# Patient Record
Sex: MALE | Race: WHITE | NOT HISPANIC OR LATINO | Employment: OTHER | URBAN - NONMETROPOLITAN AREA
[De-identification: names, ages, dates, MRNs, and addresses within clinical notes are randomized per-mention and may not be internally consistent; named-entity substitution may affect disease eponyms.]

---

## 2022-07-01 ENCOUNTER — OFFICE VISIT (OUTPATIENT)
Dept: PULMONOLOGY | Facility: CLINIC | Age: 62
End: 2022-07-01

## 2022-07-01 VITALS
RESPIRATION RATE: 16 BRPM | HEART RATE: 114 BPM | OXYGEN SATURATION: 98 % | SYSTOLIC BLOOD PRESSURE: 128 MMHG | HEIGHT: 69 IN | BODY MASS INDEX: 38.36 KG/M2 | WEIGHT: 259 LBS | DIASTOLIC BLOOD PRESSURE: 74 MMHG

## 2022-07-01 DIAGNOSIS — R06.83 SNORING: ICD-10-CM

## 2022-07-01 DIAGNOSIS — G47.33 OBSTRUCTIVE SLEEP APNEA: ICD-10-CM

## 2022-07-01 DIAGNOSIS — J43.9 PULMONARY EMPHYSEMA, UNSPECIFIED EMPHYSEMA TYPE: ICD-10-CM

## 2022-07-01 DIAGNOSIS — E66.01 MORBID OBESITY, UNSPECIFIED OBESITY TYPE: ICD-10-CM

## 2022-07-01 DIAGNOSIS — J44.9 CHRONIC OBSTRUCTIVE PULMONARY DISEASE, UNSPECIFIED COPD TYPE: ICD-10-CM

## 2022-07-01 DIAGNOSIS — F17.210 NICOTINE DEPENDENCE, CIGARETTES, UNCOMPLICATED: ICD-10-CM

## 2022-07-01 DIAGNOSIS — R06.02 SHORTNESS OF BREATH: Primary | ICD-10-CM

## 2022-07-01 DIAGNOSIS — G47.19 EXCESSIVE DAYTIME SLEEPINESS: ICD-10-CM

## 2022-07-01 DIAGNOSIS — J44.1 ACUTE EXACERBATION OF CHRONIC OBSTRUCTIVE PULMONARY DISEASE (COPD): ICD-10-CM

## 2022-07-01 PROCEDURE — 96372 THER/PROPH/DIAG INJ SC/IM: CPT | Performed by: INTERNAL MEDICINE

## 2022-07-01 PROCEDURE — 99204 OFFICE O/P NEW MOD 45 MIN: CPT | Performed by: INTERNAL MEDICINE

## 2022-07-01 PROCEDURE — 95012 NITRIC OXIDE EXP GAS DETER: CPT | Performed by: INTERNAL MEDICINE

## 2022-07-01 RX ORDER — METHYLPREDNISOLONE ACETATE 40 MG/ML
40 INJECTION, SUSPENSION INTRA-ARTICULAR; INTRALESIONAL; INTRAMUSCULAR; SOFT TISSUE ONCE
Status: COMPLETED | OUTPATIENT
Start: 2022-07-01 | End: 2022-07-01

## 2022-07-01 RX ORDER — QUETIAPINE FUMARATE 50 MG/1
50 TABLET, FILM COATED ORAL
COMMUNITY
Start: 2022-06-20

## 2022-07-01 RX ORDER — LISINOPRIL 40 MG/1
40 TABLET ORAL EVERY 12 HOURS
COMMUNITY
Start: 2022-06-20

## 2022-07-01 RX ORDER — BUSPIRONE HYDROCHLORIDE 5 MG/1
5 TABLET ORAL 3 TIMES DAILY
COMMUNITY
Start: 2022-03-11

## 2022-07-01 RX ORDER — FUROSEMIDE 80 MG
80 TABLET ORAL 2 TIMES DAILY
COMMUNITY
Start: 2022-06-20

## 2022-07-01 RX ORDER — IPRATROPIUM BROMIDE AND ALBUTEROL SULFATE 2.5; .5 MG/3ML; MG/3ML
SOLUTION RESPIRATORY (INHALATION)
COMMUNITY
Start: 2022-06-20

## 2022-07-01 RX ORDER — PHENYTOIN SODIUM 100 MG/1
100 CAPSULE, EXTENDED RELEASE ORAL 2 TIMES DAILY
COMMUNITY
Start: 2022-06-20

## 2022-07-01 RX ORDER — ATORVASTATIN CALCIUM 80 MG/1
80 TABLET, FILM COATED ORAL
COMMUNITY
Start: 2022-03-09

## 2022-07-01 RX ORDER — CLOPIDOGREL BISULFATE 75 MG/1
75 TABLET ORAL DAILY
COMMUNITY
Start: 2015-04-23

## 2022-07-01 RX ADMIN — METHYLPREDNISOLONE ACETATE 40 MG: 40 INJECTION, SUSPENSION INTRA-ARTICULAR; INTRALESIONAL; INTRAMUSCULAR; SOFT TISSUE at 11:13

## 2022-07-01 RX ADMIN — METHYLPREDNISOLONE ACETATE 40 MG: 40 INJECTION, SUSPENSION INTRA-ARTICULAR; INTRALESIONAL; INTRAMUSCULAR; SOFT TISSUE at 11:35
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
## 2023-04-12 NOTE — PROGRESS NOTES
[facility]    Encounter Date: 2023    Patient ID: Fernie Dalal is a 63 y.o. male.  : 1960     PCP: Belem Albert APRN       Chief Complaint: Establish Care (New Patient   COPD)      PROBLEM LIST:  1. Systolic heart failure/cardiomyopathy  a. AMA to RCA, data deficit  b. [facility] 2011 at : Diffusely diseased RCA with 50 to 60% narrowing proximal to prior stent, stent is patent, tandem 40% lesion distal to the stent, FFR across the mid RCA is 0.82  c. [facility], 2022; EF 14%. Severely large LV cavity. No stress-induced TID. Large defect located in the inferio, inferoseptal and inferoapical myocardium. The defect is fixed, consistent with scar in the distribution of the RCA. Prominent RV uptake, consistent with significant LV systolic dysfunction. The gated SPECT images demonstrate abnormal systolic function. Regional wall motion is abnormal with extensive ingerior, inferoseptal, and inferoapical akinesis/hypokinesis. There is global hypokinesis.  d. Echo 3/2023, reportedly EF 33%  2. Diabetes, type II  3. COPD, on home oxygen  4. Seizure disorder    History of Present Illness: Fernie Dalal is a 63 y.o. male who presents to the cardiology office today to be seen in consultation for chest pain and shortness of breath.  Patient has had multiple hospitalizations over the last 12 months with acute on chronic respiratory failure that appears to be multifactorial.  He does have chronic supplemental oxygen via nasal cannula and did have a stress test in 2022 that showed a large defect in the inferior, inferior septal, inferior apical myocardium with an EF of 14%.  It does not appear that the patient underwent heart catheterization after this finding and he does not recall any recent heart catheterization.  He had a repeat admission at [facility] 2023 and a repeat echocardiogram was obtained that revealed an EF of 33%.  He did have elevated  troponin but this was felt to be type II mechanism in the setting of multiple medical conditions.    Today, patient reports that he has been having progressive chest pain and shortness of breath for the last several months.  He gets short of breath by the time he finishes his sentences.  He cannot ambulate throughout his house without having chest pain or tightness.  He has been compliant with his medications.    Past Medical History:   Past Medical History:   Diagnosis Date   • Angina at rest    • Anxiety    • Arrhythmia    • COPD (chronic obstructive pulmonary disease)    • GERD (gastroesophageal reflux disease)    • Heart failure     congestive   • Hyperlipidemia    • Hypertension    • Myocardial infarction        Past Surgical History:   Past Surgical History:   Procedure Laterality Date   • CARDIAC CATHETERIZATION     • CARDIOVASCULAR STRESS TEST         Family History:   Family History   Problem Relation Age of Onset   • Breast cancer Mother    • Kidney failure Father    • Pneumonia Father    • Stroke Father    • Ovarian cancer Sister    • Breast cancer Sister    • Melanoma Sister    • No Known Problems Sister    • Hyperlipidemia Sister    • Hypertension Sister    • Heart attack Sister    • Stroke Brother    • Hyperlipidemia Brother    • Hypertension Brother    • Heart disease Brother    • Stomach cancer Brother    • Stroke Brother    • Heart attack Brother    • Heart disease Brother    • Alzheimer's disease Brother    • Hypertension Brother    • Hyperlipidemia Brother    • Heart disease Brother    • Heart attack Brother        Social History:   Social History     Socioeconomic History   • Marital status: Single   Tobacco Use   • Smoking status: Former     Packs/day: 1.00     Years: 30.00     Pack years: 30.00     Types: Cigarettes     Quit date: 2022     Years since quittin.1   Vaping Use   • Vaping Use: Never used   Substance and Sexual Activity   • Alcohol use: Never   • Drug use: Not Currently      Types: Marijuana   • Sexual activity: Defer       Tobacco History:   Social History     Tobacco Use   Smoking Status Former   • Packs/day: 1.00   • Years: 30.00   • Pack years: 30.00   • Types: Cigarettes   • Quit date: 2022   • Years since quittin.1   Smokeless Tobacco Not on file       Medications:     Current Outpatient Medications:   •  Aspirin Adult Low Strength 81 MG EC tablet, Take 1 tablet by mouth Daily., Disp: , Rfl:   •  atorvastatin (LIPITOR) 40 MG tablet, Take 1 tablet by mouth every night at bedtime., Disp: , Rfl:   •  bumetanide (BUMEX) 1 MG tablet, Take 1 tablet by mouth Daily., Disp: , Rfl:   •  busPIRone (BUSPAR) 5 MG tablet, Take 1 tablet by mouth 3 (Three) Times a Day., Disp: , Rfl:   •  calcium citrate (CALCITRATE) 950 (200 Ca) MG tablet, Take 1 tablet by mouth Daily., Disp: , Rfl:   •  citalopram (CeleXA) 20 MG tablet, Take 1 tablet by mouth Daily., Disp: , Rfl:   •  clopidogrel (PLAVIX) 75 MG tablet, Take 1 tablet by mouth Daily., Disp: , Rfl:   •  cyclobenzaprine (FLEXERIL) 5 MG tablet, Take 1 tablet by mouth 3 (Three) Times a Day As Needed., Disp: , Rfl:   •  diazePAM (VALIUM) 10 MG tablet, TAKE 1 TABLET TWICE DAILY AS NEEDED FOR SEVERE ANXIETY, Disp: , Rfl:   •  Fluticasone-Umeclidin-Vilant (Trelegy Ellipta) 100-62.5-25 MCG/INH inhaler, Inhale 1 puff Daily. Rinse mouth with water after use., Disp: 1 each, Rfl: 5  •  furosemide (LASIX) 80 MG tablet, Take 1 tablet by mouth Daily., Disp: , Rfl:   •  guaiFENesin (HUMIBID 3) 400 MG tablet, Take 1 tablet by mouth Daily., Disp: , Rfl:   •  HYDROcodone-acetaminophen (NORCO) 5-325 MG per tablet, TAKE 1 TABLET EVERY SIX HOURS AS NEEDED FOR SEVERE pain, Disp: , Rfl:   •  ipratropium-albuterol (DUO-NEB) 0.5-2.5 mg/3 ml nebulizer, INHALE ONE VIAL VIA NEBULIZER FOUR TIMES DAILY AS NEEDED, Disp: , Rfl:   •  lisinopril (PRINIVIL,ZESTRIL) 40 MG tablet, Take 1 tablet by mouth Daily., Disp: , Rfl:   •  metoprolol succinate XL (TOPROL-XL) 100 MG 24 hr  "tablet, Take 1 tablet by mouth Daily., Disp: , Rfl:   •  phenytoin ER (DILANTIN) 100 MG capsule, Take 1 capsule by mouth 2 (Two) Times a Day., Disp: , Rfl:   •  potassium chloride ER (K-TAB) 20 MEQ tablet controlled-release ER tablet, Take 1 tablet by mouth 2 (Two) Times a Day With Meals., Disp: , Rfl:   •  ProAir  (90 Base) MCG/ACT inhaler, Inhale 2 puffs Every 4 (Four) Hours As Needed., Disp: , Rfl:   •  QUEtiapine Fumarate 150 MG tablet, Take 1 tablet by mouth every night at bedtime., Disp: , Rfl:   •  dilTIAZem XR (DILACOR XR) 180 MG 24 hr capsule, Take 1 capsule by mouth Daily., Disp: 90 capsule, Rfl: 3    Allergies:   No Known Allergies  The following portions of the patient's history were reviewed and updated as appropriate: allergies, current medications, past family history, past medical history, past social history, past surgical history and problem list.    Review of Systems   14 point ROS negative except for that listed in the HPI.          Objective:     /86 (BP Location: Right arm, Patient Position: Sitting)   Pulse 102   Ht 175.3 cm (69.02\")   SpO2 91%   BMI 38.23 kg/m²      Physical Exam  Constitutional: Patient appears well-developed and well-nourished.   HENT: HEENT exam unremarkable.   Neck: Neck supple. No JVD present. No carotid bruits.   Cardiovascular: Normal rate, regular rhythm and normal heart sounds. No murmur heard.   2+ symmetric pulses.   Pulmonary/Chest: Breath sounds normal. Does not exhibit tenderness.   Abdominal: Abdomen benign.   Musculoskeletal: Does not exhibit edema.   Neurological: Neurological exam unremarkable.   Vitals reviewed.    Data Review:     Lab date: 07/19/2022  • CMP: Glu 333, BUN 12, Creat 1.17, Na 136, K 5.1, Cl 103, CO2 29, Ca 8.6, Alk Phos 76, AST 37, ALT 61  • CBC: WBC 21.6, RBC 5.43, HGB 15.2, HCT 50.1, MCV 92, MCH 28.0,           ECG 12 Lead    Date/Time: 4/18/2023 4:25 PM  Performed by: Bren Araya PA-C  Authorized by: " Bren Araya PA-C   Comparison: not compared with previous ECG   Previous ECG: no previous ECG available  Rhythm: sinus rhythm and sinus tachycardia  Rate: tachycardic  BPM: 102  Conduction: right bundle branch block  Other findings: non-specific ST-T wave changes    Clinical impression: abnormal EKG               Assessment:   Medical records reviewed and summarized as in problem list above.     Diagnosis   1. Abnormal stress test    2. Acute on chronic systolic CHF (congestive heart failure)    3. Dyslipidemia    4. Essential hypertension    5. Sleep apnea, unspecified type      Plan:   Patient with abnormal stress test 3/2022 that showed large defect located in the inferio, inferoseptal and inferoapical myocardium. The defect is fixed, consistent with scar in the distribution of the RCA.  He is having ongoing chest pain and dyspnea on exertion and has had multiple hospitalizations.  We will proceed with left heart catheterization for ischemic evaluation with catheter-based intervention if necessary.  Add diltiazem 180 mg daily for better heart rate control.   Continue current medications.   FU after procedure, sooner as needed.  Thank you for allowing us to participate in the care of your patient.       Bren Araya PA-C    Part of this note may be an electronic transcription/translation of spoken language to printed text using the Dragon Dictation System.

## 2023-04-18 ENCOUNTER — OFFICE VISIT (OUTPATIENT)
Dept: CARDIOLOGY | Facility: CLINIC | Age: 63
End: 2023-04-18
Payer: MEDICAID

## 2023-04-18 VITALS
HEIGHT: 69 IN | HEART RATE: 102 BPM | SYSTOLIC BLOOD PRESSURE: 138 MMHG | BODY MASS INDEX: 38.23 KG/M2 | DIASTOLIC BLOOD PRESSURE: 86 MMHG | OXYGEN SATURATION: 91 %

## 2023-04-18 DIAGNOSIS — E78.5 DYSLIPIDEMIA: ICD-10-CM

## 2023-04-18 DIAGNOSIS — I10 ESSENTIAL HYPERTENSION: ICD-10-CM

## 2023-04-18 DIAGNOSIS — R94.39 ABNORMAL STRESS TEST: Primary | ICD-10-CM

## 2023-04-18 DIAGNOSIS — G47.30 SLEEP APNEA, UNSPECIFIED TYPE: ICD-10-CM

## 2023-04-18 DIAGNOSIS — I50.23 ACUTE ON CHRONIC SYSTOLIC CHF (CONGESTIVE HEART FAILURE): ICD-10-CM

## 2023-04-18 PROBLEM — IMO0001 BLUES: Status: ACTIVE | Noted: 2023-04-18

## 2023-04-18 PROBLEM — I50.22 CHRONIC SYSTOLIC CONGESTIVE HEART FAILURE: Status: ACTIVE | Noted: 2022-04-04

## 2023-04-18 PROBLEM — M54.50 CHRONIC LBP: Status: ACTIVE | Noted: 2023-04-18

## 2023-04-18 PROBLEM — G40.909 SEIZURE DISORDER: Status: ACTIVE | Noted: 2023-04-18

## 2023-04-18 PROBLEM — J30.9 ALLERGIC RHINITIS: Status: ACTIVE | Noted: 2023-04-18

## 2023-04-18 PROBLEM — E66.9 ADIPOSITY: Status: ACTIVE | Noted: 2023-04-18

## 2023-04-18 PROBLEM — I25.10 CORONARY ARTERY DISEASE INVOLVING NATIVE HEART: Status: ACTIVE | Noted: 2022-04-04

## 2023-04-18 PROBLEM — M62.838 MUSCLE SPASM: Status: ACTIVE | Noted: 2022-03-18

## 2023-04-18 PROBLEM — J44.1 ACUTE EXACERBATION OF CHRONIC OBSTRUCTIVE PULMONARY DISEASE (COPD): Status: ACTIVE | Noted: 2022-03-18

## 2023-04-18 PROBLEM — G89.29 CHRONIC LBP: Status: ACTIVE | Noted: 2023-04-18

## 2023-04-18 PROBLEM — R07.9 CHEST PAIN: Status: ACTIVE | Noted: 2022-03-17

## 2023-04-18 PROBLEM — F41.9 ANXIETY: Status: ACTIVE | Noted: 2022-03-18

## 2023-04-18 RX ORDER — METOPROLOL SUCCINATE 100 MG/1
1 TABLET, EXTENDED RELEASE ORAL DAILY
Status: ON HOLD | COMMUNITY
Start: 2023-03-07

## 2023-04-18 RX ORDER — DIAZEPAM 10 MG/1
TABLET ORAL
COMMUNITY
Start: 2023-02-08 | End: 2023-04-22

## 2023-04-18 RX ORDER — IBUPROFEN 200 MG
1 CAPSULE ORAL DAILY
Status: ON HOLD | COMMUNITY
Start: 2023-01-12

## 2023-04-18 RX ORDER — HYDROCODONE BITARTRATE AND ACETAMINOPHEN 5; 325 MG/1; MG/1
TABLET ORAL
COMMUNITY
Start: 2023-03-08 | End: 2023-04-22

## 2023-04-18 RX ORDER — CYCLOBENZAPRINE HCL 5 MG
5 TABLET ORAL 3 TIMES DAILY PRN
Status: ON HOLD | COMMUNITY
Start: 2023-03-07

## 2023-04-18 RX ORDER — GUAIFENESIN 400 MG/1
400 TABLET ORAL DAILY
COMMUNITY
End: 2023-04-22

## 2023-04-18 RX ORDER — QUETIAPINE FUMARATE 150 MG/1
1 TABLET, FILM COATED ORAL
Status: ON HOLD | COMMUNITY
Start: 2023-03-07

## 2023-04-18 RX ORDER — BUMETANIDE 1 MG/1
1 TABLET ORAL DAILY
Status: ON HOLD | COMMUNITY
Start: 2023-03-07

## 2023-04-18 RX ORDER — CITALOPRAM 20 MG/1
1 TABLET ORAL DAILY
Status: ON HOLD | COMMUNITY
Start: 2023-03-07

## 2023-04-18 RX ORDER — POTASSIUM CHLORIDE 1500 MG/1
20 TABLET, FILM COATED, EXTENDED RELEASE ORAL 2 TIMES DAILY WITH MEALS
Status: ON HOLD | COMMUNITY
Start: 2023-03-07

## 2023-04-18 RX ORDER — DILTIAZEM HYDROCHLORIDE 180 MG/1
180 CAPSULE, EXTENDED RELEASE ORAL DAILY
Qty: 90 CAPSULE | Refills: 3 | Status: ON HOLD | OUTPATIENT
Start: 2023-04-18

## 2023-04-18 RX ORDER — ASPIRIN 81 MG/1
1 TABLET ORAL DAILY
Status: ON HOLD | COMMUNITY
Start: 2023-03-07

## 2023-04-18 RX ORDER — ATORVASTATIN CALCIUM 40 MG/1
40 TABLET, FILM COATED ORAL
Status: ON HOLD | COMMUNITY
Start: 2023-03-07

## 2023-04-19 ENCOUNTER — PREP FOR SURGERY (OUTPATIENT)
Dept: OTHER | Facility: HOSPITAL | Age: 63
End: 2023-04-19
Payer: MEDICAID

## 2023-04-19 RX ORDER — ASPIRIN 81 MG/1
324 TABLET, CHEWABLE ORAL ONCE
OUTPATIENT
Start: 2023-04-19 | End: 2023-04-19

## 2023-04-19 RX ORDER — ASPIRIN 81 MG/1
81 TABLET ORAL DAILY
OUTPATIENT
Start: 2023-04-20

## 2023-04-19 RX ORDER — SODIUM CHLORIDE 0.9 % (FLUSH) 0.9 %
10 SYRINGE (ML) INJECTION AS NEEDED
OUTPATIENT
Start: 2023-04-19

## 2023-04-19 RX ORDER — SODIUM CHLORIDE 0.9 % (FLUSH) 0.9 %
10 SYRINGE (ML) INJECTION EVERY 12 HOURS SCHEDULED
OUTPATIENT
Start: 2023-04-19

## 2023-04-19 RX ORDER — SODIUM CHLORIDE 9 MG/ML
40 INJECTION, SOLUTION INTRAVENOUS AS NEEDED
OUTPATIENT
Start: 2023-04-19

## 2023-04-22 ENCOUNTER — APPOINTMENT (OUTPATIENT)
Dept: GENERAL RADIOLOGY | Facility: HOSPITAL | Age: 63
DRG: 280 | End: 2023-04-22
Payer: MEDICAID

## 2023-04-22 ENCOUNTER — HOSPITAL ENCOUNTER (INPATIENT)
Facility: HOSPITAL | Age: 63
LOS: 6 days | Discharge: HOME HEALTH | DRG: 280 | End: 2023-04-28
Attending: EMERGENCY MEDICINE | Admitting: STUDENT IN AN ORGANIZED HEALTH CARE EDUCATION/TRAINING PROGRAM
Payer: MEDICAID

## 2023-04-22 DIAGNOSIS — J44.1 ACUTE EXACERBATION OF CHRONIC OBSTRUCTIVE PULMONARY DISEASE (COPD): ICD-10-CM

## 2023-04-22 DIAGNOSIS — R07.9 NONSPECIFIC CHEST PAIN: Primary | ICD-10-CM

## 2023-04-22 DIAGNOSIS — I50.22 CHRONIC SYSTOLIC CONGESTIVE HEART FAILURE: ICD-10-CM

## 2023-04-22 DIAGNOSIS — F41.9 ANXIETY: ICD-10-CM

## 2023-04-22 DIAGNOSIS — M62.838 MUSCLE SPASM: ICD-10-CM

## 2023-04-22 DIAGNOSIS — I50.23 ACUTE ON CHRONIC SYSTOLIC CHF (CONGESTIVE HEART FAILURE): ICD-10-CM

## 2023-04-22 DIAGNOSIS — I10 ESSENTIAL HYPERTENSION: ICD-10-CM

## 2023-04-22 DIAGNOSIS — J44.1 COPD EXACERBATION: ICD-10-CM

## 2023-04-22 DIAGNOSIS — R94.39 ABNORMAL STRESS TEST: ICD-10-CM

## 2023-04-22 LAB
ALBUMIN SERPL-MCNC: 3.7 G/DL (ref 3.5–5.2)
ALBUMIN/GLOB SERPL: 1.1 G/DL
ALP SERPL-CCNC: 100 U/L (ref 39–117)
ALT SERPL W P-5'-P-CCNC: 17 U/L (ref 1–41)
ANION GAP SERPL CALCULATED.3IONS-SCNC: 7 MMOL/L (ref 5–15)
AST SERPL-CCNC: 16 U/L (ref 1–40)
B PARAPERT DNA SPEC QL NAA+PROBE: NOT DETECTED
B PERT DNA SPEC QL NAA+PROBE: NOT DETECTED
BASOPHILS # BLD AUTO: 0.06 10*3/MM3 (ref 0–0.2)
BASOPHILS NFR BLD AUTO: 0.8 % (ref 0–1.5)
BILIRUB SERPL-MCNC: 0.3 MG/DL (ref 0–1.2)
BUN SERPL-MCNC: 11 MG/DL (ref 8–23)
BUN/CREAT SERPL: 19 (ref 7–25)
C PNEUM DNA NPH QL NAA+NON-PROBE: NOT DETECTED
CALCIUM SPEC-SCNC: 9.5 MG/DL (ref 8.6–10.5)
CHLORIDE SERPL-SCNC: 95 MMOL/L (ref 98–107)
CO2 SERPL-SCNC: 36 MMOL/L (ref 22–29)
CREAT SERPL-MCNC: 0.58 MG/DL (ref 0.76–1.27)
D DIMER PPP FEU-MCNC: 0.78 MCGFEU/ML (ref 0–0.62)
DEPRECATED RDW RBC AUTO: 49.1 FL (ref 37–54)
EGFRCR SERPLBLD CKD-EPI 2021: 110.3 ML/MIN/1.73
EOSINOPHIL # BLD AUTO: 0.13 10*3/MM3 (ref 0–0.4)
EOSINOPHIL NFR BLD AUTO: 1.8 % (ref 0.3–6.2)
ERYTHROCYTE [DISTWIDTH] IN BLOOD BY AUTOMATED COUNT: 15 % (ref 12.3–15.4)
FLUAV SUBTYP SPEC NAA+PROBE: NOT DETECTED
FLUBV RNA ISLT QL NAA+PROBE: NOT DETECTED
GLOBULIN UR ELPH-MCNC: 3.3 GM/DL
GLUCOSE BLDC GLUCOMTR-MCNC: 127 MG/DL (ref 70–130)
GLUCOSE SERPL-MCNC: 101 MG/DL (ref 65–99)
HADV DNA SPEC NAA+PROBE: NOT DETECTED
HCOV 229E RNA SPEC QL NAA+PROBE: NOT DETECTED
HCOV HKU1 RNA SPEC QL NAA+PROBE: NOT DETECTED
HCOV NL63 RNA SPEC QL NAA+PROBE: NOT DETECTED
HCOV OC43 RNA SPEC QL NAA+PROBE: NOT DETECTED
HCT VFR BLD AUTO: 50.4 % (ref 37.5–51)
HGB BLD-MCNC: 14.9 G/DL (ref 13–17.7)
HMPV RNA NPH QL NAA+NON-PROBE: NOT DETECTED
HOLD SPECIMEN: NORMAL
HPIV1 RNA ISLT QL NAA+PROBE: NOT DETECTED
HPIV2 RNA SPEC QL NAA+PROBE: NOT DETECTED
HPIV3 RNA NPH QL NAA+PROBE: NOT DETECTED
HPIV4 P GENE NPH QL NAA+PROBE: NOT DETECTED
IMM GRANULOCYTES # BLD AUTO: 0.03 10*3/MM3 (ref 0–0.05)
IMM GRANULOCYTES NFR BLD AUTO: 0.4 % (ref 0–0.5)
LYMPHOCYTES # BLD AUTO: 0.96 10*3/MM3 (ref 0.7–3.1)
LYMPHOCYTES NFR BLD AUTO: 13.3 % (ref 19.6–45.3)
M PNEUMO IGG SER IA-ACNC: NOT DETECTED
MCH RBC QN AUTO: 26.2 PG (ref 26.6–33)
MCHC RBC AUTO-ENTMCNC: 29.6 G/DL (ref 31.5–35.7)
MCV RBC AUTO: 88.7 FL (ref 79–97)
MONOCYTES # BLD AUTO: 0.83 10*3/MM3 (ref 0.1–0.9)
MONOCYTES NFR BLD AUTO: 11.5 % (ref 5–12)
NEUTROPHILS NFR BLD AUTO: 5.2 10*3/MM3 (ref 1.7–7)
NEUTROPHILS NFR BLD AUTO: 72.2 % (ref 42.7–76)
NRBC BLD AUTO-RTO: 0 /100 WBC (ref 0–0.2)
NT-PROBNP SERPL-MCNC: 2034 PG/ML (ref 0–900)
PLATELET # BLD AUTO: 306 10*3/MM3 (ref 140–450)
PMV BLD AUTO: 9.8 FL (ref 6–12)
POTASSIUM SERPL-SCNC: 4.4 MMOL/L (ref 3.5–5.2)
PROT SERPL-MCNC: 7 G/DL (ref 6–8.5)
QT INTERVAL: 394 MS
QTC INTERVAL: 500 MS
RBC # BLD AUTO: 5.68 10*6/MM3 (ref 4.14–5.8)
RHINOVIRUS RNA SPEC NAA+PROBE: NOT DETECTED
RSV RNA NPH QL NAA+NON-PROBE: NOT DETECTED
SARS-COV-2 RNA NPH QL NAA+NON-PROBE: NOT DETECTED
SODIUM SERPL-SCNC: 138 MMOL/L (ref 136–145)
TROPONIN T SERPL HS-MCNC: 87 NG/L
WBC NRBC COR # BLD: 7.21 10*3/MM3 (ref 3.4–10.8)
WHOLE BLOOD HOLD COAG: NORMAL
WHOLE BLOOD HOLD SPECIMEN: NORMAL

## 2023-04-22 PROCEDURE — 83735 ASSAY OF MAGNESIUM: CPT | Performed by: PHYSICIAN ASSISTANT

## 2023-04-22 PROCEDURE — 83880 ASSAY OF NATRIURETIC PEPTIDE: CPT | Performed by: EMERGENCY MEDICINE

## 2023-04-22 PROCEDURE — 25010000002 FUROSEMIDE PER 20 MG: Performed by: NURSE PRACTITIONER

## 2023-04-22 PROCEDURE — 93005 ELECTROCARDIOGRAM TRACING: CPT | Performed by: EMERGENCY MEDICINE

## 2023-04-22 PROCEDURE — 71045 X-RAY EXAM CHEST 1 VIEW: CPT

## 2023-04-22 PROCEDURE — 99285 EMERGENCY DEPT VISIT HI MDM: CPT

## 2023-04-22 PROCEDURE — 25010000002 METHYLPREDNISOLONE PER 40 MG: Performed by: INTERNAL MEDICINE

## 2023-04-22 PROCEDURE — 99223 1ST HOSP IP/OBS HIGH 75: CPT | Performed by: INTERNAL MEDICINE

## 2023-04-22 PROCEDURE — 94640 AIRWAY INHALATION TREATMENT: CPT

## 2023-04-22 PROCEDURE — 85379 FIBRIN DEGRADATION QUANT: CPT | Performed by: EMERGENCY MEDICINE

## 2023-04-22 PROCEDURE — 80053 COMPREHEN METABOLIC PANEL: CPT | Performed by: EMERGENCY MEDICINE

## 2023-04-22 PROCEDURE — 25010000002 ENOXAPARIN PER 10 MG: Performed by: INTERNAL MEDICINE

## 2023-04-22 PROCEDURE — 82962 GLUCOSE BLOOD TEST: CPT

## 2023-04-22 PROCEDURE — 85025 COMPLETE CBC W/AUTO DIFF WBC: CPT

## 2023-04-22 PROCEDURE — 99232 SBSQ HOSP IP/OBS MODERATE 35: CPT | Performed by: INTERNAL MEDICINE

## 2023-04-22 PROCEDURE — 0202U NFCT DS 22 TRGT SARS-COV-2: CPT | Performed by: INTERNAL MEDICINE

## 2023-04-22 PROCEDURE — 93005 ELECTROCARDIOGRAM TRACING: CPT

## 2023-04-22 PROCEDURE — 94799 UNLISTED PULMONARY SVC/PX: CPT

## 2023-04-22 PROCEDURE — 84484 ASSAY OF TROPONIN QUANT: CPT | Performed by: EMERGENCY MEDICINE

## 2023-04-22 PROCEDURE — 25010000002 METHYLPREDNISOLONE PER 125 MG: Performed by: EMERGENCY MEDICINE

## 2023-04-22 RX ORDER — METHYLPREDNISOLONE SODIUM SUCCINATE 40 MG/ML
40 INJECTION, POWDER, LYOPHILIZED, FOR SOLUTION INTRAMUSCULAR; INTRAVENOUS EVERY 8 HOURS SCHEDULED
Status: DISCONTINUED | OUTPATIENT
Start: 2023-04-22 | End: 2023-04-26

## 2023-04-22 RX ORDER — NICOTINE POLACRILEX 4 MG
15 LOZENGE BUCCAL
Status: DISCONTINUED | OUTPATIENT
Start: 2023-04-22 | End: 2023-04-28 | Stop reason: HOSPADM

## 2023-04-22 RX ORDER — FUROSEMIDE 10 MG/ML
40 INJECTION INTRAMUSCULAR; INTRAVENOUS ONCE
Status: COMPLETED | OUTPATIENT
Start: 2023-04-22 | End: 2023-04-22

## 2023-04-22 RX ORDER — ASPIRIN 81 MG/1
324 TABLET, CHEWABLE ORAL ONCE
Status: COMPLETED | OUTPATIENT
Start: 2023-04-22 | End: 2023-04-22

## 2023-04-22 RX ORDER — IPRATROPIUM BROMIDE AND ALBUTEROL SULFATE 2.5; .5 MG/3ML; MG/3ML
3 SOLUTION RESPIRATORY (INHALATION) ONCE
Status: COMPLETED | OUTPATIENT
Start: 2023-04-22 | End: 2023-04-22

## 2023-04-22 RX ORDER — DEXTROSE MONOHYDRATE 25 G/50ML
25 INJECTION, SOLUTION INTRAVENOUS
Status: DISCONTINUED | OUTPATIENT
Start: 2023-04-22 | End: 2023-04-28 | Stop reason: HOSPADM

## 2023-04-22 RX ORDER — CLOPIDOGREL BISULFATE 75 MG/1
75 TABLET ORAL DAILY
Status: DISCONTINUED | OUTPATIENT
Start: 2023-04-22 | End: 2023-04-28 | Stop reason: HOSPADM

## 2023-04-22 RX ORDER — CITALOPRAM 20 MG/1
20 TABLET ORAL DAILY
Status: DISCONTINUED | OUTPATIENT
Start: 2023-04-22 | End: 2023-04-28 | Stop reason: HOSPADM

## 2023-04-22 RX ORDER — PHENYTOIN SODIUM 100 MG/1
100 CAPSULE, EXTENDED RELEASE ORAL 2 TIMES DAILY
Status: DISCONTINUED | OUTPATIENT
Start: 2023-04-22 | End: 2023-04-28 | Stop reason: HOSPADM

## 2023-04-22 RX ORDER — LISINOPRIL 40 MG/1
40 TABLET ORAL DAILY
Status: DISCONTINUED | OUTPATIENT
Start: 2023-04-22 | End: 2023-04-22

## 2023-04-22 RX ORDER — NITROGLYCERIN 0.4 MG/1
0.4 TABLET SUBLINGUAL
Status: DISCONTINUED | OUTPATIENT
Start: 2023-04-22 | End: 2023-04-28 | Stop reason: HOSPADM

## 2023-04-22 RX ORDER — BUDESONIDE AND FORMOTEROL FUMARATE DIHYDRATE 160; 4.5 UG/1; UG/1
2 AEROSOL RESPIRATORY (INHALATION)
Status: DISCONTINUED | OUTPATIENT
Start: 2023-04-22 | End: 2023-04-28 | Stop reason: HOSPADM

## 2023-04-22 RX ORDER — GUAIFENESIN 600 MG/1
1200 TABLET, EXTENDED RELEASE ORAL EVERY 12 HOURS SCHEDULED
Status: DISCONTINUED | OUTPATIENT
Start: 2023-04-22 | End: 2023-04-28 | Stop reason: HOSPADM

## 2023-04-22 RX ORDER — INSULIN LISPRO 100 [IU]/ML
0-7 INJECTION, SOLUTION INTRAVENOUS; SUBCUTANEOUS
Status: DISCONTINUED | OUTPATIENT
Start: 2023-04-22 | End: 2023-04-28 | Stop reason: HOSPADM

## 2023-04-22 RX ORDER — METOPROLOL SUCCINATE 50 MG/1
100 TABLET, EXTENDED RELEASE ORAL DAILY
Status: DISCONTINUED | OUTPATIENT
Start: 2023-04-22 | End: 2023-04-28 | Stop reason: HOSPADM

## 2023-04-22 RX ORDER — ENOXAPARIN SODIUM 100 MG/ML
40 INJECTION SUBCUTANEOUS
Status: DISCONTINUED | OUTPATIENT
Start: 2023-04-22 | End: 2023-04-23

## 2023-04-22 RX ORDER — DILTIAZEM HYDROCHLORIDE 180 MG/1
180 CAPSULE, COATED, EXTENDED RELEASE ORAL
Status: DISCONTINUED | OUTPATIENT
Start: 2023-04-22 | End: 2023-04-22

## 2023-04-22 RX ORDER — DIAZEPAM 5 MG/1
5 TABLET ORAL EVERY 12 HOURS PRN
Status: DISCONTINUED | OUTPATIENT
Start: 2023-04-22 | End: 2023-04-28 | Stop reason: HOSPADM

## 2023-04-22 RX ORDER — IPRATROPIUM BROMIDE AND ALBUTEROL SULFATE 2.5; .5 MG/3ML; MG/3ML
3 SOLUTION RESPIRATORY (INHALATION)
Status: DISCONTINUED | OUTPATIENT
Start: 2023-04-22 | End: 2023-04-25

## 2023-04-22 RX ORDER — DOXYCYCLINE 100 MG/1
100 CAPSULE ORAL EVERY 12 HOURS SCHEDULED
Status: DISPENSED | OUTPATIENT
Start: 2023-04-22 | End: 2023-04-27

## 2023-04-22 RX ORDER — BUSPIRONE HYDROCHLORIDE 5 MG/1
5 TABLET ORAL 3 TIMES DAILY
Status: DISCONTINUED | OUTPATIENT
Start: 2023-04-22 | End: 2023-04-28 | Stop reason: HOSPADM

## 2023-04-22 RX ORDER — HYDROXYZINE HYDROCHLORIDE 25 MG/1
50 TABLET, FILM COATED ORAL 3 TIMES DAILY PRN
Status: DISCONTINUED | OUTPATIENT
Start: 2023-04-22 | End: 2023-04-28 | Stop reason: HOSPADM

## 2023-04-22 RX ORDER — METHYLPREDNISOLONE SODIUM SUCCINATE 125 MG/2ML
125 INJECTION, POWDER, LYOPHILIZED, FOR SOLUTION INTRAMUSCULAR; INTRAVENOUS ONCE
Status: COMPLETED | OUTPATIENT
Start: 2023-04-22 | End: 2023-04-22

## 2023-04-22 RX ORDER — QUETIAPINE FUMARATE 50 MG/1
150 TABLET, EXTENDED RELEASE ORAL NIGHTLY
Status: DISCONTINUED | OUTPATIENT
Start: 2023-04-22 | End: 2023-04-28 | Stop reason: HOSPADM

## 2023-04-22 RX ORDER — IBUPROFEN 600 MG/1
1 TABLET ORAL
Status: DISCONTINUED | OUTPATIENT
Start: 2023-04-22 | End: 2023-04-28 | Stop reason: HOSPADM

## 2023-04-22 RX ORDER — SODIUM CHLORIDE 0.9 % (FLUSH) 0.9 %
10 SYRINGE (ML) INJECTION AS NEEDED
Status: DISCONTINUED | OUTPATIENT
Start: 2023-04-22 | End: 2023-04-28 | Stop reason: HOSPADM

## 2023-04-22 RX ORDER — ASPIRIN 81 MG/1
81 TABLET ORAL DAILY
Status: DISCONTINUED | OUTPATIENT
Start: 2023-04-22 | End: 2023-04-25

## 2023-04-22 RX ORDER — FUROSEMIDE 40 MG/1
80 TABLET ORAL DAILY
Status: DISCONTINUED | OUTPATIENT
Start: 2023-04-22 | End: 2023-04-22

## 2023-04-22 RX ORDER — HYDROXYZINE HYDROCHLORIDE 25 MG/1
25 TABLET, FILM COATED ORAL 3 TIMES DAILY PRN
Status: DISCONTINUED | OUTPATIENT
Start: 2023-04-22 | End: 2023-04-22

## 2023-04-22 RX ORDER — ATORVASTATIN CALCIUM 40 MG/1
40 TABLET, FILM COATED ORAL NIGHTLY
Status: DISCONTINUED | OUTPATIENT
Start: 2023-04-22 | End: 2023-04-28 | Stop reason: HOSPADM

## 2023-04-22 RX ADMIN — BUSPIRONE HYDROCHLORIDE 5 MG: 5 TABLET ORAL at 21:24

## 2023-04-22 RX ADMIN — FUROSEMIDE 80 MG: 40 TABLET ORAL at 16:44

## 2023-04-22 RX ADMIN — BUDESONIDE AND FORMOTEROL FUMARATE DIHYDRATE 2 PUFF: 160; 4.5 AEROSOL RESPIRATORY (INHALATION) at 21:37

## 2023-04-22 RX ADMIN — IPRATROPIUM BROMIDE AND ALBUTEROL SULFATE 3 ML: 2.5; .5 SOLUTION RESPIRATORY (INHALATION) at 21:36

## 2023-04-22 RX ADMIN — IPRATROPIUM BROMIDE AND ALBUTEROL SULFATE 3 ML: 2.5; .5 SOLUTION RESPIRATORY (INHALATION) at 17:48

## 2023-04-22 RX ADMIN — METHYLPREDNISOLONE SODIUM SUCCINATE 40 MG: 40 INJECTION INTRAMUSCULAR; INTRAVENOUS at 21:23

## 2023-04-22 RX ADMIN — ENOXAPARIN SODIUM 40 MG: 40 INJECTION SUBCUTANEOUS at 16:43

## 2023-04-22 RX ADMIN — LISINOPRIL 40 MG: 40 TABLET ORAL at 16:44

## 2023-04-22 RX ADMIN — FUROSEMIDE 40 MG: 10 INJECTION, SOLUTION INTRAMUSCULAR; INTRAVENOUS at 19:55

## 2023-04-22 RX ADMIN — GUAIFENESIN 1200 MG: 600 TABLET, EXTENDED RELEASE ORAL at 21:24

## 2023-04-22 RX ADMIN — ATORVASTATIN CALCIUM 40 MG: 40 TABLET, FILM COATED ORAL at 21:23

## 2023-04-22 RX ADMIN — DIAZEPAM 5 MG: 5 TABLET ORAL at 17:39

## 2023-04-22 RX ADMIN — QUETIAPINE FUMARATE 150 MG: 50 TABLET, EXTENDED RELEASE ORAL at 21:23

## 2023-04-22 RX ADMIN — NITROGLYCERIN 0.4 MG: 0.4 TABLET SUBLINGUAL at 12:19

## 2023-04-22 RX ADMIN — CLOPIDOGREL BISULFATE 75 MG: 75 TABLET ORAL at 16:44

## 2023-04-22 RX ADMIN — PHENYTOIN SODIUM 100 MG: 100 CAPSULE ORAL at 21:24

## 2023-04-22 RX ADMIN — METHYLPREDNISOLONE SODIUM SUCCINATE 125 MG: 125 INJECTION INTRAMUSCULAR; INTRAVENOUS at 12:51

## 2023-04-22 RX ADMIN — ASPIRIN 81 MG: 81 TABLET, COATED ORAL at 16:44

## 2023-04-22 RX ADMIN — IPRATROPIUM BROMIDE AND ALBUTEROL SULFATE 3 ML: .5; 3 SOLUTION RESPIRATORY (INHALATION) at 12:53

## 2023-04-22 RX ADMIN — DOXYCYCLINE 100 MG: 100 CAPSULE ORAL at 16:44

## 2023-04-22 RX ADMIN — CITALOPRAM HYDROBROMIDE 20 MG: 20 TABLET ORAL at 16:44

## 2023-04-22 RX ADMIN — TIOTROPIUM BROMIDE INHALATION SPRAY 2 PUFF: 3.12 SPRAY, METERED RESPIRATORY (INHALATION) at 17:48

## 2023-04-22 RX ADMIN — METOPROLOL SUCCINATE 100 MG: 50 TABLET, EXTENDED RELEASE ORAL at 16:44

## 2023-04-22 RX ADMIN — ASPIRIN 81 MG 324 MG: 81 TABLET ORAL at 13:01

## 2023-04-22 RX ADMIN — Medication 10 ML: at 21:23

## 2023-04-22 NOTE — ED NOTES
"Patient refusing assistance from staff with help using urinal. Patient also refusing to wear oxygen while urinating stating \"I can't wear the oxygen while I pee.\" Patient back on side of bed with oxygen on at this time.  "

## 2023-04-22 NOTE — ED PROVIDER NOTES
" EMERGENCY DEPARTMENT ENCOUNTER    Pt Name: Fernie Dalal  MRN: [id number]  Pt :   1960  Room Number:    Date of encounter:  2023  PCP: Belem Albert APRN  ED Provider: Bao Mercado MD    Historian: Patient and      HPI:  Chief Complaint: Shortness of breath and chest discomfort        Context: Fernie Dalal is a 63 y.o. male who presents to the ED c/o shortness of breath which has been ongoing but gradually worsening over the last few months.  He reports a history of COPD.  He reports that he quit smoking in July however he admits that he smokes some cigarettes as recently as 1 week ago.  He is normally on oxygen via nasal cannula at 2-1/2 to 3 L/min.  He does not recall being on steroids recently.  The patient also reports chest discomfort which is described as a \"weight on my chest\" which started around 1 PM yesterday.  He has not taken any nitroglycerin.  He has not taken his morning meds.  He rates his discomfort as moderate in severity.  He denies diaphoresis and nausea accompanying this.  Patient is scheduled to undergo heart catheterization with Dr. Gaitan on Monday, 2 days from today.  He has seen Bren nurse practitioner from the heart valve clinic who is arranged for this.      PAST MEDICAL HISTORY  Past Medical History:   Diagnosis Date   • Angina at rest    • Anxiety    • Arrhythmia    • COPD (chronic obstructive pulmonary disease)    • GERD (gastroesophageal reflux disease)    • Heart failure     congestive   • Hyperlipidemia    • Hypertension    • Myocardial infarction          PAST SURGICAL HISTORY  Past Surgical History:   Procedure Laterality Date   • CARDIAC CATHETERIZATION     • CARDIOVASCULAR STRESS TEST           FAMILY HISTORY  Family History   Problem Relation Age of Onset   • Breast cancer Mother    • Kidney failure Father    • Pneumonia Father    • Stroke Father    • Ovarian cancer Sister    • Breast cancer Sister    • Melanoma Sister    • No Known Problems " Patient transported to imaging   Sister    • Hyperlipidemia Sister    • Hypertension Sister    • Heart attack Sister    • Stroke Brother    • Hyperlipidemia Brother    • Hypertension Brother    • Heart disease Brother    • Stomach cancer Brother    • Stroke Brother    • Heart attack Brother    • Heart disease Brother    • Alzheimer's disease Brother    • Hypertension Brother    • Hyperlipidemia Brother    • Heart disease Brother    • Heart attack Brother          SOCIAL HISTORY  Social History     Socioeconomic History   • Marital status: Single   Tobacco Use   • Smoking status: Former     Packs/day: 1.00     Years: 30.00     Pack years: 30.00     Types: Cigarettes     Quit date: 2022     Years since quittin.1   Vaping Use   • Vaping Use: Never used   Substance and Sexual Activity   • Alcohol use: Never   • Drug use: Not Currently     Types: Marijuana   • Sexual activity: Defer         ALLERGIES  Patient has no known allergies.        REVIEW OF SYSTEMS  Review of Systems       All systems reviewed and negative except for those discussed in HPI.       PHYSICAL EXAM    I have reviewed the triage vital signs and nursing notes.    ED Triage Vitals [23 1118]   Temp Heart Rate Resp BP SpO2   97.5 °F (36.4 °C) 100 20 141/90 95 %      Temp src Heart Rate Source Patient Position BP Location FiO2 (%)   Oral Monitor Sitting Left arm --       Physical Exam  GENERAL:   Appears anxious but nontoxic.  He is lying in a right side decubitus position.  HENT: Nares patent.  EYES: No scleral icterus.  CV: Regular rhythm, tachycardic rate.  No murmurs gallops rubs  RESPIRATORY: Scattered coarse Rales/rhonchi throughout.  Mild expiratory wheeze.  No accessory muscle use or retractions.  ABDOMEN: Soft, nontender, obese  MUSCULOSKELETAL: No deformities.  No reproduction of chest discomfort with palpation of the chest.  NEURO: Alert, moves all extremities, follows commands.  SKIN: Warm, dry, no rash visualized.      LAB RESULTS  Recent Results (from the past  24 hour(s))   ECG 12 Lead ED Triage Standing Order; SOA    Collection Time: 04/22/23 11:30 AM   Result Value Ref Range    QT Interval 394 ms    QTC Interval 500 ms   Comprehensive Metabolic Panel    Collection Time: 04/22/23 11:35 AM    Specimen: Blood   Result Value Ref Range    Glucose 101 (H) 65 - 99 mg/dL    BUN 11 8 - 23 mg/dL    Creatinine 0.58 (L) 0.76 - 1.27 mg/dL    Sodium 138 136 - 145 mmol/L    Potassium 4.4 3.5 - 5.2 mmol/L    Chloride 95 (L) 98 - 107 mmol/L    CO2 36.0 (H) 22.0 - 29.0 mmol/L    Calcium 9.5 8.6 - 10.5 mg/dL    Total Protein 7.0 6.0 - 8.5 g/dL    Albumin 3.7 3.5 - 5.2 g/dL    ALT (SGPT) 17 1 - 41 U/L    AST (SGOT) 16 1 - 40 U/L    Alkaline Phosphatase 100 39 - 117 U/L    Total Bilirubin 0.3 0.0 - 1.2 mg/dL    Globulin 3.3 gm/dL    A/G Ratio 1.1 g/dL    BUN/Creatinine Ratio 19.0 7.0 - 25.0    Anion Gap 7.0 5.0 - 15.0 mmol/L    eGFR 110.3 >60.0 mL/min/1.73   BNP    Collection Time: 04/22/23 11:35 AM    Specimen: Blood   Result Value Ref Range    proBNP 2,034.0 (H) 0.0 - 900.0 pg/mL   Single High Sensitivity Troponin T    Collection Time: 04/22/23 11:35 AM    Specimen: Blood   Result Value Ref Range    HS Troponin T 87 (C) <15 ng/L   Green Top (Gel)    Collection Time: 04/22/23 11:35 AM   Result Value Ref Range    Extra Tube Hold for add-ons.    Lavender Top    Collection Time: 04/22/23 11:35 AM   Result Value Ref Range    Extra Tube hold for add-on    Gold Top - SST    Collection Time: 04/22/23 11:35 AM   Result Value Ref Range    Extra Tube Hold for add-ons.    Light Blue Top    Collection Time: 04/22/23 11:35 AM   Result Value Ref Range    Extra Tube Hold for add-ons.    CBC Auto Differential    Collection Time: 04/22/23 11:35 AM    Specimen: Blood   Result Value Ref Range    WBC 7.21 3.40 - 10.80 10*3/mm3    RBC 5.68 4.14 - 5.80 10*6/mm3    Hemoglobin 14.9 13.0 - 17.7 g/dL    Hematocrit 50.4 37.5 - 51.0 %    MCV 88.7 79.0 - 97.0 fL    MCH 26.2 (L) 26.6 - 33.0 pg    MCHC 29.6 (L) 31.5 -  35.7 g/dL    RDW 15.0 12.3 - 15.4 %    RDW-SD 49.1 37.0 - 54.0 fl    MPV 9.8 6.0 - 12.0 fL    Platelets 306 140 - 450 10*3/mm3    Neutrophil % 72.2 42.7 - 76.0 %    Lymphocyte % 13.3 (L) 19.6 - 45.3 %    Monocyte % 11.5 5.0 - 12.0 %    Eosinophil % 1.8 0.3 - 6.2 %    Basophil % 0.8 0.0 - 1.5 %    Immature Grans % 0.4 0.0 - 0.5 %    Neutrophils, Absolute 5.20 1.70 - 7.00 10*3/mm3    Lymphocytes, Absolute 0.96 0.70 - 3.10 10*3/mm3    Monocytes, Absolute 0.83 0.10 - 0.90 10*3/mm3    Eosinophils, Absolute 0.13 0.00 - 0.40 10*3/mm3    Basophils, Absolute 0.06 0.00 - 0.20 10*3/mm3    Immature Grans, Absolute 0.03 0.00 - 0.05 10*3/mm3    nRBC 0.0 0.0 - 0.2 /100 WBC       If labs were ordered, I independently reviewed the results and considered them in treating the patient.        RADIOLOGY  XR Chest 1 View    Result Date: 4/22/2023  XR CHEST 1 VW Date of Exam: 4/22/2023 11:45 AM EDT Indication: SOA triage protocol Comparison: 7/10/2013. Findings: The heart appears enlarged and there is mild vascular engorgement, without overt pulmonary edema. There is no focal consolidation. There is no effusion or pneumothorax.     Impression: The heart appears enlarged and there is mild vascular engorgement, without overt pulmonary edema. There is no focal consolidation. There is no effusion or pneumothorax. Electronically Signed: Wilian Hoff  4/22/2023 12:05 PM EDT  Workstation ID: [id number]      I ordered and independently reviewed the above noted radiographic studies.      I viewed images of chest x-ray which showed no active disease per my independent interpretation.    See radiologist's dictation for official interpretation.        PROCEDURES    Procedures    ECG 12 Lead ED Triage Standing Order; SOA   Preliminary Result   Test Reason : ED Triage Standing Order~   Blood Pressure :   */*   mmHG   Vent. Rate :  97 BPM     Atrial Rate :  97 BPM      P-R Int : 148 ms          QRS Dur : 132 ms       QT Int : 394 ms       P-R-T Axes :   11 255  55 degrees      QTc Int : 500 ms      ** Poor data quality, interpretation may be adversely affected   Sinus rhythm with occasional and consecutive premature ventricular    complexes and fusion complexes   Possible Left atrial enlargement   Right bundle branch block   Inferior infarct , age undetermined   Abnormal ECG   When compared with ECG of 04-JUL-2013 21:20,   fusion complexes are now present   premature ventricular complexes are now present   Right bundle branch block is now present      Referred By: EDMD           Confirmed By:           MEDICATIONS GIVEN IN ER    Medications   sodium chloride 0.9 % flush 10 mL (has no administration in time range)   nitroglycerin (NITROSTAT) SL tablet 0.4 mg (0.4 mg Sublingual Given 4/22/23 1219)   ipratropium-albuterol (DUO-NEB) nebulizer solution 3 mL (3 mL Nebulization Given 4/22/23 1253)   methylPREDNISolone sodium succinate (SOLU-Medrol) injection 125 mg (125 mg Intravenous Given 4/22/23 1251)   aspirin chewable tablet 324 mg (324 mg Oral Given 4/22/23 1301)         MEDICAL DECISION MAKING, PROGRESS, and CONSULTS    All labs have been independently reviewed by me.  All radiology studies have been reviewed by me and the radiologist dictating the report.  All EKG's have been independently viewed and interpreted by me.      Discussion below represents my analysis of pertinent findings related to patient's condition, differential diagnosis, treatment plan and final disposition.      Differential diagnosis:    Shortness of breath and chest discomfort which could reflect COPD, CHF, acute coronary syndrome, stable versus unstable angina, pulmonary embolus, etc.      Additional sources:    - Discussed/ obtained information from independent historians: Patient's wife gave some of the history    - External (non-ED) record review: Epic chart review    - Chronic or social conditions impacting care: Smoker    - Shared decision making: Patient and wife in full agreement with  plan for evaluation, treatment, admission.  They live over an hour away and [facility]      Orders placed during this visit:  Orders Placed This Encounter   Procedures   • XR Chest 1 View   • [place] Draw   • Comprehensive Metabolic Panel   • BNP   • Single High Sensitivity Troponin T   • CBC Auto Differential   • D-dimer, Quantitative   • NPO Diet NPO Type: Strict NPO   • Undress & Gown   • Cardiac Monitoring   • Continuous Pulse Oximetry   • Vital Signs   • Oxygen Therapy- Nasal Cannula; 2 LPM; Titrate for SPO2: equal to or greater than, 92%   • ECG 12 Lead ED Triage Standing Order; SOA   • Insert Peripheral IV   • CBC & Differential   • Green Top (Gel)   • Lavender Top   • Gold Top - SST   • Gray Top   • Light Blue Top         Additional orders considered but not ordered:  CTA chest    ED Course:    Consultants:      ED Course as of 04/22/23 1316   Sat Apr 22, 2023   1256 HS Troponin T(!!): 87 [MS]   1256 proBNP(!): 2,034.0 [MS]   1309 HEART Pathway for Early Discharge in Acute Chest Pain - MDCalc  Calculated on Apr 22 2023 1:09 PM  7 points -> HEART Pathway Score  High risk -> 12-65% 30-day MACE Cardiology consultation and admission recommended. Further testing indicated. [MS]   1315 I discussed the case with our on-call cardiologist Dr. Claire.  He agrees that admission to the hospitalist group is the most appropriate given the patient's multiple complaints/underlying problems.  I have contacted Dr. Belle, hospitalist for admission. [MS]      ED Course User Index  [MS] Bao Mercado MD                  AS OF 13:16 EDT VITALS:    BP - 125/82  HR - 98  TEMP - 97.5 °F (36.4 °C) (Oral)  O2 SATS - 97%                  DIAGNOSIS  Final diagnoses:   Nonspecific chest pain   COPD exacerbation         DISPOSITION  Admission      Please note that portions of this document were completed with voice recognition software.      Bao Mercado MD  04/22/23 1964

## 2023-04-22 NOTE — H&P
[facility] Medicine Services  HISTORY AND PHYSICAL    Patient Name: Fernie Dalal  : 1960  MRN: [id number]  Primary Care Physician: Belme Albert APRN  Date of admission: 2023      Subjective   Subjective     Chief Complaint:  Shortness of breath and chest pain    HPI:  Fernie Dalal is a 63 y.o. male with PMHx significant for COPD, chronic respiratory failure on 2-3L at rest and 3-4L with exertion, seizure d/o, DMII, Anxiety/depression, HFrEF (LVEF 33%), tobacco abuse, HTN, HLD who presents with complaints of increased shortness of breath and chest pain. Patient was recently admitted at [facility]. 3/23-3/27 with similar complaints. He was treated with steroids, abx and nebs and improved. He was referred to Dr. Gaitan for CHF/chest pain and patient was supposed to actually have a heart cath with him on Monday. Patient notes onset of worsening shortness of breath last night. He states he had to turn his home O2 up to 5L in order to ambulate around his home. He feels like his chest pain has improved since he arrived to the ED and received steroids. He has not missed any of his home inhalers.      Review of Systems   Gen- No fevers, chills  CV- +chest pain, palpitations  Resp- + cough, dyspnea  GI- No N/V/D, abd pain        Personal History     Past Medical History:   Diagnosis Date   • Angina at rest    • Anxiety    • Arrhythmia    • COPD (chronic obstructive pulmonary disease)    • GERD (gastroesophageal reflux disease)    • Heart failure     congestive   • Hyperlipidemia    • Hypertension    • Myocardial infarction              Past Surgical History:   Procedure Laterality Date   • CARDIAC CATHETERIZATION     • CARDIOVASCULAR STRESS TEST         Family History: family history includes Alzheimer's disease in his brother; Breast cancer in his mother and sister; Heart attack in his brother, brother, and sister; Heart disease in his brother, brother, and brother; Hyperlipidemia  in his brother, brother, and sister; Hypertension in his brother, brother, and sister; Kidney failure in his father; Melanoma in his sister; No Known Problems in his sister; Ovarian cancer in his sister; Pneumonia in his father; Stomach cancer in his brother; Stroke in his brother, brother, and father.     Social History:  reports that he quit smoking about 14 months ago. His smoking use included cigarettes. He has a 30.00 pack-year smoking history. He does not have any smokeless tobacco history on file. He reports that he does not currently use drugs after having used the following drugs: Marijuana. He reports that he does not drink alcohol.  Social History     Social History Narrative   • Not on file       Medications:  Available home medication information reviewed.  (Not in a hospital admission)      No Known Allergies    Objective   Objective     Vital Signs:   Temp:  [97.5 °F (36.4 °C)] 97.5 °F (36.4 °C)  Heart Rate:  [] 91  Resp:  [20] 20  BP: (108-141)/() 108/76  Flow (L/min):  [3] 3       Physical Exam   Constitutional: Awake, alert  Eyes: PERRLA, sclerae anicteric, no conjunctival injection  HENT: NCAT, mucous membranes moist  Neck: Supple, no thyromegaly, no lymphadenopathy, trachea midline  Respiratory: Clear bilaterally, however poor air movement, no wheezing present, on 3L  Cardiovascular: RRR, no murmurs, rubs, or gallops, palpable pedal pulses bilaterally  Gastrointestinal: Positive bowel sounds, soft, nontender, nondistended  Musculoskeletal: No bilateral ankle edema, no clubbing or cyanosis to extremities  Psychiatric: Appropriate affect, cooperative  Neurologic: Oriented x 3, speech clear, moves all extremities equally  Skin: No rashes      Result Review:  I have personally reviewed the results from the time of this admission to 4/22/2023 13:39 EDT and agree with these findings:  [x]  Laboratory list / accordion  [x]  Microbiology  [x]  Radiology  [x]  EKG/Telemetry   [x]   Cardiology/Vascular   [x]  Pathology  []  Old records  []  Other:  Most notable findings include:         LAB RESULTS:      Lab 04/22/23  1135   WBC 7.21   HEMOGLOBIN 14.9   HEMATOCRIT 50.4   PLATELETS 306   NEUTROS ABS 5.20   IMMATURE GRANS (ABS) 0.03   LYMPHS ABS 0.96   MONOS ABS 0.83   EOS ABS 0.13   MCV 88.7   D DIMER QUANT 0.78*         Lab 04/22/23  1135   SODIUM 138   POTASSIUM 4.4   CHLORIDE 95*   CO2 36.0*   ANION GAP 7.0   BUN 11   CREATININE 0.58*   EGFR 110.3   GLUCOSE 101*   CALCIUM 9.5         Lab 04/22/23  1135   TOTAL PROTEIN 7.0   ALBUMIN 3.7   GLOBULIN 3.3   ALT (SGPT) 17   AST (SGOT) 16   BILIRUBIN 0.3   ALK PHOS 100         Lab 04/22/23  1135   PROBNP 2,034.0*   HSTROP T 87*                     Microbiology Results (last 10 days)     ** No results found for the last 240 hours. **          XR Chest 1 View    Result Date: 4/22/2023  XR CHEST 1 VW Date of Exam: 4/22/2023 11:45 AM EDT Indication: SOA triage protocol Comparison: 7/10/2013. Findings: The heart appears enlarged and there is mild vascular engorgement, without overt pulmonary edema. There is no focal consolidation. There is no effusion or pneumothorax.     Impression: Impression: The heart appears enlarged and there is mild vascular engorgement, without overt pulmonary edema. There is no focal consolidation. There is no effusion or pneumothorax. Electronically Signed: Wilian Hoff  4/22/2023 12:05 PM EDT  Workstation ID: [id number]          Assessment & Plan   Assessment & Plan     Active Hospital Problems    Diagnosis  POA   • **Nonspecific chest pain [R07.9]  Yes   • Dyslipidemia [E78.5]  Yes   • Essential hypertension [I10]  Yes   • Seizure disorder [G40.909]  Yes   • Chronic systolic congestive heart failure [I50.22]  Yes   • Coronary artery disease involving native heart [I25.10]  Yes     Description: A.  History of multiple cardiac stents.  Most recent left heart cath in 2011.  Followed by cardioloy.     • Acute exacerbation of  chronic obstructive pulmonary disease (COPD) [J44.1]  Yes   • Anxiety [F41.9]  Yes     Description: A.  Chronic benzodiazepine therapy.     • Hyperlipemia [E78.5]  Yes   • Sleep apnea [G47.30]  Yes       Fernie Dalal is a 63 y.o. male with PMHx significant for COPD, chronic respiratory failure on 2-3L at rest and 3-4L with exertion, seizure d/o, DMII, Anxiety/depression, HFrEF (LVEF 33%), tobacco abuse, HTN, HLD who presents with complaints of increased shortness of breath and chest pain.    AECOPD  Acute on Chronic Respiratory Failure w/Hypoxia  - continue steroids, 40mg IV q8hrs for now  - aggressive pulmonary toilet, mucinex, scheduled nebs  - doxycycline BID x 5 days, check resp PCR, CXR reviewed  - continue Symbicort, Sprivia (in place or Trelegy)  - COPD navigator    Chest Pain  Elevated Troponin  - consult cardiology, was planning to have LHC on Monday with Dr. Gaitan, has hx of abnormal stress test  -ASA and NTG in the ED  -serial EKG  - continue ASA, statin, beta-blocker, plavix    HFrEF - LVEF 33% (3/2023)  - does not appear volume overloaded at this time  - continue home regimen, lasix 80mg daily  - continue beta-blocker    Anxiety  Depression  - continue buspar, PRN Valium  - nightly seroquel    Seizure D/O  - continue dilantin    HTN:  - continue lisinopril, metoprolol, diltiazem    HLD:  - statin    DMII  - SSI coverage for now while on steroids, on no home meds    DVT prophylaxis:  Lovenox    CODE STATUS:    Code Status and Medical Interventions:   Ordered at: 04/22/23 1339     Level Of Support Discussed With:    Patient     Code Status (Patient has no pulse and is not breathing):    CPR (Attempt to Resuscitate)     Medical Interventions (Patient has pulse or is breathing):    Full Support       Expected Discharge 2-3 days       Corina Daugherty DO  04/22/23

## 2023-04-22 NOTE — CASE MANAGEMENT/SOCIAL WORK
Discharge Planning Assessment  [facility]     Patient Name: Fernie Dalal  MRN: [id number]  Today's Date: 4/22/2023    Admit Date: 4/22/2023    Plan: IDP   Discharge Needs Assessment     Row Name 04/22/23 1349       Living Environment    People in Home alone    Current Living Arrangements home    Primary Care Provided by self    Provides Primary Care For no one    Family Caregiver if Needed sibling(s)    Family Caregiver Names Sister Oumou ([phone])    Quality of Family Relationships helpful;involved    Able to Return to Prior Arrangements yes       Transition Planning    Patient/Family Anticipated Services at Transition     Transportation Anticipated family or friend will provide       Discharge Needs Assessment    Readmission Within the Last 30 Days no previous admission in last 30 days    Equipment Currently Used at Home cane, straight    Concerns to be Addressed discharge planning               Discharge Plan     Row Name 04/22/23 2210       Plan    Plan IDP    Plan Comments MSW met with Pt and Pt’s sister at bedside to complete IDP. Pt lives alone in [place]. Pt confirmed demographics and reports PCP is Belem Albert. Pt has [place] Medicaid insurance coverage. Pt is moderate with ADL’s. Pt’s sister assist with care when able. Pt has a walking cane, HH- but unable to recall the name, and 2.5/3L of O2 w/ [employer]. Pt’s preferred [facility]. Pt’s sister able to transport when medically ready. Pt would like HH to resume after discharge and additional caregiving resources/services. CM will continue to follow and assist with discharge.    Final Discharge Disposition Code 30 - still a patient              Continued Care and Services - Admitted Since 4/22/2023    Coordination has not been started for this encounter.          Demographic Summary     Row Name 04/22/23 1349       General Information    Admission Type inpatient    Arrived From home    Referral Source admission  list;emergency department    Reason for Consult discharge planning    Preferred Language English               Functional Status     Row Name 04/22/23 2791       Functional Status    Usual Activity Tolerance moderate    Current Activity Tolerance moderate       Functional Status, IADL    Medications assistive equipment    Meal Preparation assistive equipment    Housekeeping assistive equipment    Laundry assistive equipment    Shopping assistive equipment                         MICHELLE Rosas

## 2023-04-22 NOTE — PROGRESS NOTES
"  [facility] at [facility]   Inpatient Progress Note       LOS: 0 days   Patient Care Team:  Belem Albert APRN as PCP - General (Family Medicine)    Chief Complaint:  Chest pain and dyspnea    Subjective     Interval History:   Patient is a 63-year-old  male who is admitted secondary to increasing chest pain as well as dyspnea.  Patient was recently seen in the office by his primary cardiologist and at that time Cardizem was initiated.  Since beginning this medication patient notes to feel overall worse.  Has had increased shortness of breath as well as increased lower extremity edema.  Has also continued to have chest pain.  Due to worsening of his symptoms he presented to the hospital for further evaluation.  He is noted to be scheduled for cardiac catheterization with Dr. Gaitan on Monday.        Review of Systems:   Pertinent positives noted in history, exam, and assessment. Otherwise reviewed and negative.      Objective     Vitals:  Blood pressure 159/94, pulse 97, temperature 97.5 °F (36.4 °C), temperature source Oral, resp. rate 20, height 175.3 cm (69\"), weight 117 kg (259 lb), SpO2 92 %.   No intake or output data in the 24 hours ending 04/22/23 1803     Vitals reviewed.   Constitutional:       Appearance: Well-developed. Chronically ill-appearing.      Comments: Dyspneic   Neck:      Vascular: No JVD.      Trachea: No tracheal deviation.   Pulmonary:      Effort: Pulmonary effort is normal.      Comments: Decreased breath sounds throughout  Cardiovascular:      Normal rate. Regular rhythm.   Pulses:     Intact distal pulses.   Edema:     Peripheral edema present.     Ankle: bilateral 1+ edema of the ankle.  Abdominal:      General: Bowel sounds are normal.      Tenderness: There is no abdominal tenderness.   Musculoskeletal:         General: No deformity. Skin:     General: Skin is warm and dry.   Neurological:      Mental Status: Alert and oriented to person, place, and " time.       I have examined the patient and agree with the above       Results Review:     I reviewed the patient's new clinical results.    Results from last 7 days   Lab Units 04/22/23  1135   WBC 10*3/mm3 7.21   HEMOGLOBIN g/dL 14.9   HEMATOCRIT % 50.4   PLATELETS 10*3/mm3 306     Results from last 7 days   Lab Units 04/22/23  1135   SODIUM mmol/L 138   POTASSIUM mmol/L 4.4   CHLORIDE mmol/L 95*   CO2 mmol/L 36.0*   BUN mg/dL 11   CREATININE mg/dL 0.58*   CALCIUM mg/dL 9.5   BILIRUBIN mg/dL 0.3   ALK PHOS U/L 100   ALT (SGPT) U/L 17   AST (SGOT) U/L 16   GLUCOSE mg/dL 101*     Results from last 7 days   Lab Units 04/22/23  1135   SODIUM mmol/L 138   POTASSIUM mmol/L 4.4   CHLORIDE mmol/L 95*   CO2 mmol/L 36.0*   BUN mg/dL 11   CREATININE mg/dL 0.58*   GLUCOSE mg/dL 101*   CALCIUM mg/dL 9.5         Lab Results   Lab Value Date/Time    TROPONINT 87 (C) 04/22/2023 1135             Results from last 7 days   Lab Units 04/22/23  1135   PROBNP pg/mL 2,034.0*     Results from last 7 days   Lab Units 04/22/23  1135   HSTROP T ng/L 87*         Tele: Sinus rhythm  Chest x-ray: Reviewed by myself, shows mild vascular congestion.    Assessment:      Nonspecific chest pain    Anxiety    Acute exacerbation of chronic obstructive pulmonary disease (COPD)    Chronic systolic congestive heart failure    Coronary artery disease involving native heart    Dyslipidemia    Hyperlipemia    Seizure disorder    Sleep apnea    Essential hypertension      1. Acute on chronic systolic congestive heart failure, suspect recent worsening due to initiation of diltiazem and decreased LVEF.  2. Acute on chronic hypoxic respiratory failure with known COPD.  3. NSTEMI, unclear mechanism.  Could be type II related to hypoxic respiratory failure.  However is pending further ischemic evaluation.  4. COPD  5. Dyslipidemia  6. Sleep apnea    Plan:  1. Given evidence of volume overload we will give Lasix 40 mg IV today.  2. Discontinue lisinopril and  initiate Entresto in 36 hours.  3. Discontinue diltiazem given worsened symptoms on this medication as well as overall general contraindication for long term use given his low LVEF.  4. We will continue to follow clinical course with plans currently still for cardiac catheterization on Monday with Dr. Gaitan.  5. Optimization of pulmonary status prior to cardiac cath, per hospitalist  6. We will continue to follow along.      KRISTIN Grant scribed this note for Dr. Romeo Claire.     I have seen and examined the patient, I have reviewed the note, discussed the case with the advance practice clinician, made necessary changes and I agree with the final note.    Romeo Claire MD  04/22/23  18:56 EDT        Dictated utilizing Dragon dictation

## 2023-04-23 ENCOUNTER — APPOINTMENT (OUTPATIENT)
Dept: CARDIOLOGY | Facility: HOSPITAL | Age: 63
DRG: 280 | End: 2023-04-23
Payer: MEDICAID

## 2023-04-23 LAB
BH CV LOW VAS LEFT POSTERIOR TIBIAL VESSEL: 1
BH CV LOWER VASCULAR LEFT COMMON FEMORAL AUGMENT: NORMAL
BH CV LOWER VASCULAR LEFT COMMON FEMORAL COMPRESS: NORMAL
BH CV LOWER VASCULAR LEFT COMMON FEMORAL PHASIC: NORMAL
BH CV LOWER VASCULAR LEFT COMMON FEMORAL SPONT: NORMAL
BH CV LOWER VASCULAR LEFT DISTAL FEMORAL AUGMENT: NORMAL
BH CV LOWER VASCULAR LEFT DISTAL FEMORAL COMPRESS: NORMAL
BH CV LOWER VASCULAR LEFT DISTAL FEMORAL PHASIC: NORMAL
BH CV LOWER VASCULAR LEFT DISTAL FEMORAL SPONT: NORMAL
BH CV LOWER VASCULAR LEFT GASTRONEMIUS COMPRESS: NORMAL
BH CV LOWER VASCULAR LEFT GREATER SAPH AK COMPRESS: NORMAL
BH CV LOWER VASCULAR LEFT GREATER SAPH BK COMPRESS: NORMAL
BH CV LOWER VASCULAR LEFT LESSER SAPH COMPRESS: NORMAL
BH CV LOWER VASCULAR LEFT MID FEMORAL AUGMENT: NORMAL
BH CV LOWER VASCULAR LEFT MID FEMORAL COMPRESS: NORMAL
BH CV LOWER VASCULAR LEFT MID FEMORAL PHASIC: NORMAL
BH CV LOWER VASCULAR LEFT MID FEMORAL SPONT: NORMAL
BH CV LOWER VASCULAR LEFT PERONEAL AUGMENT: NORMAL
BH CV LOWER VASCULAR LEFT PERONEAL COMPRESS: NORMAL
BH CV LOWER VASCULAR LEFT POPLITEAL AUGMENT: NORMAL
BH CV LOWER VASCULAR LEFT POPLITEAL COMPRESS: NORMAL
BH CV LOWER VASCULAR LEFT POPLITEAL PHASIC: NORMAL
BH CV LOWER VASCULAR LEFT POPLITEAL SPONT: NORMAL
BH CV LOWER VASCULAR LEFT POSTERIOR TIBIAL AUGMENT: NORMAL
BH CV LOWER VASCULAR LEFT POSTERIOR TIBIAL COMPRESS: NORMAL
BH CV LOWER VASCULAR LEFT POSTERIOR TIBIAL THROMBUS: NORMAL
BH CV LOWER VASCULAR LEFT PROFUNDA FEMORAL AUGMENT: NORMAL
BH CV LOWER VASCULAR LEFT PROFUNDA FEMORAL PHASIC: NORMAL
BH CV LOWER VASCULAR LEFT PROFUNDA FEMORAL SPONT: NORMAL
BH CV LOWER VASCULAR LEFT PROXIMAL FEMORAL AUGMENT: NORMAL
BH CV LOWER VASCULAR LEFT PROXIMAL FEMORAL COMPRESS: NORMAL
BH CV LOWER VASCULAR LEFT PROXIMAL FEMORAL PHASIC: NORMAL
BH CV LOWER VASCULAR LEFT PROXIMAL FEMORAL SPONT: NORMAL
BH CV LOWER VASCULAR LEFT SAPHENOFEMORAL JUNCTION AUGMENT: NORMAL
BH CV LOWER VASCULAR LEFT SAPHENOFEMORAL JUNCTION COMPRESS: NORMAL
BH CV LOWER VASCULAR LEFT SAPHENOFEMORAL JUNCTION PHASIC: NORMAL
BH CV LOWER VASCULAR LEFT SAPHENOFEMORAL JUNCTION SPONT: NORMAL
BH CV LOWER VASCULAR RIGHT COMMON FEMORAL AUGMENT: NORMAL
BH CV LOWER VASCULAR RIGHT COMMON FEMORAL COMPRESS: NORMAL
BH CV LOWER VASCULAR RIGHT COMMON FEMORAL PHASIC: NORMAL
BH CV LOWER VASCULAR RIGHT COMMON FEMORAL SPONT: NORMAL
BH CV LOWER VASCULAR RIGHT DISTAL FEMORAL AUGMENT: NORMAL
BH CV LOWER VASCULAR RIGHT DISTAL FEMORAL COMPRESS: NORMAL
BH CV LOWER VASCULAR RIGHT DISTAL FEMORAL PHASIC: NORMAL
BH CV LOWER VASCULAR RIGHT DISTAL FEMORAL SPONT: NORMAL
BH CV LOWER VASCULAR RIGHT GASTRONEMIUS COMPRESS: NORMAL
BH CV LOWER VASCULAR RIGHT GREATER SAPH AK COMPRESS: NORMAL
BH CV LOWER VASCULAR RIGHT GREATER SAPH BK COMPRESS: NORMAL
BH CV LOWER VASCULAR RIGHT LESSER SAPH COMPRESS: NORMAL
BH CV LOWER VASCULAR RIGHT MID FEMORAL AUGMENT: NORMAL
BH CV LOWER VASCULAR RIGHT MID FEMORAL COMPRESS: NORMAL
BH CV LOWER VASCULAR RIGHT MID FEMORAL PHASIC: NORMAL
BH CV LOWER VASCULAR RIGHT MID FEMORAL SPONT: NORMAL
BH CV LOWER VASCULAR RIGHT PERONEAL AUGMENT: NORMAL
BH CV LOWER VASCULAR RIGHT PERONEAL COMPRESS: NORMAL
BH CV LOWER VASCULAR RIGHT POPLITEAL AUGMENT: NORMAL
BH CV LOWER VASCULAR RIGHT POPLITEAL COMPRESS: NORMAL
BH CV LOWER VASCULAR RIGHT POPLITEAL PHASIC: NORMAL
BH CV LOWER VASCULAR RIGHT POPLITEAL SPONT: NORMAL
BH CV LOWER VASCULAR RIGHT POSTERIOR TIBIAL AUGMENT: NORMAL
BH CV LOWER VASCULAR RIGHT POSTERIOR TIBIAL COMPRESS: NORMAL
BH CV LOWER VASCULAR RIGHT PROFUNDA FEMORAL AUGMENT: NORMAL
BH CV LOWER VASCULAR RIGHT PROFUNDA FEMORAL PHASIC: NORMAL
BH CV LOWER VASCULAR RIGHT PROFUNDA FEMORAL SPONT: NORMAL
BH CV LOWER VASCULAR RIGHT PROXIMAL FEMORAL AUGMENT: NORMAL
BH CV LOWER VASCULAR RIGHT PROXIMAL FEMORAL COMPRESS: NORMAL
BH CV LOWER VASCULAR RIGHT PROXIMAL FEMORAL PHASIC: NORMAL
BH CV LOWER VASCULAR RIGHT PROXIMAL FEMORAL SPONT: NORMAL
BH CV LOWER VASCULAR RIGHT SAPHENOFEMORAL JUNCTION AUGMENT: NORMAL
BH CV LOWER VASCULAR RIGHT SAPHENOFEMORAL JUNCTION COMPRESS: NORMAL
BH CV LOWER VASCULAR RIGHT SAPHENOFEMORAL JUNCTION PHASIC: NORMAL
BH CV LOWER VASCULAR RIGHT SAPHENOFEMORAL JUNCTION SPONT: NORMAL
BH CV VAS PRELIMINARY FINDINGS SCRIPTING: 1
GLUCOSE BLDC GLUCOMTR-MCNC: 107 MG/DL (ref 70–130)
GLUCOSE BLDC GLUCOMTR-MCNC: 184 MG/DL (ref 70–130)
GLUCOSE BLDC GLUCOMTR-MCNC: 224 MG/DL (ref 70–130)
MAGNESIUM SERPL-MCNC: 2 MG/DL (ref 1.6–2.4)
MAXIMAL PREDICTED HEART RATE: 158 BPM
STRESS TARGET HR: 134 BPM

## 2023-04-23 PROCEDURE — 25010000002 ENOXAPARIN PER 10 MG: Performed by: INTERNAL MEDICINE

## 2023-04-23 PROCEDURE — 25010000002 LORAZEPAM PER 2 MG: Performed by: INTERNAL MEDICINE

## 2023-04-23 PROCEDURE — 25010000002 FUROSEMIDE PER 20 MG: Performed by: NURSE PRACTITIONER

## 2023-04-23 PROCEDURE — 93010 ELECTROCARDIOGRAM REPORT: CPT | Performed by: INTERNAL MEDICINE

## 2023-04-23 PROCEDURE — 93005 ELECTROCARDIOGRAM TRACING: CPT | Performed by: PHYSICIAN ASSISTANT

## 2023-04-23 PROCEDURE — 25010000002 ONDANSETRON PER 1 MG: Performed by: PHYSICIAN ASSISTANT

## 2023-04-23 PROCEDURE — 25010000002 METHYLPREDNISOLONE PER 40 MG: Performed by: INTERNAL MEDICINE

## 2023-04-23 PROCEDURE — 94799 UNLISTED PULMONARY SVC/PX: CPT

## 2023-04-23 PROCEDURE — 99232 SBSQ HOSP IP/OBS MODERATE 35: CPT | Performed by: NURSE PRACTITIONER

## 2023-04-23 PROCEDURE — 94761 N-INVAS EAR/PLS OXIMETRY MLT: CPT

## 2023-04-23 PROCEDURE — 93970 EXTREMITY STUDY: CPT | Performed by: INTERNAL MEDICINE

## 2023-04-23 PROCEDURE — 63710000001 INSULIN LISPRO (HUMAN) PER 5 UNITS: Performed by: INTERNAL MEDICINE

## 2023-04-23 PROCEDURE — 99232 SBSQ HOSP IP/OBS MODERATE 35: CPT | Performed by: INTERNAL MEDICINE

## 2023-04-23 PROCEDURE — 82962 GLUCOSE BLOOD TEST: CPT

## 2023-04-23 PROCEDURE — 93970 EXTREMITY STUDY: CPT

## 2023-04-23 RX ORDER — LORAZEPAM 2 MG/ML
0.25 INJECTION INTRAMUSCULAR ONCE
Status: COMPLETED | OUTPATIENT
Start: 2023-04-23 | End: 2023-04-23

## 2023-04-23 RX ORDER — LORAZEPAM 2 MG/ML
0.5 INJECTION INTRAMUSCULAR EVERY 4 HOURS PRN
Status: DISCONTINUED | OUTPATIENT
Start: 2023-04-23 | End: 2023-04-28 | Stop reason: HOSPADM

## 2023-04-23 RX ORDER — ONDANSETRON 2 MG/ML
4 INJECTION INTRAMUSCULAR; INTRAVENOUS EVERY 6 HOURS PRN
Status: DISCONTINUED | OUTPATIENT
Start: 2023-04-23 | End: 2023-04-23

## 2023-04-23 RX ORDER — ONDANSETRON 2 MG/ML
4 INJECTION INTRAMUSCULAR; INTRAVENOUS ONCE
Status: COMPLETED | OUTPATIENT
Start: 2023-04-23 | End: 2023-04-23

## 2023-04-23 RX ORDER — FUROSEMIDE 10 MG/ML
80 INJECTION INTRAMUSCULAR; INTRAVENOUS ONCE
Status: COMPLETED | OUTPATIENT
Start: 2023-04-23 | End: 2023-04-23

## 2023-04-23 RX ORDER — ENOXAPARIN SODIUM 150 MG/ML
1 INJECTION SUBCUTANEOUS EVERY 12 HOURS
Status: DISCONTINUED | OUTPATIENT
Start: 2023-04-23 | End: 2023-04-24

## 2023-04-23 RX ADMIN — ONDANSETRON 4 MG: 2 INJECTION INTRAMUSCULAR; INTRAVENOUS at 02:10

## 2023-04-23 RX ADMIN — BUDESONIDE AND FORMOTEROL FUMARATE DIHYDRATE 2 PUFF: 160; 4.5 AEROSOL RESPIRATORY (INHALATION) at 09:25

## 2023-04-23 RX ADMIN — QUETIAPINE FUMARATE 150 MG: 50 TABLET, EXTENDED RELEASE ORAL at 21:55

## 2023-04-23 RX ADMIN — INSULIN LISPRO 3 UNITS: 100 INJECTION, SOLUTION INTRAVENOUS; SUBCUTANEOUS at 10:01

## 2023-04-23 RX ADMIN — CITALOPRAM HYDROBROMIDE 20 MG: 20 TABLET ORAL at 10:00

## 2023-04-23 RX ADMIN — BUSPIRONE HYDROCHLORIDE 5 MG: 5 TABLET ORAL at 10:00

## 2023-04-23 RX ADMIN — BUDESONIDE AND FORMOTEROL FUMARATE DIHYDRATE 2 PUFF: 160; 4.5 AEROSOL RESPIRATORY (INHALATION) at 20:15

## 2023-04-23 RX ADMIN — CLOPIDOGREL BISULFATE 75 MG: 75 TABLET ORAL at 10:00

## 2023-04-23 RX ADMIN — FUROSEMIDE 80 MG: 10 INJECTION, SOLUTION INTRAMUSCULAR; INTRAVENOUS at 15:03

## 2023-04-23 RX ADMIN — ENOXAPARIN SODIUM 120 MG: 120 INJECTION SUBCUTANEOUS at 15:03

## 2023-04-23 RX ADMIN — METOPROLOL SUCCINATE 100 MG: 50 TABLET, EXTENDED RELEASE ORAL at 10:00

## 2023-04-23 RX ADMIN — LORAZEPAM 0.25 MG: 2 INJECTION INTRAMUSCULAR; INTRAVENOUS at 05:48

## 2023-04-23 RX ADMIN — LORAZEPAM 0.5 MG: 2 INJECTION INTRAMUSCULAR; INTRAVENOUS at 22:08

## 2023-04-23 RX ADMIN — ASPIRIN 81 MG: 81 TABLET, COATED ORAL at 10:00

## 2023-04-23 RX ADMIN — METHYLPREDNISOLONE SODIUM SUCCINATE 40 MG: 40 INJECTION INTRAMUSCULAR; INTRAVENOUS at 15:03

## 2023-04-23 RX ADMIN — ATORVASTATIN CALCIUM 40 MG: 40 TABLET, FILM COATED ORAL at 21:55

## 2023-04-23 RX ADMIN — DIAZEPAM 5 MG: 5 TABLET ORAL at 15:03

## 2023-04-23 RX ADMIN — GUAIFENESIN 1200 MG: 600 TABLET, EXTENDED RELEASE ORAL at 10:00

## 2023-04-23 RX ADMIN — GUAIFENESIN 1200 MG: 600 TABLET, EXTENDED RELEASE ORAL at 21:54

## 2023-04-23 RX ADMIN — METHYLPREDNISOLONE SODIUM SUCCINATE 40 MG: 40 INJECTION INTRAMUSCULAR; INTRAVENOUS at 05:57

## 2023-04-23 RX ADMIN — IPRATROPIUM BROMIDE AND ALBUTEROL SULFATE 3 ML: 2.5; .5 SOLUTION RESPIRATORY (INHALATION) at 20:15

## 2023-04-23 RX ADMIN — IPRATROPIUM BROMIDE AND ALBUTEROL SULFATE 3 ML: 2.5; .5 SOLUTION RESPIRATORY (INHALATION) at 09:25

## 2023-04-23 RX ADMIN — ENOXAPARIN SODIUM 40 MG: 40 INJECTION SUBCUTANEOUS at 10:00

## 2023-04-23 RX ADMIN — LORAZEPAM 0.5 MG: 2 INJECTION INTRAMUSCULAR; INTRAVENOUS at 10:00

## 2023-04-23 RX ADMIN — METHYLPREDNISOLONE SODIUM SUCCINATE 40 MG: 40 INJECTION INTRAMUSCULAR; INTRAVENOUS at 21:54

## 2023-04-23 RX ADMIN — DOXYCYCLINE 100 MG: 100 CAPSULE ORAL at 05:57

## 2023-04-23 RX ADMIN — IPRATROPIUM BROMIDE AND ALBUTEROL SULFATE 3 ML: 2.5; .5 SOLUTION RESPIRATORY (INHALATION) at 16:57

## 2023-04-23 RX ADMIN — PHENYTOIN SODIUM 100 MG: 100 CAPSULE ORAL at 21:55

## 2023-04-23 RX ADMIN — PHENYTOIN SODIUM 100 MG: 100 CAPSULE ORAL at 10:01

## 2023-04-23 NOTE — PROGRESS NOTES
[facility] Medicine Services  PROGRESS NOTE    Patient Name: Fernie Dalal  : 1960  MRN: [id number]    Date of Admission: 2023  Primary Care Physician: Belem Albert APRN    Subjective   Subjective     CC:  F/u chest pain    HPI:  Patient resting in bed. Says his breathing is much better today. Denies any current chest pain.    ROS:  Gen- No fevers, chills  CV- No chest pain, palpitations  Resp- No cough, dyspnea  GI- No N/V/D, abd pain    Objective   Objective     Vital Signs:   Temp:  [97.5 °F (36.4 °C)-98.6 °F (37 °C)] 97.9 °F (36.6 °C)  Heart Rate:  [] 91  Resp:  [13-28] 13  BP: (108-159)/() 132/94  Flow (L/min):  [3-4] 4     Physical Exam:  Constitutional: No acute distress, awake, alert  HENT: NCAT, mucous membranes moist  Respiratory:  Poor air movement, no wheezing, respiratory effort normal on 4L O2  Cardiovascular: RRR, no murmurs, rubs, or gallops  Gastrointestinal: Positive bowel sounds, soft, nontender, nondistended  Musculoskeletal: No bilateral ankle edema  Psychiatric: Appropriate affect, cooperative  Neurologic: Oriented x 3, speech clear  Skin: No rashes      Results Reviewed:  LAB RESULTS:      Lab 23  1135   WBC 7.21   HEMOGLOBIN 14.9   HEMATOCRIT 50.4   PLATELETS 306   NEUTROS ABS 5.20   IMMATURE GRANS (ABS) 0.03   LYMPHS ABS 0.96   MONOS ABS 0.83   EOS ABS 0.13   MCV 88.7   D DIMER QUANT 0.78*         Lab 23  1135   SODIUM 138   POTASSIUM 4.4   CHLORIDE 95*   CO2 36.0*   ANION GAP 7.0   BUN 11   CREATININE 0.58*   EGFR 110.3   GLUCOSE 101*   CALCIUM 9.5   MAGNESIUM 2.0         Lab 23  1135   TOTAL PROTEIN 7.0   ALBUMIN 3.7   GLOBULIN 3.3   ALT (SGPT) 17   AST (SGOT) 16   BILIRUBIN 0.3   ALK PHOS 100         Lab 23  1135   PROBNP 2,034.0*   HSTROP T 87*                 Brief Urine Lab Results     None          Microbiology Results Abnormal     Procedure Component Value - Date/Time    Respiratory Panel PCR  w/COVID-19(SARS-CoV-2) JERO/ELADIO/YEIMI/PAD/COR/MAD/JONATHAN In-House, NP Swab in UTM/VTM, 3-4 HR TAT - Swab, Nasopharynx [[id number]]  (Normal) Collected: 04/22/23 1350    Lab Status: Final result Specimen: Swab from Nasopharynx Updated: 04/22/23 7538     ADENOVIRUS, PCR Not Detected     Coronavirus 229E Not Detected     Coronavirus HKU1 Not Detected     Coronavirus NL63 Not Detected     Coronavirus OC43 Not Detected     COVID19 Not Detected     Human Metapneumovirus Not Detected     Human Rhinovirus/Enterovirus Not Detected     Influenza A PCR Not Detected     Influenza B PCR Not Detected     Parainfluenza Virus 1 Not Detected     Parainfluenza Virus 2 Not Detected     Parainfluenza Virus 3 Not Detected     Parainfluenza Virus 4 Not Detected     RSV, PCR Not Detected     Bordetella pertussis pcr Not Detected     Bordetella parapertussis PCR Not Detected     Chlamydophila pneumoniae PCR Not Detected     Mycoplasma pneumo by PCR Not Detected    Narrative:      In the setting of a positive respiratory panel with a viral infection PLUS a negative procalcitonin without other underlying concern for bacterial infection, consider observing off antibiotics or discontinuation of antibiotics and continue supportive care. If the respiratory panel is positive for atypical bacterial infection (Bordetella pertussis, Chlamydophila pneumoniae, or Mycoplasma pneumoniae), consider antibiotic de-escalation to target atypical bacterial infection.          XR Chest 1 View    Result Date: 4/22/2023  XR CHEST 1 VW Date of Exam: 4/22/2023 11:45 AM EDT Indication: SOA triage protocol Comparison: 7/10/2013. Findings: The heart appears enlarged and there is mild vascular engorgement, without overt pulmonary edema. There is no focal consolidation. There is no effusion or pneumothorax.     Impression: Impression: The heart appears enlarged and there is mild vascular engorgement, without overt pulmonary edema. There is no focal consolidation. There is no  effusion or pneumothorax. Electronically Signed: Wilian Hoff  4/22/2023 12:05 PM EDT  Workstation ID: [id number]          Current medications:  Scheduled Meds:aspirin, 81 mg, Oral, Daily  atorvastatin, 40 mg, Oral, Nightly  budesonide-formoterol, 2 puff, Inhalation, BID - RT   And  tiotropium bromide monohydrate, 2 puff, Inhalation, Daily - RT  busPIRone, 5 mg, Oral, TID  citalopram, 20 mg, Oral, Daily  clopidogrel, 75 mg, Oral, Daily  doxycycline, 100 mg, Oral, Q12H  enoxaparin, 40 mg, Subcutaneous, Q24H  guaiFENesin, 1,200 mg, Oral, Q12H  insulin lispro, 0-7 Units, Subcutaneous, TID With Meals  ipratropium-albuterol, 3 mL, Nebulization, 4x Daily - RT  methylPREDNISolone sodium succinate, 40 mg, Intravenous, Q8H  metoprolol succinate XL, 100 mg, Oral, Daily  pharmacy consult - MTM, , Does not apply, Daily  phenytoin ER, 100 mg, Oral, BID  QUEtiapine fumarate ER, 150 mg, Oral, Nightly  [START ON 4/24/2023] sacubitril-valsartan, 1 tablet, Oral, Q12H      Continuous Infusions:   PRN Meds:.•  dextrose  •  dextrose  •  diazePAM  •  glucagon (human recombinant)  •  hydrOXYzine  •  LORazepam  •  nitroglycerin  •  sodium chloride    Assessment & Plan   Assessment & Plan     Active Hospital Problems    Diagnosis  POA   • **Nonspecific chest pain [R07.9]  Yes   • Dyslipidemia [E78.5]  Yes   • Essential hypertension [I10]  Yes   • Seizure disorder [G40.909]  Yes   • Chronic systolic congestive heart failure [I50.22]  Yes   • Coronary artery disease involving native heart [I25.10]  Yes   • Acute exacerbation of chronic obstructive pulmonary disease (COPD) [J44.1]  Yes   • Anxiety [F41.9]  Yes   • Hyperlipemia [E78.5]  Yes   • Sleep apnea [G47.30]  Yes      Resolved Hospital Problems   No resolved problems to display.        Brief Hospital Course to date:  Fernie Dalal is a 63 y.o. male with PMHx significant for COPD, chronic respiratory failure on 2-3L at rest and 3-4L with exertion, seizure d/o, DMII, Anxiety/depression,  HFrEF (LVEF 33%), tobacco abuse, HTN, HLD who presents with complaints of increased shortness of breath and chest pain.     AECOPD  Acute on Chronic Respiratory Failure w/Hypoxia  - continue steroids, 40mg IV q8hrs, wean as able  - aggressive pulmonary toilet, mucinex, scheduled nebs  - doxycycline BID x 5 days, resp PCR negative, CXR reviewed  - continue Symbicort, Sprivia (in place of Trelegy)  - COPD navigator to see  - at baseline 2-3L w/rest and 3-4L w/exertion     Chest Pain  Elevated Troponin  - cardiology following, scheduled  For [facility] on Monday with Dr. Gaitan, has hx of abnormal stress test  - NTG PRN  - continue ASA, statin, beta-blocker, plavix     HFrEF - LVEF 33% (3/2023)  - Cardiology following, s/p IV lasix, defer further diuresis to cards  - switched to Entresto  - continue beta-blocker    Elevated D-dimer  - Duplex ordered and pending     Anxiety  Depression  - continue buspar, PRN Valium  - nightly seroquel     Seizure D/O  - continue dilantin     HTN:  - continue metoprolol, now on Entresto     HLD:  - statin     DMII  - SSI coverage for now while on steroids, on no home meds      Expected Discharge Location and Transportation: Home  Expected Discharge   Expected Discharge Date and Time     Expected Discharge Date Expected Discharge Time    Apr 26, 2023            DVT prophylaxis:  Medical DVT prophylaxis orders are present.          CODE STATUS:   Code Status and Medical Interventions:   Ordered at: 04/22/23 1339     Level Of Support Discussed With:    Patient     Code Status (Patient has no pulse and is not breathing):    CPR (Attempt to Resuscitate)     Medical Interventions (Patient has pulse or is breathing):    Full Support       Corina Daugherty, DO  04/23/23

## 2023-04-23 NOTE — PROGRESS NOTES
"Nutrition Services    Patient Name:  Fernie Dalal  YOB: 1960  MRN: [id number]  Admit Date:  4/22/2023    Pt screened for MST report of \"unure of weight loss.\" Per EMR, if current documented stated weight correct, no weight loss. Ordered measured weight to verify.     Weight       Weight (kg) Weight (lbs) Weight Method Visit Report   8/13/2013 113.04 kg  249 lb 3.3 oz      4/23/2015 118.84 kg  261 lb 15.9 oz      7/1/2022 117.482 kg  259 lb   --    4/18/2023 --  --   --    4/22/2023 117.482 kg  259 lb  Stated       EMR reviewed, pt does not otherwise appear to meet nutrition risk screen criteria. Will follow per protocol, please consult RD if needed.     Electronically signed by:  Sailaja Ortiz RD  04/23/23 07:57 EDT   "

## 2023-04-23 NOTE — PLAN OF CARE
Goal Outcome Evaluation:  Plan of Care Reviewed With: patient        Progress: no change  Outcome Evaluation: continues to have chest pain with nausea through the night. PRN med has limited relief.

## 2023-04-23 NOTE — PROGRESS NOTES
"  [facility] at [facility]   Inpatient Progress Note       LOS: 1 day   Patient Care Team:  Belem Albert APRN as PCP - General (Family Medicine)    Chief Complaint:  Follow-up for angina    Subjective     Interval History:   Patient resting in bed. Notes to just feel weak. No chest pain. Breathing mildly improved from yesterday. Labs for today still pending.       Review of Systems:   Pertinent positives noted in history, exam, and assessment. Otherwise reviewed and negative.      Objective     Vitals:  Blood pressure 136/89, pulse 99, temperature 96.9 °F (36.1 °C), temperature source Axillary, resp. rate 15, height 175.3 cm (69\"), weight 117 kg (259 lb), SpO2 93 %.     Intake/Output Summary (Last 24 hours) at 4/23/2023 1144  Last data filed at 4/23/2023 0158  Gross per 24 hour   Intake 240 ml   Output 2950 ml   Net -2710 ml     Vitals reviewed.   Constitutional:       Appearance: Well-developed and not in distress. Chronically ill-appearing.   Neck:      Vascular: No JVD.      Trachea: No tracheal deviation.   Pulmonary:      Effort: Pulmonary effort is normal.      Breath sounds: Bibasilar Rales present.   Cardiovascular:      Normal rate. Regular rhythm.   Edema:     Peripheral edema absent.   Abdominal:      General: Bowel sounds are normal.      Palpations: Abdomen is soft.      Tenderness: There is no abdominal tenderness.   Musculoskeletal:         General: No deformity. Skin:     General: Skin is warm and dry.   Neurological:      Mental Status: Alert and oriented to person, place, and time.            Results Review:     I reviewed the patient's new clinical results.    Results from last 7 days   Lab Units 04/22/23  1135   WBC 10*3/mm3 7.21   HEMOGLOBIN g/dL 14.9   HEMATOCRIT % 50.4   PLATELETS 10*3/mm3 306     Results from last 7 days   Lab Units 04/22/23  1135   SODIUM mmol/L 138   POTASSIUM mmol/L 4.4   CHLORIDE mmol/L 95*   CO2 mmol/L 36.0*   BUN mg/dL 11   CREATININE mg/dL 0.58* "   CALCIUM mg/dL 9.5   BILIRUBIN mg/dL 0.3   ALK PHOS U/L 100   ALT (SGPT) U/L 17   AST (SGOT) U/L 16   GLUCOSE mg/dL 101*     Results from last 7 days   Lab Units 04/22/23  1135   SODIUM mmol/L 138   POTASSIUM mmol/L 4.4   CHLORIDE mmol/L 95*   CO2 mmol/L 36.0*   BUN mg/dL 11   CREATININE mg/dL 0.58*   GLUCOSE mg/dL 101*   CALCIUM mg/dL 9.5         Lab Results   Lab Value Date/Time    TROPONINT 87 (C) 04/22/2023 1135             Results from last 7 days   Lab Units 04/22/23  1135   PROBNP pg/mL 2,034.0*     Results from last 7 days   Lab Units 04/22/23  1135   HSTROP T ng/L 87*         Tele:  SR    Assessment:      Nonspecific chest pain    Anxiety    Acute exacerbation of chronic obstructive pulmonary disease (COPD)    Chronic systolic congestive heart failure    Coronary artery disease involving native heart    Dyslipidemia    Hyperlipemia    Seizure disorder    Sleep apnea    Essential hypertension      1. Acute on chronic systolic congestive heart failure,   1. Worsening possibly due to initiation of diltiazem and decreased LVEF.  2. Given IV lasix yesterday.   2. Acute on chronic hypoxic respiratory failure with known COPD.  3. NSTEMI, unclear mechanism.  Could be type II related to hypoxic respiratory failure.  However is pending further ischemic evaluation.  4. COPD  5. Dyslipidemia  6. Sleep apnea    Plan:  · Will give 80mg IV lasix today  · NPO after MN for possible LHC tomorrow  · Dr. Gaitan to assume cardiac care tomorrow.     KRISTIN Grant   Dictated utilizing Dragon dictation    Addendum:  Patient noted to have below calf DVT. Discussed case with Dr. Daugherty. Agree with initiation of therapeutic Lovenox. Will plan to hold in AM in preparation for possible LHC.     KRISTIN Grant

## 2023-04-24 LAB
ALBUMIN SERPL-MCNC: 3.7 G/DL (ref 3.5–5.2)
ALBUMIN/GLOB SERPL: 1.4 G/DL
ALP SERPL-CCNC: 93 U/L (ref 39–117)
ALT SERPL W P-5'-P-CCNC: 13 U/L (ref 1–41)
ANION GAP SERPL CALCULATED.3IONS-SCNC: 8 MMOL/L (ref 5–15)
AST SERPL-CCNC: 11 U/L (ref 1–40)
BASOPHILS # BLD AUTO: 0.02 10*3/MM3 (ref 0–0.2)
BASOPHILS NFR BLD AUTO: 0.1 % (ref 0–1.5)
BILIRUB SERPL-MCNC: 0.2 MG/DL (ref 0–1.2)
BUN SERPL-MCNC: 25 MG/DL (ref 8–23)
BUN/CREAT SERPL: 29.4 (ref 7–25)
CALCIUM SPEC-SCNC: 9.3 MG/DL (ref 8.6–10.5)
CHLORIDE SERPL-SCNC: 91 MMOL/L (ref 98–107)
CO2 SERPL-SCNC: 39 MMOL/L (ref 22–29)
CREAT SERPL-MCNC: 0.85 MG/DL (ref 0.76–1.27)
DEPRECATED RDW RBC AUTO: 49.6 FL (ref 37–54)
EGFRCR SERPLBLD CKD-EPI 2021: 98.2 ML/MIN/1.73
EOSINOPHIL # BLD AUTO: 0 10*3/MM3 (ref 0–0.4)
EOSINOPHIL NFR BLD AUTO: 0 % (ref 0.3–6.2)
ERYTHROCYTE [DISTWIDTH] IN BLOOD BY AUTOMATED COUNT: 15.3 % (ref 12.3–15.4)
GLOBULIN UR ELPH-MCNC: 2.7 GM/DL
GLUCOSE BLDC GLUCOMTR-MCNC: 105 MG/DL (ref 70–130)
GLUCOSE BLDC GLUCOMTR-MCNC: 152 MG/DL (ref 70–130)
GLUCOSE BLDC GLUCOMTR-MCNC: 166 MG/DL (ref 70–130)
GLUCOSE SERPL-MCNC: 143 MG/DL (ref 65–99)
HCT VFR BLD AUTO: 51.8 % (ref 37.5–51)
HGB BLD-MCNC: 15 G/DL (ref 13–17.7)
IMM GRANULOCYTES # BLD AUTO: 0.05 10*3/MM3 (ref 0–0.05)
IMM GRANULOCYTES NFR BLD AUTO: 0.4 % (ref 0–0.5)
INR PPP: 1.12 (ref 0.84–1.13)
LYMPHOCYTES # BLD AUTO: 0.82 10*3/MM3 (ref 0.7–3.1)
LYMPHOCYTES NFR BLD AUTO: 5.9 % (ref 19.6–45.3)
MCH RBC QN AUTO: 26.1 PG (ref 26.6–33)
MCHC RBC AUTO-ENTMCNC: 29 G/DL (ref 31.5–35.7)
MCV RBC AUTO: 90.1 FL (ref 79–97)
MONOCYTES # BLD AUTO: 0.52 10*3/MM3 (ref 0.1–0.9)
MONOCYTES NFR BLD AUTO: 3.8 % (ref 5–12)
NEUTROPHILS NFR BLD AUTO: 12.4 10*3/MM3 (ref 1.7–7)
NEUTROPHILS NFR BLD AUTO: 89.8 % (ref 42.7–76)
NRBC BLD AUTO-RTO: 0 /100 WBC (ref 0–0.2)
PLATELET # BLD AUTO: 369 10*3/MM3 (ref 140–450)
PMV BLD AUTO: 10.3 FL (ref 6–12)
POTASSIUM SERPL-SCNC: 5.3 MMOL/L (ref 3.5–5.2)
PROT SERPL-MCNC: 6.4 G/DL (ref 6–8.5)
PROTHROMBIN TIME: 14.4 SECONDS (ref 11.4–14.4)
QT INTERVAL: 430 MS
QTC INTERVAL: 531 MS
RBC # BLD AUTO: 5.75 10*6/MM3 (ref 4.14–5.8)
SODIUM SERPL-SCNC: 138 MMOL/L (ref 136–145)
WBC NRBC COR # BLD: 13.81 10*3/MM3 (ref 3.4–10.8)

## 2023-04-24 PROCEDURE — 93458 L HRT ARTERY/VENTRICLE ANGIO: CPT | Performed by: INTERNAL MEDICINE

## 2023-04-24 PROCEDURE — 99232 SBSQ HOSP IP/OBS MODERATE 35: CPT | Performed by: INTERNAL MEDICINE

## 2023-04-24 PROCEDURE — 25010000002 LORAZEPAM PER 2 MG: Performed by: INTERNAL MEDICINE

## 2023-04-24 PROCEDURE — 94799 UNLISTED PULMONARY SVC/PX: CPT

## 2023-04-24 PROCEDURE — B2151ZZ FLUOROSCOPY OF LEFT HEART USING LOW OSMOLAR CONTRAST: ICD-10-PCS | Performed by: INTERNAL MEDICINE

## 2023-04-24 PROCEDURE — 80053 COMPREHEN METABOLIC PANEL: CPT | Performed by: NURSE PRACTITIONER

## 2023-04-24 PROCEDURE — 4A023N7 MEASUREMENT OF CARDIAC SAMPLING AND PRESSURE, LEFT HEART, PERCUTANEOUS APPROACH: ICD-10-PCS | Performed by: INTERNAL MEDICINE

## 2023-04-24 PROCEDURE — 25010000002 ENOXAPARIN PER 10 MG: Performed by: INTERNAL MEDICINE

## 2023-04-24 PROCEDURE — 25010000002 HEPARIN (PORCINE) PER 1000 UNITS: Performed by: INTERNAL MEDICINE

## 2023-04-24 PROCEDURE — 85025 COMPLETE CBC W/AUTO DIFF WBC: CPT | Performed by: INTERNAL MEDICINE

## 2023-04-24 PROCEDURE — 82962 GLUCOSE BLOOD TEST: CPT

## 2023-04-24 PROCEDURE — C1894 INTRO/SHEATH, NON-LASER: HCPCS | Performed by: INTERNAL MEDICINE

## 2023-04-24 PROCEDURE — 94664 DEMO&/EVAL PT USE INHALER: CPT

## 2023-04-24 PROCEDURE — 25010000002 METHYLPREDNISOLONE PER 40 MG: Performed by: INTERNAL MEDICINE

## 2023-04-24 PROCEDURE — C1769 GUIDE WIRE: HCPCS | Performed by: INTERNAL MEDICINE

## 2023-04-24 PROCEDURE — 25510000001 IOPAMIDOL PER 1 ML: Performed by: INTERNAL MEDICINE

## 2023-04-24 PROCEDURE — 94761 N-INVAS EAR/PLS OXIMETRY MLT: CPT

## 2023-04-24 PROCEDURE — 85610 PROTHROMBIN TIME: CPT | Performed by: INTERNAL MEDICINE

## 2023-04-24 PROCEDURE — 25010000002 MIDAZOLAM PER 1 MG: Performed by: INTERNAL MEDICINE

## 2023-04-24 PROCEDURE — B2111ZZ FLUOROSCOPY OF MULTIPLE CORONARY ARTERIES USING LOW OSMOLAR CONTRAST: ICD-10-PCS | Performed by: INTERNAL MEDICINE

## 2023-04-24 RX ORDER — ENOXAPARIN SODIUM 100 MG/ML
1 INJECTION SUBCUTANEOUS EVERY 12 HOURS
Status: DISCONTINUED | OUTPATIENT
Start: 2023-04-24 | End: 2023-04-28 | Stop reason: HOSPADM

## 2023-04-24 RX ORDER — HEPARIN SODIUM 1000 [USP'U]/ML
INJECTION, SOLUTION INTRAVENOUS; SUBCUTANEOUS
Status: DISCONTINUED | OUTPATIENT
Start: 2023-04-24 | End: 2023-04-24 | Stop reason: HOSPADM

## 2023-04-24 RX ORDER — ACETAMINOPHEN 325 MG/1
650 TABLET ORAL EVERY 4 HOURS PRN
Status: DISCONTINUED | OUTPATIENT
Start: 2023-04-24 | End: 2023-04-28 | Stop reason: HOSPADM

## 2023-04-24 RX ORDER — MIDAZOLAM HYDROCHLORIDE 1 MG/ML
INJECTION INTRAMUSCULAR; INTRAVENOUS
Status: DISCONTINUED | OUTPATIENT
Start: 2023-04-24 | End: 2023-04-24 | Stop reason: HOSPADM

## 2023-04-24 RX ORDER — LIDOCAINE HYDROCHLORIDE 10 MG/ML
INJECTION, SOLUTION EPIDURAL; INFILTRATION; INTRACAUDAL; PERINEURAL
Status: DISCONTINUED | OUTPATIENT
Start: 2023-04-24 | End: 2023-04-24 | Stop reason: HOSPADM

## 2023-04-24 RX ORDER — WARFARIN SODIUM 5 MG/1
5 TABLET ORAL
Status: DISCONTINUED | OUTPATIENT
Start: 2023-04-24 | End: 2023-04-28 | Stop reason: HOSPADM

## 2023-04-24 RX ORDER — SODIUM CHLORIDE 9 MG/ML
100 INJECTION, SOLUTION INTRAVENOUS CONTINUOUS
Status: ACTIVE | OUTPATIENT
Start: 2023-04-24 | End: 2023-04-24

## 2023-04-24 RX ORDER — ONDANSETRON 2 MG/ML
4 INJECTION INTRAMUSCULAR; INTRAVENOUS ONCE
Status: DISCONTINUED | OUTPATIENT
Start: 2023-04-24 | End: 2023-04-25

## 2023-04-24 RX ORDER — NICARDIPINE HCL-0.9% SOD CHLOR 1 MG/10 ML
SYRINGE (ML) INTRAVENOUS
Status: DISCONTINUED | OUTPATIENT
Start: 2023-04-24 | End: 2023-04-24 | Stop reason: HOSPADM

## 2023-04-24 RX ADMIN — METHYLPREDNISOLONE SODIUM SUCCINATE 40 MG: 40 INJECTION INTRAMUSCULAR; INTRAVENOUS at 15:20

## 2023-04-24 RX ADMIN — ATORVASTATIN CALCIUM 40 MG: 40 TABLET, FILM COATED ORAL at 20:47

## 2023-04-24 RX ADMIN — GUAIFENESIN 1200 MG: 600 TABLET, EXTENDED RELEASE ORAL at 20:49

## 2023-04-24 RX ADMIN — METOPROLOL SUCCINATE 100 MG: 50 TABLET, EXTENDED RELEASE ORAL at 09:22

## 2023-04-24 RX ADMIN — SACUBITRIL AND VALSARTAN 1 TABLET: 49; 51 TABLET, FILM COATED ORAL at 06:08

## 2023-04-24 RX ADMIN — IPRATROPIUM BROMIDE AND ALBUTEROL SULFATE 3 ML: 2.5; .5 SOLUTION RESPIRATORY (INHALATION) at 08:58

## 2023-04-24 RX ADMIN — DOXYCYCLINE 100 MG: 100 CAPSULE ORAL at 06:08

## 2023-04-24 RX ADMIN — CITALOPRAM HYDROBROMIDE 20 MG: 20 TABLET ORAL at 09:22

## 2023-04-24 RX ADMIN — SACUBITRIL AND VALSARTAN 1 TABLET: 24; 26 TABLET, FILM COATED ORAL at 20:48

## 2023-04-24 RX ADMIN — BUDESONIDE AND FORMOTEROL FUMARATE DIHYDRATE 2 PUFF: 160; 4.5 AEROSOL RESPIRATORY (INHALATION) at 08:58

## 2023-04-24 RX ADMIN — PHENYTOIN SODIUM 100 MG: 100 CAPSULE ORAL at 20:47

## 2023-04-24 RX ADMIN — ENOXAPARIN SODIUM 100 MG: 100 INJECTION SUBCUTANEOUS at 17:44

## 2023-04-24 RX ADMIN — IPRATROPIUM BROMIDE AND ALBUTEROL SULFATE 3 ML: 2.5; .5 SOLUTION RESPIRATORY (INHALATION) at 16:28

## 2023-04-24 RX ADMIN — SODIUM CHLORIDE 100 ML/HR: 9 INJECTION, SOLUTION INTRAVENOUS at 18:29

## 2023-04-24 RX ADMIN — GUAIFENESIN 1200 MG: 600 TABLET, EXTENDED RELEASE ORAL at 09:23

## 2023-04-24 RX ADMIN — WARFARIN SODIUM 5 MG: 5 TABLET ORAL at 17:44

## 2023-04-24 RX ADMIN — PHENYTOIN SODIUM 100 MG: 100 CAPSULE ORAL at 10:10

## 2023-04-24 RX ADMIN — METHYLPREDNISOLONE SODIUM SUCCINATE 40 MG: 40 INJECTION INTRAMUSCULAR; INTRAVENOUS at 21:04

## 2023-04-24 RX ADMIN — DOXYCYCLINE 100 MG: 100 CAPSULE ORAL at 17:44

## 2023-04-24 RX ADMIN — CLOPIDOGREL BISULFATE 75 MG: 75 TABLET ORAL at 09:22

## 2023-04-24 RX ADMIN — METHYLPREDNISOLONE SODIUM SUCCINATE 40 MG: 40 INJECTION INTRAMUSCULAR; INTRAVENOUS at 06:08

## 2023-04-24 RX ADMIN — QUETIAPINE FUMARATE 150 MG: 50 TABLET, EXTENDED RELEASE ORAL at 20:48

## 2023-04-24 RX ADMIN — LORAZEPAM 0.5 MG: 2 INJECTION INTRAMUSCULAR; INTRAVENOUS at 10:10

## 2023-04-24 RX ADMIN — TIOTROPIUM BROMIDE INHALATION SPRAY 2 PUFF: 3.12 SPRAY, METERED RESPIRATORY (INHALATION) at 08:58

## 2023-04-24 RX ADMIN — ASPIRIN 81 MG: 81 TABLET, COATED ORAL at 09:23

## 2023-04-24 RX ADMIN — LORAZEPAM 0.5 MG: 2 INJECTION INTRAMUSCULAR; INTRAVENOUS at 21:02

## 2023-04-24 RX ADMIN — EMPAGLIFLOZIN 10 MG: 10 TABLET, FILM COATED ORAL at 18:29

## 2023-04-24 NOTE — PLAN OF CARE
Problem: Fall Injury Risk  Goal: Absence of Fall and Fall-Related Injury  Outcome: Ongoing, Progressing  Intervention: Promote Injury-Free Environment     Problem: COPD (Chronic Obstructive Pulmonary Disease) Comorbidity  Goal: Maintenance of COPD Symptom Control  Outcome: Ongoing, Progressing  Intervention: Maintain COPD-Symptom Control     Problem: Heart Failure Comorbidity  Goal: Maintenance of Heart Failure Symptom Control  Outcome: Ongoing, Progressing     Problem: Hypertension Comorbidity  Goal: Blood Pressure in Desired Range  Outcome: Ongoing, Progressing     Problem: Obstructive Sleep Apnea Risk or Actual Comorbidity Management  Goal: Unobstructed Breathing During Sleep  Outcome: Ongoing, Progressing     Problem: Pain Chronic (Persistent) (Comorbidity Management)  Goal: Acceptable Pain Control and Functional Ability  Outcome: Ongoing, Progressing  Intervention: Optimize Psychosocial Wellbeing     Problem: Adult Inpatient Plan of Care  Goal: Plan of Care Review  Outcome: Ongoing, Progressing  Goal: Optimal Comfort and Wellbeing  Outcome: Ongoing, Progressing  Intervention: Provide Person-Centered Care   Goal Outcome Evaluation:  Plan of Care Reviewed With: patient        Progress: improving      Yes

## 2023-04-24 NOTE — PROGRESS NOTES
"                    Clinical Nutrition       Patient Name: Fernie Dalal  YOB: 1960  MRN: [id number]  Date of Encounter: 04/24/23 14:50 EDT  Admission date: 4/22/2023      Reason for Visit   MST score 2+, \"Unsure\" unintentional weight loss    EMR Reviewed     EMR Reviewed: yes     Admission Diagnosis:  Nonspecific chest pain [R07.9]    Problem List:    Nonspecific chest pain    Anxiety    Acute exacerbation of chronic obstructive pulmonary disease (COPD)    Chronic systolic congestive heart failure    Coronary artery disease involving native heart    Dyslipidemia    Hyperlipemia    Seizure disorder    Sleep apnea    Abnormal stress test    Essential hypertension    Anthropometric      Flowsheet Rows    Flowsheet Row First Filed Value   Admission Height 175.3 cm (69\") Documented at 04/22/2023 1118   Admission Weight 117 kg (259 lb) Documented at 04/22/2023 1118            Height: 175.3 cm (69\")  Last Filed Weight: Weight: 117 kg (259 lb) (04/23/23 1121)  Weight Method: Stated  BMI: BMI (Calculated): 38.2  BMI classification: Obese Class II: 35-39.9kg/m2     UBW: Pt unsure, per EMR ~260lbs  Weight change: 32lbs/12% wt loss x 9 months   Weight       Weight (kg) Weight (lbs) Weight Method Visit Report   4/23/2015 118.84 kg  261 lb 15.9 oz      7/1/2022 117.482 kg  259 lb   --    4/18/2023 --  --   --    4/22/2023 117.482 kg  259 lb  Stated     4/23/2023 117.482 kg  259 lb         Reported/Observed/Food/Nutrition Related - Comments     Pt lying in bed, waiting for procedure, family at bedside upon RD visit. Pt appears sleepy and answering nutrition-related questions w/minimal response. Pt reports poor po intake x 2 days PTA, unable to quantify amount of po intake. Pt states he is unsure of UBW as well. RD requested measured wt from nsg-standing wt of 227lbs obtained. Per EMR, appears pt has had wt loss of 32lb/12% x 9 months which is not significant for malnutrition criteria.     Nutrition Focused Physical " Exam     Date: 4/24    NFPE completed, patient does not meet criteria for MSA at this time. Moderate muscle wasting-upper body.     Current Nutrition Prescription     NPO Diet NPO Type: Strict NPO  Orders Placed This Encounter      DIET MESSAGE Please send tray to floor asap.  New admission      Average Intake from Charting: Insufficient data     Nutrition Diagnosis     Problem Unintended weight loss   Etiology ? etiology   Signs/Symptoms 32lb/12% wt loss x 9 months   Status:    Actions     Follow treatment progress, Care plan reviewed, Interview for preferences, Supplement provided    Pt agreeable to ONS (chocolate) BID    Monitor Per Protocol      Laine Melissa RD,   Time Spent: 30

## 2023-04-24 NOTE — PROGRESS NOTES
[facility] Medicine Services  PROGRESS NOTE    Patient Name: Fernie Dalal  : 1960  MRN: [id number]    Date of Admission: 2023  Primary Care Physician: Belem Albert APRN    Subjective   Subjective     CC: Follow-up chest pain    HPI: No acute events overnight, patient rested well, denies any chest pain    ROS:  Gen- No fevers, chills  CV- No chest pain, palpitations  Resp- No cough, dyspnea  GI- No N/V/D, abd pain       Objective   Objective     Vital Signs:   Temp:  [96.9 °F (36.1 °C)-99 °F (37.2 °C)] 98.2 °F (36.8 °C)  Heart Rate:  [87-99] 88  Resp:  [15-18] 18  BP: (109-136)/(50-89) 114/78  Flow (L/min):  [3-4] 3     Physical Exam:  Constitutional: No acute distress, awake, alert  HENT: NCAT, mucous membranes moist  Respiratory: Nonlabored respirations, diffuse coarse breath sounds on 3 L NC  Cardiovascular: RRR, no murmurs, rubs, or gallops  Gastrointestinal: Positive bowel sounds, soft, nontender, nondistended  Musculoskeletal: No bilateral ankle edema  Psychiatric: Appropriate affect, cooperative  Neurologic: Oriented x 3, nonfocal  Skin: No rashes    Results Reviewed:  LAB RESULTS:      Lab 23  0442 23  1135   WBC 13.81* 7.21   HEMOGLOBIN 15.0 14.9   HEMATOCRIT 51.8* 50.4   PLATELETS 369 306   NEUTROS ABS 12.40* 5.20   IMMATURE GRANS (ABS) 0.05 0.03   LYMPHS ABS 0.82 0.96   MONOS ABS 0.52 0.83   EOS ABS 0.00 0.13   MCV 90.1 88.7   D DIMER QUANT  --  0.78*         Lab 23  0442 23  1135   SODIUM 138 138   POTASSIUM 5.3* 4.4   CHLORIDE 91* 95*   CO2 39.0* 36.0*   ANION GAP 8.0 7.0   BUN 25* 11   CREATININE 0.85 0.58*   EGFR 98.2 110.3   GLUCOSE 143* 101*   CALCIUM 9.3 9.5   MAGNESIUM  --  2.0         Lab 23  0442 23  1135   TOTAL PROTEIN 6.4 7.0   ALBUMIN 3.7 3.7   GLOBULIN 2.7 3.3   ALT (SGPT) 13 17   AST (SGOT) 11 16   BILIRUBIN 0.2 0.3   ALK PHOS 93 100         Lab 23  1135   PROBNP 2,034.0*   HSTROP T 87*                  Brief Urine Lab Results     None          Microbiology Results Abnormal     Procedure Component Value - Date/Time    Respiratory Panel PCR w/COVID-19(SARS-CoV-2) JERO/ELADIO/YEIMI/PAD/COR/MAD/JONATHAN In-House, NP Swab in UTM/VTM, 3-4 HR TAT - Swab, Nasopharynx [[id number]]  (Normal) Collected: 04/22/23 1350    Lab Status: Final result Specimen: Swab from Nasopharynx Updated: 04/22/23 1455     ADENOVIRUS, PCR Not Detected     Coronavirus 229E Not Detected     Coronavirus HKU1 Not Detected     Coronavirus NL63 Not Detected     Coronavirus OC43 Not Detected     COVID19 Not Detected     Human Metapneumovirus Not Detected     Human Rhinovirus/Enterovirus Not Detected     Influenza A PCR Not Detected     Influenza B PCR Not Detected     Parainfluenza Virus 1 Not Detected     Parainfluenza Virus 2 Not Detected     Parainfluenza Virus 3 Not Detected     Parainfluenza Virus 4 Not Detected     RSV, PCR Not Detected     Bordetella pertussis pcr Not Detected     Bordetella parapertussis PCR Not Detected     Chlamydophila pneumoniae PCR Not Detected     Mycoplasma pneumo by PCR Not Detected    Narrative:      In the setting of a positive respiratory panel with a viral infection PLUS a negative procalcitonin without other underlying concern for bacterial infection, consider observing off antibiotics or discontinuation of antibiotics and continue supportive care. If the respiratory panel is positive for atypical bacterial infection (Bordetella pertussis, Chlamydophila pneumoniae, or Mycoplasma pneumoniae), consider antibiotic de-escalation to target atypical bacterial infection.          XR Chest 1 View    Result Date: 4/22/2023  XR CHEST 1 VW Date of Exam: 4/22/2023 11:45 AM EDT Indication: SOA triage protocol Comparison: 7/10/2013. Findings: The heart appears enlarged and there is mild vascular engorgement, without overt pulmonary edema. There is no focal consolidation. There is no effusion or pneumothorax.     Impression: Impression:  The heart appears enlarged and there is mild vascular engorgement, without overt pulmonary edema. There is no focal consolidation. There is no effusion or pneumothorax. Electronically Signed: Wilian Hoff  4/22/2023 12:05 PM EDT  Workstation ID: [id number]    Duplex Venous Lower Extremity - Bilateral CAR    Result Date: 4/23/2023  •  Acute left lower extremity deep vein thrombosis noted in the posterial tibial. •  All other veins appeared normal bilaterally.           Current medications:  Scheduled Meds:aspirin, 81 mg, Oral, Daily  atorvastatin, 40 mg, Oral, Nightly  budesonide-formoterol, 2 puff, Inhalation, BID - RT   And  tiotropium bromide monohydrate, 2 puff, Inhalation, Daily - RT  busPIRone, 5 mg, Oral, TID  citalopram, 20 mg, Oral, Daily  clopidogrel, 75 mg, Oral, Daily  doxycycline, 100 mg, Oral, Q12H  enoxaparin, 1 mg/kg, Subcutaneous, Q12H  guaiFENesin, 1,200 mg, Oral, Q12H  insulin lispro, 0-7 Units, Subcutaneous, TID With Meals  ipratropium-albuterol, 3 mL, Nebulization, 4x Daily - RT  methylPREDNISolone sodium succinate, 40 mg, Intravenous, Q8H  metoprolol succinate XL, 100 mg, Oral, Daily  pharmacy consult - MTM, , Does not apply, Daily  phenytoin ER, 100 mg, Oral, BID  QUEtiapine fumarate ER, 150 mg, Oral, Nightly  sacubitril-valsartan, 1 tablet, Oral, Q12H      Continuous Infusions:   PRN Meds:.•  dextrose  •  dextrose  •  diazePAM  •  glucagon (human recombinant)  •  hydrOXYzine  •  LORazepam  •  nitroglycerin  •  sodium chloride    Assessment & Plan   Assessment & Plan     Active Hospital Problems    Diagnosis  POA   • **Nonspecific chest pain [R07.9]  Yes   • Dyslipidemia [E78.5]  Yes   • Essential hypertension [I10]  Yes   • Seizure disorder [G40.909]  Yes   • Abnormal stress test [R94.39]  Unknown   • Chronic systolic congestive heart failure [I50.22]  Yes   • Coronary artery disease involving native heart [I25.10]  Yes   • Acute exacerbation of chronic obstructive pulmonary disease (COPD)  [J44.1]  Yes   • Anxiety [F41.9]  Yes   • Hyperlipemia [E78.5]  Yes   • Sleep apnea [G47.30]  Yes      Resolved Hospital Problems   No resolved problems to display.        Brief Hospital Course to date:  Fernie Dalal is a 63 y.o. male with PMHx significant for COPD, chronic respiratory failure on 2-3L at rest and 3-4L with exertion, seizure d/o, DMII, Anxiety/depression, HFrEF (LVEF 33%), tobacco abuse, HTN, HLD who presents with complaints of increased shortness of breath and chest pain.     This patient's problems and plans were partially entered by my partner and updated as appropriate by me 04/24/23.    AECOPD  Acute on Chronic Respiratory Failure w/Hypoxia  - continue steroids, 40mg IV q8hrs, wean as able  - aggressive pulmonary toilet, mucinex, scheduled nebs  - doxycycline BID x 5 days, resp PCR negative, CXR reviewed  - continue Symbicort, Sprivia (in place of Trelegy)  - COPD navigator to see  - at baseline 2-3L w/rest and 3-4L w/exertion, currently on 3 L NC     Chest Pain  Elevated Troponin  - cardiology following, scheduled  For C on Monday with Dr. Gaitan, has hx of abnormal stress test  - NTG PRN  - continue ASA, statin, beta-blocker, plavix     HFrEF - LVEF 33% (3/2023)  - Cardiology following, s/p IV lasix, defer further diuresis to cards  - switched to Entresto  - continue beta-blocker     Acute left DVT  -Patient had elevated D-dimer  -Venous duplex shows acute DVT in the posterior tibial  -Continue therapeutic Lovenox , plan to transition to Entresto     Anxiety and depression  - continue buspar, PRN Valium  - nightly seroquel     Seizure D/O  - continue dilantin     Hypertension  -BP slightly low this morning  -Patient is on metoprolol and Entresto, closely monitor     Hyperlipidemia  -continue statin     Type 2 diabetes  -Control unknown, follow-up A1c  -FSBG's reviewed and currently appropriate, continue SSI while on steroids.  -He is not on any home meds      Expected Discharge Location and  Transportation: Home  Expected Discharge   Expected Discharge Date and Time     Expected Discharge Date Expected Discharge Time    Apr 26, 2023            DVT prophylaxis:  Medical DVT prophylaxis orders are present.          CODE STATUS:   Code Status and Medical Interventions:   Ordered at: 04/22/23 1339     Level Of Support Discussed With:    Patient     Code Status (Patient has no pulse and is not breathing):    CPR (Attempt to Resuscitate)     Medical Interventions (Patient has pulse or is breathing):    Full Support       Julieta Landers MD  04/24/23

## 2023-04-24 NOTE — PROGRESS NOTES
"Pharmacy Consult  -  Warfarin    Fernie Dalal is a  63 y.o. male   Height - 175.3 cm (69\")  Weight - 103 kg (227 lb)    Consulting Provider: Intensivist  Indication: - DVT/PE  Goal INR: 2 - 3   Home Regimen: New Start    Bridge Therapy: Yes. Pt has been on therapeutic lovenox for ~24 hrs. Will continue until INR is therapeutic.     Drug-Drug Interactions with current regimen:  Aspirin - may enhance anticoagulant effect  APAP - may enhance anticoagulant effect  Citalopram - may enhance anticoagulant effect  Clopidogrel - may enhance anticoagulant effect  Doxycycline - may enhance anticoagulant effect  Methylprednisolone - may enhance anticoagulant effect  Phenytoin - may enhance anticoagulant effect  Quetiapine - may enhance anticoagulant effect      Warfarin Dosing During Admission:    Date  4/24           INR  pending           Dose  5mg                Education Provided:    Discharge Follow up:   Following Provider -    Follow up time range or appointment -      Labs:    Results from last 7 days   Lab Units 04/24/23  0442 04/22/23  1135   HEMOGLOBIN g/dL 15.0 14.9   HEMATOCRIT % 51.8* 50.4     Results from last 7 days   Lab Units 04/24/23  0442 04/22/23  1135   SODIUM mmol/L 138 138   POTASSIUM mmol/L 5.3* 4.4   CHLORIDE mmol/L 91* 95*   CO2 mmol/L 39.0* 36.0*   BUN mg/dL 25* 11   CREATININE mg/dL 0.85 0.58*   CALCIUM mg/dL 9.3 9.5   BILIRUBIN mg/dL 0.2 0.3   ALK PHOS U/L 93 100   ALT (SGPT) U/L 13 17   AST (SGOT) U/L 11 16   GLUCOSE mg/dL 143* 101*       Current dietary intake:   Dietary Orders (From admission, onward)       Start     Ordered    04/24/23 1800  Dietary Nutrition Supplements Boost Plus; chocolate  Daily With Breakfast & Dinner      Question Answer Comment   Select Supplement: Boost Plus    Flavor: chocolate        04/24/23 1512    04/24/23 1504  Diet: Regular/House Diet; Texture: Regular Texture (IDDSI 7); Fluid Consistency: Thin (IDDSI 0)  Diet Effective Now        References:    Diet Order " [facility]   Question Answer Comment   Diets: Regular/House Diet    Texture: Regular Texture (IDDSI 7)    Fluid Consistency: Thin (IDDSI 0)        04/24/23 1504                        Assessment/Plan:      Pharmacy to dose warfarin for DVT/PE, goal INR 2-3. Patient does not take warfarin outpatient.  INR is pending for today, but expected to be subtherapeutic as this is an initiation of therapy Will give warfarin 5mg this evening.  Pharmacy will continue to monitor daily INR, signs/symptoms of bleeding, drug-drug interactions and dietary intake to adjust dose as necessary.    Thank you  Isauro Smith RPH  4/24/2023  17:09 EDT

## 2023-04-24 NOTE — CASE MANAGEMENT/SOCIAL WORK
Continued Stay Note  [facility]     Patient Name: Fernie Dalal  MRN: [id number]  Today's Date: 4/24/2023    Admit Date: 4/22/2023    Plan: Home at DC   Discharge Plan     Row Name 04/24/23 1431       Plan    Plan Home at DC    Patient/Family in Agreement with Plan other (see comments)    Plan Comments The pt is off the floor in a heart cath. CM will continue to follow.    Final Discharge Disposition Code 06 - home with home health care               Discharge Codes    No documentation.               Expected Discharge Date and Time     Expected Discharge Date Expected Discharge Time    Apr 26, 2023             Samina Weir RN

## 2023-04-24 NOTE — PROGRESS NOTES
"  [facility]   Inpatient Progress Note       LOS: 2 days   Patient Care Team:  Belem Albert APRN as PCP - General (Family Medicine)    Chief Complaint:  Follow-up for angina/CAD    Subjective   Resting comfortably, his breathing has improved after diuresis.  Feels tired but denies chest pain.  States that he has restless legs and gets very uncomfortable during procedure and requesting sedation for cardiac cath.    Objective     Vitals:  Blood pressure 114/78, pulse 88, temperature 98.2 °F (36.8 °C), temperature source Oral, resp. rate 18, height 175.3 cm (69\"), weight 117 kg (259 lb), SpO2 94 %.     Intake/Output Summary (Last 24 hours) at 4/24/2023 0851  Last data filed at 4/24/2023 0702  Gross per 24 hour   Intake --   Output 400 ml   Net -400 ml     General: No apparent distress.  Neck: no JVD.  Chest:No respiratory distress, breath sounds are diminished at bases with fine crackles.  Cardiovascular: Normal S1 and S2, no murmur, distant heart sounds.  Extremities: No significant edema.         Results Review:     I reviewed the patient's new clinical results.    Results from last 7 days   Lab Units 04/24/23  0442   WBC 10*3/mm3 13.81*   HEMOGLOBIN g/dL 15.0   HEMATOCRIT % 51.8*   PLATELETS 10*3/mm3 369     Results from last 7 days   Lab Units 04/24/23  0442   SODIUM mmol/L 138   POTASSIUM mmol/L 5.3*   CHLORIDE mmol/L 91*   CO2 mmol/L 39.0*   BUN mg/dL 25*   CREATININE mg/dL 0.85   CALCIUM mg/dL 9.3   BILIRUBIN mg/dL 0.2   ALK PHOS U/L 93   ALT (SGPT) U/L 13   AST (SGOT) U/L 11   GLUCOSE mg/dL 143*     Results from last 7 days   Lab Units 04/24/23  0442   SODIUM mmol/L 138   POTASSIUM mmol/L 5.3*   CHLORIDE mmol/L 91*   CO2 mmol/L 39.0*   BUN mg/dL 25*   CREATININE mg/dL 0.85   GLUCOSE mg/dL 143*   CALCIUM mg/dL 9.3         Lab Results   Lab Value Date/Time    TROPONINT 87 (C) 04/22/2023 1135             Results from last 7 days   Lab Units 04/22/23  1135   PROBNP pg/mL " 2,034.0*     Results from last 7 days   Lab Units 04/22/23  1135   HSTROP T ng/L 87*          Tele:  SR    Assessment:  1. Acute on chronic systolic congestive heart failure, EF 33% by echo at .  1. Abnormal stress 3/18/2022: EF 14%. Severely large LV cavity. No stress-induced TID. Large defect located in the inferio, inferoseptal and inferoapical myocardium.  2. Acute on chronic hypoxic respiratory failure with known COPD.  3. NSTEMI, unclear mechanism.  Could be type II related to hypoxic respiratory failure.   4. COPD  5. Dyslipidemia  6. Sleep apnea  7. Left lower extremity acute DVT    Plan:  1. Continue dual antiplatelet therapy for CAD/suspected ACS.  2. Continue Toprol and Entresto for CM/HFrEF.  He diuresed with IV Lasix, hold off additional diuresis in anticipation of cardiac cath.  3. Continue subcu Lovenox, will transition to Eliquis after cardiac cath.  4. Management of COPD exacerbation and medical conditions per hospitalist.  5. Further recommendations to follow.    Marsha Gaitan MD, FACC, [facility]

## 2023-04-24 NOTE — CONSULTS
COPD Nurse Navigator has received consult and completed chart review.  Patient scheduled for C today and not expected to discharge this date per primary RN.  BS visit deferred this date.  No other questions or concerns at this time.  NN continues to follow.

## 2023-04-25 LAB
ANION GAP SERPL CALCULATED.3IONS-SCNC: 12 MMOL/L (ref 5–15)
BASOPHILS # BLD AUTO: 0.02 10*3/MM3 (ref 0–0.2)
BASOPHILS NFR BLD AUTO: 0.1 % (ref 0–1.5)
BUN SERPL-MCNC: 24 MG/DL (ref 8–23)
BUN/CREAT SERPL: 36.4 (ref 7–25)
CALCIUM SPEC-SCNC: 9.4 MG/DL (ref 8.6–10.5)
CHLORIDE SERPL-SCNC: 95 MMOL/L (ref 98–107)
CO2 SERPL-SCNC: 34 MMOL/L (ref 22–29)
CREAT SERPL-MCNC: 0.66 MG/DL (ref 0.76–1.27)
DEPRECATED RDW RBC AUTO: 49.3 FL (ref 37–54)
EGFRCR SERPLBLD CKD-EPI 2021: 106 ML/MIN/1.73
EOSINOPHIL # BLD AUTO: 0.02 10*3/MM3 (ref 0–0.4)
EOSINOPHIL NFR BLD AUTO: 0.1 % (ref 0.3–6.2)
ERYTHROCYTE [DISTWIDTH] IN BLOOD BY AUTOMATED COUNT: 16.1 % (ref 12.3–15.4)
GLUCOSE BLDC GLUCOMTR-MCNC: 150 MG/DL (ref 70–130)
GLUCOSE BLDC GLUCOMTR-MCNC: 161 MG/DL (ref 70–130)
GLUCOSE BLDC GLUCOMTR-MCNC: 202 MG/DL (ref 70–130)
GLUCOSE SERPL-MCNC: 156 MG/DL (ref 65–99)
HBA1C MFR BLD: 7.3 % (ref 4.8–5.6)
HCT VFR BLD AUTO: 57 % (ref 37.5–51)
HGB BLD-MCNC: 17.1 G/DL (ref 13–17.7)
IMM GRANULOCYTES # BLD AUTO: 0.07 10*3/MM3 (ref 0–0.05)
IMM GRANULOCYTES NFR BLD AUTO: 0.5 % (ref 0–0.5)
INR PPP: 1.13 (ref 0.84–1.13)
LYMPHOCYTES # BLD AUTO: 1.19 10*3/MM3 (ref 0.7–3.1)
LYMPHOCYTES NFR BLD AUTO: 8.1 % (ref 19.6–45.3)
MCH RBC QN AUTO: 26.6 PG (ref 26.6–33)
MCHC RBC AUTO-ENTMCNC: 30 G/DL (ref 31.5–35.7)
MCV RBC AUTO: 88.6 FL (ref 79–97)
MONOCYTES # BLD AUTO: 0.7 10*3/MM3 (ref 0.1–0.9)
MONOCYTES NFR BLD AUTO: 4.8 % (ref 5–12)
NEUTROPHILS NFR BLD AUTO: 12.68 10*3/MM3 (ref 1.7–7)
NEUTROPHILS NFR BLD AUTO: 86.4 % (ref 42.7–76)
NRBC BLD AUTO-RTO: 0 /100 WBC (ref 0–0.2)
PLATELET # BLD AUTO: 368 10*3/MM3 (ref 140–450)
PMV BLD AUTO: 10.5 FL (ref 6–12)
POTASSIUM SERPL-SCNC: 5.2 MMOL/L (ref 3.5–5.2)
PROTHROMBIN TIME: 14.4 SECONDS (ref 11.4–14.4)
RBC # BLD AUTO: 6.43 10*6/MM3 (ref 4.14–5.8)
RBC MORPH BLD: NORMAL
SMALL PLATELETS BLD QL SMEAR: ADEQUATE
SODIUM SERPL-SCNC: 141 MMOL/L (ref 136–145)
WBC MORPH BLD: NORMAL
WBC NRBC COR # BLD: 14.68 10*3/MM3 (ref 3.4–10.8)

## 2023-04-25 PROCEDURE — 85025 COMPLETE CBC W/AUTO DIFF WBC: CPT | Performed by: INTERNAL MEDICINE

## 2023-04-25 PROCEDURE — 97116 GAIT TRAINING THERAPY: CPT

## 2023-04-25 PROCEDURE — 94799 UNLISTED PULMONARY SVC/PX: CPT

## 2023-04-25 PROCEDURE — 83036 HEMOGLOBIN GLYCOSYLATED A1C: CPT | Performed by: INTERNAL MEDICINE

## 2023-04-25 PROCEDURE — 97161 PT EVAL LOW COMPLEX 20 MIN: CPT

## 2023-04-25 PROCEDURE — 85007 BL SMEAR W/DIFF WBC COUNT: CPT | Performed by: INTERNAL MEDICINE

## 2023-04-25 PROCEDURE — 25010000002 ENOXAPARIN PER 10 MG: Performed by: INTERNAL MEDICINE

## 2023-04-25 PROCEDURE — 82962 GLUCOSE BLOOD TEST: CPT

## 2023-04-25 PROCEDURE — 97165 OT EVAL LOW COMPLEX 30 MIN: CPT

## 2023-04-25 PROCEDURE — 94664 DEMO&/EVAL PT USE INHALER: CPT

## 2023-04-25 PROCEDURE — 85610 PROTHROMBIN TIME: CPT | Performed by: INTERNAL MEDICINE

## 2023-04-25 PROCEDURE — 80048 BASIC METABOLIC PNL TOTAL CA: CPT | Performed by: INTERNAL MEDICINE

## 2023-04-25 PROCEDURE — 25010000002 METHYLPREDNISOLONE PER 40 MG: Performed by: INTERNAL MEDICINE

## 2023-04-25 PROCEDURE — 99232 SBSQ HOSP IP/OBS MODERATE 35: CPT | Performed by: INTERNAL MEDICINE

## 2023-04-25 PROCEDURE — 25010000002 LORAZEPAM PER 2 MG: Performed by: INTERNAL MEDICINE

## 2023-04-25 PROCEDURE — 94761 N-INVAS EAR/PLS OXIMETRY MLT: CPT

## 2023-04-25 RX ORDER — BUMETANIDE 1 MG/1
1 TABLET ORAL DAILY
Status: DISCONTINUED | OUTPATIENT
Start: 2023-04-25 | End: 2023-04-28 | Stop reason: HOSPADM

## 2023-04-25 RX ORDER — ALBUTEROL SULFATE 2.5 MG/3ML
2.5 SOLUTION RESPIRATORY (INHALATION)
Status: DISCONTINUED | OUTPATIENT
Start: 2023-04-25 | End: 2023-04-28 | Stop reason: HOSPADM

## 2023-04-25 RX ADMIN — BUDESONIDE AND FORMOTEROL FUMARATE DIHYDRATE 2 PUFF: 160; 4.5 AEROSOL RESPIRATORY (INHALATION) at 07:15

## 2023-04-25 RX ADMIN — PHENYTOIN SODIUM 100 MG: 100 CAPSULE ORAL at 08:21

## 2023-04-25 RX ADMIN — CITALOPRAM HYDROBROMIDE 20 MG: 20 TABLET ORAL at 08:21

## 2023-04-25 RX ADMIN — ATORVASTATIN CALCIUM 40 MG: 40 TABLET, FILM COATED ORAL at 21:18

## 2023-04-25 RX ADMIN — QUETIAPINE FUMARATE 150 MG: 50 TABLET, EXTENDED RELEASE ORAL at 21:19

## 2023-04-25 RX ADMIN — METHYLPREDNISOLONE SODIUM SUCCINATE 40 MG: 40 INJECTION INTRAMUSCULAR; INTRAVENOUS at 15:19

## 2023-04-25 RX ADMIN — ALBUTEROL SULFATE 2.5 MG: 2.5 SOLUTION RESPIRATORY (INHALATION) at 20:25

## 2023-04-25 RX ADMIN — BUDESONIDE AND FORMOTEROL FUMARATE DIHYDRATE 2 PUFF: 160; 4.5 AEROSOL RESPIRATORY (INHALATION) at 20:25

## 2023-04-25 RX ADMIN — METHYLPREDNISOLONE SODIUM SUCCINATE 40 MG: 40 INJECTION INTRAMUSCULAR; INTRAVENOUS at 05:26

## 2023-04-25 RX ADMIN — GUAIFENESIN 1200 MG: 600 TABLET, EXTENDED RELEASE ORAL at 21:18

## 2023-04-25 RX ADMIN — SACUBITRIL AND VALSARTAN 1 TABLET: 24; 26 TABLET, FILM COATED ORAL at 08:21

## 2023-04-25 RX ADMIN — METHYLPREDNISOLONE SODIUM SUCCINATE 40 MG: 40 INJECTION INTRAMUSCULAR; INTRAVENOUS at 21:37

## 2023-04-25 RX ADMIN — PHENYTOIN SODIUM 100 MG: 100 CAPSULE ORAL at 21:19

## 2023-04-25 RX ADMIN — DOXYCYCLINE 100 MG: 100 CAPSULE ORAL at 17:45

## 2023-04-25 RX ADMIN — BUSPIRONE HYDROCHLORIDE 5 MG: 5 TABLET ORAL at 08:21

## 2023-04-25 RX ADMIN — WARFARIN SODIUM 5 MG: 5 TABLET ORAL at 17:45

## 2023-04-25 RX ADMIN — BUMETANIDE 1 MG: 1 TABLET ORAL at 08:21

## 2023-04-25 RX ADMIN — EMPAGLIFLOZIN 10 MG: 10 TABLET, FILM COATED ORAL at 08:21

## 2023-04-25 RX ADMIN — ENOXAPARIN SODIUM 100 MG: 100 INJECTION SUBCUTANEOUS at 17:45

## 2023-04-25 RX ADMIN — METOPROLOL SUCCINATE 100 MG: 50 TABLET, EXTENDED RELEASE ORAL at 08:21

## 2023-04-25 RX ADMIN — GUAIFENESIN 1200 MG: 600 TABLET, EXTENDED RELEASE ORAL at 08:21

## 2023-04-25 RX ADMIN — SACUBITRIL AND VALSARTAN 1 TABLET: 24; 26 TABLET, FILM COATED ORAL at 21:19

## 2023-04-25 RX ADMIN — ACETAMINOPHEN 325MG 650 MG: 325 TABLET ORAL at 17:45

## 2023-04-25 RX ADMIN — Medication 10 ML: at 08:21

## 2023-04-25 RX ADMIN — CLOPIDOGREL BISULFATE 75 MG: 75 TABLET ORAL at 08:21

## 2023-04-25 RX ADMIN — DOXYCYCLINE 100 MG: 100 CAPSULE ORAL at 05:26

## 2023-04-25 RX ADMIN — TIOTROPIUM BROMIDE INHALATION SPRAY 2 PUFF: 3.12 SPRAY, METERED RESPIRATORY (INHALATION) at 07:15

## 2023-04-25 RX ADMIN — ALBUTEROL SULFATE 2.5 MG: 2.5 SOLUTION RESPIRATORY (INHALATION) at 07:15

## 2023-04-25 RX ADMIN — DIAZEPAM 5 MG: 5 TABLET ORAL at 07:38

## 2023-04-25 RX ADMIN — LORAZEPAM 0.5 MG: 2 INJECTION INTRAMUSCULAR; INTRAVENOUS at 12:22

## 2023-04-25 RX ADMIN — ENOXAPARIN SODIUM 100 MG: 100 INJECTION SUBCUTANEOUS at 05:26

## 2023-04-25 RX ADMIN — BUSPIRONE HYDROCHLORIDE 5 MG: 5 TABLET ORAL at 21:18

## 2023-04-25 RX ADMIN — BUSPIRONE HYDROCHLORIDE 5 MG: 5 TABLET ORAL at 15:19

## 2023-04-25 NOTE — PROGRESS NOTES
"Pharmacy Consult  -  Warfarin    Fernie Dalal is a  63 y.o. male   Height - 175.3 cm (69\")  Weight - 103 kg (227 lb)    Consulting Provider: Intensivist  Indication: - DVT/PE  Goal INR: 2 - 3   Home Regimen: New Start    Bridge Therapy: Yes. Pt has been on therapeutic lovenox for ~24 hrs. Will continue until INR is therapeutic.     Drug-Drug Interactions with current regimen:  Aspirin - may enhance anticoagulant effect  APAP - may enhance anticoagulant effect  Citalopram - may enhance anticoagulant effect  Clopidogrel - may enhance anticoagulant effect  Doxycycline - may enhance anticoagulant effect  Methylprednisolone - may enhance anticoagulant effect  Phenytoin - may enhance or diminish anticoagulant effect  Quetiapine - may enhance anticoagulant effect      Warfarin Dosing During Admission:    Date  4/24 4/25          INR  1.12 1.13          Dose  5mg 5 mg               Education Provided:  Patient not on warfarin prior to admission. Education provided 4/25/23 verbally and in writing. Discussed effects of warfarin, importance of checking INR, drug-drug and drug-food interactions, and signs/symptoms of bleeding and clotting. Patient verbalized understanding through teach back. All pertinent questions were answered.    Discharge Follow up:   Following Provider -  Likely to follow with PCP KRISTIN Manuel at [facility] in [place], [place]. Called to verify that patient can get INR monitored in office 4/25 but office closed this entire week for a conference.     Follow up time range or appointment - ~2-3 days following discharge. Close follow up will be needed as patient will be new start.      Labs:    Results from last 7 days   Lab Units 04/25/23  0440 04/24/23  1909 04/24/23  0442 04/22/23  1135   INR  1.13 1.12  --   --    HEMOGLOBIN g/dL 17.1  --  15.0 14.9   HEMATOCRIT % 57.0*  --  51.8* 50.4     Results from last 7 days   Lab Units 04/25/23  0440 04/24/23  0442 04/22/23  1135 "   SODIUM mmol/L 141 138 138   POTASSIUM mmol/L 5.2 5.3* 4.4   CHLORIDE mmol/L 95* 91* 95*   CO2 mmol/L 34.0* 39.0* 36.0*   BUN mg/dL 24* 25* 11   CREATININE mg/dL 0.66* 0.85 0.58*   CALCIUM mg/dL 9.4 9.3 9.5   BILIRUBIN mg/dL  --  0.2 0.3   ALK PHOS U/L  --  93 100   ALT (SGPT) U/L  --  13 17   AST (SGOT) U/L  --  11 16   GLUCOSE mg/dL 156* 143* 101*       Current dietary intake: 75% of breakfast 4/25  Dietary Orders (From admission, onward)       Start     Ordered    04/24/23 1800  Dietary Nutrition Supplements Boost Plus; chocolate  Daily With Breakfast & Dinner      Question Answer Comment   Select Supplement: Boost Plus    Flavor: chocolate        04/24/23 1512    04/24/23 1504  Diet: Regular/House Diet; Texture: Regular Texture (IDDSI 7); Fluid Consistency: Thin (IDDSI 0)  Diet Effective Now        References:    Diet Order [facility]   Question Answer Comment   Diets: Regular/House Diet    Texture: Regular Texture (IDDSI 7)    Fluid Consistency: Thin (IDDSI 0)        04/24/23 1504                    Assessment/Plan:   Pharmacy to dose warfarin for DVT/PE, goal INR 2-3. Patient is a new start to warfarin as of 4/24/23.  INR is SUBtherapeutic today at 1.13, stable from 1.12 yesterday. Will continue warfarin 5 mg this evening.  Daily PT/INR ordered.  Pharmacy will continue to monitor daily INR, signs/symptoms of bleeding, drug-drug interactions and dietary intake to adjust dose as necessary.      Thank you  Leonid Sarkar, PharmD  4/25/2023  12:43 EDT

## 2023-04-25 NOTE — PLAN OF CARE
Goal Outcome Evaluation:  Plan of Care Reviewed With: patient        Progress: improving  Outcome Evaluation: OT eval complete. Pt is pleasent and cooperative. Presents below baseline with decreased strength, ROM, activity tolerance and overall independence with ADLS. Dependent to don socks this date. O2 saturations decreased with exertion and requires extensive time/effort to improve O2 saturations. Pt would benefit from IPR prior to returning home.

## 2023-04-25 NOTE — NURSING NOTE
Pt. Referred for Phase II Cardiac Rehab. Staff discussed benefits of exercise, program protocol, and educational material provided. Teach back verified.  Permission granted from patient for staff to fax referral information to outlying program at this time.  Staff faxed referral info to [facility].

## 2023-04-25 NOTE — PROGRESS NOTES
"  [facility] at [facility]   Inpatient Progress Note       LOS: 3 days   Patient Care Team:  Belem Albert APRN as PCP - General (Family Medicine)    Chief Complaint:  Follow-up for angina/CAD    Subjective   Resting, states that he has hiatal hernia and retrosternal burning.  Denying any chest pain or shortness of breath.    Objective     Vitals:  Blood pressure 100/90, pulse 92, temperature 98.1 °F (36.7 °C), temperature source Oral, resp. rate 18, height 175.3 cm (69\"), weight 103 kg (227 lb), SpO2 90 %.     Intake/Output Summary (Last 24 hours) at 4/25/2023 0641  Last data filed at 4/24/2023 0702  Gross per 24 hour   Intake --   Output 400 ml   Net -400 ml   Physical exam:  General: No apparent distress.  Neck: no JVD.  Chest:No respiratory distress, breath sounds are normal. No wheezes,  rhonchi or rales.  Cardiovascular: Normal S1 and S2, distant heart sounds  Extremities: No edema.  Right wrist stable, no bleeding or hematoma.       Results Review:     I reviewed the patient's new clinical results.    Results from last 7 days   Lab Units 04/25/23  0440   WBC 10*3/mm3 14.68*   HEMOGLOBIN g/dL 17.1   HEMATOCRIT % 57.0*   PLATELETS 10*3/mm3 368     Results from last 7 days   Lab Units 04/25/23  0440 04/24/23  0442   SODIUM mmol/L 141 138   POTASSIUM mmol/L 5.2 5.3*   CHLORIDE mmol/L 95* 91*   CO2 mmol/L 34.0* 39.0*   BUN mg/dL 24* 25*   CREATININE mg/dL 0.66* 0.85   CALCIUM mg/dL 9.4 9.3   BILIRUBIN mg/dL  --  0.2   ALK PHOS U/L  --  93   ALT (SGPT) U/L  --  13   AST (SGOT) U/L  --  11   GLUCOSE mg/dL 156* 143*     Results from last 7 days   Lab Units 04/25/23  0440   SODIUM mmol/L 141   POTASSIUM mmol/L 5.2   CHLORIDE mmol/L 95*   CO2 mmol/L 34.0*   BUN mg/dL 24*   CREATININE mg/dL 0.66*   GLUCOSE mg/dL 156*   CALCIUM mg/dL 9.4     Results from last 7 days   Lab Units 04/25/23  0440 04/24/23  1909   INR  1.13 1.12     Lab Results   Lab Value Date/Time    TROPONINT 87 (C) 04/22/2023 1135 "             Results from last 7 days   Lab Units 04/22/23  1135   PROBNP pg/mL 2,034.0*     Results from last 7 days   Lab Units 04/22/23  1135   HSTROP T ng/L 87*          Tele:  SR    Assessment:  1. Acute on chronic systolic congestive heart failure/ICM, EF 33% by echo at .  1. Abnormal stress 3/18/2022: EF 14%. Severely large LV cavity. No stress-induced TID. Large defect located in the inferio, inferoseptal and inferoapical myocardium.  2. Acute on chronic hypoxic respiratory failure with known COPD.  3. NSTEMI, unclear mechanism.  Could be type II related to hypoxic respiratory failure.  Status postcardiac catheterization study which showed no significant disease of dominant left coronary system, distally occluded codominant right coronary artery with left-sided collaterals.  Continued medical therapy recommended.  4. COPD  5. Dyslipidemia  6. Sleep apnea  7. Left lower extremity acute DVT    Plan:  1. De-escalate antiplatelet therapy to Plavix only as he will be on full anticoagulation due to DVT.  Discontinue aspirin.  2. Continue Toprol and Entresto for CM/HFrEF.  We have lowered the dose of Entresto to allow optimization of other GDMT.  Jardiance was added.  3. Continue current statin therapy.  4. Permanently discontinued diltiazem from his home medications.  5. Restart Bumex 1 mg p.o. daily.  6. Continue subcu Lovenox and warfarin, discontinue Lovenox when INR greater than 2.  7. Management of COPD exacerbation and medical conditions per hospitalist.  8. Not much more to add from cardiology standpoint, I will sign off and will be available to see him as needed.  Please call for questions or concerns.  When discharged he can follow-up with me at [place] office in 4 to 6 weeks, appointment will be ordered.    Marsha Gaitan MD, FACC, [facility]

## 2023-04-25 NOTE — PLAN OF CARE
Problem: Fall Injury Risk  Goal: Absence of Fall and Fall-Related Injury  Outcome: Ongoing, Progressing  Intervention: Promote Injury-Free Environment     Problem: COPD (Chronic Obstructive Pulmonary Disease) Comorbidity  Goal: Maintenance of COPD Symptom Control  Outcome: Ongoing, Progressing  Intervention: Maintain COPD-Symptom Control     Problem: Heart Failure Comorbidity  Goal: Maintenance of Heart Failure Symptom Control  Outcome: Ongoing, Progressing     Problem: Hypertension Comorbidity  Goal: Blood Pressure in Desired Range  Outcome: Ongoing, Progressing     Problem: Obstructive Sleep Apnea Risk or Actual Comorbidity Management  Goal: Unobstructed Breathing During Sleep  Outcome: Ongoing, Progressing     Problem: Pain Chronic (Persistent) (Comorbidity Management)  Goal: Acceptable Pain Control and Functional Ability  Outcome: Ongoing, Progressing  Intervention: Manage Persistent Pain  Intervention: Optimize Psychosocial Wellbeing     Problem: Adult Inpatient Plan of Care  Goal: Plan of Care Review  Outcome: Ongoing, Progressing  Goal: Optimal Comfort and Wellbeing  Outcome: Ongoing, Progressing  Intervention: Provide Person-Centered Care     Problem: Adjustment to Illness COPD (Chronic Obstructive Pulmonary Disease)  Goal: Optimal Chronic Illness Coping  Outcome: Ongoing, Progressing  Intervention: Support and Optimize Psychosocial Response     Problem: Functional Ability Impaired COPD (Chronic Obstructive Pulmonary Disease)  Goal: Optimal Level of Functional [place]  Outcome: Ongoing, Progressing  Intervention: Optimize Functional Ability     Problem: Infection COPD (Chronic Obstructive Pulmonary Disease)  Goal: Absence of Infection Signs and Symptoms  Outcome: Ongoing, Progressing     Problem: Oral Intake Inadequate COPD (Chronic Obstructive Pulmonary Disease)  Goal: Improved Nutrition Intake  Outcome: Ongoing, Progressing     Problem: Respiratory Compromise COPD (Chronic Obstructive Pulmonary  Disease)  Goal: Effective Oxygenation and Ventilation  Outcome: Ongoing, Progressing  Intervention: Promote Airway Secretion Clearance  Intervention: Optimize Oxygenation and Ventilation   Goal Outcome Evaluation:  Plan of Care Reviewed With: patient        Progress: improving

## 2023-04-25 NOTE — PLAN OF CARE
Goal Outcome Evaluation:  Plan of Care Reviewed With: patient        Progress: improving   Pt resting in the bed.

## 2023-04-25 NOTE — THERAPY EVALUATION
Patient Name: Fernie Dalal  : 1960    MRN: [id number]                              Today's Date: 2023       Admit Date: 2023    Visit Dx:     ICD-10-CM ICD-9-CM   1. Nonspecific chest pain  R07.9 786.50   2. COPD exacerbation  J44.1 491.21   3. Abnormal stress test  R94.39 794.39   4. Acute on chronic systolic CHF (congestive heart failure)  I50.23 428.23     428.0     Patient Active Problem List   Diagnosis   • Adiposity   • Allergic rhinitis   • Anxiety   • Blues   • Chest pain   • Chronic LBP   • Acute exacerbation of chronic obstructive pulmonary disease (COPD)   • Chronic systolic congestive heart failure   • Coronary artery disease involving native heart   • Dyslipidemia   • Hyperlipemia   • Muscle spasm   • Seizure disorder   • Sleep apnea   • Abnormal stress test   • Essential hypertension   • Nonspecific chest pain     Past Medical History:   Diagnosis Date   • Angina at rest    • Anxiety    • Arrhythmia    • COPD (chronic obstructive pulmonary disease)    • GERD (gastroesophageal reflux disease)    • Heart failure     congestive   • Hyperlipidemia    • Hypertension    • Myocardial infarction      Past Surgical History:   Procedure Laterality Date   • CARDIAC CATHETERIZATION     • CARDIAC CATHETERIZATION N/A 2023    Procedure: Left Heart Cath;  Surgeon: Marsha Gaitan MD;  Location: [facility] CATH INVASIVE LOCATION;  Service: Cardiology;  Laterality: N/A;   • CARDIOVASCULAR STRESS TEST        General Information     Row Name 23 1027          Physical Therapy Time and Intention    Document Type evaluation  -LO     Mode of Treatment physical therapy  -LO     Row Name 23 1027          General Information    Patient Profile Reviewed yes  -LO     Prior Level of Function independent:;all household mobility;gait;transfer;bed mobility  -LO     Existing Precautions/Restrictions fall;oxygen therapy device and L/min  -LO     Barriers to Rehab medically complex;previous functional deficit   -LO     Row Name 04/25/23 1027          Living Environment    People in Home alone  -LO     Row Name 04/25/23 1027          Home Main Entrance    Number of Stairs, Main Entrance none  -LO     Row Name 04/25/23 1027          Stairs Within Home, Primary    Number of Stairs, Within Home, Primary none  -LO     Row Name 04/25/23 1027          Cognition    Orientation Status (Cognition) oriented x 4  -LO     Row Name 04/25/23 1027          Safety Issues, Functional Mobility    Safety Issues Affecting Function (Mobility) awareness of need for assistance;impulsivity;insight into deficits/self-awareness;judgment;positioning of assistive device  -LO     Impairments Affecting Function (Mobility) balance;endurance/activity tolerance;shortness of breath;strength  -LO           User Key  (r) = Recorded By, (t) = Taken By, (c) = Cosigned By    Initials Name Provider Type    Yolis Chairez PT Physical Therapist               Mobility     Row Name 04/25/23 1028          Bed Mobility    Bed Mobility scooting/bridging;supine-sit  -LO     Scooting/Bridging [place] (Bed Mobility) supervision;verbal cues  -LO     Supine-Sit [place] (Bed Mobility) supervision;verbal cues  -LO     Assistive Device (Bed Mobility) bed rails;head of bed elevated  -LO     Comment, (Bed Mobility) good sequencing, extra time for effort  -LO     Row Name 04/25/23 1028          Transfers    Comment, (Transfers) EOB>FWW>recliner with vc for HP  -LO     Row Name 04/25/23 1028          Sit-Stand Transfer    Sit-Stand [place] (Transfers) contact guard;verbal cues  -LO     Assistive Device (Sit-Stand Transfers) walker, front-wheeled  -LO     Row Name 04/25/23 1028          Gait/Stairs (Locomotion)    [place] Level (Gait) contact guard  -LO     Assistive Device (Gait) walker, front-wheeled  -LO     Distance in Feet (Gait) 150  -LO     Deviations/Abnormal Patterns (Gait) bilateral deviations;stride length decreased;gait speed decreased;dottie  decreased  -LO     Bilateral Gait Deviations forward flexed posture  -LO     Comment, (Gait/Stairs) Patient ambulates with FWW CGA with vc for maintaining upright posture and forward gaze. Distance limited by fatigue and SOA. SpO2 levels desat to 86% on 4 liters of O2. Difficulty resolving to > 90% at rest, >5 min to resolve to >90% but had to increase to 5 liters with vc for deep breathing  -LO           User Key  (r) = Recorded By, (t) = Taken By, (c) = Cosigned By    Initials Name Provider Type    Yolis Chairez, ELSA Physical Therapist               Obj/Interventions     Row Name 04/25/23 1033          Range of Motion Comprehensive    General Range of Motion bilateral lower extremity ROM WNL  -LO     Row Name 04/25/23 1033          Strength Comprehensive (MMT)    General Manual Muscle Testing (MMT) Assessment lower extremity strength deficits identified  -LO     Comment, General Manual Muscle Testing (MMT) Assessment BLE grossly 3+/5  -LO     Row Name 04/25/23 1033          Motor Skills    Motor Skills functional endurance  -LO     Functional Endurance poor  -LO     Row Name 04/25/23 1033          Balance    Balance Assessment sitting static balance;sitting dynamic balance;standing static balance;standing dynamic balance  -LO     Static Sitting Balance supervision  -LO     Dynamic Sitting Balance contact guard  -LO     Position, Sitting Balance unsupported;sitting edge of bed  -LO     Static Standing Balance contact guard;verbal cues  -LO     Dynamic Standing Balance minimal assist;verbal cues  -LO     Position/Device Used, Standing Balance supported;walker, rolling  -LO     Comment, Balance CGA with FWW in standing  -LO     Row Name 04/25/23 1033          Sensory Assessment (Somatosensory)    Sensory Assessment (Somatosensory) sensation intact  -LO           User Key  (r) = Recorded By, (t) = Taken By, (c) = Cosigned By    Initials Name Provider Type    Yolis Chairez, ELSA Physical Therapist                Goals/Plan     Row Name 04/25/23 1037          Bed Mobility Goal 1 (PT)    Activity/Assistive Device (Bed Mobility Goal 1, PT) rolling to left;rolling to right;scooting;sit to supine;supine to sit  -LO     [place] Level/Cues Needed (Bed Mobility Goal 1, PT) modified independence  -LO     Time Frame (Bed Mobility Goal 1, PT) long term goal (LTG);10 days  -LO     Row Name 04/25/23 1037          Transfer Goal 1 (PT)    Activity/Assistive Device (Transfer Goal 1, PT) sit-to-stand/stand-to-sit;bed-to-chair/chair-to-bed;car transfer  -LO     [place] Level/Cues Needed (Transfer Goal 1, PT) modified independence  -LO     Time Frame (Transfer Goal 1, PT) long term goal (LTG);10 days  -LO     Row Name 04/25/23 1037          Gait Training Goal 1 (PT)    Activity/Assistive Device (Gait Training Goal 1, PT) gait (walking locomotion);assistive device use;diminish gait deviation;decrease fall risk;increase endurance/gait distance;improve balance and speed;increase energy conservation  -LO     [place] Level (Gait Training Goal 1, PT) modified independence  -LO     Distance (Gait Training Goal 1, PT) 200  -LO     Time Frame (Gait Training Goal 1, PT) long term goal (LTG);10 days  -LO     Row Name 04/25/23 1037          Therapy Assessment/Plan (PT)    Planned Therapy Interventions (PT) balance training;bed mobility training;home exercise program;gait training;patient/family education;neuromuscular re-education;strengthening;transfer training  -LO           User Key  (r) = Recorded By, (t) = Taken By, (c) = Cosigned By    Initials Name Provider Type    Yolis Chairez, PT Physical Therapist               Clinical Impression     Row Name 04/25/23 1034          Pain    Pretreatment Pain Rating 0/10 - no pain  -LO     Posttreatment Pain Rating 0/10 - no pain  -LO     Row Name 04/25/23 1034          Plan of Care Review    Plan of Care Reviewed With patient  -LO     Outcome Evaluation PT eval completed. Patient alert and  oriented x4. Presenting with deficits in general BLE strength, standing balance, gait quality, and functional endurance effecting functional mobility below baseline. Patient will benefit from skilled IP PT services in order to address impairments for return to PLOF.Recommend IPR at [place].  -     Row Name 04/25/23 1034          Therapy Assessment/Plan (PT)    Patient/Family Therapy Goals Statement (PT) home  -LO     Rehab Potential (PT) good, to achieve stated therapy goals  -LO     Criteria for Skilled Interventions Met (PT) yes;meets criteria;skilled treatment is necessary  -LO     Therapy Frequency (PT) daily  -LO     Row Name 04/25/23 1034          Vital Signs    Pre Systolic BP Rehab 124  -LO     Pre Treatment Diastolic BP 75  -LO     Post Systolic BP Rehab 110  -LO     Post Treatment Diastolic BP 79  -LO     Pretreatment Heart Rate (beats/min) 89  -LO     Posttreatment Heart Rate (beats/min) 89  -LO     Pre SpO2 (%) 93  -LO     O2 Delivery Pre Treatment supplemental O2  -LO     Intra SpO2 (%) 86   -LO     O2 Delivery Intra Treatment supplemental O2  -LO     Post SpO2 (%) 89  -LO     O2 Delivery Post Treatment supplemental O2  -LO     Pre Patient Position Supine  -LO     Intra Patient Position Standing  -LO     Post Patient Position Sitting  -LO     Rest Breaks  4  -LO     Row Name 04/25/23 1034          Positioning and Restraints    Pre-Treatment Position in bed  -LO     Post Treatment Position chair  -LO     In Chair notified nsg;reclined;call light within reach;encouraged to call for assist;exit alarm on;waffle cushion  -LO           User Key  (r) = Recorded By, (t) = Taken By, (c) = Cosigned By    Initials Name Provider Type    Yolis Chairez, PT Physical Therapist               Outcome Measures     Row Name 04/25/23 1038 04/25/23 0805       How much help from another person do you currently need...    Turning from your back to your side while in flat bed without using bedrails? 3  -LO 3  -KH    Moving from  lying on back to sitting on the side of a flat bed without bedrails? 3  -LO 3  -KH    Moving to and from a bed to a chair (including a wheelchair)? 3  -LO 3  -KH    Standing up from a chair using your arms (e.g., wheelchair, bedside chair)? 3  -LO 3  -KH    Climbing 3-5 steps with a railing? 2  -LO 2  -KH    To walk in hospital room? 3  -LO 3  -KH    AM-PAC 6 Clicks Score (PT) 17  -LO 17  -KH    Highest level of mobility 5 --> Static standing  -LO 5 --> Static standing  -KH    Row Name 04/25/23 1038 04/25/23 1006       Functional Assessment    Outcome Measure Options AM-PAC 6 Clicks Basic Mobility (PT)  -LO AM-PAC 6 Clicks Daily Activity (OT)  -          User Key  (r) = Recorded By, (t) = Taken By, (c) = Cosigned By    Initials Name Provider Type     Urmila Mendoza, OT Occupational Therapist    Yolis Chairez, PT Physical Therapist    Oxana Cee, RN Registered Nurse                             Physical Therapy Education     Title: PT OT SLP Therapies (In Progress)     Topic: Physical Therapy (Done)     Point: Mobility training (Done)     Learning Progress Summary           Patient Acceptance, E, VU,NR by  at 4/25/2023 0833    Comment: PT POC                   Point: Home exercise program (Done)     Learning Progress Summary           Patient Acceptance, E, VU,NR by  at 4/25/2023 0833    Comment: PT POC                   Point: Body mechanics (Done)     Learning Progress Summary           Patient Acceptance, E, VU,NR by  at 4/25/2023 0833    Comment: PT POC                   Point: Precautions (Done)     Learning Progress Summary           Patient Acceptance, E, VU,NR by  at 4/25/2023 0833    Comment: PT POC                               User Key     Initials Effective Dates Name Provider Type Discipline     06/16/21 -  Yolis Altman, PT Physical Therapist PT              PT Recommendation and Plan  Planned Therapy Interventions (PT): balance training, bed mobility training, home exercise  program, gait training, patient/family education, neuromuscular re-education, strengthening, transfer training  Plan of Care Reviewed With: patient  Outcome Evaluation: PT eval completed. Patient alert and oriented x4. Presenting with deficits in general BLE strength, standing balance, gait quality, and functional endurance effecting functional mobility below baseline. Patient will benefit from skilled IP PT services in order to address impairments for return to PLOF.Recommend IPR at [place].     Time Calculation:    PT Charges     Row Name 04/25/23 0833             Time Calculation    Start Time 0833  -LO      PT Received On 04/25/23  -LO      PT Goal Re-Cert Due Date 05/05/23  -LO         Timed Charges    55387 - Gait Training Minutes  11  -LO      68140 - PT Therapeutic Activity Minutes 5  -LO         Untimed Charges    PT Eval/Re-eval Minutes 40  -LO         Total Minutes    Timed Charges Total Minutes 16  -LO      Untimed Charges Total Minutes 40  -LO       Total Minutes 56  -LO            User Key  (r) = Recorded By, (t) = Taken By, (c) = Cosigned By    Initials Name Provider Type    LO Yolis Altman, PT Physical Therapist              Therapy Charges for Today     Code Description Service Date Service Provider Modifiers Qty    93383320021 HC GAIT TRAINING EA 15 MIN 4/25/2023 Yolis Altman, PT GP 1    37350113086 HC PT EVAL LOW COMPLEXITY 3 4/25/2023 Yolis Altman, PT GP 1          PT G-Codes  Outcome Measure Options: AM-PAC 6 Clicks Basic Mobility (PT)  AM-PAC 6 Clicks Score (PT): 17  AM-PAC 6 Clicks Score (OT): 16  PT Discharge Summary  Anticipated Discharge Disposition (PT): inpatient rehabilitation facility    Yolis Altman PT  4/25/2023

## 2023-04-25 NOTE — PROGRESS NOTES
[facility] Medicine Services  PROGRESS NOTE    Patient Name: Fernie Dalal  : 1960  MRN: [id number]    Date of Admission: 2023  Primary Care Physician: Belem Albert APRN    Subjective   Subjective     CC: Follow-up chest pain, SOA    HPI: Patient did have a rough night, continues to complain of some SOA    ROS:  Gen- No fevers, chills  CV- No chest pain, palpitations  Resp- No cough, +dyspnea  GI- No N/V/D, abd pain    Objective   Objective     Vital Signs:   Temp:  [98 °F (36.7 °C)-98.5 °F (36.9 °C)] 98.3 °F (36.8 °C)  Heart Rate:  [73-95] 95  Resp:  [18-20] 20  BP: ()/(59-90) 124/82  Flow (L/min):  [3-4] 3     Physical Exam:  Constitutional: No acute distress, awake, alert  HENT: NCAT, mucous membranes moist  Respiratory: Moderate labored respirations, diffuse coarse breath sounds on 3 L NC  Cardiovascular: RRR, no murmurs, rubs, or gallops  Gastrointestinal: Positive bowel sounds, soft, nontender, nondistended  Musculoskeletal: No bilateral ankle edema  Psychiatric: Appropriate affect, cooperative  Neurologic: Oriented x 3, nonfocal  Skin: No rashes      Results Reviewed:  LAB RESULTS:      Lab 23  04423  1909 23  04423  1135   WBC 14.68*  --  13.81* 7.21   HEMOGLOBIN 17.1  --  15.0 14.9   HEMATOCRIT 57.0*  --  51.8* 50.4   PLATELETS 368  --  369 306   NEUTROS ABS 12.68*  --  12.40* 5.20   IMMATURE GRANS (ABS) 0.07*  --  0.05 0.03   LYMPHS ABS 1.19  --  0.82 0.96   MONOS ABS 0.70  --  0.52 0.83   EOS ABS 0.02  --  0.00 0.13   MCV 88.6  --  90.1 88.7   PROTIME 14.4 14.4  --   --    D DIMER QUANT  --   --   --  0.78*         Lab 23  0440 23  0442 23  1135   SODIUM 141 138 138   POTASSIUM 5.2 5.3* 4.4   CHLORIDE 95* 91* 95*   CO2 34.0* 39.0* 36.0*   ANION GAP 12.0 8.0 7.0   BUN 24* 25* 11   CREATININE 0.66* 0.85 0.58*   EGFR 106.0 98.2 110.3   GLUCOSE 156* 143* 101*   CALCIUM 9.4 9.3 9.5   MAGNESIUM  --   --  2.0          Lab 04/24/23  0442 04/22/23  1135   TOTAL PROTEIN 6.4 7.0   ALBUMIN 3.7 3.7   GLOBULIN 2.7 3.3   ALT (SGPT) 13 17   AST (SGOT) 11 16   BILIRUBIN 0.2 0.3   ALK PHOS 93 100         Lab 04/25/23  0440 04/24/23  1909 04/22/23  1135   PROBNP  --   --  2,034.0*   HSTROP T  --   --  87*   PROTIME 14.4 14.4  --    INR 1.13 1.12  --                  Brief Urine Lab Results     None          Microbiology Results Abnormal     Procedure Component Value - Date/Time    Respiratory Panel PCR w/COVID-19(SARS-CoV-2) JERO/ELADIO/YEIMI/PAD/COR/MAD/JONATHAN In-House, NP Swab in UTM/VTM, 3-4 HR TAT - Swab, Nasopharynx [[id number]]  (Normal) Collected: 04/22/23 1350    Lab Status: Final result Specimen: Swab from Nasopharynx Updated: 04/22/23 1455     ADENOVIRUS, PCR Not Detected     Coronavirus 229E Not Detected     Coronavirus HKU1 Not Detected     Coronavirus NL63 Not Detected     Coronavirus OC43 Not Detected     COVID19 Not Detected     Human Metapneumovirus Not Detected     Human Rhinovirus/Enterovirus Not Detected     Influenza A PCR Not Detected     Influenza B PCR Not Detected     Parainfluenza Virus 1 Not Detected     Parainfluenza Virus 2 Not Detected     Parainfluenza Virus 3 Not Detected     Parainfluenza Virus 4 Not Detected     RSV, PCR Not Detected     Bordetella pertussis pcr Not Detected     Bordetella parapertussis PCR Not Detected     Chlamydophila pneumoniae PCR Not Detected     Mycoplasma pneumo by PCR Not Detected    Narrative:      In the setting of a positive respiratory panel with a viral infection PLUS a negative procalcitonin without other underlying concern for bacterial infection, consider observing off antibiotics or discontinuation of antibiotics and continue supportive care. If the respiratory panel is positive for atypical bacterial infection (Bordetella pertussis, Chlamydophila pneumoniae, or Mycoplasma pneumoniae), consider antibiotic de-escalation to target atypical bacterial infection.          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  No significant disease of the left coronary system. Severe left ventricular systolic dysfunction, estimated EF 30%. RECOMMENDATIONS: Optimize medical therapy and risk factor management per guidelines. At this time Jardiance 10 mg daily will be added.  Continue Toprol-XL at current dose.  Reduce the dose of Entresto to allow introduction and optimization of concomitant GDMT. Indications: Acute on chronic HFrEF, chest pain and CAD. Access: Right radial. Procedures: Left heart catheterization. Left ventriculogram. Selective coronary angiography. Arterial site hemostasis with radial band. Procedure narrative: The patient was brought to the catheterization lab in a fasting condition.  Access site was prepped and draped in standard sterile fashion.  Lidocaine was injected and arterial access was obtained by percutaneous anterior wall puncture technique.  A 6 Vietnamese arterial sheath was placed. Above procedures were performed without complications.  At the conclusion the arterial sheath was removed and hemostasis was achieved.  The patient was transferred to the unit in a stable condition. Hemodynamic Findings: Heart Rate: 85/minute. LV pressure: 98/4-8 mmHg, on pull back no gradient was recorded across the aortic valve. Angiographic Findings: Bilateral coronary dominance. LM: Angiographically normal. LAD: Smooth, 40% mid segment stenosis after the origin of a large diagonal branch.  The diagonal itself has segments of 40 to 50% plaque.  The distal LAD has diffuse 30% plaque without hemodynamically significant disease. LCX: Dominant vessel which gives rise to a large bifurcating obtuse marginal trunk giving off first and second marginal branches.  Both of these branches have nonobstructive 30 to 40% plaque.  Distally the circumflex gives off 2 lateral branches  and a small left PDA there is diffuse plaque noted without occlusive disease. Ramus intermedius: Small vessel with 30 to 40% plaque without occlusive disease. RCA: Codominant vessel it has been previously stented over a long mid segment.  Sequential 70 and 60% in-stent restenosis are noted with distal occlusion of the stent after the origin of a third RV marginal branch.  No significant left ventricular branches originating from the RCA are noted.  3 RV marginals are noted which has nonobstructive disease. LV: Left ventriculogram performed in 30 COLBERT projection revealed dilated left ventricle with global hypokinesis and inferior apical akinesis/dyskinesis with severe left ventricular systolic dysfunction.  Estimated ejection fraction is 30%.  Complications: No acute procedure related complications.     Result Date: 4/24/2023  FINAL     Impression: Chronic total occlusion of the previously stented codominant right coronary artery.  No significant disease of the left coronary system. Severe left ventricular systolic dysfunction, estimated EF 30%. RECOMMENDATIONS: Optimize medical therapy and risk factor management per guidelines. At this time Jardiance 10 mg daily will be added.  Continue Toprol-XL at current dose.  Reduce the dose of Entresto to allow introduction and optimization of concomitant GDMT. Indications: Acute on chronic HFrEF, chest pain and CAD. Access: Right radial. Procedures: Left heart catheterization. Left ventriculogram. Selective coronary angiography. Arterial site hemostasis with radial band. Procedure narrative: The patient was brought to the catheterization lab in a fasting condition.  Access site was prepped and draped in standard sterile fashion.  Lidocaine was injected and arterial access was obtained by percutaneous anterior wall puncture technique.  A 6 [place] arterial sheath was placed. Above procedures were performed without complications.  At the conclusion the arterial sheath was removed  and hemostasis was achieved.  The patient was transferred to the unit in a stable condition. Hemodynamic Findings: Heart Rate: 85/minute. LV pressure: 98/4-8 mmHg, on pull back no gradient was recorded across the aortic valve. Angiographic Findings: Bilateral coronary dominance. LM: Angiographically normal. LAD: Smooth, 40% mid segment stenosis after the origin of a large diagonal branch.  The diagonal itself has segments of 40 to 50% plaque.  The distal LAD has diffuse 30% plaque without hemodynamically significant disease. LCX: Dominant vessel which gives rise to a large bifurcating obtuse marginal trunk giving off first and second marginal branches.  Both of these branches have nonobstructive 30 to 40% plaque.  Distally the circumflex gives off 2 lateral branches and a small left PDA there is diffuse plaque noted without occlusive disease. Ramus intermedius: Small vessel with 30 to 40% plaque without occlusive disease. RCA: Codominant vessel it has been previously stented over a long mid segment.  Sequential 70 and 60% in-stent restenosis are noted with distal occlusion of the stent after the origin of a third RV marginal branch.  No significant left ventricular branches originating from the RCA are noted.  3 RV marginals are noted which has nonobstructive disease. LV: Left ventriculogram performed in 30 COLBERT projection revealed dilated left ventricle with global hypokinesis and inferior apical akinesis/dyskinesis with severe left ventricular systolic dysfunction.  Estimated ejection fraction is 30%.  Complications: No acute procedure related complications.     Duplex Venous Lower Extremity - Bilateral CAR    Result Date: 4/23/2023  •  Acute left lower extremity deep vein thrombosis noted in the posterial tibial. •  All other veins appeared normal bilaterally.           Current medications:  Scheduled Meds:albuterol, 2.5 mg, Nebulization, 4x Daily - RT  atorvastatin, 40 mg, Oral, Nightly  budesonide-formoterol, 2  puff, Inhalation, BID - RT   And  tiotropium bromide monohydrate, 2 puff, Inhalation, Daily - RT  busPIRone, 5 mg, Oral, TID  citalopram, 20 mg, Oral, Daily  clopidogrel, 75 mg, Oral, Daily  doxycycline, 100 mg, Oral, Q12H  empagliflozin, 10 mg, Oral, Daily  enoxaparin, 1 mg/kg, Subcutaneous, Q12H  guaiFENesin, 1,200 mg, Oral, Q12H  insulin lispro, 0-7 Units, Subcutaneous, TID With Meals  methylPREDNISolone sodium succinate, 40 mg, Intravenous, Q8H  metoprolol succinate XL, 100 mg, Oral, Daily  ondansetron, 4 mg, Intravenous, Once  pharmacy consult - MTM, , Does not apply, Daily  phenytoin ER, 100 mg, Oral, BID  QUEtiapine fumarate ER, 150 mg, Oral, Nightly  sacubitril-valsartan, 1 tablet, Oral, Q12H  warfarin, 5 mg, Oral, Daily      Continuous Infusions:Pharmacy to dose warfarin,       PRN Meds:.•  acetaminophen  •  dextrose  •  dextrose  •  diazePAM  •  glucagon (human recombinant)  •  hydrOXYzine  •  LORazepam  •  nitroglycerin  •  Pharmacy to dose warfarin  •  sodium chloride    Assessment & Plan   Assessment & Plan     Active Hospital Problems    Diagnosis  POA   • **Nonspecific chest pain [R07.9]  Yes   • Dyslipidemia [E78.5]  Yes   • Essential hypertension [I10]  Yes   • Seizure disorder [G40.909]  Yes   • Abnormal stress test [R94.39]  Unknown   • Chronic systolic congestive heart failure [I50.22]  Yes   • Coronary artery disease involving native heart [I25.10]  Yes   • Acute exacerbation of chronic obstructive pulmonary disease (COPD) [J44.1]  Yes   • Anxiety [F41.9]  Yes   • Hyperlipemia [E78.5]  Yes   • Sleep apnea [G47.30]  Yes      Resolved Hospital Problems   No resolved problems to display.        Brief Hospital Course to date:  Fernie Dalal is a 63 y.o. male with PMHx significant for COPD, chronic respiratory failure on 2-3L at rest and 3-4L with exertion, seizure d/o, DMII, Anxiety/depression, HFrEF (LVEF 33%), tobacco abuse, HTN, HLD who presents with complaints of increased shortness of breath  and chest pain.     AECOPD  Acute on Chronic Respiratory Failure w/Hypoxia  - continue steroids, 40mg IV q8hrs, wean as able  - aggressive pulmonary toilet, mucinex, scheduled nebs  - doxycycline BID x 5 days, resp PCR negative, CXR reviewed  - continue Symbicort, Sprivia (in place of Trelegy)  - COPD navigator to see  - at baseline 2-3L w/rest and 3-4L w/exertion, currently on 3 L NC     Chest Pain  NSTEMI, type II  - cardiology following, s/p C 4/25 with Dr. Gaitan, that showed no similar disease, recommend continued medical therapy  -Continue Plavix, statin stop aspirin as he is not on coagulation     HFrEF   - LVEF 33% (3/2023)  - Cardiology following, s/p IV lasix, resume Bumex 1 mg p.o. daily  -Continue Entresto and beta-blocker  -He has been started on Jardiance     Acute left DVT  -Patient had elevated D-dimer  -Venous duplex shows acute DVT in the posterior tibial  -Started on Coumadin with Lovenox bridge, goal INR 2-3     Anxiety and depression  - continue buspar, PRN Valium  - nightly seroquel     Seizure D/O  - continue dilantin     Hypertension  -BP is now improved  -Continue home meds as above     Hyperlipidemia  -continue statin      Well control type 2 diabetes with A1c 7.3%  -FSBG's reviewed and currently appropriate, continue SSI while on steroids.  -He is not on any meds at home.     Expected Discharge Location and Transportation: home  Expected Discharge   Expected Discharge Date: 04/27/23     DVT prophylaxis:  Medical DVT prophylaxis orders are present.     CODE STATUS:   Code Status and Medical Interventions:   Ordered at: 04/22/23 0292     Level Of Support Discussed With:    Patient     Code Status (Patient has no pulse and is not breathing):    CPR (Attempt to Resuscitate)     Medical Interventions (Patient has pulse or is breathing):    Full Support       Julieta Landers MD  04/25/23

## 2023-04-25 NOTE — CASE MANAGEMENT/SOCIAL WORK
Continued Stay Note  [facility]     Patient Name: Fernie Dalal  MRN: [id number]  Today's Date: 4/25/2023    Admit Date: 4/22/2023    Plan: Home at DC   Discharge Plan     Row Name 04/25/23 1158       Plan    Plan Home at DC    Patient/Family in Agreement with Plan other (see comments)    Plan Comments I confuirmed he has waiver services for meals and housekeeping thru [facility]. I spoke with his CM Carleen [phone]. I confirmed he has continuous 02 thru [facility] ([place] office). Will follow.    Final Discharge Disposition Code 01 - home or self-care               Discharge Codes    No documentation.               Expected Discharge Date and Time     Expected Discharge Date Expected Discharge Time    Apr 27, 2023             Samina Weir RN

## 2023-04-25 NOTE — THERAPY EVALUATION
Patient Name: Fernie Dalal  : 1960    MRN: [id number]                              Today's Date: 2023       Admit Date: 2023    Visit Dx:     ICD-10-CM ICD-9-CM   1. Nonspecific chest pain  R07.9 786.50   2. COPD exacerbation  J44.1 491.21   3. Abnormal stress test  R94.39 794.39   4. Acute on chronic systolic CHF (congestive heart failure)  I50.23 428.23     428.0     Patient Active Problem List   Diagnosis   • Adiposity   • Allergic rhinitis   • Anxiety   • Blues   • Chest pain   • Chronic LBP   • Acute exacerbation of chronic obstructive pulmonary disease (COPD)   • Chronic systolic congestive heart failure   • Coronary artery disease involving native heart   • Dyslipidemia   • Hyperlipemia   • Muscle spasm   • Seizure disorder   • Sleep apnea   • Abnormal stress test   • Essential hypertension   • Nonspecific chest pain     Past Medical History:   Diagnosis Date   • Angina at rest    • Anxiety    • Arrhythmia    • COPD (chronic obstructive pulmonary disease)    • GERD (gastroesophageal reflux disease)    • Heart failure     congestive   • Hyperlipidemia    • Hypertension    • Myocardial infarction      Past Surgical History:   Procedure Laterality Date   • CARDIAC CATHETERIZATION     • CARDIOVASCULAR STRESS TEST        General Information     Row Name 2346          OT Time and Intention    Document Type evaluation  -     Mode of Treatment occupational therapy  -     Row Name 2346          General Information    Patient Profile Reviewed yes  -     Prior Level of Function independent:;all household mobility;community mobility;gait;transfer;bed mobility;ADL's  -     Existing Precautions/Restrictions fall;oxygen therapy device and L/min  -     Barriers to Rehab medically complex  -     Row Name 2346          Occupational Profile    Environmental Supports and Barriers (Occupational Profile) Pt reports succesful use of tub/shower.  -     Row Name 2346           Living Environment    People in Home alone  -     Row Name 04/25/23 0946          Home Main Entrance    Number of Stairs, Main Entrance none  -HM     Stair Railings, Main Entrance none  -     Row Name 04/25/23 0946          Stairs Within Home, Primary    Stairs, Within Home, Primary has ramp to enter home and no steps inside  -     Number of Stairs, Within Home, Primary none  -HM     Stair Railings, Within Home, Primary none  -HM     Row Name 04/25/23 0946          Cognition    Orientation Status (Cognition) oriented x 4  -     Row Name 04/25/23 0946          Safety Issues, Functional Mobility    Safety Issues Affecting Function (Mobility) safety precaution awareness;insight into deficits/self-awareness;safety precautions follow-through/compliance;awareness of need for assistance;judgment;sequencing abilities  -     Impairments Affecting Function (Mobility) balance;endurance/activity tolerance;shortness of breath;range of motion (ROM)  -           User Key  (r) = Recorded By, (t) = Taken By, (c) = Cosigned By    Initials Name Provider Type     Urmila Mendoza OT Occupational Therapist                 Mobility/ADL's     Row Name 04/25/23 0953          Bed Mobility    Bed Mobility scooting/bridging;supine-sit  -     Scooting/Bridging [place] (Bed Mobility) supervision;verbal cues  -     Supine-Sit [place] (Bed Mobility) supervision;verbal cues  -     Bed Mobility, Safety Issues decreased use of arms for pushing/pulling  -     Assistive Device (Bed Mobility) bed rails;head of bed elevated  -     Comment, (Bed Mobility) supervision for completion of bed mobility.  -     Row Name 04/25/23 0953          Transfers    Transfers sit-stand transfer  -     Row Name 04/25/23 0953          Sit-Stand Transfer    Sit-Stand [place] (Transfers) contact guard;verbal cues  -     Assistive Device (Sit-Stand Transfers) walker, front-wheeled  -     Row Name 04/25/23 0953           Functional Mobility    Functional Mobility- Ind. Level contact guard assist;verbal cues required  -     Functional Mobility- Device walker, front-wheeled  -     Functional Mobility- Safety Issues step length decreased;weight-shifting ability decreased;supplemental O2  -     Functional Mobility- Comment decreased activity tolerance with increased SOA after ambulation.  -     Row Name 04/25/23 0953          Activities of Daily Living    BADL Assessment/Intervention lower body dressing  -     Row Name 04/25/23 0953          Lower Body Dressing Assessment/Training    [place] Level (Lower Body Dressing) doff;socks;independent;don;dependent (less than 25% patient effort)  -     Position (Lower Body Dressing) edge of bed sitting  -     Comment, (Lower Body Dressing) Pt able to dof socks however unable to don.  -           User Key  (r) = Recorded By, (t) = Taken By, (c) = Cosigned By    Initials Name Provider Type     Urmila Mendoza, OT Occupational Therapist               Obj/Interventions     Row Name 04/25/23 0957          Sensory Assessment (Somatosensory)    Sensory Assessment (Somatosensory) sensation intact  -     Row Name 04/25/23 0957          Vision Assessment/Intervention    Visual Impairment/Limitations corrective lenses full-time;other (see comments)  Reports decreased vision effecting all ADLS and IADLS.  -     Row Name 04/25/23 0957          Range of Motion Comprehensive    General Range of Motion upper extremity range of motion deficits identified  -     Comment, General Range of Motion grossly 90 degrees ROM.  -     Row Name 04/25/23 0957          Strength Comprehensive (MMT)    General Manual Muscle Testing (MMT) Assessment upper extremity strength deficits identified  -     Row Name 04/25/23 0957          Upper Extremity (Manual Muscle Testing)    Upper Extremity: Manual Muscle Testing (MMT) right shoulder strength deficit;left shoulder strength deficit  -      Row Name 04/25/23 0957          Motor Skills    Motor Skills functional endurance  -     Functional Endurance poor  -HM     Row Name 04/25/23 0957          Balance    Balance Assessment sitting static balance;standing static balance;sitting dynamic balance;standing dynamic balance  -     Static Sitting Balance supervision  -     Dynamic Sitting Balance contact guard  -HM     Position, Sitting Balance unsupported;sitting edge of bed  -     Static Standing Balance contact guard  -     Dynamic Standing Balance minimal assist  -HM     Position/Device Used, Standing Balance supported;walker, rolling  -     Balance Interventions sitting;occupation based/functional task  -HM     Row Name 04/25/23 0957          Right Shoulder (Manual Muscle Testing)    Right Shoulder Manual Muscle Testing (MMT) flexion  -HM     MMT: Flexion, Right Shoulder flexion  -HM     MMT, Gross Movement: Right Shoulder Flexion (3/5) fair  -     Row Name 04/25/23 0957          Left Shoulder (Manual Muscle Testing)    Left Shoulder Manual Muscle Testing (MMT) flexion  -HM     MMT: Flexion, Left Shoulder flexion  -HM     MMT, Gross Movement: Left Shoulder Flexion (3/5) fair  -           User Key  (r) = Recorded By, (t) = Taken By, (c) = Cosigned By    Initials Name Provider Type     Urmila Mendoza OT Occupational Therapist               Goals/Plan     Row Name 04/25/23 1003          Bed Mobility Goal 1 (OT)    Activity/Assistive Device (Bed Mobility Goal 1, OT) sit to supine/supine to sit;scooting  -     [place] Level/Cues Needed (Bed Mobility Goal 1, OT) modified independence  -     Time Frame (Bed Mobility Goal 1, OT) by discharge;long term goal (LTG)  -     Progress/Outcomes (Bed Mobility Goal 1, OT) goal ongoing  -     Row Name 04/25/23 1003          Transfer Goal 1 (OT)    Activity/Assistive Device (Transfer Goal 1, OT) sit-to-stand/stand-to-sit;bed-to-chair/chair-to-bed;toilet  -     [place] Level/Cues  Needed (Transfer Goal 1, OT) verbal cues required;standby assist  -HM     Time Frame (Transfer Goal 1, OT) by discharge;long term goal (LTG)  -HM     Progress/Outcome (Transfer Goal 1, OT) goal ongoing  -     Row Name 04/25/23 1003          Dressing Goal 1 (OT)    Activity/Device (Dressing Goal 1, OT) lower body dressing  -HM     [place]/Cues Needed (Dressing Goal 1, OT) verbal cues required;moderate assist (50-74% patient effort)  -HM     Time Frame (Dressing Goal 1, OT) by discharge;long term goal (LTG)  -HM     Strategies/Barriers (Dressing Goal 1, OT) Use of AE if pt receptive.  -HM     Progress/Outcome (Dressing Goal 1, OT) goal ongoing  -     Row Name 04/25/23 1003          Toileting Goal 1 (OT)    Activity/Device (Toileting Goal 1, OT) adjust/manage clothing;perform perineal hygiene  -HM     [place] Level/Cues Needed (Toileting Goal 1, OT) minimum assist (75% or more patient effort);verbal cues required  -HM     Time Frame (Toileting Goal 1, OT) by discharge;long term goal (LTG)  -HM     Progress/Outcome (Toileting Goal 1, OT) goal ongoing  -     Row Name 04/25/23 1003          Grooming Goal 1 (OT)    Activity/Device (Grooming Goal 1, OT) hair care;oral care;wash face, hands  -HM     [place] (Grooming Goal 1, OT) minimum assist (75% or more patient effort);verbal cues required  -HM     Time Frame (Grooming Goal 1, OT) by discharge;long term goal (LTG)  -HM     Strategies/Barriers (Grooming Goal 1, OT) sink side  -HM     Progress/Outcome (Grooming Goal 1, OT) goal ongoing  -     Row Name 04/25/23 1003          Therapy Assessment/Plan (OT)    Planned Therapy Interventions (OT) adaptive equipment training;BADL retraining;functional balance retraining;occupation/activity based interventions;ROM/therapeutic exercise;transfer/mobility retraining  -           User Key  (r) = Recorded By, (t) = Taken By, (c) = Cosigned By    Initials Name Provider Type     Urmila Mendoza, OT  Occupational Therapist               Clinical Impression     Row Name 04/25/23 0958          Pain Assessment    Pretreatment Pain Rating 0/10 - no pain  -     Posttreatment Pain Rating 0/10 - no pain  -     Row Name 04/25/23 0958          Plan of Care Review    Plan of Care Reviewed With patient  -     Progress improving  -     Outcome Evaluation OT eval complete. Pt is pleasent and cooperative. Presents below baseline with decreased strength, ROM, activity tolerance and overall independence with ADLS. Dependent to don socks this date. O2 saturations decreased with exertion and requires extensive time/effort to improve O2 saturations. Endorses frequent falls at home. Pt would benefit from IPR prior to returning home.  -     Row Name 04/25/23 0958          Therapy Assessment/Plan (OT)    Patient/Family Therapy Goal Statement (OT) Pt would like to improve and return home.  -     Rehab Potential (OT) good, to achieve stated therapy goals  -     Criteria for Skilled Therapeutic Interventions Met (OT) yes;skilled treatment is necessary  -     Therapy Frequency (OT) daily  -     Row Name 04/25/23 0958          Therapy Plan Review/Discharge Plan (OT)    Anticipated Discharge Disposition (OT) inpatient rehabilitation facility  -     Row Name 04/25/23 0958          Vital Signs    Pre Systolic BP Rehab 124  RN cleared for tx; VSS  -HM     Pre Treatment Diastolic BP 75  -HM     Intra Systolic BP Rehab 110  -HM     Intra Treatment Diastolic   -HM     Post Systolic BP Rehab 114  -HM     Post Treatment Diastolic BP 80  -HM     O2 Delivery Pre Treatment supplemental O2  -HM     Intra SpO2 (%) 86  -HM     O2 Delivery Intra Treatment supplemental O2  -HM     Post SpO2 (%) 89  RN aware  -HM     O2 Delivery Post Treatment supplemental O2  -HM     Pre Patient Position Supine  -HM     Intra Patient Position Standing  -HM     Post Patient Position Sitting  -     Row Name 04/25/23 0958          Positioning and  Restraints    Pre-Treatment Position in bed  -HM     Post Treatment Position chair  -HM     In Chair notified nsg;reclined;call light within reach;exit alarm on;encouraged to call for assist;waffle cushion  -           User Key  (r) = Recorded By, (t) = Taken By, (c) = Cosigned By    Initials Name Provider Type     Urmila Mendoza OT Occupational Therapist               Outcome Measures     Row Name 04/25/23 1006          How much help from another is currently needed...    Putting on and taking off regular lower body clothing? 1  -HM     Bathing (including washing, rinsing, and drying) 3  -HM     Toileting (which includes using toilet bed pan or urinal) 3  -HM     Putting on and taking off regular upper body clothing 3  -HM     Taking care of personal grooming (such as brushing teeth) 3  -HM     Eating meals 3  -HM     AM-PAC 6 Clicks Score (OT) 16  -HM     Row Name 04/25/23 0805          How much help from another person do you currently need...    Turning from your back to your side while in flat bed without using bedrails? 3  -KH     Moving from lying on back to sitting on the side of a flat bed without bedrails? 3  -KH     Moving to and from a bed to a chair (including a wheelchair)? 3  -KH     Standing up from a chair using your arms (e.g., wheelchair, bedside chair)? 3  -KH     Climbing 3-5 steps with a railing? 2  -KH     To walk in hospital room? 3  -KH     AM-PAC 6 Clicks Score (PT) 17  -KH     Highest level of mobility 5 --> Static standing  -KH     Row Name 04/25/23 1006          Functional Assessment    Outcome Measure Options AM-PAC 6 Clicks Daily Activity (OT)  -           User Key  (r) = Recorded By, (t) = Taken By, (c) = Cosigned By    Initials Name Provider Type     Urmila Mendoza OT Occupational Therapist    Oxana Cee RN Registered Nurse                Occupational Therapy Education     Title: PT OT SLP Therapies (In Progress)     Topic: Occupational Therapy (In  Progress)     Point: ADL training (Done)     Description:   Instruct learner(s) on proper safety adaptation and remediation techniques during self care or transfers.   Instruct in proper use of assistive devices.              Learning Progress Summary           Patient Acceptance, TB,E,D, VU,NR by  at 4/25/2023 1006                   Point: Home exercise program (Not Started)     Description:   Instruct learner(s) on appropriate technique for monitoring, assisting and/or progressing therapeutic exercises/activities.              Learner Progress:  Not documented in this visit.          Point: Precautions (Done)     Description:   Instruct learner(s) on prescribed precautions during self-care and functional transfers.              Learning Progress Summary           Patient Acceptance, TB,E,D, VU,NR by  at 4/25/2023 1006                   Point: Body mechanics (Done)     Description:   Instruct learner(s) on proper positioning and spine alignment during self-care, functional mobility activities and/or exercises.              Learning Progress Summary           Patient Acceptance, TB,E,D, VU,NR by  at 4/25/2023 1006                               User Key     Initials Effective Dates Name Provider Type Discipline     10/25/22 -  Urmila Mendoza OT Occupational Therapist OT              OT Recommendation and Plan  Planned Therapy Interventions (OT): adaptive equipment training, BADL retraining, functional balance retraining, occupation/activity based interventions, ROM/therapeutic exercise, transfer/mobility retraining  Therapy Frequency (OT): daily  Plan of Care Review  Plan of Care Reviewed With: patient  Progress: improving  Outcome Evaluation: OT eval complete. Pt is pleasent and cooperative. Presents below baseline with decreased strength, ROM, activity tolerance and overall independence with ADLS. Dependent to don socks this date. O2 saturations decreased with exertion and requires extensive  time/effort to improve O2 saturations. Endorses frequent falls at home. Pt would benefit from IPR prior to returning home.     Time Calculation:    Time Calculation- OT     Row Name 04/25/23 0825             Time Calculation- OT    OT Start Time 0825  -HM      OT Received On 04/25/23  -HM      OT Goal Re-Cert Due Date 05/05/23  -         Untimed Charges    OT Eval/Re-eval Minutes 56  -HM         Total Minutes    Untimed Charges Total Minutes 56  -HM       Total Minutes 56  -HM            User Key  (r) = Recorded By, (t) = Taken By, (c) = Cosigned By    Initials Name Provider Type     Urmila Mendoza OT Occupational Therapist              Therapy Charges for Today     Code Description Service Date Service Provider Modifiers Qty    [id number] HC OT EVAL LOW COMPLEXITY 4 4/25/2023 Urmila Mendoza OT GO 1               Urmila Mendoza OT  4/25/2023

## 2023-04-25 NOTE — PLAN OF CARE
Goal Outcome Evaluation:  Plan of Care Reviewed With: patient           Outcome Evaluation: PT eval completed. Patient alert and oriented x4. Presenting with deficits in general BLE strength, standing balance, gait quality, and functional endurance effecting functional mobility below baseline. Patient will benefit from skilled IP PT services in order to address impairments for return to PLOF.Recommend IPR at [facility].
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
## 2023-04-26 LAB
ANION GAP SERPL CALCULATED.3IONS-SCNC: 7 MMOL/L (ref 5–15)
BASOPHILS # BLD AUTO: 0.01 10*3/MM3 (ref 0–0.2)
BASOPHILS NFR BLD AUTO: 0.1 % (ref 0–1.5)
BUN SERPL-MCNC: 21 MG/DL (ref 8–23)
BUN/CREAT SERPL: 29.6 (ref 7–25)
CALCIUM SPEC-SCNC: 9.1 MG/DL (ref 8.6–10.5)
CHLORIDE SERPL-SCNC: 95 MMOL/L (ref 98–107)
CO2 SERPL-SCNC: 34 MMOL/L (ref 22–29)
CREAT SERPL-MCNC: 0.71 MG/DL (ref 0.76–1.27)
DEPRECATED RDW RBC AUTO: 48.5 FL (ref 37–54)
EGFRCR SERPLBLD CKD-EPI 2021: 103.7 ML/MIN/1.73
EOSINOPHIL # BLD AUTO: 0 10*3/MM3 (ref 0–0.4)
EOSINOPHIL NFR BLD AUTO: 0 % (ref 0.3–6.2)
ERYTHROCYTE [DISTWIDTH] IN BLOOD BY AUTOMATED COUNT: 15.5 % (ref 12.3–15.4)
GLUCOSE BLDC GLUCOMTR-MCNC: 112 MG/DL (ref 70–130)
GLUCOSE BLDC GLUCOMTR-MCNC: 126 MG/DL (ref 70–130)
GLUCOSE BLDC GLUCOMTR-MCNC: 177 MG/DL (ref 70–130)
GLUCOSE SERPL-MCNC: 146 MG/DL (ref 65–99)
HCT VFR BLD AUTO: 54.1 % (ref 37.5–51)
HGB BLD-MCNC: 16.4 G/DL (ref 13–17.7)
IMM GRANULOCYTES # BLD AUTO: 0.06 10*3/MM3 (ref 0–0.05)
IMM GRANULOCYTES NFR BLD AUTO: 0.5 % (ref 0–0.5)
INR PPP: 1.33 (ref 0.84–1.13)
LYMPHOCYTES # BLD AUTO: 0.97 10*3/MM3 (ref 0.7–3.1)
LYMPHOCYTES NFR BLD AUTO: 8.8 % (ref 19.6–45.3)
MCH RBC QN AUTO: 26.4 PG (ref 26.6–33)
MCHC RBC AUTO-ENTMCNC: 30.3 G/DL (ref 31.5–35.7)
MCV RBC AUTO: 87.1 FL (ref 79–97)
MONOCYTES # BLD AUTO: 0.77 10*3/MM3 (ref 0.1–0.9)
MONOCYTES NFR BLD AUTO: 6.9 % (ref 5–12)
NEUTROPHILS NFR BLD AUTO: 83.7 % (ref 42.7–76)
NEUTROPHILS NFR BLD AUTO: 9.27 10*3/MM3 (ref 1.7–7)
NRBC BLD AUTO-RTO: 0 /100 WBC (ref 0–0.2)
PLATELET # BLD AUTO: 376 10*3/MM3 (ref 140–450)
PMV BLD AUTO: 9.9 FL (ref 6–12)
POTASSIUM SERPL-SCNC: 4.8 MMOL/L (ref 3.5–5.2)
PROTHROMBIN TIME: 16.5 SECONDS (ref 11.4–14.4)
RBC # BLD AUTO: 6.21 10*6/MM3 (ref 4.14–5.8)
SODIUM SERPL-SCNC: 136 MMOL/L (ref 136–145)
WBC NRBC COR # BLD: 11.08 10*3/MM3 (ref 3.4–10.8)

## 2023-04-26 PROCEDURE — 85610 PROTHROMBIN TIME: CPT | Performed by: INTERNAL MEDICINE

## 2023-04-26 PROCEDURE — 80048 BASIC METABOLIC PNL TOTAL CA: CPT | Performed by: INTERNAL MEDICINE

## 2023-04-26 PROCEDURE — 99232 SBSQ HOSP IP/OBS MODERATE 35: CPT | Performed by: STUDENT IN AN ORGANIZED HEALTH CARE EDUCATION/TRAINING PROGRAM

## 2023-04-26 PROCEDURE — 85025 COMPLETE CBC W/AUTO DIFF WBC: CPT | Performed by: INTERNAL MEDICINE

## 2023-04-26 PROCEDURE — 25010000002 METHYLPREDNISOLONE PER 40 MG: Performed by: INTERNAL MEDICINE

## 2023-04-26 PROCEDURE — 94799 UNLISTED PULMONARY SVC/PX: CPT

## 2023-04-26 PROCEDURE — 82962 GLUCOSE BLOOD TEST: CPT

## 2023-04-26 PROCEDURE — 25010000002 METHYLPREDNISOLONE PER 40 MG: Performed by: STUDENT IN AN ORGANIZED HEALTH CARE EDUCATION/TRAINING PROGRAM

## 2023-04-26 PROCEDURE — 94761 N-INVAS EAR/PLS OXIMETRY MLT: CPT

## 2023-04-26 PROCEDURE — 94664 DEMO&/EVAL PT USE INHALER: CPT

## 2023-04-26 PROCEDURE — 25010000002 ENOXAPARIN PER 10 MG: Performed by: INTERNAL MEDICINE

## 2023-04-26 RX ORDER — METHYLPREDNISOLONE SODIUM SUCCINATE 40 MG/ML
40 INJECTION, POWDER, LYOPHILIZED, FOR SOLUTION INTRAMUSCULAR; INTRAVENOUS EVERY 12 HOURS
Status: COMPLETED | OUTPATIENT
Start: 2023-04-26 | End: 2023-04-27

## 2023-04-26 RX ORDER — PANTOPRAZOLE SODIUM 40 MG/10ML
40 INJECTION, POWDER, LYOPHILIZED, FOR SOLUTION INTRAVENOUS
Status: DISCONTINUED | OUTPATIENT
Start: 2023-04-26 | End: 2023-04-28 | Stop reason: HOSPADM

## 2023-04-26 RX ORDER — PREDNISONE 20 MG/1
40 TABLET ORAL
Status: DISCONTINUED | OUTPATIENT
Start: 2023-04-28 | End: 2023-04-28 | Stop reason: HOSPADM

## 2023-04-26 RX ADMIN — METHYLPREDNISOLONE SODIUM SUCCINATE 40 MG: 40 INJECTION INTRAMUSCULAR; INTRAVENOUS at 17:28

## 2023-04-26 RX ADMIN — EMPAGLIFLOZIN 10 MG: 10 TABLET, FILM COATED ORAL at 09:09

## 2023-04-26 RX ADMIN — DIAZEPAM 5 MG: 5 TABLET ORAL at 20:47

## 2023-04-26 RX ADMIN — DIAZEPAM 5 MG: 5 TABLET ORAL at 00:26

## 2023-04-26 RX ADMIN — DOXYCYCLINE 100 MG: 100 CAPSULE ORAL at 05:11

## 2023-04-26 RX ADMIN — ALBUTEROL SULFATE 2.5 MG: 2.5 SOLUTION RESPIRATORY (INHALATION) at 16:40

## 2023-04-26 RX ADMIN — ALBUTEROL SULFATE 2.5 MG: 2.5 SOLUTION RESPIRATORY (INHALATION) at 11:58

## 2023-04-26 RX ADMIN — SACUBITRIL AND VALSARTAN 1 TABLET: 24; 26 TABLET, FILM COATED ORAL at 09:14

## 2023-04-26 RX ADMIN — CITALOPRAM HYDROBROMIDE 20 MG: 20 TABLET ORAL at 09:09

## 2023-04-26 RX ADMIN — HYDROXYZINE HYDROCHLORIDE 50 MG: 25 TABLET, FILM COATED ORAL at 00:26

## 2023-04-26 RX ADMIN — SACUBITRIL AND VALSARTAN 1 TABLET: 24; 26 TABLET, FILM COATED ORAL at 20:42

## 2023-04-26 RX ADMIN — DOXYCYCLINE 100 MG: 100 CAPSULE ORAL at 17:28

## 2023-04-26 RX ADMIN — BUDESONIDE AND FORMOTEROL FUMARATE DIHYDRATE 2 PUFF: 160; 4.5 AEROSOL RESPIRATORY (INHALATION) at 11:58

## 2023-04-26 RX ADMIN — METOPROLOL SUCCINATE 100 MG: 50 TABLET, EXTENDED RELEASE ORAL at 09:09

## 2023-04-26 RX ADMIN — ATORVASTATIN CALCIUM 40 MG: 40 TABLET, FILM COATED ORAL at 20:42

## 2023-04-26 RX ADMIN — PHENYTOIN SODIUM 100 MG: 100 CAPSULE ORAL at 20:42

## 2023-04-26 RX ADMIN — BUSPIRONE HYDROCHLORIDE 5 MG: 5 TABLET ORAL at 09:09

## 2023-04-26 RX ADMIN — METHYLPREDNISOLONE SODIUM SUCCINATE 40 MG: 40 INJECTION INTRAMUSCULAR; INTRAVENOUS at 05:11

## 2023-04-26 RX ADMIN — PANTOPRAZOLE SODIUM 40 MG: 40 INJECTION, POWDER, FOR SOLUTION INTRAVENOUS at 05:11

## 2023-04-26 RX ADMIN — PHENYTOIN SODIUM 100 MG: 100 CAPSULE ORAL at 09:14

## 2023-04-26 RX ADMIN — BUDESONIDE AND FORMOTEROL FUMARATE DIHYDRATE 2 PUFF: 160; 4.5 AEROSOL RESPIRATORY (INHALATION) at 19:01

## 2023-04-26 RX ADMIN — BUMETANIDE 1 MG: 1 TABLET ORAL at 09:09

## 2023-04-26 RX ADMIN — CLOPIDOGREL BISULFATE 75 MG: 75 TABLET ORAL at 09:09

## 2023-04-26 RX ADMIN — WARFARIN SODIUM 5 MG: 5 TABLET ORAL at 17:28

## 2023-04-26 RX ADMIN — TIOTROPIUM BROMIDE INHALATION SPRAY 2 PUFF: 3.12 SPRAY, METERED RESPIRATORY (INHALATION) at 11:59

## 2023-04-26 RX ADMIN — ALUMINUM HYDROXIDE, MAGNESIUM HYDROXIDE, AND DIMETHICONE: 400; 400; 40 SUSPENSION ORAL at 05:10

## 2023-04-26 RX ADMIN — QUETIAPINE FUMARATE 150 MG: 50 TABLET, EXTENDED RELEASE ORAL at 20:42

## 2023-04-26 RX ADMIN — ENOXAPARIN SODIUM 100 MG: 100 INJECTION SUBCUTANEOUS at 05:11

## 2023-04-26 RX ADMIN — GUAIFENESIN 1200 MG: 600 TABLET, EXTENDED RELEASE ORAL at 20:42

## 2023-04-26 RX ADMIN — ALBUTEROL SULFATE 2.5 MG: 2.5 SOLUTION RESPIRATORY (INHALATION) at 19:01

## 2023-04-26 RX ADMIN — ENOXAPARIN SODIUM 100 MG: 100 INJECTION SUBCUTANEOUS at 17:28

## 2023-04-26 RX ADMIN — GUAIFENESIN 1200 MG: 600 TABLET, EXTENDED RELEASE ORAL at 09:09

## 2023-04-26 RX ADMIN — Medication 10 ML: at 09:09

## 2023-04-26 NOTE — PROGRESS NOTES
"Pharmacy Consult  -  Warfarin    Fernie Dalal is a  63 y.o. male   Height - 175.3 cm (69\")  Weight - 103 kg (227 lb)    Consulting Provider: Intensivist  Indication: - DVT/PE  Goal INR: 2 - 3   Home Regimen: New Start    Bridge Therapy: Yes. Pt has been on therapeutic lovenox for ~24 hrs. Will continue until INR is therapeutic.     Drug-Drug Interactions with current regimen:  Aspirin - may enhance anticoagulant effect  APAP - may enhance anticoagulant effect  Citalopram - may enhance anticoagulant effect  Clopidogrel - may enhance anticoagulant effect  Doxycycline - may enhance anticoagulant effect  Methylprednisolone - may enhance anticoagulant effect  Phenytoin - may enhance or diminish anticoagulant effect  Quetiapine - may enhance anticoagulant effect      Warfarin Dosing During Admission:    Date  4/24 4/25 4/26         INR  1.12 1.13 1.33         Dose  5mg 5 mg 5 mg              Education Provided:  Patient not on warfarin prior to admission. Education provided 4/25/23 verbally and in writing. Discussed effects of warfarin, importance of checking INR, drug-drug and drug-food interactions, and signs/symptoms of bleeding and clotting. Patient verbalized understanding through teach back. All pertinent questions were answered.    Discharge Follow up:   Following Provider -  Likely to follow with PCP KRISTIN Manuel at [facility] in [place], [place]. Called to verify that patient can get INR monitored in office 4/25 but office closed this entire week for a conference. Possible that home health will be arranged and can assist as well. MANDA Haas to look into this.     Follow up time range or appointment - ~2-3 days following discharge. Close follow up will be needed as patient will be new start.      Labs:    Results from last 7 days   Lab Units 04/26/23  0449 04/25/23  0440 04/24/23  1909 04/24/23  0442 04/22/23  1135   INR  1.33* 1.13 1.12  --   --    HEMOGLOBIN g/dL 16.4 17.1  --  " 15.0 14.9   HEMATOCRIT % 54.1* 57.0*  --  51.8* 50.4     Results from last 7 days   Lab Units 04/26/23  0449 04/25/23  0440 04/24/23  0442 04/22/23  1135   SODIUM mmol/L 136 141 138 138   POTASSIUM mmol/L 4.8 5.2 5.3* 4.4   CHLORIDE mmol/L 95* 95* 91* 95*   CO2 mmol/L 34.0* 34.0* 39.0* 36.0*   BUN mg/dL 21 24* 25* 11   CREATININE mg/dL 0.71* 0.66* 0.85 0.58*   CALCIUM mg/dL 9.1 9.4 9.3 9.5   BILIRUBIN mg/dL  --   --  0.2 0.3   ALK PHOS U/L  --   --  93 100   ALT (SGPT) U/L  --   --  13 17   AST (SGOT) U/L  --   --  11 16   GLUCOSE mg/dL 146* 156* 143* 101*       Current dietary intake: 75% of breakfast 4/25  Dietary Orders (From admission, onward)       Start     Ordered    04/24/23 1800  Dietary Nutrition Supplements Boost Plus; chocolate  Daily With Breakfast & Dinner      Question Answer Comment   Select Supplement: Boost Plus    Flavor: chocolate        04/24/23 1512    04/24/23 1504  Diet: Regular/House Diet; Texture: Regular Texture (IDDSI 7); Fluid Consistency: Thin (IDDSI 0)  Diet Effective Now        References:    Diet Order [place]   Question Answer Comment   Diets: Regular/House Diet    Texture: Regular Texture (IDDSI 7)    Fluid Consistency: Thin (IDDSI 0)        04/24/23 1504                    Assessment/Plan:   Pharmacy to dose warfarin for DVT/PE, goal INR 2-3. Patient is a new start to warfarin as of 4/24/23.  INR is SUBtherapeutic today at 1.33, increased from 1.13 yesterday. Will continue warfarin 5 mg this evening.  Daily PT/INR ordered.  Pharmacy will continue to monitor daily INR, signs/symptoms of bleeding, drug-drug interactions and dietary intake to adjust dose as necessary.      Thank you  Leonid Sarkar, PharmD  4/26/2023  12:59 EDT

## 2023-04-26 NOTE — PROGRESS NOTES
[facility] Medicine Services  PROGRESS NOTE    Patient Name: Fernie Dalal  : 1960  MRN: [id number]    Date of Admission: 2023  Primary Care Physician: Belem Albert APRN    Subjective   Subjective     CC: Follow-up chest pain, SOA    HPI:  On 3L NC.  Denies any leg pain.  Reports his breathing is improved.  Has some GERD symptoms.  Minimal nonproductive cough.  No GI or  symptoms.    ROS:  Gen- No fevers, chills  CV- No chest pain, palpitations  Resp- + cough, +dyspnea  GI- No N/V/D, abd pain  No bleeding  Objective   Objective     Vital Signs:   Temp:  [98.2 °F (36.8 °C)-99.1 °F (37.3 °C)] 98.7 °F (37.1 °C)  Heart Rate:  [79-95] 80  Resp:  [18-20] 18  BP: ()/(66-91) 106/78  Flow (L/min):  [3] 3     Physical Exam:  Constitutional: No acute distress, awake, alert  HENT: NCAT, mucous membranes moist  Respiratory: Mildly labored respirations, diffuse coarse breath sounds on 3 L NC  Cardiovascular: RRR, no murmurs, rubs, or gallops  Gastrointestinal: Positive bowel sounds, soft, nontender, nondistended  Musculoskeletal: No bilateral ankle edema  Psychiatric: Appropriate affect, cooperative  Neurologic: PERRL, symmetric facies, symmetric palate rise, tongue midline, moving all extremities equally, nonfocal  Skin: No rashes      Results Reviewed:  LAB RESULTS:      Lab 23  0449 23  0440 23  1909 23  0442 23  1135   WBC 11.08* 14.68*  --  13.81* 7.21   HEMOGLOBIN 16.4 17.1  --  15.0 14.9   HEMATOCRIT 54.1* 57.0*  --  51.8* 50.4   PLATELETS 376 368  --  369 306   NEUTROS ABS 9.27* 12.68*  --  12.40* 5.20   IMMATURE GRANS (ABS) 0.06* 0.07*  --  0.05 0.03   LYMPHS ABS 0.97 1.19  --  0.82 0.96   MONOS ABS 0.77 0.70  --  0.52 0.83   EOS ABS 0.00 0.02  --  0.00 0.13   MCV 87.1 88.6  --  90.1 88.7   PROTIME 16.5* 14.4 14.4  --   --    D DIMER QUANT  --   --   --   --  0.78*         Lab 23  0449 23  0440 23  0442 23  1131    SODIUM 136 141 138 138   POTASSIUM 4.8 5.2 5.3* 4.4   CHLORIDE 95* 95* 91* 95*   CO2 34.0* 34.0* 39.0* 36.0*   ANION GAP 7.0 12.0 8.0 7.0   BUN 21 24* 25* 11   CREATININE 0.71* 0.66* 0.85 0.58*   EGFR 103.7 106.0 98.2 110.3   GLUCOSE 146* 156* 143* 101*   CALCIUM 9.1 9.4 9.3 9.5   MAGNESIUM  --   --   --  2.0   HEMOGLOBIN A1C  --  7.30*  --   --          Lab 04/24/23  0442 04/22/23  1135   TOTAL PROTEIN 6.4 7.0   ALBUMIN 3.7 3.7   GLOBULIN 2.7 3.3   ALT (SGPT) 13 17   AST (SGOT) 11 16   BILIRUBIN 0.2 0.3   ALK PHOS 93 100         Lab 04/26/23  0449 04/25/23  0440 04/24/23  1909 04/22/23  1135   PROBNP  --   --   --  2,034.0*   HSTROP T  --   --   --  87*   PROTIME 16.5* 14.4 14.4  --    INR 1.33* 1.13 1.12  --                  Brief Urine Lab Results     None          Microbiology Results Abnormal     Procedure Component Value - Date/Time    Respiratory Panel PCR w/COVID-19(SARS-CoV-2) JERO/ELADIO/YEIMI/PAD/COR/MAD/JONATHAN In-House, NP Swab in UTM/VTM, 3-4 HR TAT - Swab, Nasopharynx [[id number]]  (Normal) Collected: 04/22/23 1350    Lab Status: Final result Specimen: Swab from Nasopharynx Updated: 04/22/23 1455     ADENOVIRUS, PCR Not Detected     Coronavirus 229E Not Detected     Coronavirus HKU1 Not Detected     Coronavirus NL63 Not Detected     Coronavirus OC43 Not Detected     COVID19 Not Detected     Human Metapneumovirus Not Detected     Human Rhinovirus/Enterovirus Not Detected     Influenza A PCR Not Detected     Influenza B PCR Not Detected     Parainfluenza Virus 1 Not Detected     Parainfluenza Virus 2 Not Detected     Parainfluenza Virus 3 Not Detected     Parainfluenza Virus 4 Not Detected     RSV, PCR Not Detected     Bordetella pertussis pcr Not Detected     Bordetella parapertussis PCR Not Detected     Chlamydophila pneumoniae PCR Not Detected     Mycoplasma pneumo by PCR Not Detected    Narrative:      In the setting of a positive respiratory panel with a viral infection PLUS a negative procalcitonin without  other underlying concern for bacterial infection, consider observing off antibiotics or discontinuation of antibiotics and continue supportive care. If the respiratory panel is positive for atypical bacterial infection (Bordetella pertussis, Chlamydophila pneumoniae, or Mycoplasma pneumoniae), consider antibiotic de-escalation to target atypical bacterial infection.          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  No significant disease of the left coronary system. Severe left ventricular systolic dysfunction, estimated EF 30%. RECOMMENDATIONS: Optimize medical therapy and risk factor management per guidelines. At this time Jardiance 10 mg daily will be added.  Continue Toprol-XL at current dose.  Reduce the dose of Entresto to allow introduction and optimization of concomitant GDMT. Indications: Acute on chronic HFrEF, chest pain and CAD. Access: Right radial. Procedures: Left heart catheterization. Left ventriculogram. Selective coronary angiography. Arterial site hemostasis with radial band. Procedure narrative: The patient was brought to the catheterization lab in a fasting condition.  Access site was prepped and draped in standard sterile fashion.  Lidocaine was injected and arterial access was obtained by percutaneous anterior wall puncture technique.  A 6 [place] arterial sheath was placed. Above procedures were performed without complications.  At the conclusion the arterial sheath was removed and hemostasis was achieved.  The patient was transferred to the unit in a stable condition. Hemodynamic Findings: Heart Rate: 85/minute. LV pressure: 98/4-8 mmHg, on pull back no gradient was recorded across the aortic valve. Angiographic Findings: Bilateral coronary dominance. LM: Angiographically normal. LAD: Smooth, 40% mid segment stenosis after the origin of a large diagonal branch.   The diagonal itself has segments of 40 to 50% plaque.  The distal LAD has diffuse 30% plaque without hemodynamically significant disease. LCX: Dominant vessel which gives rise to a large bifurcating obtuse marginal trunk giving off first and second marginal branches.  Both of these branches have nonobstructive 30 to 40% plaque.  Distally the circumflex gives off 2 lateral branches and a small left PDA there is diffuse plaque noted without occlusive disease. Ramus intermedius: Small vessel with 30 to 40% plaque without occlusive disease. RCA: Codominant vessel it has been previously stented over a long mid segment.  Sequential 70 and 60% in-stent restenosis are noted with distal occlusion of the stent after the origin of a third RV marginal branch.  No significant left ventricular branches originating from the RCA are noted.  3 RV marginals are noted which has nonobstructive disease. LV: Left ventriculogram performed in 30 COLBERT projection revealed dilated left ventricle with global hypokinesis and inferior apical akinesis/dyskinesis with severe left ventricular systolic dysfunction.  Estimated ejection fraction is 30%.  Complications: No acute procedure related complications.     Result Date: 4/24/2023  FINAL     Impression: Chronic total occlusion of the previously stented codominant right coronary artery.  No significant disease of the left coronary system. Severe left ventricular systolic dysfunction, estimated EF 30%. RECOMMENDATIONS: Optimize medical therapy and risk factor management per guidelines. At this time Jardiance 10 mg daily will be added.  Continue Toprol-XL at current dose.  Reduce the dose of Entresto to allow introduction and optimization of concomitant GDMT. Indications: Acute on chronic HFrEF, chest pain and CAD. Access: Right radial. Procedures: Left heart catheterization. Left ventriculogram. Selective coronary angiography. Arterial site hemostasis with radial band. Procedure narrative: The  patient was brought to the catheterization lab in a fasting condition.  Access site was prepped and draped in standard sterile fashion.  Lidocaine was injected and arterial access was obtained by percutaneous anterior wall puncture technique.  A 6 [place] arterial sheath was placed. Above procedures were performed without complications.  At the conclusion the arterial sheath was removed and hemostasis was achieved.  The patient was transferred to the unit in a stable condition. Hemodynamic Findings: Heart Rate: 85/minute. LV pressure: 98/4-8 mmHg, on pull back no gradient was recorded across the aortic valve. Angiographic Findings: Bilateral coronary dominance. LM: Angiographically normal. LAD: Smooth, 40% mid segment stenosis after the origin of a large diagonal branch.  The diagonal itself has segments of 40 to 50% plaque.  The distal LAD has diffuse 30% plaque without hemodynamically significant disease. LCX: Dominant vessel which gives rise to a large bifurcating obtuse marginal trunk giving off first and second marginal branches.  Both of these branches have nonobstructive 30 to 40% plaque.  Distally the circumflex gives off 2 lateral branches and a small left PDA there is diffuse plaque noted without occlusive disease. Ramus intermedius: Small vessel with 30 to 40% plaque without occlusive disease. RCA: Codominant vessel it has been previously stented over a long mid segment.  Sequential 70 and 60% in-stent restenosis are noted with distal occlusion of the stent after the origin of a third RV marginal branch.  No significant left ventricular branches originating from the RCA are noted.  3 RV marginals are noted which has nonobstructive disease. LV: Left ventriculogram performed in 30 COLBERT projection revealed dilated left ventricle with global hypokinesis and inferior apical akinesis/dyskinesis with severe left ventricular systolic dysfunction.  Estimated ejection fraction is 30%.  Complications: No acute  procedure related complications.           Current medications:  Scheduled Meds:albuterol, 2.5 mg, Nebulization, 4x Daily - RT  atorvastatin, 40 mg, Oral, Nightly  budesonide-formoterol, 2 puff, Inhalation, BID - RT   And  tiotropium bromide monohydrate, 2 puff, Inhalation, Daily - RT  bumetanide, 1 mg, Oral, Daily  busPIRone, 5 mg, Oral, TID  citalopram, 20 mg, Oral, Daily  clopidogrel, 75 mg, Oral, Daily  doxycycline, 100 mg, Oral, Q12H  empagliflozin, 10 mg, Oral, Daily  enoxaparin, 1 mg/kg, Subcutaneous, Q12H  guaiFENesin, 1,200 mg, Oral, Q12H  insulin lispro, 0-7 Units, Subcutaneous, TID With Meals  methylPREDNISolone sodium succinate, 40 mg, Intravenous, Q8H  metoprolol succinate XL, 100 mg, Oral, Daily  pantoprazole, 40 mg, Intravenous, Q AM  pharmacy consult - MTM, , Does not apply, Daily  phenytoin ER, 100 mg, Oral, BID  QUEtiapine fumarate ER, 150 mg, Oral, Nightly  sacubitril-valsartan, 1 tablet, Oral, Q12H  warfarin, 5 mg, Oral, Daily      Continuous Infusions:Pharmacy to dose warfarin,       PRN Meds:.•  acetaminophen  •  dextrose  •  dextrose  •  diazePAM  •  glucagon (human recombinant)  •  hydrOXYzine  •  LORazepam  •  nitroglycerin  •  Pharmacy to dose warfarin  •  sodium chloride    Assessment & Plan   Assessment & Plan     Active Hospital Problems    Diagnosis  POA   • **Nonspecific chest pain [R07.9]  Yes   • Dyslipidemia [E78.5]  Yes   • Essential hypertension [I10]  Yes   • Seizure disorder [G40.909]  Yes   • Abnormal stress test [R94.39]  Unknown   • Chronic systolic congestive heart failure [I50.22]  Yes   • Coronary artery disease involving native heart [I25.10]  Yes   • Acute exacerbation of chronic obstructive pulmonary disease (COPD) [J44.1]  Yes   • Anxiety [F41.9]  Yes   • Hyperlipemia [E78.5]  Yes   • Sleep apnea [G47.30]  Yes      Resolved Hospital Problems   No resolved problems to display.        Brief Hospital Course to date:  Fernie Dalal is a 63 y.o. male with PMHx significant  for COPD, chronic respiratory failure on 2-3L at rest and 3-4L with exertion, seizure d/o, DMII, Anxiety/depression, HFrEF (LVEF 33%), tobacco abuse, HTN, HLD who presents with complaints of increased shortness of breath and chest pain.    This patient's problems and plans were partially entered by my partner and updated as appropriate by me 04/26/23.     AECOPD  Acute on Chronic Respiratory Failure w/Hypoxia  - continue steroids, 40mg IV weaned to twice daily today and daily tomorrow and then transition to prednisone 40 mg once daily  - aggressive pulmonary toilet, mucinex, scheduled nebs  - doxycycline BID x 5 days, resp PCR negative, CXR reviewed  - continue Symbicort, Sprivia (in place of Trelegy)  - COPD navigator consulted  - at baseline 2-3L w/ rest and 3-4L w/exertion, currently on 3 L NC     Chest Pain, resolved  NSTEMI, type II  HFrEF  - LVEF 33% (3/2023)  - cardiology following, s/p [facility] 4/25 with Dr. Gaitan, that showed no similar disease, recommend continued medical therapy  -Continue Plavix, statin, stop aspirin as he is now on coagulation  - Cardiology was following, s/p IV lasix, resume Bumex 1 mg p.o. daily  -Continue Entresto and beta-blocker  -He has been started on Jardiance  -Follow-up with Dr. Marsha Gaitan in 4-6 weeks at [place] office  -Cards recs on DC: Plavix, STOP aspirin, continue metoprolol, Entresto, started Jardiance, STOP diltiazem, continue bumex 1mg daily     Acute left DVT  -Patient had elevated D-dimer  -Venous duplex shows acute DVT in the posterior tibial  -Started on Coumadin with Lovenox bridge, goal INR 2-3 due to Phenytoin use  -Since PCP office is closed all week and thus INR cannot be monitored and patient not comfortable with Lovenox bridge yet, will remain in house for bridge     Anxiety and depression  - continue buspar, PRN Valium  - nightly seroquel     Seizure D/O  - continue dilantin     Hypertension  -BP is now improved  -Continue home meds as  above     Hyperlipidemia  -continue statin      Well control type 2 diabetes with A1c 7.3%  -FSBG's reviewed and currently appropriate, continue SSI while on steroids.  -He is not on any meds at home.     Expected Discharge Location and Transportation: home? Patient requesting rehab  Expected Discharge   Expected Discharge Date: 04/27/23     DVT prophylaxis:  Medical DVT prophylaxis orders are present.     CODE STATUS:   Code Status and Medical Interventions:   Ordered at: 04/22/23 133     Level Of Support Discussed With:    Patient     Code Status (Patient has no pulse and is not breathing):    CPR (Attempt to Resuscitate)     Medical Interventions (Patient has pulse or is breathing):    Full Support       Cha Osborn MD  04/26/23

## 2023-04-26 NOTE — CASE MANAGEMENT/SOCIAL WORK
Continued Stay Note  [facility]     Patient Name: Fernie Dalal  MRN: [id number]  Today's Date: 4/26/2023    Admit Date: 4/22/2023    Plan: HH vs inpt rehab   Discharge Plan     Row Name 04/26/23 1200       Plan    Plan HH vs inpt rehab    Patient/Family in Agreement with Plan yes    Plan Comments I spoke with the pt. Discussed inpt rehab at [place]. He tells me he wants to go home at [facility] and have HH PT/OT. He has animals he needs to care for and is not interested in inpt rehab at [place]. I will continue to follow and arrange HH at [place]. He has waiver home services for help at home.    Final Discharge Disposition Code 06 - home with home health care               Discharge Codes    No documentation.               Expected Discharge Date and Time     Expected Discharge Date Expected Discharge Time    Apr 27, 2023             Samina Weir RN

## 2023-04-26 NOTE — PLAN OF CARE
Goal Outcome Evaluation:  Plan of Care Reviewed With: patient        Progress: improving   Pt resting in bed.

## 2023-04-26 NOTE — PROGRESS NOTES
NN spoke with pt at .  Pt alert and able to answer questions appropriately. Pt O2 on 2  L currently, home O2 use 2-4 L. COPD action plan reviewed. Deep breathing exercises encouraged. Patient is adamant that he will not go to rehab at [place].  No new concerns or questions voiced at this time.  NN will continue to follow as needed.           Per current GOLD Standards, please consider: No LAMA/LABA/ICS in place, Outpatient PFT, Rehab as appropriate, Palliative Care consult,  Annual LDCT per current screening guidelines (age 50-80 years old, smoking history of 20 pack years or more or has quit within past 15 years)

## 2023-04-26 NOTE — PLAN OF CARE
Problem: Fall Injury Risk  Goal: Absence of Fall and Fall-Related Injury  Outcome: Ongoing, Progressing  Intervention: Promote Injury-Free Environment     Problem: COPD (Chronic Obstructive Pulmonary Disease) Comorbidity  Goal: Maintenance of COPD Symptom Control  Outcome: Ongoing, Progressing  Intervention: Maintain COPD-Symptom Control     Problem: Heart Failure Comorbidity  Goal: Maintenance of Heart Failure Symptom Control  Outcome: Ongoing, Progressing     Problem: Hypertension Comorbidity  Goal: Blood Pressure in Desired Range  Outcome: Ongoing, Progressing     Problem: Obstructive Sleep Apnea Risk or Actual Comorbidity Management  Goal: Unobstructed Breathing During Sleep  Outcome: Ongoing, Progressing     Problem: Pain Chronic (Persistent) (Comorbidity Management)  Goal: Acceptable Pain Control and Functional Ability  Outcome: Ongoing, Progressing  Intervention: Optimize Psychosocial Wellbeing     Problem: Adult Inpatient Plan of Care  Goal: Plan of Care Review  Outcome: Ongoing, Progressing  Goal: Optimal Comfort and Wellbeing  Outcome: Ongoing, Progressing  Intervention: Provide Person-Centered Care     Problem: Adjustment to Illness COPD (Chronic Obstructive Pulmonary Disease)  Goal: Optimal Chronic Illness Coping  Outcome: Ongoing, Progressing  Intervention: Support and Optimize Psychosocial Response     Problem: Functional Ability Impaired COPD (Chronic Obstructive Pulmonary Disease)  Goal: Optimal Level of Functional [place]  Outcome: Ongoing, Progressing  Intervention: Optimize Functional Ability     Problem: Infection COPD (Chronic Obstructive Pulmonary Disease)  Goal: Absence of Infection Signs and Symptoms  Outcome: Ongoing, Progressing     Problem: Oral Intake Inadequate COPD (Chronic Obstructive Pulmonary Disease)  Goal: Improved Nutrition Intake  Outcome: Ongoing, Progressing     Problem: Respiratory Compromise COPD (Chronic Obstructive Pulmonary Disease)  Goal: Effective Oxygenation and  Ventilation  Outcome: Ongoing, Progressing  Intervention: Promote Airway Secretion Clearance  Intervention: Optimize Oxygenation and Ventilation   Goal Outcome Evaluation:  Plan of Care Reviewed With: patient        Progress: improving

## 2023-04-27 LAB
ANION GAP SERPL CALCULATED.3IONS-SCNC: 6 MMOL/L (ref 5–15)
BUN SERPL-MCNC: 21 MG/DL (ref 8–23)
BUN/CREAT SERPL: 35 (ref 7–25)
CALCIUM SPEC-SCNC: 8.8 MG/DL (ref 8.6–10.5)
CHLORIDE SERPL-SCNC: 96 MMOL/L (ref 98–107)
CO2 SERPL-SCNC: 37 MMOL/L (ref 22–29)
CREAT SERPL-MCNC: 0.6 MG/DL (ref 0.76–1.27)
DEPRECATED RDW RBC AUTO: 49.2 FL (ref 37–54)
EGFRCR SERPLBLD CKD-EPI 2021: 109.1 ML/MIN/1.73
ERYTHROCYTE [DISTWIDTH] IN BLOOD BY AUTOMATED COUNT: 15.5 % (ref 12.3–15.4)
GLUCOSE BLDC GLUCOMTR-MCNC: 104 MG/DL (ref 70–130)
GLUCOSE BLDC GLUCOMTR-MCNC: 119 MG/DL (ref 70–130)
GLUCOSE BLDC GLUCOMTR-MCNC: 170 MG/DL (ref 70–130)
GLUCOSE SERPL-MCNC: 114 MG/DL (ref 65–99)
HCT VFR BLD AUTO: 52.3 % (ref 37.5–51)
HGB BLD-MCNC: 15.7 G/DL (ref 13–17.7)
INR PPP: 1.99 (ref 0.89–1.12)
MCH RBC QN AUTO: 26.2 PG (ref 26.6–33)
MCHC RBC AUTO-ENTMCNC: 30 G/DL (ref 31.5–35.7)
MCV RBC AUTO: 87.2 FL (ref 79–97)
PLATELET # BLD AUTO: 316 10*3/MM3 (ref 140–450)
PMV BLD AUTO: 9.8 FL (ref 6–12)
POTASSIUM SERPL-SCNC: 4.5 MMOL/L (ref 3.5–5.2)
PROTHROMBIN TIME: 22.7 SECONDS (ref 12.2–14.5)
RBC # BLD AUTO: 6 10*6/MM3 (ref 4.14–5.8)
SODIUM SERPL-SCNC: 139 MMOL/L (ref 136–145)
WBC NRBC COR # BLD: 8.01 10*3/MM3 (ref 3.4–10.8)

## 2023-04-27 PROCEDURE — 25010000002 ENOXAPARIN PER 10 MG: Performed by: INTERNAL MEDICINE

## 2023-04-27 PROCEDURE — 99232 SBSQ HOSP IP/OBS MODERATE 35: CPT | Performed by: STUDENT IN AN ORGANIZED HEALTH CARE EDUCATION/TRAINING PROGRAM

## 2023-04-27 PROCEDURE — 85610 PROTHROMBIN TIME: CPT | Performed by: INTERNAL MEDICINE

## 2023-04-27 PROCEDURE — 63710000001 INSULIN LISPRO (HUMAN) PER 5 UNITS: Performed by: INTERNAL MEDICINE

## 2023-04-27 PROCEDURE — 94799 UNLISTED PULMONARY SVC/PX: CPT

## 2023-04-27 PROCEDURE — 97530 THERAPEUTIC ACTIVITIES: CPT

## 2023-04-27 PROCEDURE — 94761 N-INVAS EAR/PLS OXIMETRY MLT: CPT

## 2023-04-27 PROCEDURE — 80048 BASIC METABOLIC PNL TOTAL CA: CPT | Performed by: STUDENT IN AN ORGANIZED HEALTH CARE EDUCATION/TRAINING PROGRAM

## 2023-04-27 PROCEDURE — 85027 COMPLETE CBC AUTOMATED: CPT | Performed by: STUDENT IN AN ORGANIZED HEALTH CARE EDUCATION/TRAINING PROGRAM

## 2023-04-27 PROCEDURE — 25010000002 METHYLPREDNISOLONE PER 40 MG: Performed by: STUDENT IN AN ORGANIZED HEALTH CARE EDUCATION/TRAINING PROGRAM

## 2023-04-27 PROCEDURE — 82948 REAGENT STRIP/BLOOD GLUCOSE: CPT

## 2023-04-27 PROCEDURE — 82962 GLUCOSE BLOOD TEST: CPT

## 2023-04-27 RX ADMIN — METHYLPREDNISOLONE SODIUM SUCCINATE 40 MG: 40 INJECTION INTRAMUSCULAR; INTRAVENOUS at 06:27

## 2023-04-27 RX ADMIN — ALBUTEROL SULFATE 2.5 MG: 2.5 SOLUTION RESPIRATORY (INHALATION) at 11:33

## 2023-04-27 RX ADMIN — CITALOPRAM HYDROBROMIDE 20 MG: 20 TABLET ORAL at 08:42

## 2023-04-27 RX ADMIN — BUSPIRONE HYDROCHLORIDE 5 MG: 5 TABLET ORAL at 08:42

## 2023-04-27 RX ADMIN — DIAZEPAM 5 MG: 5 TABLET ORAL at 20:46

## 2023-04-27 RX ADMIN — INSULIN LISPRO 2 UNITS: 100 INJECTION, SOLUTION INTRAVENOUS; SUBCUTANEOUS at 12:24

## 2023-04-27 RX ADMIN — BUMETANIDE 1 MG: 1 TABLET ORAL at 08:42

## 2023-04-27 RX ADMIN — EMPAGLIFLOZIN 10 MG: 10 TABLET, FILM COATED ORAL at 08:42

## 2023-04-27 RX ADMIN — QUETIAPINE FUMARATE 150 MG: 50 TABLET, EXTENDED RELEASE ORAL at 20:46

## 2023-04-27 RX ADMIN — GUAIFENESIN 1200 MG: 600 TABLET, EXTENDED RELEASE ORAL at 08:42

## 2023-04-27 RX ADMIN — PANTOPRAZOLE SODIUM 40 MG: 40 INJECTION, POWDER, FOR SOLUTION INTRAVENOUS at 06:27

## 2023-04-27 RX ADMIN — SACUBITRIL AND VALSARTAN 1 TABLET: 24; 26 TABLET, FILM COATED ORAL at 08:42

## 2023-04-27 RX ADMIN — ATORVASTATIN CALCIUM 40 MG: 40 TABLET, FILM COATED ORAL at 20:46

## 2023-04-27 RX ADMIN — GUAIFENESIN 1200 MG: 600 TABLET, EXTENDED RELEASE ORAL at 20:46

## 2023-04-27 RX ADMIN — CLOPIDOGREL BISULFATE 75 MG: 75 TABLET ORAL at 08:41

## 2023-04-27 RX ADMIN — ALBUTEROL SULFATE 2.5 MG: 2.5 SOLUTION RESPIRATORY (INHALATION) at 15:47

## 2023-04-27 RX ADMIN — METOPROLOL SUCCINATE 100 MG: 50 TABLET, EXTENDED RELEASE ORAL at 08:42

## 2023-04-27 RX ADMIN — TIOTROPIUM BROMIDE INHALATION SPRAY 2 PUFF: 3.12 SPRAY, METERED RESPIRATORY (INHALATION) at 08:04

## 2023-04-27 RX ADMIN — DOXYCYCLINE 100 MG: 100 CAPSULE ORAL at 06:27

## 2023-04-27 RX ADMIN — ENOXAPARIN SODIUM 100 MG: 100 INJECTION SUBCUTANEOUS at 06:27

## 2023-04-27 RX ADMIN — PHENYTOIN SODIUM 100 MG: 100 CAPSULE ORAL at 08:42

## 2023-04-27 RX ADMIN — BUDESONIDE AND FORMOTEROL FUMARATE DIHYDRATE 2 PUFF: 160; 4.5 AEROSOL RESPIRATORY (INHALATION) at 08:04

## 2023-04-27 RX ADMIN — WARFARIN SODIUM 5 MG: 5 TABLET ORAL at 18:28

## 2023-04-27 RX ADMIN — PHENYTOIN SODIUM 100 MG: 100 CAPSULE ORAL at 20:47

## 2023-04-27 RX ADMIN — ENOXAPARIN SODIUM 100 MG: 100 INJECTION SUBCUTANEOUS at 18:28

## 2023-04-27 RX ADMIN — ALBUTEROL SULFATE 2.5 MG: 2.5 SOLUTION RESPIRATORY (INHALATION) at 19:14

## 2023-04-27 RX ADMIN — SACUBITRIL AND VALSARTAN 1 TABLET: 24; 26 TABLET, FILM COATED ORAL at 20:47

## 2023-04-27 RX ADMIN — ALBUTEROL SULFATE 2.5 MG: 2.5 SOLUTION RESPIRATORY (INHALATION) at 08:04

## 2023-04-27 RX ADMIN — BUDESONIDE AND FORMOTEROL FUMARATE DIHYDRATE 2 PUFF: 160; 4.5 AEROSOL RESPIRATORY (INHALATION) at 19:14

## 2023-04-27 NOTE — PLAN OF CARE
Goal Outcome Evaluation:  Plan of Care Reviewed With: patient        Progress: improving  Outcome Evaluation: Physical therapy treatment complete. The patient increased ambulation distances without use of RW compared to previous treatment session. Patient remained on 3 LPM without increased work of breathing and patient denies dyspnea during ambulation. Patient would continue to benefit from skilled PT to address activity tolerance and balance deficits. At hospital discharge recommend patient return home with HHPT.

## 2023-04-27 NOTE — DISCHARGE PLACEMENT REQUEST
Fernie Dalal (63 y.o. Male)   To [facility]  From Samina [facility] [phone]     [address] [address]  Phone:  [phone]  Fax:  [phone] Date: 2023      Ambulatory Referral to Home Health     Patient:  Fernie Dalal MRN:  [id number]    [address]  [facility] [address] :  1960  SSN:    Phone: [phone] Sex:  M      INSURANCE PAYOR PLAN GROUP # SUBSCRIBER ID   Primary:    [place] [id number]   [id number]      Referring Provider Information:  MILLICENT PABLO Phone: [phone] Fax: [phone]       Referral Information:   # Visits:  999 Referral Type: Home Health [42]   Urgency:  Routine Referral Reason: Specialty Services Required   Start Date: 2023 End Date:  To be determined by Insurer   Diagnosis: Nonspecific chest pain (R07.9 [ICD-10-CM] 786.50 [ICD-9-CM])  COPD exacerbation (J44.1 [ICD-10-CM] 491.21 [ICD-9-CM])  Abnormal stress test (R94.39 [ICD-10-CM] 794.39 [ICD-9-CM])  Acute on chronic systolic CHF (congestive heart failure) (I50.23 [ICD-10-CM] 428.23,428.0 [ICD-9-CM])  Essential hypertension (I10 [ICD-10-CM] 401.9 [ICD-9-CM])  Muscle spasm (M62.838 [ICD-10-CM] 728.85 [ICD-9-CM])  Anxiety (F41.9 [ICD-10-CM] 300.00 [ICD-9-CM])  Acute exacerbation of chronic obstructive pulmonary disease (COPD) (J44.1 [ICD-10-CM] 491.21 [ICD-9-CM])  Chronic systolic congestive heart failure (I50.22 [ICD-10-CM] 428.22,428.0 [ICD-9-CM])      Refer to Dept:   Refer to Provider:   Refer to Provider Phone:   Refer to Facility:    PT/INR on 2023 with results to the pts PCP to monitor/adjust.  Face to Face Visit Date: 2023  Follow-up provider for Plan of Care? I treated the patient in an acute care facility and will not continue treatment after discharge.  Follow-up provider: CHERYL OCRONADO [[id number]]  Reason/Clinical Findings: sp hospital stay  Describe mobility limitations that make leaving home  difficult: weakness, chest pain, debility  Nursing/Therapeutic Services Requested: Skilled Nursing  Nursing/Therapeutic Services Requested: Physical Therapy  Nursing/Therapeutic Services Requested: Occupational Therapy  Skilled nursing orders: Medication education  Skilled nursing orders: Cardiopulmonary assessments  Skilled nursing orders: O2 instruction  PT orders: Therapeutic exercise  PT orders: Gait Training  PT orders: Strengthening  Weight Bearing Status: As Tolerated  Occupational orders: Home safety assessment  Occupational orders: Activities of daily living  Occupational orders: Strengthening  Occupational orders: Fine motor  Occupational orders: Cognition  Frequency: 1 Week 1     This document serves as a request of services and does not constitute Insurance authorization or approval of services.  To determine eligibility, please contact the members Insurance carrier to verify and review coverage.     If you have medical questions regarding this request for services. Please contact [address] at [phone] during normal business hours.        Authorizing Provider:Cha Osborn MD  Authorizing Provider's NPI: [id number]  Order Entered By: Samina Weir RN 4/27/2023 12:06 PM     Electronically signed by: Cha Osborn MD 4/27/2023 12:06 PM           Date of Birth   1960    Social Security Number       Address   [place] [address]    Home Phone   [phone]    MRN   [id number]       Restorationist   None    Marital Status   Single                            Admission Date   4/22/23    Admission Type   Emergency    Admitting Provider   Cha Osborn MD    Attending Provider   Cha Osborn MD    Department, Room/Bed   [address], S506/1       Discharge Date       Discharge Disposition       Discharge Destination                               Attending Provider: Cha Osborn MD    Allergies: No Known Allergies    Isolation: None   Infection: None  "  Code Status: CPR    Ht: 175.3 cm (69\")   Wt: 103 kg (227 lb)    Admission Cmt: None   Principal Problem: Nonspecific chest pain [R07.9]                 Active Insurance as of 2023     Primary Coverage     Payor Plan Insurance Group Employer/Plan Group    [place]      Payor Plan Address Payor Plan Phone Number Payor Plan Fax Number Effective Dates    [phone]  2023 - None Entered    [place] [address]       Subscriber Name Subscriber Birth Date Member ID       FERNIE RIOS 1960 [id number]                 Emergency Contacts      (Rel.) Home Phone Work Phone Mobile Phone    SHANEL SUAREZ (Sister) [phone] -- [phone]               History & Physical      Corina Daugherty DO at 23 1339              [facility] Medicine Services  HISTORY AND PHYSICAL    Patient Name: Fernie Rios  : 1960  MRN: [id number]  Primary Care Physician: Belem Albert APRN  Date of admission: 2023      Subjective    Subjective     Chief Complaint:  Shortness of breath and chest pain    HPI:  Fernie Rios is a 63 y.o. male with PMHx significant for COPD, chronic respiratory failure on 2-3L at rest and 3-4L with exertion, seizure d/o, DMII, Anxiety/depression, HFrEF (LVEF 33%), tobacco abuse, HTN, HLD who presents with complaints of increased shortness of breath and chest pain. Patient was recently admitted at [facility]. 3/23-3/27 with similar complaints. He was treated with steroids, abx and nebs and improved. He was referred to Dr. Gaitan for CHF/chest pain and patient was supposed to actually have a heart cath with him on Monday. Patient notes onset of worsening shortness of breath last night. He states he had to turn his home O2 up to 5L in order to ambulate around his home. He feels like his chest pain has improved since he arrived to the ED and received steroids. He has not missed any of his home " inhalers.      Review of Systems   Gen- No fevers, chills  CV- +chest pain, palpitations  Resp- + cough, dyspnea  GI- No N/V/D, abd pain        Personal History     Past Medical History:   Diagnosis Date   • Angina at rest    • Anxiety    • Arrhythmia    • COPD (chronic obstructive pulmonary disease)    • GERD (gastroesophageal reflux disease)    • Heart failure     congestive   • Hyperlipidemia    • Hypertension    • Myocardial infarction              Past Surgical History:   Procedure Laterality Date   • CARDIAC CATHETERIZATION     • CARDIOVASCULAR STRESS TEST         Family History: family history includes Alzheimer's disease in his brother; Breast cancer in his mother and sister; Heart attack in his brother, brother, and sister; Heart disease in his brother, brother, and brother; Hyperlipidemia in his brother, brother, and sister; Hypertension in his brother, brother, and sister; Kidney failure in his father; Melanoma in his sister; No Known Problems in his sister; Ovarian cancer in his sister; Pneumonia in his father; Stomach cancer in his brother; Stroke in his brother, brother, and father.     Social History:  reports that he quit smoking about 14 months ago. His smoking use included cigarettes. He has a 30.00 pack-year smoking history. He does not have any smokeless tobacco history on file. He reports that he does not currently use drugs after having used the following drugs: Marijuana. He reports that he does not drink alcohol.  Social History     Social History Narrative   • Not on file       Medications:  Available home medication information reviewed.  (Not in a hospital admission)      No Known Allergies    Objective    Objective     Vital Signs:   Temp:  [97.5 °F (36.4 °C)] 97.5 °F (36.4 °C)  Heart Rate:  [] 91  Resp:  [20] 20  BP: (108-141)/() 108/76  Flow (L/min):  [3] 3       Physical Exam   Constitutional: Awake, alert  Eyes: PERRLA, sclerae anicteric, no conjunctival injection  HENT:  NCAT, mucous membranes moist  Neck: Supple, no thyromegaly, no lymphadenopathy, trachea midline  Respiratory: Clear bilaterally, however poor air movement, no wheezing present, on 3L  Cardiovascular: RRR, no murmurs, rubs, or gallops, palpable pedal pulses bilaterally  Gastrointestinal: Positive bowel sounds, soft, nontender, nondistended  Musculoskeletal: No bilateral ankle edema, no clubbing or cyanosis to extremities  Psychiatric: Appropriate affect, cooperative  Neurologic: Oriented x 3, speech clear, moves all extremities equally  Skin: No rashes      Result Review:  I have personally reviewed the results from the time of this admission to 4/22/2023 13:39 EDT and agree with these findings:  [x]  Laboratory list / accordion  [x]  Microbiology  [x]  Radiology  [x]  EKG/Telemetry   [x]  Cardiology/Vascular   [x]  Pathology  []  Old records  []  Other:  Most notable findings include:         LAB RESULTS:      Lab 04/22/23  1135   WBC 7.21   HEMOGLOBIN 14.9   HEMATOCRIT 50.4   PLATELETS 306   NEUTROS ABS 5.20   IMMATURE GRANS (ABS) 0.03   LYMPHS ABS 0.96   MONOS ABS 0.83   EOS ABS 0.13   MCV 88.7   D DIMER QUANT 0.78*         Lab 04/22/23  1135   SODIUM 138   POTASSIUM 4.4   CHLORIDE 95*   CO2 36.0*   ANION GAP 7.0   BUN 11   CREATININE 0.58*   EGFR 110.3   GLUCOSE 101*   CALCIUM 9.5         Lab 04/22/23  1135   TOTAL PROTEIN 7.0   ALBUMIN 3.7   GLOBULIN 3.3   ALT (SGPT) 17   AST (SGOT) 16   BILIRUBIN 0.3   ALK PHOS 100         Lab 04/22/23  1135   PROBNP 2,034.0*   HSTROP T 87*                     Microbiology Results (last 10 days)     ** No results found for the last 240 hours. **          XR Chest 1 View    Result Date: 4/22/2023  XR CHEST 1 VW Date of Exam: 4/22/2023 11:45 AM EDT Indication: SOA triage protocol Comparison: 7/10/2013. Findings: The heart appears enlarged and there is mild vascular engorgement, without overt pulmonary edema. There is no focal consolidation. There is no effusion or pneumothorax.      Impression: Impression: The heart appears enlarged and there is mild vascular engorgement, without overt pulmonary edema. There is no focal consolidation. There is no effusion or pneumothorax. Electronically Signed: Wilian Hoff  4/22/2023 12:05 PM EDT  Workstation ID: [id number]          Assessment & Plan   Assessment & Plan     Active Hospital Problems    Diagnosis  POA   • **Nonspecific chest pain [R07.9]  Yes   • Dyslipidemia [E78.5]  Yes   • Essential hypertension [I10]  Yes   • Seizure disorder [G40.909]  Yes   • Chronic systolic congestive heart failure [I50.22]  Yes   • Coronary artery disease involving native heart [I25.10]  Yes     Description: A.  History of multiple cardiac stents.  Most recent left heart cath in 2011.  Followed by cardioloy.     • Acute exacerbation of chronic obstructive pulmonary disease (COPD) [J44.1]  Yes   • Anxiety [F41.9]  Yes     Description: A.  Chronic benzodiazepine therapy.     • Hyperlipemia [E78.5]  Yes   • Sleep apnea [G47.30]  Yes       Fernie Dalal is a 63 y.o. male with PMHx significant for COPD, chronic respiratory failure on 2-3L at rest and 3-4L with exertion, seizure d/o, DMII, Anxiety/depression, HFrEF (LVEF 33%), tobacco abuse, HTN, HLD who presents with complaints of increased shortness of breath and chest pain.    AECOPD  Acute on Chronic Respiratory Failure w/Hypoxia  - continue steroids, 40mg IV q8hrs for now  - aggressive pulmonary toilet, mucinex, scheduled nebs  - doxycycline BID x 5 days, check resp PCR, CXR reviewed  - continue Symbicort, Sprivia (in place or Trelegy)  - COPD navigator    Chest Pain  Elevated Troponin  - consult cardiology, was planning to have LHC on Monday with Dr. Gaitan, has hx of abnormal stress test  -ASA and NTG in the ED  -serial EKG  - continue ASA, statin, beta-blocker, plavix    HFrEF - LVEF 33% (3/2023)  - does not appear volume overloaded at this time  - continue home regimen, lasix 80mg daily  - continue  beta-blocker    Anxiety  Depression  - continue buspar, PRN Valium  - nightly seroquel    Seizure D/O  - continue dilantin    HTN:  - continue lisinopril, metoprolol, diltiazem    HLD:  - statin    DMII  - SSI coverage for now while on steroids, on no home meds    DVT prophylaxis:  Lovenox    CODE STATUS:    Code Status and Medical Interventions:   Ordered at: 04/22/23 1339     Level Of Support Discussed With:    Patient     Code Status (Patient has no pulse and is not breathing):    CPR (Attempt to Resuscitate)     Medical Interventions (Patient has pulse or is breathing):    Full Support       Expected Discharge 2-3 days       Corina Daugherty DO  04/22/23      Electronically signed by Corina Daugherty DO at 04/22/23 1353         Current Facility-Administered Medications   Medication Dose Route Frequency Provider Last Rate Last Admin   • acetaminophen (TYLENOL) tablet 650 mg  650 mg Oral Q4H PRN Marsha Gaitan MD   650 mg at 04/25/23 1745   • albuterol (PROVENTIL) nebulizer solution 0.083% 2.5 mg/3mL  2.5 mg Nebulization 4x Daily - RT Julieta Landers MD   2.5 mg at 04/27/23 1133   • atorvastatin (LIPITOR) tablet 40 mg  40 mg Oral Nightly Marsha Gaitan MD   40 mg at 04/26/23 2042   • budesonide-formoterol (SYMBICORT) 160-4.5 MCG/ACT inhaler 2 puff  2 puff Inhalation BID - RT Marsha Gaitan MD   2 puff at 04/27/23 0804    And   • tiotropium (SPIRIVA RESPIMAT) 2.5 mcg/act aerosol solution inhaler  2 puff Inhalation Daily - RT Marsha Gaitan MD   2 puff at 04/27/23 0804   • bumetanide (BUMEX) tablet 1 mg  1 mg Oral Daily Marsha Gaitan MD   1 mg at 04/27/23 0842   • busPIRone (BUSPAR) tablet 5 mg  5 mg Oral TID Marsha Gaitan MD   5 mg at 04/27/23 0842   • citalopram (CeleXA) tablet 20 mg  20 mg Oral Daily Marsha Gaitan MD   20 mg at 04/27/23 0842   • clopidogrel (PLAVIX) tablet 75 mg  75 mg Oral Daily Marsha Gaitan MD   75 mg at 04/27/23 0841   • dextrose (D50W) (25 g/50 mL) IV injection 25 g  25 g Intravenous Q15 Min  PRN Marsha Gaitna MD       • dextrose (GLUTOSE) oral gel 15 g  15 g Oral Q15 Min PRN Marsha Gaitan MD       • diazePAM (VALIUM) tablet 5 mg  5 mg Oral Q12H PRN Marsha Gaitan MD   5 mg at 04/26/23 2047   • doxycycline (MONODOX) capsule 100 mg  100 mg Oral Q12H Marsha Gaitan MD   100 mg at 04/27/23 0627   • empagliflozin (JARDIANCE) tablet 10 mg  10 mg Oral Daily Marsha Gaitan MD   10 mg at 04/27/23 0842   • Enoxaparin Sodium (LOVENOX) syringe 100 mg  1 mg/kg Subcutaneous Q12H Marsha Gaitan MD   100 mg at 04/27/23 0627   • glucagon (GLUCAGEN) injection 1 mg  1 mg Intramuscular Q15 Min PRN Marsha Gaitan MD       • guaiFENesin (MUCINEX) 12 hr tablet 1,200 mg  1,200 mg Oral Q12H Marsha Gaitan MD   1,200 mg at 04/27/23 0842   • hydrOXYzine (ATARAX) tablet 50 mg  50 mg Oral TID PRN Marsha Gaitan MD   50 mg at 04/26/23 0026   • Insulin Lispro (humaLOG) injection 0-7 Units  0-7 Units Subcutaneous TID With Meals Marsha Gaitan MD   3 Units at 04/23/23 1001   • LORazepam (ATIVAN) injection 0.5 mg  0.5 mg Intravenous Q4H PRN Marsha Gaitan MD   0.5 mg at 04/25/23 1222   • metoprolol succinate XL (TOPROL-XL) 24 hr tablet 100 mg  100 mg Oral Daily Marsha Gaitan MD   100 mg at 04/27/23 0842   • nitroglycerin (NITROSTAT) SL tablet 0.4 mg  0.4 mg Sublingual Q5 Min PRN Marsha Gaitan MD   0.4 mg at 04/22/23 1219   • pantoprazole (PROTONIX) injection 40 mg  40 mg Intravenous Q AM Odalis Rivera APRN   40 mg at 04/27/23 0627   • Pharmacy Consult - MTM   Does not apply Daily Marsha Gaitan MD       • Pharmacy to dose warfarin   Does not apply Continuous PRN Marsha Gaitan MD       • phenytoin ER (DILANTIN) capsule 100 mg  100 mg Oral BID Marsha Gaitan MD   100 mg at 04/27/23 0842   • [START ON 4/28/2023] predniSONE (DELTASONE) tablet 40 mg  40 mg Oral Daily With Breakfast Cha Osborn MD       • QUEtiapine fumarate ER (SEROquel XR) tablet 150 mg  150 mg Oral Nightly Marsha Gaitan MD   150 mg at 04/26/23 2042   • sacubitril-valsartan  (ENTRESTO) 24-26 MG tablet 1 tablet  1 tablet Oral Q12H Marsha Gaitan MD   1 tablet at 23 0842   • sodium chloride 0.9 % flush 10 mL  10 mL Intravenous PRN Marsha Gaitan MD   10 mL at 23 0909   • warfarin (COUMADIN) tablet 5 mg  5 mg Oral Daily Marsha Gaitan MD   5 mg at 23 1728        Physician Progress Notes (most recent note)      Cha Osborn MD at 23 0706              [facility] Medicine Services  PROGRESS NOTE    Patient Name: Fernie Dalal  : 1960  MRN: [id number]    Date of Admission: 2023  Primary Care Physician: Belem Albert APRN    Subjective   Subjective     CC: Follow-up chest pain, SOA    HPI:  On 3L NC.  Denies any leg pain.  Reports his breathing is back to baseline.  Sitting up in chair and eating lunch.  States his PCP is always available and if he is unable to go to a clinic appointment, they will come see him.  He says it is very easy to get appointments with his PCP.    ROS:  Gen- No fevers, chills  CV- No chest pain, palpitations  Resp- + cough, no dyspnea  GI- No N/V/D, abd pain  No bleeding    Objective   Objective     Vital Signs:   Temp:  [98 °F (36.7 °C)-98.6 °F (37 °C)] 98.5 °F (36.9 °C)  Heart Rate:  [68-88] 73  Resp:  [16-18] 18  BP: (101-109)/(65-81) 108/81  Flow (L/min):  [3] 3     Physical Exam:  Constitutional: No acute distress, awake, alert, sitting up in chair  HENT: NCAT, mucous membranes moist  Respiratory: Non-labored respirations, diffuse coarse breath sounds on 3 L NC  Cardiovascular: RRR, no murmurs, rubs, or gallops  Gastrointestinal: Positive bowel sounds, soft, nontender, nondistended  Musculoskeletal: No bilateral ankle edema  Psychiatric: Appropriate affect, cooperative  Neurologic: PERRL, symmetric facies, symmetric palate rise, tongue midline, moving all extremities equally, nonfocal  Skin: No rashes on exposed arms or legs      Results Reviewed:  LAB RESULTS:      Lab 23  0529 23  0449  04/25/23  0440 04/24/23  1909 04/24/23  0442 04/22/23  1135   WBC 8.01 11.08* 14.68*  --  13.81* 7.21   HEMOGLOBIN 15.7 16.4 17.1  --  15.0 14.9   HEMATOCRIT 52.3* 54.1* 57.0*  --  51.8* 50.4   PLATELETS 316 376 368  --  369 306   NEUTROS ABS  --  9.27* 12.68*  --  12.40* 5.20   IMMATURE GRANS (ABS)  --  0.06* 0.07*  --  0.05 0.03   LYMPHS ABS  --  0.97 1.19  --  0.82 0.96   MONOS ABS  --  0.77 0.70  --  0.52 0.83   EOS ABS  --  0.00 0.02  --  0.00 0.13   MCV 87.2 87.1 88.6  --  90.1 88.7   PROTIME 22.7* 16.5* 14.4 14.4  --   --    D DIMER QUANT  --   --   --   --   --  0.78*         Lab 04/27/23  0529 04/26/23  0449 04/25/23 0440 04/24/23 0442 04/22/23  1135   SODIUM 139 136 141 138 138   POTASSIUM 4.5 4.8 5.2 5.3* 4.4   CHLORIDE 96* 95* 95* 91* 95*   CO2 37.0* 34.0* 34.0* 39.0* 36.0*   ANION GAP 6.0 7.0 12.0 8.0 7.0   BUN 21 21 24* 25* 11   CREATININE 0.60* 0.71* 0.66* 0.85 0.58*   EGFR 109.1 103.7 106.0 98.2 110.3   GLUCOSE 114* 146* 156* 143* 101*   CALCIUM 8.8 9.1 9.4 9.3 9.5   MAGNESIUM  --   --   --   --  2.0   HEMOGLOBIN A1C  --   --  7.30*  --   --          Lab 04/24/23 0442 04/22/23  1135   TOTAL PROTEIN 6.4 7.0   ALBUMIN 3.7 3.7   GLOBULIN 2.7 3.3   ALT (SGPT) 13 17   AST (SGOT) 11 16   BILIRUBIN 0.2 0.3   ALK PHOS 93 100         Lab 04/27/23  0529 04/26/23  0449 04/25/23  0440 04/24/23  1909 04/22/23  1135   PROBNP  --   --   --   --  2,034.0*   HSTROP T  --   --   --   --  87*   PROTIME 22.7* 16.5* 14.4 14.4  --    INR 1.99* 1.33* 1.13 1.12  --                  Brief Urine Lab Results     None          Microbiology Results Abnormal     Procedure Component Value - Date/Time    Respiratory Panel PCR w/COVID-19(SARS-CoV-2) JERO/ELADIO/YEIMI/PAD/COR/MAD/JONATHAN In-House, NP Swab in UTM/VTM, 3-4 HR TAT - Swab, Nasopharynx [[id number]]  (Normal) Collected: 04/22/23 1350    Lab Status: Final result Specimen: Swab from Nasopharynx Updated: 04/22/23 1455     ADENOVIRUS, PCR Not Detected     Coronavirus 229E Not Detected      Coronavirus HKU1 Not Detected     Coronavirus NL63 Not Detected     Coronavirus OC43 Not Detected     COVID19 Not Detected     Human Metapneumovirus Not Detected     Human Rhinovirus/Enterovirus Not Detected     Influenza A PCR Not Detected     Influenza B PCR Not Detected     Parainfluenza Virus 1 Not Detected     Parainfluenza Virus 2 Not Detected     Parainfluenza Virus 3 Not Detected     Parainfluenza Virus 4 Not Detected     RSV, PCR Not Detected     Bordetella pertussis pcr Not Detected     Bordetella parapertussis PCR Not Detected     Chlamydophila pneumoniae PCR Not Detected     Mycoplasma pneumo by PCR Not Detected    Narrative:      In the setting of a positive respiratory panel with a viral infection PLUS a negative procalcitonin without other underlying concern for bacterial infection, consider observing off antibiotics or discontinuation of antibiotics and continue supportive care. If the respiratory panel is positive for atypical bacterial infection (Bordetella pertussis, Chlamydophila pneumoniae, or Mycoplasma pneumoniae), consider antibiotic de-escalation to target atypical bacterial infection.          No radiology results from the last 24 hrs        Current medications:  Scheduled Meds:albuterol, 2.5 mg, Nebulization, 4x Daily - RT  atorvastatin, 40 mg, Oral, Nightly  budesonide-formoterol, 2 puff, Inhalation, BID - RT   And  tiotropium bromide monohydrate, 2 puff, Inhalation, Daily - RT  bumetanide, 1 mg, Oral, Daily  busPIRone, 5 mg, Oral, TID  citalopram, 20 mg, Oral, Daily  clopidogrel, 75 mg, Oral, Daily  doxycycline, 100 mg, Oral, Q12H  empagliflozin, 10 mg, Oral, Daily  enoxaparin, 1 mg/kg, Subcutaneous, Q12H  guaiFENesin, 1,200 mg, Oral, Q12H  insulin lispro, 0-7 Units, Subcutaneous, TID With Meals  metoprolol succinate XL, 100 mg, Oral, Daily  pantoprazole, 40 mg, Intravenous, Q AM  pharmacy consult - MTM, , Does not apply, Daily  phenytoin ER, 100 mg, Oral, BID  [START ON 4/28/2023]  predniSONE, 40 mg, Oral, Daily With Breakfast  QUEtiapine fumarate ER, 150 mg, Oral, Nightly  sacubitril-valsartan, 1 tablet, Oral, Q12H  warfarin, 5 mg, Oral, Daily      Continuous Infusions:Pharmacy to dose warfarin,       PRN Meds:.•  acetaminophen  •  dextrose  •  dextrose  •  diazePAM  •  glucagon (human recombinant)  •  hydrOXYzine  •  LORazepam  •  nitroglycerin  •  Pharmacy to dose warfarin  •  sodium chloride    Assessment & Plan   Assessment & Plan     Active Hospital Problems    Diagnosis  POA   • **Nonspecific chest pain [R07.9]  Yes   • Dyslipidemia [E78.5]  Yes   • Essential hypertension [I10]  Yes   • Seizure disorder [G40.909]  Yes   • Abnormal stress test [R94.39]  Unknown   • Chronic systolic congestive heart failure [I50.22]  Yes   • Coronary artery disease involving native heart [I25.10]  Yes   • Acute exacerbation of chronic obstructive pulmonary disease (COPD) [J44.1]  Yes   • Anxiety [F41.9]  Yes   • Hyperlipemia [E78.5]  Yes   • Sleep apnea [G47.30]  Yes      Resolved Hospital Problems   No resolved problems to display.        Brief Hospital Course to date:  Fernie Dalal is a 63 y.o. male with PMHx significant for COPD, chronic respiratory failure on 2-3L at rest and 3-4L with exertion, seizure d/o, DMII, Anxiety/depression, HFrEF (LVEF 33%), tobacco abuse, HTN, HLD who presents with complaints of increased shortness of breath and chest pain.    This patient's problems and plans were partially entered by my partner and updated as appropriate by me 04/27/23.     AECOPD  Acute on Chronic Respiratory Failure w/Hypoxia  - continue steroids, 40mg IV weaned to twice daily from 4/26 and daily on 4/27 and then transition to prednisone 40 mg once daily  - aggressive pulmonary toilet, mucinex, scheduled nebs  - doxycycline BID x 5 days, resp PCR negative, CXR reviewed  - continue Symbicort, Sprivia (in place of Trelegy)  - COPD navigator consulted  - at baseline 2-3L w/ rest and 3-4L w/exertion,  currently on 3 L NC     Chest Pain, resolved  NSTEMI, type II  HFrEF  - LVEF 33% (3/2023)  - cardiology following, s/p [facility] 4/25 with Dr. Gaitan, that showed no similar disease, recommend continued medical therapy  -Continue Plavix, statin, stop aspirin as he is now on coagulation  - Cardiology was following, s/p IV lasix, resume Bumex 1 mg p.o. daily  -Continue Entresto and beta-blocker  -He has been started on Jardiance  -Follow-up with Dr. Marsha Gaitan in 4-6 weeks at [facility]  -Cards recs on DC: Plavix, STOP aspirin, continue metoprolol, Entresto, started Jardiance, STOP diltiazem, continue bumex 1mg daily     Acute left DVT  -Patient had elevated D-dimer  -Venous duplex shows acute DVT in the posterior tibial  -Started on Coumadin with Lovenox bridge, goal INR 2-3 due to Phenytoin use  -Since PCP office is closed all week and thus INR cannot be monitored and patient not comfortable/capable of administering Lovenox bridge yet, will remain in house for bridge  -Likely will discharge home on 4/28     Anxiety and depression  - continue buspar, PRN Valium  - nightly seroquel     Seizure D/O  - continue dilantin     Hypertension  -BP is now improved  -Continue home meds as above     Hyperlipidemia  -continue statin      Well control type 2 diabetes with A1c 7.3%  -FSBG's reviewed and currently appropriate, continue SSI while on steroids.  -He is not on any meds at home.     Expected Discharge Location and Transportation: home with   Expected Discharge   Expected Discharge Date: 04/28/23     DVT prophylaxis:  Medical DVT prophylaxis orders are present.     CODE STATUS:   Code Status and Medical Interventions:   Ordered at: 04/22/23 4878     Level Of Support Discussed With:    Patient     Code Status (Patient has no pulse and is not breathing):    CPR (Attempt to Resuscitate)     Medical Interventions (Patient has pulse or is breathing):    Full Support       Cha Osborn MD  04/27/23      Electronically  signed by Cha Osborn MD at 04/27/23 8366

## 2023-04-27 NOTE — THERAPY TREATMENT NOTE
Patient Name: Fernie Dalal  : 1960    MRN: [id number]                              Today's Date: 2023       Admit Date: 2023    Visit Dx:     ICD-10-CM ICD-9-CM   1. Nonspecific chest pain  R07.9 786.50   2. COPD exacerbation  J44.1 491.21   3. Abnormal stress test  R94.39 794.39   4. Acute on chronic systolic CHF (congestive heart failure)  I50.23 428.23     428.0   5. Essential hypertension  I10 401.9   6. Muscle spasm  M62.838 728.85   7. Anxiety  F41.9 300.00   8. Acute exacerbation of chronic obstructive pulmonary disease (COPD)  J44.1 491.21   9. Chronic systolic congestive heart failure  I50.22 428.22     428.0     Patient Active Problem List   Diagnosis   • Adiposity   • Allergic rhinitis   • Anxiety   • Blues   • Chest pain   • Chronic LBP   • Acute exacerbation of chronic obstructive pulmonary disease (COPD)   • Chronic systolic congestive heart failure   • Coronary artery disease involving native heart   • Dyslipidemia   • Hyperlipemia   • Muscle spasm   • Seizure disorder   • Sleep apnea   • Abnormal stress test   • Essential hypertension   • Nonspecific chest pain     Past Medical History:   Diagnosis Date   • Angina at rest    • Anxiety    • Arrhythmia    • COPD (chronic obstructive pulmonary disease)    • GERD (gastroesophageal reflux disease)    • Heart failure     congestive   • Hyperlipidemia    • Hypertension    • Myocardial infarction      Past Surgical History:   Procedure Laterality Date   • CARDIAC CATHETERIZATION     • CARDIAC CATHETERIZATION N/A 2023    Procedure: Left Heart Cath;  Surgeon: Marsha Gaitan MD;  Location: [facility] CATH INVASIVE LOCATION;  Service: Cardiology;  Laterality: N/A;   • CARDIOVASCULAR STRESS TEST        General Information     Row Name 23 1150          Physical Therapy Time and Intention    Document Type therapy note (daily note)  -ML     Mode of Treatment physical therapy  -ML     Row Name 23 1150          General Information     Patient Profile Reviewed yes  -ML     Existing Precautions/Restrictions fall;oxygen therapy device and L/min  -ML     Barriers to Rehab medically complex;previous functional deficit  -ML     Row Name 04/27/23 1150          Cognition    Orientation Status (Cognition) oriented x 4  -ML     Row Name 04/27/23 1150          Safety Issues, Functional Mobility    Safety Issues Affecting Function (Mobility) awareness of need for assistance;insight into deficits/self-awareness;safety precaution awareness;safety precautions follow-through/compliance  -ML     Impairments Affecting Function (Mobility) balance;endurance/activity tolerance  -ML           User Key  (r) = Recorded By, (t) = Taken By, (c) = Cosigned By    Initials Name Provider Type    ML Yenifer Murphy Physical Therapist               Mobility     Row Name 04/27/23 1150          Bed Mobility    Bed Mobility supine-sit  -ML     Supine-Sit [place] (Bed Mobility) standby assist  -ML     Assistive Device (Bed Mobility) head of bed elevated  -ML     Row Name 04/27/23 1150          Sit-Stand Transfer    Sit-Stand [place] (Transfers) standby assist  -ML     Assistive Device (Sit-Stand Transfers) other (see comments)  no AD  -ML     Row Name 04/27/23 1150          Gait/Stairs (Locomotion)    [place] Level (Gait) supervision  -ML     Assistive Device (Gait) other (see comments)  no AD  -ML     Distance in Feet (Gait) 180 feet + 15 feet  -ML     Deviations/Abnormal Patterns (Gait) dottie decreased;gait speed decreased  -ML     Comment, (Gait/Stairs) Patient remained on 3 LPM during ambuation. Patient without increased work of breathing and denies dyspnea during ambulation.  -ML           User Key  (r) = Recorded By, (t) = Taken By, (c) = Cosigned By    Initials Name Provider Type    ML Yenifer Murphy Physical Therapist               Obj/Interventions     Row Name 04/27/23 1152          Motor Skills    Functional Endurance improved activity tolerance, no increased  work of breathing and patient denies dyspnea during ambulation. Patient ambulated on 3 LPM  -ML     Row Name 04/27/23 1152          Balance    Balance Assessment sitting static balance;sitting dynamic balance;sit to stand dynamic balance;standing static balance;standing dynamic balance  -ML     Static Sitting Balance independent  -ML     Dynamic Sitting Balance independent  -ML     Position, Sitting Balance unsupported;sitting edge of bed;other (see comments)  sitting on toilet  -ML     Sit to Stand Dynamic Balance standby assist  -ML     Static Standing Balance standby assist  -ML     Dynamic Standing Balance supervision  -ML     Position/Device Used, Standing Balance unsupported  -ML     Balance Interventions sitting;standing;sit to stand;supported;static;dynamic;occupation based/functional task  -ML           User Key  (r) = Recorded By, (t) = Taken By, (c) = Cosigned By    Initials Name Provider Type     Yenifer Murphy Physical Therapist               Goals/Plan    No documentation.                Clinical Impression     Row Name 04/27/23 1155          Pain    Pretreatment Pain Rating 0/10 - no pain  -ML     Posttreatment Pain Rating 0/10 - no pain  -ML     Row Name 04/27/23 1155          Plan of Care Review    Plan of Care Reviewed With patient  -ML     Progress improving  -     Outcome Evaluation Physical therapy treatment complete. The patient increased ambulation distances withtout use of RW compared to previous treatment session. Patient remained on 3 LPM without increased work of breathing and patient denies dyspnea during ambulation. Patient would continue to benefit from skilled PT to address activity tolerance and balance deficits. At hospital discharge recommend patient return home with HHPT.  -ML     Row Name 04/27/23 1155          Vital Signs    Pre SpO2 (%) 94  -ML     O2 Delivery Pre Treatment supplemental O2  -ML     Post SpO2 (%) 94  -ML     O2 Delivery Post Treatment supplemental O2  -ML      Pre Patient Position Supine  -ML     Intra Patient Position Standing  -ML     Post Patient Position Sitting  -ML     Row Name 04/27/23 1155          Positioning and Restraints    Pre-Treatment Position in bed  -ML     Post Treatment Position chair  -ML     In Chair notified nsg;sitting;call light within reach;encouraged to call for assist;with other staff;waffle cushion  -ML           User Key  (r) = Recorded By, (t) = Taken By, (c) = Cosigned By    Initials Name Provider Type    Yenifer Barcenas Physical Therapist               Outcome Measures     Row Name 04/27/23 1157          How much help from another person do you currently need...    Turning from your back to your side while in flat bed without using bedrails? 4  -ML     Moving from lying on back to sitting on the side of a flat bed without bedrails? 4  -ML     Moving to and from a bed to a chair (including a wheelchair)? 4  -ML     Standing up from a chair using your arms (e.g., wheelchair, bedside chair)? 4  -ML     Climbing 3-5 steps with a railing? 3  -ML     To walk in hospital room? 3  -ML     AM-PAC 6 Clicks Score (PT) 22  -ML     Highest level of mobility 7 --> Walked 25 feet or more  -ML     Row Name 04/27/23 1157          Functional Assessment    Outcome Measure Options AM-PAC 6 Clicks Basic Mobility (PT)  -ML           User Key  (r) = Recorded By, (t) = Taken By, (c) = Cosigned By    Initials Name Provider Type    Yenifer Barcenas Physical Therapist                             Physical Therapy Education     Title: PT OT SLP Therapies (In Progress)     Topic: Physical Therapy (Done)     Point: Mobility training (Done)     Learning Progress Summary           Patient Acceptance, E, VU by CRISTY at 4/27/2023 1158    Acceptance, E, VU,NR by DAMARIS at 4/25/2023 0833    Comment: PT POC                   Point: Home exercise program (Done)     Learning Progress Summary           Patient Acceptance, E, VU,NR by DAMARIS at 4/25/2023 0833    Comment: PT POC                    Point: Body mechanics (Done)     Learning Progress Summary           Patient Acceptance, E, VU,NR by  at 4/25/2023 0833    Comment: PT POC                   Point: Precautions (Done)     Learning Progress Summary           Patient Acceptance, E, VU by  at 4/27/2023 1158    Acceptance, E, VU,NR by  at 4/25/2023 0833    Comment: PT POC                               User Key     Initials Effective Dates Name Provider Type Discipline     06/16/21 -  Yolis Altman, PT Physical Therapist PT     04/22/21 -  Yenifer Murphy Physical Therapist PT              PT Recommendation and Plan     Plan of Care Reviewed With: patient  Progress: improving  Outcome Evaluation: Physical therapy treatment complete. The patient increased ambulation distances withtout use of RW compared to previous treatment session. Patient remained on 3 LPM without increased work of breathing and patient denies dyspnea during ambulation. Patient would continue to benefit from skilled PT to address activity tolerance and balance deficits. At hospital discharge recommend patient return home with HHPT.     Time Calculation:    PT Charges     Row Name 04/27/23 1350             Time Calculation    Start Time 1046  -ML      PT Received On 04/27/23  -ML         Timed Charges    94907 - PT Therapeutic Activity Minutes 27  -ML         Total Minutes    Timed Charges Total Minutes 27  -ML       Total Minutes 27  -ML            User Key  (r) = Recorded By, (t) = Taken By, (c) = Cosigned By    Initials Name Provider Type     Yenifer Murphy Physical Therapist              Therapy Charges for Today     Code Description Service Date Service Provider Modifiers Qty    51202208236 HC PT THERAPEUTIC ACT EA 15 MIN 4/27/2023 Yenifer Murphy GP 2          PT G-Codes  Outcome Measure Options: AM-PAC 6 Clicks Basic Mobility (PT)  AM-PAC 6 Clicks Score (PT): 22  AM-PAC 6 Clicks Score (OT): 16  PT Discharge Summary  Anticipated Discharge Disposition (PT): home, home with  home health    Yenifer Murphy  4/27/2023

## 2023-04-27 NOTE — PLAN OF CARE
Problem: Fall Injury Risk  Goal: Absence of Fall and Fall-Related Injury  Outcome: Ongoing, Progressing  Intervention: Identify and Manage Contributors  Recent Flowsheet Documentation  Taken 4/26/2023 2000 by Opal Gao RN  Medication Review/Management: medications reviewed  Intervention: Promote Injury-Free Environment  Recent Flowsheet Documentation  Taken 4/27/2023 0400 by Opal Gao RN  Safety Promotion/Fall Prevention:   safety round/check completed   room organization consistent   nonskid shoes/slippers when out of bed   lighting adjusted   clutter free environment maintained   assistive device/personal items within reach  Taken 4/27/2023 0200 by Opal Gao RN  Safety Promotion/Fall Prevention:   safety round/check completed   room organization consistent   nonskid shoes/slippers when out of bed   lighting adjusted   clutter free environment maintained   assistive device/personal items within reach  Taken 4/27/2023 0000 by Opal Gao RN  Safety Promotion/Fall Prevention:   safety round/check completed   room organization consistent   nonskid shoes/slippers when out of bed   lighting adjusted   clutter free environment maintained   assistive device/personal items within reach  Taken 4/26/2023 2200 by Opal Gao RN  Safety Promotion/Fall Prevention:   safety round/check completed   room organization consistent   nonskid shoes/slippers when out of bed   lighting adjusted   clutter free environment maintained   assistive device/personal items within reach  Taken 4/26/2023 2000 by Opal Gao RN  Safety Promotion/Fall Prevention:   safety round/check completed   room organization consistent   nonskid shoes/slippers when out of bed   lighting adjusted   clutter free environment maintained   assistive device/personal items within reach     Problem: COPD (Chronic Obstructive Pulmonary Disease) Comorbidity  Goal: Maintenance of COPD Symptom Control  Outcome: Ongoing, Progressing  Intervention:  Maintain COPD-Symptom Control  Recent Flowsheet Documentation  Taken 4/26/2023 2000 by Opal Gao RN  Medication Review/Management: medications reviewed     Problem: Heart Failure Comorbidity  Goal: Maintenance of Heart Failure Symptom Control  Outcome: Ongoing, Progressing  Intervention: Maintain Heart Failure-Management  Recent Flowsheet Documentation  Taken 4/26/2023 2000 by Opal Gao RN  Medication Review/Management: medications reviewed     Problem: Hypertension Comorbidity  Goal: Blood Pressure in Desired Range  Outcome: Ongoing, Progressing  Intervention: Maintain Blood Pressure Management  Recent Flowsheet Documentation  Taken 4/26/2023 2000 by Opal Gao RN  Medication Review/Management: medications reviewed     Problem: Obstructive Sleep Apnea Risk or Actual Comorbidity Management  Goal: Unobstructed Breathing During Sleep  Outcome: Ongoing, Progressing   Goal Outcome Evaluation:   Pt resting in bed. No complaints. Activity encouraged. Safety education reinforced.

## 2023-04-27 NOTE — PROGRESS NOTES
[facility] Medicine Services  PROGRESS NOTE    Patient Name: Fernie Dalal  : 1960  MRN: [id number]    Date of Admission: 2023  Primary Care Physician: Belem Albert APRN    Subjective   Subjective     CC: Follow-up chest pain, SOA    HPI:  On 3L NC.  Denies any leg pain.  Reports his breathing is back to baseline.  Sitting up in chair and eating lunch.  States his PCP is always available and if he is unable to go to a clinic appointment, they will come see him.  He says it is very easy to get appointments with his PCP.    ROS:  Gen- No fevers, chills  CV- No chest pain, palpitations  Resp- + cough, no dyspnea  GI- No N/V/D, abd pain  No bleeding    Objective   Objective     Vital Signs:   Temp:  [98 °F (36.7 °C)-98.6 °F (37 °C)] 98.5 °F (36.9 °C)  Heart Rate:  [68-88] 73  Resp:  [16-18] 18  BP: (101-109)/(65-81) 108/81  Flow (L/min):  [3] 3     Physical Exam:  Constitutional: No acute distress, awake, alert, sitting up in chair  HENT: NCAT, mucous membranes moist  Respiratory: Non-labored respirations, diffuse coarse breath sounds on 3 L NC  Cardiovascular: RRR, no murmurs, rubs, or gallops  Gastrointestinal: Positive bowel sounds, soft, nontender, nondistended  Musculoskeletal: No bilateral ankle edema  Psychiatric: Appropriate affect, cooperative  Neurologic: PERRL, symmetric facies, symmetric palate rise, tongue midline, moving all extremities equally, nonfocal  Skin: No rashes on exposed arms or legs      Results Reviewed:  LAB RESULTS:      Lab 23  0529 23  0449 23  0440 23  1909 23  0442 23  1135   WBC 8.01 11.08* 14.68*  --  13.81* 7.21   HEMOGLOBIN 15.7 16.4 17.1  --  15.0 14.9   HEMATOCRIT 52.3* 54.1* 57.0*  --  51.8* 50.4   PLATELETS 316 376 368  --  369 306   NEUTROS ABS  --  9.27* 12.68*  --  12.40* 5.20   IMMATURE GRANS (ABS)  --  0.06* 0.07*  --  0.05 0.03   LYMPHS ABS  --  0.97 1.19  --  0.82 0.96   MONOS ABS  --  0.77  0.70  --  0.52 0.83   EOS ABS  --  0.00 0.02  --  0.00 0.13   MCV 87.2 87.1 88.6  --  90.1 88.7   PROTIME 22.7* 16.5* 14.4 14.4  --   --    D DIMER QUANT  --   --   --   --   --  0.78*         Lab 04/27/23  0529 04/26/23  0449 04/25/23  0440 04/24/23  0442 04/22/23  1135   SODIUM 139 136 141 138 138   POTASSIUM 4.5 4.8 5.2 5.3* 4.4   CHLORIDE 96* 95* 95* 91* 95*   CO2 37.0* 34.0* 34.0* 39.0* 36.0*   ANION GAP 6.0 7.0 12.0 8.0 7.0   BUN 21 21 24* 25* 11   CREATININE 0.60* 0.71* 0.66* 0.85 0.58*   EGFR 109.1 103.7 106.0 98.2 110.3   GLUCOSE 114* 146* 156* 143* 101*   CALCIUM 8.8 9.1 9.4 9.3 9.5   MAGNESIUM  --   --   --   --  2.0   HEMOGLOBIN A1C  --   --  7.30*  --   --          Lab 04/24/23 0442 04/22/23  1135   TOTAL PROTEIN 6.4 7.0   ALBUMIN 3.7 3.7   GLOBULIN 2.7 3.3   ALT (SGPT) 13 17   AST (SGOT) 11 16   BILIRUBIN 0.2 0.3   ALK PHOS 93 100         Lab 04/27/23  0529 04/26/23  0449 04/25/23  0440 04/24/23  1909 04/22/23  1135   PROBNP  --   --   --   --  2,034.0*   HSTROP T  --   --   --   --  87*   PROTIME 22.7* 16.5* 14.4 14.4  --    INR 1.99* 1.33* 1.13 1.12  --                  Brief Urine Lab Results     None          Microbiology Results Abnormal     Procedure Component Value - Date/Time    Respiratory Panel PCR w/COVID-19(SARS-CoV-2) JERO/ELADIO/YEIMI/PAD/COR/MAD/JONATHAN In-House, NP Swab in [facility], 3-4 HR TAT - Swab, Nasopharynx [[id number]]  (Normal) Collected: 04/22/23 1350    Lab Status: Final result Specimen: Swab from Nasopharynx Updated: 04/22/23 1455     ADENOVIRUS, PCR Not Detected     Coronavirus 229E Not Detected     Coronavirus HKU1 Not Detected     Coronavirus NL63 Not Detected     Coronavirus OC43 Not Detected     COVID19 Not Detected     Human Metapneumovirus Not Detected     Human Rhinovirus/Enterovirus Not Detected     Influenza A PCR Not Detected     Influenza B PCR Not Detected     Parainfluenza Virus 1 Not Detected     Parainfluenza Virus 2 Not Detected     Parainfluenza Virus 3 Not Detected      Parainfluenza Virus 4 Not Detected     RSV, PCR Not Detected     Bordetella pertussis pcr Not Detected     Bordetella parapertussis PCR Not Detected     Chlamydophila pneumoniae PCR Not Detected     Mycoplasma pneumo by PCR Not Detected    Narrative:      In the setting of a positive respiratory panel with a viral infection PLUS a negative procalcitonin without other underlying concern for bacterial infection, consider observing off antibiotics or discontinuation of antibiotics and continue supportive care. If the respiratory panel is positive for atypical bacterial infection (Bordetella pertussis, Chlamydophila pneumoniae, or Mycoplasma pneumoniae), consider antibiotic de-escalation to target atypical bacterial infection.          No radiology results from the last 24 hrs        Current medications:  Scheduled Meds:albuterol, 2.5 mg, Nebulization, 4x Daily - RT  atorvastatin, 40 mg, Oral, Nightly  budesonide-formoterol, 2 puff, Inhalation, BID - RT   And  tiotropium bromide monohydrate, 2 puff, Inhalation, Daily - RT  bumetanide, 1 mg, Oral, Daily  busPIRone, 5 mg, Oral, TID  citalopram, 20 mg, Oral, Daily  clopidogrel, 75 mg, Oral, Daily  doxycycline, 100 mg, Oral, Q12H  empagliflozin, 10 mg, Oral, Daily  enoxaparin, 1 mg/kg, Subcutaneous, Q12H  guaiFENesin, 1,200 mg, Oral, Q12H  insulin lispro, 0-7 Units, Subcutaneous, TID With Meals  metoprolol succinate XL, 100 mg, Oral, Daily  pantoprazole, 40 mg, Intravenous, Q AM  pharmacy consult - [place], , Does not apply, Daily  phenytoin ER, 100 mg, Oral, BID  [START ON 4/28/2023] predniSONE, 40 mg, Oral, Daily With Breakfast  QUEtiapine fumarate ER, 150 mg, Oral, Nightly  sacubitril-valsartan, 1 tablet, Oral, Q12H  warfarin, 5 mg, Oral, Daily      Continuous Infusions:Pharmacy to dose warfarin,       PRN Meds:.•  acetaminophen  •  dextrose  •  dextrose  •  diazePAM  •  glucagon (human recombinant)  •  hydrOXYzine  •  LORazepam  •  nitroglycerin  •  Pharmacy to dose  warfarin  •  sodium chloride    Assessment & Plan   Assessment & Plan     Active Hospital Problems    Diagnosis  POA   • **Nonspecific chest pain [R07.9]  Yes   • Dyslipidemia [E78.5]  Yes   • Essential hypertension [I10]  Yes   • Seizure disorder [G40.909]  Yes   • Abnormal stress test [R94.39]  Unknown   • Chronic systolic congestive heart failure [I50.22]  Yes   • Coronary artery disease involving native heart [I25.10]  Yes   • Acute exacerbation of chronic obstructive pulmonary disease (COPD) [J44.1]  Yes   • Anxiety [F41.9]  Yes   • Hyperlipemia [E78.5]  Yes   • Sleep apnea [G47.30]  Yes      Resolved Hospital Problems   No resolved problems to display.        Brief Hospital Course to date:  Fernie Dalal is a 63 y.o. male with PMHx significant for COPD, chronic respiratory failure on 2-3L at rest and 3-4L with exertion, seizure d/o, DMII, Anxiety/depression, HFrEF (LVEF 33%), tobacco abuse, HTN, HLD who presents with complaints of increased shortness of breath and chest pain.    This patient's problems and plans were partially entered by my partner and updated as appropriate by me 04/27/23.     AECOPD  Acute on Chronic Respiratory Failure w/Hypoxia  - continue steroids, 40mg IV weaned to twice daily from 4/26 and daily on 4/27 and then transition to prednisone 40 mg once daily  - aggressive pulmonary toilet, mucinex, scheduled nebs  - doxycycline BID x 5 days, resp PCR negative, CXR reviewed  - continue Symbicort, Sprivia (in place of Trelegy)  - COPD navigator consulted  - at baseline 2-3L w/ rest and 3-4L w/exertion, currently on 3 L NC     Chest Pain, resolved  NSTEMI, type II  HFrEF  - LVEF 33% (3/2023)  - cardiology following, s/p [facility] 4/25 with Dr. Gaitan, that showed no similar disease, recommend continued medical therapy  -Continue Plavix, statin, stop aspirin as he is now on coagulation  - Cardiology was following, s/p IV lasix, resume Bumex 1 mg p.o. daily  -Continue Entresto and beta-blocker  -He has  been started on Jardiance  -Follow-up with Dr. Marsha Gaitan in 4-6 weeks at [place] office  -Cards recs on DC: Plavix, STOP aspirin, continue metoprolol, Entresto, started Jardiance, STOP diltiazem, continue bumex 1mg daily     Acute left DVT  -Patient had elevated D-dimer  -Venous duplex shows acute DVT in the posterior tibial  -Started on Coumadin with Lovenox bridge, goal INR 2-3 due to Phenytoin use  -Since PCP office is closed all week and thus INR cannot be monitored and patient not comfortable/capable of administering Lovenox bridge yet, will remain in house for bridge  -Likely will discharge home on 4/28  -Addendum: On 4/27 AM, discussed with patient regarding the risks of warfarin including potentially fatal bleeds.  Discussed with him the importance of taking warfarin consistently as prescribed.  Discussed with him that if his INR is low or if he stops taking his warfarin, his blood clot could progress and go to his lungs and be fatal.  Also discussed with him that if his INR is too high, that could result in fatal bleeding.  Discussed with him the importance of regular monitoring with his PCP.  Patient expressed understanding of this.     Anxiety and depression  - continue buspar, PRN Valium  - nightly seroquel     Seizure D/O  - continue dilantin     Hypertension  -BP is now improved  -Continue home meds as above     Hyperlipidemia  -continue statin      Well control type 2 diabetes with A1c 7.3%  -FSBG's reviewed and currently appropriate, continue SSI while on steroids.  -He is not on any meds at home.     Expected Discharge Location and Transportation: home with   Expected Discharge   Expected Discharge Date: 04/28/23     DVT prophylaxis:  Medical DVT prophylaxis orders are present.     CODE STATUS:   Code Status and Medical Interventions:   Ordered at: 04/22/23 9356     Level Of Support Discussed With:    Patient     Code Status (Patient has no pulse and is not breathing):    CPR (Attempt to  Resuscitate)     Medical Interventions (Patient has pulse or is breathing):    Full Support       Cha Osborn MD  04/27/23

## 2023-04-27 NOTE — PROGRESS NOTES
NN spoke with pt at BS.  Pt alert and able to answer questions appropriately. Pt O2 sat  91% on  3 L currently, home O2 use 2-4 L. COPD action plan reviewed. Deep breathing exercises encouraged. Patient encouraged that he will need to be motivated to take care of himself in order to not return to the hospital.  No new concerns or questions voiced at this time.  NN will continue to follow as needed.       Per current GOLD Standards, please consider: No LAMA/LABA/ICS in place, Outpatient PFT, Rehab as appropriate, Palliative Care consult,  Annual LDCT per current screening guidelines (age 50-80 years old, smoking history of 20 pack years or more or has quit within past 15 years)

## 2023-04-27 NOTE — PROGRESS NOTES
"Pharmacy Consult  -  Warfarin    Fernie Dalal is a  63 y.o. male   Height - 175.3 cm (69\")  Weight - 103 kg (227 lb)    Consulting Provider: Intensivist  Indication: - DVT/PE  Goal INR: 2 - 3   Home Regimen: New Start    Bridge Therapy: Yes. Pt has been on therapeutic lovenox for ~24 hrs. Will continue until INR is therapeutic.     Drug-Drug Interactions with current regimen:  PRN APAP - may enhance anticoagulant effect  Citalopram - may enhance anticoagulant effect  Clopidogrel - may enhance anticoagulant effect  Doxycycline - may enhance anticoagulant effect  Methylprednisolone - may enhance anticoagulant effect  Phenytoin - may enhance or diminish anticoagulant effect  Quetiapine - may enhance anticoagulant effect      Warfarin Dosing During Admission:    Date  4/24 4/25 4/26 4/27        INR  1.12 1.13 1.33 1.99        Dose  5mg 5 mg 5 mg 5 mg             Education Provided:  Patient not on warfarin prior to admission. Education provided 4/25/23 verbally and in writing. Discussed effects of warfarin, importance of checking INR, drug-drug and drug-food interactions, and signs/symptoms of bleeding and clotting. Patient verbalized understanding through teach back. All pertinent questions were answered.    Discharge Follow up:   Following Provider -  Likely to follow with PCP KRISTIN Manuel at [facility] in [place], [place]. Called to verify that patient can get INR monitored in office 4/25 but office closed this entire week for a conference. [facility] setting home health to follow patient at home. Samina has left message with PCP informing them of warfarin start and planned INR check Monday.     Follow up time range or appointment - If discharged 4/28 would recommend patient recheck INR 5/1      Labs:    Results from last 7 days   Lab Units 04/26/23  0449 04/25/23  0440 04/24/23  1909 04/24/23  0442 04/22/23  1135   INR  1.33* 1.13 1.12  --   --    HEMOGLOBIN g/dL 16.4 17.1  --  15.0 " 14.9   HEMATOCRIT % 54.1* 57.0*  --  51.8* 50.4     Results from last 7 days   Lab Units 04/26/23  0449 04/25/23  0440 04/24/23  0442 04/22/23  1135   SODIUM mmol/L 136 141 138 138   POTASSIUM mmol/L 4.8 5.2 5.3* 4.4   CHLORIDE mmol/L 95* 95* 91* 95*   CO2 mmol/L 34.0* 34.0* 39.0* 36.0*   BUN mg/dL 21 24* 25* 11   CREATININE mg/dL 0.71* 0.66* 0.85 0.58*   CALCIUM mg/dL 9.1 9.4 9.3 9.5   BILIRUBIN mg/dL  --   --  0.2 0.3   ALK PHOS U/L  --   --  93 100   ALT (SGPT) U/L  --   --  13 17   AST (SGOT) U/L  --   --  11 16   GLUCOSE mg/dL 146* 156* 143* 101*       Current dietary intake: % of meals over past 24 hours  Dietary Orders (From admission, onward)       Start     Ordered    04/24/23 1800  Dietary Nutrition Supplements Boost Plus; chocolate  Daily With Breakfast & Dinner      Question Answer Comment   Select Supplement: Boost Plus    Flavor: chocolate        04/24/23 1512    04/24/23 1504  Diet: Regular/House Diet; Texture: Regular Texture (IDDSI 7); Fluid Consistency: Thin (IDDSI 0)  Diet Effective Now        References:    Diet Order [place]   Question Answer Comment   Diets: Regular/House Diet    Texture: Regular Texture (IDDSI 7)    Fluid Consistency: Thin (IDDSI 0)        04/24/23 1504                    Assessment/Plan:   Pharmacy to dose warfarin for DVT/PE, goal INR 2-3. Patient is a new start to warfarin as of 4/24/23.  INR is slightly SUBtherapeutic today at 1.99, increased from 1.33 yesterday. Will continue warfarin 5 mg this evening. Patient to likely stay admitted until INR therapeutic as he is not comfortable/capable of administering Lovenox at home.  Planned discharge tomorrow. Will await tomorrow's INR before making discharge recommendation.  Daily PT/INR ordered.  Pharmacy will continue to monitor daily INR, signs/symptoms of bleeding, drug-drug interactions and dietary intake to adjust dose as necessary.      Thank you  Leonid Sarkar, PharmD  4/26/2023  12:59 EDT

## 2023-04-27 NOTE — CASE MANAGEMENT/SOCIAL WORK
Continued Stay Note  [facility]     Patient Name: Fernie Dalal  MRN: [id number]  Today's Date: 4/27/2023    Admit Date: 4/22/2023    Plan: Home with HH   Discharge Plan     Row Name 04/27/23 1454       Plan    Plan Home with     Patient/Family in Agreement with Plan yes    Plan Comments I spoke with Aggie at [facility] [phone] and faxed her the HH order and hospital info to [phone]. She has accepted the pt and has a nurse lined up to do a PT/INR on Monday. Aggie and I have both left a message on the PCPs office VM. I spoke with the pt who is in agreement with the plan. He states his PCP lives close and has done house-calls in the past. He states his sister will transport him home from the hospital at OR. He has a portable 02 tanks for transport home. He will have his sister call the [facility] to check his C pap as he has been unable to use it. The C pap is not thru [employer] and he is unsure what company arranged it. He denies any other OR needs at this time.  I will notify the HH agency at OR. Pts physical address is [address] [place] [address], phone [phone].    Final Discharge Disposition Code 06 - home with home health care               Discharge Codes    No documentation.               Expected Discharge Date and Time     Expected Discharge Date Expected Discharge Time    Apr 28, 2023             Samina Weir RN

## 2023-04-28 ENCOUNTER — READMISSION MANAGEMENT (OUTPATIENT)
Dept: CALL CENTER | Facility: HOSPITAL | Age: 63
End: 2023-04-28
Payer: MEDICAID

## 2023-04-28 VITALS
OXYGEN SATURATION: 93 % | DIASTOLIC BLOOD PRESSURE: 74 MMHG | RESPIRATION RATE: 16 BRPM | HEIGHT: 69 IN | WEIGHT: 227 LBS | HEART RATE: 78 BPM | BODY MASS INDEX: 33.62 KG/M2 | TEMPERATURE: 97.5 F | SYSTOLIC BLOOD PRESSURE: 116 MMHG

## 2023-04-28 PROBLEM — J44.1 ACUTE EXACERBATION OF CHRONIC OBSTRUCTIVE PULMONARY DISEASE (COPD): Status: RESOLVED | Noted: 2022-03-18 | Resolved: 2023-04-28

## 2023-04-28 LAB
GLUCOSE BLDC GLUCOMTR-MCNC: 121 MG/DL (ref 70–130)
INR PPP: 2.27 (ref 0.89–1.12)
PROTHROMBIN TIME: 25.3 SECONDS (ref 12.2–14.5)

## 2023-04-28 PROCEDURE — 97535 SELF CARE MNGMENT TRAINING: CPT

## 2023-04-28 PROCEDURE — 97530 THERAPEUTIC ACTIVITIES: CPT

## 2023-04-28 PROCEDURE — 25010000002 ENOXAPARIN PER 10 MG: Performed by: INTERNAL MEDICINE

## 2023-04-28 PROCEDURE — 82948 REAGENT STRIP/BLOOD GLUCOSE: CPT

## 2023-04-28 PROCEDURE — 63710000001 PREDNISONE PER 1 MG: Performed by: STUDENT IN AN ORGANIZED HEALTH CARE EDUCATION/TRAINING PROGRAM

## 2023-04-28 PROCEDURE — 99239 HOSP IP/OBS DSCHRG MGMT >30: CPT | Performed by: NURSE PRACTITIONER

## 2023-04-28 PROCEDURE — 85610 PROTHROMBIN TIME: CPT | Performed by: INTERNAL MEDICINE

## 2023-04-28 RX ORDER — PREDNISONE 20 MG/1
40 TABLET ORAL
Qty: 8 TABLET | Refills: 0 | Status: SHIPPED | OUTPATIENT
Start: 2023-04-29 | End: 2023-05-03

## 2023-04-28 RX ORDER — BUSPIRONE HYDROCHLORIDE 5 MG/1
5 TABLET ORAL 3 TIMES DAILY
Qty: 90 TABLET | Refills: 1 | Status: SHIPPED | OUTPATIENT
Start: 2023-04-28

## 2023-04-28 RX ORDER — DIAZEPAM 5 MG/1
5 TABLET ORAL EVERY 12 HOURS PRN
Start: 2023-04-28

## 2023-04-28 RX ORDER — WARFARIN SODIUM 5 MG/1
5 TABLET ORAL NIGHTLY
Qty: 7 TABLET | Refills: 0 | Status: SHIPPED | OUTPATIENT
Start: 2023-04-28

## 2023-04-28 RX ADMIN — EMPAGLIFLOZIN 10 MG: 10 TABLET, FILM COATED ORAL at 08:51

## 2023-04-28 RX ADMIN — PANTOPRAZOLE SODIUM 40 MG: 40 INJECTION, POWDER, FOR SOLUTION INTRAVENOUS at 06:35

## 2023-04-28 RX ADMIN — CITALOPRAM HYDROBROMIDE 20 MG: 20 TABLET ORAL at 08:51

## 2023-04-28 RX ADMIN — PREDNISONE 40 MG: 20 TABLET ORAL at 08:52

## 2023-04-28 RX ADMIN — GUAIFENESIN 1200 MG: 600 TABLET, EXTENDED RELEASE ORAL at 08:52

## 2023-04-28 RX ADMIN — SACUBITRIL AND VALSARTAN 1 TABLET: 24; 26 TABLET, FILM COATED ORAL at 09:01

## 2023-04-28 RX ADMIN — PHENYTOIN SODIUM 100 MG: 100 CAPSULE ORAL at 09:01

## 2023-04-28 RX ADMIN — Medication 10 ML: at 08:53

## 2023-04-28 RX ADMIN — METOPROLOL SUCCINATE 100 MG: 50 TABLET, EXTENDED RELEASE ORAL at 08:51

## 2023-04-28 RX ADMIN — BUMETANIDE 1 MG: 1 TABLET ORAL at 08:52

## 2023-04-28 RX ADMIN — ENOXAPARIN SODIUM 100 MG: 100 INJECTION SUBCUTANEOUS at 06:35

## 2023-04-28 RX ADMIN — CLOPIDOGREL BISULFATE 75 MG: 75 TABLET ORAL at 08:51

## 2023-04-28 NOTE — PROGRESS NOTES
NN spoke with pt at BS.  Pt alert and able to answer questions appropriately. Pt O2 sat  95% on   3L currently, home O2 use  2-4L. COPD action plan reviewed. Deep breathing exercises encouraged. No new concerns or questions voiced at this time.  NN will continue to follow as needed.       Per current GOLD Standards, please consider: No LAMA/LABA/ICS in place, Outpatient PFT, Rehab as appropriate, Palliative Care consult,  Annual LDCT per current screening guidelines (age 50-80 years old, smoking history of 20 pack years or more or has quit within past 15 years)

## 2023-04-28 NOTE — PLAN OF CARE
Goal Outcome Evaluation:  Plan of Care Reviewed With: patient        Progress: improving  Outcome Evaluation: OT promoted activity michaelle and adl retraining.  He completed grooming, ubd, and lbd with setup.  Pt able to perform sts with indep and bed mob with indep.  Stood 4-5 minutes with good endurance.  Discussed d/c plans with pt demo good understanding.

## 2023-04-28 NOTE — CASE MANAGEMENT/SOCIAL WORK
Case Management Discharge Note      Final Note: I notified Magui's VM with [facility] of the pts DC and his physical address. They will f/u with the pt on Monday for labs and assessments. I will fax the DC summary to  when available to [phone].         Selected Continued Care - Admitted Since 4/22/2023     Destination    No services have been selected for the patient.              Durable Medical Equipment    No services have been selected for the patient.              Dialysis/Infusion    No services have been selected for the patient.              Home Medical Care     Service Provider Selected Services Address Phone Fax Patient Preferred    [facility] [address] [place] [id number] [phone] --          Therapy    No services have been selected for the patient.              Community Resources    No services have been selected for the patient.              Community & DME    No services have been selected for the patient.                       Final Discharge Disposition Code: 06 - home with home health care

## 2023-04-28 NOTE — PLAN OF CARE
Problem: Fall Injury Risk  Goal: Absence of Fall and Fall-Related Injury  Outcome: Ongoing, Progressing  Intervention: Promote Injury-Free Environment  Recent Flowsheet Documentation  Taken 4/28/2023 0400 by Opal Gao, RN  Safety Promotion/Fall Prevention:   safety round/check completed   room organization consistent   nonskid shoes/slippers when out of bed   lighting adjusted   clutter free environment maintained   assistive device/personal items within reach  Taken 4/28/2023 0200 by Opal Gao, RN  Safety Promotion/Fall Prevention:   safety round/check completed   room organization consistent   nonskid shoes/slippers when out of bed   lighting adjusted   clutter free environment maintained   assistive device/personal items within reach  Taken 4/28/2023 0000 by Opal Gao, KAMILA  Safety Promotion/Fall Prevention:   safety round/check completed   room organization consistent   nonskid shoes/slippers when out of bed   lighting adjusted   clutter free environment maintained   assistive device/personal items within reach  Taken 4/27/2023 2200 by Opal Gao, RN  Safety Promotion/Fall Prevention:   safety round/check completed   room organization consistent   nonskid shoes/slippers when out of bed   lighting adjusted   clutter free environment maintained   assistive device/personal items within reach  Taken 4/27/2023 2000 by Opal Gao, RN  Safety Promotion/Fall Prevention:   safety round/check completed   room organization consistent   nonskid shoes/slippers when out of bed   lighting adjusted   clutter free environment maintained   assistive device/personal items within reach     Problem: COPD (Chronic Obstructive Pulmonary Disease) Comorbidity  Goal: Maintenance of COPD Symptom Control  Outcome: Ongoing, Progressing     Problem: Heart Failure Comorbidity  Goal: Maintenance of Heart Failure Symptom Control  Outcome: Ongoing, Progressing     Problem: Hypertension Comorbidity  Goal: Blood Pressure in  Desired Range  Outcome: Ongoing, Progressing     Problem: Obstructive Sleep Apnea Risk or Actual Comorbidity Management  Goal: Unobstructed Breathing During Sleep  Outcome: Ongoing, Progressing   Goal Outcome Evaluation: Pt resting comfortably in bed. Hopeful to discharge home today

## 2023-04-28 NOTE — THERAPY TREATMENT NOTE
Patient Name: Fernie Dalal  : 1960    MRN: [id number]                              Today's Date: 2023       Admit Date: 2023    Visit Dx:     ICD-10-CM ICD-9-CM   1. Nonspecific chest pain  R07.9 786.50   2. COPD exacerbation  J44.1 491.21   3. Abnormal stress test  R94.39 794.39   4. Acute on chronic systolic CHF (congestive heart failure)  I50.23 428.23     428.0   5. Essential hypertension  I10 401.9   6. Muscle spasm  M62.838 728.85   7. Anxiety  F41.9 300.00   8. Acute exacerbation of chronic obstructive pulmonary disease (COPD)  J44.1 491.21   9. Chronic systolic congestive heart failure  I50.22 428.22     428.0     Patient Active Problem List   Diagnosis   • Adiposity   • Allergic rhinitis   • Anxiety   • Blues   • Chest pain   • Chronic LBP   • Chronic systolic congestive heart failure   • Coronary artery disease involving native heart   • Dyslipidemia   • Hyperlipemia   • Muscle spasm   • Seizure disorder   • Sleep apnea   • Abnormal stress test   • Essential hypertension   • Nonspecific chest pain     Past Medical History:   Diagnosis Date   • Angina at rest    • Anxiety    • Arrhythmia    • COPD (chronic obstructive pulmonary disease)    • GERD (gastroesophageal reflux disease)    • Heart failure     congestive   • Hyperlipidemia    • Hypertension    • Myocardial infarction      Past Surgical History:   Procedure Laterality Date   • CARDIAC CATHETERIZATION     • CARDIAC CATHETERIZATION N/A 2023    Procedure: Left Heart Cath;  Surgeon: Marsha Gaitan MD;  Location: [facility] CATH INVASIVE LOCATION;  Service: Cardiology;  Laterality: N/A;   • CARDIOVASCULAR STRESS TEST        General Information     Row Name 23 1111          OT Time and Intention    Document Type therapy note (daily note)  -SW     Mode of Treatment occupational therapy  -SW     Row Name 23 1111          General Information    Patient Profile Reviewed yes  -SW     Existing Precautions/Restrictions fall;oxygen  therapy device and L/min  -SW     Row Name 04/28/23 1111          Cognition    Orientation Status (Cognition) oriented x 4  -SW     Row Name 04/28/23 1111          Safety Issues, Functional Mobility    Impairments Affecting Function (Mobility) balance;endurance/activity tolerance  -           User Key  (r) = Recorded By, (t) = Taken By, (c) = Cosigned By    Initials Name Provider Type    Samina Perla OT Occupational Therapist                 Mobility/ADL's     Row Name 04/28/23 1122          Bed Mobility    Bed Mobility supine-sit  -SW     Supine-Sit [place] (Bed Mobility) modified independence  -     Assistive Device (Bed Mobility) bed rails  -     Row Name 04/28/23 1122          Transfers    Transfers sit-stand transfer  -     Row Name 04/28/23 1122          Sit-Stand Transfer    Sit-Stand [place] (Transfers) independent  -     Assistive Device (Sit-Stand Transfers) other (see comments)  no device  -     Row Name 04/28/23 1122          Functional Mobility    Functional Mobility- Ind. Level independent  -     Functional Mobility- Device other (see comments)  no device  -     Functional Mobility-Distance (Feet) 10  -SW     Functional Mobility- Safety Issues supplemental O2  -     Row Name 04/28/23 1122          Activities of Daily Living    BADL Assessment/Intervention lower body dressing;grooming;upper body dressing  -[facility] Name 04/28/23 1122          Lower Body Dressing Assessment/Training    [place] Level (Lower Body Dressing) lower body dressing skills;shoes/slippers;set up  -SW     Position (Lower Body Dressing) edge of bed sitting  -     Row Name 04/28/23 1122          Grooming Assessment/Training    [place] Level (Grooming) grooming skills;wash face, hands;set up  -SW     Position (Grooming) edge of bed sitting  -     Row Name 04/28/23 1122          Upper Body Dressing Assessment/Training    [place] Level (Upper Body Dressing) upper body dressing  skills;set up  -SW     Position (Upper Body Dressing) edge of bed sitting  -SW           User Key  (r) = Recorded By, (t) = Taken By, (c) = Cosigned By    Initials Name Provider Type    Samina Perla OT Occupational Therapist               Obj/Interventions     Row Name 04/28/23 1122          Balance    Balance Assessment sitting static balance;sitting dynamic balance;sit to stand dynamic balance;standing static balance;standing dynamic balance  -SW     Static Sitting Balance independent  -SW     Dynamic Sitting Balance independent  -SW     Position, Sitting Balance unsupported  -SW     Sit to Stand Dynamic Balance independent  -SW     Static Standing Balance independent  -SW     Dynamic Standing Balance independent  -SW     Position/Device Used, Standing Balance unsupported  -SW     Balance Interventions sitting;standing;sit to stand;supported;static;dynamic;minimal challenge  -SW           User Key  (r) = Recorded By, (t) = Taken By, (c) = Cosigned By    Initials Name Provider Type    Samina Perla OT Occupational Therapist               Goals/Plan    No documentation.                Clinical Impression     Row Name 04/28/23 1122          Pain Assessment    Pretreatment Pain Rating 0/10 - no pain  -SW     Posttreatment Pain Rating 0/10 - no pain  -SW     Row Name 04/28/23 1122          Plan of Care Review    Plan of Care Reviewed With patient  -SW     Progress improving  -SW     Outcome Evaluation OT promoted activity michaelle and adl retraining.  He completed grooming, ubd, and lbd with setup.  Pt able to perform sts with indep and bed mob with indep.  Stood 4-5 minutes with good endurance.  Discussed d/c plans with pt demo good understanding.  -     Row Name 04/28/23 1122          Vital Signs    Pre Systolic BP Rehab --  VSS  -SW     O2 Delivery Pre Treatment supplemental O2  -SW     O2 Delivery Intra Treatment supplemental O2  -SW     O2 Delivery Post Treatment supplemental O2  -SW     Pre Patient Position  Supine  -SW     Intra Patient Position Standing  -SW     Post Patient Position Sitting  -SW     Row Name 04/28/23 1122          Positioning and Restraints    Pre-Treatment Position in bed  -SW     Post Treatment Position bed  -SW     In Bed notified nsg;sitting;sitting EOB;call light within reach;encouraged to call for assist;side rails up x2;with family/caregiver  -           User Key  (r) = Recorded By, (t) = Taken By, (c) = Cosigned By    Initials Name Provider Type    Samina Perla OT Occupational Therapist               Outcome Measures     Row Name 04/28/23 1316          How much help from another is currently needed...    Putting on and taking off regular lower body clothing? 4  -SW     Bathing (including washing, rinsing, and drying) 3  -SW     Toileting (which includes using toilet bed pan or urinal) 3  -SW     Putting on and taking off regular upper body clothing 4  -SW     Taking care of personal grooming (such as brushing teeth) 4  -SW     Eating meals 4  -SW     AM-PAC 6 Clicks Score (OT) 22  -SW     Row Name 04/28/23 1000          How much help from another person do you currently need...    Turning from your back to your side while in flat bed without using bedrails? 4  -EH     Moving from lying on back to sitting on the side of a flat bed without bedrails? 4  -EH     Moving to and from a bed to a chair (including a wheelchair)? 4  -EH     Standing up from a chair using your arms (e.g., wheelchair, bedside chair)? 4  -EH     Climbing 3-5 steps with a railing? 3  -EH     To walk in hospital room? 3  -EH     AM-PAC 6 Clicks Score (PT) 22  -EH     Highest level of mobility 7 --> Walked 25 feet or more  -     Row Name 04/28/23 1316          Functional Assessment    Outcome Measure Options AM-PAC 6 Clicks Daily Activity (OT)  -           User Key  (r) = Recorded By, (t) = Taken By, (c) = Cosigned By    Initials Name Provider Type    Samina Perla OT Occupational Therapist    Laine Ruano RN  Registered Nurse                Occupational Therapy Education     Title: PT OT SLP Therapies (Resolved)     Topic: Occupational Therapy (Resolved)     Point: ADL training (Resolved)     Description:   Instruct learner(s) on proper safety adaptation and remediation techniques during self care or transfers.   Instruct in proper use of assistive devices.              Learning Progress Summary           Patient Acceptance, TB,E,D, VU,NR by  at 4/25/2023 1006                   Point: Home exercise program (Resolved)     Description:   Instruct learner(s) on appropriate technique for monitoring, assisting and/or progressing therapeutic exercises/activities.              Learner Progress:  Not documented in this visit.          Point: Precautions (Resolved)     Description:   Instruct learner(s) on prescribed precautions during self-care and functional transfers.              Learning Progress Summary           Patient Acceptance, TB,E,D, VU,NR by  at 4/25/2023 1006                   Point: Body mechanics (Resolved)     Description:   Instruct learner(s) on proper positioning and spine alignment during self-care, functional mobility activities and/or exercises.              Learning Progress Summary           Patient Acceptance, TB,E,D, VU,NR by  at 4/25/2023 1006                               User Key     Initials Effective Dates Name Provider Type Discipline     10/25/22 -  Urmila Mendoza OT Occupational Therapist OT              OT Recommendation and Plan     Plan of Care Review  Plan of Care Reviewed With: patient  Progress: improving  Outcome Evaluation: OT promoted activity michaelle and adl retraining.  He completed grooming, ubd, and lbd with setup.  Pt able to perform sts with indep and bed mob with indep.  Stood 4-5 minutes with good endurance.  Discussed d/c plans with pt demo good understanding.     Time Calculation:    Time Calculation- OT     Row Name 04/28/23 1122             Time Calculation- OT     OT Start Time 1122  -SW      OT Received On 04/28/23  -SW         Timed Charges    65208 - OT Therapeutic Activity Minutes 12  -SW      36640 - OT Self Care/Mgmt Minutes 15  -SW         Total Minutes    Timed Charges Total Minutes 27  -SW       Total Minutes 27  -SW            User Key  (r) = Recorded By, (t) = Taken By, (c) = Cosigned By    Initials Name Provider Type     Samina Zhang OT Occupational Therapist              Therapy Charges for Today     Code Description Service Date Service Provider Modifiers Qty    45232922274  OT THERAPEUTIC ACT EA 15 MIN 4/28/2023 Samina Zhang OT GO 1    15104777833 HC OT SELF CARE/MGMT/TRAIN EA 15 MIN 4/28/2023 Samina Zhang OT GO 1               Samina Zhang OT  4/28/2023

## 2023-04-28 NOTE — CASE MANAGEMENT/SOCIAL WORK
Continued Stay Note  [facility]     Patient Name: Fernie Dalal  MRN: [id number]  Today's Date: 4/28/2023    Admit Date: 4/22/2023    Plan: Home with    Discharge Plan     Row Name 04/28/23 1218       Plan    Plan Comments I did a PA on Jaur[employer]iance thru covermymeds. Key [id number].    Row Name 04/28/23 1008       Plan    Plan Home with     Patient/Family in Agreement with Plan other (see comments)    Final Discharge Disposition Code 06 - home with home health care    Final Note I notified Magui's VM with [facility] of the pts DC and his physical address. They will f/u with the pt on Monday for labs and assessments. I will fax the DC summary to  when available to [phone].               Discharge Codes    No documentation.               Expected Discharge Date and Time     Expected Discharge Date Expected Discharge Time    Apr 28, 2023             Samina Weir RN

## 2023-04-28 NOTE — PLAN OF CARE
Problem: Fall Injury Risk  Goal: Absence of Fall and Fall-Related Injury  Outcome: Ongoing, Progressing  Intervention: Promote Injury-Free Environment  Recent Flowsheet Documentation  Taken 4/28/2023 1000 by Laine Fontaien RN  Safety Promotion/Fall Prevention: activity supervised     Problem: COPD (Chronic Obstructive Pulmonary Disease) Comorbidity  Goal: Maintenance of COPD Symptom Control  Outcome: Ongoing, Progressing  Intervention: Maintain COPD-Symptom Control  Recent Flowsheet Documentation  Taken 4/28/2023 1000 by Laine Fontaine RN  Supportive Measures: active listening utilized     Problem: Heart Failure Comorbidity  Goal: Maintenance of Heart Failure Symptom Control  Outcome: Ongoing, Progressing     Problem: Hypertension Comorbidity  Goal: Blood Pressure in Desired Range  Outcome: Ongoing, Progressing     Problem: Obstructive Sleep Apnea Risk or Actual Comorbidity Management  Goal: Unobstructed Breathing During Sleep  Outcome: Ongoing, Progressing     Problem: Pain Chronic (Persistent) (Comorbidity Management)  Goal: Acceptable Pain Control and Functional Ability  Outcome: Ongoing, Progressing  Intervention: Optimize Psychosocial Wellbeing  Recent Flowsheet Documentation  Taken 4/28/2023 1000 by Laine Fontaine RN  Supportive Measures: active listening utilized  Diversional Activities: television     Problem: Adult Inpatient Plan of Care  Goal: Plan of Care Review  Outcome: Ongoing, Progressing  Goal: Optimal Comfort and Wellbeing  Outcome: Ongoing, Progressing  Intervention: Provide Person-Centered Care  Recent Flowsheet Documentation  Taken 4/28/2023 1000 by Laine Fontaine RN  Trust Relationship/Rapport:   care explained   choices provided     Problem: Adjustment to Illness COPD (Chronic Obstructive Pulmonary Disease)  Goal: Optimal Chronic Illness Coping  Outcome: Ongoing, Progressing  Intervention: Support and Optimize Psychosocial Response  Recent Flowsheet Documentation  Taken 4/28/2023 1000 by Laine Fontaine  RN  Supportive Measures: active listening utilized     Problem: Functional Ability Impaired COPD (Chronic Obstructive Pulmonary Disease)  Goal: Optimal Level of Functional [place]  Outcome: Ongoing, Progressing  Intervention: Optimize Functional Ability  Recent Flowsheet Documentation  Taken 4/28/2023 1000 by Laine Fontaine RN  Activity Management: activity encouraged  Environmental Support: calm environment promoted     Problem: Infection COPD (Chronic Obstructive Pulmonary Disease)  Goal: Absence of Infection Signs and Symptoms  Outcome: Ongoing, Progressing     Problem: Oral Intake Inadequate COPD (Chronic Obstructive Pulmonary Disease)  Goal: Improved Nutrition Intake  Outcome: Ongoing, Progressing     Problem: Respiratory Compromise COPD (Chronic Obstructive Pulmonary Disease)  Goal: Effective Oxygenation and Ventilation  Outcome: Ongoing, Progressing  Intervention: Promote Airway Secretion Clearance  Recent Flowsheet Documentation  Taken 4/28/2023 1000 by Laine Fontaine RN  Activity Management: activity encouraged  Cough And Deep Breathing: done independently per patient  Intervention: Optimize Oxygenation and Ventilation  Recent Flowsheet Documentation  Taken 4/28/2023 1000 by Laine Fontaine RN  Head of Bed (HOB) Positioning: HOB elevated   Goal Outcome Evaluation:

## 2023-04-28 NOTE — CASE MANAGEMENT/SOCIAL WORK
Continued Stay Note  [facility]     Patient Name: Fernie Dalal  MRN: [id number]  Today's Date: 4/28/2023    Admit Date: 4/22/2023    Plan: Home with    Discharge Plan     Row Name 04/28/23 2984       Plan    Plan Comments [employer]ardiance approved. No co pay for the pts meds today.    Final Discharge Disposition Code 06 - home with home health care    Row Name 04/28/23 1218       Plan    Plan Comments I did a PA on [employer]aur[employer] thru covermymeds. Key [id number].               Discharge Codes    No documentation.               Expected Discharge Date and Time     Expected Discharge Date Expected Discharge Time    Apr 28, 2023             Samina Weir RN

## 2023-04-28 NOTE — PROGRESS NOTES
"Pharmacy Consult  -  Warfarin    Frenie Dalal is a  63 y.o. male   Height - 175.3 cm (69\")  Weight - 103 kg (227 lb)    Consulting Provider: Intensivist  Indication: - DVT/PE  Goal INR: 2 - 3   Home Regimen: New Start    Bridge Therapy: Yes. Pt has been on therapeutic lovenox for ~24 hrs. Will continue until INR is therapeutic.     Drug-Drug Interactions with current regimen:  PRN APAP - may enhance anticoagulant effect  Citalopram - may enhance anticoagulant effect  Clopidogrel - may enhance anticoagulant effect  Doxycycline - may enhance anticoagulant effect  Methylprednisolone - may enhance anticoagulant effect  Phenytoin - may enhance or diminish anticoagulant effect  Quetiapine - may enhance anticoagulant effect      Warfarin Dosing During Admission:    Date  4/24 4/25 4/26 4/27        INR  1.12 1.13 1.33 1.99        Dose  5mg 5 mg 5 mg 5 mg             Education Provided:  Patient not on warfarin prior to admission. Education provided 4/25/23 verbally and in writing. Discussed effects of warfarin, importance of checking INR, drug-drug and drug-food interactions, and signs/symptoms of bleeding and clotting. Patient verbalized understanding through teach back. All pertinent questions were answered.    Discharge Follow up:   Following Provider -  Likely to follow with PCP KRISTIN Manuel at [facility] in [place], [place]. Called to verify that patient can get INR monitored in office 4/25 but office closed this entire week for a conference. [facility] setting home health to follow patient at home. Samina has left message with PCP informing them of warfarin start and planned INR check Monday.     Follow up time range or appointment - If discharged 4/28 would recommend patient recheck INR 5/1      Labs:    Results from last 7 days   Lab Units 04/28/23  0528 04/27/23  0529 04/26/23  0449 04/25/23  0440 04/24/23  1909 04/24/23  0442 04/22/23  1135   INR  2.27* 1.99* 1.33* 1.13 1.12  --   -- "    HEMOGLOBIN g/dL  --  15.7 16.4 17.1  --  15.0 14.9   HEMATOCRIT %  --  52.3* 54.1* 57.0*  --  51.8* 50.4     Results from last 7 days   Lab Units 04/27/23  0529 04/26/23  0449 04/25/23  0440 04/24/23  0442 04/22/23  1135   SODIUM mmol/L 139 136 141 138 138   POTASSIUM mmol/L 4.5 4.8 5.2 5.3* 4.4   CHLORIDE mmol/L 96* 95* 95* 91* 95*   CO2 mmol/L 37.0* 34.0* 34.0* 39.0* 36.0*   BUN mg/dL 21 21 24* 25* 11   CREATININE mg/dL 0.60* 0.71* 0.66* 0.85 0.58*   CALCIUM mg/dL 8.8 9.1 9.4 9.3 9.5   BILIRUBIN mg/dL  --   --   --  0.2 0.3   ALK PHOS U/L  --   --   --  93 100   ALT (SGPT) U/L  --   --   --  13 17   AST (SGOT) U/L  --   --   --  11 16   GLUCOSE mg/dL 114* 146* 156* 143* 101*       Current dietary intake: % of meals over past 24 hours  Dietary Orders (From admission, onward)       Start     Ordered    04/27/23 0851  DIET MESSAGE Can you please send a tray with eggs, robbins, and coffee now? Thanks  Once        Comments: Can you please send a tray with eggs, robbins, and coffee now? Thanks    04/27/23 0851    04/27/23 0641  Diet: Regular/House Diet; Texture: Regular Texture (IDDSI 7); Fluid Consistency: Thin (IDDSI 0)  Diet Effective Now        Comments: Requesting biscuit and gravy for breakfast   References:    Diet Order [place]   Question Answer Comment   Diets: Regular/House Diet    Texture: Regular Texture (IDDSI 7)    Fluid Consistency: Thin (IDDSI 0)        04/27/23 0641    04/24/23 1800  Dietary Nutrition Supplements Boost Plus; chocolate  Daily With Breakfast & Dinner      Question Answer Comment   Select Supplement: Boost Plus    Flavor: chocolate        04/24/23 1512                    Assessment/Plan:   Pharmacy to dose warfarin for DVT/PE, goal INR 2-3. Patient is a new start to warfarin as of 4/24/23.  INR is now therapeutic today at 2.27, increased from 1.99 yesterday. Patient to discharge today, discussed with patient and family in room. Patient to take warfarin 5 mg qPM daily until INR check  on Monday with PCP via Home Health. Provided reinforcement of education today prior to discharge including importance of checking INR, interactions with medications and GLV containing vitamin K.  Daily PT/INR ordered.  Pharmacy will continue to monitor daily INR, signs/symptoms of bleeding, drug-drug interactions and dietary intake to adjust dose as necessary.      Thank you  Leonid Sarkar, PharmD  4/28/2023  12:19 EDT

## 2023-04-28 NOTE — DISCHARGE SUMMARY
[facility]  DISCHARGE SUMMARY    Patient Name: Fernie Dalal  : 1960  MRN: [id number]    Date of Admission: 2023 11:19 AM  Date of Discharge: 2023  Primary Care Physician: Belem Albert APRN    Consults     Date and Time Order Name Status Description    2023  3:16 PM Inpatient Cardiology Consult Completed           Hospital Course     Presenting Problem:   Nonspecific chest pain [R07.9]    Active Hospital Problems    Diagnosis  POA   • **Nonspecific chest pain [R07.9]  Yes   • Dyslipidemia [E78.5]  Yes   • Essential hypertension [I10]  Yes   • Seizure disorder [G40.909]  Yes   • Abnormal stress test [R94.39]  Yes   • Chronic systolic congestive heart failure [I50.22]  Yes   • Coronary artery disease involving native heart [I25.10]  Yes   • Anxiety [F41.9]  Yes   • Hyperlipemia [E78.5]  Yes   • Sleep apnea [G47.30]  Yes      Resolved Hospital Problems    Diagnosis Date Resolved POA   • Acute exacerbation of chronic obstructive pulmonary disease (COPD) [J44.1] 2023 Yes          Hospital Course:  Fernie Dalal is a 63 y.o. male with PMHx significant for COPD, chronic respiratory failure on 2-3L at rest and 3-4L with exertion, seizure d/o, DMII, Anxiety/depression, HFrEF (LVEF 33%), tobacco abuse, HTN, HLD who presents with complaints of increased shortness of breath and chest pain.     AECOPD  Acute on Chronic Respiratory Failure w/Hypoxia  - continue steroids, 40mg IV weaned to twice daily from  and daily on  and then transition to prednisone 40 mg once daily  - aggressive pulmonary toilet, mucinex, scheduled nebs  - doxycycline BID x 5 days, resp PCR negative, CXR reviewed  - continue Symbicort, Sprivia (in place of Trelegy)  - COPD navigator consulted  - at baseline 2-3L w/ rest and 3-4L w/exertion, currently on 3 L NC     Chest Pain, resolved  NSTEMI, type II  HFrEF  Hypertension  Hyperlipidemia  - LVEF 33%  (3/2023)  - cardiology following, s/p [facility] 4/25 with Dr. Gaitan, that showed no similar disease, recommend continued medical therapy  -Continue Plavix, statin, stop aspirin as he is now on coagulation  - Cardiology was following, s/p IV lasix, resume Bumex 1 mg p.o. daily  -Continue Entresto and beta-blocker  -He has been started on Jardiance  -Follow-up with Dr. Marsha Gaitan in 4-6 weeks at [place] office  -Cards recs on DC: Plavix, STOP aspirin, continue metoprolol, Entresto, started Jardiance, STOP diltiazem, continue bumex 1mg daily     Acute left DVT  -Patient had elevated D-dimer  -Venous duplex shows acute DVT in the posterior tibial  -Started on Coumadin with Lovenox bridge, goal INR 2-3 due to Phenytoin use  -Since PCP office is closed all week and thus INR cannot be monitored and patient not comfortable/capable of administering Lovenox bridge yet, will remain in house for bridge  -Addendum: On 4/27 AM, discussed with patient regarding the risks of warfarin including potentially fatal bleeds.  Discussed with him the importance of taking warfarin consistently as prescribed.  Discussed with him that if his INR is low or if he stops taking his warfarin, his blood clot could progress and go to his lungs and be fatal.  Also discussed with him that if his INR is too high, that could result in fatal bleeding.  Discussed with him the importance of regular monitoring with his PCP.  Patient expressed understanding of this.     Anxiety and depression  - continue buspar, PRN Valium  - nightly seroquel      Well control type 2 diabetes with A1c 7.3%  --FSBG's   --Not on any meds at home.    Patient is ready for discharge home today and will be transported by family.  Discharge follow-up recommendations and appointments are listed below.    Discharge Follow Up Recommendations for outpatient labs/diagnostics:  -- Follow-up with your family doctor in 1 week  -- Follow-up with Dr. Gaitan in 4 to 6 weeks at his [facility]  [place] office  --Home health will check your INR on Monday  --Start Coumadin as prescribed  --Need to follow-up weekly for an INR check or as provider orders  --You have to take your Coumadin as prescribed for your blood clots may cause a stroke, heart attack or death  --Cards recs on DC: STOP aspirin, diltiazem, Lasix, lisinopril;  CONTINUE metoprolol, Bumex:  START Jardiance and Entresto  --Start prednisone and take as prescribed until gone    Day of Discharge     HPI:   Patient sitting up in bed telling jokes in NAD.  Patient is chronically on 3 L of oxygen at home and states he feels ready to be discharged.  Patient no longer complaining of chest pain.  No other complaints.  No adverse events overnight per nursing.    Review of Systems  Gen- No fevers, chills  CV- No chest pain, palpitations  Resp- No cough, dyspnea  GI- No N/V/D, abd pain  All other systems have been reviewed and the pertinent positives and negatives are listed above in HPI or ROS    Vital Signs:   Temp:  [97.3 °F (36.3 °C)-98.3 °F (36.8 °C)] 97.5 °F (36.4 °C)  Heart Rate:  [73-80] 78  Resp:  [16-18] 16  BP: ()/(63-85) 116/74  Flow (L/min):  [3] 3  Physical Exam:  Constitutional: Alert, obese chronically ill-appearing male lying on the bed in NAD  Eyes: EOMI, sclerae anicteric, no conjunctival injection  Head: NCAT  ENT: [place], moist mucous membranes  Respiratory: Nonlabored, symmetrical chest expansion, CTAB, 96% 3L  Cardiovascular: RRR, HR 72 SA, no M/R/G, +DP pulses bilaterally  Gastrointestinal: Obese, Soft, NT, ND +BS  Musculoskeletal: ABARCA; no LE edema bilaterally  Neurologic: Oriented x4, strength symmetric in all extremities, follows all commands, CN II-XII intact, speech clear  Skin: No rashes on exposed skin  Psychiatric: Pleasant and cooperative; normal affect  Pertinent  and/or Most Recent Results     LAB RESULTS:      Lab 04/28/23  0528 04/27/23  0529 04/26/23  0449 04/25/23  0440 04/24/23  1909 04/24/23  0442 04/22/23  1135    WBC  --  8.01 11.08* 14.68*  --  13.81* 7.21   HEMOGLOBIN  --  15.7 16.4 17.1  --  15.0 14.9   HEMATOCRIT  --  52.3* 54.1* 57.0*  --  51.8* 50.4   PLATELETS  --  316 376 368  --  369 306   NEUTROS ABS  --   --  9.27* 12.68*  --  12.40* 5.20   IMMATURE GRANS (ABS)  --   --  0.06* 0.07*  --  0.05 0.03   LYMPHS ABS  --   --  0.97 1.19  --  0.82 0.96   MONOS ABS  --   --  0.77 0.70  --  0.52 0.83   EOS ABS  --   --  0.00 0.02  --  0.00 0.13   MCV  --  87.2 87.1 88.6  --  90.1 88.7   PROTIME 25.3* 22.7* 16.5* 14.4 14.4  --   --    D DIMER QUANT  --   --   --   --   --   --  0.78*         Lab 04/27/23  0529 04/26/23  0449 04/25/23 0440 04/24/23 0442 04/22/23  1135   SODIUM 139 136 141 138 138   POTASSIUM 4.5 4.8 5.2 5.3* 4.4   CHLORIDE 96* 95* 95* 91* 95*   CO2 37.0* 34.0* 34.0* 39.0* 36.0*   ANION GAP 6.0 7.0 12.0 8.0 7.0   BUN 21 21 24* 25* 11   CREATININE 0.60* 0.71* 0.66* 0.85 0.58*   EGFR 109.1 103.7 106.0 98.2 110.3   GLUCOSE 114* 146* 156* 143* 101*   CALCIUM 8.8 9.1 9.4 9.3 9.5   MAGNESIUM  --   --   --   --  2.0   HEMOGLOBIN A1C  --   --  7.30*  --   --          Lab 04/24/23 0442 04/22/23  1135   TOTAL PROTEIN 6.4 7.0   ALBUMIN 3.7 3.7   GLOBULIN 2.7 3.3   ALT (SGPT) 13 17   AST (SGOT) 11 16   BILIRUBIN 0.2 0.3   ALK PHOS 93 100         Lab 04/28/23  0528 04/27/23  0529 04/26/23  0449 04/25/23  0440 04/24/23  1909 04/22/23  1135   PROBNP  --   --   --   --   --  2,034.0*   HSTROP T  --   --   --   --   --  87*   PROTIME 25.3* 22.7* 16.5* 14.4 14.4  --    INR 2.27* 1.99* 1.33* 1.13 1.12  --                  Brief Urine Lab Results     None        Microbiology Results (last 10 days)     Procedure Component Value - Date/Time    Respiratory Panel PCR w/COVID-19(SARS-CoV-2) JERO/ELADIO/YEIMI/PAD/COR/MAD/JONATHAN In-House, NP Swab in [facility], 3-4 HR TAT - Swab, Nasopharynx [[id number]]  (Normal) Collected: 04/22/23 1350    Lab Status: Final result Specimen: Swab from Nasopharynx Updated: 04/22/23 1455     ADENOVIRUS, PCR Not  Detected     Coronavirus 229E Not Detected     Coronavirus HKU1 Not Detected     Coronavirus NL63 Not Detected     Coronavirus OC43 Not Detected     COVID19 Not Detected     Human Metapneumovirus Not Detected     Human Rhinovirus/Enterovirus Not Detected     Influenza A PCR Not Detected     Influenza B PCR Not Detected     Parainfluenza Virus 1 Not Detected     Parainfluenza Virus 2 Not Detected     Parainfluenza Virus 3 Not Detected     Parainfluenza Virus 4 Not Detected     RSV, PCR Not Detected     Bordetella pertussis pcr Not Detected     Bordetella parapertussis PCR Not Detected     Chlamydophila pneumoniae PCR Not Detected     Mycoplasma pneumo by PCR Not Detected    Narrative:      In the setting of a positive respiratory panel with a viral infection PLUS a negative procalcitonin without other underlying concern for bacterial infection, consider observing off antibiotics or discontinuation of antibiotics and continue supportive care. If the respiratory panel is positive for atypical bacterial infection (Bordetella pertussis, Chlamydophila pneumoniae, or Mycoplasma pneumoniae), consider antibiotic de-escalation to target atypical bacterial infection.          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  No significant disease of the left coronary system. Severe left ventricular systolic dysfunction, estimated EF 30%. RECOMMENDATIONS: Optimize medical therapy and risk factor management per guidelines. At this time Jardiance 10 mg daily will be added.  Continue Toprol-XL at current dose.  Reduce the dose of Entresto to allow introduction and optimization of concomitant GDMT. Indications: Acute on chronic HFrEF, chest pain and CAD. Access: Right radial. Procedures: Left heart catheterization. Left ventriculogram. Selective coronary angiography. Arterial site hemostasis with radial band.  Procedure narrative: The patient was brought to the catheterization lab in a fasting condition.  Access site was prepped and draped in standard sterile fashion.  Lidocaine was injected and arterial access was obtained by percutaneous anterior wall puncture technique.  A 6 Georgian arterial sheath was placed. Above procedures were performed without complications.  At the conclusion the arterial sheath was removed and hemostasis was achieved.  The patient was transferred to the unit in a stable condition. Hemodynamic Findings: Heart Rate: 85/minute. LV pressure: 98/4-8 mmHg, on pull back no gradient was recorded across the aortic valve. Angiographic Findings: Bilateral coronary dominance. LM: Angiographically normal. LAD: Smooth, 40% mid segment stenosis after the origin of a large diagonal branch.  The diagonal itself has segments of 40 to 50% plaque.  The distal LAD has diffuse 30% plaque without hemodynamically significant disease. LCX: Dominant vessel which gives rise to a large bifurcating obtuse marginal trunk giving off first and second marginal branches.  Both of these branches have nonobstructive 30 to 40% plaque.  Distally the circumflex gives off 2 lateral branches and a small left PDA there is diffuse plaque noted without occlusive disease. Ramus intermedius: Small vessel with 30 to 40% plaque without occlusive disease. RCA: Codominant vessel it has been previously stented over a long mid segment.  Sequential 70 and 60% in-stent restenosis are noted with distal occlusion of the stent after the origin of a third RV marginal branch.  No significant left ventricular branches originating from the RCA are noted.  3 RV marginals are noted which has nonobstructive disease. LV: Left ventriculogram performed in 30 COLBERT projection revealed dilated left ventricle with global hypokinesis and inferior apical akinesis/dyskinesis with severe left ventricular systolic dysfunction.  Estimated ejection fraction is 30%.   Complications: No acute procedure related complications.     Result Date: 4/24/2023  FINAL     Chronic total occlusion of the previously stented codominant right coronary artery.  No significant disease of the left coronary system. Severe left ventricular systolic dysfunction, estimated EF 30%. RECOMMENDATIONS: Optimize medical therapy and risk factor management per guidelines. At this time Jardiance 10 mg daily will be added.  Continue Toprol-XL at current dose.  Reduce the dose of Entresto to allow introduction and optimization of concomitant GDMT. Indications: Acute on chronic HFrEF, chest pain and CAD. Access: Right radial. Procedures: Left heart catheterization. Left ventriculogram. Selective coronary angiography. Arterial site hemostasis with radial band. Procedure narrative: The patient was brought to the catheterization lab in a fasting condition.  Access site was prepped and draped in standard sterile fashion.  Lidocaine was injected and arterial access was obtained by percutaneous anterior wall puncture technique.  A 6 Hebrew arterial sheath was placed. Above procedures were performed without complications.  At the conclusion the arterial sheath was removed and hemostasis was achieved.  The patient was transferred to the unit in a stable condition. Hemodynamic Findings: Heart Rate: 85/minute. LV pressure: 98/4-8 mmHg, on pull back no gradient was recorded across the aortic valve. Angiographic Findings: Bilateral coronary dominance. LM: Angiographically normal. LAD: Smooth, 40% mid segment stenosis after the origin of a large diagonal branch.  The diagonal itself has segments of 40 to 50% plaque.  The distal LAD has diffuse 30% plaque without hemodynamically significant disease. LCX: Dominant vessel which gives rise to a large bifurcating obtuse marginal trunk giving off first and second marginal branches.  Both of these branches have nonobstructive 30 to 40% plaque.  Distally the circumflex gives off 2  lateral branches and a small left PDA there is diffuse plaque noted without occlusive disease. Ramus intermedius: Small vessel with 30 to 40% plaque without occlusive disease. RCA: Codominant vessel it has been previously stented over a long mid segment.  Sequential 70 and 60% in-stent restenosis are noted with distal occlusion of the stent after the origin of a third RV marginal branch.  No significant left ventricular branches originating from the RCA are noted.  3 RV marginals are noted which has nonobstructive disease. LV: Left ventriculogram performed in 30 COLBERT projection revealed dilated left ventricle with global hypokinesis and inferior apical akinesis/dyskinesis with severe left ventricular systolic dysfunction.  Estimated ejection fraction is 30%.  Complications: No acute procedure related complications.     XR Chest 1 View    Result Date: 4/22/2023  XR CHEST 1 VW Date of Exam: 4/22/2023 11:45 AM EDT Indication: SOA triage protocol Comparison: 7/10/2013. Findings: The heart appears enlarged and there is mild vascular engorgement, without overt pulmonary edema. There is no focal consolidation. There is no effusion or pneumothorax.     Impression: The heart appears enlarged and there is mild vascular engorgement, without overt pulmonary edema. There is no focal consolidation. There is no effusion or pneumothorax. Electronically Signed: Wilian Hoff  4/22/2023 12:05 PM EDT  Workstation ID: [id number]    Duplex Venous Lower Extremity - Bilateral CAR    Result Date: 4/23/2023  •  Acute left lower extremity deep vein thrombosis noted in the posterial tibial. •  All other veins appeared normal bilaterally.       Results for orders placed during the hospital encounter of 04/22/23    Duplex Venous Lower Extremity - Bilateral CAR    Interpretation Summary  •  Acute left lower extremity deep vein thrombosis noted in the posterial tibial.  •  All other veins appeared normal bilaterally.      Results for orders placed  during the hospital encounter of 04/22/23    Duplex Venous Lower Extremity - Bilateral CAR    Interpretation Summary  •  Acute left lower extremity deep vein thrombosis noted in the posterial tibial.  •  All other veins appeared normal bilaterally.          Plan for Follow-up of Pending Labs/Results:     Discharge Details        Discharge Medications      New Medications      Instructions Start Date   empagliflozin 10 MG tablet tablet  Commonly known as: JARDIANCE   10 mg, Oral, Daily   Start Date: April 29, 2023     predniSONE 20 MG tablet  Commonly known as: DELTASONE   40 mg, Oral, Daily With Breakfast   Start Date: April 29, 2023     sacubitril-valsartan 24-26 MG tablet  Commonly known as: ENTRESTO   1 tablet, Oral, Every 12 Hours Scheduled      warfarin 5 MG tablet  Commonly known as: COUMADIN   5 mg, Oral, Nightly         Changes to Medications      Instructions Start Date   diazePAM 5 MG tablet  Commonly known as: VALIUM  What changed:   · medication strength  · See the new instructions.   5 mg, Oral, Every 12 Hours PRN      Fluticasone-Umeclidin-Vilant 100-62.5-25 MCG/ACT inhaler  Commonly known as: Trelegy Ellipta  What changed: medication strength   1 puff, Inhalation, Daily, Rinse mouth with water after use.         Continue These Medications      Instructions Start Date   atorvastatin 40 MG tablet  Commonly known as: LIPITOR   40 mg, Oral, Every Night at Bedtime      bumetanide 1 MG tablet  Commonly known as: BUMEX   1 tablet, Oral, Daily      busPIRone 5 MG tablet  Commonly known as: BUSPAR   5 mg, Oral, 3 Times Daily      calcium citrate 950 (200 Ca) MG tablet  Commonly known as: CALCITRATE   1 tablet, Oral, Daily, OTC      citalopram 20 MG tablet  Commonly known as: CeleXA   1 tablet, Oral, Daily      clopidogrel 75 MG tablet  Commonly known as: PLAVIX   75 mg, Oral, Daily      cyclobenzaprine 5 MG tablet  Commonly known as: FLEXERIL   5 mg, Oral, 3 Times Daily PRN      ipratropium-albuterol 0.5-2.5  mg/3 ml nebulizer  Commonly known as: DUO-NEB   Take 3 mL by nebulization 3 (Three) Times a Day.      metoprolol succinate  MG 24 hr tablet  Commonly known as: TOPROL-XL   1 tablet, Oral, Daily      phenytoin  MG capsule  Commonly known as: DILANTIN   100 mg, Oral, 2 Times Daily      potassium chloride ER 20 MEQ tablet controlled-release ER tablet  Commonly known as: K-TAB   20 mEq, Oral, 2 Times Daily With Meals      ProAir  (90 Base) MCG/ACT inhaler  Generic drug: albuterol sulfate HFA   2 puffs, Inhalation, Every 4 Hours PRN      QUEtiapine Fumarate 150 MG tablet   1 tablet, Oral, Every Night at Bedtime         Stop These Medications    Aspirin Adult Low Strength 81 MG EC tablet  Generic drug: aspirin     dilTIAZem  MG 24 hr capsule  Commonly known as: DILACOR XR     furosemide 80 MG tablet  Commonly known as: LASIX     lisinopril 40 MG tablet  Commonly known as: PRINIVIL,ZESTRIL            No Known Allergies      Discharge Disposition:  Home-Health Care [facility]    Diet:  Hospital:  Diet Order   Procedures   • Diet: Regular/House Diet; Texture: Regular Texture (IDDSI 7); Fluid Consistency: Thin (IDDSI 0)       Activity:  Activity Instructions     Activity as Tolerated            Restrictions or Other Recommendations:  None       CODE STATUS:    Code Status and Medical Interventions:   Ordered at: 04/22/23 5508     Level Of Support Discussed With:    Patient     Code Status (Patient has no pulse and is not breathing):    CPR (Attempt to Resuscitate)     Medical Interventions (Patient has pulse or is breathing):    Full Support       Future Appointments   Date Time Provider Department Center   6/27/2023  1:00 PM Marsha Gaitan MD [facility] JONATHAN       Additional Instructions for the Follow-ups that You Need to Schedule     Ambulatory Referral to Home Health   As directed      PT/INR on Monday 5-1-2023 with results to the pts PCP to monitor/adjust.    Order Comments: PT/INR on Monday 5-1-2023  with results to the pts PCP to monitor/adjust.     Face to Face Visit Date: 4/27/2023    Follow-up provider for Plan of Care?: I treated the patient in an acute care facility and will not continue treatment after discharge.    Follow-up provider: BELEM CORONADO [[id number]]    Reason/Clinical Findings: sp hospital stay    Describe mobility limitations that make leaving home difficult: weakness, chest pain, debility    Nursing/Therapeutic Services Requested: Skilled Nursing Physical Therapy Occupational Therapy    Skilled nursing orders: Medication education Cardiopulmonary assessments O2 instruction    PT orders: Therapeutic exercise Gait Training Strengthening    Weight Bearing Status: As Tolerated    Occupational orders: Home safety assessment Activities of daily living Strengthening Fine motor Cognition    Frequency: 1 Week 1         Discharge Follow-up with PCP   As directed       Currently Documented PCP:    Belem Coronado APRN    PCP Phone Number:    [phone]     Follow Up Details: 1 week; please schedule appointment prior to patient's discharge         Discharge Follow-up with Specialty: Dr. Gaitan at [facility]; 6 Weeks   As directed      Specialty: Dr. Gaitan at [facility]    Follow Up: 6 Weeks         Discharge Follow-up with Specified Provider: Dr. Gaitan; 4 to 6 weeks in [place] office; please schedule appointment prior to patient's discharge   As directed      To: Dr. Gaitan; 4 to 6 weeks in [place] office; please schedule appointment prior to patient's discharge         Discharge Follow-up with Specified Provider: Home health will check your INR on Monday   As directed      To: Home health will check your INR on Monday             KRISTIN Bueno  04/28/23      Time Spent on Discharge:  I spent 45  minutes on this discharge activity which included: face-to-face encounter with the patient, reviewing the data in the system, coordination of the care with the nursing  staff as well as consultants, documentation, and entering orders.

## 2023-04-29 NOTE — OUTREACH NOTE
Prep Survey    Flowsheet Row Responses   [facility] facility patient discharged from? [facility]   Is LACE score < 7 ? No   Eligibility Readm Mgmt   Discharge diagnosis Acute on Chronic Respiratory Failure w/Hypoxia   Does the patient have one of the following disease processes/diagnoses(primary or secondary)? CHF   Does the patient have Home health ordered? Yes   What is the Home health agency?  [facility]    Is there a DME ordered? No   Prep survey completed? Yes          Usha SANTOS - Registered Nurse

## 2023-05-02 ENCOUNTER — READMISSION MANAGEMENT (OUTPATIENT)
Dept: CALL CENTER | Facility: HOSPITAL | Age: 63
End: 2023-05-02
Payer: MEDICAID

## 2023-05-02 NOTE — OUTREACH NOTE
CHF Week 1 Survey    Flowsheet Row Responses   [facility], [place], operated by [facility] patient discharged from? [place]   Does the patient have one of the following disease processes/diagnoses(primary or secondary)? CHF   CHF Week 1 attempt successful? Yes   Call start time 1541   Call end time 1544   Discharge diagnosis Acute on Chronic Respiratory Failure w/Hypoxia   Meds reviewed with patient/caregiver? Yes   Is the patient having any side effects they believe may be caused by any medication additions or changes? No   Does the patient have all medications ordered at discharge? Yes   Is the patient taking all medications as directed (includes completed medication regime)? Yes   Does the patient have a primary care provider?  Yes   Does the patient have an appointment with their PCP within 7 days of discharge? Yes   Has the patient kept scheduled appointments due by today? Yes   What is the Home health agency?  [facility]    Has home health visited the patient within 72 hours of discharge? Yes   Pulse Ox monitoring Intermittent   Pulse Ox device source Patient   O2 Sat comments O2 sats are running 97% today   Psychosocial issues? No   Did the patient receive a copy of their discharge instructions? Yes   Nursing interventions Reviewed instructions with patient   What is the patient's perception of their health status since discharge? Improving   Nursing interventions Nurse provided patient education   Is the patient able to teach back signs and symptoms of worsening condition? (i.e. weight gain, shortness of air, etc.) Yes   If the patient is a current smoker, are they able to teach back resources for cessation? Not a smoker   Is the patient/caregiver able to teach back the hierarchy of who to call/visit for symptoms/problems? PCP, Specialist, Home health nurse, Urgent Care, ED, 911 Yes   Is the patient able to teach back Heart Failure Zones? Yes   CHF Nursing Interventions Education provided on various zones   CHF Zone this  Call Green Zone   Green Zone Patient reports doing well, No new or worsening shortness of breath, Physical activity level is normal for you, No new swelling -  feet, ankles and legs look normal for you, Weight check stable, No chest pain   Green Zone Interventions Meds as directed, Low sodium diet, Daily weight check, Follow up visits planned    CHF Week 1 call completed? Yes   Call end time 1870          Martha KELLEY - Licensed Nurse

## 2023-05-05 NOTE — PROGRESS NOTES
"Enter Query Response Below      Query Response: Acute on chronic systolic heart failure due to ICM             If applicable, please update the problem list.   Patient: Fernie Dalal        : 1960  Account: [id number]           Admit Date: 2023        How to Respond to this query:       a. Click New Note     b. Answer query within the yellow box.                c. Update the Problem List, if applicable.      If you have any questions about this query contact me at: [email or web address]    Dr. Gaitan    62 yo male admitted 23 for \"AE COPD, acute on chronic respiratory failure, and HFrEF\" per History and Physical.  Also noted is \"does not appear volume overloaded at this time.\"   Cardiology consulted, -23 notes include \"Acute on chronic systolic congestive heart failure/ICM.\" Also noted is \"worsening possibly due to initiation of diltiazem and decreased LVEF\" on -23.  Chest xray 23 \"The heart appears enlarged and there is mild vascular engorgement, without overt pulmonary edema.\"  ProBNP reported (23)-.  Discharge summary \"chronic systolic congestive heart failure.\"   Treatment included IV Lasix -23 transitioned to oral Bumex on 23. Additional new medications at discharge include Jardiance and Entresto. The following medications were discontinued at discharge: aspirin, diltiazem, Lasix, and prinivil.  Query response 5/3/23 by Myrtle FOSTER \"Please direct this query to cardiology since they were following the patient's cardiac issues.\"    After study, can you please clarify conflicting documentation as    Acute on chronic systolic congestive heart failure due to ICM  Chronic systolic congestive heart failure due to ICM  Other (please specify)____________  Unable to determine    By submitting this query, we are merely seeking further clarification of documentation to accurately reflect all conditions that you are monitoring, evaluating, treating or " that extend the hospitalization or utilize additional resources of care. Please utilize your independent clinical judgment when addressing the question(s) above.     This query and your response, once completed, will be entered into the legal medical record.    Sincerely,  Marcy CORBIN RN CCDS  System Director Clinical Documentation Integrity Program

## 2023-05-10 ENCOUNTER — READMISSION MANAGEMENT (OUTPATIENT)
Dept: CALL CENTER | Facility: HOSPITAL | Age: 63
End: 2023-05-10
Payer: MEDICAID

## 2023-05-10 NOTE — OUTREACH NOTE
CHF Week 2 Survey    Flowsheet Row Responses   [facility] facility patient discharged from? [place]   Does the patient have one of the following disease processes/diagnoses(primary or secondary)? CHF   Week 2 attempt successful? No   Unsuccessful attempts Attempt 1          Violet MICHELLE - Registered Nurse

## 2023-05-15 ENCOUNTER — READMISSION MANAGEMENT (OUTPATIENT)
Dept: CALL CENTER | Facility: HOSPITAL | Age: 63
End: 2023-05-15
Payer: MEDICAID

## 2023-05-15 NOTE — OUTREACH NOTE
"CHF Week 2 Survey    Flowsheet Row Responses   [facility] patient discharged from? [place]   Does the patient have one of the following disease processes/diagnoses(primary or secondary)? CHF   Week 2 attempt successful? Yes   Call start time 1250   Call end time 1259   Discharge diagnosis Acute on Chronic Respiratory Failure w/Hypoxia   Meds reviewed with patient/caregiver? Yes   Is the patient taking all medications as directed (includes completed medication regime)? Yes   Medication comments PCP ordered another \"water pill\"   Does the patient have a primary care provider?  Yes   Has the patient kept scheduled appointments due by today? Yes   What is the Home health agency?  [facility]    Has home health visited the patient within 72 hours of discharge? Yes   Psychosocial issues? No   Did the patient receive a copy of their discharge instructions? Yes   Nursing interventions Reviewed instructions with patient   What is the patient's perception of their health status since discharge? Improving   Nursing interventions Nurse provided patient education   Is the patient able to teach back signs and symptoms of worsening condition? (i.e. weight gain, shortness of air, etc.) Yes   Is the patient/caregiver able to teach back the hierarchy of who to call/visit for symptoms/problems? PCP, Specialist, Home health nurse, Urgent Care, ED, 911 Yes   CHF Week 2 call completed? Yes   Wrap up additional comments Pt reports that HH is coming out and they are working on getting him a scale. Also reports that he has swelling to ankles and legs. Pt states PCP put him back on a new water pilll. Advised pt if no improvement to call his Dr and to inform HH of any issues.    Call end time 1259          MIGUEL GUTIÉRREZ - Registered Nurse  "

## 2023-06-28 PROBLEM — I42.0 CARDIOMYOPATHY, DILATED: Status: ACTIVE | Noted: 2023-06-28

## 2023-06-28 PROBLEM — E78.5 HYPERLIPEMIA: Status: RESOLVED | Noted: 2022-03-18 | Resolved: 2023-06-28

## 2023-06-28 PROBLEM — R94.39 ABNORMAL STRESS TEST: Status: RESOLVED | Noted: 2023-04-18 | Resolved: 2023-06-28

## 2023-06-28 PROBLEM — R07.9 NONSPECIFIC CHEST PAIN: Status: RESOLVED | Noted: 2023-04-22 | Resolved: 2023-06-28

## 2023-06-28 PROBLEM — R07.9 CHEST PAIN: Status: RESOLVED | Noted: 2022-03-17 | Resolved: 2023-06-28

## 2023-09-21 ENCOUNTER — TELEPHONE (OUTPATIENT)
Dept: CARDIOLOGY | Facility: CLINIC | Age: 63
End: 2023-09-21
Payer: MEDICAID

## 2023-09-21 NOTE — TELEPHONE ENCOUNTER
Caller: [facility]    Relationship to patient: NURSE/HOSPITAL    Best call back number: [phone]    New or established patient?  [] New  [x] Established    Date of discharge: 09.21.23    Facility discharged from: [facility]    Diagnosis/Symptoms: ACUTE RESPIRATORY FAILURE, CO2 HIGH UPON ARRIVAL    Length of stay (If applicable): ONE WEEK    Specialty Only: Did you see a [facility] provider?    [] Yes  [x] No  If so, who?      PT NEEDS ONE WEEK HOSPITAL FU VISIT IN [facility] OFFICE. PLEASE REACH OUT TO PT WITH APPT, THANK YOU

## 2023-09-29 ENCOUNTER — HOSPITAL ENCOUNTER (OUTPATIENT)
Dept: CARDIOLOGY | Facility: HOSPITAL | Age: 63
Discharge: HOME | End: 2023-09-29
Payer: MEDICAID

## 2023-09-29 VITALS — WEIGHT: 233 LBS | BODY MASS INDEX: 34.51 KG/M2 | HEIGHT: 69 IN

## 2023-09-29 DIAGNOSIS — I42.0 CARDIOMYOPATHY, DILATED: ICD-10-CM

## 2023-09-29 DIAGNOSIS — I50.22 CHRONIC SYSTOLIC CONGESTIVE HEART FAILURE: ICD-10-CM

## 2023-09-29 PROCEDURE — 93306 TTE W/DOPPLER COMPLETE: CPT

## 2023-10-02 LAB
BH CV ECHO MEAS - AO MAX PG: 15 MMHG
BH CV ECHO MEAS - AO MEAN PG: 8.5 MMHG
BH CV ECHO MEAS - AO ROOT DIAM: 3.4 CM
BH CV ECHO MEAS - AO V2 MAX: 193.8 CM/SEC
BH CV ECHO MEAS - AO V2 VTI: 27.4 CM
BH CV ECHO MEAS - AVA(I,D): 2.08 CM2
BH CV ECHO MEAS - EDV(CUBED): 247.7 ML
BH CV ECHO MEAS - EDV(MOD-SP2): 266 ML
BH CV ECHO MEAS - EDV(MOD-SP4): 263 ML
BH CV ECHO MEAS - EF(MOD-BP): 32 %
BH CV ECHO MEAS - EF(MOD-SP2): 21.4 %
BH CV ECHO MEAS - EF(MOD-SP4): 40.7 %
BH CV ECHO MEAS - ESV(CUBED): 140.6 ML
BH CV ECHO MEAS - ESV(MOD-SP2): 209 ML
BH CV ECHO MEAS - ESV(MOD-SP4): 156 ML
BH CV ECHO MEAS - FS: 17.2 %
BH CV ECHO MEAS - IVS/LVPW: 1.11 CM
BH CV ECHO MEAS - IVSD: 1.05 CM
BH CV ECHO MEAS - LA DIMENSION: 5.1 CM
BH CV ECHO MEAS - LAT PEAK E' VEL: 14.8 CM/SEC
BH CV ECHO MEAS - LV DIASTOLIC VOL/BSA (35-75): 119.3 CM2
BH CV ECHO MEAS - LV MASS(C)D: 266 GRAMS
BH CV ECHO MEAS - LV MAX PG: 2.14 MMHG
BH CV ECHO MEAS - LV MEAN PG: 1 MMHG
BH CV ECHO MEAS - LV SYSTOLIC VOL/BSA (12-30): 70.8 CM2
BH CV ECHO MEAS - LV V1 MAX: 73.1 CM/SEC
BH CV ECHO MEAS - LV V1 VTI: 13.7 CM
BH CV ECHO MEAS - LVIDD: 6.3 CM
BH CV ECHO MEAS - LVIDS: 5.2 CM
BH CV ECHO MEAS - LVOT AREA: 4.2 CM2
BH CV ECHO MEAS - LVOT DIAM: 2.3 CM
BH CV ECHO MEAS - LVPWD: 0.95 CM
BH CV ECHO MEAS - MED PEAK E' VEL: 11 CM/SEC
BH CV ECHO MEAS - MR MAX PG: 94.5 MMHG
BH CV ECHO MEAS - MR MAX VEL: 486 CM/SEC
BH CV ECHO MEAS - MR MEAN PG: 67 MMHG
BH CV ECHO MEAS - MR MEAN VEL: 390 CM/SEC
BH CV ECHO MEAS - MR VTI: 142 CM
BH CV ECHO MEAS - MV DEC SLOPE: 939 CM/SEC2
BH CV ECHO MEAS - MV DEC TIME: 0.12 SEC
BH CV ECHO MEAS - MV E MAX VEL: 160 CM/SEC
BH CV ECHO MEAS - MV MAX PG: 11.8 MMHG
BH CV ECHO MEAS - MV MEAN PG: 3 MMHG
BH CV ECHO MEAS - MV P1/2T: 52.1 MSEC
BH CV ECHO MEAS - MV V2 VTI: 27.2 CM
BH CV ECHO MEAS - MVA(P1/2T): 4.2 CM2
BH CV ECHO MEAS - MVA(VTI): 2.09 CM2
BH CV ECHO MEAS - PA ACC SLOPE: 524 CM/SEC2
BH CV ECHO MEAS - PA ACC TIME: 0.12 SEC
BH CV ECHO MEAS - PA V2 MAX: 114 CM/SEC
BH CV ECHO MEAS - RAP SYSTOLE: 10 MMHG
BH CV ECHO MEAS - RVSP: 42 MMHG
BH CV ECHO MEAS - SI(MOD-SP2): 25.9 ML/M2
BH CV ECHO MEAS - SI(MOD-SP4): 48.5 ML/M2
BH CV ECHO MEAS - SV(LVOT): 56.9 ML
BH CV ECHO MEAS - SV(MOD-SP2): 57 ML
BH CV ECHO MEAS - SV(MOD-SP4): 107 ML
BH CV ECHO MEAS - TAPSE (>1.6): 2.4 CM
BH CV ECHO MEAS - TR MAX PG: 31.6 MMHG
BH CV ECHO MEAS - TR MAX VEL: 281 CM/SEC
BH CV ECHO MEASUREMENTS AVERAGE E/E' RATIO: 12.4
BH CV XLRA - RV BASE: 5.1 CM
BH CV XLRA - RV LENGTH: 9.2 CM
BH CV XLRA - RV MID: 3.8 CM
BH CV XLRA - TDI S': 11.3 CM/SEC
LEFT ATRIUM VOLUME INDEX: 39.1 ML/M2
LV EF 2D ECHO EST: 36 %

## 2023-10-16 ENCOUNTER — TELEPHONE (OUTPATIENT)
Dept: CARDIOLOGY | Facility: CLINIC | Age: 63
End: 2023-10-16
Payer: MEDICAID

## 2023-10-16 NOTE — TELEPHONE ENCOUNTER
Called patient to relay echo results. Patient verbalized understanding.   He does have an appointment coming up to further discuss treatment.

## 2023-10-31 ENCOUNTER — OFFICE VISIT (OUTPATIENT)
Dept: CARDIOLOGY | Facility: CLINIC | Age: 63
End: 2023-10-31
Payer: MEDICAID

## 2023-10-31 VITALS
BODY MASS INDEX: 34.51 KG/M2 | OXYGEN SATURATION: 94 % | DIASTOLIC BLOOD PRESSURE: 100 MMHG | WEIGHT: 233 LBS | SYSTOLIC BLOOD PRESSURE: 162 MMHG | HEART RATE: 124 BPM | HEIGHT: 69 IN

## 2023-10-31 DIAGNOSIS — I25.10 CORONARY ARTERY DISEASE INVOLVING NATIVE CORONARY ARTERY OF NATIVE HEART WITHOUT ANGINA PECTORIS: Primary | ICD-10-CM

## 2023-10-31 DIAGNOSIS — I50.22 CHRONIC SYSTOLIC CONGESTIVE HEART FAILURE: ICD-10-CM

## 2023-10-31 DIAGNOSIS — I10 ESSENTIAL HYPERTENSION: ICD-10-CM

## 2023-10-31 DIAGNOSIS — E78.5 DYSLIPIDEMIA: ICD-10-CM

## 2023-10-31 DIAGNOSIS — I42.0 CARDIOMYOPATHY, DILATED: ICD-10-CM
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
## 2023-11-02 ENCOUNTER — OUTSIDE FACILITY SERVICE (OUTPATIENT)
Dept: CARDIOLOGY | Facility: CLINIC | Age: 63
End: 2023-11-02
Payer: MEDICAID

## 2023-12-09 ENCOUNTER — OUTSIDE FACILITY SERVICE (OUTPATIENT)
Dept: CARDIOLOGY | Facility: CLINIC | Age: 63
End: 2023-12-09
Payer: MEDICAID

## 2023-12-12 ENCOUNTER — OUTSIDE FACILITY SERVICE (OUTPATIENT)
Dept: CARDIOLOGY | Facility: CLINIC | Age: 63
End: 2023-12-12
Payer: MEDICAID

## 2024-09-01 ENCOUNTER — APPOINTMENT (OUTPATIENT)
Dept: GENERAL RADIOLOGY | Facility: HOSPITAL | Age: 64
End: 2024-09-01
Payer: MEDICAID

## 2024-09-01 ENCOUNTER — APPOINTMENT (OUTPATIENT)
Dept: CT IMAGING | Facility: HOSPITAL | Age: 64
End: 2024-09-01
Payer: MEDICAID

## 2024-09-01 ENCOUNTER — HOSPITAL ENCOUNTER (INPATIENT)
Facility: HOSPITAL | Age: 64
LOS: 24 days | Discharge: NURSING, REHAB OR LONG-TERM CARE | End: 2024-09-25
Attending: EMERGENCY MEDICINE | Admitting: INTERNAL MEDICINE
Payer: MEDICAID

## 2024-09-01 ENCOUNTER — APPOINTMENT (OUTPATIENT)
Dept: CARDIOLOGY | Facility: HOSPITAL | Age: 64
End: 2024-09-01
Payer: MEDICAID

## 2024-09-01 DIAGNOSIS — J96.21 ACUTE ON CHRONIC RESPIRATORY FAILURE WITH HYPOXIA: ICD-10-CM

## 2024-09-01 DIAGNOSIS — R07.9 CHEST PAIN, UNSPECIFIED TYPE: ICD-10-CM

## 2024-09-01 DIAGNOSIS — I50.20 HFREF (HEART FAILURE WITH REDUCED EJECTION FRACTION): ICD-10-CM

## 2024-09-01 DIAGNOSIS — J96.01 ACUTE RESPIRATORY FAILURE WITH HYPOXIA: Primary | ICD-10-CM

## 2024-09-01 DIAGNOSIS — J69.0 ASPIRATION PNEUMONIA OF RIGHT LOWER LOBE, UNSPECIFIED ASPIRATION PNEUMONIA TYPE: ICD-10-CM

## 2024-09-01 PROBLEM — I21.4 NSTEMI (NON-ST ELEVATED MYOCARDIAL INFARCTION): Status: ACTIVE | Noted: 2024-09-01

## 2024-09-01 PROBLEM — I25.119 CORONARY ARTERY DISEASE INVOLVING NATIVE CORONARY ARTERY OF NATIVE HEART WITH ANGINA PECTORIS: Status: ACTIVE | Noted: 2022-04-04

## 2024-09-01 PROBLEM — I50.23 ACUTE ON CHRONIC SYSTOLIC CONGESTIVE HEART FAILURE: Status: ACTIVE | Noted: 2022-04-04

## 2024-09-01 PROBLEM — R06.00 DYSPNEA: Status: ACTIVE | Noted: 2024-09-01

## 2024-09-01 PROBLEM — R06.00 DYSPNEA: Status: RESOLVED | Noted: 2024-09-01 | Resolved: 2024-09-01

## 2024-09-01 LAB
ALBUMIN SERPL-MCNC: 3.9 G/DL (ref 3.5–5.2)
ALBUMIN/GLOB SERPL: 1.2 G/DL
ALP SERPL-CCNC: 87 U/L (ref 39–117)
ALT SERPL W P-5'-P-CCNC: 13 U/L (ref 1–41)
ANION GAP SERPL CALCULATED.3IONS-SCNC: 9 MMOL/L (ref 5–15)
APTT PPP: 26.1 SECONDS (ref 60–90)
ARTERIAL PATENCY WRIST A: ABNORMAL
AST SERPL-CCNC: 26 U/L (ref 1–40)
ATMOSPHERIC PRESS: ABNORMAL MM[HG]
B PARAPERT DNA SPEC QL NAA+PROBE: NOT DETECTED
B PERT DNA SPEC QL NAA+PROBE: NOT DETECTED
BASE EXCESS BLDA CALC-SCNC: 6.5 MMOL/L (ref 0–2)
BASOPHILS # BLD AUTO: 0.09 10*3/MM3 (ref 0–0.2)
BASOPHILS NFR BLD AUTO: 0.4 % (ref 0–1.5)
BDY SITE: ABNORMAL
BH CV ECHO MEAS - AO MAX PG: 22.7 MMHG
BH CV ECHO MEAS - AO MEAN PG: 15 MMHG
BH CV ECHO MEAS - AO ROOT DIAM: 3.7 CM
BH CV ECHO MEAS - AO V2 MAX: 238.3 CM/SEC
BH CV ECHO MEAS - AO V2 VTI: 37.2 CM
BH CV ECHO MEAS - AVA(I,D): 1.12 CM2
BH CV ECHO MEAS - EDV(CUBED): 328.5 ML
BH CV ECHO MEAS - EDV(MOD-SP2): 272 ML
BH CV ECHO MEAS - EDV(MOD-SP4): 287 ML
BH CV ECHO MEAS - EF(MOD-BP): 23.8 %
BH CV ECHO MEAS - EF(MOD-SP2): 21 %
BH CV ECHO MEAS - EF(MOD-SP4): 25.1 %
BH CV ECHO MEAS - ESV(CUBED): 148.9 ML
BH CV ECHO MEAS - ESV(MOD-SP2): 215 ML
BH CV ECHO MEAS - ESV(MOD-SP4): 215 ML
BH CV ECHO MEAS - FS: 23.2 %
BH CV ECHO MEAS - IVS/LVPW: 1 CM
BH CV ECHO MEAS - IVSD: 1.2 CM
BH CV ECHO MEAS - LA DIMENSION: 5.3 CM
BH CV ECHO MEAS - LAT PEAK E' VEL: 12.6 CM/SEC
BH CV ECHO MEAS - LV DIASTOLIC VOL/BSA (35-75): 130.2 CM2
BH CV ECHO MEAS - LV MASS(C)D: 396.5 GRAMS
BH CV ECHO MEAS - LV MAX PG: 2.8 MMHG
BH CV ECHO MEAS - LV MEAN PG: 1.5 MMHG
BH CV ECHO MEAS - LV SYSTOLIC VOL/BSA (12-30): 97.5 CM2
BH CV ECHO MEAS - LV V1 MAX: 83.1 CM/SEC
BH CV ECHO MEAS - LV V1 VTI: 12 CM
BH CV ECHO MEAS - LVIDD: 6.9 CM
BH CV ECHO MEAS - LVIDS: 5.3 CM
BH CV ECHO MEAS - LVOT AREA: 3.5 CM2
BH CV ECHO MEAS - LVOT DIAM: 2.1 CM
BH CV ECHO MEAS - LVPWD: 1.2 CM
BH CV ECHO MEAS - MED PEAK E' VEL: 12.3 CM/SEC
BH CV ECHO MEAS - MR MAX PG: 87.6 MMHG
BH CV ECHO MEAS - MR MAX VEL: 468 CM/SEC
BH CV ECHO MEAS - MR MEAN PG: 57 MMHG
BH CV ECHO MEAS - MR MEAN VEL: 361 CM/SEC
BH CV ECHO MEAS - MR VTI: 137 CM
BH CV ECHO MEAS - MV DEC SLOPE: 1167 CM/SEC2
BH CV ECHO MEAS - MV DEC TIME: 0.12 SEC
BH CV ECHO MEAS - MV E MAX VEL: 165 CM/SEC
BH CV ECHO MEAS - MV MAX PG: 11.8 MMHG
BH CV ECHO MEAS - MV MEAN PG: 7 MMHG
BH CV ECHO MEAS - MV P1/2T: 42.7 MSEC
BH CV ECHO MEAS - MV V2 VTI: 25.7 CM
BH CV ECHO MEAS - MVA(P1/2T): 5.2 CM2
BH CV ECHO MEAS - MVA(VTI): 1.62 CM2
BH CV ECHO MEAS - PA ACC TIME: 0.1 SEC
BH CV ECHO MEAS - RAP SYSTOLE: 3 MMHG
BH CV ECHO MEAS - RVSP: 25 MMHG
BH CV ECHO MEAS - SV(LVOT): 41.6 ML
BH CV ECHO MEAS - SV(MOD-SP2): 57 ML
BH CV ECHO MEAS - SV(MOD-SP4): 72 ML
BH CV ECHO MEAS - SVI(LVOT): 18.9 ML/M2
BH CV ECHO MEAS - SVI(MOD-SP2): 25.9 ML/M2
BH CV ECHO MEAS - SVI(MOD-SP4): 32.7 ML/M2
BH CV ECHO MEAS - TAPSE (>1.6): 2.2 CM
BH CV ECHO MEAS - TR MAX PG: 22.1 MMHG
BH CV ECHO MEAS - TR MAX VEL: 235 CM/SEC
BH CV ECHO MEASUREMENTS AVERAGE E/E' RATIO: 13.25
BH CV VAS BP RIGHT ARM: NORMAL MMHG
BH CV XLRA - RV BASE: 4.8 CM
BH CV XLRA - RV LENGTH: 10.1 CM
BH CV XLRA - RV MID: 4 CM
BH CV XLRA - TDI S': 13.1 CM/SEC
BILIRUB SERPL-MCNC: 0.4 MG/DL (ref 0–1.2)
BILIRUB UR QL STRIP: NEGATIVE
BODY TEMPERATURE: 37
BUN SERPL-MCNC: 19 MG/DL (ref 8–23)
BUN/CREAT SERPL: 22.9 (ref 7–25)
C PNEUM DNA NPH QL NAA+NON-PROBE: NOT DETECTED
CALCIUM SPEC-SCNC: 9 MG/DL (ref 8.6–10.5)
CHLORIDE SERPL-SCNC: 100 MMOL/L (ref 98–107)
CHOLEST SERPL-MCNC: 225 MG/DL (ref 0–200)
CLARITY UR: CLEAR
CO2 BLDA-SCNC: 36.2 MMOL/L (ref 22–33)
CO2 SERPL-SCNC: 31 MMOL/L (ref 22–29)
COHGB MFR BLD: 1.5 % (ref 0–2)
COLOR UR: YELLOW
CREAT SERPL-MCNC: 0.83 MG/DL (ref 0.76–1.27)
D-LACTATE SERPL-SCNC: 1.8 MMOL/L (ref 0.5–2)
DEPRECATED RDW RBC AUTO: 47 FL (ref 37–54)
EGFRCR SERPLBLD CKD-EPI 2021: 98.3 ML/MIN/1.73
EOSINOPHIL # BLD AUTO: 0.04 10*3/MM3 (ref 0–0.4)
EOSINOPHIL NFR BLD AUTO: 0.2 % (ref 0.3–6.2)
EPAP: 0
ERYTHROCYTE [DISTWIDTH] IN BLOOD BY AUTOMATED COUNT: 13.9 % (ref 12.3–15.4)
FLUAV SUBTYP SPEC NAA+PROBE: NOT DETECTED
FLUBV RNA ISLT QL NAA+PROBE: NOT DETECTED
GEN 5 2HR TROPONIN T REFLEX: 295 NG/L
GLOBULIN UR ELPH-MCNC: 3.3 GM/DL
GLUCOSE SERPL-MCNC: 146 MG/DL (ref 65–99)
GLUCOSE UR STRIP-MCNC: ABNORMAL MG/DL
HADV DNA SPEC NAA+PROBE: NOT DETECTED
HBA1C MFR BLD: 6 % (ref 4.8–5.6)
HCO3 BLDA-SCNC: 34.2 MMOL/L (ref 20–26)
HCOV 229E RNA SPEC QL NAA+PROBE: NOT DETECTED
HCOV HKU1 RNA SPEC QL NAA+PROBE: NOT DETECTED
HCOV NL63 RNA SPEC QL NAA+PROBE: NOT DETECTED
HCOV OC43 RNA SPEC QL NAA+PROBE: NOT DETECTED
HCT VFR BLD AUTO: 45 % (ref 37.5–51)
HCT VFR BLD CALC: 41.2 % (ref 38–51)
HDLC SERPL-MCNC: 61 MG/DL (ref 40–60)
HGB BLD-MCNC: 13.5 G/DL (ref 13–17.7)
HGB BLDA-MCNC: 13.4 G/DL (ref 13.5–17.5)
HGB UR QL STRIP.AUTO: NEGATIVE
HMPV RNA NPH QL NAA+NON-PROBE: NOT DETECTED
HOLD SPECIMEN: NORMAL
HPIV1 RNA ISLT QL NAA+PROBE: NOT DETECTED
HPIV2 RNA SPEC QL NAA+PROBE: NOT DETECTED
HPIV3 RNA NPH QL NAA+PROBE: NOT DETECTED
HPIV4 P GENE NPH QL NAA+PROBE: NOT DETECTED
IMM GRANULOCYTES # BLD AUTO: 0.14 10*3/MM3 (ref 0–0.05)
IMM GRANULOCYTES NFR BLD AUTO: 0.6 % (ref 0–0.5)
INHALED O2 CONCENTRATION: 44 %
INR PPP: 0.97 (ref 0.89–1.12)
IPAP: 0
KETONES UR QL STRIP: NEGATIVE
LDLC SERPL CALC-MCNC: 144 MG/DL (ref 0–100)
LDLC/HDLC SERPL: 2.32 {RATIO}
LEFT ATRIUM VOLUME INDEX: 42.9 ML/M2
LEUKOCYTE ESTERASE UR QL STRIP.AUTO: NEGATIVE
LIPASE SERPL-CCNC: 9 U/L (ref 13–60)
LV EF 2D ECHO EST: 28 %
LYMPHOCYTES # BLD AUTO: 0.52 10*3/MM3 (ref 0.7–3.1)
LYMPHOCYTES NFR BLD AUTO: 2.4 % (ref 19.6–45.3)
M PNEUMO IGG SER IA-ACNC: NOT DETECTED
MAGNESIUM SERPL-MCNC: 1.7 MG/DL (ref 1.6–2.4)
MCH RBC QN AUTO: 27.6 PG (ref 26.6–33)
MCHC RBC AUTO-ENTMCNC: 30 G/DL (ref 31.5–35.7)
MCV RBC AUTO: 91.8 FL (ref 79–97)
METHGB BLD QL: 0.2 % (ref 0–1.5)
MODALITY: ABNORMAL
MONOCYTES # BLD AUTO: 2.17 10*3/MM3 (ref 0.1–0.9)
MONOCYTES NFR BLD AUTO: 9.8 % (ref 5–12)
MRSA DNA SPEC QL NAA+PROBE: NEGATIVE
NEUTROPHILS NFR BLD AUTO: 19.13 10*3/MM3 (ref 1.7–7)
NEUTROPHILS NFR BLD AUTO: 86.6 % (ref 42.7–76)
NITRITE UR QL STRIP: NEGATIVE
NRBC BLD AUTO-RTO: 0 /100 WBC (ref 0–0.2)
NT-PROBNP SERPL-MCNC: 3232 PG/ML (ref 0–900)
OXYHGB MFR BLDV: 92.9 % (ref 94–99)
PAW @ PEAK INSP FLOW SETTING VENT: 0 CMH2O
PCO2 BLDA: 62.7 MM HG (ref 35–45)
PCO2 TEMP ADJ BLD: 62.7 MM HG (ref 35–48)
PH BLDA: 7.35 PH UNITS (ref 7.35–7.45)
PH UR STRIP.AUTO: <=5 [PH] (ref 5–8)
PH, TEMP CORRECTED: 7.35 PH UNITS
PLATELET # BLD AUTO: 333 10*3/MM3 (ref 140–450)
PMV BLD AUTO: 10.4 FL (ref 6–12)
PO2 BLDA: 72 MM HG (ref 83–108)
PO2 TEMP ADJ BLD: 72 MM HG (ref 83–108)
POTASSIUM SERPL-SCNC: 4.6 MMOL/L (ref 3.5–5.2)
PROCALCITONIN SERPL-MCNC: 4.18 NG/ML (ref 0–0.25)
PROT SERPL-MCNC: 7.2 G/DL (ref 6–8.5)
PROT UR QL STRIP: ABNORMAL
PROTHROMBIN TIME: 13 SECONDS (ref 12.2–14.5)
RBC # BLD AUTO: 4.9 10*6/MM3 (ref 4.14–5.8)
RHINOVIRUS RNA SPEC NAA+PROBE: NOT DETECTED
RSV RNA NPH QL NAA+NON-PROBE: NOT DETECTED
SARS-COV-2 RNA NPH QL NAA+NON-PROBE: NOT DETECTED
SODIUM SERPL-SCNC: 140 MMOL/L (ref 136–145)
SP GR UR STRIP: 1.03 (ref 1–1.03)
TOTAL RATE: 0 BREATHS/MINUTE
TRIGL SERPL-MCNC: 112 MG/DL (ref 0–150)
TROPONIN T DELTA: 59 NG/L
TROPONIN T SERPL HS-MCNC: 236 NG/L
UFH PPP CHRO-ACNC: 0.1 IU/ML (ref 0.3–0.7)
UFH PPP CHRO-ACNC: 0.1 IU/ML (ref 0.3–0.7)
UROBILINOGEN UR QL STRIP: ABNORMAL
VLDLC SERPL-MCNC: 20 MG/DL (ref 5–40)
WBC NRBC COR # BLD AUTO: 22.09 10*3/MM3 (ref 3.4–10.8)
WHOLE BLOOD HOLD COAG: NORMAL
WHOLE BLOOD HOLD SPECIMEN: NORMAL

## 2024-09-01 PROCEDURE — 94799 UNLISTED PULMONARY SVC/PX: CPT

## 2024-09-01 PROCEDURE — 25010000002 FUROSEMIDE PER 20 MG: Performed by: INTERNAL MEDICINE

## 2024-09-01 PROCEDURE — 85730 THROMBOPLASTIN TIME PARTIAL: CPT

## 2024-09-01 PROCEDURE — G0378 HOSPITAL OBSERVATION PER HR: HCPCS

## 2024-09-01 PROCEDURE — 25010000002 HEPARIN (PORCINE) PER 1000 UNITS

## 2024-09-01 PROCEDURE — 84145 PROCALCITONIN (PCT): CPT

## 2024-09-01 PROCEDURE — 99285 EMERGENCY DEPT VISIT HI MDM: CPT

## 2024-09-01 PROCEDURE — 82805 BLOOD GASES W/O2 SATURATION: CPT

## 2024-09-01 PROCEDURE — 71045 X-RAY EXAM CHEST 1 VIEW: CPT

## 2024-09-01 PROCEDURE — 36415 COLL VENOUS BLD VENIPUNCTURE: CPT

## 2024-09-01 PROCEDURE — 84484 ASSAY OF TROPONIN QUANT: CPT | Performed by: EMERGENCY MEDICINE

## 2024-09-01 PROCEDURE — 36600 WITHDRAWAL OF ARTERIAL BLOOD: CPT

## 2024-09-01 PROCEDURE — 25010000002 SULFUR HEXAFLUORIDE MICROSPH 60.7-25 MG RECONSTITUTED SUSPENSION: Performed by: INTERNAL MEDICINE

## 2024-09-01 PROCEDURE — 99254 IP/OBS CNSLTJ NEW/EST MOD 60: CPT | Performed by: INTERNAL MEDICINE

## 2024-09-01 PROCEDURE — 83880 ASSAY OF NATRIURETIC PEPTIDE: CPT | Performed by: EMERGENCY MEDICINE

## 2024-09-01 PROCEDURE — 82375 ASSAY CARBOXYHB QUANT: CPT

## 2024-09-01 PROCEDURE — 25010000002 HEPARIN (PORCINE) 25000-0.45 UT/250ML-% SOLUTION

## 2024-09-01 PROCEDURE — 85610 PROTHROMBIN TIME: CPT

## 2024-09-01 PROCEDURE — 93306 TTE W/DOPPLER COMPLETE: CPT

## 2024-09-01 PROCEDURE — 25010000002 MAGNESIUM SULFATE 4 GM/100ML SOLUTION: Performed by: INTERNAL MEDICINE

## 2024-09-01 PROCEDURE — 93306 TTE W/DOPPLER COMPLETE: CPT | Performed by: INTERNAL MEDICINE

## 2024-09-01 PROCEDURE — 25010000002 MORPHINE PER 10 MG: Performed by: EMERGENCY MEDICINE

## 2024-09-01 PROCEDURE — 93005 ELECTROCARDIOGRAM TRACING: CPT | Performed by: EMERGENCY MEDICINE

## 2024-09-01 PROCEDURE — 0202U NFCT DS 22 TRGT SARS-COV-2: CPT

## 2024-09-01 PROCEDURE — 87040 BLOOD CULTURE FOR BACTERIA: CPT

## 2024-09-01 PROCEDURE — 80061 LIPID PANEL: CPT | Performed by: INTERNAL MEDICINE

## 2024-09-01 PROCEDURE — 83735 ASSAY OF MAGNESIUM: CPT | Performed by: INTERNAL MEDICINE

## 2024-09-01 PROCEDURE — 87641 MR-STAPH DNA AMP PROBE: CPT | Performed by: INTERNAL MEDICINE

## 2024-09-01 PROCEDURE — 85520 HEPARIN ASSAY: CPT

## 2024-09-01 PROCEDURE — 83036 HEMOGLOBIN GLYCOSYLATED A1C: CPT | Performed by: INTERNAL MEDICINE

## 2024-09-01 PROCEDURE — 80053 COMPREHEN METABOLIC PANEL: CPT | Performed by: EMERGENCY MEDICINE

## 2024-09-01 PROCEDURE — 25810000003 SEPSIS FLUID NS 0.9 % SOLUTION

## 2024-09-01 PROCEDURE — 83605 ASSAY OF LACTIC ACID: CPT

## 2024-09-01 PROCEDURE — 85520 HEPARIN ASSAY: CPT | Performed by: INTERNAL MEDICINE

## 2024-09-01 PROCEDURE — 94640 AIRWAY INHALATION TREATMENT: CPT

## 2024-09-01 PROCEDURE — 83050 HGB METHEMOGLOBIN QUAN: CPT

## 2024-09-01 PROCEDURE — 25810000003 SODIUM CHLORIDE 0.9 % SOLUTION

## 2024-09-01 PROCEDURE — 25010000002 VANCOMYCIN 10 G RECONSTITUTED SOLUTION

## 2024-09-01 PROCEDURE — 25010000002 PIPERACILLIN SOD-TAZOBACTAM PER 1 G: Performed by: INTERNAL MEDICINE

## 2024-09-01 PROCEDURE — 83690 ASSAY OF LIPASE: CPT | Performed by: EMERGENCY MEDICINE

## 2024-09-01 PROCEDURE — 94664 DEMO&/EVAL PT USE INHALER: CPT

## 2024-09-01 PROCEDURE — 81003 URINALYSIS AUTO W/O SCOPE: CPT | Performed by: INTERNAL MEDICINE

## 2024-09-01 PROCEDURE — 25010000002 PIPERACILLIN SOD-TAZOBACTAM PER 1 G

## 2024-09-01 PROCEDURE — 85025 COMPLETE CBC W/AUTO DIFF WBC: CPT | Performed by: EMERGENCY MEDICINE

## 2024-09-01 RX ORDER — HEPARIN SODIUM 10000 [USP'U]/100ML
21 INJECTION, SOLUTION INTRAVENOUS
Status: DISCONTINUED | OUTPATIENT
Start: 2024-09-01 | End: 2024-09-05

## 2024-09-01 RX ORDER — HEPARIN SODIUM 1000 [USP'U]/ML
4000 INJECTION, SOLUTION INTRAVENOUS; SUBCUTANEOUS AS NEEDED
Status: DISCONTINUED | OUTPATIENT
Start: 2024-09-01 | End: 2024-09-01

## 2024-09-01 RX ORDER — SODIUM CHLORIDE 0.9 % (FLUSH) 0.9 %
10 SYRINGE (ML) INJECTION AS NEEDED
Status: DISCONTINUED | OUTPATIENT
Start: 2024-09-01 | End: 2024-09-25

## 2024-09-01 RX ORDER — HEPARIN SODIUM 1000 [USP'U]/ML
4000 INJECTION, SOLUTION INTRAVENOUS; SUBCUTANEOUS ONCE
Status: COMPLETED | OUTPATIENT
Start: 2024-09-01 | End: 2024-09-01

## 2024-09-01 RX ORDER — PHENYTOIN SODIUM 100 MG/1
100 CAPSULE, EXTENDED RELEASE ORAL 2 TIMES DAILY
Status: DISCONTINUED | OUTPATIENT
Start: 2024-09-01 | End: 2024-09-03

## 2024-09-01 RX ORDER — SODIUM CHLORIDE 0.9 % (FLUSH) 0.9 %
10 SYRINGE (ML) INJECTION EVERY 12 HOURS SCHEDULED
Status: DISCONTINUED | OUTPATIENT
Start: 2024-09-01 | End: 2024-09-04

## 2024-09-01 RX ORDER — METOPROLOL SUCCINATE 25 MG/1
25 TABLET, EXTENDED RELEASE ORAL
Status: DISCONTINUED | OUTPATIENT
Start: 2024-09-01 | End: 2024-09-04

## 2024-09-01 RX ORDER — BISACODYL 10 MG
10 SUPPOSITORY, RECTAL RECTAL DAILY PRN
Status: DISCONTINUED | OUTPATIENT
Start: 2024-09-01 | End: 2024-09-03 | Stop reason: SDUPTHER

## 2024-09-01 RX ORDER — ASPIRIN 81 MG/1
81 TABLET ORAL DAILY
Status: DISCONTINUED | OUTPATIENT
Start: 2024-09-01 | End: 2024-09-03

## 2024-09-01 RX ORDER — AMOXICILLIN 250 MG
2 CAPSULE ORAL 2 TIMES DAILY PRN
Status: DISCONTINUED | OUTPATIENT
Start: 2024-09-01 | End: 2024-09-03 | Stop reason: SDUPTHER

## 2024-09-01 RX ORDER — IPRATROPIUM BROMIDE AND ALBUTEROL SULFATE 2.5; .5 MG/3ML; MG/3ML
3 SOLUTION RESPIRATORY (INHALATION)
Status: DISCONTINUED | OUTPATIENT
Start: 2024-09-01 | End: 2024-09-03

## 2024-09-01 RX ORDER — FUROSEMIDE 10 MG/ML
60 INJECTION INTRAMUSCULAR; INTRAVENOUS
Status: DISCONTINUED | OUTPATIENT
Start: 2024-09-01 | End: 2024-09-03

## 2024-09-01 RX ORDER — SODIUM CHLORIDE 0.9 % (FLUSH) 0.9 %
10 SYRINGE (ML) INJECTION AS NEEDED
Status: DISCONTINUED | OUTPATIENT
Start: 2024-09-01 | End: 2024-09-04

## 2024-09-01 RX ORDER — ATORVASTATIN CALCIUM 40 MG/1
40 TABLET, FILM COATED ORAL DAILY
Status: DISCONTINUED | OUTPATIENT
Start: 2024-09-01 | End: 2024-09-03

## 2024-09-01 RX ORDER — ACETAMINOPHEN 325 MG/1
650 TABLET ORAL EVERY 6 HOURS PRN
Status: DISCONTINUED | OUTPATIENT
Start: 2024-09-01 | End: 2024-09-03

## 2024-09-01 RX ORDER — PANTOPRAZOLE SODIUM 40 MG/1
40 TABLET, DELAYED RELEASE ORAL DAILY
Status: DISCONTINUED | OUTPATIENT
Start: 2024-09-01 | End: 2024-09-03

## 2024-09-01 RX ORDER — NITROGLYCERIN 0.4 MG/1
0.4 TABLET SUBLINGUAL
Status: DISCONTINUED | OUTPATIENT
Start: 2024-09-01 | End: 2024-09-04

## 2024-09-01 RX ORDER — MORPHINE SULFATE 4 MG/ML
4 INJECTION, SOLUTION INTRAMUSCULAR; INTRAVENOUS ONCE
Status: COMPLETED | OUTPATIENT
Start: 2024-09-01 | End: 2024-09-01

## 2024-09-01 RX ORDER — ALBUTEROL SULFATE 90 UG/1
2 INHALANT RESPIRATORY (INHALATION) EVERY 4 HOURS PRN
Status: DISCONTINUED | OUTPATIENT
Start: 2024-09-01 | End: 2024-09-04

## 2024-09-01 RX ORDER — HEPARIN SODIUM 1000 [USP'U]/ML
2000 INJECTION, SOLUTION INTRAVENOUS; SUBCUTANEOUS AS NEEDED
Status: DISCONTINUED | OUTPATIENT
Start: 2024-09-01 | End: 2024-09-01

## 2024-09-01 RX ORDER — SODIUM CHLORIDE 9 MG/ML
40 INJECTION, SOLUTION INTRAVENOUS AS NEEDED
Status: DISCONTINUED | OUTPATIENT
Start: 2024-09-01 | End: 2024-09-04

## 2024-09-01 RX ORDER — BISACODYL 5 MG/1
5 TABLET, DELAYED RELEASE ORAL DAILY PRN
Status: DISCONTINUED | OUTPATIENT
Start: 2024-09-01 | End: 2024-09-03 | Stop reason: SDUPTHER

## 2024-09-01 RX ORDER — ASPIRIN 81 MG/1
324 TABLET, CHEWABLE ORAL ONCE
Status: COMPLETED | OUTPATIENT
Start: 2024-09-01 | End: 2024-09-01

## 2024-09-01 RX ORDER — POLYETHYLENE GLYCOL 3350 17 G/17G
17 POWDER, FOR SOLUTION ORAL DAILY PRN
Status: DISCONTINUED | OUTPATIENT
Start: 2024-09-01 | End: 2024-09-03 | Stop reason: SDUPTHER

## 2024-09-01 RX ORDER — NITROGLYCERIN 0.4 MG/1
0.4 TABLET SUBLINGUAL
Status: DISCONTINUED | OUTPATIENT
Start: 2024-09-01 | End: 2024-09-01

## 2024-09-01 RX ORDER — DIAZEPAM 5 MG
5 TABLET ORAL EVERY 12 HOURS PRN
Status: DISCONTINUED | OUTPATIENT
Start: 2024-09-01 | End: 2024-09-02

## 2024-09-01 RX ORDER — PANTOPRAZOLE SODIUM 40 MG/1
40 TABLET, DELAYED RELEASE ORAL DAILY
COMMUNITY
End: 2024-09-25 | Stop reason: HOSPADM

## 2024-09-01 RX ORDER — SPIRONOLACTONE 25 MG/1
25 TABLET ORAL DAILY
Status: DISCONTINUED | OUTPATIENT
Start: 2024-09-01 | End: 2024-09-04

## 2024-09-01 RX ORDER — MAGNESIUM SULFATE HEPTAHYDRATE 40 MG/ML
4 INJECTION, SOLUTION INTRAVENOUS ONCE
Status: COMPLETED | OUTPATIENT
Start: 2024-09-01 | End: 2024-09-01

## 2024-09-01 RX ORDER — IPRATROPIUM BROMIDE AND ALBUTEROL SULFATE 2.5; .5 MG/3ML; MG/3ML
3 SOLUTION RESPIRATORY (INHALATION) ONCE
Status: COMPLETED | OUTPATIENT
Start: 2024-09-01 | End: 2024-09-01

## 2024-09-01 RX ORDER — IPRATROPIUM BROMIDE AND ALBUTEROL SULFATE 2.5; .5 MG/3ML; MG/3ML
3 SOLUTION RESPIRATORY (INHALATION)
Status: DISCONTINUED | OUTPATIENT
Start: 2024-09-01 | End: 2024-09-01 | Stop reason: SDUPTHER

## 2024-09-01 RX ADMIN — ACETAMINOPHEN 650 MG: 325 TABLET ORAL at 16:36

## 2024-09-01 RX ADMIN — HEPARIN SODIUM 4000 UNITS: 1000 INJECTION INTRAVENOUS; SUBCUTANEOUS at 12:27

## 2024-09-01 RX ADMIN — EMPAGLIFLOZIN 10 MG: 10 TABLET, FILM COATED ORAL at 17:33

## 2024-09-01 RX ADMIN — NITROGLYCERIN 0.4 MG: 0.4 TABLET SUBLINGUAL at 10:57

## 2024-09-01 RX ADMIN — SPIRONOLACTONE 25 MG: 25 TABLET, FILM COATED ORAL at 17:33

## 2024-09-01 RX ADMIN — VANCOMYCIN HYDROCHLORIDE 2750 MG: 10 INJECTION, POWDER, LYOPHILIZED, FOR SOLUTION INTRAVENOUS at 20:20

## 2024-09-01 RX ADMIN — SULFUR HEXAFLUORIDE 1 ML: KIT at 13:49

## 2024-09-01 RX ADMIN — IPRATROPIUM BROMIDE AND ALBUTEROL SULFATE 3 ML: 2.5; .5 SOLUTION RESPIRATORY (INHALATION) at 19:24

## 2024-09-01 RX ADMIN — EXTENDED PHENYTOIN SODIUM 100 MG: 100 CAPSULE ORAL at 20:20

## 2024-09-01 RX ADMIN — SODIUM CHLORIDE 1000 ML: 9 INJECTION, SOLUTION INTRAVENOUS at 12:26

## 2024-09-01 RX ADMIN — METOPROLOL SUCCINATE 25 MG: 25 TABLET, EXTENDED RELEASE ORAL at 16:17

## 2024-09-01 RX ADMIN — IPRATROPIUM BROMIDE AND ALBUTEROL SULFATE 3 ML: 2.5; .5 SOLUTION RESPIRATORY (INHALATION) at 16:33

## 2024-09-01 RX ADMIN — PANTOPRAZOLE SODIUM 40 MG: 40 TABLET, DELAYED RELEASE ORAL at 16:17

## 2024-09-01 RX ADMIN — HEPARIN SODIUM 4000 UNITS: 1000 INJECTION INTRAVENOUS; SUBCUTANEOUS at 20:16

## 2024-09-01 RX ADMIN — MORPHINE SULFATE 4 MG: 4 INJECTION, SOLUTION INTRAMUSCULAR; INTRAVENOUS at 10:58

## 2024-09-01 RX ADMIN — PIPERACILLIN AND TAZOBACTAM 3.38 G: 3; .375 INJECTION, POWDER, LYOPHILIZED, FOR SOLUTION INTRAVENOUS; PARENTERAL at 18:23

## 2024-09-01 RX ADMIN — ASPIRIN 81 MG: 81 TABLET, COATED ORAL at 16:17

## 2024-09-01 RX ADMIN — ASPIRIN 81 MG 324 MG: 81 TABLET ORAL at 10:57

## 2024-09-01 RX ADMIN — DIAZEPAM 5 MG: 5 TABLET ORAL at 16:36

## 2024-09-01 RX ADMIN — PIPERACILLIN AND TAZOBACTAM 3.38 G: 3; .375 INJECTION, POWDER, LYOPHILIZED, FOR SOLUTION INTRAVENOUS at 11:46

## 2024-09-01 RX ADMIN — FUROSEMIDE 60 MG: 10 INJECTION, SOLUTION INTRAMUSCULAR; INTRAVENOUS at 16:15

## 2024-09-01 RX ADMIN — MORPHINE SULFATE 4 MG: 4 INJECTION, SOLUTION INTRAMUSCULAR; INTRAVENOUS at 11:46

## 2024-09-01 RX ADMIN — IPRATROPIUM BROMIDE AND ALBUTEROL SULFATE 3 ML: 2.5; .5 SOLUTION RESPIRATORY (INHALATION) at 11:10

## 2024-09-01 RX ADMIN — HEPARIN SODIUM 9 UNITS/KG/HR: 10000 INJECTION, SOLUTION INTRAVENOUS at 12:29

## 2024-09-01 RX ADMIN — MAGNESIUM SULFATE IN WATER FOR 4 G: 40 INJECTION INTRAVENOUS at 16:36

## 2024-09-01 RX ADMIN — ATORVASTATIN CALCIUM 40 MG: 40 TABLET, FILM COATED ORAL at 16:17
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
## 2024-09-01 NOTE — PLAN OF CARE
Problem: Adult Inpatient Plan of Care  Goal: Plan of Care Review  Outcome: Ongoing, Progressing  Flowsheets (Taken 9/1/2024 1837)  Progress: no change  Plan of Care Reviewed With: patient  Goal: Patient-Specific Goal (Individualized)  Outcome: Ongoing, Progressing  Goal: Absence of Hospital-Acquired Illness or Injury  Outcome: Ongoing, Progressing  Intervention: Identify and Manage Fall Risk  Description: Perform standard risk assessment on admission using a validated tool or comprehensive approach appropriate to the patient; reassess fall risk frequently, with change in status or transfer to another level of care.  Communicate fall injury risk to interprofessional healthcare team.  Determine need for increased observation, equipment and environmental modification, such as low bed, signage and supportive, nonskid footwear.  Adjust safety measures to individual developmental age, stage and identified risk factors.  Reinforce the importance of safety and physical activity with patient and family.  Perform regular intentional rounding to assess need for position change, pain assessment and personal needs, including assistance with toileting.  Recent Flowsheet Documentation  Taken 9/1/2024 1800 by Lluvia Ford RN  Safety Promotion/Fall Prevention:   activity supervised   clutter free environment maintained   fall prevention program maintained   nonskid shoes/slippers when out of bed   safety round/check completed   toileting scheduled  Intervention: Prevent Skin Injury  Description: Perform a screening for skin injury risk, such as pressure or moisture associated skin damage on admission and at regular intervals throughout hospital stay.  Keep all areas of skin (especially folds) clean and dry.  Maintain adequate skin hydration.  Relieve and redistribute pressure and protect bony prominences; implement measures based on patient-specific risk factors.  Match turning and repositioning schedule to clinical  condition.  Encourage weight shift frequently; assist with reposition if unable to complete independently.  Float heels off bed; avoid pressure on the Achilles tendon.  Keep skin free from extended contact with medical devices.  Encourage functional activity and mobility, as early as tolerated.  Use aids (e.g., slide boards, mechanical lift) during transfer.  Recent Flowsheet Documentation  Taken 9/1/2024 1800 by Lluvia Ford RN  Body Position: position changed independently  Intervention: Prevent and Manage VTE (Venous Thromboembolism) Risk  Description: Assess for VTE (venous thromboembolism) risk.  Encourage and assist with early ambulation.  Initiate and maintain compression or other therapy, as indicated, based on identified risk in accordance with organizational protocol and provider order.  Encourage both active and passive leg exercises while in bed, if unable to ambulate.  Recent Flowsheet Documentation  Taken 9/1/2024 1800 by Lluvia Ford RN  Activity Management: activity encouraged  Goal: Optimal Comfort and Wellbeing  Outcome: Ongoing, Progressing  Goal: Readiness for Transition of Care  Outcome: Ongoing, Progressing     Problem: Skin Injury Risk Increased  Goal: Skin Health and Integrity  Outcome: Ongoing, Progressing  Intervention: Optimize Skin Protection  Description: Perform a full pressure injury risk assessment, as indicated by screening, upon admission to care unit.  Reassess skin (injury risk, full inspection) frequently (e.g., scheduled interval, with change in condition) to provide optimal early detection and prevention.  Maintain adequate tissue perfusion (e.g., encourage fluid balance; avoid crossing legs, constrictive clothing or devices) to promote tissue oxygenation.  Maintain head of bed at lowest degree of elevation tolerated, considering medical condition and other restrictions.  Avoid positioning onto an area that remains reddened.  Minimize incontinence and moisture  (e.g., toileting schedule; moisture-wicking pad, diaper or incontinence collection device; skin moisture barrier).  Cleanse skin promptly and gently when soiled utilizing a pH-balanced cleanser.  Relieve and redistribute pressure (e.g., scheduled position changes, weight shifts, use of support surface, medical device repositioning, protective dressing application, use of positioning device, microclimate control, use of pressure-injury-monitor  Encourage increased activity, such as sitting in a chair at the bedside or early mobilization, when able to tolerate.  Recent Flowsheet Documentation  Taken 9/1/2024 1800 by Lluvia Ford, RN  Head of Bed (HOB) Positioning: HOB elevated     Problem: Noninvasive Ventilation Acute  Goal: Effective Unassisted Ventilation and Oxygenation  Outcome: Ongoing, Progressing   Goal Outcome Evaluation:  Plan of Care Reviewed With: patient        Progress: no change

## 2024-09-01 NOTE — ED NOTES
Fernie Dalal    Nursing Report ED to Floor:  Mental status: a&ox4  Ambulatory status: x2 assist  Oxygen Therapy:  bipap  Cardiac Rhythm: tachy  Admitted from: home  Safety Concerns:  fall risk   Social Issues: n/a  ED Room #:  11    ED Nurse Phone Extension - [phone] or may call [phone].      HPI:   Chief Complaint   Patient presents with    Chest Pain       Past Medical History:  Past Medical History:   Diagnosis Date    Angina at rest     Anxiety     Arrhythmia     COPD (chronic obstructive pulmonary disease)     GERD (gastroesophageal reflux disease)     Heart failure     congestive    Hyperlipidemia     Hypertension     Myocardial infarction         Past Surgical History:  Past Surgical History:   Procedure Laterality Date    CARDIAC CATHETERIZATION      CARDIAC CATHETERIZATION N/A 4/24/2023    Procedure: Left Heart Cath;  Surgeon: Marsha Gaitan MD;  Location: [facility] CATH INVASIVE LOCATION;  Service: Cardiology;  Laterality: N/A;    CARDIOVASCULAR STRESS TEST          Admitting Doctor:   Noy Belle MD    Consulting Provider(s):  Consults       Date and Time Order Name Status Description    9/1/2024 11:47 AM Inpatient Cardiology Consult               Admitting Diagnosis:   The primary encounter diagnosis was Acute respiratory failure with hypoxia. Diagnoses of Aspiration pneumonia of right lower lobe, unspecified aspiration pneumonia type and Chest pain, unspecified type were also pertinent to this visit.    Most Recent Vitals:   Vitals:    09/01/24 1057 09/01/24 1100 09/01/24 1110 09/01/24 1146   BP: 133/81 127/99  (!) 143/101   BP Location:       Patient Position:       Pulse: (!) 133 (!) 133 (!) 131 (!) 126   Resp:   22    Temp:       TempSrc:       SpO2:  93% 93% (!) 85%   Weight:       Height:           Active LDAs/IV Access:   Lines, Drains & Airways       Active LDAs       Name Placement date Placement time Site Days    Peripheral IV 09/01/24 1050 Posterior;Right Forearm 09/01/24  1050  Forearm  less than 1     Peripheral IV 09/01/24 1153 Left;Posterior;Proximal Forearm 09/01/24  1153  Forearm  less than 1                    Labs (abnormal labs have a star):   Labs Reviewed   TROPONIN - Abnormal; Notable for the following components:       Result Value    HS Troponin T 236 (*)     All other components within normal limits    Narrative:     High Sensitive Troponin T Reference Range:  <14.0 ng/L- Negative Female for AMI  <22.0 ng/L- Negative Male for AMI  >=14 - Abnormal Female indicating possible myocardial injury.  >=22 - Abnormal Male indicating possible myocardial injury.   Clinicians would have to utilize clinical acumen, EKG, Troponin, and serial changes to determine if it is an Acute Myocardial Infarction or myocardial injury due to an underlying chronic condition.        COMPREHENSIVE METABOLIC PANEL - Abnormal; Notable for the following components:    Glucose 146 (*)     CO2 31.0 (*)     All other components within normal limits    Narrative:     GFR Normal >60  Chronic Kidney Disease <60  Kidney Failure <15     LIPASE - Abnormal; Notable for the following components:    Lipase 9 (*)     All other components within normal limits   BNP (IN-HOUSE) - Abnormal; Notable for the following components:    proBNP 3,232.0 (*)     All other components within normal limits    Narrative:     This assay is used as an aid in the diagnosis of individuals suspected of having heart failure. It can be used as an aid in the diagnosis of acute decompensated heart failure (ADHF) in patients presenting with signs and symptoms of ADHF to the emergency department (ED). In addition, NT-proBNP of <300 pg/mL indicates ADHF is not likely.    Age Range Result Interpretation  NT-proBNP Concentration (pg/mL:      <50             Positive            >450                   Gray                 300-450                    Negative             <300    50-75           Positive            >900                  Gray                300-900                "   Negative            <300      >75             Positive            >1800                  Gray                300-1800                  Negative            <300   CBC WITH AUTO DIFFERENTIAL - Abnormal; Notable for the following components:    WBC 22.09 (*)     MCHC 30.0 (*)     Neutrophil % 86.6 (*)     Lymphocyte % 2.4 (*)     Eosinophil % 0.2 (*)     Immature Grans % 0.6 (*)     Neutrophils, Absolute 19.13 (*)     Lymphocytes, Absolute 0.52 (*)     Monocytes, Absolute 2.17 (*)     Immature Grans, Absolute 0.14 (*)     All other components within normal limits   PROCALCITONIN - Abnormal; Notable for the following components:    Procalcitonin 4.18 (*)     All other components within normal limits    Narrative:     As a Marker for Sepsis (Non-Neonates):    1. <0.5 ng/mL represents a low risk of severe sepsis and/or septic shock.  2. >2 ng/mL represents a high risk of severe sepsis and/or septic shock.    As a Marker for Lower Respiratory Tract Infections that require antibiotic therapy:    PCT on Admission    Antibiotic Therapy       6-12 Hrs later    >0.5                Strongly Recommended  >0.25 - <0.5        Recommended   0.1 - 0.25          Discouraged              Remeasure/reassess PCT  <0.1                Strongly Discouraged     Remeasure/reassess PCT    As 28 day mortality risk marker: \"Change in Procalcitonin Result\" (>80% or <=80%) if Day 0 (or Day 1) and Day 4 values are available. Refer to http://www.[employer]-pct-calculator.com    Change in PCT <=80%  A decrease of PCT levels below or equal to 80% defines a positive change in PCT test result representing a higher risk for 28-day all-cause mortality of patients diagnosed with severe sepsis for septic shock.    Change in PCT >80%  A decrease of PCT levels of more than 80% defines a negative change in PCT result representing a lower risk for 28-day all-cause mortality of patients diagnosed with severe sepsis or septic shock.      BLOOD GAS, ARTERIAL " W/CO-OXIMETRY - Abnormal; Notable for the following components:    pH, Arterial 7.346 (*)     pCO2, Arterial 62.7 (*)     pO2, Arterial 72.0 (*)     HCO3, Arterial 34.2 (*)     Base Excess, Arterial 6.5 (*)     Hemoglobin, Blood Gas 13.4 (*)     Oxyhemoglobin 92.9 (*)     CO2 Content 36.2 (*)     pCO2, Temperature Corrected 62.7 (*)     pO2, Temperature Corrected 72.0 (*)     All other components within normal limits   HEPARIN ANTI XA - Abnormal; Notable for the following components:    Heparin Anti-Xa (UFH) 0.10 (*)     All other components within normal limits   APTT - Abnormal; Notable for the following components:    PTT 26.1 (*)     All other components within normal limits    Narrative:     PTT = The equivalent PTT values for the therapeutic range of heparin levels at 0.3 to 0.5 U/ml are 60 to 70 seconds.   LIPID PANEL - Abnormal; Notable for the following components:    Total Cholesterol 225 (*)     HDL Cholesterol 61 (*)     LDL Cholesterol  144 (*)     All other components within normal limits    Narrative:     Cholesterol Reference Ranges  (U.S. Department of Health and Human Services ATP III Classifications)    Desirable          <200 mg/dL  Borderline High    200-239 mg/dL  High Risk          >240 mg/dL      Triglyceride Reference Ranges  (U.S. Department of Health and Human Services ATP III Classifications)    Normal           <150 mg/dL  Borderline High  150-199 mg/dL  High             200-499 mg/dL  Very High        >500 mg/dL    HDL Reference Ranges  (U.S. Department of Health and Human Services ATP III Classifications)    Low     <40 mg/dl (major risk factor for CHD)  High    >60 mg/dl ('negative' risk factor for CHD)        LDL Reference Ranges  (U.S. Department of Health and Human Services ATP III Classifications)    Optimal          <100 mg/dL  Near Optimal     100-129 mg/dL  Borderline High  130-159 mg/dL  High             160-189 mg/dL  Very High        >189 mg/dL   HEMOGLOBIN A1C - Abnormal;  Notable for the following components:    Hemoglobin A1C 6.00 (*)     All other components within normal limits    Narrative:     Hemoglobin A1C Ranges:    Increased Risk for Diabetes  5.7% to 6.4%  Diabetes                     >= 6.5%  Diabetic Goal                < 7.0%   LACTIC ACID, PLASMA - Normal   PROTIME-INR - Normal   MAGNESIUM - Normal   BLOOD CULTURE   BLOOD CULTURE   RESPIRATORY PANEL PCR W/ COVID-19 (SARS-COV-2), NP SWAB IN UTM/VTP, 2 HR TAT   RAINBOW DRAW    Narrative:     The following orders were created for panel order [place] Draw.  Procedure                               Abnormality         Status                     ---------                               -----------         ------                     Green Top (Gel)[[id number]]                                  Final result               Lavender Top[[id number]]                                     Final result               Gold Top - SST[[id number]]                                   Final result               Reese Top[[id number]]                                         Final result               Light Blue Top[[id number]]                                   Final result                 Please view results for these tests on the individual orders.   BLOOD GAS, ARTERIAL   HIGH SENSITIVITIY TROPONIN T 2HR   URINALYSIS W/ MICROSCOPIC IF INDICATED (NO CULTURE)   CBC AND DIFFERENTIAL    Narrative:     The following orders were created for panel order CBC & Differential.  Procedure                               Abnormality         Status                     ---------                               -----------         ------                     CBC Auto Differential[[id number]]        Abnormal            Final result                 Please view results for these tests on the individual orders.   GREEN TOP   LAVENDER TOP   GOLD TOP - SST   GRAY TOP   LIGHT BLUE TOP       Meds Given in ED:   Medications   sodium chloride 0.9 % flush 10 mL (has no administration in  time range)   nitroglycerin (NITROSTAT) SL tablet 0.4 mg (0.4 mg Sublingual Given 9/1/24 1057)   heparin 42117 units/250 mL (100 units/mL) in 0.45 % NaCl infusion (9 Units/kg/hr × 106 kg Intravenous New Bag 9/1/24 1229)   Pharmacy to Dose Heparin (has no administration in time range)   Potassium Replacement - Follow Nurse / BPA Driven Protocol (has no administration in time range)   Magnesium Cardiology Dose Replacement - Follow Nurse / BPA Driven Protocol (has no administration in time range)   Phosphorus Replacement - Follow Nurse / BPA Driven Protocol (has no administration in time range)   Calcium Replacement - Follow Nurse / BPA Driven Protocol (has no administration in time range)   sepsis fluid NS 0.9 % bolus 1,000 mL (1,000 mL Intravenous New Bag 9/1/24 1226)   aspirin chewable tablet 324 mg (324 mg Oral Given 9/1/24 1057)   Morphine sulfate (PF) injection 4 mg (4 mg Intravenous Given 9/1/24 1058)   ipratropium-albuterol (DUO-NEB) nebulizer solution 3 mL (3 mL Nebulization Given 9/1/24 1110)   piperacillin-tazobactam (ZOSYN) 3.375 g IVPB in 100 mL NS MBP (CD) (0 g Intravenous Stopped 9/1/24 1219)   Morphine sulfate (PF) injection 4 mg (4 mg Intravenous Given 9/1/24 1146)   heparin (porcine) injection 4,000 Units (4,000 Units Intravenous Given 9/1/24 1227)     heparin, 9 Units/kg/hr, Last Rate: 9 Units/kg/hr (09/01/24 1229)  Pharmacy to Dose Heparin,          Last NIH score:                                                          Dysphagia screening results:        [place] Coma Scale:  No data recorded     CIWA:        Restraint Type:            Isolation Status:  No active isolations

## 2024-09-01 NOTE — Clinical Note
Level of Care: Telemetry [5]   Diagnosis: Dyspnea [650257]   Admitting Physician: PUMA SWEET [[id number]]

## 2024-09-01 NOTE — CONSULTS
Inpatient Cardiology Consult  Consult performed by: Jonatan Chery IV, MD  Consult ordered by: Jonatan Chery IV, MD  Reason for consult: NSTEMICHF            Cardiology Consult         IDENTIFICATION: 64-year-old gentleman who resides in [place], [place]    PRIMARY CARDIOLOGIST: Amalia Gaitan MD    Active Hospital Problems    Diagnosis  POA    **Acute on chronic respiratory failure with hypoxia [J96.21]  Yes     Priority: High    NSTEMI (non-ST elevated myocardial infarction) [I21.4]  Yes     Priority: Medium     Elevated troponin in the setting of pneumonia and CHF, 9/1/2024      Acute on chronic systolic congestive heart failure [I50.23]  Yes     Echo (9/29/2023): LVEF 36%.  Moderate MR.  RVSP 42 mmHg      Coronary artery disease involving native coronary artery of native heart with angina pectoris [I25.119]  Yes     Cardiac catheterization (4/24/2023): Severe 1-vessel CAD involving chronic total occlusion of previously stented distal RCA.  Mild to moderate disease of left coronary system                64-year-old gentleman with CAD (RCA  on [facility] 2023), HFrEF, and O2 dependent COPD developed acute shortness of breath last evening.  Associated with chest pain and chills.    In the emergency room, he was found to have hypoxia requiring BiPAP ventilation.  Chest x-ray suggested possible right lower lobe pneumonia and increased pulmonary vasculature.  proBNP elevated.    The patient is unclear of what medicines he has been taking at home.    No Known Allergies    Prior to Admission medications    Medication Sig Start Date End Date Taking? Authorizing Provider   atorvastatin (LIPITOR) 40 MG tablet Take 1 tablet by mouth every night at bedtime.  Patient not taking: Reported on 10/31/2023 3/7/23   Jocelyne Carrero MD   bumetanide (BUMEX) 1 MG tablet Take 1 tablet by mouth Daily.  Patient not taking: Reported on 10/31/2023 3/7/23   Jocelyne Carrero MD   calcium citrate (CALCITRATE) 950  (200 Ca) MG tablet Take 1 tablet by mouth Daily. OTC 1/12/23   Jocelyne Carrero MD   citalopram (CeleXA) 20 MG tablet Take 1 tablet by mouth Daily.  Patient not taking: Reported on 10/31/2023 3/7/23   Jocelyne Carrero MD   cyclobenzaprine (FLEXERIL) 5 MG tablet Take 1 tablet by mouth 3 (Three) Times a Day As Needed for Muscle Spasms.  Patient not taking: Reported on 10/31/2023 3/7/23   Jocelyne Carrero MD   diazePAM (VALIUM) 5 MG tablet Take 1 tablet by mouth Every 12 (Twelve) Hours As Needed for Anxiety. 4/28/23   Myrtle Jeffries APRN   Eliquis 5 MG tablet tablet Take 1 tablet by mouth Every 12 (Twelve) Hours.  Patient not taking: Reported on 10/31/2023 6/2/23   Jocelyne Carrero MD   empagliflozin (JARDIANCE) 10 MG tablet tablet Take 1 tablet by mouth Daily. 4/29/23   Myrtle Jeffries APRN   ipratropium-albuterol (DUO-NEB) 0.5-2.5 mg/3 ml nebulizer Take 3 mL by nebulization 3 (Three) Times a Day. 6/20/22   Jocelyne Carrero MD   metoprolol succinate XL (TOPROL-XL) 100 MG 24 hr tablet Take 1 tablet by mouth Daily.  Patient not taking: Reported on 10/31/2023 3/7/23   Jocelyne Carrero MD   phenytoin ER (DILANTIN) 100 MG capsule Take 1 capsule by mouth 2 (Two) Times a Day. 6/20/22   Jocelyne Carrero MD   potassium chloride ER (K-TAB) 20 MEQ tablet controlled-release ER tablet Take 1 tablet by mouth 2 (Two) Times a Day With Meals. 3/7/23   Jocelyne Carrero MD   ProAir  (90 Base) MCG/ACT inhaler Inhale 2 puffs Every 4 (Four) Hours As Needed. 6/10/22   Jocelyne Carrero MD   QUEtiapine Fumarate 150 MG tablet Take 1 tablet by mouth every night at bedtime.  Patient not taking: Reported on 10/31/2023 3/7/23   Jocelyne Carrero MD   sacubitril-valsartan (ENTRESTO) 24-26 MG tablet Take 1 tablet by mouth Every 12 (Twelve) Hours.  Patient not taking: Reported on 10/31/2023 4/28/23   Myrtle Jeffries APRN       Past Medical History:   Diagnosis Date    Angina at rest     Anxiety      Arrhythmia     COPD (chronic obstructive pulmonary disease)     GERD (gastroesophageal reflux disease)     Heart failure     congestive    Hyperlipidemia     Hypertension     Myocardial infarction        Past Surgical History:   Procedure Laterality Date    CARDIAC CATHETERIZATION      CARDIAC CATHETERIZATION N/A 2023    Procedure: Left Heart Cath;  Surgeon: Marsha Gaitan MD;  Location: [facility] CATH INVASIVE LOCATION;  Service: Cardiology;  Laterality: N/A;    CARDIOVASCULAR STRESS TEST         Family History   Problem Relation Age of Onset    Breast cancer Mother     Kidney failure Father     Pneumonia Father     Stroke Father     Ovarian cancer Sister     Breast cancer Sister     Melanoma Sister     No Known Problems Sister     Hyperlipidemia Sister     Hypertension Sister     Heart attack Sister     Stroke Brother     Hyperlipidemia Brother     Hypertension Brother     Heart disease Brother     Stomach cancer Brother     Stroke Brother     Heart attack Brother     Heart disease Brother     Alzheimer's disease Brother     Hypertension Brother     Hyperlipidemia Brother     Heart disease Brother     Heart attack Brother        Social History     Tobacco Use   Smoking Status Former    Current packs/day: 0.00    Average packs/day: 1 pack/day for 30.0 years (30.0 ttl pk-yrs)    Types: Cigarettes    Start date: 1992    Quit date: 2022    Years since quittin.5   Smokeless Tobacco Not on file       Social History     Substance and Sexual Activity   Alcohol Use Never         Review of Systems:   Review of Systems   Cardiovascular:  Positive for chest pain and dyspnea on exertion.   Respiratory:  Positive for shortness of breath.             Vital Sign Min/Max for last 24 hours  Temp  Min: 98.1 °F (36.7 °C)  Max: 98.4 °F (36.9 °C)   BP  Min: 104/80  Max: 143/101   Pulse  Min: 118  Max: 139   Resp  Min: 20  Max: 26   SpO2  Min: 81 %  Max: 97 %   Flow (L/min)  Min: 6  Max: 6      Intake/Output Summary  (Last 24 hours) at 9/1/2024 1511  Last data filed at 9/1/2024 1219  Gross per 24 hour   Intake 100 ml   Output --   Net 100 ml           Tele: Sinus    Constitutional:       Appearance: Healthy appearance.   Eyes:      General: No scleral icterus.  Neck:      Thyroid: No thyroid mass.      Vascular: No carotid bruit or JVD. JVD normal.   Pulmonary:      Effort: Tachypnea present.      Breath sounds: Normal breath sounds.   Cardiovascular:      Normal rate. Regular rhythm.      Murmurs: There is no murmur.      No gallop.    Edema:     Peripheral edema absent.   Skin:     General: Skin is warm. There is no cyanosis.   Neurological:      General: No focal deficit present.      Mental Status: Alert.   Psychiatric:         Attention and Perception: Attention normal.              DATA REVIEW:    EKG (9/1/2024):           Chest x-ray (9/1/2024):      Echo (9/29/2023): LVEF 36%.  Moderate MR.  RVSP 42 mmHg      Results from last 7 days   Lab Units 09/01/24  1050   SODIUM mmol/L 140   POTASSIUM mmol/L 4.6   CHLORIDE mmol/L 100   BUN mg/dL 19   CREATININE mg/dL 0.83   MAGNESIUM mg/dL 1.7     Results from last 7 days   Lab Units 09/01/24  1303 09/01/24  1050   HSTROP T ng/L 295* 236*     Results from last 7 days   Lab Units 09/01/24  1050   WBC 10*3/mm3 22.09*   HEMOGLOBIN g/dL 13.5   HEMATOCRIT % 45.0   PLATELETS 10*3/mm3 333     Lab Results   Component Value Date    HGBA1C 6.00 (H) 09/01/2024     Lab Results   Component Value Date    CHOL 225 (H) 09/01/2024    TRIG 112 09/01/2024    HDL 61 (H) 09/01/2024     (H) 09/01/2024    AST 26 09/01/2024    ALT 13 09/01/2024       Lab Results   Component Value Date    TROPONINT 295 (C) 09/01/2024    TROPONINT 236 (C) 09/01/2024    TROPONINT 87 (C) 04/22/2023         Lab 09/01/24  1050   PROBNP 3,232.0*              NSTEMI  Suspect type II secondary to respiratory failure and fixed CAD (RCA )  Repeat troponin  Started on IV heparin in ER    Acute on chronic systolic heart  failure  Elevated proBNP and interstitial edema on chest x-ray  Noncompliant with CHF meds  Resume metoprolol succinate 25 mg daily  Resume Entresto 24-26 mg twice daily  Continue empagliflozin  Start spironolactone 25 mg daily  Furosemide 60 mg IV twice daily    CAD  Cardiac catheterization last year showed severe one-vessel CAD involving chronic total occlusion of the RCA    Hyperlipidemia with goal LDL <70  Continue atorvastatin    Type 2 diabetes mellitus not on insulin         IV diuretics  Resume GDMT for HFrEF  Repeat troponin in a.m.  Start IV heparin and aspirin for ACS      Electronically signed by Jonatan Chery IV, MD, 09/01/24, 11:47 AM EDT.

## 2024-09-01 NOTE — H&P
[facility]  HISTORY AND PHYSICAL    Patient Name: Fernie Dalal  : 1960  MRN: [id number]  Primary Care Physician: Belem Albert APRN  Date of admission: 2024      Subjective   Subjective     Chief Complaint: chest pain, dyspnea     HPI:  Fernie Dalal is a 64 y.o. male w history of  end-stage COPD with 3-4 L baseline, CHF, recurrent pneumonia, R lung surgery remotely (data deficit), HTN HL CAD.  He comes to ED today with hours of  chest pain, also chills this morning, and mild hemoptysis since last night.     In ED, he was started on hep gtt due to elevated trop and chest pain; he was started on BIPAP due to pH.  He says the chest pain is better now.  He is a poor historian.      He has a NIPPV machine at home but doesn't use it due to air blowing on his eyes.  He uses oxygen 3L at rest, 4L walking.  Recently he was walking up to .75miles but has slacked off due to the summer heat.             Personal History     Past Medical History:   Diagnosis Date    Angina at rest     Anxiety     Arrhythmia     COPD (chronic obstructive pulmonary disease)     GERD (gastroesophageal reflux disease)     Heart failure     congestive    Hyperlipidemia     Hypertension     Myocardial infarction            Past Surgical History:   Procedure Laterality Date    CARDIAC CATHETERIZATION      CARDIAC CATHETERIZATION N/A 2023    Procedure: Left Heart Cath;  Surgeon: Marsha Gaitan MD;  Location: [facility] CATH INVASIVE LOCATION;  Service: Cardiology;  Laterality: N/A;    CARDIOVASCULAR STRESS TEST         Family History: family history includes Alzheimer's disease in his brother; Breast cancer in his mother and sister; Heart attack in his brother, brother, and sister; Heart disease in his brother, brother, and brother; Hyperlipidemia in his brother, brother, and sister; Hypertension in his brother, brother, and sister; Kidney failure in his father; Melanoma in his sister; No  Known Problems in his sister; Ovarian cancer in his sister; Pneumonia in his father; Stomach cancer in his brother; Stroke in his brother, brother, and father.     Social History:  reports that he quit smoking about 2 years ago. His smoking use included cigarettes. He started smoking about 32 years ago. He has a 30 pack-year smoking history. He does not have any smokeless tobacco history on file. He reports that he does not currently use drugs after having used the following drugs: Marijuana. He reports that he does not drink alcohol.  Social History     Social History Narrative    Not on file       Medications:  Available home medication information reviewed.  QUEtiapine Fumarate, albuterol sulfate HFA, apixaban, atorvastatin, bumetanide, calcium citrate, citalopram, cyclobenzaprine, diazePAM, empagliflozin, ipratropium-albuterol, metoprolol succinate XL, phenytoin ER, potassium chloride ER, and sacubitril-valsartan    No Known Allergies    Objective   Objective     Vital Signs:   Temp:  [98.3 °F (36.8 °C)] 98.3 °F (36.8 °C)  Heart Rate:  [126-139] 126  Resp:  [22-26] 22  BP: (123-143)/() 143/101  Flow (L/min):  [6] 6       Physical Exam   Gen:  in ED bed with BIPAP on, awake and talking.    Neuro: alert and oriented, clear speech, follows commands, grossly nonfocal  HEENT:  NC/AT    Neck:  Supple, no LAD  Heart tachy RR   Lungs crackles R base, no wheeze, BIPAP makes exam difficult   Abd:  Soft, nontender, no rebound or guarding, pos BS  Extrem:  No c/c/e  Skin diffuse facial rash       Result Review:  I have personally reviewed the results from the time of this admission to 9/1/2024 12:41 EDT and agree with these findings:  [x]  Laboratory list / accordion  []  Microbiology  [x]  Radiology  [x]  EKG/Telemetry   [x]  Cardiology/Vascular   []  Pathology  []  Old records  []  Other:  Most notable findings include: WBC 22.  CXR R infiltrate.  EKG sinus tach.  Last echo EF 35.  Laste LHC with RCA chronic  occlusion       LAB RESULTS:      Lab 09/01/24  1050   WBC 22.09*   HEMOGLOBIN 13.5   HEMATOCRIT 45.0   PLATELETS 333   NEUTROS ABS 19.13*   IMMATURE GRANS (ABS) 0.14*   LYMPHS ABS 0.52*   MONOS ABS 2.17*   EOS ABS 0.04   MCV 91.8   PROCALCITONIN 4.18*   LACTATE 1.8   PROTIME 13.0   INR 0.97   APTT 26.1*   HEPARIN ANTI-XA 0.10*         Lab 09/01/24  1050   SODIUM 140   POTASSIUM 4.6   CHLORIDE 100   CO2 31.0*   ANION GAP 9.0   BUN 19   CREATININE 0.83   EGFR 98.3   GLUCOSE 146*   CALCIUM 9.0   MAGNESIUM 1.7   HEMOGLOBIN A1C 6.00*         Lab 09/01/24  1050   TOTAL PROTEIN 7.2   ALBUMIN 3.9   GLOBULIN 3.3   ALT (SGPT) 13   AST (SGOT) 26   BILIRUBIN 0.4   ALK PHOS 87   LIPASE 9*         Lab 09/01/24  1050   PROBNP 3,232.0*   HSTROP T 236*         Lab 09/01/24  1050   CHOLESTEROL 225*   LDL CHOL 144*   HDL CHOL 61*   TRIGLYCERIDES 112             Lab 09/01/24  1120   PH, ARTERIAL 7.346*   PCO2, ARTERIAL 62.7*   PO2 ART 72.0*   FIO2 44   HCO3 ART 34.2*   BASE EXCESS ART 6.5*   CARBOXYHEMOGLOBIN 1.5         Microbiology Results (last 10 days)       ** No results found for the last 240 hours. **            XR Chest 1 View    Result Date: 9/1/2024  XR CHEST 1 VW Date of Exam: 9/1/2024 10:38 AM EDT Indication: Chest Pain Triage Protocol Comparison: AP chest x-ray 4/22/2023. Findings:   Cardiac silhouette remains mildly enlarged. There are increased airspace opacities in the right lower lung. Left lung appears clear. No pneumothorax is seen.     Impression: Impression: Right basilar airspace opacities, concerning for pneumonia and/or atelectasis. Electronically Signed: Yolis Bustillo  9/1/2024 11:04 AM EDT  Workstation ID: [id number]     Results for orders placed during the hospital encounter of 09/29/23    Adult Transthoracic Echo Complete W/ Cont if Necessary Per Protocol    Interpretation Summary    Dilated cardiomyopathy with moderate left ventricular systolic dysfunction, estimated EF 36%.    Aortic sclerosis without  "significant stenosis or regurgitation.    Moderate mitral valve regurgitation is present.    Mild tricuspid regurgitation with RVSP 42 mmHg.      Assessment & Plan   Assessment & Plan       Acute on chronic respiratory failure with hypoxia    Acute on chronic systolic congestive heart failure    Coronary artery disease involving native heart    NSTEMI (non-ST elevated myocardial infarction)    Dyspnea    64 yr old man with chronic 3-4L nc oxygen for COPD, presents w chest pain and mild hemoptysis     chronic hypercapnic/hypoxic resp failure  RLL pna   Sepsis (tachycardia, leukocytosis, tachypnea, source)   - CXr w RLL infiltrate. WBC 22.  History of R lung surgery , details unknown  - abg shows pH 7.35, pt alert with pCO2 62.    - bipap for now, then QHS and prn   - abx: Zoxyn, vanc. Check MRSA screen   Resp panel pending    - chest CTa pending    - states 'steroids cause diarrhea and pneumonia\" - doubt this is a true allergy.  No steroids needed now as does not sound like COPD exac     Chest pain  Sinus tach   CAD, known RCA occlusion   - elevated troponin.    -Cards consulted in ED; hep gtt started; however consider demand ischemia  - trend troponins, repeat EKG in AM   - last [facility] was 4/2023    Cardiomyopathy  - EF 35, repeat echo pending   - cards consulted; ordered home meds and diuretics   - watch renal fxn     Facial rash  - x 2 months, pt denies pruritus or pain with it; notes flaking scalp skin  - ? Rosacea     Unable to read/write   - approp education at dc       VTE Prophylaxis:  Pharmacologic VTE prophylaxis orders are present.          CODE STATUS:    There are no questions and answers to display.       Expected Discharge   Expected discharge date/ time has not been documented.     Noy Belle MD  09/01/24    "

## 2024-09-01 NOTE — PROGRESS NOTES
"Pharmacy Consult-Vancomycin Dosing  Fernie Dalal is a  64 y.o. male receiving vancomycin therapy.     Indication: PNA  Consulting Provider: Hospitalist  ID Consult: N    Goal AUC: 400 - 600 mg/L*hr    Allergies  Allergies as of 09/01/2024    (No Known Allergies)       Labs    Results from last 7 days   Lab Units 09/01/24  1050   BUN mg/dL 19   CREATININE mg/dL 0.83       Results from last 7 days   Lab Units 09/01/24  1050   WBC 10*3/mm3 22.09*       Evaluation of Dosing     Last Dose Received in the ED/Outside Facility:   Is Patient on Dialysis or Renal Replacement:     Ht - 175.3 cm (69.02\")  Wt - 113 kg (250 lb)    Estimated Creatinine Clearance: 112.9 mL/min (by C-G formula based on SCr of 0.83 mg/dL).    No intake/output data recorded.    Microbiology and Radiology  Microbiology Results (last 10 days)       Procedure Component Value - Date/Time    Respiratory Panel PCR w/COVID-19(SARS-CoV-2) JERO/ELADIO/YEIMI/PAD/COR/JONATHAN In-House, NP Swab in UTM/VTM, 2 HR TAT - Swab, Nasopharynx [[id number]]  (Normal) Collected: 09/01/24 1216    Lab Status: Final result Specimen: Swab from Nasopharynx Updated: 09/01/24 1316     ADENOVIRUS, PCR Not Detected     Coronavirus 229E Not Detected     Coronavirus HKU1 Not Detected     Coronavirus NL63 Not Detected     Coronavirus OC43 Not Detected     COVID19 Not Detected     Human Metapneumovirus Not Detected     Human Rhinovirus/Enterovirus Not Detected     Influenza A PCR Not Detected     Influenza B PCR Not Detected     Parainfluenza Virus 1 Not Detected     Parainfluenza Virus 2 Not Detected     Parainfluenza Virus 3 Not Detected     Parainfluenza Virus 4 Not Detected     RSV, PCR Not Detected     Bordetella pertussis pcr Not Detected     Bordetella parapertussis PCR Not Detected     Chlamydophila pneumoniae PCR Not Detected     Mycoplasma pneumo by PCR Not Detected    Narrative:      In the setting of a positive respiratory panel with a viral infection PLUS a negative procalcitonin " without other underlying concern for bacterial infection, consider observing off antibiotics or discontinuation of antibiotics and continue supportive care. If the respiratory panel is positive for atypical bacterial infection (Bordetella pertussis, Chlamydophila pneumoniae, or Mycoplasma pneumoniae), consider antibiotic de-escalation to target atypical bacterial infection.            Reported Vancomycin Levels                [employer] AUC Calculation:    Current AUC:  --  mg/L*hr    Predicted Steady State AUC on Current Dose: -- mg/L*hr  _________________________________    Predicted Steady State AUC on New Dose:  477  mg/L*hr    Assessment/Plan:    Pharmacy dosing Vancomycin for PNA. Goal -600 mg/L*hr  Vancomycin initiated with 2750mg (~20mg/kg) followed by 1250mg q12hr.  Obtain Vancomycin level 9/3.  Monitor renal function, culture results, clinical status and adjust as necessary.  Pharmacy will continue to follow.     Ankit Back, PharmD  9/1/2024  14:36 EDT

## 2024-09-01 NOTE — PROGRESS NOTES
HEPARIN INFUSION  Fernie Dalal is a  64 y.o. male receiving heparin infusion.     Therapy for (VTE/Cardiac):  ACS: NSTEMI workup d/t bilateral chest pain (Cardiac Protocol)  Patient Weight: 106 kg  Initial Bolus (Y/N):   Yes  Any Bolus (Y/N):   Yes        Signs or Symptoms of Bleeding: Patient endorses some mild hemoptysis at bedside, but was not clinically significant per team. Proceed with IV heparin, monitor closely.    Cardiac (Not VTE)   Initial Bolus: 60 units/kg (Max 4,000 units)  Initial rate: 12 units/kg/hr (Max 1,000 units/hr)   Anti Xa Rebolus Infusion Hold time Change infusion Dose (Units/kg/hr) Next Anti Xa or aPTT Level Due   < 0.11 50 Units/kg  (4000 Units Max) None Increase by  3 Units/kg/hr 6 hours   0.11- 0.19 25 Units/kg  (2000 Units Max) None Increase by  2 Units/kg/hr 6 hours   0.2 - 0.29 0 None Increase by  1 Units/kg/hr 6 hours   0.3 - 0.5 0 None No Change 6 hours (after 2 consecutive levels in range check qAM)   0.51 - 0.6 0 None Decrease by  1 Units/kg/hr 6 hours   0.61 - 0.8 0 30 Minutes Decrease by  2 Units/kg/hr 6 hours   0.81 - 1 0 60 Minutes Decrease by  3 Units/kg/hr 6 hours   >1 0 Hold  After Anti Xa less than 0.5 decrease previous rate by  4 Units/kg/hr  Every 2 hours until Anti Xa  less than 0.5 then when infusion restarts in 6 hours     Recommend Xa every 6 hours.     Results from last 7 days   Lab Units 09/01/24  1050   INR  0.97   HEMOGLOBIN g/dL 13.5   HEMATOCRIT % 45.0   PLATELETS 10*3/mm3 333          Date   Time   Anti-Xa Current Rate (Unit/kg/hr) Bolus   (Units) Rate Change   (Unit/kg/hr) New Rate (Unit/kg/hr) Next   Anti-Xa Comments  Pump Check Daily   9/1 10:50 0.10 --new-- 4000 +9 9 18:00 D/w KAMILA Patricia. Bolus & rate confirmed. Pt was counseled on Heparin. He was unfamiliar with Eliquis when I questioned him.                                                                                                                                                                                                                                  Willie Davis, PharmD, BCPS, [facility]  12:29 EDT   09/01/24

## 2024-09-01 NOTE — LETTER
EMS Transport Request  For use at [facility], [place], [place], [place], and [place] only   Patient Name: Fernie Dalal : 1960   Weight:110 kg (241 lb 7.2 oz) Pick-up Location: [facility] BLS/ALS: BLS/ALS: ALS   Insurance: [place] MEDICAID Auth End Date:      Pre-Cert #: D/C Summary complete:    Destination: Other [facility]   Contact Precautions: None   Equipment (O2, Fluids, etc.): Ventilator, settings 35% O2 Peep 5  via trach   Arrive By Date/Time:  afternoon after 1400 Stretcher/WC: Stretcher   CM Requesting: Aniket Maqruez RN Ext: [phone]   Notes/Medical Necessity: trach Peg on ventilator     ______________________________________________________________________    *Only 2 patient bags OR 1 carry-on size bag are permitted.  Wheelchairs and walkers CANNOT transported with the patient. Acknowledge: Yes

## 2024-09-02 ENCOUNTER — APPOINTMENT (OUTPATIENT)
Dept: CT IMAGING | Facility: HOSPITAL | Age: 64
End: 2024-09-02
Payer: MEDICAID

## 2024-09-02 PROBLEM — I50.20 HFREF (HEART FAILURE WITH REDUCED EJECTION FRACTION): Status: ACTIVE | Noted: 2022-04-04

## 2024-09-02 LAB
ALBUMIN SERPL-MCNC: 3.5 G/DL (ref 3.5–5.2)
ALBUMIN SERPL-MCNC: 3.5 G/DL (ref 3.5–5.2)
ALBUMIN/GLOB SERPL: 1.1 G/DL
ALBUMIN/GLOB SERPL: 1.1 G/DL
ALP SERPL-CCNC: 82 U/L (ref 39–117)
ALP SERPL-CCNC: 85 U/L (ref 39–117)
ALT SERPL W P-5'-P-CCNC: 12 U/L (ref 1–41)
ALT SERPL W P-5'-P-CCNC: 12 U/L (ref 1–41)
ANION GAP SERPL CALCULATED.3IONS-SCNC: 11 MMOL/L (ref 5–15)
ANION GAP SERPL CALCULATED.3IONS-SCNC: 7 MMOL/L (ref 5–15)
ANION GAP SERPL CALCULATED.3IONS-SCNC: 9 MMOL/L (ref 5–15)
AST SERPL-CCNC: 24 U/L (ref 1–40)
AST SERPL-CCNC: 27 U/L (ref 1–40)
BASOPHILS # BLD AUTO: 0.05 10*3/MM3 (ref 0–0.2)
BASOPHILS NFR BLD AUTO: 0.3 % (ref 0–1.5)
BILIRUB SERPL-MCNC: 0.2 MG/DL (ref 0–1.2)
BILIRUB SERPL-MCNC: 0.2 MG/DL (ref 0–1.2)
BUN SERPL-MCNC: 13 MG/DL (ref 8–23)
BUN SERPL-MCNC: 13 MG/DL (ref 8–23)
BUN SERPL-MCNC: 14 MG/DL (ref 8–23)
BUN/CREAT SERPL: 16.3 (ref 7–25)
BUN/CREAT SERPL: 18.4 (ref 7–25)
BUN/CREAT SERPL: 21.3 (ref 7–25)
CALCIUM SPEC-SCNC: 8.4 MG/DL (ref 8.6–10.5)
CALCIUM SPEC-SCNC: 8.7 MG/DL (ref 8.6–10.5)
CALCIUM SPEC-SCNC: 8.8 MG/DL (ref 8.6–10.5)
CHLORIDE SERPL-SCNC: 98 MMOL/L (ref 98–107)
CHLORIDE SERPL-SCNC: 99 MMOL/L (ref 98–107)
CHLORIDE SERPL-SCNC: 99 MMOL/L (ref 98–107)
CO2 SERPL-SCNC: 27 MMOL/L (ref 22–29)
CO2 SERPL-SCNC: 31 MMOL/L (ref 22–29)
CO2 SERPL-SCNC: 32 MMOL/L (ref 22–29)
CREAT SERPL-MCNC: 0.61 MG/DL (ref 0.76–1.27)
CREAT SERPL-MCNC: 0.76 MG/DL (ref 0.76–1.27)
CREAT SERPL-MCNC: 0.8 MG/DL (ref 0.76–1.27)
DEPRECATED RDW RBC AUTO: 47.4 FL (ref 37–54)
EGFRCR SERPLBLD CKD-EPI 2021: 101 ML/MIN/1.73
EGFRCR SERPLBLD CKD-EPI 2021: 107.9 ML/MIN/1.73
EGFRCR SERPLBLD CKD-EPI 2021: 99.4 ML/MIN/1.73
EOSINOPHIL # BLD AUTO: 0.15 10*3/MM3 (ref 0–0.4)
EOSINOPHIL NFR BLD AUTO: 0.9 % (ref 0.3–6.2)
ERYTHROCYTE [DISTWIDTH] IN BLOOD BY AUTOMATED COUNT: 14 % (ref 12.3–15.4)
GEN 5 2HR TROPONIN T REFLEX: 252 NG/L
GLOBULIN UR ELPH-MCNC: 3.2 GM/DL
GLOBULIN UR ELPH-MCNC: 3.2 GM/DL
GLUCOSE BLDC GLUCOMTR-MCNC: 168 MG/DL (ref 70–130)
GLUCOSE SERPL-MCNC: 105 MG/DL (ref 65–99)
GLUCOSE SERPL-MCNC: 109 MG/DL (ref 65–99)
GLUCOSE SERPL-MCNC: 170 MG/DL (ref 65–99)
HCT VFR BLD AUTO: 38.1 % (ref 37.5–51)
HGB BLD-MCNC: 11.5 G/DL (ref 13–17.7)
IMM GRANULOCYTES # BLD AUTO: 0.11 10*3/MM3 (ref 0–0.05)
IMM GRANULOCYTES NFR BLD AUTO: 0.6 % (ref 0–0.5)
LYMPHOCYTES # BLD AUTO: 0.66 10*3/MM3 (ref 0.7–3.1)
LYMPHOCYTES NFR BLD AUTO: 3.8 % (ref 19.6–45.3)
MAGNESIUM SERPL-MCNC: 2.4 MG/DL (ref 1.6–2.4)
MAGNESIUM SERPL-MCNC: 2.5 MG/DL (ref 1.6–2.4)
MAGNESIUM SERPL-MCNC: 2.6 MG/DL (ref 1.6–2.4)
MCH RBC QN AUTO: 27.8 PG (ref 26.6–33)
MCHC RBC AUTO-ENTMCNC: 30.2 G/DL (ref 31.5–35.7)
MCV RBC AUTO: 92 FL (ref 79–97)
MONOCYTES # BLD AUTO: 1.53 10*3/MM3 (ref 0.1–0.9)
MONOCYTES NFR BLD AUTO: 8.8 % (ref 5–12)
NEUTROPHILS NFR BLD AUTO: 14.96 10*3/MM3 (ref 1.7–7)
NEUTROPHILS NFR BLD AUTO: 85.6 % (ref 42.7–76)
NRBC BLD AUTO-RTO: 0 /100 WBC (ref 0–0.2)
PLATELET # BLD AUTO: 289 10*3/MM3 (ref 140–450)
PMV BLD AUTO: 10.7 FL (ref 6–12)
POTASSIUM SERPL-SCNC: 3.9 MMOL/L (ref 3.5–5.2)
POTASSIUM SERPL-SCNC: 4.1 MMOL/L (ref 3.5–5.2)
POTASSIUM SERPL-SCNC: 4.4 MMOL/L (ref 3.5–5.2)
PROCALCITONIN SERPL-MCNC: 3.32 NG/ML (ref 0–0.25)
PROT SERPL-MCNC: 6.7 G/DL (ref 6–8.5)
PROT SERPL-MCNC: 6.7 G/DL (ref 6–8.5)
QT INTERVAL: 410 MS
QTC INTERVAL: 544 MS
RBC # BLD AUTO: 4.14 10*6/MM3 (ref 4.14–5.8)
SODIUM SERPL-SCNC: 137 MMOL/L (ref 136–145)
SODIUM SERPL-SCNC: 138 MMOL/L (ref 136–145)
SODIUM SERPL-SCNC: 138 MMOL/L (ref 136–145)
TROPONIN T DELTA: 0 NG/L
TROPONIN T SERPL HS-MCNC: 252 NG/L
TROPONIN T SERPL HS-MCNC: 274 NG/L
UFH PPP CHRO-ACNC: 0.1 IU/ML (ref 0.3–0.7)
UFH PPP CHRO-ACNC: 0.16 IU/ML (ref 0.3–0.7)
UFH PPP CHRO-ACNC: 0.3 IU/ML (ref 0.3–0.7)
WBC NRBC COR # BLD AUTO: 17.46 10*3/MM3 (ref 3.4–10.8)

## 2024-09-02 PROCEDURE — 25010000002 MORPHINE PER 10 MG: Performed by: FAMILY MEDICINE

## 2024-09-02 PROCEDURE — G0378 HOSPITAL OBSERVATION PER HR: HCPCS

## 2024-09-02 PROCEDURE — 99232 SBSQ HOSP IP/OBS MODERATE 35: CPT | Performed by: FAMILY MEDICINE

## 2024-09-02 PROCEDURE — 25010000002 HEPARIN (PORCINE) 25000-0.45 UT/250ML-% SOLUTION

## 2024-09-02 PROCEDURE — 94664 DEMO&/EVAL PT USE INHALER: CPT

## 2024-09-02 PROCEDURE — 25810000003 SODIUM CHLORIDE 0.9 % SOLUTION 250 ML FLEX CONT

## 2024-09-02 PROCEDURE — 85025 COMPLETE CBC W/AUTO DIFF WBC: CPT | Performed by: INTERNAL MEDICINE

## 2024-09-02 PROCEDURE — 25010000002 HEPARIN (PORCINE) PER 1000 UNITS: Performed by: NURSE PRACTITIONER

## 2024-09-02 PROCEDURE — 99232 SBSQ HOSP IP/OBS MODERATE 35: CPT | Performed by: NURSE PRACTITIONER

## 2024-09-02 PROCEDURE — 93005 ELECTROCARDIOGRAM TRACING: CPT | Performed by: FAMILY MEDICINE

## 2024-09-02 PROCEDURE — 85520 HEPARIN ASSAY: CPT | Performed by: NURSE PRACTITIONER

## 2024-09-02 PROCEDURE — P9047 ALBUMIN (HUMAN), 25%, 50ML: HCPCS | Performed by: FAMILY MEDICINE

## 2024-09-02 PROCEDURE — 25010000002 ALBUMIN HUMAN 25% PER 50 ML: Performed by: FAMILY MEDICINE

## 2024-09-02 PROCEDURE — 25510000001 IOPAMIDOL 61 % SOLUTION: Performed by: INTERNAL MEDICINE

## 2024-09-02 PROCEDURE — 94799 UNLISTED PULMONARY SVC/PX: CPT

## 2024-09-02 PROCEDURE — 84484 ASSAY OF TROPONIN QUANT: CPT | Performed by: INTERNAL MEDICINE

## 2024-09-02 PROCEDURE — 93010 ELECTROCARDIOGRAM REPORT: CPT | Performed by: INTERNAL MEDICINE

## 2024-09-02 PROCEDURE — 84145 PROCALCITONIN (PCT): CPT | Performed by: INTERNAL MEDICINE

## 2024-09-02 PROCEDURE — 25010000002 FUROSEMIDE PER 20 MG: Performed by: INTERNAL MEDICINE

## 2024-09-02 PROCEDURE — 25010000002 HEPARIN (PORCINE) PER 1000 UNITS

## 2024-09-02 PROCEDURE — 70450 CT HEAD/BRAIN W/O DYE: CPT

## 2024-09-02 PROCEDURE — 97166 OT EVAL MOD COMPLEX 45 MIN: CPT

## 2024-09-02 PROCEDURE — 83735 ASSAY OF MAGNESIUM: CPT | Performed by: PHYSICIAN ASSISTANT

## 2024-09-02 PROCEDURE — 97530 THERAPEUTIC ACTIVITIES: CPT

## 2024-09-02 PROCEDURE — 85520 HEPARIN ASSAY: CPT

## 2024-09-02 PROCEDURE — 25010000002 VANCOMYCIN HCL 1.25 G RECONSTITUTED SOLUTION 1 EACH VIAL

## 2024-09-02 PROCEDURE — 83735 ASSAY OF MAGNESIUM: CPT | Performed by: INTERNAL MEDICINE

## 2024-09-02 PROCEDURE — 71275 CT ANGIOGRAPHY CHEST: CPT

## 2024-09-02 PROCEDURE — 25010000002 PIPERACILLIN SOD-TAZOBACTAM PER 1 G: Performed by: INTERNAL MEDICINE

## 2024-09-02 PROCEDURE — 97535 SELF CARE MNGMENT TRAINING: CPT

## 2024-09-02 PROCEDURE — 80053 COMPREHEN METABOLIC PANEL: CPT | Performed by: INTERNAL MEDICINE

## 2024-09-02 PROCEDURE — 84484 ASSAY OF TROPONIN QUANT: CPT | Performed by: FAMILY MEDICINE

## 2024-09-02 PROCEDURE — 82948 REAGENT STRIP/BLOOD GLUCOSE: CPT

## 2024-09-02 RX ORDER — ALUMINA, MAGNESIA, AND SIMETHICONE 2400; 2400; 240 MG/30ML; MG/30ML; MG/30ML
30 SUSPENSION ORAL ONCE
Status: COMPLETED | OUTPATIENT
Start: 2024-09-02 | End: 2024-09-02

## 2024-09-02 RX ORDER — GUAIFENESIN 600 MG/1
1200 TABLET, EXTENDED RELEASE ORAL EVERY 12 HOURS SCHEDULED
Status: DISCONTINUED | OUTPATIENT
Start: 2024-09-02 | End: 2024-09-03

## 2024-09-02 RX ORDER — ALBUMIN (HUMAN) 12.5 G/50ML
25 SOLUTION INTRAVENOUS ONCE
Status: COMPLETED | OUTPATIENT
Start: 2024-09-02 | End: 2024-09-02

## 2024-09-02 RX ORDER — HEPARIN SODIUM 1000 [USP'U]/ML
2000 INJECTION, SOLUTION INTRAVENOUS; SUBCUTANEOUS ONCE
Status: COMPLETED | OUTPATIENT
Start: 2024-09-02 | End: 2024-09-02

## 2024-09-02 RX ORDER — MORPHINE SULFATE 2 MG/ML
2 INJECTION, SOLUTION INTRAMUSCULAR; INTRAVENOUS EVERY 4 HOURS PRN
Status: DISCONTINUED | OUTPATIENT
Start: 2024-09-02 | End: 2024-09-04

## 2024-09-02 RX ORDER — HEPARIN SODIUM 1000 [USP'U]/ML
4000 INJECTION, SOLUTION INTRAVENOUS; SUBCUTANEOUS ONCE
Status: COMPLETED | OUTPATIENT
Start: 2024-09-02 | End: 2024-09-02

## 2024-09-02 RX ORDER — IOPAMIDOL 612 MG/ML
80 INJECTION, SOLUTION INTRAVASCULAR
Status: COMPLETED | OUTPATIENT
Start: 2024-09-02 | End: 2024-09-02

## 2024-09-02 RX ORDER — QUETIAPINE FUMARATE 50 MG/1
150 TABLET, EXTENDED RELEASE ORAL NIGHTLY
Status: DISCONTINUED | OUTPATIENT
Start: 2024-09-02 | End: 2024-09-04

## 2024-09-02 RX ORDER — DIAZEPAM 5 MG
5 TABLET ORAL EVERY 8 HOURS PRN
Status: DISCONTINUED | OUTPATIENT
Start: 2024-09-02 | End: 2024-09-03

## 2024-09-02 RX ADMIN — PIPERACILLIN AND TAZOBACTAM 3.38 G: 3; .375 INJECTION, POWDER, LYOPHILIZED, FOR SOLUTION INTRAVENOUS; PARENTERAL at 02:15

## 2024-09-02 RX ADMIN — GUAIFENESIN 1200 MG: 600 TABLET, EXTENDED RELEASE ORAL at 15:53

## 2024-09-02 RX ADMIN — HEPARIN SODIUM 4000 UNITS: 1000 INJECTION INTRAVENOUS; SUBCUTANEOUS at 04:47

## 2024-09-02 RX ADMIN — IPRATROPIUM BROMIDE AND ALBUTEROL SULFATE 3 ML: 2.5; .5 SOLUTION RESPIRATORY (INHALATION) at 12:10

## 2024-09-02 RX ADMIN — EXTENDED PHENYTOIN SODIUM 100 MG: 100 CAPSULE ORAL at 20:23

## 2024-09-02 RX ADMIN — SACUBITRIL AND VALSARTAN 1 TABLET: 24; 26 TABLET, FILM COATED ORAL at 09:49

## 2024-09-02 RX ADMIN — IPRATROPIUM BROMIDE AND ALBUTEROL SULFATE 3 ML: 2.5; .5 SOLUTION RESPIRATORY (INHALATION) at 15:34

## 2024-09-02 RX ADMIN — METOPROLOL SUCCINATE 25 MG: 25 TABLET, EXTENDED RELEASE ORAL at 09:49

## 2024-09-02 RX ADMIN — ASPIRIN 81 MG: 81 TABLET, COATED ORAL at 09:49

## 2024-09-02 RX ADMIN — QUETIAPINE FUMARATE 150 MG: 50 TABLET, EXTENDED RELEASE ORAL at 20:23

## 2024-09-02 RX ADMIN — IPRATROPIUM BROMIDE AND ALBUTEROL SULFATE 3 ML: 2.5; .5 SOLUTION RESPIRATORY (INHALATION) at 07:39

## 2024-09-02 RX ADMIN — VANCOMYCIN HYDROCHLORIDE 1250 MG: 1.25 INJECTION, POWDER, LYOPHILIZED, FOR SOLUTION INTRAVENOUS at 09:40

## 2024-09-02 RX ADMIN — ALBUMIN (HUMAN) 25 G: 0.25 INJECTION, SOLUTION INTRAVENOUS at 18:10

## 2024-09-02 RX ADMIN — EMPAGLIFLOZIN 10 MG: 10 TABLET, FILM COATED ORAL at 09:50

## 2024-09-02 RX ADMIN — ACETAMINOPHEN 650 MG: 325 TABLET ORAL at 13:59

## 2024-09-02 RX ADMIN — Medication 10 ML: at 20:23

## 2024-09-02 RX ADMIN — IOPAMIDOL 80 ML: 612 INJECTION, SOLUTION INTRAVENOUS at 01:46

## 2024-09-02 RX ADMIN — HEPARIN SODIUM 14 UNITS/KG/HR: 10000 INJECTION, SOLUTION INTRAVENOUS at 10:11

## 2024-09-02 RX ADMIN — MORPHINE SULFATE 2 MG: 2 INJECTION, SOLUTION INTRAMUSCULAR; INTRAVENOUS at 14:06

## 2024-09-02 RX ADMIN — HEPARIN SODIUM 2000 UNITS: 1000 INJECTION INTRAVENOUS; SUBCUTANEOUS at 13:21

## 2024-09-02 RX ADMIN — IPRATROPIUM BROMIDE AND ALBUTEROL SULFATE 3 ML: 2.5; .5 SOLUTION RESPIRATORY (INHALATION) at 19:53

## 2024-09-02 RX ADMIN — PIPERACILLIN AND TAZOBACTAM 3.38 G: 3; .375 INJECTION, POWDER, LYOPHILIZED, FOR SOLUTION INTRAVENOUS; PARENTERAL at 18:47

## 2024-09-02 RX ADMIN — EXTENDED PHENYTOIN SODIUM 100 MG: 100 CAPSULE ORAL at 09:47

## 2024-09-02 RX ADMIN — PIPERACILLIN AND TAZOBACTAM 3.38 G: 3; .375 INJECTION, POWDER, LYOPHILIZED, FOR SOLUTION INTRAVENOUS; PARENTERAL at 09:47

## 2024-09-02 RX ADMIN — ALUMINUM HYDROXIDE, MAGNESIUM HYDROXIDE, DIMETHICONE 30 ML: 400; 400; 40 SUSPENSION ORAL at 15:53

## 2024-09-02 RX ADMIN — ATORVASTATIN CALCIUM 40 MG: 40 TABLET, FILM COATED ORAL at 09:49

## 2024-09-02 RX ADMIN — PANTOPRAZOLE SODIUM 40 MG: 40 TABLET, DELAYED RELEASE ORAL at 09:50

## 2024-09-02 RX ADMIN — FUROSEMIDE 60 MG: 10 INJECTION, SOLUTION INTRAMUSCULAR; INTRAVENOUS at 14:07

## 2024-09-02 RX ADMIN — DIAZEPAM 5 MG: 5 TABLET ORAL at 05:50

## 2024-09-02 RX ADMIN — Medication 10 ML: at 10:25

## 2024-09-02 RX ADMIN — DIAZEPAM 5 MG: 5 TABLET ORAL at 10:41

## 2024-09-02 RX ADMIN — NITROGLYCERIN 0.4 MG: 0.4 TABLET SUBLINGUAL at 04:55

## 2024-09-02 NOTE — CASE MANAGEMENT/SOCIAL WORK
"Discharge Planning Assessment  [facility]     Patient Name: Fernie Dalal  MRN: [id number]  Today's Date: 9/2/2024    Admit Date: 9/1/2024    Plan: [facility]   Discharge Needs Assessment       Row Name 09/02/24 1525       Living Environment    People in Home alone    Current Living Arrangements home    Potentially Unsafe Housing Conditions none    Primary Care Provided by self    Provides Primary Care For no one    Family Caregiver if Needed sibling(s)    Family Caregiver Names Oumou Palafox    Quality of Family Relationships unable to assess    Able to Return to Prior Arrangements yes       Resource/Environmental Concerns    Resource/Environmental Concerns none       Transition Planning    Patient/Family Anticipates Transition to home    Patient/Family Anticipated Services at Transition none    Transportation Anticipated family or friend will provide       Discharge Needs Assessment    Readmission Within the Last 30 Days no previous admission in last 30 days    Equipment Currently Used at Home rollator;oxygen;nebulizer;ramp    Concerns to be Addressed denies needs/concerns at this time;discharge planning    Anticipated Changes Related to Illness none    Equipment Needed After Discharge none    Current Discharge Risk chronically ill;dependent with mobility/activities of daily living;lives alone    Discharge Coordination/Progress [facility]                   Discharge Plan       Row Name 09/02/24 1527       Plan    Plan [facility]    Patient/Family in Agreement with Plan yes    Plan Comments Spoke with patient at bedside regarding discharge planning.  Patient denies the use of HH and reports \"I don't want those people at my house again.\".  Patient reprots having a Rollator, Nebulizer and Oxygen 3-4L continuous.  He has prescription coverage and reports no diffuculty filling prescriptions.  Rohit lives alone in a single level house with a ramp to enter.  Ptient would like to go to [facility] if recommended by therapy.  He indicates that he " has been independt at home with a Rol[employer] and his sister prepares his medications weekly in a pill planner.  No other needs verbalized.  CM following.   Patient discharge plan is ongoing.    Final Discharge Disposition Code 62 - inpatient rehab facility                  Continued Care and Services - Admitted Since 9/1/2024    No active coordination exists for this encounter.       Expected Discharge Date and Time       Expected Discharge Date Expected Discharge Time    Sep 5, 2024            Demographic Summary       Row Name 09/02/24 1524       General Information    Admission Type inpatient    Arrived From home    Referral Source admission list    Reason for Consult discharge planning    Preferred Language English    General Information Comments KRISTIN Davison       Contact Information    Permission Granted to Share Info With     Contact Information Comments Oumou Palafox, sister  [phone]                   Functional Status       Row Name 09/02/24 1525       Functional Status    Usual Activity Tolerance moderate    Current Activity Tolerance moderate       Functional Status, IADL    Medications independent    Meal Preparation assistive equipment    Housekeeping assistive equipment    Laundry assistive equipment    Shopping assistive equipment       Employment/    Employment/ Comments [employer]                   Psychosocial    No documentation.                  Abuse/Neglect    No documentation.                  Legal    No documentation.                  Substance Abuse    No documentation.                  Patient Forms    No documentation.                     Harper Parham RN

## 2024-09-02 NOTE — NURSING NOTE
Rapid was called due to pt complaining of chest pain. Pt stated that it felt the same as it did when he arrived to the facility. EKG was ordered and placed in chart. Pts heart rate has been elevated throughout the shift. Pt had a run of v-tach later in the shift. Cardiology was notified. Pt is on 3L NC. Blood pressure has been soft through out shift ordered were given (See MAR). Basilio was placed per order due to giving lasix. Pt is now laying in bed resting. Pt has been very pleasant but anxious.

## 2024-09-02 NOTE — PROGRESS NOTES
Cardiology Progress Note      Reason for visit:    NSTEMI  Acute on chronic systolic heart failure      IDENTIFICATION: 64-year-old gentleman who resides in [place], [place]    Active Hospital Problems    Diagnosis  POA    **Acute on chronic respiratory failure with hypoxia [J96.21]  Yes    NSTEMI (non-ST elevated myocardial infarction) [I21.4]  Yes     Elevated troponin in the setting of pneumonia and CHF, 9/1/2024      Acute on chronic systolic congestive heart failure [I50.23]  Yes     Echo (9/29/2023): LVEF 36%.  Moderate MR.  RVSP 42 mmHg      Coronary artery disease involving native coronary artery of native heart with angina pectoris [I25.119]  Yes     Cardiac catheterization (4/24/2023): Severe 1-vessel CAD involving chronic total occlusion of previously stented distal RCA.  Mild to moderate disease of left coronary system              No events noted overnight.  He denies any chest pain.  IV heparin drip is infusing.  He has been hypotensive this morning.  He has been coughing up thick brown sputum.  He is on O2 at 4 L via nasal cannula.  At home he usually uses 3 L.           Vital Sign Min/Max for last 24 hours  Temp  Min: 97.9 °F (36.6 °C)  Max: 98.6 °F (37 °C)   BP  Min: 94/68  Max: 143/101   Pulse  Min: 100  Max: 139   Resp  Min: 20  Max: 26   SpO2  Min: 81 %  Max: 97 %   Flow (L/min)  Min: 3  Max: 6      Intake/Output Summary (Last 24 hours) at 9/2/2024 0933  Last data filed at 9/2/2024 0345  Gross per 24 hour   Intake 100 ml   Output 2100 ml   Net -2000 ml           Physical Exam    Tele:  NSR    Results Review (reviewed the patient's recent labs in the electronic medical record):     EKG (9/1/2024): Sinus tachycardia    CT angiogram of the chest (9/1/2024): No PE.  Multifocal right lung pneumonia.  Small partially loculated right pleural effusion    ECHO (9/1/2024): Severely reduced LVEF 28%.  Global hypokinesis of LV.  Mild aortic stenosis mean gradient 15 mmHg.  Mild to moderate MR.  RVSP less than  35 mmHg    Results from last 7 days   Lab Units 09/02/24  0527 09/01/24  1050   SODIUM mmol/L 138 140   POTASSIUM mmol/L 4.1 4.6   CHLORIDE mmol/L 99 100   BUN mg/dL 13 19   CREATININE mg/dL 0.61* 0.83   MAGNESIUM mg/dL 2.5* 1.7     Results from last 7 days   Lab Units 09/02/24  0527 09/01/24  1303 09/01/24  1050   HSTROP T ng/L 274* 295* 236*     Results from last 7 days   Lab Units 09/02/24  0527 09/01/24  1050   WBC 10*3/mm3 17.46* 22.09*   HEMOGLOBIN g/dL 11.5* 13.5   HEMATOCRIT % 38.1 45.0   PLATELETS 10*3/mm3 289 333       Lab Results   Component Value Date    HGBA1C 6.00 (H) 09/01/2024       Lab Results   Component Value Date    CHOL 225 (H) 09/01/2024    TRIG 112 09/01/2024    HDL 61 (H) 09/01/2024     (H) 09/01/2024              NSTEMI  Suspect type II secondary to respiratory failure and fixed CAD (RCA )  Repeat troponin  Started on IV heparin in ER  Aspirin 81 mg daily     Acute on chronic systolic heart failure  Elevated proBNP and interstitial edema on chest x-ray  Noncompliant with CHF meds  Echo this admission shows worsening LVEF from previously 36% to currently 28%  Metoprolol succinate 25 mg daily  Entresto 24-26 mg twice daily  Jardiance 10 mg daily  Spironolactone 25 mg daily  Lasix 60 mg IV twice daily  2000 mL diuresed     CAD  Cardiac catheterization last year showed severe one-vessel CAD involving chronic total occlusion of the RCA     Hyperlipidemia with goal LDL <70  Lipitor 40 mg daily     Type 2 diabetes mellitus not on insulin  Jardiance 10 mg daily     Hypertension  Current BP 94/60      Pneumonia   history of right lung surgery  Leukocytosis  CT angiogram of the chest shows no PE but multifocal right lung pneumonia with small partially loculated right pleural effusion  Management per hospitalist         Hold spironolactone while diuresing with IV Lasix due to hypotension  Continue IV heparin for elevated troponins.  However likely type II NSTEMI from fixed CAD  Continue  guideline directed medical therapy for heart failure  Dr. Gaitan to evaluate in the a.m.    Electronically signed by KRISTIN Prince, 09/02/24, 9:33 AM EDT.

## 2024-09-02 NOTE — SIGNIFICANT NOTE
Patient seen following rapid response this afternoon called because he was having chest pain, symptoms have already resolved and nursing actively discussing on the phone with cardiology. He has refused his Lasix this AM and dicussed with patient ideally we need to give him this with his reduced EF 28%. Patient reports he has been refusing additional dose as he is embarrassed about potentially exposing himself in front of female nurses. Plans for temporary placement of tom tonight to allow for diuresis and will assess for removal tomorrow, continue to follow with cardiology. Stat repeat troponin has been ordered, he continues on heparin gtt at this time. Plans as well for GI cocktail as may be related to possible indigestion and PRN morphine for recurrent episodes.       Addendum- 18:13- called and discussed case with Cardiology, confirmed with nursing and patient was given AM dose of Lasix early this afternoon, now slightly hypotensive and multiple cardiac meds have now been ordered. Plans to briefly hold Heparin gtt briefly to give albumin and antibiotics this PM and then will resume, Cardiology will see again in AM to try and further adjust medications as able for his cardiomyopathy. We both are currently in agreement his issues are likely related to PNA/FVO and more likely demand ischemia with improvement in troponin.

## 2024-09-02 NOTE — PROGRESS NOTES
[facility] Medicine Services  PROGRESS NOTE    Patient Name: Fernie Dalal  : 1960  MRN: [id number]    Date of Admission: 2024  Primary Care Physician: Belem Albert APRN    Subjective   Subjective     CC:  Chest pain, Dyspnea     HPI:  Patient is a 65 yo M seen and examined by me this AM, admitted overnight with chest pain, dyspnea and likely pneumonia. He is currently on 3L NC at this time and reports breathing doing better at this time. He reports he did not sleep well overnight, reordering some of his regular medications for anxiety.       Objective   Objective     Vital Signs:   Temp:  [97.9 °F (36.6 °C)-98.6 °F (37 °C)] 97.9 °F (36.6 °C)  Heart Rate:  [] 106  Resp:  [20-25] 20  BP: ()/(53-81) 94/62  Flow (L/min):  [3-6] 3     Physical Exam:  Constitutional: No acute distress, awake, alert, frail and chronically ill appearing, currently on 3L NC   HENT: NCAT, nares patent, mucous membranes moist  Respiratory: Decreased BS bilaterally, rhonchi right lower lung, no wheezing, respiratory effort normal   Cardiovascular: RRR, no murmurs, rubs, or gallops  Gastrointestinal: Positive bowel sounds, soft, nontender, nondistended  Musculoskeletal:1+ bilateral ankle edema  Psychiatric: Appropriate affect, cooperative  Neurologic: Oriented x 3, strength symmetric in all extremities, Cranial Nerves grossly intact to confrontation, speech clear  Skin: No rashes      Results Reviewed:  LAB RESULTS:      Lab 24  1153 24  0527 24  0254 24  1827 24  1050   WBC  --  17.46*  --   --  22.09*   HEMOGLOBIN  --  11.5*  --   --  13.5   HEMATOCRIT  --  38.1  --   --  45.0   PLATELETS  --  289  --   --  333   NEUTROS ABS  --  14.96*  --   --  19.13*   IMMATURE GRANS (ABS)  --  0.11*  --   --  0.14*   LYMPHS ABS  --  0.66*  --   --  0.52*   MONOS ABS  --  1.53*  --   --  2.17*   EOS ABS  --  0.15  --   --  0.04   MCV  --  92.0  --   --  91.8    PROCALCITONIN  --  3.32*  --   --  4.18*   LACTATE  --   --   --   --  1.8   PROTIME  --   --   --   --  13.0   APTT  --   --   --   --  26.1*   HEPARIN ANTI-XA 0.16*  --  0.10* 0.10* 0.10*         Lab 09/02/24  0527 09/01/24  1050   SODIUM 138 140   POTASSIUM 4.1 4.6   CHLORIDE 99 100   CO2 32.0* 31.0*   ANION GAP 7.0 9.0   BUN 13 19   CREATININE 0.61* 0.83   EGFR 107.9 98.3   GLUCOSE 109* 146*   CALCIUM 8.4* 9.0   MAGNESIUM 2.5* 1.7   HEMOGLOBIN A1C  --  6.00*         Lab 09/01/24  1050   TOTAL PROTEIN 7.2   ALBUMIN 3.9   GLOBULIN 3.3   ALT (SGPT) 13   AST (SGOT) 26   BILIRUBIN 0.4   ALK PHOS 87   LIPASE 9*         Lab 09/02/24  0527 09/01/24  1303 09/01/24  1050   PROBNP  --   --  3,232.0*   HSTROP T 274* 295* 236*   PROTIME  --   --  13.0   INR  --   --  0.97         Lab 09/01/24  1050   CHOLESTEROL 225*   LDL CHOL 144*   HDL CHOL 61*   TRIGLYCERIDES 112             Lab 09/01/24  1120   PH, ARTERIAL 7.346*   PCO2, ARTERIAL 62.7*   PO2 ART 72.0*   FIO2 44   HCO3 ART 34.2*   BASE EXCESS ART 6.5*   CARBOXYHEMOGLOBIN 1.5     Brief Urine Lab Results  (Last result in the past 365 days)        Color   Clarity   Blood   Leuk Est   Nitrite   Protein   CREAT   Urine HCG        09/01/24 1636 Yellow   Clear   Negative   Negative   Negative   Trace                   Microbiology Results Abnormal       Procedure Component Value - Date/Time    Blood Culture - Blood, Arm, Left [[id number]]  (Normal) Collected: 09/01/24 1145    Lab Status: Preliminary result Specimen: Blood from Arm, Left Updated: 09/02/24 1302     Blood Culture No growth at 24 hours    Blood Culture - Blood, Arm, Right [[id number]]  (Normal) Collected: 09/01/24 1108    Lab Status: Preliminary result Specimen: Blood from Arm, Right Updated: 09/02/24 1145     Blood Culture No growth at 24 hours    MRSA Screen, PCR (Inpatient) - Swab, Nares [[id number]]  (Normal) Collected: 09/01/24 1628    Lab Status: Final result Specimen: Swab from Nares Updated: 09/01/24 9984      MRSA PCR Negative    Narrative:      The negative predictive value of this diagnostic test is high and should only be used to consider de-escalating anti-MRSA therapy. A positive result may indicate colonization with MRSA and must be correlated clinically.  MRSA Negative    Respiratory Panel PCR w/COVID-19(SARS-CoV-2) JERO/ELADIO/YEIMI/PAD/COR/JONATHAN In-House, NP Swab in UTM/VTM, 2 HR TAT - Swab, Nasopharynx [[id number]]  (Normal) Collected: 09/01/24 1216    Lab Status: Final result Specimen: Swab from Nasopharynx Updated: 09/01/24 1316     ADENOVIRUS, PCR Not Detected     Coronavirus 229E Not Detected     Coronavirus HKU1 Not Detected     Coronavirus NL63 Not Detected     Coronavirus OC43 Not Detected     COVID19 Not Detected     Human Metapneumovirus Not Detected     Human Rhinovirus/Enterovirus Not Detected     Influenza A PCR Not Detected     Influenza B PCR Not Detected     Parainfluenza Virus 1 Not Detected     Parainfluenza Virus 2 Not Detected     Parainfluenza Virus 3 Not Detected     Parainfluenza Virus 4 Not Detected     RSV, PCR Not Detected     Bordetella pertussis pcr Not Detected     Bordetella parapertussis PCR Not Detected     Chlamydophila pneumoniae PCR Not Detected     Mycoplasma pneumo by PCR Not Detected    Narrative:      In the setting of a positive respiratory panel with a viral infection PLUS a negative procalcitonin without other underlying concern for bacterial infection, consider observing off antibiotics or discontinuation of antibiotics and continue supportive care. If the respiratory panel is positive for atypical bacterial infection (Bordetella pertussis, Chlamydophila pneumoniae, or Mycoplasma pneumoniae), consider antibiotic de-escalation to target atypical bacterial infection.            CT Angiogram Chest    Result Date: 9/2/2024  CT ANGIOGRAM CHEST Date of Exam: 9/2/2024 1:43 AM EDT Indication: acute onset chest pain, dyspnea, tachycardia, hypoxia, RLL opacity, suspect PNA, r/o PE.  Comparison: None available. Technique: CTA of the chest was performed after the uneventful intravenous administration of 80 mL Isovue-300. Reconstructed coronal and sagittal images were also obtained. In addition, a 3-D volume rendered image was created for interpretation. Automated exposure control and iterative reconstruction methods were used. Findings: Pulmonary arteries: Adequate opacification of the pulmonary arteries. No evidence of acute pulmonary embolism. Lungs and Pleura: There is a small partially loculated right pleural effusion. There are multiple nodular airspace opacities throughout the right upper, right middle and right lower lobes greatest in the right lower lobe. There are surrounding areas of groundglass attenuation with some air bronchograms in the right lower lobe. The findings are consistent with multifocal right lung pneumonia, perhaps atypical pneumonia. The left lung is clear. The left pleural space is clear. Mediastinum/Nikole: There are multiple prominent lymph nodes in the pretracheal, subcarinal and right hilar region consistent with reactive lymphadenopathy from right lung pulmonary infection. Lymph nodes: No axillary or supraclavicular adenopathy. Cardiovascular: The heart is enlarged. There is mild aortic valve calcification. There is moderate coronary artery calcification. There is no pericardial effusion..   Upper Abdomen: There is a hiatal hernia. Bones and Soft Tissue: No suspicious osseous lesion.     Impression: Impression: 1.No evidence of pulmonary embolism. 2.Multifocal right lung pneumonia with reactive mediastinal and right hilar lymphadenopathy. 3.Small partially loculated right pleural effusion. Electronically Signed: Esteban Jeffries MD  9/2/2024 2:40 AM EDT  Workstation ID: [id number]    CT Head Without Contrast    Result Date: 9/2/2024  CT HEAD WO CONTRAST Date of Exam: 9/2/2024 1:44 AM EDT Indication: Severe headache, acute onset. Comparison: None available. Technique:  Axial CT images were obtained of the head without contrast administration.  Automated exposure control and iterative construction methods were used. Findings: There is no evidence of hemorrhage. There is no mass effect or midline shift. There is no extracerebral collection. Ventricles are normal in size and configuration for patient's stated age.  Posterior fossa is within normal limits. Calvarium and skull base appear intact.   Visualized sinuses show no air fluid levels. Visualized orbits are unremarkable.     Impression: Impression: No acute intracranial abnormality. Electronically Signed: Esteban Jeffries MD  9/2/2024 2:31 AM EDT  Workstation ID: [id number]    Adult Transthoracic Echo Complete W/ Cont if Necessary Per Protocol    Result Date: 9/1/2024    Left ventricular systolic function is moderately decreased. Estimated left ventricular EF = 28%.  There is global hypokinesis of left ventricle.   The left ventricular cavity is mildly dilated.   The left atrial cavity is mild to moderately dilated.   Mild aortic valve stenosis is present (mean gradient 15 mmHg)   Mild to moderate mitral regurgitation is present.   Estimated right ventricular systolic pressure from tricuspid regurgitation is normal (<35 mmHg).     XR Chest 1 View    Result Date: 9/1/2024  XR CHEST 1 VW Date of Exam: 9/1/2024 10:38 AM EDT Indication: Chest Pain Triage Protocol Comparison: AP chest x-ray 4/22/2023. Findings:   Cardiac silhouette remains mildly enlarged. There are increased airspace opacities in the right lower lung. Left lung appears clear. No pneumothorax is seen.     Impression: Impression: Right basilar airspace opacities, concerning for pneumonia and/or atelectasis. Electronically Signed: Yolis Bustillo  9/1/2024 11:04 AM EDT  Workstation ID: [id number]     Results for orders placed during the hospital encounter of 09/01/24    Adult Transthoracic Echo Complete W/ Cont if Necessary Per Protocol    Interpretation Summary    Left  ventricular systolic function is moderately decreased. Estimated left ventricular EF = 28%.  There is global hypokinesis of left ventricle.    The left ventricular cavity is mildly dilated.    The left atrial cavity is mild to moderately dilated.    Mild aortic valve stenosis is present (mean gradient 15 mmHg)    Mild to moderate mitral regurgitation is present.    Estimated right ventricular systolic pressure from tricuspid regurgitation is normal (<35 mmHg).      Current medications:  Scheduled Meds:aspirin, 81 mg, Oral, Daily  atorvastatin, 40 mg, Oral, Daily  empagliflozin, 10 mg, Oral, Daily  furosemide, 60 mg, Intravenous, BID  heparin (porcine), 2,000 Units, Intravenous, Once  ipratropium-albuterol, 3 mL, Nebulization, 4x Daily - RT  metoprolol succinate XL, 25 mg, Oral, Q24H  pantoprazole, 40 mg, Oral, Daily  pharmacy consult - MTM, , Does not apply, Daily  phenytoin ER, 100 mg, Oral, BID  piperacillin-tazobactam, 3.375 g, Intravenous, Q8H  QUEtiapine fumarate ER, 150 mg, Oral, Nightly  sacubitril-valsartan, 1 tablet, Oral, Q12H  sodium chloride, 10 mL, Intravenous, Q12H  [Held by provider] spironolactone, 25 mg, Oral, Daily      Continuous Infusions:heparin, 16 Units/kg/hr, Last Rate: 14 Units/kg/hr (09/02/24 1011)  Pharmacy to Dose Heparin,   Pharmacy to dose vancomycin,       PRN Meds:.  acetaminophen    albuterol sulfate HFA    senna-docusate sodium **AND** polyethylene glycol **AND** bisacodyl **AND** bisacodyl    Calcium Replacement - Follow Nurse / BPA Driven Protocol    diazePAM    Magnesium Cardiology Dose Replacement - Follow Nurse / BPA Driven Protocol    nitroglycerin    Pharmacy to Dose Heparin    Pharmacy to dose vancomycin    Phosphorus Replacement - Follow Nurse / BPA Driven Protocol    Potassium Replacement - Follow Nurse / BPA Driven Protocol    sodium chloride    sodium chloride    sodium chloride    Assessment & Plan   Assessment & Plan     Active Hospital Problems    Diagnosis  POA     "**Acute on chronic respiratory failure with hypoxia [J96.21]  Yes    NSTEMI (non-ST elevated myocardial infarction) [I21.4]  Yes    HFrEF (heart failure with reduced ejection fraction) [I50.20]  Yes    Coronary artery disease involving native coronary artery of native heart with angina pectoris [I25.119]  Yes      Resolved Hospital Problems    Diagnosis Date Resolved POA    Dyspnea [R06.00] 09/01/2024 Yes        Brief Hospital Course to date:  Fernie Dalal is a 64 y.o. male  with chronic 3-4L nc oxygen for COPD, presents w chest pain and mild hemoptysis. Patient transferred to my services on the AM of 9/2, suspect much of his symptoms are likely related to RLL PNA at this time, plans for continued IV antibiotics and pulmonary toileting at this time. Patient currently on home 3L NC, continued use of bipap prn and qhs and following with cardiology for chest pain/elevated troponins, he is to remain on heparin gtt at this time.       chronic hypercapnic/hypoxic resp failure  RLL pna   Sepsis (tachycardia, leukocytosis, tachypnea, source)   - CXr w RLL infiltrate. WBC 22.  History of R lung surgery , details unknown  - abg shows pH 7.35, pt alert with pCO2 62.    - Continued bipap qhs and prn   - abx: Zoxyn, vanc. MRSA screen negative, will stop Vanc, Resp panel negative  - chest CTA negative for PE, multifocal right lung pneumonia    - states 'steroids cause diarrhea and pneumonia\" - doubt this is a true allergy.  No steroids needed now as does not sound like COPD exac      Hemoptysis   - reports on admission but improved  - Continue to follow at this time, continued heparin as below     Chest pain  Sinus tach   CAD, known RCA occlusion   - elevated troponin.    -Cards consulted in ED; hep gtt started; however consider demand ischemia  - Plans for continued heparin gtt on 9/2, regular cardiologist to evaluate 9/3    - last LHC was 4/2023     Cardiomyopathy  - EF 35, repeat echo with EF 28%, global hypokinesis   - cards " consulted; ordered home meds and diuretics   - watch renal fxn      Facial rash  - x 2 months, pt denies pruritus or pain with it; notes flaking scalp skin  - ? Rosacea      Unable to read/write   - approp education at dc     Expected Discharge Location and Transportation: TBD  Expected Discharge   Expected Discharge Date: 9/5/2024; Expected Discharge Time:      VTE Prophylaxis:  Pharmacologic VTE prophylaxis orders are present.         AM-PAC 6 Clicks Score (PT): 17 (09/01/24 2022)    CODE STATUS:   Code Status and Medical Interventions: CPR (Attempt to Resuscitate); Full Support   Ordered at: 09/01/24 1318     Level Of Support Discussed With:    Patient     Code Status (Patient has no pulse and is not breathing):    CPR (Attempt to Resuscitate)     Medical Interventions (Patient has pulse or is breathing):    Full Support       BOBBY Lobato, DO  09/02/24

## 2024-09-02 NOTE — PROGRESS NOTES
HEPARIN INFUSION  Fernie Dalal is a  64 y.o. male receiving heparin infusion.     Therapy for (VTE/Cardiac):  Cardiac (ACS/NSTEMI)  Patient Weight: 106 kg  Initial Bolus (Y/N):  Yes  Any Bolus (Y/N):  Yes        Signs or Symptoms of Bleeding: Patient endorses some mild hemoptysis at bedside, but was not clinically significant per team. Proceed with IV heparin, monitor closely.    Cardiac (Not VTE)   Initial Bolus: 60 units/kg (Max 4,000 units)  Initial rate: 12 units/kg/hr (Max 1,000 units/hr)   Anti Xa Rebolus Infusion Hold time Change infusion Dose (Units/kg/hr) Next Anti Xa or aPTT Level Due   < 0.11 50 Units/kg  (4000 Units Max) None Increase by  3 Units/kg/hr 6 hours   0.11- 0.19 25 Units/kg  (2000 Units Max) None Increase by  2 Units/kg/hr 6 hours   0.2 - 0.29 0 None Increase by  1 Units/kg/hr 6 hours   0.3 - 0.5 0 None No Change 6 hours (after 2 consecutive levels in range check qAM)   0.51 - 0.6 0 None Decrease by  1 Units/kg/hr 6 hours   0.61 - 0.8 0 30 Minutes Decrease by  2 Units/kg/hr 6 hours   0.81 - 1 0 60 Minutes Decrease by  3 Units/kg/hr 6 hours   >1 0 Hold  After Anti Xa less than 0.5 decrease previous rate by  4 Units/kg/hr  Every 2 hours until Anti Xa  less than 0.5 then when infusion restarts in 6 hours     Recommend Xa every 6 hours.     Results from last 7 days   Lab Units 09/02/24  0527 09/01/24  1050   INR   --  0.97   HEMOGLOBIN g/dL 11.5* 13.5   HEMATOCRIT % 38.1 45.0   PLATELETS 10*3/mm3 289 333       Date   Time   Anti-Xa Current Rate (Unit/kg/hr) Bolus   (Units) Rate Change   (Unit/kg/hr) New Rate (Unit/kg/hr) Next   Anti-Xa Comments  Pump Check Daily   9/1 10:50 0.10 --new-- 4000 +9 9 18:00 D/w KAMILA Patricia. Bolus & rate confirmed. Pt was counseled on Heparin. He was unfamiliar with Eliquis when I questioned him.   9/1 1827 0.10 9 4000 +2 11 0200 Dw RN   9/2 0254 0.10 11 4000 +3 14 1200 D/w RN   9/2 1153 0.16 14 2000 +2 16 2000 D/w KAMILA Breen                                                                                                                                                                                                 Taylor Riedel-Rogers, PharmD, BCPS  9/2/2024  13:05 EDT

## 2024-09-02 NOTE — THERAPY EVALUATION
Patient Name: Fernie Dalal  : 1960    MRN: [id number]                              Today's Date: 2024       Admit Date: 2024    Visit Dx:     ICD-10-CM ICD-9-CM   1. Acute respiratory failure with hypoxia  J96.01 518.81   2. Aspiration pneumonia of right lower lobe, unspecified aspiration pneumonia type  J69.0 507.0   3. Chest pain, unspecified type  R07.9 786.50     Patient Active Problem List   Diagnosis    Adiposity    Allergic rhinitis    Anxiety    Blues    Chronic low back pain    HFrEF (heart failure with reduced ejection fraction)    Coronary artery disease involving native coronary artery of native heart with angina pectoris    Dyslipidemia    Muscle spasm    Seizure disorder    Sleep apnea    Essential hypertension    Cardiomyopathy, dilated    NSTEMI (non-ST elevated myocardial infarction)    Acute on chronic respiratory failure with hypoxia     Past Medical History:   Diagnosis Date    Angina at rest     Anxiety     Arrhythmia     COPD (chronic obstructive pulmonary disease)     GERD (gastroesophageal reflux disease)     Heart failure     congestive    Hyperlipidemia     Hypertension     Myocardial infarction      Past Surgical History:   Procedure Laterality Date    CARDIAC CATHETERIZATION      CARDIAC CATHETERIZATION N/A 2023    Procedure: Left Heart Cath;  Surgeon: Marsha Gaitan MD;  Location: [facility] CATH INVASIVE LOCATION;  Service: Cardiology;  Laterality: N/A;    CARDIOVASCULAR STRESS TEST        General Information       Row Name 24 1232          OT Time and Intention    Document Type evaluation  -JY     Mode of Treatment occupational therapy;individual therapy  -JY       Row Name 24 1232          General Information    Patient Profile Reviewed yes  -JY     Prior Level of Function independent:;all household mobility;gait;transfer;bed mobility;ADL's;feeding;grooming;dressing;bathing;home management;cooking;cleaning  I in all ADLs, related t/fs, mob w/ use of 4WW,  prepares simple meals & does laundry, sister takes pt to take to grocery and pt uses shopping cart or sits to rest; uses provided trans for medical appts; denies any falls, less conventional bathing set up  -JY     Existing Precautions/Restrictions fall;oxygen therapy device and L/min;other (see comments)  per chart unable to read or write, easily fatigued  -JY     Barriers to Rehab medically complex;previous functional deficit  -[place]       Row Name 09/02/24 1232          Occupational Profile    Environmental Supports and Barriers (Occupational Profile) standard toilet, often uses urinal at bedside, chair side d/t frequency and urgency with toileting, step over tub/shower and outdoor shower set up, DME: 4WW, portable O2, needs LH AE  -JY     Patient Goals (Occupational Profile) to return to PLOF  -JY       Row Name 09/02/24 1232          Living Environment    People in Home alone  -[employer]       Row Name 09/02/24 1232          Home Main Entrance    Number of Stairs, Main Entrance none;other (see comments)  ramp to enter  -[employer]       Row Name 09/02/24 1232          Stairs Within Home, Primary    Number of Stairs, Within Home, Primary none  -[employer]       Row Name 09/02/24 1232          Cognition    Orientation Status (Cognition) oriented x 4  -[employer]       Row Name 09/02/24 1232          Safety Issues, Functional Mobility    Safety Issues Affecting Function (Mobility) insight into deficits/self-awareness;awareness of need for assistance;safety precaution awareness;safety precautions follow-through/compliance  -JY     Impairments Affecting Function (Mobility) balance;endurance/activity tolerance;pain;shortness of breath;strength  -JY     Comment, Safety Issues/Impairments (Mobility) pt alert and able to follow commands; pt fatigues easily and becomes SOA and requires return to downgraded position  -JY               User Key  (r) = Recorded By, (t) = Taken By, (c) = Cosigned By      Initials Name Provider Type    Bren Bhatti, OT  Occupational Therapist                     Mobility/ADL's       Row Name 09/02/24 1306          Bed Mobility    Bed Mobility supine-sit;sit-supine  -JY     Supine-Sit [place] (Bed Mobility) standby assist  -JY     Sit-Supine [place] (Bed Mobility) standby assist  -JY     Assistive Device (Bed Mobility) head of bed elevated;bed rails  -JY     Comment, (Bed Mobility) skilled cues for optimal hand placement and seq to advance LEs to EOB, reach across midline to grasp bedrail for needed support to upright self; pt did not require physical A yet rather SBA for safety for line/tubing mgmt and pt utilized HOB elevated throughout; pt presented with SOA, fatigue upon sitting, denied dizziness or feeling LH  -JY       Row Name 09/02/24 1306          Transfers    Transfers sit-stand transfer;stand-sit transfer  -JY     Comment, (Transfers) skilled cues for optimal hand placement for controlled ascend, descend specifically to push up from seated surface, to reach back prior to sitting once aligned and in close proximity to seated surface; pt stood at EOB prior to safety readiness, CGA provided for safety  -JY       Row Name 09/02/24 1306          Sit-Stand Transfer    Sit-Stand [place] (Transfers) contact guard;verbal cues  -JY     Assistive Device (Sit-Stand Transfers) other (see comments)  no AD used  -JY       Row Name 09/02/24 1306          Stand-Sit Transfer    Stand-Sit [place] (Transfers) contact guard;verbal cues  -JY     Assistive Device (Stand-Sit Transfers) other (see comments)  no AD used  -JY       Row Name 09/02/24 1306          Functional Mobility    Functional Mobility- Ind. Level contact guard assist;verbal cues required  -JY     Functional Mobility- Device other (see comments)  no AD used  -JY     Functional Mobility-Distance (Feet) 3  limited to 3 feet to step toward HOB for improved position when pt sits  -JY     Functional Mobility- Comment defer to PT for specfics however during the  abbreviated ADL related mobility stepping at EOB to HOB for improved positioning when pt returns to sitting pt req'd CGA for safety and line mgmt; pt stood and moved semi abruptly without AD available; pt fatigued with movement and req'd return to sitting after standign ~ 1.5 minutes total  -J     Patient was able to Ambulate yes  -       Row Name 09/02/24 1306          Activities of Daily Living    BADL Assessment/Intervention upper body dressing;lower body dressing;grooming;bathing;toileting  -[facility] Name 09/02/24 1306          Upper Body Dressing Assessment/Training    [place] Level (Upper Body Dressing) doff;don;pajama/robe;moderate assist (50% patient effort);verbal cues  -JY     Position (Upper Body Dressing) edge of bed sitting  -JY     Comment, (Upper Body Dressing) mod A for proximal and posterior mgmt of gown after situational A for mgmt of sleeves around IVs  -[facility] Name 09/02/24 1306          Lower Body Dressing Assessment/Training    [place] Level (Lower Body Dressing) doff;don;socks;dependent (less than 25% patient effort)  -JY     Position (Lower Body Dressing) edge of bed sitting  -JY     Comment, (Lower Body Dressing) u/a to reach distally w/o SOA and feeling dizzy or LH and u/a to lift LEs to more proximal position for improved reach; issued LH AE to assist with distal reach and educated pt on each with plan for further application in future sessions  -[facility] Name 09/02/24 1306          Grooming Assessment/Training    [place] Level (Grooming) wash face, hands;set up  -JY     Position (Grooming) sitting up in bed  -[facility] Name 09/02/24 1306          Bathing Assessment/Intervention    [place] Level (Bathing) upper body;upper extremities;chest/trunk;standby assist;other (see comments);moderate assist (50% patient effort);dependent (less than 25% patient effort)  axillary and back  -JY     Position (Bathing) edge of bed sitting  -JY     Comment, (Bathing)  pt u/a to reach back to wash and req'd mod A for axillary areas for quality, no physical A for more anterior and upper body areas  -[facility] Name 09/02/24 1306          Toileting Assessment/Training    [place] Level (Toileting) set up;supervision  -JY     Assistive Devices (Toileting) urinal  -JY     Position (Toileting) edge of bed sitting  -JY     Comment, (Toileting) able to tolerate sitting at EOB to use urinal after set up, spv for safety given fatigue with minimal tasks  -JY               User Key  (r) = Recorded By, (t) = Taken By, (c) = Cosigned By      Initials Name Provider Type    Bren Bhatti OT Occupational Therapist                   Obj/Interventions       [facility] Name 09/02/24 1317          Sensory Assessment (Somatosensory)    Sensory Assessment (Somatosensory) bilateral UE;sensation intact  -     Bilateral UE Sensory Assessment general sensation;light touch awareness;intact  -JY     Sensory Assessment denies any numbness or tingling at BUEs and reports LT stimuli as intact and symmetrical at BUEs, endorses baseline numbness/tingling at L hand  -[facility] Name 09/02/24 1317          Vision Assessment/Intervention    Visual Impairment/Limitations corrective lenses full-time  -J     Vision Assessment Comment denies any acute changes to vision  -[facility] Name 09/02/24 1317          Range of Motion Comprehensive    General Range of Motion bilateral upper extremity ROM WFL  -[facility] Name 09/02/24 1317          Strength Comprehensive (MMT)    General Manual Muscle Testing (MMT) Assessment upper extremity strength deficits identified  -JY     Comment, General Manual Muscle Testing (MMT) Assessment BUE MMS 4/5 per MMT; pt very fatigued from MMT  -Y       [facility] Name 09/02/24 1317          Motor Skills    Motor Skills functional endurance;coordination  -JY     Coordination bilateral;upper extremity;finger to nose;other (see comments)  finger thumb opposition  -JY     Functional  Endurance decreased activity tolerance with minimal activity, rated PRE at 7/10 with standing at bedside for </= 1.5 mins for strength, ROM assessments; 8/10 PRE with UBD, didn't complete LB tasks before too fatigued and SOA  -JY       Row Name 09/02/24 1317          Balance    Balance Assessment sitting static balance;sitting dynamic balance;standing static balance;standing dynamic balance  -JY     Static Sitting Balance standby assist  -JY     Dynamic Sitting Balance standby assist  -JY     Position, Sitting Balance unsupported;sitting edge of bed  -JY     Static Standing Balance standby assist  -JY     Dynamic Standing Balance contact guard;verbal cues  -JY     Position/Device Used, Standing Balance supported  -JY     Balance Interventions sitting;standing;static;dynamic;sit to stand;supported;occupation based/functional task  -JY     Comment, Balance no oert LOB during seated or standing tasks, no AD used d/t urgency in standing, may have observed more balance deficits if pt had stood longer prior to sitting  -JY               User Key  (r) = Recorded By, (t) = Taken By, (c) = Cosigned By      Initials Name Provider Type    Bren Bhatti OT Occupational Therapist                   Goals/Plan       Row Name 09/02/24 1339          Transfer Goal 1 (OT)    Activity/Assistive Device (Transfer Goal 1, OT) sit-to-stand/stand-to-sit;bed-to-chair/chair-to-bed;toilet;commode, bedside without drop arms;other (see comments)  AD recs per PT  -JY     [place] Level/Cues Needed (Transfer Goal 1, OT) contact guard required;verbal cues required  -JY     Time Frame (Transfer Goal 1, OT) long term goal (LTG);by discharge  -JY     Progress/Outcome (Transfer Goal 1, OT) new goal  -JY       Row Name 09/02/24 1339          Dressing Goal 1 (OT)    Activity/Device (Dressing Goal 1, OT) upper body dressing;lower body dressing;other (see comments)  d/d TB garments with AE PRN  -JY     [place]/Cues Needed (Dressing Goal 1,  OT) minimum assist (75% or more patient effort);moderate assist (50-74% patient effort)  -JY     Time Frame (Dressing Goal 1, OT) long term goal (LTG);by discharge  -JY     Progress/Outcome (Dressing Goal 1, OT) new goal  -JY       Row Name 09/02/24 8229          Toileting Goal 1 (OT)    Activity/Device (Toileting Goal 1, OT) adjust/manage clothing;perform perineal hygiene;commode, bedside without drop arms;commode;grab bar/safety frame;raised toilet seat  -JY     [place] Level/Cues Needed (Toileting Goal 1, OT) minimum assist (75% or more patient effort);verbal cues required  -JY     Time Frame (Toileting Goal 1, OT) long term goal (LTG);by discharge  -JY     Progress/Outcome (Toileting Goal 1, OT) new goal  -J       Row Name 09/02/24 1339          Strength Goal 1 (OT)    Strength Goal 1 (OT) PT to complete seated HEP encompassing BUEs targeting strength and endurance with progressive sets/reps/resistance in order to improve integration in ADLs, related t/fs, mobility  -JY     Time Frame (Strength Goal 1, OT) long term goal (LTG);by discharge  -JY     Progress/Outcome (Strength Goal 1, OT) new goal  -       Row Name 09/02/24 8674          Therapy Assessment/Plan (OT)    Planned Therapy Interventions (OT) activity tolerance training;adaptive equipment training;BADL retraining;functional balance retraining;occupation/activity based interventions;patient/caregiver education/training;ROM/therapeutic exercise;strengthening exercise;transfer/mobility retraining  -JY               User Key  (r) = Recorded By, (t) = Taken By, (c) = Cosigned By      Initials Name Provider Type    Bren Bhatti, OT Occupational Therapist                   Clinical Impression       Row Name 09/02/24 1322          Pain Assessment    Pretreatment Pain Rating 7/10  -JY     Posttreatment Pain Rating 4/10  -JY     Pain Location - Side/Orientation Bilateral  -JY     Pain Location generalized  -JY     Pain Location - head  -JY      Pre/Posttreatment Pain Comment pt endorses h/a throughout OT session, improved with OT interventions  -JY     Pain Intervention(s) Repositioned;Ambulation/increased activity;Rest;Nursing Notified  -FLOWER       Row Name 09/02/24 1322          Plan of Care Review    Plan of Care Reviewed With patient  -JY     Progress no change  OT IE  -JY     Outcome Evaluation OT evaluation completed. Pt presents with decreased I in ADLs, related t/fs, mobility compared to PLOF limited by decreased activity tolerance, impaired balance, muscle weakness at BUEs, fatigue with minimal activity with PRE upward of 8/10 with fxl tasks, pain and decreased distal reach toward LEs impacting LEs. Pt req'd SBA for bed mobility, CGA for fxl STS and stepping up EOB for improved position, standing for grossly 1.5 minutes before too fatigued/SOA and resumed sitting. Pt req'd mod A UBD, dep for d/d socks, SUA for hand washing and spv for urinal use at EOB. OT issued LH AE and initiated education on each with plan for further training in future sessions. Pt is below baseline occupational performance and would benefit from IPOT POC and SNF at d/c for therapy purposes to address underlying impairments and impact on function.  -JY       Row Name 09/02/24 1322          Therapy Assessment/Plan (OT)    Patient/Family Therapy Goal Statement (OT) to return to PLOF  -JY     Rehab Potential (OT) good, to achieve stated therapy goals  -JY     Criteria for Skilled Therapeutic Interventions Met (OT) yes;skilled treatment is necessary  -JY     Therapy Frequency (OT) daily  -JY     Predicted Duration of Therapy Intervention (OT) 5 days  -JY       Row Name 09/02/24 1322          Therapy Plan Review/Discharge Plan (OT)    Equipment Needs Upon Discharge (OT) dressing equipment;bathing equipment  -JY     Anticipated Discharge Disposition (OT) skilled nursing facility  -JY       Row Name 09/02/24 1322          Vital Signs    Pre Systolic BP Rehab 111  -JY     Pre Treatment  Diastolic BP 78  -JY     Post Systolic BP Rehab 103  -JY     Post Treatment Diastolic BP 82  -JY     Pretreatment Heart Rate (beats/min) 113  -JY     Intratreatment Heart Rate (beats/min) 120  -JY     Posttreatment Heart Rate (beats/min) 109  -JY     Pre SpO2 (%) 95  -JY     O2 Delivery Pre Treatment supplemental O2  -JY     O2 Delivery Intra Treatment supplemental O2  -JY     Post SpO2 (%) 94  -JY     O2 Delivery Post Treatment supplemental O2  -JY     Pre Patient Position Supine  -JY     Intra Patient Position Sitting  -JY     Post Patient Position Supine  -JY       Row Name 09/02/24 1322          Positioning and Restraints    Pre-Treatment Position in bed  -JY     Post Treatment Position bed  -JY     In Bed notified nsg;fowlers;call light within reach;encouraged to call for assist;exit alarm on;side rails up x2  -JY               User Key  (r) = Recorded By, (t) = Taken By, (c) = Cosigned By      Initials Name Provider Type    Bren Bhatti OT Occupational Therapist                   Outcome Measures       Row Name 09/02/24 1347          How much help from another is currently needed...    Putting on and taking off regular lower body clothing? 1  -JY     Bathing (including washing, rinsing, and drying) 2  -JY     Toileting (which includes using toilet bed pan or urinal) 2  -JY     Putting on and taking off regular upper body clothing 2  -JY     Taking care of personal grooming (such as brushing teeth) 3  -JY     Eating meals 4  -JY     AM-PAC 6 Clicks Score (OT) 14  -JY       Row Name 09/02/24 1347          Functional Assessment    Outcome Measure Options AM-PAC 6 Clicks Daily Activity (OT)  -JY               User Key  (r) = Recorded By, (t) = Taken By, (c) = Cosigned By      Initials Name Provider Type    Bren Bhatti OT Occupational Therapist                    Occupational Therapy Education       Title: PT OT SLP Therapies (In Progress)       Topic: Occupational Therapy (In Progress)       Point:  ADL training (In Progress)       Description:   Instruct learner(s) on proper safety adaptation and remediation techniques during self care or transfers.   Instruct in proper use of assistive devices.                  Learning Progress Summary             Patient Acceptance, E,D, NR by FLOWER at 9/2/2024 0916                         Point: Home exercise program (Not Started)       Description:   Instruct learner(s) on appropriate technique for monitoring, assisting and/or progressing therapeutic exercises/activities.                  Learner Progress:  Not documented in this visit.              Point: Precautions (In Progress)       Description:   Instruct learner(s) on prescribed precautions during self-care and functional transfers.                  Learning Progress Summary             Patient Acceptance, E,D, NR by FLOWER at 9/2/2024 0916                         Point: Body mechanics (In Progress)       Description:   Instruct learner(s) on proper positioning and spine alignment during self-care, functional mobility activities and/or exercises.                  Learning Progress Summary             Patient Acceptance, E,D, NR by FLOWER at 9/2/2024 0916                                         User Key       Initials Effective Dates Name Provider Type Discipline    FLOWER 06/16/21 -  Bren Hillman OT Occupational Therapist OT                  OT Recommendation and Plan  Planned Therapy Interventions (OT): activity tolerance training, adaptive equipment training, BADL retraining, functional balance retraining, occupation/activity based interventions, patient/caregiver education/training, ROM/therapeutic exercise, strengthening exercise, transfer/mobility retraining  Therapy Frequency (OT): daily  Plan of Care Review  Plan of Care Reviewed With: patient  Progress: no change (OT IE)  Outcome Evaluation: OT evaluation completed. Pt presents with decreased I in ADLs, related t/fs, mobility compared to PLOF limited by decreased  activity tolerance, impaired balance, muscle weakness at BUEs, fatigue with minimal activity with PRE upward of 8/10 with fxl tasks, pain and decreased distal reach toward LEs impacting LEs. Pt req'd SBA for bed mobility, CGA for fxl STS and stepping up EOB for improved position, standing for grossly 1.5 minutes before too fatigued/SOA and resumed sitting. Pt req'd mod A UBD, dep for d/d socks, SUA for hand washing and spv for urinal use at EOB. OT issued LH AE and initiated education on each with plan for further training in future sessions. Pt is below baseline occupational performance and would benefit from IPOT POC and SNF at d/c for therapy purposes to address underlying impairments and impact on function.     Time Calculation:   Evaluation Complexity (OT)  Review Occupational Profile/Medical/Therapy History Complexity: expanded/moderate complexity  Assessment, Occupational Performance/Identification of Deficit Complexity: 3-5 performance deficits  Clinical Decision Making Complexity (OT): detailed assessment/moderate complexity  Overall Complexity of Evaluation (OT): moderate complexity     Time Calculation- OT       Row Name 09/02/24 1350             Time Calculation- OT    OT Start Time 0916  -JY      OT Received On 09/02/24  -JY      OT Goal Re-Cert Due Date 09/12/24  -JY         Timed Charges    96282 - OT Therapeutic Activity Minutes 18  -JY      94965 - OT Self Care/Mgmt Minutes 15  -JY         Untimed Charges    OT Eval/Re-eval Minutes 50  -JY         Total Minutes    Timed Charges Total Minutes 33  -JY      Untimed Charges Total Minutes 50  -JY       Total Minutes 83  -JY                User Key  (r) = Recorded By, (t) = Taken By, (c) = Cosigned By      Initials Name Provider Type    Bren Bhatti OT Occupational Therapist                  Therapy Charges for Today       Code Description Service Date Service Provider Modifiers Qty    09903966593  OT THERAPEUTIC ACT EA 15 MIN 9/2/2024 Rafy  Bren OT GO 1    03864033704 HC OT SELF CARE/MGMT/TRAIN EA 15 MIN 9/2/2024 Bren Hillman OT GO 1    79746297947 HC OT EVAL MOD COMPLEXITY 4 9/2/2024 Bren Hillman OT GO 1                 Bren Hillman OT  9/2/2024

## 2024-09-02 NOTE — PLAN OF CARE
Goal Outcome Evaluation:  Plan of Care Reviewed With: patient        Progress: no change (OT IE)  Outcome Evaluation: OT evaluation completed. Pt presents with decreased I in ADLs, related t/fs, mobility compared to PLOF limited by decreased activity tolerance, impaired balance, muscle weakness at BUEs, fatigue with minimal activity with PRE upward of 8/10 with fxl tasks, pain and decreased distal reach toward LEs impacting LEs. Pt req'd SBA for bed mobility, CGA for fxl STS and stepping up EOB for improved position, standing for grossly 1.5 minutes before too fatigued/SOA and resumed sitting. Pt req'd mod A UBD, dep for d/d socks, SUA for hand washing and spv for urinal use at EOB. OT issued LH AE and initiated education on each with plan for further training in future sessions. Pt is below baseline occupational performance and would benefit from IPOT POC and SNF at d/c for therapy purposes to address underlying impairments and impact on function.      Anticipated Discharge Disposition (OT): skilled nursing facility

## 2024-09-03 ENCOUNTER — APPOINTMENT (OUTPATIENT)
Dept: GENERAL RADIOLOGY | Facility: HOSPITAL | Age: 64
End: 2024-09-03
Payer: MEDICAID

## 2024-09-03 PROBLEM — J18.9 RIGHT LOWER LOBE PNEUMONIA: Status: ACTIVE | Noted: 2024-09-03

## 2024-09-03 PROBLEM — J96.22 ACUTE ON CHRONIC RESPIRATORY FAILURE WITH HYPOXIA AND HYPERCAPNIA: Status: ACTIVE | Noted: 2024-09-01

## 2024-09-03 LAB
ARTERIAL PATENCY WRIST A: ABNORMAL
ARTERIAL PATENCY WRIST A: ABNORMAL
ATMOSPHERIC PRESS: ABNORMAL MM[HG]
ATMOSPHERIC PRESS: ABNORMAL MM[HG]
BASE EXCESS BLDA CALC-SCNC: 2.9 MMOL/L (ref 0–2)
BASE EXCESS BLDA CALC-SCNC: 6.2 MMOL/L (ref 0–2)
BASOPHILS # BLD AUTO: 0.04 10*3/MM3 (ref 0–0.2)
BASOPHILS NFR BLD AUTO: 0.4 % (ref 0–1.5)
BDY SITE: ABNORMAL
BDY SITE: ABNORMAL
BODY TEMPERATURE: 37
BODY TEMPERATURE: 37
CO2 BLDA-SCNC: 34.6 MMOL/L (ref 22–33)
CO2 BLDA-SCNC: 39 MMOL/L (ref 22–33)
COHGB MFR BLD: 1.3 % (ref 0–2)
COHGB MFR BLD: 1.6 % (ref 0–2)
DEPRECATED RDW RBC AUTO: 48.1 FL (ref 37–54)
EOSINOPHIL # BLD AUTO: 0.23 10*3/MM3 (ref 0–0.4)
EOSINOPHIL NFR BLD AUTO: 2.3 % (ref 0.3–6.2)
EPAP: 0
ERYTHROCYTE [DISTWIDTH] IN BLOOD BY AUTOMATED COUNT: 14.1 % (ref 12.3–15.4)
GLUCOSE BLDC GLUCOMTR-MCNC: 125 MG/DL (ref 70–130)
GLUCOSE BLDC GLUCOMTR-MCNC: 205 MG/DL (ref 70–130)
HCO3 BLDA-SCNC: 32.9 MMOL/L (ref 20–26)
HCO3 BLDA-SCNC: 35.5 MMOL/L (ref 20–26)
HCT VFR BLD AUTO: 35.5 % (ref 37.5–51)
HCT VFR BLD CALC: 32.9 % (ref 38–51)
HCT VFR BLD CALC: 35.7 % (ref 38–51)
HGB BLD-MCNC: 10.6 G/DL (ref 13–17.7)
HGB BLDA-MCNC: 10.7 G/DL (ref 13.5–17.5)
HGB BLDA-MCNC: 11.7 G/DL (ref 13.5–17.5)
IMM GRANULOCYTES # BLD AUTO: 0.03 10*3/MM3 (ref 0–0.05)
IMM GRANULOCYTES NFR BLD AUTO: 0.3 % (ref 0–0.5)
INHALED O2 CONCENTRATION: 40 %
INHALED O2 CONCENTRATION: 80 %
IPAP: 0
LYMPHOCYTES # BLD AUTO: 0.97 10*3/MM3 (ref 0.7–3.1)
LYMPHOCYTES NFR BLD AUTO: 9.9 % (ref 19.6–45.3)
Lab: ABNORMAL
MCH RBC QN AUTO: 28 PG (ref 26.6–33)
MCHC RBC AUTO-ENTMCNC: 29.9 G/DL (ref 31.5–35.7)
MCV RBC AUTO: 93.7 FL (ref 79–97)
METHGB BLD QL: 0 % (ref 0–1.5)
METHGB BLD QL: 0.3 % (ref 0–1.5)
MODALITY: ABNORMAL
MODALITY: ABNORMAL
MONOCYTES # BLD AUTO: 1.06 10*3/MM3 (ref 0.1–0.9)
MONOCYTES NFR BLD AUTO: 10.8 % (ref 5–12)
NEUTROPHILS NFR BLD AUTO: 7.51 10*3/MM3 (ref 1.7–7)
NEUTROPHILS NFR BLD AUTO: 76.3 % (ref 42.7–76)
NOTIFIED BY: ABNORMAL
NOTIFIED WHO: ABNORMAL
NRBC BLD AUTO-RTO: 0 /100 WBC (ref 0–0.2)
NT-PROBNP SERPL-MCNC: 3154 PG/ML (ref 0–900)
OXYHGB MFR BLDV: 96.1 % (ref 94–99)
OXYHGB MFR BLDV: 99.1 % (ref 94–99)
PAW @ PEAK INSP FLOW SETTING VENT: 0 CMH2O
PCO2 BLDA: 57.6 MM HG (ref 35–45)
PCO2 BLDA: ABNORMAL MM[HG]
PCO2 TEMP ADJ BLD: 57.6 MM HG (ref 35–48)
PCO2 TEMP ADJ BLD: ABNORMAL MM[HG]
PEEP RESPIRATORY: 5 CM[H2O]
PH BLDA: 7.11 PH UNITS (ref 7.35–7.45)
PH BLDA: 7.36 PH UNITS (ref 7.35–7.45)
PH, TEMP CORRECTED: 7.11 PH UNITS
PH, TEMP CORRECTED: 7.36 PH UNITS
PHENYTOIN SERPL-MCNC: 1.7 MCG/ML (ref 10–20)
PLATELET # BLD AUTO: 252 10*3/MM3 (ref 140–450)
PMV BLD AUTO: 10.3 FL (ref 6–12)
PO2 BLDA: 337 MM HG (ref 83–108)
PO2 BLDA: 86.5 MM HG (ref 83–108)
PO2 TEMP ADJ BLD: 337 MM HG (ref 83–108)
PO2 TEMP ADJ BLD: 86.5 MM HG (ref 83–108)
RBC # BLD AUTO: 3.79 10*6/MM3 (ref 4.14–5.8)
TOTAL RATE: 0 BREATHS/MINUTE
TROPONIN T SERPL HS-MCNC: 330 NG/L
UFH PPP CHRO-ACNC: 0.19 IU/ML (ref 0.3–0.7)
UFH PPP CHRO-ACNC: 0.24 IU/ML (ref 0.3–0.7)
UFH PPP CHRO-ACNC: 0.28 IU/ML (ref 0.3–0.7)
VENTILATOR MODE: ABNORMAL
WBC NRBC COR # BLD AUTO: 9.84 10*3/MM3 (ref 3.4–10.8)

## 2024-09-03 PROCEDURE — 82375 ASSAY CARBOXYHB QUANT: CPT

## 2024-09-03 PROCEDURE — 97530 THERAPEUTIC ACTIVITIES: CPT

## 2024-09-03 PROCEDURE — 82805 BLOOD GASES W/O2 SATURATION: CPT

## 2024-09-03 PROCEDURE — 74018 RADEX ABDOMEN 1 VIEW: CPT

## 2024-09-03 PROCEDURE — 94799 UNLISTED PULMONARY SVC/PX: CPT

## 2024-09-03 PROCEDURE — 87206 SMEAR FLUORESCENT/ACID STAI: CPT | Performed by: INTERNAL MEDICINE

## 2024-09-03 PROCEDURE — 80185 ASSAY OF PHENYTOIN TOTAL: CPT | Performed by: NURSE PRACTITIONER

## 2024-09-03 PROCEDURE — 31500 INSERT EMERGENCY AIRWAY: CPT | Performed by: INTERNAL MEDICINE

## 2024-09-03 PROCEDURE — 03HY32Z INSERTION OF MONITORING DEVICE INTO UPPER ARTERY, PERCUTANEOUS APPROACH: ICD-10-PCS | Performed by: INTERNAL MEDICINE

## 2024-09-03 PROCEDURE — 36620 INSERTION CATHETER ARTERY: CPT

## 2024-09-03 PROCEDURE — 4A133J1 MONITORING OF ARTERIAL PULSE, PERIPHERAL, PERCUTANEOUS APPROACH: ICD-10-PCS | Performed by: INTERNAL MEDICINE

## 2024-09-03 PROCEDURE — 99232 SBSQ HOSP IP/OBS MODERATE 35: CPT | Performed by: INTERNAL MEDICINE

## 2024-09-03 PROCEDURE — 97162 PT EVAL MOD COMPLEX 30 MIN: CPT

## 2024-09-03 PROCEDURE — 25010000002 HEPARIN (PORCINE) PER 1000 UNITS

## 2024-09-03 PROCEDURE — 25010000002 HEPARIN (PORCINE) 25000-0.45 UT/250ML-% SOLUTION

## 2024-09-03 PROCEDURE — 85520 HEPARIN ASSAY: CPT

## 2024-09-03 PROCEDURE — 93010 ELECTROCARDIOGRAM REPORT: CPT | Performed by: INTERNAL MEDICINE

## 2024-09-03 PROCEDURE — 83050 HGB METHEMOGLOBIN QUAN: CPT

## 2024-09-03 PROCEDURE — 25010000002 MAGNESIUM SULFATE 2 GM/50ML SOLUTION: Performed by: NURSE PRACTITIONER

## 2024-09-03 PROCEDURE — 25010000002 MIDAZOLAM PER 1 MG: Performed by: INTERNAL MEDICINE

## 2024-09-03 PROCEDURE — C1751 CATH, INF, PER/CENT/MIDLINE: HCPCS

## 2024-09-03 PROCEDURE — 85025 COMPLETE CBC W/AUTO DIFF WBC: CPT | Performed by: FAMILY MEDICINE

## 2024-09-03 PROCEDURE — 94002 VENT MGMT INPAT INIT DAY: CPT

## 2024-09-03 PROCEDURE — 84484 ASSAY OF TROPONIN QUANT: CPT | Performed by: INTERNAL MEDICINE

## 2024-09-03 PROCEDURE — 82948 REAGENT STRIP/BLOOD GLUCOSE: CPT

## 2024-09-03 PROCEDURE — 93005 ELECTROCARDIOGRAM TRACING: CPT | Performed by: INTERNAL MEDICINE

## 2024-09-03 PROCEDURE — 87205 SMEAR GRAM STAIN: CPT | Performed by: INTERNAL MEDICINE

## 2024-09-03 PROCEDURE — 25010000002 MIDAZOLAM PER 1 MG: Performed by: NURSE PRACTITIONER

## 2024-09-03 PROCEDURE — 4A133B1 MONITORING OF ARTERIAL PRESSURE, PERIPHERAL, PERCUTANEOUS APPROACH: ICD-10-PCS | Performed by: INTERNAL MEDICINE

## 2024-09-03 PROCEDURE — 25010000002 ALBUMIN HUMAN 25% PER 50 ML: Performed by: FAMILY MEDICINE

## 2024-09-03 PROCEDURE — C1894 INTRO/SHEATH, NON-LASER: HCPCS

## 2024-09-03 PROCEDURE — 5A1955Z RESPIRATORY VENTILATION, GREATER THAN 96 CONSECUTIVE HOURS: ICD-10-PCS | Performed by: INTERNAL MEDICINE

## 2024-09-03 PROCEDURE — 87116 MYCOBACTERIA CULTURE: CPT | Performed by: INTERNAL MEDICINE

## 2024-09-03 PROCEDURE — 25010000002 ALBUMIN HUMAN 25% PER 50 ML: Performed by: NURSE PRACTITIONER

## 2024-09-03 PROCEDURE — 25010000002 FENTANYL 10 MCG/1 ML NS: Performed by: INTERNAL MEDICINE

## 2024-09-03 PROCEDURE — 25010000002 PIPERACILLIN SOD-TAZOBACTAM PER 1 G: Performed by: INTERNAL MEDICINE

## 2024-09-03 PROCEDURE — 99291 CRITICAL CARE FIRST HOUR: CPT | Performed by: INTERNAL MEDICINE

## 2024-09-03 PROCEDURE — 99232 SBSQ HOSP IP/OBS MODERATE 35: CPT | Performed by: FAMILY MEDICINE

## 2024-09-03 PROCEDURE — 36600 WITHDRAWAL OF ARTERIAL BLOOD: CPT

## 2024-09-03 PROCEDURE — 0BH17EZ INSERTION OF ENDOTRACHEAL AIRWAY INTO TRACHEA, VIA NATURAL OR ARTIFICIAL OPENING: ICD-10-PCS | Performed by: INTERNAL MEDICINE

## 2024-09-03 PROCEDURE — P9047 ALBUMIN (HUMAN), 25%, 50ML: HCPCS | Performed by: NURSE PRACTITIONER

## 2024-09-03 PROCEDURE — 87070 CULTURE OTHR SPECIMN AEROBIC: CPT | Performed by: INTERNAL MEDICINE

## 2024-09-03 PROCEDURE — P9047 ALBUMIN (HUMAN), 25%, 50ML: HCPCS | Performed by: FAMILY MEDICINE

## 2024-09-03 PROCEDURE — 93005 ELECTROCARDIOGRAM TRACING: CPT | Performed by: FAMILY MEDICINE

## 2024-09-03 PROCEDURE — 25010000002 HEPARIN (PORCINE) 25000-0.45 UT/250ML-% SOLUTION: Performed by: NURSE PRACTITIONER

## 2024-09-03 PROCEDURE — 71045 X-RAY EXAM CHEST 1 VIEW: CPT

## 2024-09-03 PROCEDURE — 83880 ASSAY OF NATRIURETIC PEPTIDE: CPT

## 2024-09-03 RX ORDER — NICOTINE POLACRILEX 4 MG
15 LOZENGE BUCCAL
Status: DISCONTINUED | OUTPATIENT
Start: 2024-09-03 | End: 2024-09-03

## 2024-09-03 RX ORDER — AMOXICILLIN 250 MG
2 CAPSULE ORAL 2 TIMES DAILY
Status: DISCONTINUED | OUTPATIENT
Start: 2024-09-03 | End: 2024-09-03

## 2024-09-03 RX ORDER — POLYVINYL ALCOHOL 14 MG/ML
2 SOLUTION/ DROPS OPHTHALMIC
Status: DISCONTINUED | OUTPATIENT
Start: 2024-09-03 | End: 2024-09-25 | Stop reason: HOSPADM

## 2024-09-03 RX ORDER — DEXMEDETOMIDINE HYDROCHLORIDE 4 UG/ML
.2-1.5 INJECTION, SOLUTION INTRAVENOUS
Status: DISCONTINUED | OUTPATIENT
Start: 2024-09-03 | End: 2024-09-03

## 2024-09-03 RX ORDER — POLYETHYLENE GLYCOL 3350 17 G/17G
17 POWDER, FOR SOLUTION ORAL DAILY PRN
Status: DISCONTINUED | OUTPATIENT
Start: 2024-09-03 | End: 2024-09-04

## 2024-09-03 RX ORDER — SODIUM CHLORIDE 0.9 % (FLUSH) 0.9 %
10 SYRINGE (ML) INJECTION EVERY 12 HOURS SCHEDULED
Status: DISCONTINUED | OUTPATIENT
Start: 2024-09-03 | End: 2024-09-25 | Stop reason: HOSPADM

## 2024-09-03 RX ORDER — ATORVASTATIN CALCIUM 40 MG/1
80 TABLET, FILM COATED ORAL DAILY
Status: DISCONTINUED | OUTPATIENT
Start: 2024-09-03 | End: 2024-09-03

## 2024-09-03 RX ORDER — MAGNESIUM SULFATE HEPTAHYDRATE 40 MG/ML
2 INJECTION, SOLUTION INTRAVENOUS ONCE
Status: COMPLETED | OUTPATIENT
Start: 2024-09-03 | End: 2024-09-03

## 2024-09-03 RX ORDER — FUROSEMIDE 10 MG/ML
40 INJECTION INTRAMUSCULAR; INTRAVENOUS
Status: DISCONTINUED | OUTPATIENT
Start: 2024-09-03 | End: 2024-09-04

## 2024-09-03 RX ORDER — HEPARIN SODIUM 1000 [USP'U]/ML
2000 INJECTION, SOLUTION INTRAVENOUS; SUBCUTANEOUS ONCE
Status: COMPLETED | OUTPATIENT
Start: 2024-09-03 | End: 2024-09-03

## 2024-09-03 RX ORDER — POLYETHYLENE GLYCOL 3350 17 G/17G
17 POWDER, FOR SOLUTION ORAL DAILY PRN
Status: DISCONTINUED | OUTPATIENT
Start: 2024-09-03 | End: 2024-09-03

## 2024-09-03 RX ORDER — BISACODYL 10 MG
10 SUPPOSITORY, RECTAL RECTAL DAILY PRN
Status: DISCONTINUED | OUTPATIENT
Start: 2024-09-03 | End: 2024-09-03

## 2024-09-03 RX ORDER — BISACODYL 10 MG
10 SUPPOSITORY, RECTAL RECTAL DAILY PRN
Status: DISCONTINUED | OUTPATIENT
Start: 2024-09-03 | End: 2024-09-04

## 2024-09-03 RX ORDER — DIAZEPAM 5 MG
5 TABLET ORAL ONCE
Status: DISCONTINUED | OUTPATIENT
Start: 2024-09-03 | End: 2024-09-03

## 2024-09-03 RX ORDER — MIDAZOLAM HYDROCHLORIDE 1 MG/ML
2 INJECTION INTRAMUSCULAR; INTRAVENOUS EVERY 4 HOURS PRN
Status: DISCONTINUED | OUTPATIENT
Start: 2024-09-03 | End: 2024-09-03

## 2024-09-03 RX ORDER — ALBUMIN (HUMAN) 12.5 G/50ML
25 SOLUTION INTRAVENOUS ONCE
Status: COMPLETED | OUTPATIENT
Start: 2024-09-03 | End: 2024-09-03

## 2024-09-03 RX ORDER — INSULIN LISPRO 100 [IU]/ML
1-200 INJECTION, SOLUTION INTRAVENOUS; SUBCUTANEOUS AS NEEDED
Status: DISCONTINUED | OUTPATIENT
Start: 2024-09-03 | End: 2024-09-03

## 2024-09-03 RX ORDER — ACETAMINOPHEN 325 MG/1
650 TABLET ORAL EVERY 6 HOURS PRN
Status: DISCONTINUED | OUTPATIENT
Start: 2024-09-03 | End: 2024-09-04

## 2024-09-03 RX ORDER — PHENYTOIN 125 MG/5ML
100 SUSPENSION ORAL 2 TIMES DAILY
Status: DISCONTINUED | OUTPATIENT
Start: 2024-09-03 | End: 2024-09-06

## 2024-09-03 RX ORDER — MIDAZOLAM HYDROCHLORIDE 1 MG/ML
4 INJECTION INTRAMUSCULAR; INTRAVENOUS ONCE
Status: COMPLETED | OUTPATIENT
Start: 2024-09-03 | End: 2024-09-03

## 2024-09-03 RX ORDER — ASPIRIN 81 MG/1
81 TABLET, CHEWABLE ORAL DAILY
Status: DISCONTINUED | OUTPATIENT
Start: 2024-09-04 | End: 2024-09-25 | Stop reason: HOSPADM

## 2024-09-03 RX ORDER — PHENYTOIN 125 MG/5ML
100 SUSPENSION ORAL 2 TIMES DAILY
Status: DISCONTINUED | OUTPATIENT
Start: 2024-09-03 | End: 2024-09-03

## 2024-09-03 RX ORDER — MIDAZOLAM HYDROCHLORIDE 1 MG/ML
2 INJECTION INTRAMUSCULAR; INTRAVENOUS
Status: DISCONTINUED | OUTPATIENT
Start: 2024-09-03 | End: 2024-09-04

## 2024-09-03 RX ORDER — BISACODYL 5 MG/1
5 TABLET, DELAYED RELEASE ORAL DAILY PRN
Status: DISCONTINUED | OUTPATIENT
Start: 2024-09-03 | End: 2024-09-03

## 2024-09-03 RX ORDER — AMOXICILLIN 250 MG
2 CAPSULE ORAL 2 TIMES DAILY
Status: DISCONTINUED | OUTPATIENT
Start: 2024-09-03 | End: 2024-09-04

## 2024-09-03 RX ORDER — NOREPINEPHRINE BITARTRATE 0.03 MG/ML
.02-.5 INJECTION, SOLUTION INTRAVENOUS
Status: DISCONTINUED | OUTPATIENT
Start: 2024-09-03 | End: 2024-09-13

## 2024-09-03 RX ORDER — SODIUM CHLORIDE 0.9 % (FLUSH) 0.9 %
10 SYRINGE (ML) INJECTION AS NEEDED
Status: DISCONTINUED | OUTPATIENT
Start: 2024-09-03 | End: 2024-09-04

## 2024-09-03 RX ORDER — PANTOPRAZOLE SODIUM 40 MG/10ML
40 INJECTION, POWDER, LYOPHILIZED, FOR SOLUTION INTRAVENOUS
Status: DISCONTINUED | OUTPATIENT
Start: 2024-09-04 | End: 2024-09-25 | Stop reason: HOSPADM

## 2024-09-03 RX ORDER — IPRATROPIUM BROMIDE AND ALBUTEROL SULFATE 2.5; .5 MG/3ML; MG/3ML
3 SOLUTION RESPIRATORY (INHALATION)
Status: DISCONTINUED | OUTPATIENT
Start: 2024-09-04 | End: 2024-09-04

## 2024-09-03 RX ORDER — KETAMINE HYDROCHLORIDE 100 MG/ML
150 INJECTION, SOLUTION INTRAMUSCULAR; INTRAVENOUS ONCE
Status: COMPLETED | OUTPATIENT
Start: 2024-09-03 | End: 2024-09-03

## 2024-09-03 RX ORDER — ATORVASTATIN CALCIUM 40 MG/1
80 TABLET, FILM COATED ORAL DAILY
Status: DISCONTINUED | OUTPATIENT
Start: 2024-09-04 | End: 2024-09-25 | Stop reason: HOSPADM

## 2024-09-03 RX ORDER — DEXTROSE MONOHYDRATE 25 G/50ML
10-50 INJECTION, SOLUTION INTRAVENOUS
Status: DISCONTINUED | OUTPATIENT
Start: 2024-09-03 | End: 2024-09-03

## 2024-09-03 RX ORDER — CHLORHEXIDINE GLUCONATE ORAL RINSE 1.2 MG/ML
15 SOLUTION DENTAL EVERY 12 HOURS SCHEDULED
Status: DISCONTINUED | OUTPATIENT
Start: 2024-09-03 | End: 2024-09-03

## 2024-09-03 RX ORDER — IBUPROFEN 600 MG/1
1 TABLET ORAL
Status: DISCONTINUED | OUTPATIENT
Start: 2024-09-03 | End: 2024-09-03

## 2024-09-03 RX ORDER — CHLORHEXIDINE GLUCONATE ORAL RINSE 1.2 MG/ML
15 SOLUTION DENTAL EVERY 12 HOURS SCHEDULED
Status: DISCONTINUED | OUTPATIENT
Start: 2024-09-03 | End: 2024-09-25 | Stop reason: HOSPADM

## 2024-09-03 RX ADMIN — CHLORHEXIDINE GLUCONATE, 0.12% ORAL RINSE 15 ML: 1.2 SOLUTION DENTAL at 21:02

## 2024-09-03 RX ADMIN — MIDAZOLAM HYDROCHLORIDE 2 MG: 1 INJECTION, SOLUTION INTRAMUSCULAR; INTRAVENOUS at 21:54

## 2024-09-03 RX ADMIN — KETAMINE HYDROCHLORIDE 150 MG: 100 INJECTION INTRAMUSCULAR; INTRAVENOUS at 18:24

## 2024-09-03 RX ADMIN — METOPROLOL SUCCINATE 25 MG: 25 TABLET, EXTENDED RELEASE ORAL at 09:51

## 2024-09-03 RX ADMIN — DIAZEPAM 5 MG: 5 TABLET ORAL at 02:27

## 2024-09-03 RX ADMIN — Medication 10 ML: at 10:06

## 2024-09-03 RX ADMIN — DEXMEDETOMIDINE HYDROCHLORIDE 0.4 MCG/KG/HR: 4 INJECTION, SOLUTION INTRAVENOUS at 18:30

## 2024-09-03 RX ADMIN — PANTOPRAZOLE SODIUM 40 MG: 40 TABLET, DELAYED RELEASE ORAL at 09:49

## 2024-09-03 RX ADMIN — GUAIFENESIN 1200 MG: 600 TABLET, EXTENDED RELEASE ORAL at 09:49

## 2024-09-03 RX ADMIN — Medication 10 ML: at 21:25

## 2024-09-03 RX ADMIN — ALBUMIN (HUMAN) 25 G: 0.25 INJECTION, SOLUTION INTRAVENOUS at 01:34

## 2024-09-03 RX ADMIN — ATORVASTATIN CALCIUM 80 MG: 40 TABLET, FILM COATED ORAL at 09:51

## 2024-09-03 RX ADMIN — MIDAZOLAM HYDROCHLORIDE 4 MG: 1 INJECTION, SOLUTION INTRAMUSCULAR; INTRAVENOUS at 18:23

## 2024-09-03 RX ADMIN — Medication 10 ML: at 12:42

## 2024-09-03 RX ADMIN — PIPERACILLIN AND TAZOBACTAM 3.38 G: 3; .375 INJECTION, POWDER, LYOPHILIZED, FOR SOLUTION INTRAVENOUS; PARENTERAL at 02:41

## 2024-09-03 RX ADMIN — PIPERACILLIN AND TAZOBACTAM 3.38 G: 3; .375 INJECTION, POWDER, LYOPHILIZED, FOR SOLUTION INTRAVENOUS; PARENTERAL at 09:43

## 2024-09-03 RX ADMIN — MIDAZOLAM HYDROCHLORIDE 4 MG: 1 INJECTION, SOLUTION INTRAMUSCULAR; INTRAVENOUS at 19:37

## 2024-09-03 RX ADMIN — HEPARIN SODIUM 19 UNITS/KG/HR: 10000 INJECTION, SOLUTION INTRAVENOUS at 18:56

## 2024-09-03 RX ADMIN — HEPARIN SODIUM 16 UNITS/KG/HR: 10000 INJECTION, SOLUTION INTRAVENOUS at 01:41

## 2024-09-03 RX ADMIN — HEPARIN SODIUM 19 UNITS/KG/HR: 10000 INJECTION, SOLUTION INTRAVENOUS at 15:40

## 2024-09-03 RX ADMIN — ALBUMIN (HUMAN) 25 G: 0.25 INJECTION, SOLUTION INTRAVENOUS at 14:35

## 2024-09-03 RX ADMIN — PHENYTOIN 100 MG: 125 SUSPENSION ORAL at 21:02

## 2024-09-03 RX ADMIN — MAGNESIUM SULFATE HEPTAHYDRATE 2 G: 40 INJECTION, SOLUTION INTRAVENOUS at 21:02

## 2024-09-03 RX ADMIN — EMPAGLIFLOZIN 10 MG: 10 TABLET, FILM COATED ORAL at 09:51

## 2024-09-03 RX ADMIN — EXTENDED PHENYTOIN SODIUM 100 MG: 100 CAPSULE ORAL at 09:49

## 2024-09-03 RX ADMIN — PIPERACILLIN AND TAZOBACTAM 3.38 G: 3; .375 INJECTION, POWDER, LYOPHILIZED, FOR SOLUTION INTRAVENOUS; PARENTERAL at 19:02

## 2024-09-03 RX ADMIN — Medication 100 MCG/HR: at 18:31

## 2024-09-03 RX ADMIN — DIAZEPAM 5 MG: 5 TABLET ORAL at 14:33

## 2024-09-03 RX ADMIN — MIDAZOLAM HYDROCHLORIDE 2 MG: 1 INJECTION, SOLUTION INTRAMUSCULAR; INTRAVENOUS at 23:54

## 2024-09-03 RX ADMIN — ASPIRIN 81 MG: 81 TABLET, COATED ORAL at 09:51

## 2024-09-03 RX ADMIN — HEPARIN SODIUM 2000 UNITS: 1000 INJECTION INTRAVENOUS; SUBCUTANEOUS at 06:10

## 2024-09-03 RX ADMIN — NOREPINEPHRINE BITARTRATE 0.3 MCG/KG/MIN: 0.03 INJECTION, SOLUTION INTRAVENOUS at 18:56

## 2024-09-03 NOTE — PLAN OF CARE
Goal Outcome Evaluation:      Alert and oriented x4. Hypotensive most of the shift. NP notified and order for albumin obtained and initiated. B/p did improve to 100's over 70's. Remains tachycardic rate in the low 100's. On 4liters nasal cannula. Attempted to wear bipap for about 40 minutes. C/o  feeling like he is having a panic attack and was placed back on nasal cannula. Prn Valium given., Symptoms improved. Heparin is infusing with no complications.

## 2024-09-03 NOTE — PROGRESS NOTES
[facility] Medicine Services  PROGRESS NOTE    Patient Name: Fernie Dalal  : 1960  MRN: [id number]    Date of Admission: 2024  Primary Care Physician: Belem Albert APRN    Subjective   Subjective     CC:  Chest pain, Dyspnea     HPI:  Patient is a 65 yo M seen and examined by me this AM, he reports he did rest better overnight, he had rapid response yesterday afternoon however for chest pain. Continuing diuresis as tolerated at this time. He denies any new acute complaints or problems this AM. Continue to follow with cardiology.    Objective   Objective     Vital Signs:   Temp:  [98.4 °F (36.9 °C)-98.9 °F (37.2 °C)] 98.9 °F (37.2 °C)  Heart Rate:  [] 101  Resp:  [20-22] 20  BP: ()/(45-76) 85/45  Flow (L/min):  [3] 3     Physical Exam:  Constitutional: No acute distress, awake, alert, frail and chronically ill appearing, remains on 3L NC   HENT: NCAT, nares patent, mucous membranes moist  Respiratory: Decreased BS bilaterally, rhonchi right lower lung improving, no wheezing, respiratory effort normal   Cardiovascular: sinus tachycardia, no murmurs, rubs, or gallops  Gastrointestinal: Positive bowel sounds, soft, nontender, nondistended  Musculoskeletal:1+ bilateral ankle edema  Psychiatric: Appropriate affect, cooperative  Neurologic: Oriented x 3, strength symmetric in all extremities, Cranial Nerves grossly intact to confrontation, speech clear  Skin: No rashes      Results Reviewed:  LAB RESULTS:      Lab 24  1149 24  0519 24  2045 24  1153 24  0527 24  0254 24  1827 24  1050   WBC  --  9.84  --   --  17.46*  --   --  22.09*   HEMOGLOBIN  --  10.6*  --   --  11.5*  --   --  13.5   HEMATOCRIT  --  35.5*  --   --  38.1  --   --  45.0   PLATELETS  --  252  --   --  289  --   --  333   NEUTROS ABS  --  7.51*  --   --  14.96*  --   --  19.13*   IMMATURE GRANS (ABS)  --  0.03  --   --  0.11*  --   --  0.14*    LYMPHS ABS  --  0.97  --   --  0.66*  --   --  0.52*   MONOS ABS  --  1.06*  --   --  1.53*  --   --  2.17*   EOS ABS  --  0.23  --   --  0.15  --   --  0.04   MCV  --  93.7  --   --  92.0  --   --  91.8   PROCALCITONIN  --   --   --   --  3.32*  --   --  4.18*   LACTATE  --   --   --   --   --   --   --  1.8   PROTIME  --   --   --   --   --   --   --  13.0   APTT  --   --   --   --   --   --   --  26.1*   HEPARIN ANTI-XA 0.28* 0.19* 0.30 0.16*  --  0.10*   < > 0.10*    < > = values in this interval not displayed.         Lab 09/02/24 1717 09/02/24 1423 09/02/24 0527 09/01/24  1050   SODIUM 138 137 138 140   POTASSIUM 3.9 4.4 4.1 4.6   CHLORIDE 98 99 99 100   CO2 31.0* 27.0 32.0* 31.0*   ANION GAP 9.0 11.0 7.0 9.0   BUN 13 14 13 19   CREATININE 0.80 0.76 0.61* 0.83   EGFR 99.4 101.0 107.9 98.3   GLUCOSE 105* 170* 109* 146*   CALCIUM 8.7 8.8 8.4* 9.0   MAGNESIUM 2.4 2.6* 2.5* 1.7   HEMOGLOBIN A1C  --   --   --  6.00*         Lab 09/02/24 1717 09/02/24 1423 09/01/24  1050   TOTAL PROTEIN 6.7 6.7 7.2   ALBUMIN 3.5 3.5 3.9   GLOBULIN 3.2 3.2 3.3   ALT (SGPT) 12 12 13   AST (SGOT) 24 27 26   BILIRUBIN 0.2 0.2 0.4   ALK PHOS 82 85 87   LIPASE  --   --  9*         Lab 09/02/24 1717 09/02/24 1423 09/02/24 0527 09/01/24  1303 09/01/24  1050   PROBNP  --   --   --   --  3,232.0*   HSTROP T 252* 252* 274* 295* 236*   PROTIME  --   --   --   --  13.0   INR  --   --   --   --  0.97         Lab 09/01/24  1050   CHOLESTEROL 225*   LDL CHOL 144*   HDL CHOL 61*   TRIGLYCERIDES 112             Lab 09/01/24  1120   PH, ARTERIAL 7.346*   PCO2, ARTERIAL 62.7*   PO2 ART 72.0*   FIO2 44   HCO3 ART 34.2*   BASE EXCESS ART 6.5*   CARBOXYHEMOGLOBIN 1.5     Brief Urine Lab Results  (Last result in the past 365 days)        Color   Clarity   Blood   Leuk Est   Nitrite   Protein   CREAT   Urine HCG        09/01/24 1636 Yellow   Clear   Negative   Negative   Negative   Trace                   Microbiology Results Abnormal        Procedure Component Value - Date/Time    Blood Culture - Blood, Arm, Right [[id number]]  (Normal) Collected: 09/01/24 1108    Lab Status: Preliminary result Specimen: Blood from Arm, Right Updated: 09/03/24 1145     Blood Culture No growth at 2 days    Blood Culture - Blood, Arm, Left [[id number]]  (Normal) Collected: 09/01/24 1145    Lab Status: Preliminary result Specimen: Blood from Arm, Left Updated: 09/02/24 1302     Blood Culture No growth at 24 hours    MRSA Screen, PCR (Inpatient) - Swab, Nares [[id number]]  (Normal) Collected: 09/01/24 1628    Lab Status: Final result Specimen: Swab from Nares Updated: 09/01/24 1737     MRSA PCR Negative    Narrative:      The negative predictive value of this diagnostic test is high and should only be used to consider de-escalating anti-MRSA therapy. A positive result may indicate colonization with MRSA and must be correlated clinically.  MRSA Negative    Respiratory Panel PCR w/COVID-19(SARS-CoV-2) JERO/ELADIO/YEIMI/PAD/COR/JONATHAN In-House, NP Swab in UTM/VTM, 2 HR TAT - Swab, Nasopharynx [[id number]]  (Normal) Collected: 09/01/24 1216    Lab Status: Final result Specimen: Swab from Nasopharynx Updated: 09/01/24 1316     ADENOVIRUS, PCR Not Detected     Coronavirus 229E Not Detected     Coronavirus HKU1 Not Detected     Coronavirus NL63 Not Detected     Coronavirus OC43 Not Detected     COVID19 Not Detected     Human Metapneumovirus Not Detected     Human Rhinovirus/Enterovirus Not Detected     Influenza A PCR Not Detected     Influenza B PCR Not Detected     Parainfluenza Virus 1 Not Detected     Parainfluenza Virus 2 Not Detected     Parainfluenza Virus 3 Not Detected     Parainfluenza Virus 4 Not Detected     RSV, PCR Not Detected     Bordetella pertussis pcr Not Detected     Bordetella parapertussis PCR Not Detected     Chlamydophila pneumoniae PCR Not Detected     Mycoplasma pneumo by PCR Not Detected    Narrative:      In the setting of a positive respiratory panel with a viral  infection PLUS a negative procalcitonin without other underlying concern for bacterial infection, consider observing off antibiotics or discontinuation of antibiotics and continue supportive care. If the respiratory panel is positive for atypical bacterial infection (Bordetella pertussis, Chlamydophila pneumoniae, or Mycoplasma pneumoniae), consider antibiotic de-escalation to target atypical bacterial infection.            CT Angiogram Chest    Result Date: 9/2/2024  CT ANGIOGRAM CHEST Date of Exam: 9/2/2024 1:43 AM EDT Indication: acute onset chest pain, dyspnea, tachycardia, hypoxia, RLL opacity, suspect PNA, r/o PE. Comparison: None available. Technique: CTA of the chest was performed after the uneventful intravenous administration of 80 mL Isovue-300. Reconstructed coronal and sagittal images were also obtained. In addition, a 3-D volume rendered image was created for interpretation. Automated exposure control and iterative reconstruction methods were used. Findings: Pulmonary arteries: Adequate opacification of the pulmonary arteries. No evidence of acute pulmonary embolism. Lungs and Pleura: There is a small partially loculated right pleural effusion. There are multiple nodular airspace opacities throughout the right upper, right middle and right lower lobes greatest in the right lower lobe. There are surrounding areas of groundglass attenuation with some air bronchograms in the right lower lobe. The findings are consistent with multifocal right lung pneumonia, perhaps atypical pneumonia. The left lung is clear. The left pleural space is clear. Mediastinum/Nikole: There are multiple prominent lymph nodes in the pretracheal, subcarinal and right hilar region consistent with reactive lymphadenopathy from right lung pulmonary infection. Lymph nodes: No axillary or supraclavicular adenopathy. Cardiovascular: The heart is enlarged. There is mild aortic valve calcification. There is moderate coronary artery  calcification. There is no pericardial effusion..   Upper Abdomen: There is a hiatal hernia. Bones and Soft Tissue: No suspicious osseous lesion.     Impression: Impression: 1.No evidence of pulmonary embolism. 2.Multifocal right lung pneumonia with reactive mediastinal and right hilar lymphadenopathy. 3.Small partially loculated right pleural effusion. Electronically Signed: Esteban Jeffries MD  9/2/2024 2:40 AM EDT  Workstation ID: [id number]    CT Head Without Contrast    Result Date: 9/2/2024  CT HEAD WO CONTRAST Date of Exam: 9/2/2024 1:44 AM EDT Indication: Severe headache, acute onset. Comparison: None available. Technique: Axial CT images were obtained of the head without contrast administration.  Automated exposure control and iterative construction methods were used. Findings: There is no evidence of hemorrhage. There is no mass effect or midline shift. There is no extracerebral collection. Ventricles are normal in size and configuration for patient's stated age.  Posterior fossa is within normal limits. Calvarium and skull base appear intact.   Visualized sinuses show no air fluid levels. Visualized orbits are unremarkable.     Impression: Impression: No acute intracranial abnormality. Electronically Signed: Esteban Jeffries MD  9/2/2024 2:31 AM EDT  Workstation ID: [id number]    Adult Transthoracic Echo Complete W/ Cont if Necessary Per Protocol    Result Date: 9/1/2024    Left ventricular systolic function is moderately decreased. Estimated left ventricular EF = 28%.  There is global hypokinesis of left ventricle.   The left ventricular cavity is mildly dilated.   The left atrial cavity is mild to moderately dilated.   Mild aortic valve stenosis is present (mean gradient 15 mmHg)   Mild to moderate mitral regurgitation is present.   Estimated right ventricular systolic pressure from tricuspid regurgitation is normal (<35 mmHg).      Results for orders placed during the hospital encounter of 09/01/24    Adult  Transthoracic Echo Complete W/ Cont if Necessary Per Protocol    Interpretation Summary    Left ventricular systolic function is moderately decreased. Estimated left ventricular EF = 28%.  There is global hypokinesis of left ventricle.    The left ventricular cavity is mildly dilated.    The left atrial cavity is mild to moderately dilated.    Mild aortic valve stenosis is present (mean gradient 15 mmHg)    Mild to moderate mitral regurgitation is present.    Estimated right ventricular systolic pressure from tricuspid regurgitation is normal (<35 mmHg).      Current medications:  Scheduled Meds:albumin human, 25 g, Intravenous, Once  aspirin, 81 mg, Oral, Daily  atorvastatin, 80 mg, Oral, Daily  empagliflozin, 10 mg, Oral, Daily  [Held by provider] furosemide, 40 mg, Intravenous, BID  guaiFENesin, 1,200 mg, Oral, Q12H  ipratropium-albuterol, 3 mL, Nebulization, 4x Daily - RT  metoprolol succinate XL, 25 mg, Oral, Q24H  pantoprazole, 40 mg, Oral, Daily  pharmacy consult - MTM, , Does not apply, Daily  phenytoin ER, 100 mg, Oral, BID  piperacillin-tazobactam, 3.375 g, Intravenous, Q8H  QUEtiapine fumarate ER, 150 mg, Oral, Nightly  [Held by provider] sacubitril-valsartan, 1 tablet, Oral, Q12H  sodium chloride, 10 mL, Intravenous, Q12H  sodium chloride, 10 mL, Intravenous, Q12H  [Held by provider] spironolactone, 25 mg, Oral, Daily      Continuous Infusions:heparin, 18 Units/kg/hr, Last Rate: 18 Units/kg/hr (09/03/24 0609)  Pharmacy to Dose Heparin,       PRN Meds:.  acetaminophen    albuterol sulfate HFA    senna-docusate sodium **AND** polyethylene glycol **AND** bisacodyl **AND** bisacodyl    Calcium Replacement - Follow Nurse / BPA Driven Protocol    diazePAM    Magnesium Cardiology Dose Replacement - Follow Nurse / BPA Driven Protocol    Morphine    nitroglycerin    Pharmacy to Dose Heparin    Phosphorus Replacement - Follow Nurse / BPA Driven Protocol    Potassium Replacement - Follow Nurse / BPA Driven  Protocol    sodium chloride    sodium chloride    sodium chloride    sodium chloride    Assessment & Plan   Assessment & Plan     Active Hospital Problems    Diagnosis  POA    **Acute on chronic respiratory failure with hypoxia [J96.21]  Yes    NSTEMI (non-ST elevated myocardial infarction) [I21.4]  Yes    HFrEF (heart failure with reduced ejection fraction) [I50.20]  Yes    Coronary artery disease involving native coronary artery of native heart with angina pectoris [I25.119]  Yes      Resolved Hospital Problems    Diagnosis Date Resolved POA    Dyspnea [R06.00] 09/01/2024 Yes        Brief Hospital Course to date:  Fernie Dalal is a 64 y.o. male  with chronic 3-4L nc oxygen for COPD, presents w chest pain and mild hemoptysis. Patient transferred to my services on the AM of 9/2, suspect much of his symptoms are likely related to RLL PNA at this time, plans for continued IV antibiotics and pulmonary toileting at this time. Patient currently on home 3L NC, continued use of bipap prn and qhs and following with cardiology for chest pain/elevated troponins, he is to remain on heparin gtt at this time.  Patient seen by cardiology on 9/2, later had rapid response secondary to chest pain, evaluated and troponins overall improved. He has been on heparin gtt. Patient's entresto held with hypotension and given albumin x 2, discussed with Dr. Finch on 9/3, continued diuresis as tolerated for now, if hypotension persists may consider possible dopamine gtt. Patient has overall poor IV access, plans for PICC line placement as well on 9/3.      chronic hypercapnic/hypoxic resp failure  RLL pna   Sepsis (tachycardia, leukocytosis, tachypnea, source)   - CXr w RLL infiltrate. WBC 22.  History of R lung surgery , details unknown  - abg shows pH 7.35, pt alert with pCO2 62.    - Continued bipap qhs and prn, patient not tolerating bipap well secondary to anxiety, have adjusted meds    - abx: Zoxyn, vanc. MRSA screen negative, will  "stop Vanc, Resp panel negative  - chest CTA negative for PE, multifocal right lung pneumonia    - states 'steroids cause diarrhea and pneumonia\" - doubt this is a true allergy.  No steroids needed now as does not sound like COPD exac   - Continue IV Zosyn for now and pulmonary toileting for treatment of pneumonia      Hemoptysis   - reports on admission but improved  - Continue to follow at this time, continued heparin as below   - Resolved     Chest pain  Sinus tach   CAD, known RCA occlusion   Cardiomyopathy  Acute Systolic CHF exacerbation   - elevated troponin, suspected to be likely type II from the above     -Cards consulted in ED; hep gtt started; however consider demand ischemia  - Plans for continued heparin gtt for now, continue to follow with cardiology   - last C was 4/2023  - EF 35, repeat echo with EF 28%, global hypokinesis   - cards consulted; ordered home meds and diuretics, now have had to hold Entresto and concerns for compliance from patient   - Continued diuresis as tolerated per cardiology recommendations.   - watch renal fxn      Facial rash  - x 2 months, pt denies pruritus or pain with it; notes flaking scalp skin  - ? Rosacea      Unable to read/write   - approp education at dc   - Nursing and CM following, evaluating for possible healthcare surrogate at this time.     Expected Discharge Location and Transportation: TBD  Expected Discharge   Expected Discharge Date: 9/5/2024; Expected Discharge Time:      VTE Prophylaxis:  Pharmacologic VTE prophylaxis orders are present.         AM-PAC 6 Clicks Score (PT): 17 (09/01/24 2022)    CODE STATUS:   Code Status and Medical Interventions: CPR (Attempt to Resuscitate); Full Support   Ordered at: 09/01/24 1318     Level Of Support Discussed With:    Patient     Code Status (Patient has no pulse and is not breathing):    CPR (Attempt to Resuscitate)     Medical Interventions (Patient has pulse or is breathing):    Full Support       BOBBY Mahan " DO Luis Alfredo  09/03/24

## 2024-09-03 NOTE — PLAN OF CARE
Problem: Noninvasive Ventilation Acute  Goal: Effective Unassisted Ventilation and Oxygenation  Outcome: Ongoing, Progressing  Intervention: Monitor and Manage Noninvasive Ventilation  Flowsheets (Taken 9/3/2024 0128)  Airway/Ventilation Management: airway patency maintained  NPPV/CPAP Maintenance:   mask secure   mask adjusted   Goal Outcome Evaluation:            NOCTURNAL SUPPORT

## 2024-09-03 NOTE — PROGRESS NOTES
Critical Care Note     LOS: 0 days   Patient Care Team:  Belem Albert APRN as PCP - General (Family Medicine)  Marsha Gaitan MD as Consulting Physician (Cardiology)    Chief Complaint/Reason for visit:    Chief Complaint   Patient presents with    Chest Pain     Acute on chronic hypercapnic respiratory failure  Severe COPD  Systolic heart failure  Right lower lobe pneumonia  Coronary artery disease with previous stent, RCA  NSTEMI  Elevated troponin  Obstructive sleep apnea      Subjective     Interval History:     64-year-old gentleman with a known history of chronic respiratory failure on home oxygen, underlying COPD, coronary disease, cardiomyopathy with an EF of 28% hospitalized September 1 with chest pain and mild hemoptysis.  He had a right lower lobe pneumonia and was placed on Zosyn.  He is noncompliant with his cardiac regimen and was felt to also be in some heart failure.  BiPAP was ordered but he would only wear it intermittently.  He has had intermittent chest pain with elevated troponin of 262.  He was felt to have a non-ST elevation MI cardiology assessed and he was placed on a heparin drip.  He underwent aggressive diuresis.  Despite these measures he developed respiratory distress and near respiratory arrest this evening.  ABG revealed a pH of 7.10, undetectable CO2 and a pO2 of 337.  He was moved to the ICU and intubated.  Postintubation he became hypotensive and was placed on norepinephrine.     Yesterday his pCO2 was 62 and his pH was 7.34.  Looking back to September 2023 he was hospitalized at [facility] and his blood gas revealed a pH of 7.25, CO2 63, O2 of 48 on BiPAP.  He apparently quit smoking in 2022.  Last documented PFTs revealed severe obstruction with an FEV1 of 1.78 L in 2013.  He also had a right thoracotomy and decortication in 2013 for empyema.  He has a known history of sleep apnea but does not wear CPAP at home.    Review of Systems:    All systems were reviewed and  "negative except as noted in subjective.  Patient intubated    Medical history, surgical history, social history, family history reviewed    Objective     Intake/Output:    Intake/Output Summary (Last 24 hours) at 9/3/2024 1904  Last data filed at 9/3/2024 1632  Gross per 24 hour   Intake 497 ml   Output 1650 ml   Net -1153 ml       Nutrition:  NPO Diet NPO Type: Strict NPO    Infusions:  dexmedetomidine, 0.2-1.5 mcg/kg/hr, Last Rate: 0.4 mcg/kg/hr (09/03/24 1830)  fentanyl 10 mcg/mL,  mcg/hr, Last Rate: 100 mcg/hr (09/03/24 1831)  heparin, 19 Units/kg/hr, Last Rate: 19 Units/kg/hr (09/03/24 1856)  norepinephrine, 0.02-0.3 mcg/kg/min, Last Rate: 0.3 mcg/kg/min (09/03/24 1856)  Pharmacy to Dose Heparin,         Mechanical Ventilator Settings:            Resp Rate (Set): 18  Pressure Support (cm H2O): 10 cm H20  FiO2 (%): 80 %  PEEP/CPAP (cm H2O): 6 cm H20    Minute Ventilation (L/min) (Obs): 0 L/min  Resp Rate (Observed) Vent: 0  I:E Ratio (Set): 1:2.70  I:E Ratio (Obs): 1:1    PIP Observed (cm H2O): 0 cm H2O       Telemetry: Sinus rhythm             Vital Signs  Blood pressure (!) 85/63, pulse 96, temperature 98.5 °F (36.9 °C), temperature source Oral, resp. rate 18, height 175.3 cm (69.02\"), weight 113 kg (250 lb), SpO2 97%.    Physical Exam:  General Appearance:  Overweight middle-aged gentleman sedated   Head:  Atraumatic   Eyes:          Conjunctiva with erythema   Ears:     Throat: Orally intubated   Neck: Trachea midline   Back:      Lungs:   Symmetric chest expansion, no expiratory wheezing    Heart:  Distant heart sounds, increased rate, regular   Abdomen:   Bowel sounds present, umbilical hernia, soft   Rectal:   Deferred   Extremities: No pretibial edema   Pulses:    Skin: Warm and dry   Lymph nodes:    Neurologic: Sedated      Results Review:     I reviewed the patient's new clinical results.   Results from last 7 days   Lab Units 09/02/24  1717 09/02/24  1423 09/02/24  0527 09/01/24  1050   SODIUM " "mmol/L 138 137 138 140   POTASSIUM mmol/L 3.9 4.4 4.1 4.6   CHLORIDE mmol/L 98 99 99 100   CO2 mmol/L 31.0* 27.0 32.0* 31.0*   BUN mg/dL 13 14 13 19   CREATININE mg/dL 0.80 0.76 0.61* 0.83   CALCIUM mg/dL 8.7 8.8 8.4* 9.0   BILIRUBIN mg/dL 0.2 0.2  --  0.4   ALK PHOS U/L 82 85  --  87   ALT (SGPT) U/L 12 12  --  13   AST (SGOT) U/L 24 27  --  26   GLUCOSE mg/dL 105* 170* 109* 146*     Results from last 7 days   Lab Units 09/03/24  0519 09/02/24  0527 09/01/24  1050   WBC 10*3/mm3 9.84 17.46* 22.09*   HEMOGLOBIN g/dL 10.6* 11.5* 13.5   HEMATOCRIT % 35.5* 38.1 45.0   PLATELETS 10*3/mm3 252 289 333     Results from last 7 days   Lab Units 09/03/24  1812 09/01/24  1120   PH, ARTERIAL pH units 7.105* 7.346*   PO2 ART mm Hg 337.0* 72.0*   PCO2, ARTERIAL mm Hg  --  62.7*   HCO3 ART mmol/L 35.5* 34.2*     Lab Results   Component Value Date    BLOODCX No growth at 2 days 09/01/2024     No results found for: \"URINECX\"    I reviewed the patient's new imaging including images and reports.    Personally reviewed his CTA of the chest.  I do not see any pulmonary edema.  He has a right lower lobe pneumonia as well as some nodular changes in the right middle lobe and anterior upper lobe.  I do not see any pleural effusions.    Chest x-ray has been ordered postintubation but has not been completed    All medications reviewed.   aspirin, 81 mg, Oral, Daily  atorvastatin, 80 mg, Oral, Daily  chlorhexidine, 15 mL, Mouth/Throat, Q12H  diazePAM, 5 mg, Oral, Once  empagliflozin, 10 mg, Oral, Daily  [Held by provider] furosemide, 40 mg, Intravenous, BID  guaiFENesin, 1,200 mg, Oral, Q12H  ipratropium-albuterol, 3 mL, Nebulization, 4x Daily - RT  metoprolol succinate XL, 25 mg, Oral, Q24H  pantoprazole, 40 mg, Oral, Daily  pharmacy consult - MTM, , Does not apply, Daily  phenytoin ER, 100 mg, Oral, BID  piperacillin-tazobactam, 3.375 g, Intravenous, Q8H  QUEtiapine fumarate ER, 150 mg, Oral, Nightly  [Held by provider] sacubitril-valsartan, " 1 tablet, Oral, Q12H  senna-docusate sodium, 2 tablet, Oral, BID  sodium chloride, 10 mL, Intravenous, Q12H  sodium chloride, 10 mL, Intravenous, Q12H  [Held by provider] spironolactone, 25 mg, Oral, Daily          Assessment & Plan       Acute on chronic respiratory failure with hypoxia and hypercapnia    HFrEF (heart failure with reduced ejection fraction)    Coronary artery disease involving native coronary artery of native heart with angina pectoris    NSTEMI (non-ST elevated myocardial infarction)    Right lower lobe pneumonia      64-year-old gentleman, former smoker with obstructive lung disease, coronary disease with systolic heart failure hospitalized 2 days ago with worsening shortness of air.  CTA yesterday revealed no PE, right lower lobe pneumonia, no effusion or edema.  He had diuresed 5 L in 48 hours.  Today he developed some respiratory distress and respiratory arrest.  He was moved to the ICU and intubated.  At baseline he does chronically retains carbon dioxide.  During this admission he had elevated troponin and ST changes consistent with a non-ST elevation MI.  He also has obstructive sleep apnea but is noncompliant with CPAP.  Post intubation he became hypotensive and he is currently on some norepinephrine.  He was placed on Zosyn on admission for right lower lobe pneumonia.  He does remain in sinus rhythm.  He quit smoking in 2022 and has a known history of severe obstructive lung disease.  CT scan does not show advanced emphysema.    PLAN:    Stat EKG, troponin  Hold diuretics today  Continue heparin drip  Continue Zosyn  Nebulized bronchodilators  Support with mechanical ventilation  Sputum for Gram stain and culture, sputum for AFB and fungi  Support blood pressure with norepinephrine  Aspirin, statin  Jardiance      VTE Prophylaxis: heparin drip    Stress Ulcer Prophylaxis:protonix    Alee Tello MD  09/03/24  19:04 EDT      Time: Critical care 40 min  I personally provided care  to this critically ill patient as documented above.  Critical care time does not include time spent on separately billed procedures.  None of my critical care time was concurrent with other critical care providers.

## 2024-09-03 NOTE — THERAPY EVALUATION
Patient Name: Fernie Dalal  : 1960    MRN: [id number]                              Today's Date: 9/3/2024       Admit Date: 2024    Visit Dx:     ICD-10-CM ICD-9-CM   1. Acute respiratory failure with hypoxia  J96.01 518.81   2. Aspiration pneumonia of right lower lobe, unspecified aspiration pneumonia type  J69.0 507.0   3. Chest pain, unspecified type  R07.9 786.50     Patient Active Problem List   Diagnosis    Adiposity    Allergic rhinitis    Anxiety    Blues    Chronic low back pain    HFrEF (heart failure with reduced ejection fraction)    Coronary artery disease involving native coronary artery of native heart with angina pectoris    Dyslipidemia    Muscle spasm    Seizure disorder    Sleep apnea    Essential hypertension    Cardiomyopathy, dilated    NSTEMI (non-ST elevated myocardial infarction)    Acute on chronic respiratory failure with hypoxia     Past Medical History:   Diagnosis Date    Angina at rest     Anxiety     Arrhythmia     COPD (chronic obstructive pulmonary disease)     GERD (gastroesophageal reflux disease)     Heart failure     congestive    Hyperlipidemia     Hypertension     Myocardial infarction      Past Surgical History:   Procedure Laterality Date    CARDIAC CATHETERIZATION      CARDIAC CATHETERIZATION N/A 2023    Procedure: Left Heart Cath;  Surgeon: Marsha Gaitan MD;  Location: [facility] CATH INVASIVE LOC[facility];  Service: Cardiology;  Laterality: N/A;    CARDIOVASCULAR STRESS TEST        General Information       Row Name 24 1436          Physical Therapy Time and Intention    Document Type evaluation  -     Mode of Treatment physical therapy  -       Row Name 24 1436          General Information    Patient Profile Reviewed yes  -     Prior Level of Function independent:;all household mobility;gait;transfer;bed mobility;ADL's  Uses rollator at baseline. prepares simple meals & does laundry, sister takes pt to take to grocery and pt uses shopping cart  or sits to rest; uses provided trans for medical appts; denies any falls  -     Existing Precautions/Restrictions fall;oxygen therapy device and L/min;other (see comments)  PICC line, anxiety  -     Barriers to Rehab previous functional deficit;medically complex  -[facility] Name 09/03/24 1436          Living Environment    People in Home alone  -[facility] Name 09/03/24 1436          Home Main Entrance    Number of Stairs, Main Entrance none;other (see comments)  ramp  -[facility] Name 09/03/24 1436          Stairs Within Home, Primary    Number of Stairs, Within Home, Primary none  -[facility] Name 09/03/24 1436          Cognition    Orientation Status (Cognition) oriented x 4  -[facility] Name 09/03/24 1436          Safety Issues, Functional Mobility    Safety Issues Affecting Function (Mobility) awareness of need for assistance;insight into deficits/self-awareness;safety precaution awareness;safety precautions follow-through/compliance;sequencing abilities;judgment  -     Impairments Affecting Function (Mobility) balance;endurance/activity tolerance;pain;shortness of breath;strength  -               User Key  (r) = Recorded By, (t) = Taken By, (c) = Cosigned By      Initials Name Provider Type     Angelika Mcneill, PT Physical Therapist                   Mobility       [facility] Name 09/03/24 1438          Bed Mobility    Bed Mobility supine-sit  -     Supine-Sit [place] (Bed Mobility) contact guard;verbal cues  -     Assistive Device (Bed Mobility) head of bed elevated;bed rails  -     Comment, (Bed Mobility) VCs for hand placement and sequencing. Pt fatigued at EOB and required rest prior to further mobility. SpO2 94% at EOB on 3L O2  -[facility] Name 09/03/24 1438          Transfers    Comment, (Transfers) VCs for hand placement and sequencing w/ FWW  -[facility] Name 09/03/24 1438          Sit-Stand Transfer    Sit-Stand [place] (Transfers) minimum assist (75% patient  effort);verbal cues  -     Assistive Device (Sit-Stand Transfers) walker, front-wheeled  -     Comment, (Sit-Stand Transfer) STS from EOB. Pt statically stood for ~1 min prior to further mobility  -       Row Name 09/03/24 1438          Gait/Stairs (Locomotion)    [place] Level (Gait) contact guard;verbal cues  -     Assistive Device (Gait) walker, front-wheeled  -     Patient was able to Ambulate yes  -     Distance in Feet (Gait) 5  -     Deviations/Abnormal Patterns (Gait) bilateral deviations;dottie decreased;gait speed decreased;stride length decreased  -     Bilateral Gait Deviations forward flexed posture  -     Comment, (Gait/Stairs) Pt completed 5 steps from bed>chair. Demo step through gait pattern w/ decreased dottie and step length. VCs for upright posture and PLB. After sitting in chair, pt appeared to become anxious w/ increased respirations. spO2 95% on 3L O2 however pt requested increase to 4L. BP improved to 105/84. Pt tense in chair and requesting meds from RN. RN notified and assumed care.  -               User Key  (r) = Recorded By, (t) = Taken By, (c) = Cosigned By      Initials Name Provider Type     Angelika Mcneill PT Physical Therapist                   Obj/Interventions       [facility] Name 09/03/24 1445          Range of Motion Comprehensive    General Range of Motion bilateral lower extremity ROM WFL  -[facility] Name 09/03/24 1445          Strength Comprehensive (MMT)    General Manual Muscle Testing (MMT) Assessment lower extremity strength deficits identified  -     Comment, General Manual Muscle Testing (MMT) Assessment BLEs grossly 4/5  -       Row Name 09/03/24 1445          Balance    Balance Assessment sitting static balance;sitting dynamic balance;sit to stand dynamic balance;standing static balance;standing dynamic balance  -     Static Sitting Balance standby assist  -     Dynamic Sitting Balance standby assist  -     Position, Sitting  Balance unsupported;sitting edge of bed  -     Sit to Stand Dynamic Balance minimal assist;verbal cues  -     Static Standing Balance contact guard  -     Dynamic Standing Balance contact guard;verbal cues  [facility]     Position/Device Used, Standing Balance supported;walker, front-wheeled  -     Balance Interventions sitting;standing;sit to stand;supported;static;dynamic  -[facility] Name 09/03/24 1445          Sensory Assessment (Somatosensory)    Sensory Assessment (Somatosensory) LE sensation intact  -               User Key  (r) = Recorded By, (t) = Taken By, (c) = Cosigned By      Initials Name Provider Type     Angelika Mcneill, PT Physical Therapist                   Goals/Plan       Row Name 09/03/24 1451          Bed Mobility Goal 1 (PT)    Activity/Assistive Device (Bed Mobility Goal 1, PT) bed mobility activities, all  -     [place] Level/Cues Needed (Bed Mobility Goal 1, PT) independent  -     Time Frame (Bed Mobility Goal 1, PT) long term goal (LTG);10 days  -[facility] Name 09/03/24 1451          Transfer Goal 1 (PT)    Activity/Assistive Device (Transfer Goal 1, PT) sit-to-stand/stand-to-sit;bed-to-chair/chair-to-bed  -     [place] Level/Cues Needed (Transfer Goal 1, PT) standby assist  -     Time Frame (Transfer Goal 1, PT) short term goal (STG);5 days  -[facility] Name 09/03/24 1451          Gait Training Goal 1 (PT)    Activity/Assistive Device (Gait Training Goal 1, PT) gait (walking locomotion);assistive device use  -     [place] Level (Gait Training Goal 1, PT) modified independence  -     Distance (Gait Training Goal 1, PT) 150 ft  -     Time Frame (Gait Training Goal 1, PT) long term goal (LTG);10 days  -[facility] Name 09/03/24 1451          Therapy Assessment/Plan (PT)    Planned Therapy Interventions (PT) balance training;bed mobility training;gait training;home exercise program;neuromuscular re-education;patient/family education;postural  re-education;ROM (range of motion);strengthening;stretching;transfer training  -               User Key  (r) = Recorded By, (t) = Taken By, (c) = Cosigned By      Initials Name Provider Type     Angelika Mcneill, PT Physical Therapist                   Clinical Impression       [facility] Name 09/03/24 1447          Pain    Pain Intervention(s) Repositioned;Ambulation/increased activity;Elevated;Nursing Notified  -     Additional Documentation Pain Scale: FACES Pre/Post-Treatment (Group)  -[facility] Name 09/03/24 1447          Pain Scale: FACES Pre/Post-Treatment    Pain: FACES Scale, Pretreatment 4-->hurts little more  -     Posttreatment Pain Rating 6-->hurts even more  -     Pain Location generalized  -     Pre/Posttreatment Pain Comment pt reports R flank pain when coughing. Pt denied chest pain throughout  -[facility] Name 09/03/24 1447          Plan of Care Review    Plan of Care Reviewed With patient  -     Outcome Evaluation PT eval completed. Pt presents below baseline function d/t pain, generalized weakness, balance deficits, and decreased activity tolerance. Pt took 5 steps from bed>chair w/ FWW, CGA. Pt appeared to become anxious in chair w/ increased respirations. SpO2 95% on 3L O2 however pt requested increase to 4L. BP improved once UIC. Pt denied c/o chest pain throughout session. Pt would benefit from skilled IP PT. Recommend SNF at d/c.  -[facility] Name 09/03/24 0037          Therapy Assessment/Plan (PT)    Patient/Family Therapy Goals Statement (PT) to return to [facility]  -     Rehab Potential (PT) fair, will monitor progress closely  -     Criteria for Skilled Interventions Met (PT) yes;meets criteria;skilled treatment is necessary  -     Therapy Frequency (PT) daily  -     Predicted Duration of Therapy Intervention (PT) 10 days  -[facility] Name 09/03/24 1447          Vital Signs    Pre Systolic BP Rehab 86  RN notified and cleared PT for tx  -     Pre Treatment Diastolic  BP 66  -     Post Systolic BP Rehab 105  -     Post Treatment Diastolic BP 84  -     Pretreatment Heart Rate (beats/min) 107  -LH     Intratreatment Heart Rate (beats/min) 125  -LH     Posttreatment Heart Rate (beats/min) 113  -LH     Pre SpO2 (%) 94  -LH     O2 Delivery Pre Treatment nasal cannula  -LH     O2 Delivery Intra Treatment nasal cannula  -LH     Post SpO2 (%) 96  -LH     O2 Delivery Post Treatment nasal cannula  -LH     Pre Patient Position Supine  -     Intra Patient Position Standing  -     Post Patient Position Sitting  -LH       Row Name 09/03/24 1447          Positioning and Restraints    Pre-Treatment Position in bed  -LH     Post Treatment Position chair  -     In Chair notified nsg;reclined;sitting;call light within reach;encouraged to call for assist;exit alarm on;waffle cushion;legs elevated;with nsg  -               User Key  (r) = Recorded By, (t) = Taken By, (c) = Cosigned By      Initials Name Provider Type    Angelika Parekh PT Physical Therapist                   Outcome Measures       [facility] Name 09/03/24 1451          How much help from another person do you currently need...    Turning from your back to your side while in flat bed without using bedrails? 3  -LH     Moving from lying on back to sitting on the side of a flat bed without bedrails? 3  -LH     Moving to and from a bed to a chair (including a wheelchair)? 3  -LH     Standing up from a chair using your arms (e.g., wheelchair, bedside chair)? 3  -LH     Climbing 3-5 steps with a railing? 2  -LH     To walk in hospital room? 3  -     AM-PAC 6 Clicks Score (PT) 17  -     Highest Level of Mobility Goal 5 --> Static standing  -[facility] Name 09/03/24 1451          Functional Assessment    Outcome Measure Options AM-PAC 6 Clicks Basic Mobility (PT)  -               User Key  (r) = Recorded By, (t) = Taken By, (c) = Cosigned By      Initials Name Provider Type    Angelika Parekh, ELSA Physical Therapist                                  Physical Therapy Education       Title: PT OT SLP Therapies (In Progress)       Topic: Physical Therapy (In Progress)       Point: Mobility training (Done)       Learning Progress Summary             Patient Acceptance, E, VU,NR by  at 9/3/2024 1452                         Point: Home exercise program (Not Started)       Learner Progress:  Not documented in this visit.              Point: Body mechanics (Done)       Learning Progress Summary             Patient Acceptance, E, VU,NR by  at 9/3/2024 1452                         Point: Precautions (Done)       Learning Progress Summary             Patient Acceptance, E, VU,NR by  at 9/3/2024 1452                                         User Key       Initials Effective Dates Name Provider Type Discipline     09/21/23 -  Angelika Mcneill, PT Physical Therapist PT                  PT Recommendation and Plan  Planned Therapy Interventions (PT): balance training, bed mobility training, gait training, home exercise program, neuromuscular re-education, patient/family education, postural re-education, ROM (range of motion), strengthening, stretching, transfer training  Plan of Care Reviewed With: patient  Outcome Evaluation: PT eval completed. Pt presents below baseline function d/t pain, generalized weakness, balance deficits, and decreased activity tolerance. Pt took 5 steps from bed>chair w/ FWW, CGA. Pt appeared to become anxious in chair w/ increased respirations. SpO2 95% on 3L O2 however pt requested increase to 4L. BP improved once UIC. Pt denied c/o chest pain throughout session. Pt would benefit from skilled IP PT. Recommend SNF at d/c.     Time Calculation:   PT Evaluation Complexity  History, PT Evaluation Complexity: 3 or more personal factors and/or comorbidities  Examination of Body Systems (PT Eval Complexity): total of 4 or more elements  Clinical Presentation (PT Evaluation Complexity): evolving  Clinical Decision Making (PT  Evaluation Complexity): moderate complexity  Overall Complexity (PT Evaluation Complexity): moderate complexity     PT Charges       Row Name 09/03/24 1452             Time Calculation    Start Time 1348  -LH      PT Received On 09/03/24  -      PT Goal Re-Cert Due Date 09/13/24  -         Timed Charges    96742 - PT Therapeutic Activity Minutes 15  -LH         Untimed Charges    PT Eval/Re-eval Minutes 46  -LH         Total Minutes    Timed Charges Total Minutes 15  -LH      Untimed Charges Total Minutes 46  -LH       Total Minutes 61  -LH                User Key  (r) = Recorded By, (t) = Taken By, (c) = Cosigned By      Initials Name Provider Type     Angelika Mcneill, PT Physical Therapist                  Therapy Charges for Today       Code Description Service Date Service Provider Modifiers Qty    79716285564 HC PT THERAPEUTIC ACT EA 15 MIN 9/3/2024 Angelika Mcneill, PT GP 1    [id number] HC PT EVAL MOD COMPLEXITY 4 9/3/2024 Angelika Mcneill, PT GP 1            PT G-Codes  Outcome Measure Options: AM-PAC 6 Clicks Basic Mobility (PT)  AM-PAC 6 Clicks Score (PT): 17  AM-PAC 6 Clicks Score (OT): 14  PT Discharge Summary  Anticipated Discharge Disposition (PT): skilled nursing facility    Angelika Mcneill PT  9/3/2024     Declined

## 2024-09-03 NOTE — PROGRESS NOTES
"Fernie Dalal  [id number]  1960   LOS: 0 days   Patient Care Team:  Belem Albert APRN as PCP - General (Family Medicine)  Marsha Gaitan MD as Consulting Physician (Cardiology)    Chief Complaint:  NSTEMI (? TYPE II ) / HFrEF / RESPIRATORY FAILURE / MODERATE OBESITY / DYSLIPIDEMIA / T2 DM / (?) PNA / NONCOMPLIANCE    Subjective     Suboptimal course with increased tachypnea/dyspnea as well as nausea and \"stomach does not feel right.\"  No anginal type chest discomfort or sputum production.  Occasional coarse nonproductive cough.  Intermittent audible bronchospasm.  He denies pleurisy or hemoptysis.  No headache or focal motor-sensory changes.  He ate the majority of breakfast.  His medications have been placed on hold for \"procedure today.\"    Objective     Vital Sign Min/Max for last 24 hours  Temp  Min: 97.9 °F (36.6 °C)  Max: 98.8 °F (37.1 °C)   BP  Min: 81/54  Max: 111/76   Pulse  Min: 97  Max: 109   Resp  Min: 20  Max: 22   SpO2  Min: 90 %  Max: 99 %               09/01/24  1348 09/01/24  1405   Weight: 106 kg (233 lb 11 oz) 113 kg (250 lb)         Intake/Output Summary (Last 24 hours) at 9/3/2024 0806  Last data filed at 9/3/2024 0609  Gross per 24 hour   Intake 497 ml   Output 3000 ml   Net -2503 ml       Physical Exam:     General Appearance:    Alert, cooperative, in no acute distress   Lungs:     Clear to auscultation,respirations regular, even and                unlabored    Heart:    Regular and normal rate, normal S1 and S2, no            murmur, no gallop, no rub, no click   Abdomen:  Extremities:   Soft, nontender, bowel sounds audible x4    No edema, normal range of motion   Pulses:   Pulses palpable and equal bilaterally      Results Review:   Results from last 7 days   Lab Units 09/02/24  1717 09/02/24  1423 09/02/24  0527   SODIUM mmol/L 138 137 138   POTASSIUM mmol/L 3.9 4.4 4.1   CHLORIDE mmol/L 98 99 99   CO2 mmol/L 31.0* 27.0 32.0*   BUN mg/dL 13 14 13   CREATININE mg/dL 0.80 0.76 " 0.61*   GLUCOSE mg/dL 105* 170* 109*   CALCIUM mg/dL 8.7 8.8 8.4*     Results from last 7 days   Lab Units 09/03/24  0519 09/02/24  0527 09/01/24  1050   WBC 10*3/mm3 9.84 17.46* 22.09*   HEMOGLOBIN g/dL 10.6* 11.5* 13.5   HEMATOCRIT % 35.5* 38.1 45.0   PLATELETS 10*3/mm3 252 289 333     Results from last 7 days   Lab Units 09/01/24  1050   CHOLESTEROL mg/dL 225*   TRIGLYCERIDES mg/dL 112   HDL CHOL mg/dL 61*   LDL CHOL mg/dL 144*     Results from last 7 days   Lab Units 09/01/24  1050   HEMOGLOBIN A1C % 6.00*     Results from last 7 days   Lab Units 09/02/24  1717 09/02/24  1423 09/02/24  0527   HSTROP T ng/L 252* 252* 274*       Medication Review: REVIEWED; DRIP = IV heparin 18 units/kilogram/HR continuous infusion.    Assessment & Plan     Excellent diuresis with stable GFR and marginal systolic blood pressure.  Excellent diuresis with stable GFR.  Would continue current cardiac medications and alter atorvastatin to 80 mg daily.  Uncertain whether repeat LHC at this time would be of benefit.  If hypotension is persistent would initiate fixed dose IV dopamine at 2.5 mcg/kilogram/minute.      Acute on chronic respiratory failure with hypoxia    HFrEF (heart failure with reduced ejection fraction)    Coronary artery disease involving native coronary artery of native heart with angina pectoris    NSTEMI (non-ST elevated myocardial infarction)    09/03/24  08:06 EDT

## 2024-09-03 NOTE — PLAN OF CARE
Goal Outcome Evaluation:  Plan of Care Reviewed With: patient           Outcome Evaluation: PT eval completed. Pt presents below baseline function d/t pain, generalized weakness, balance deficits, and decreased activity tolerance. Pt took 5 steps from bed>chair w/ FWW, CGA. Pt appeared to become anxious in chair w/ increased respirations. SpO2 95% on 3L O2 however pt requested increase to 4L. BP improved once UIC. Pt denied c/o chest pain throughout session. Pt would benefit from skilled IP PT. Recommend SNF at d/c.      Anticipated Discharge Disposition (PT): skilled nursing facility

## 2024-09-03 NOTE — PLAN OF CARE
Goal Outcome Evaluation:           Progress: declining  Outcome Evaluation: Pt transfered to ICU around 1830 after RRT called for respiratory distress. Pt intubated upon arrival. Fentanyl and precedex gtt started for sedation. Precedex gtt stopped shortly after d/t bradycardia and increased ectopy/VRUNS. Hep gtt restarted. Levophed started d/t SBP in 60s and quickly maxed to 0.3. Pt behavior very labile, going from minimally responsive to very agitated. NG placed. Chest and KUB done. Currenty on 100% FIO2. Restraints placed

## 2024-09-03 NOTE — NURSING NOTE
Pt called staff into the room stating that he was having a panic attack. Pt was very tachycardic, diaphoretic. When instructed to breath pt would then hold his breath. Tried to get patient to put BIPAP on and he denied. Patient since admitted on 9/1 has refused to wear his BIPAP. Pt started to turn gray, non re-breather was placed and a rapid was called. See what orders were placed per APRN and House supervisor. ICU was contacted and patient was transported to ICU. Report given by phone to receiving nurse by MICHAEL Ford RN.

## 2024-09-03 NOTE — PROCEDURES
"Intubation    Date/Time: 9/3/2024 6:25 PM    Performed by: Victor Hugo Cool MD  Authorized by: Victor Hugo Cool MD  Consent: The procedure was performed in an emergent situation.  Patient identity confirmed: arm band, provided demographic data and hospital-assigned identification number  Time out: Immediately prior to procedure a \"time out\" was called to verify the correct patient, procedure, equipment, support staff and site/side marked as required.  Indications: respiratory distress, respiratory failure, airway protection and hypercapnia  Intubation method: video-assisted  Patient status: sedated  Preoxygenation: BVM  Pretreatment medications: midazolam  Sedatives: ketamine  Paralytic: none  Laryngoscope size: Mac 4  Tube size: 8.0 mm  Tube type: cuffed  Number of attempts: 1  Cricoid pressure: no  Cords visualized: yes  Post-procedure assessment: ETCO2 monitor and chest rise  Breath sounds: equal  ETT to lip: 24 cm  Tube secured with: ETT damon  Chest x-ray interpreted by radiologist.        Arterial Blood Gases:  Results from last 7 days   Lab Units 09/03/24  1812 09/01/24  1120   PH, ARTERIAL pH units 7.105* 7.346*   PCO2, ARTERIAL mm Hg  --  62.7*   PO2 ART mm Hg 337.0* 72.0*   FIO2 % 80 44       "

## 2024-09-03 NOTE — NURSING NOTE
Pt. Referred for Phase II Cardiac Rehab. Staff discussed benefits of exercise, program protocol, and educational material provided. Teach back verified.  Patient refused cardiac rehab at this time. Patient educated on AHA guidelines for home exercise and given home exercise sheet.

## 2024-09-04 ENCOUNTER — APPOINTMENT (OUTPATIENT)
Dept: GENERAL RADIOLOGY | Facility: HOSPITAL | Age: 64
End: 2024-09-04
Payer: MEDICAID

## 2024-09-04 PROBLEM — J96.92 TYPE 2 RESPIRATORY FAILURE: Status: ACTIVE | Noted: 2024-09-01

## 2024-09-04 LAB
ALBUMIN SERPL-MCNC: 4 G/DL (ref 3.5–5.2)
ALBUMIN/GLOB SERPL: 1.4 G/DL
ALP SERPL-CCNC: 154 U/L (ref 39–117)
ALT SERPL W P-5'-P-CCNC: 11 U/L (ref 1–41)
ANION GAP SERPL CALCULATED.3IONS-SCNC: 13 MMOL/L (ref 5–15)
ARTERIAL PATENCY WRIST A: ABNORMAL
AST SERPL-CCNC: 18 U/L (ref 1–40)
ATMOSPHERIC PRESS: ABNORMAL MM[HG]
BASE EXCESS BLDA CALC-SCNC: 5.3 MMOL/L (ref 0–2)
BASOPHILS # BLD AUTO: 0.05 10*3/MM3 (ref 0–0.2)
BASOPHILS NFR BLD AUTO: 0.5 % (ref 0–1.5)
BDY SITE: ABNORMAL
BILIRUB SERPL-MCNC: 0.5 MG/DL (ref 0–1.2)
BODY TEMPERATURE: 37
BUN SERPL-MCNC: 14 MG/DL (ref 8–23)
BUN/CREAT SERPL: 17.1 (ref 7–25)
CALCIUM SPEC-SCNC: 9 MG/DL (ref 8.6–10.5)
CHLORIDE SERPL-SCNC: 100 MMOL/L (ref 98–107)
CO2 BLDA-SCNC: 32.2 MMOL/L (ref 22–33)
CO2 SERPL-SCNC: 29 MMOL/L (ref 22–29)
COHGB MFR BLD: 1.3 % (ref 0–2)
CREAT SERPL-MCNC: 0.82 MG/DL (ref 0.76–1.27)
DEPRECATED RDW RBC AUTO: 46.9 FL (ref 37–54)
EGFRCR SERPLBLD CKD-EPI 2021: 98.7 ML/MIN/1.73
EOSINOPHIL # BLD AUTO: 0.1 10*3/MM3 (ref 0–0.4)
EOSINOPHIL NFR BLD AUTO: 1 % (ref 0.3–6.2)
EPAP: 0
ERYTHROCYTE [DISTWIDTH] IN BLOOD BY AUTOMATED COUNT: 14 % (ref 12.3–15.4)
GEN 5 2HR TROPONIN T REFLEX: 338 NG/L
GIE STN SPEC: NORMAL
GLOBULIN UR ELPH-MCNC: 2.9 GM/DL
GLUCOSE BLDC GLUCOMTR-MCNC: 113 MG/DL (ref 70–130)
GLUCOSE BLDC GLUCOMTR-MCNC: 115 MG/DL (ref 70–130)
GLUCOSE BLDC GLUCOMTR-MCNC: 126 MG/DL (ref 70–130)
GLUCOSE BLDC GLUCOMTR-MCNC: 135 MG/DL (ref 70–130)
GLUCOSE SERPL-MCNC: 119 MG/DL (ref 65–99)
HCO3 BLDA-SCNC: 30.8 MMOL/L (ref 20–26)
HCT VFR BLD AUTO: 33.9 % (ref 37.5–51)
HCT VFR BLD CALC: 32.5 % (ref 38–51)
HGB BLD-MCNC: 10.3 G/DL (ref 13–17.7)
HGB BLDA-MCNC: 10.6 G/DL (ref 13.5–17.5)
IMM GRANULOCYTES # BLD AUTO: 0.14 10*3/MM3 (ref 0–0.05)
IMM GRANULOCYTES NFR BLD AUTO: 1.3 % (ref 0–0.5)
INHALED O2 CONCENTRATION: 40 %
IPAP: 0
LYMPHOCYTES # BLD AUTO: 1.21 10*3/MM3 (ref 0.7–3.1)
LYMPHOCYTES NFR BLD AUTO: 11.5 % (ref 19.6–45.3)
MAGNESIUM SERPL-MCNC: 2.6 MG/DL (ref 1.6–2.4)
MCH RBC QN AUTO: 27.6 PG (ref 26.6–33)
MCHC RBC AUTO-ENTMCNC: 30.4 G/DL (ref 31.5–35.7)
MCV RBC AUTO: 90.9 FL (ref 79–97)
METHGB BLD QL: 0.4 % (ref 0–1.5)
MODALITY: ABNORMAL
MONOCYTES # BLD AUTO: 1.04 10*3/MM3 (ref 0.1–0.9)
MONOCYTES NFR BLD AUTO: 9.9 % (ref 5–12)
NEUTROPHILS NFR BLD AUTO: 7.94 10*3/MM3 (ref 1.7–7)
NEUTROPHILS NFR BLD AUTO: 75.8 % (ref 42.7–76)
NRBC BLD AUTO-RTO: 0 /100 WBC (ref 0–0.2)
NT-PROBNP SERPL-MCNC: 3834 PG/ML (ref 0–900)
OXYHGB MFR BLDV: 97.3 % (ref 94–99)
PAW @ PEAK INSP FLOW SETTING VENT: 0 CMH2O
PCO2 BLDA: 48.2 MM HG (ref 35–45)
PCO2 TEMP ADJ BLD: 48.2 MM HG (ref 35–48)
PEEP RESPIRATORY: 6 CM[H2O]
PH BLDA: 7.41 PH UNITS (ref 7.35–7.45)
PH, TEMP CORRECTED: 7.41 PH UNITS
PLATELET # BLD AUTO: 315 10*3/MM3 (ref 140–450)
PMV BLD AUTO: 10.5 FL (ref 6–12)
PO2 BLDA: 113 MM HG (ref 83–108)
PO2 TEMP ADJ BLD: 113 MM HG (ref 83–108)
POTASSIUM SERPL-SCNC: 4.4 MMOL/L (ref 3.5–5.2)
PROCALCITONIN SERPL-MCNC: 1.35 NG/ML (ref 0–0.25)
PROT SERPL-MCNC: 6.9 G/DL (ref 6–8.5)
QT INTERVAL: 356 MS
QT INTERVAL: 370 MS
QTC INTERVAL: 494 MS
QTC INTERVAL: 555 MS
RBC # BLD AUTO: 3.73 10*6/MM3 (ref 4.14–5.8)
SODIUM SERPL-SCNC: 142 MMOL/L (ref 136–145)
TOTAL RATE: 20 BREATHS/MINUTE
TROPONIN T DELTA: 8 NG/L
UFH PPP CHRO-ACNC: 0.24 IU/ML (ref 0.3–0.7)
UFH PPP CHRO-ACNC: 0.32 IU/ML (ref 0.3–0.7)
UFH PPP CHRO-ACNC: 0.34 IU/ML (ref 0.3–0.7)
VENTILATOR MODE: ABNORMAL
VT ON VENT VENT: 0.49 ML
WBC NRBC COR # BLD AUTO: 10.48 10*3/MM3 (ref 3.4–10.8)

## 2024-09-04 PROCEDURE — 87205 SMEAR GRAM STAIN: CPT | Performed by: INTERNAL MEDICINE

## 2024-09-04 PROCEDURE — 83050 HGB METHEMOGLOBIN QUAN: CPT

## 2024-09-04 PROCEDURE — 25010000002 METHYLPREDNISOLONE PER 40 MG: Performed by: INTERNAL MEDICINE

## 2024-09-04 PROCEDURE — 71045 X-RAY EXAM CHEST 1 VIEW: CPT

## 2024-09-04 PROCEDURE — 80053 COMPREHEN METABOLIC PANEL: CPT | Performed by: FAMILY MEDICINE

## 2024-09-04 PROCEDURE — 25010000002 FENTANYL 10 MCG/1 ML NS: Performed by: INTERNAL MEDICINE

## 2024-09-04 PROCEDURE — 94799 UNLISTED PULMONARY SVC/PX: CPT

## 2024-09-04 PROCEDURE — 85520 HEPARIN ASSAY: CPT

## 2024-09-04 PROCEDURE — 82375 ASSAY CARBOXYHB QUANT: CPT

## 2024-09-04 PROCEDURE — 94003 VENT MGMT INPAT SUBQ DAY: CPT

## 2024-09-04 PROCEDURE — 94761 N-INVAS EAR/PLS OXIMETRY MLT: CPT

## 2024-09-04 PROCEDURE — 25010000002 MIDAZOLAM PER 1 MG: Performed by: NURSE PRACTITIONER

## 2024-09-04 PROCEDURE — 84145 PROCALCITONIN (PCT): CPT | Performed by: INTERNAL MEDICINE

## 2024-09-04 PROCEDURE — 85025 COMPLETE CBC W/AUTO DIFF WBC: CPT | Performed by: FAMILY MEDICINE

## 2024-09-04 PROCEDURE — 82948 REAGENT STRIP/BLOOD GLUCOSE: CPT

## 2024-09-04 PROCEDURE — 83880 ASSAY OF NATRIURETIC PEPTIDE: CPT | Performed by: INTERNAL MEDICINE

## 2024-09-04 PROCEDURE — 99233 SBSQ HOSP IP/OBS HIGH 50: CPT | Performed by: INTERNAL MEDICINE

## 2024-09-04 PROCEDURE — 25010000002 PIPERACILLIN SOD-TAZOBACTAM PER 1 G: Performed by: INTERNAL MEDICINE

## 2024-09-04 PROCEDURE — 87206 SMEAR FLUORESCENT/ACID STAI: CPT | Performed by: INTERNAL MEDICINE

## 2024-09-04 PROCEDURE — 87102 FUNGUS ISOLATION CULTURE: CPT | Performed by: INTERNAL MEDICINE

## 2024-09-04 PROCEDURE — 99232 SBSQ HOSP IP/OBS MODERATE 35: CPT | Performed by: INTERNAL MEDICINE

## 2024-09-04 PROCEDURE — 87070 CULTURE OTHR SPECIMN AEROBIC: CPT | Performed by: INTERNAL MEDICINE

## 2024-09-04 PROCEDURE — 25010000002 HEPARIN (PORCINE) 25000-0.45 UT/250ML-% SOLUTION

## 2024-09-04 PROCEDURE — 25010000002 LORAZEPAM PER 2 MG: Performed by: INTERNAL MEDICINE

## 2024-09-04 PROCEDURE — 25810000003 LACTATED RINGERS SOLUTION: Performed by: INTERNAL MEDICINE

## 2024-09-04 PROCEDURE — 83735 ASSAY OF MAGNESIUM: CPT | Performed by: PHYSICIAN ASSISTANT

## 2024-09-04 PROCEDURE — 82805 BLOOD GASES W/O2 SATURATION: CPT

## 2024-09-04 PROCEDURE — 0 MIDAZOLAM 1 MG/ML 100ML NS 100 MG/100ML SOLUTION: Performed by: INTERNAL MEDICINE

## 2024-09-04 RX ORDER — DIAZEPAM 10 MG
10 TABLET ORAL EVERY 12 HOURS SCHEDULED
Status: DISCONTINUED | OUTPATIENT
Start: 2024-09-04 | End: 2024-09-04

## 2024-09-04 RX ORDER — ACETAMINOPHEN 160 MG/5ML
650 SOLUTION ORAL EVERY 6 HOURS PRN
Status: DISCONTINUED | OUTPATIENT
Start: 2024-09-04 | End: 2024-09-04

## 2024-09-04 RX ORDER — MIDAZOLAM IN NACL,ISO-OSMOT/PF 50 MG/50ML
1-10 INFUSION BOTTLE (ML) INTRAVENOUS
Status: DISCONTINUED | OUTPATIENT
Start: 2024-09-04 | End: 2024-09-07

## 2024-09-04 RX ORDER — ACETAMINOPHEN 160 MG/5ML
650 SOLUTION ORAL EVERY 6 HOURS PRN
Status: DISCONTINUED | OUTPATIENT
Start: 2024-09-04 | End: 2024-09-25 | Stop reason: HOSPADM

## 2024-09-04 RX ORDER — HALOPERIDOL 5 MG/ML
10 INJECTION INTRAMUSCULAR ONCE
Status: DISCONTINUED | OUTPATIENT
Start: 2024-09-04 | End: 2024-09-06

## 2024-09-04 RX ORDER — ARFORMOTEROL TARTRATE 15 UG/2ML
15 SOLUTION RESPIRATORY (INHALATION)
Status: DISCONTINUED | OUTPATIENT
Start: 2024-09-04 | End: 2024-09-25 | Stop reason: HOSPADM

## 2024-09-04 RX ORDER — LORAZEPAM 2 MG/ML
2 INJECTION INTRAMUSCULAR EVERY 4 HOURS PRN
Status: DISCONTINUED | OUTPATIENT
Start: 2024-09-04 | End: 2024-09-04

## 2024-09-04 RX ORDER — ALBUTEROL SULFATE 0.83 MG/ML
2.5 SOLUTION RESPIRATORY (INHALATION)
Status: DISCONTINUED | OUTPATIENT
Start: 2024-09-04 | End: 2024-09-25 | Stop reason: HOSPADM

## 2024-09-04 RX ORDER — METHYLPREDNISOLONE SODIUM SUCCINATE 40 MG/ML
40 INJECTION, POWDER, LYOPHILIZED, FOR SOLUTION INTRAMUSCULAR; INTRAVENOUS DAILY
Status: COMPLETED | OUTPATIENT
Start: 2024-09-04 | End: 2024-09-09

## 2024-09-04 RX ORDER — DIAZEPAM 10 MG
10 TABLET ORAL 2 TIMES DAILY
COMMUNITY
End: 2024-09-25 | Stop reason: HOSPADM

## 2024-09-04 RX ADMIN — IPRATROPIUM BROMIDE AND ALBUTEROL SULFATE 3 ML: 2.5; .5 SOLUTION RESPIRATORY (INHALATION) at 00:31

## 2024-09-04 RX ADMIN — Medication 300 MCG/HR: at 00:44

## 2024-09-04 RX ADMIN — LORAZEPAM 2 MG: 2 INJECTION INTRAMUSCULAR; INTRAVENOUS at 08:43

## 2024-09-04 RX ADMIN — ATORVASTATIN CALCIUM 80 MG: 40 TABLET, FILM COATED ORAL at 08:34

## 2024-09-04 RX ADMIN — PHENYTOIN 100 MG: 125 SUSPENSION ORAL at 08:35

## 2024-09-04 RX ADMIN — IPRATROPIUM BROMIDE AND ALBUTEROL SULFATE 3 ML: 2.5; .5 SOLUTION RESPIRATORY (INHALATION) at 07:03

## 2024-09-04 RX ADMIN — MIDAZOLAM HYDROCHLORIDE 2 MG: 1 INJECTION, SOLUTION INTRAMUSCULAR; INTRAVENOUS at 06:54

## 2024-09-04 RX ADMIN — PANTOPRAZOLE SODIUM 40 MG: 40 INJECTION, POWDER, LYOPHILIZED, FOR SOLUTION INTRAVENOUS at 05:27

## 2024-09-04 RX ADMIN — Medication 300 MCG/HR: at 07:24

## 2024-09-04 RX ADMIN — MIDAZOLAM HYDROCHLORIDE 2 MG: 1 INJECTION, SOLUTION INTRAMUSCULAR; INTRAVENOUS at 05:27

## 2024-09-04 RX ADMIN — ACETAMINOPHEN 650 MG: 325 TABLET ORAL at 01:50

## 2024-09-04 RX ADMIN — PIPERACILLIN AND TAZOBACTAM 3.38 G: 3; .375 INJECTION, POWDER, LYOPHILIZED, FOR SOLUTION INTRAVENOUS at 18:07

## 2024-09-04 RX ADMIN — MIDAZOLAM HYDROCHLORIDE 2 MG: 1 INJECTION, SOLUTION INTRAMUSCULAR; INTRAVENOUS at 02:46

## 2024-09-04 RX ADMIN — NOREPINEPHRINE BITARTRATE 0.02 MCG/KG/MIN: 0.03 INJECTION, SOLUTION INTRAVENOUS at 18:48

## 2024-09-04 RX ADMIN — MIDAZOLAM HYDROCHLORIDE 2 MG/HR: 1 INJECTION, SOLUTION INTRAVENOUS at 15:15

## 2024-09-04 RX ADMIN — SODIUM CHLORIDE, POTASSIUM CHLORIDE, SODIUM LACTATE AND CALCIUM CHLORIDE 500 ML: 600; 310; 30; 20 INJECTION, SOLUTION INTRAVENOUS at 09:49

## 2024-09-04 RX ADMIN — CHLORHEXIDINE GLUCONATE, 0.12% ORAL RINSE 15 ML: 1.2 SOLUTION DENTAL at 20:26

## 2024-09-04 RX ADMIN — ARFORMOTEROL TARTRATE 15 MCG: 15 SOLUTION RESPIRATORY (INHALATION) at 19:03

## 2024-09-04 RX ADMIN — ALBUTEROL SULFATE 2.5 MG: 2.5 SOLUTION RESPIRATORY (INHALATION) at 15:22

## 2024-09-04 RX ADMIN — Medication 10 ML: at 20:27

## 2024-09-04 RX ADMIN — PHENYTOIN 100 MG: 125 SUSPENSION ORAL at 20:27

## 2024-09-04 RX ADMIN — EMPAGLIFLOZIN 10 MG: 10 TABLET, FILM COATED ORAL at 08:34

## 2024-09-04 RX ADMIN — MIDAZOLAM HYDROCHLORIDE 2 MG: 1 INJECTION, SOLUTION INTRAMUSCULAR; INTRAVENOUS at 01:38

## 2024-09-04 RX ADMIN — Medication 10 ML: at 08:34

## 2024-09-04 RX ADMIN — NOREPINEPHRINE BITARTRATE 0.08 MCG/KG/MIN: 0.03 INJECTION, SOLUTION INTRAVENOUS at 01:40

## 2024-09-04 RX ADMIN — SODIUM CHLORIDE, POTASSIUM CHLORIDE, SODIUM LACTATE AND CALCIUM CHLORIDE 500 ML: 600; 310; 30; 20 INJECTION, SOLUTION INTRAVENOUS at 10:39

## 2024-09-04 RX ADMIN — HEPARIN SODIUM 21 UNITS/KG/HR: 10000 INJECTION, SOLUTION INTRAVENOUS at 18:54

## 2024-09-04 RX ADMIN — METHYLPREDNISOLONE SODIUM SUCCINATE 40 MG: 40 INJECTION, POWDER, FOR SOLUTION INTRAMUSCULAR; INTRAVENOUS at 15:21

## 2024-09-04 RX ADMIN — ASPIRIN 81 MG 81 MG: 81 TABLET ORAL at 08:34

## 2024-09-04 RX ADMIN — MIDAZOLAM HYDROCHLORIDE 2 MG: 1 INJECTION, SOLUTION INTRAMUSCULAR; INTRAVENOUS at 03:29

## 2024-09-04 RX ADMIN — ALBUTEROL SULFATE 2.5 MG: 2.5 SOLUTION RESPIRATORY (INHALATION) at 19:03

## 2024-09-04 RX ADMIN — PIPERACILLIN AND TAZOBACTAM 3.38 G: 3; .375 INJECTION, POWDER, LYOPHILIZED, FOR SOLUTION INTRAVENOUS; PARENTERAL at 01:38

## 2024-09-04 RX ADMIN — CHLORHEXIDINE GLUCONATE, 0.12% ORAL RINSE 15 ML: 1.2 SOLUTION DENTAL at 08:34

## 2024-09-04 RX ADMIN — DIAZEPAM 10 MG: 10 TABLET ORAL at 09:38

## 2024-09-04 RX ADMIN — Medication 300 MCG/HR: at 23:50

## 2024-09-04 RX ADMIN — LORAZEPAM 2 MG: 2 INJECTION INTRAMUSCULAR; INTRAVENOUS at 12:44

## 2024-09-04 RX ADMIN — HEPARIN SODIUM 20 UNITS/KG/HR: 10000 INJECTION, SOLUTION INTRAVENOUS at 06:54

## 2024-09-04 RX ADMIN — Medication 300 MCG/HR: at 15:14

## 2024-09-04 RX ADMIN — PIPERACILLIN AND TAZOBACTAM 3.38 G: 3; .375 INJECTION, POWDER, LYOPHILIZED, FOR SOLUTION INTRAVENOUS at 09:38

## 2024-09-04 NOTE — PROGRESS NOTES
INTENSIVIST   PROGRESS NOTE        SUBJECTIVE     Fernie 64 y.o. male is followed for: Chest Pain       Acute on chronic respiratory failure with hypoxia and hypercapnia    HFrEF (heart failure with reduced ejection fraction)    Coronary artery disease involving native coronary artery of native heart with angina pectoris    NSTEMI (non-ST elevated myocardial infarction)    Right lower lobe pneumonia    As an Intensivist, we provide an integrated approach to the ICU patient and family, medical management of comorbid conditions, including but not limited to electrolytes, glycemic control, organ dysfunction, lead interdisciplinary rounds and coordinate the care with all other services, including those from other specialists.     Interval History:    Intubated yesterday.    Anxious.    Temp  Min: 98.5 °F (36.9 °C)  Max: 100.1 °F (37.8 °C)       History     Last Reviewed by Victor Hugo Cool MD on 9/3/2024 at  6:37 PM    Sections Reviewed    Medical, Surgical, Family, Tobacco, Alcohol, Drug Use, Sexual Activity,   Social Documentation    Problem list reviewed by Victor Hugo Cool MD on 9/3/2024 at  6:37 PM  Medicines reviewed by Victor Hugo Cool MD on 9/3/2024 at  6:37 PM  Allergies reviewed by Victor Hugo Cool MD on 9/3/2024 at  6:37 PM       The patient's relevant past medical, surgical and social history were reviewed and updated in Epic as appropriate.        OBJECTIVE     Vitals:  Temp: 98.8 °F (37.1 °C) (24 1130) Temp  Min: 98.5 °F (36.9 °C)  Max: 100.1 °F (37.8 °C)   Temp core:      BP: 105/68 (24 1230) BP  Min: 85/63  Max: 159/84   MAP (non-invasive) Noninvasive MAP (mmHg): 73 (24 1230) Noninvasive MAP (mmHg)  Av.5  Min: 63  Max: 140   Pulse: 95 (24 1230) Pulse  Min: 60  Max: 122   Resp: 20 (24 0703) Resp  Min: 18  Max: 20   SpO2: 95 % (24 1230) SpO2  Min: 90 %  Max: 100 %   Device: ventilator (24 1200)    Flow Rate: 3 (24 1600) Flow (L/min)  Min: 3  Max: 3          09/01/24  1348 09/01/24  1405   Weight: 106 kg (233 lb 11 oz) 113 kg (250 lb)        Intake/Ouptut 24 hrs (7:00AM - 6:59 AM)  Intake & Output (last 2 days)         09/02 0701 09/03 0700 09/03 0701 09/04 0700 09/04 0701 09/05 0700    I.V. (mL/kg) 197 (1.7) 695.1 (6.2) 2114.3 (18.7)    NG/GT   90    IV Piggyback 300      Total Intake(mL/kg) 497 (4.4) 695.1 (6.2) 2204.3 (19.5)    Urine (mL/kg/hr) 3000 (1.1) 1450 (0.5) 300 (0.4)    Total Output 3000 1450 300    Net -2503 -754.9 +1904.3                   Medications (drips):  fentanyl 10 mcg/mL, Last Rate: 300 mcg/hr (09/04/24 0724)  heparin, Last Rate: 21 Units/kg/hr (09/04/24 1116)  midazolam  norepinephrine, Last Rate: 0.06 mcg/kg/min (09/04/24 1254)  Pharmacy to Dose Heparin        Hemodynamics (Acumen HP Arterial line):   CVP:     PAP:     PAOP:     CO: CO (L/min): 7.1 L/min (09/04/24 1115)   CI: CI (L/min/m2): 3.13 L/min/m2 (09/04/24 1115)   SVI: SVI: 37.26 (09/04/24 1115)   SVR:        Invasive Mechanical Ventilator   Settings: Observed:   Mode: VC+/AC (09/04/24 1258)    Resp Rate (Set): 15 (09/04/24 1258) Resp Rate (Observed) Vent: 17 (09/04/24 1258)   Vt (Set, mL): 490 mL (09/04/24 1258) Vt, Exp (observed, mL): 438 mL (09/04/24 1258)    Minute Ventilation (L/min) (Obs): 8.03 L/min (09/04/24 1258)    I:E Ratio (Obs): 1:2.8 (09/04/24 1258)       FiO2 (%): 35 % (09/04/24 1258) Plateau Pressure (cm H2O): 22 cm H2O (09/04/24 0703)   PEEP/CPAP (cm H2O): 8 cm H20 (09/04/24 1258) Driving Pressure (cm H2O): 15.9 cm H2O (09/04/24 0703)    Static Compliance (L/cm H2O): 29 (09/04/24 0703)      Physical Examination  Telemetry:  Rhythm: normal sinus rhythm (09/04/24 1200)      Constitutional:  No acute distress.   Cardiovascular: RRR.    Respiratory: Normal breath sounds  (+) Rhonchi   Abdominal:  Soft with no tenderness.   Extremities: 1+ Edema   Neurological:   Sedated.    Best Eye Response: 4-->(E4) spontaneous (09/04/24 1200)  Best Motor Response: 6-->(M6) obeys  commands (09/04/24 1200)  Best Verbal Response: 1-->(V1) none (09/04/24 1200)  Leo Coma Scale Score: 11 (09/04/24 1200)    RASS (Chery Agitation-Sedation Scale): 1-->restless (09/04/24 0600)    Confusion Assessment Method-ICU (CAM-ICU)  Feature 3: Altered Level of Consciousness: Positive (09/04/24 0600)     Results Reviewed:  Laboratory  Microbiology  Radiology  Pathology    Hematology:  Results from last 7 days   Lab Units 09/04/24  0532 09/03/24  0519 09/02/24 0527   WBC 10*3/mm3 10.48 9.84 17.46*   HEMOGLOBIN g/dL 10.3* 10.6* 11.5*   MCV fL 90.9 93.7 92.0   PLATELETS 10*3/mm3 315 252 289     Results from last 7 days   Lab Units 09/04/24 0532 09/03/24 0519 09/02/24 0527   NEUTROS ABS 10*3/mm3 7.94* 7.51* 14.96*   LYMPHS ABS 10*3/mm3 1.21 0.97 0.66*   EOS ABS 10*3/mm3 0.10 0.23 0.15     Chemistry:  Estimated Creatinine Clearance: 114.2 mL/min (by C-G formula based on SCr of 0.82 mg/dL).    Results from last 7 days   Lab Units 09/04/24 0532 09/02/24  1717   SODIUM mmol/L 142 138   POTASSIUM mmol/L 4.4 3.9   CHLORIDE mmol/L 100 98   CO2 mmol/L 29.0 31.0*   BUN mg/dL 14 13   CREATININE mg/dL 0.82 0.80   GLUCOSE mg/dL 119* 105*     Results from last 7 days   Lab Units 09/04/24  0532 09/02/24  1717   CALCIUM mg/dL 9.0 8.7   MAGNESIUM mg/dL 2.6* 2.4     Hepatic Panel:  Results from last 7 days   Lab Units 09/04/24 0532 09/02/24 1717 09/02/24  1423   ALBUMIN g/dL 4.0 3.5 3.5   TOTAL PROTEIN g/dL 6.9 6.7 6.7   BILIRUBIN mg/dL 0.5 0.2 0.2   AST (SGOT) U/L 18 24 27   ALT (SGPT) U/L 11 12 12   ALK PHOS U/L 154* 82 85     Coagulation Labs:  Results from last 7 days   Lab Units 09/01/24  1050   PROTIME Seconds 13.0   INR  0.97   APTT seconds 26.1*      Cardiac Labs:  Results from last 7 days   Lab Units 09/04/24  0532 09/03/24  2358 09/03/24  2112 09/02/24  1717 09/01/24  1303 09/01/24  1050   PROBNP pg/mL 3,834.0*  --  3,154.0*  --   --  3,232.0*   HSTROP T ng/L  --  338* 330* 252*   < > 236*    < > = values in  this interval not displayed.     Biomarkers:  Results from last 7 days   Lab Units 09/04/24  0532 09/02/24  0527 09/01/24  1050   LACTATE mmol/L  --   --  1.8   PROCALCITONIN ng/mL 1.35* 3.32* 4.18*     COVID-19  Lab Results   Component Value Date    COVID19 Not Detected 09/01/2024    COVID19 Not Detected 04/22/2023    COVID19 Negative 03/23/2023       Arterial Blood Gases:  Results from last 7 days   Lab Units 09/04/24  0342 09/03/24 2054 09/03/24  1812 09/01/24  1120   PH, ARTERIAL pH units 7.413 7.364 7.105* 7.346*   PCO2, ARTERIAL mm Hg 48.2* 57.6*  --  62.7*   PO2 ART mm Hg 113.0* 86.5 337.0* 72.0*   FIO2 % 40 40 80 44       Images:  XR Chest 1 View    Result Date: 9/4/2024  Impression: Interval intubation and placement of NG/OG tube. Similar patchy right lung interstitial opacities compatible with multifocal infection. Electronically Signed: Lewis Delong MD  9/4/2024 7:11 AM EDT  Workstation ID: [id number]    XR Chest 1 View    Result Date: 9/3/2024  Impression: 1. Tube and line positioning as described 2. Persistent right sided infiltrates and pleural fluid Electronically Signed: Kartik Coles  9/3/2024 7:11 PM EDT  Workstation ID: [id number]    XR Abdomen KUB    Result Date: 9/3/2024  Impression: Enteric tube tip and sideport project over the stomach. Electronically Signed: Sameer Benz  9/3/2024 7:10 PM EDT  Workstation ID: [id number]     Echo:  Results for orders placed during the hospital encounter of 09/01/24    Adult Transthoracic Echo Complete W/ Cont if Necessary Per Protocol    Interpretation Summary    Left ventricular systolic function is moderately decreased. Estimated left ventricular EF = 28%.  There is global hypokinesis of left ventricle.    The left ventricular cavity is mildly dilated.    The left atrial cavity is mild to moderately dilated.    Mild aortic valve stenosis is present (mean gradient 15 mmHg)    Mild to moderate mitral regurgitation is present.    Estimated right ventricular  systolic pressure from tricuspid regurgitation is normal (<35 mmHg).      Results: Reviewed.  I reviewed the patient's new laboratory and imaging results.  I independently reviewed the patient's new images.    Medications: Reviewed.    Assessment   A/P     Hospital:  LOS: 1 day   ICU: 20h     Active Hospital Problems    Diagnosis  POA    **Acute on chronic respiratory failure with hypoxia and hypercapnia [J96.21, J96.22]  Yes    Right lower lobe pneumonia [J18.9]  Yes    NSTEMI (non-ST elevated myocardial infarction) [I21.4]  Yes     Elevated troponin in the setting of pneumonia and CHF, 9/1/2024      HFrEF (heart failure with reduced ejection fraction) [I50.20]  Yes     Echo (9/29/2023): LVEF 36%.  Moderate MR.  RVSP 42 mmHg      Coronary artery disease involving native coronary artery of native heart with angina pectoris [I25.119]  Yes     Cardiac catheterization (4/24/2023): Severe 1-vessel CAD involving chronic total occlusion of previously stented distal RCA.  Mild to moderate disease of left coronary system       Fernie is a 64 y.o. male admitted on 9/1/2024 with worsening Dyspnea [R06.00]  Acute on chronic respiratory failure with hypoxia [J96.21]    CTA yesterday revealed no PE, right lower lobe pneumonia, no effusion or edema.  He had diuresed 5 L over 2 days.  On 09/03/24 he developed some respiratory distress and respiratory arrest.  He was emergently intubated. During this admission he had elevated troponin and ST changes consistent with a non-ST elevation MI.  He also has obstructive sleep apnea but is noncompliant with CPAP.    Post intubation he became hypotensive requiring norepinephrine.  He was placed on Piperacillin-Tazobactam  on admission for right lower lobe pneumonia.      Assessment/Management/Treatment Plan:    Respiratory Failure, type 2, acute on chronic  Intubated 09/03/24   Invasive Mechanical Ventilation   Possible RLL pneumonia  Former smoker. Quit 2022  OAD per history.    Cardiovascular  HFrEF. EF 28%  CAD. NSTEMI  Dyslipidemia   HTN  ID/Antibiotics  Elevated PCT, 4.18, in the range of sepsis, on admission. Improving.  MRSA PCR screen Negative  Antibiotics: Piperacillin-Tazobactam   Endocrine   Body mass index is 36.9 kg/m². Obese Class II: 35-39.9kg/m2  Type 2 diabetes.    Lab Results   Lab Value Date/Time    HGBA1C 6.00 (H) 09/01/2024 1050    HGBA1C 7.30 (H) 04/25/2023 0440     Results from last 7 days   Lab Units 09/04/24  1155 09/04/24  0541 09/03/24  2340 09/03/24  1759 09/02/24  1346   GLUCOSE mg/dL 113 115 125 205* 168*       Diet: NPO Diet NPO Type: Strict NPO   Advance Directives: Code Status and Medical Interventions: CPR (Attempt to Resuscitate); Full Support   Ordered at: 09/01/24 1318     Level Of Support Discussed With:    Patient     Code Status (Patient has no pulse and is not breathing):    CPR (Attempt to Resuscitate)     Medical Interventions (Patient has pulse or is breathing):    Full Support        VTE Prophylaxis:  Pharmacologic VTE prophylaxis orders are present.         In brief:  Weaning pressors as tolerated.  Analgesics/Sedatives  Add methylprednisolone  Continue empiric antibiotics  Not ready to be liberated from the ventilator.  Continue anticoagulation for NSTEMI as per Cardiology  Goal: Glucose < 180 mg/dL.   Insulin SQ - correction scale for now.  Hemodynamic, Normal CI. Borderline low SVI.  Probably start Enteral Nutrition tomorrow.  Disposition: Keep in ICU.    Plan of care and goals reviewed during interdisciplinary rounds.  I discussed the patient's findings and my recommendations with patient and nursing staff    MDM:    Problem(s) High due to: Acute or Chronic illness or injury that may poses a threat to life or bodily function  Data: Moderate due to: Review of prior external records from each unique source, Review or results of each unique test, and Ordering of each unique test  Risk: High due to: drug(s) requiring intensive monitoring for  toxicity    High    [x] Primary Attending Intensive Care Medicine - Nutrition Support   [] Consultant    Copied text in this note has been reviewed and is accurate as of 09/04/24

## 2024-09-04 NOTE — PROCEDURES
Insert Arterial Line    Date/Time: 9/3/2024 8:41 PM    Performed by: Latrice Lima APRN  Authorized by: Latrice Lima APRN    [facility] Protocol:     Emergent situation      Patient identity confirmed:  Arm band, provided demographic data and hospital-assigned identification number  A time out verifies correct patient, procedure, equipment, support staff and site/side marked as required:   Preparation:     Preparation: Patient was prepped and draped in usual sterile fashion    Indications:     Indications: multiple ABGs, respiratory failure and hemodynamic monitoring    Location:     Location:  Right radial  Anesthesia:     Patient sedated: Yes      Sedation:  Fentanyl and see MAR for details    Analgesia:  Fentanyl  Procedure Details:     Ultrasound Guidance: yes  The ultrasound was used for evaluation of possible access sites.  Needle entry into vessel was visualized in realtime with the ultrasound.       Elias's test normal?: Yes      Needle gauge:  20    Number of attempts:  2  Post-procedure:     Post-procedure:  Line sutured and dressing applied    Post-procedure CMS:  Normal     patient tolerated the procedure well with no immediate complications     Patient tolerated the procedure well with no immediate complications     Inserted with ultrasound guidance with bright red pulsatile blood detected. Connected to pressure tubing with arterial waveform present. Suture placed and sterile dressing applied.    Supervised by Dr. Cormier and KRISTIN Russell      Signed: KRISTIN Manning  20:46 EDT 9/3/2024

## 2024-09-04 NOTE — PLAN OF CARE
Problem: Adult Inpatient Plan of Care  Goal: Plan of Care Review  Outcome: Ongoing, Progressing  Flowsheets (Taken 9/4/2024 1830)  Plan of Care Reviewed With:   patient   sibling  Outcome Evaluation: Pt remains on mechanical ventilation- PEEP 8 and FiO2 35%. Pt able to follow commands. Levo titrated to maintain MAP >65. Versed initiated- infusing @6. Fent @300. x1 versed bolus and x3 fent boluses administered. Heparin gtt @ 21u/kg/hr. 1L LR bolus administered. 850ml UOP. No BM this shift. BUE restraints remain in place for pt safety. Sister updated via phone call.    Problem: Restraint, Nonviolent  Goal: Absence of Harm or Injury  Outcome: Ongoing, Progressing  Intervention: Implement Least Restrictive Safety Strategies  Recent Flowsheet Documentation  Taken 9/4/2024 1800 by Winsome Mir RN  Medical Device Protection:   IV pole/bag removed from visual field   torso covered   tubing secured  Less Restrictive Alternative:   bed alarm in use   calming techniques promoted   emotional support provided   environment adjusted   positive reinforcement provided   surveillance provided  De-Escalation Techniques:   quiet time facilitated   reoriented   stimulation decreased   verbally redirected  Diversional Activities: music  Taken 9/4/2024 1600 by Winsome Mir RN  Medical Device Protection:   IV pole/bag removed from visual field   torso covered   tubing secured  Less Restrictive Alternative:   bed alarm in use   calming techniques promoted   emotional support provided   environment adjusted   positive reinforcement provided   surveillance provided  De-Escalation Techniques:   quiet time facilitated   reoriented   stimulation decreased   verbally redirected  Diversional Activities: music  Taken 9/4/2024 1400 by Winsome Mir RN  Medical Device Protection:   IV pole/bag removed from visual field   torso covered   tubing secured  Less Restrictive Alternative:   bed alarm in use   calming techniques promoted    emotional support provided   environment adjusted   positive reinforcement provided   surveillance provided  De-Escalation Techniques:   quiet time facilitated   reoriented   stimulation decreased   verbally redirected  Diversional Activities: music  Taken 9/4/2024 1200 by Winsome Mir RN  Medical Device Protection:   IV pole/bag removed from visual field   torso covered   tubing secured  Less Restrictive Alternative:   bed alarm in use   calming techniques promoted   emotional support provided   environment adjusted   positive reinforcement provided   surveillance provided  De-Escalation Techniques:   quiet time facilitated   reoriented   stimulation decreased   verbally redirected  Diversional Activities: music  Taken 9/4/2024 1000 by Winsome Mir RN  Medical Device Protection:   IV pole/bag removed from visual field   torso covered   tubing secured  Less Restrictive Alternative:   bed alarm in use   calming techniques promoted   emotional support provided   environment adjusted   positive reinforcement provided   surveillance provided  De-Escalation Techniques:   quiet time facilitated   stimulation decreased  Diversional Activities: music  Taken 9/4/2024 0800 by Winsome Mir RN  Medical Device Protection:   IV pole/bag removed from visual field   torso covered   tubing secured  Less Restrictive Alternative:   bed alarm in use   calming techniques promoted   emotional support provided   environment adjusted   positive reinforcement provided   surveillance provided  De-Escalation Techniques:   appropriate behavior reinforced   quiet time facilitated   reoriented   stimulation decreased   verbally redirected  Diversional Activities: music  Intervention: Protect Dignity, Rights, and Personal Wellbeing  Recent Flowsheet Documentation  Taken 9/4/2024 1600 by Winsome Mir RN  Trust Relationship/Rapport:   care explained   emotional support provided   reassurance provided   thoughts/feelings  acknowledged  Taken 9/4/2024 1400 by Winsome Mir RN  Trust Relationship/Rapport:   care explained   emotional support provided   reassurance provided   thoughts/feelings acknowledged  Taken 9/4/2024 1200 by Winsome Mir RN  Trust Relationship/Rapport:   care explained   reassurance provided  Taken 9/4/2024 1000 by Winsome Mir RN  Trust Relationship/Rapport:   care explained   emotional support provided   reassurance provided   thoughts/feelings acknowledged  Taken 9/4/2024 0800 by Winsome Mir RN  Trust Relationship/Rapport:   care explained   emotional support provided   reassurance provided  Intervention: Protect Skin and Joint Integrity  Recent Flowsheet Documentation  Taken 9/4/2024 1600 by Winsome Mir RN  Body Position:   turned   tilted   right   lower extremity elevated   upper extremity elevated  Range of Motion: ROM (range of motion) performed  Taken 9/4/2024 1400 by Winsome Mir RN  Body Position:   turned   supine   upper extremity elevated   lower extremity elevated  Taken 9/4/2024 1200 by Winsome Mir RN  Body Position:   turned   tilted   right   lower extremity elevated   upper extremity elevated  Taken 9/4/2024 1000 by Winsome Mir RN  Body Position:   turned   supine   lower extremity elevated   upper extremity elevated  Taken 9/4/2024 0800 by Winsome Mir RN  Body Position:   turned   tilted   right   lower extremity elevated   upper extremity elevated  Range of Motion: ROM (range of motion) performed

## 2024-09-04 NOTE — CASE MANAGEMENT/SOCIAL WORK
Continued Stay Note  [facility]     Patient Name: Fernie Dalal  MRN: [id number]  Today's Date: 9/4/2024    Admit Date: 9/1/2024    Plan: DC goal from previous [facility], D/C currently TBD   Discharge Plan       Row Name 09/04/24 1101       Plan    Plan DC goal from previous [facility], D/C currently TBD    Patient/Family in Agreement with Plan other (see comments)    Plan Comments TRansferred to ICU on 9/3 after RR on floor. Patient is currently intubated/sedated. Per searching thru chart, he has has had medicaid waiver services in the past. Oxygen thru [facility], uses rollator, has neb. Has had [facility] in the past. D/C TBD @ this time. PT/OT on hold due to transfer to ICU.  will continue to follow.    Final Discharge Disposition Code 62 - inpatient rehab facility                   Discharge Codes    No documentation.                 Expected Discharge Date and Time       Expected Discharge Date Expected Discharge Time    Sep 5, 2024               Aniket Marquez RN

## 2024-09-04 NOTE — PROGRESS NOTES
HEPARIN INFUSION  Fernie Dalal is a  64 y.o. male receiving heparin infusion.   Therapy for (VTE/Cardiac):  Cardiac (ACS/NSTEMI)  Patient Weight: 106 kg  Any Bolus (Y/N):  Yes      Signs or Symptoms of Bleeding: none noted  Cardiac (Not VTE)   Initial Bolus: 60 units/kg (Max 4,000 units)  Initial rate: 12 units/kg/hr (Max 1,000 units/hr)   Anti Xa Rebolus Infusion Hold time Change infusion Dose (Units/kg/hr) Next Anti Xa or aPTT Level Due   < 0.11 50 Units/kg  (4000 Units Max) None Increase by  3 Units/kg/hr 6 hours   0.11- 0.19 25 Units/kg  (2000 Units Max) None Increase by  2 Units/kg/hr 6 hours   0.2 - 0.29 0 None Increase by  1 Units/kg/hr 6 hours   0.3 - 0.5 0 None No Change 6 hours (after 2 consecutive levels in range check qAM)   0.51 - 0.6 0 None Decrease by  1 Units/kg/hr 6 hours   0.61 - 0.8 0 30 Minutes Decrease by  2 Units/kg/hr 6 hours   0.81 - 1 0 60 Minutes Decrease by  3 Units/kg/hr 6 hours   >1 0 Hold  After Anti Xa less than 0.5 decrease previous rate by  4 Units/kg/hr  Every 2 hours until Anti Xa  less than 0.5 then when infusion restarts in 6 hours     Results from last 7 days   Lab Units 09/04/24  0532 09/03/24  0519 09/02/24  0527 09/01/24  1050   INR   --   --   --  0.97   HEMOGLOBIN g/dL 10.3* 10.6* 11.5* 13.5   HEMATOCRIT % 33.9* 35.5* 38.1 45.0   PLATELETS 10*3/mm3 315 252 289 333       Date   Time   Anti-Xa Current Rate (Unit/kg/hr) Bolus   (Units) Rate Change   (Unit/kg/hr) New Rate (Unit/kg/hr) Next   Anti-Xa Comments  Pump Check Daily   9/1 10:50 0.10 --new-- 4000 +9 9 18:00 D/w KAMILA Patricia. Bolus & rate confirmed. Pt was counseled on Heparin. He was unfamiliar with Eliquis when I questioned him.   9/1 1827 0.10 9 4000 +2 11 0200 Dw RN   9/2 0254 0.10 11 4000 +3 14 1200 D/w RN   9/2 1153 0.16 14 2000 +2 16 2000 D/w KAMILA Breen   9/2 2045 0.30 16 -- -- 16 0600 Fun[facility]   9/3 0519 0.19 16 2000 +2 18 1200 Fun[facility]   9/3 1149 0.28 18 -- +1 19 2000 D/w Nuha   9/3 2112 0.24 19  -- +1 20 0300 DW KAMILA Vargas   9/4 0430 0.34 20 --- -- 20 1000 D/w RN   9/4 1035 0.24 20 -- +1 21 1800 Sw Mariya, pump check                                                                                                                            Odalis oBston, PharmD   9/4/2024  11:11 EDT

## 2024-09-04 NOTE — PLAN OF CARE
Goal Outcome Evaluation:  Plan of Care Reviewed With: patient, sibling        Progress: no change       Pt transferred to ICU after RRT. Was intubated on arrival. A-line placed. VSS, did become restless and agitated with any stimulation requiring PRN and bolus medications. Levophed weaned down. UOP adequate

## 2024-09-04 NOTE — PROGRESS NOTES
Clinical Nutrition     Patient Name: Fernie Dalal  YOB: 1960  MRN: [id number]  Date of Encounter: 09/04/24 12:54 EDT  Admission date: 9/1/2024  Reason for Visit: MDR, Identified at risk by screening criteria    Assessment   Nutrition Assessment   Admission Diagnosis:  Dyspnea [R06.00]  Acute on chronic respiratory failure with hypoxia [J96.21]    Problem List:    Acute on chronic respiratory failure with hypoxia and hypercapnia    HFrEF (heart failure with reduced ejection fraction)    Coronary artery disease involving native coronary artery of native heart with angina pectoris    NSTEMI (non-ST elevated myocardial infarction)    Right lower lobe pneumonia      PMH:   He  has a past medical history of Angina at rest, Anxiety, Arrhythmia, COPD (chronic obstructive pulmonary disease), GERD (gastroesophageal reflux disease), Heart failure, Hyperlipidemia, Hypertension, and Myocardial infarction.    PSH:  He  has a past surgical history that includes Cardiac catheterization; Cardiovascular stress test; and Cardiac catheterization (N/A, 4/24/2023).    Applicable Nutrition History:   ARF/VENT 9-3-24    Labs    Labs Reviewed: Yes    Results from last 7 days   Lab Units 09/04/24 0532 09/02/24 1717 09/02/24 1423 09/02/24 0527 09/01/24  1050   GLUCOSE mg/dL 119* 105* 170*   < > 146*   BUN mg/dL 14 13 14   < > 19   CREATININE mg/dL 0.82 0.80 0.76   < > 0.83   SODIUM mmol/L 142 138 137   < > 140   CHLORIDE mmol/L 100 98 99   < > 100   POTASSIUM mmol/L 4.4 3.9 4.4   < > 4.6   MAGNESIUM mg/dL 2.6* 2.4 2.6*   < > 1.7   ALT (SGPT) U/L 11 12 12  --  13   LACTATE mmol/L  --   --   --   --  1.8    < > = values in this interval not displayed.       Results from last 7 days   Lab Units 09/04/24 0532 09/02/24 1717 09/02/24 1423 09/01/24  1050   ALBUMIN g/dL 4.0 3.5 3.5 3.9   CHOLESTEROL mg/dL  --   --   --  225*   TRIGLYCERIDES mg/dL  --   --   --  112       Results from last 7 days   Lab Units  "09/04/24  1155 09/04/24  0541 09/03/24  2340 09/03/24  1759 09/02/24  1346   GLUCOSE mg/dL 113 115 125 205* 168*     Lab Results   Lab Value Date/Time    HGBA1C 6.00 (H) 09/01/2024 1050    HGBA1C 7.30 (H) 04/25/2023 0440         Results from last 7 days   Lab Units 09/04/24  0532 09/03/24  2112 09/01/24  1050   PROBNP pg/mL 3,834.0* 3,154.0* 3,232.0*       Medications    Medications Reviewed: yes    Scheduled Meds:aspirin, 81 mg, Nasogastric, Daily  atorvastatin, 80 mg, Nasogastric, Daily  chlorhexidine, 15 mL, Mouth/Throat, Q12H  diazePAM, 10 mg, Nasogastric, Q12H  empagliflozin, 10 mg, Nasogastric, Daily  insulin regular, 2-7 Units, Subcutaneous, Q6H  pantoprazole, 40 mg, Intravenous, Q AM  pharmacy consult - MTM, , Does not apply, Daily  phenytoin, 100 mg, Nasogastric, BID  piperacillin-tazobactam, 3.375 g, Intravenous, Q8H  sennosides, 10 mL, Nasogastric, BID  sodium chloride, 10 mL, Intravenous, Q12H      Continuous Infusions:fentanyl 10 mcg/mL,  mcg/hr, Last Rate: 300 mcg/hr (09/04/24 0724)  heparin, 21 Units/kg/hr, Last Rate: 21 Units/kg/hr (09/04/24 1116)  norepinephrine, 0.02-0.5 mcg/kg/min, Last Rate: 0.04 mcg/kg/min (09/04/24 1208)  Pharmacy to Dose Heparin,       PRN Meds:.  acetaminophen    albuterol sulfate HFA    fentaNYL    LORazepam    Pharmacy to Dose Heparin    polyvinyl alcohol    Potassium Replacement - Follow Nurse / BPA Driven Protocol    sodium chloride    Intake/Ouptut 24 hrs (0701 - 0700)   I&O's Reviewed: yes    Intake & Output (last day)         09/03 0701 09/04 0700 09/04 0701 09/05 0700    I.V. (mL/kg) 695.1 (6.2) 777.9 (6.9)    NG/GT  90    IV Piggyback      Total Intake(mL/kg) 695.1 (6.2) 867.9 (7.7)    Urine (mL/kg/hr) 1450 (0.5) 300 (0.4)    Total Output 1450 300    Net -754.9 +567.9                 Anthropometrics     Height: Height: 175.3 cm (69.02\")  Last Filed Weight: Weight: 113 kg (250 lb) (09/01/24 1405)  Method: Weight Method: Bed scale  BMI: BMI (Calculated): " "36.9    UBW: ?  Weight change:  17lb wt gain sine admit, suspect fluid     Weight       Weight (kg) Weight (lbs) Weight Method Visit Report   2015 118.84 kg  261 lb 15.9 oz      2022 117.482 kg  259 lb   --    2023 --  --   --    2023 117.482 kg  259 lb  Stated     2023 117.482 kg  259 lb      2023 102.967 kg  227 lb  Standing scale     2023 106.051 kg  233 lb 12.8 oz   --    2023 105.688 kg  233 lb      10/31/2023 105.688 kg  233 lb   --    2024 105.688 kg  233 lb  Estimated      106 kg  233 lb 11 oz  Bed scale      113.399 kg  250 lb          Nutrition Focused Physical Exam     Date:     Unable to perform due to Pt unable to participate at time of visit     Subjective   Reported/Observed/Food/Nutrition Related History:     Pt intubated, sedated, will open eyes, is tremulous  + levophed 0.04mcg, fentanyl, heparin    Per RN: pt very anxious, will follow commands, has lots of secretions    Needs Assessment   Date: 24    Height used:Height: 175.3 cm (69.02\")  Weights used ABW: 233lb/ 106kg  IBW: 160lb/ 73kg    Estimated Calorie needs: ~1500kcal  Method:  14Kcals/KG ABW: 1484kcal  Method:  MSJ ABW: 1845kcal  Method:  PSU ABW: 1915kcal    Estimated Protein needs: ~146g protein  Method: 2g protein per kg IBW: 146g protein  Method: 1.2-1.5g protein per k-159g protein      Current Nutrition Prescription     PO: NPO Diet NPO Type: Strict NPO  Oral Nutrition Supplement:  Intake: N/A    Assessment & Plan   Nutrition Diagnosis   Date: 24 Updated:   Problem Inadequate energy intake    Etiology ARF/VENT   Signs/Symptoms NPO   Status: New    Goal:   Nutrition to support treatment and Initiate EN      Nutrition Intervention      Follow treatment progress, Care plan reviewed    If pt remains intubated, suggest start trophic feed via ngt : Peptamen VHP at 25ml/hr, free water 30ml Q 2 hours    If pt remains hemodynamically stable, rec gradually advance TF to goal rate " @80ml/hr    TF at goal volume (20 hours) will provide 1600ml, 1600kcal, 107% kcal needs, 147g protein, 101% protein needs, 6g fiber, 1704ml free water    Hold TF I hour before and after each dose of dilantin      Monitoring/Evaluation:   Per protocol, I&O, Pertinent labs, Weight, Skin status, GI status, Symptoms, Hemodynamic stability    Tania Street, TITO  Time Spent: 60min

## 2024-09-04 NOTE — PROGRESS NOTES
"  [facility]  PROGRESS NOTE    Date of Admission: 9/1/2024  Date of Service: 09/04/24    Primary Care Physician: Belem Albert APRN    Chief Complaint: Follow-up of respiratory failure, acute on chronic HFrEF, non-STEMI and CM  Problem List:   Acute on chronic respiratory failure with hypoxia and hypercapnia    HFrEF (heart failure with reduced ejection fraction)    Coronary artery disease involving native coronary artery of native heart with angina pectoris    NSTEMI (non-ST elevated myocardial infarction)    Right lower lobe pneumonia      Subjective    Patient in the ICU on ventilator, awake but anxious and mildly agitated.  On low-dose pressors and sedatives.      Objective   Vitals: /76   Pulse 84   Temp 99.7 °F (37.6 °C) (Axillary)   Resp 20   Ht 175.3 cm (69.02\")   Wt 113 kg (250 lb)   SpO2 100%   BMI 36.90 kg/m²     Physical Exam:  General: On ventilator  Neck: no JVD.  Chest:No respiratory distress, breath sounds are diminished at bases, scattered rhonchi.  Cardiovascular: Normal S1 and S2, distant/tachycardic heart sounds   Extremities: 1+ edema.      Results:  Results from last 7 days   Lab Units 09/04/24  0532 09/03/24  0519 09/02/24  0527   WBC 10*3/mm3 10.48 9.84 17.46*   HEMOGLOBIN g/dL 10.3* 10.6* 11.5*   HEMATOCRIT % 33.9* 35.5* 38.1   PLATELETS 10*3/mm3 315 252 289     Results from last 7 days   Lab Units 09/04/24  0532 09/02/24  1717 09/02/24  1423   SODIUM mmol/L 142 138 137   POTASSIUM mmol/L 4.4 3.9 4.4   CHLORIDE mmol/L 100 98 99   CO2 mmol/L 29.0 31.0* 27.0   BUN mg/dL 14 13 14   CREATININE mg/dL 0.82 0.80 0.76   GLUCOSE mg/dL 119* 105* 170*      Lab Results   Component Value Date    CHOL 225 (H) 09/01/2024    TRIG 112 09/01/2024    HDL 61 (H) 09/01/2024     (H) 09/01/2024    AST 18 09/04/2024    ALT 11 09/04/2024     Results from last 7 days   Lab Units 09/01/24  1050   HEMOGLOBIN A1C % 6.00*     Results from last 7 days   Lab " Units 09/01/24  1050   CHOLESTEROL mg/dL 225*   TRIGLYCERIDES mg/dL 112   HDL CHOL mg/dL 61*   LDL CHOL mg/dL 144*             Results from last 7 days   Lab Units 09/01/24  1050   PROTIME Seconds 13.0   INR  0.97   APTT seconds 26.1*     Results from last 7 days   Lab Units 09/03/24  2358 09/03/24  2112 09/02/24  1717   HSTROP T ng/L 338* 330* 252*     Results from last 7 days   Lab Units 09/04/24  0532   PROBNP pg/mL 3,834.0*         Intake/Output Summary (Last 24 hours) at 9/4/2024 1132  Last data filed at 9/4/2024 0958  Gross per 24 hour   Intake 1563.05 ml   Output 1750 ml   Net -186.95 ml       I personally reviewed the patient's EKG/Telemetry data    Radiology Data:   Results for orders placed during the hospital encounter of 09/01/24    Adult Transthoracic Echo Complete W/ Cont if Necessary Per Protocol    Interpretation Summary    Left ventricular systolic function is moderately decreased. Estimated left ventricular EF = 28%.  There is global hypokinesis of left ventricle.    The left ventricular cavity is mildly dilated.    The left atrial cavity is mild to moderately dilated.    Mild aortic valve stenosis is present (mean gradient 15 mmHg)    Mild to moderate mitral regurgitation is present.    Estimated right ventricular systolic pressure from tricuspid regurgitation is normal (<35 mmHg).        Current Medications:  aspirin, 81 mg, Nasogastric, Daily  atorvastatin, 80 mg, Nasogastric, Daily  chlorhexidine, 15 mL, Mouth/Throat, Q12H  diazePAM, 10 mg, Nasogastric, Q12H  empagliflozin, 10 mg, Nasogastric, Daily  insulin regular, 2-7 Units, Subcutaneous, Q6H  pantoprazole, 40 mg, Intravenous, Q AM  pharmacy consult - MTM, , Does not apply, Daily  phenytoin, 100 mg, Nasogastric, BID  piperacillin-tazobactam, 3.375 g, Intravenous, Q8H  sennosides, 10 mL, Nasogastric, BID  sodium chloride, 10 mL, Intravenous, Q12H      fentanyl 10 mcg/mL,  mcg/hr, Last Rate: 300 mcg/hr (09/04/24 0724)  heparin, 21  Units/kg/hr, Last Rate: 21 Units/kg/hr (09/04/24 1116)  norepinephrine, 0.02-0.5 mcg/kg/min, Last Rate: 0.06 mcg/kg/min (09/04/24 1111)  Pharmacy to Dose Heparin,         Assessment:   NSTEMI in the setting of demand ischemia with known chronically occluded RCA, he did not have significant left coronary system disease by cath last year.  Acute on chronic systolic heart failure, EF 28% this admission. Last EF 36% by echo 10/2023  Cardiomyopathy.  Pneumonia  Acute on chronic respiratory failure with hypoxia  Coronary artery disease  Dyslipidemia  Hypertension    Plan:   Continue current management and supportive care.  He is currently on aspirin, high intensity statin and Jardiance.  Consider adding beta-blockers when off vasopressors.  We will continue to optimize GDMT as tolerated.  Management of multiple medical conditions per ICU team.    Marsha Gaitan MD, FACC, [facility]

## 2024-09-04 NOTE — PLAN OF CARE
Goal Outcome Evaluation:      Fio2 was weaned         Problem: Inability to Wean (Mechanical Ventilation, Invasive)  Goal: Mechanical Ventilation Liberation  9/4/2024 1746 by Maddie Dietz, RRT  Outcome: Ongoing, Progressing

## 2024-09-05 ENCOUNTER — APPOINTMENT (OUTPATIENT)
Dept: GENERAL RADIOLOGY | Facility: HOSPITAL | Age: 64
End: 2024-09-05
Payer: MEDICAID

## 2024-09-05 LAB
ANION GAP SERPL CALCULATED.3IONS-SCNC: 12 MMOL/L (ref 5–15)
ARTERIAL PATENCY WRIST A: ABNORMAL
ATMOSPHERIC PRESS: ABNORMAL MM[HG]
BACTERIA SPEC RESP CULT: NORMAL
BASE EXCESS BLDA CALC-SCNC: 6.2 MMOL/L (ref 0–2)
BASOPHILS # BLD AUTO: 0.04 10*3/MM3 (ref 0–0.2)
BASOPHILS NFR BLD AUTO: 0.7 % (ref 0–1.5)
BDY SITE: ABNORMAL
BODY TEMPERATURE: 37
BUN SERPL-MCNC: 14 MG/DL (ref 8–23)
BUN/CREAT SERPL: 19.7 (ref 7–25)
CALCIUM SPEC-SCNC: 9 MG/DL (ref 8.6–10.5)
CHLORIDE SERPL-SCNC: 103 MMOL/L (ref 98–107)
CO2 BLDA-SCNC: 33.9 MMOL/L (ref 22–33)
CO2 SERPL-SCNC: 29 MMOL/L (ref 22–29)
COHGB MFR BLD: 1.7 % (ref 0–2)
CREAT SERPL-MCNC: 0.71 MG/DL (ref 0.76–1.27)
DEPRECATED RDW RBC AUTO: 48.4 FL (ref 37–54)
EGFRCR SERPLBLD CKD-EPI 2021: 103.1 ML/MIN/1.73
EOSINOPHIL # BLD AUTO: 0.06 10*3/MM3 (ref 0–0.4)
EOSINOPHIL NFR BLD AUTO: 1.1 % (ref 0.3–6.2)
EPAP: 0
ERYTHROCYTE [DISTWIDTH] IN BLOOD BY AUTOMATED COUNT: 14.3 % (ref 12.3–15.4)
GLUCOSE BLDC GLUCOMTR-MCNC: 110 MG/DL (ref 70–130)
GLUCOSE BLDC GLUCOMTR-MCNC: 115 MG/DL (ref 70–130)
GLUCOSE BLDC GLUCOMTR-MCNC: 87 MG/DL (ref 70–130)
GLUCOSE BLDC GLUCOMTR-MCNC: 92 MG/DL (ref 70–130)
GLUCOSE SERPL-MCNC: 95 MG/DL (ref 65–99)
GRAM STN SPEC: NORMAL
HCO3 BLDA-SCNC: 32.2 MMOL/L (ref 20–26)
HCT VFR BLD AUTO: 38.1 % (ref 37.5–51)
HCT VFR BLD CALC: 29.1 % (ref 38–51)
HGB BLD-MCNC: 11.5 G/DL (ref 13–17.7)
HGB BLDA-MCNC: 9.5 G/DL (ref 13.5–17.5)
IMM GRANULOCYTES # BLD AUTO: 0.02 10*3/MM3 (ref 0–0.05)
IMM GRANULOCYTES NFR BLD AUTO: 0.4 % (ref 0–0.5)
INHALED O2 CONCENTRATION: 35 %
IPAP: 0
LYMPHOCYTES # BLD AUTO: 0.89 10*3/MM3 (ref 0.7–3.1)
LYMPHOCYTES NFR BLD AUTO: 15.9 % (ref 19.6–45.3)
MAGNESIUM SERPL-MCNC: 2.4 MG/DL (ref 1.6–2.4)
MCH RBC QN AUTO: 27.8 PG (ref 26.6–33)
MCHC RBC AUTO-ENTMCNC: 30.2 G/DL (ref 31.5–35.7)
MCV RBC AUTO: 92 FL (ref 79–97)
METHGB BLD QL: 0.2 % (ref 0–1.5)
MODALITY: ABNORMAL
MONOCYTES # BLD AUTO: 0.69 10*3/MM3 (ref 0.1–0.9)
MONOCYTES NFR BLD AUTO: 12.3 % (ref 5–12)
NEUTROPHILS NFR BLD AUTO: 3.89 10*3/MM3 (ref 1.7–7)
NEUTROPHILS NFR BLD AUTO: 69.6 % (ref 42.7–76)
NRBC BLD AUTO-RTO: 0 /100 WBC (ref 0–0.2)
NT-PROBNP SERPL-MCNC: 2648 PG/ML (ref 0–900)
OXYHGB MFR BLDV: 97.3 % (ref 94–99)
PAW @ PEAK INSP FLOW SETTING VENT: 0 CMH2O
PCO2 BLDA: 53.7 MM HG (ref 35–45)
PCO2 TEMP ADJ BLD: 53.7 MM HG (ref 35–48)
PEEP RESPIRATORY: 8 CM[H2O]
PH BLDA: 7.39 PH UNITS (ref 7.35–7.45)
PH, TEMP CORRECTED: 7.39 PH UNITS
PHOSPHATE SERPL-MCNC: 2.9 MG/DL (ref 2.5–4.5)
PLATELET # BLD AUTO: 231 10*3/MM3 (ref 140–450)
PMV BLD AUTO: 10.8 FL (ref 6–12)
PO2 BLDA: 115 MM HG (ref 83–108)
PO2 TEMP ADJ BLD: 115 MM HG (ref 83–108)
POTASSIUM SERPL-SCNC: 4.3 MMOL/L (ref 3.5–5.2)
PROCALCITONIN SERPL-MCNC: 0.76 NG/ML (ref 0–0.25)
RBC # BLD AUTO: 4.14 10*6/MM3 (ref 4.14–5.8)
SODIUM SERPL-SCNC: 144 MMOL/L (ref 136–145)
TOTAL RATE: 0 BREATHS/MINUTE
UFH PPP CHRO-ACNC: 0.31 IU/ML (ref 0.3–0.7)
UFH PPP CHRO-ACNC: 0.36 IU/ML (ref 0.3–0.7)
VENTILATOR MODE: ABNORMAL
VT ON VENT VENT: 0.49 ML
WBC NRBC COR # BLD AUTO: 5.59 10*3/MM3 (ref 3.4–10.8)

## 2024-09-05 PROCEDURE — 83050 HGB METHEMOGLOBIN QUAN: CPT

## 2024-09-05 PROCEDURE — 0 MIDAZOLAM 1 MG/ML 100ML NS 100 MG/100ML SOLUTION: Performed by: INTERNAL MEDICINE

## 2024-09-05 PROCEDURE — 83880 ASSAY OF NATRIURETIC PEPTIDE: CPT | Performed by: INTERNAL MEDICINE

## 2024-09-05 PROCEDURE — 25010000002 PIPERACILLIN SOD-TAZOBACTAM PER 1 G: Performed by: INTERNAL MEDICINE

## 2024-09-05 PROCEDURE — 94799 UNLISTED PULMONARY SVC/PX: CPT

## 2024-09-05 PROCEDURE — 25010000002 METHYLPREDNISOLONE PER 40 MG: Performed by: INTERNAL MEDICINE

## 2024-09-05 PROCEDURE — 25010000002 FUROSEMIDE PER 20 MG: Performed by: INTERNAL MEDICINE

## 2024-09-05 PROCEDURE — 85025 COMPLETE CBC W/AUTO DIFF WBC: CPT | Performed by: INTERNAL MEDICINE

## 2024-09-05 PROCEDURE — 99232 SBSQ HOSP IP/OBS MODERATE 35: CPT | Performed by: INTERNAL MEDICINE

## 2024-09-05 PROCEDURE — 85520 HEPARIN ASSAY: CPT

## 2024-09-05 PROCEDURE — 94761 N-INVAS EAR/PLS OXIMETRY MLT: CPT

## 2024-09-05 PROCEDURE — 25010000002 HEPARIN (PORCINE) 25000-0.45 UT/250ML-% SOLUTION

## 2024-09-05 PROCEDURE — 82805 BLOOD GASES W/O2 SATURATION: CPT

## 2024-09-05 PROCEDURE — 25810000003 SODIUM CHLORIDE 0.9 % SOLUTION: Performed by: INTERNAL MEDICINE

## 2024-09-05 PROCEDURE — 25010000002 FENTANYL 10 MCG/1 ML NS: Performed by: INTERNAL MEDICINE

## 2024-09-05 PROCEDURE — 94003 VENT MGMT INPAT SUBQ DAY: CPT

## 2024-09-05 PROCEDURE — 83735 ASSAY OF MAGNESIUM: CPT | Performed by: INTERNAL MEDICINE

## 2024-09-05 PROCEDURE — 25010000002 ENOXAPARIN PER 10 MG: Performed by: INTERNAL MEDICINE

## 2024-09-05 PROCEDURE — 84145 PROCALCITONIN (PCT): CPT | Performed by: INTERNAL MEDICINE

## 2024-09-05 PROCEDURE — 25010000002 MIDAZOLAM PER 1 MG

## 2024-09-05 PROCEDURE — 82948 REAGENT STRIP/BLOOD GLUCOSE: CPT

## 2024-09-05 PROCEDURE — 80048 BASIC METABOLIC PNL TOTAL CA: CPT | Performed by: INTERNAL MEDICINE

## 2024-09-05 PROCEDURE — 63710000001 REVEFENACIN 175 MCG/3ML SOLUTION: Performed by: INTERNAL MEDICINE

## 2024-09-05 PROCEDURE — 84100 ASSAY OF PHOSPHORUS: CPT | Performed by: INTERNAL MEDICINE

## 2024-09-05 PROCEDURE — 99233 SBSQ HOSP IP/OBS HIGH 50: CPT | Performed by: INTERNAL MEDICINE

## 2024-09-05 PROCEDURE — 82375 ASSAY CARBOXYHB QUANT: CPT

## 2024-09-05 PROCEDURE — 71045 X-RAY EXAM CHEST 1 VIEW: CPT

## 2024-09-05 RX ORDER — RISPERIDONE 1 MG/ML
2 SOLUTION ORAL 2 TIMES DAILY
Status: DISCONTINUED | OUTPATIENT
Start: 2024-09-05 | End: 2024-09-06

## 2024-09-05 RX ORDER — ENOXAPARIN SODIUM 100 MG/ML
40 INJECTION SUBCUTANEOUS DAILY
Status: DISCONTINUED | OUTPATIENT
Start: 2024-09-05 | End: 2024-09-25 | Stop reason: HOSPADM

## 2024-09-05 RX ORDER — HALOPERIDOL 5 MG/ML
5 INJECTION INTRAMUSCULAR EVERY 6 HOURS PRN
Status: DISCONTINUED | OUTPATIENT
Start: 2024-09-05 | End: 2024-09-08

## 2024-09-05 RX ORDER — BISACODYL 10 MG
10 SUPPOSITORY, RECTAL RECTAL DAILY PRN
Status: DISCONTINUED | OUTPATIENT
Start: 2024-09-05 | End: 2024-09-05

## 2024-09-05 RX ORDER — SENNOSIDES A AND B 8.6 MG/1
2 TABLET, FILM COATED ORAL 2 TIMES DAILY
Status: DISCONTINUED | OUTPATIENT
Start: 2024-09-05 | End: 2024-09-13

## 2024-09-05 RX ORDER — BISACODYL 5 MG/1
5 TABLET, DELAYED RELEASE ORAL DAILY PRN
Status: DISCONTINUED | OUTPATIENT
Start: 2024-09-05 | End: 2024-09-05

## 2024-09-05 RX ORDER — POLYETHYLENE GLYCOL 3350 17 G/17G
17 POWDER, FOR SOLUTION ORAL DAILY PRN
Status: DISCONTINUED | OUTPATIENT
Start: 2024-09-05 | End: 2024-09-05

## 2024-09-05 RX ORDER — MIDAZOLAM HYDROCHLORIDE 1 MG/ML
2 INJECTION INTRAMUSCULAR; INTRAVENOUS ONCE
Status: COMPLETED | OUTPATIENT
Start: 2024-09-05 | End: 2024-09-05

## 2024-09-05 RX ORDER — FUROSEMIDE 10 MG/ML
40 INJECTION INTRAMUSCULAR; INTRAVENOUS ONCE
Status: COMPLETED | OUTPATIENT
Start: 2024-09-05 | End: 2024-09-05

## 2024-09-05 RX ORDER — LACTULOSE 10 G/15ML
20 SOLUTION ORAL 3 TIMES DAILY PRN
Status: DISCONTINUED | OUTPATIENT
Start: 2024-09-05 | End: 2024-09-06

## 2024-09-05 RX ADMIN — FUROSEMIDE 40 MG: 10 INJECTION, SOLUTION INTRAMUSCULAR; INTRAVENOUS at 08:27

## 2024-09-05 RX ADMIN — ALBUTEROL SULFATE 2.5 MG: 2.5 SOLUTION RESPIRATORY (INHALATION) at 07:11

## 2024-09-05 RX ADMIN — RISPERIDONE 2 MG: 1 SOLUTION ORAL at 11:13

## 2024-09-05 RX ADMIN — EMPAGLIFLOZIN 10 MG: 10 TABLET, FILM COATED ORAL at 08:26

## 2024-09-05 RX ADMIN — RISPERIDONE 2 MG: 1 SOLUTION ORAL at 21:17

## 2024-09-05 RX ADMIN — PIPERACILLIN AND TAZOBACTAM 3.38 G: 3; .375 INJECTION, POWDER, LYOPHILIZED, FOR SOLUTION INTRAVENOUS at 02:24

## 2024-09-05 RX ADMIN — Medication 10 ML: at 21:18

## 2024-09-05 RX ADMIN — Medication 300 MCG/HR: at 07:17

## 2024-09-05 RX ADMIN — MIDAZOLAM HYDROCHLORIDE 10 MG/HR: 1 INJECTION, SOLUTION INTRAVENOUS at 01:25

## 2024-09-05 RX ADMIN — ALBUTEROL SULFATE 2.5 MG: 2.5 SOLUTION RESPIRATORY (INHALATION) at 20:08

## 2024-09-05 RX ADMIN — KETAMINE HYDROCHLORIDE 0.06 MG/KG/HR: 100 INJECTION, SOLUTION, CONCENTRATE INTRAMUSCULAR; INTRAVENOUS at 13:00

## 2024-09-05 RX ADMIN — PHENYTOIN 100 MG: 125 SUSPENSION ORAL at 21:18

## 2024-09-05 RX ADMIN — MIDAZOLAM HYDROCHLORIDE 10 MG/HR: 1 INJECTION, SOLUTION INTRAVENOUS at 23:24

## 2024-09-05 RX ADMIN — ALBUTEROL SULFATE 2.5 MG: 2.5 SOLUTION RESPIRATORY (INHALATION) at 00:52

## 2024-09-05 RX ADMIN — HEPARIN SODIUM 21 UNITS/KG/HR: 10000 INJECTION, SOLUTION INTRAVENOUS at 07:17

## 2024-09-05 RX ADMIN — ACETAMINOPHEN 650 MG: 650 SOLUTION ORAL at 15:51

## 2024-09-05 RX ADMIN — PANTOPRAZOLE SODIUM 40 MG: 40 INJECTION, POWDER, LYOPHILIZED, FOR SOLUTION INTRAVENOUS at 05:27

## 2024-09-05 RX ADMIN — CHLORHEXIDINE GLUCONATE, 0.12% ORAL RINSE 15 ML: 1.2 SOLUTION DENTAL at 08:27

## 2024-09-05 RX ADMIN — CHLORHEXIDINE GLUCONATE, 0.12% ORAL RINSE 15 ML: 1.2 SOLUTION DENTAL at 21:17

## 2024-09-05 RX ADMIN — Medication 300 MCG/HR: at 23:24

## 2024-09-05 RX ADMIN — PHENYTOIN 100 MG: 125 SUSPENSION ORAL at 08:26

## 2024-09-05 RX ADMIN — ALBUTEROL SULFATE 2.5 MG: 2.5 SOLUTION RESPIRATORY (INHALATION) at 13:26

## 2024-09-05 RX ADMIN — ENOXAPARIN SODIUM 40 MG: 100 INJECTION SUBCUTANEOUS at 18:38

## 2024-09-05 RX ADMIN — PIPERACILLIN AND TAZOBACTAM 3.38 G: 3; .375 INJECTION, POWDER, LYOPHILIZED, FOR SOLUTION INTRAVENOUS at 09:00

## 2024-09-05 RX ADMIN — ARFORMOTEROL TARTRATE 15 MCG: 15 SOLUTION RESPIRATORY (INHALATION) at 07:11

## 2024-09-05 RX ADMIN — ARFORMOTEROL TARTRATE 15 MCG: 15 SOLUTION RESPIRATORY (INHALATION) at 20:08

## 2024-09-05 RX ADMIN — SENNOSIDES 2 TABLET: 8.6 TABLET, FILM COATED ORAL at 21:17

## 2024-09-05 RX ADMIN — REVEFENACIN 175 MCG: 175 SOLUTION RESPIRATORY (INHALATION) at 07:11

## 2024-09-05 RX ADMIN — MIDAZOLAM HYDROCHLORIDE 10 MG/HR: 1 INJECTION, SOLUTION INTRAVENOUS at 12:01

## 2024-09-05 RX ADMIN — SENNOSIDES 10 ML: 8.8 LIQUID ORAL at 08:26

## 2024-09-05 RX ADMIN — MIDAZOLAM HYDROCHLORIDE 2 MG: 1 INJECTION, SOLUTION INTRAMUSCULAR; INTRAVENOUS at 02:24

## 2024-09-05 RX ADMIN — ATORVASTATIN CALCIUM 80 MG: 40 TABLET, FILM COATED ORAL at 08:27

## 2024-09-05 RX ADMIN — Medication 300 MCG/HR: at 15:40

## 2024-09-05 RX ADMIN — ASPIRIN 81 MG 81 MG: 81 TABLET ORAL at 08:27

## 2024-09-05 RX ADMIN — METHYLPREDNISOLONE SODIUM SUCCINATE 40 MG: 40 INJECTION, POWDER, FOR SOLUTION INTRAMUSCULAR; INTRAVENOUS at 08:27

## 2024-09-05 RX ADMIN — KETAMINE HYDROCHLORIDE 0.86 MG/KG/HR: 100 INJECTION, SOLUTION, CONCENTRATE INTRAMUSCULAR; INTRAVENOUS at 23:24

## 2024-09-05 RX ADMIN — PIPERACILLIN AND TAZOBACTAM 3.38 G: 3; .375 INJECTION, POWDER, LYOPHILIZED, FOR SOLUTION INTRAVENOUS at 17:20

## 2024-09-05 RX ADMIN — Medication 10 ML: at 08:27

## 2024-09-05 NOTE — PLAN OF CARE
Goal Outcome Evaluation:      Patient remains intubated overnight PEEP of 8 and Fio2 of 35%   Patient very restless with stimulation, one time dose of versed 2mg given per APRN   Versed at 10 and fentanyl at 300 for vent compliance.   T- Max 99.8  UOP adequate    Problem: Adult Inpatient Plan of Care  Goal: Plan of Care Review  Outcome: Ongoing, Progressing  Goal: Patient-Specific Goal (Individualized)  Outcome: Ongoing, Progressing  Goal: Absence of Hospital-Acquired Illness or Injury  Outcome: Ongoing, Progressing  Intervention: Identify and Manage Fall Risk  Recent Flowsheet Documentation  Taken 9/5/2024 0400 by Myrtle Oshea, RN  Safety Promotion/Fall Prevention:   clutter free environment maintained   fall prevention program maintained   lighting adjusted   room organization consistent   safety round/check completed  Taken 9/5/2024 0200 by Myrtle Oshea RN  Safety Promotion/Fall Prevention:   clutter free environment maintained   fall prevention program maintained   lighting adjusted   room organization consistent   safety round/check completed  Taken 9/5/2024 0000 by Myrtle Oshea RN  Safety Promotion/Fall Prevention:   clutter free environment maintained   fall prevention program maintained   lighting adjusted   room organization consistent   safety round/check completed  Taken 9/4/2024 2200 by Myrtle Oshea, RN  Safety Promotion/Fall Prevention:   clutter free environment maintained   fall prevention program maintained   lighting adjusted   room organization consistent   safety round/check completed  Taken 9/4/2024 2000 by Myrtle Oshea, RN  Safety Promotion/Fall Prevention:   clutter free environment maintained   fall prevention program maintained   lighting adjusted   room organization consistent   safety round/check completed  Intervention: Prevent Skin Injury  Recent Flowsheet Documentation  Taken 9/5/2024 0400 by Myrtle Oshea, RN  Body Position: turned  Skin Protection:   adhesive  use limited   incontinence pads utilized   skin-to-device areas padded   skin-to-skin areas padded   transparent dressing maintained   tubing/devices free from skin contact   silicone foam dressing in place  Taken 9/5/2024 0200 by Myrtle Oshea RN  Body Position: turned  Skin Protection:   adhesive use limited   incontinence pads utilized   skin-to-device areas padded   skin-to-skin areas padded   transparent dressing maintained   tubing/devices free from skin contact   silicone foam dressing in place  Taken 9/5/2024 0000 by Myrtle Oshea RN  Body Position: turned  Skin Protection:   adhesive use limited   incontinence pads utilized   skin-to-device areas padded   skin-to-skin areas padded   transparent dressing maintained   tubing/devices free from skin contact   silicone foam dressing in place  Taken 9/4/2024 2200 by Myrtle Oshea RN  Body Position: turned  Skin Protection:   adhesive use limited   incontinence pads utilized   skin-to-device areas padded   skin-to-skin areas padded   transparent dressing maintained   tubing/devices free from skin contact   silicone foam dressing in place  Taken 9/4/2024 2000 by Myrtle Oshea RN  Body Position: turned  Skin Protection:   adhesive use limited   incontinence pads utilized   skin-to-device areas padded   skin-to-skin areas padded   transparent dressing maintained   tubing/devices free from skin contact   silicone foam dressing in place  Intervention: Prevent and Manage VTE (Venous Thromboembolism) Risk  Recent Flowsheet Documentation  Taken 9/5/2024 0400 by Myrtle Oshea, RN  Activity Management: bedrest  Taken 9/5/2024 0200 by Myrtle Oshea, RN  Activity Management: bedrest  Taken 9/5/2024 0000 by Myrtle Oshea RN  Activity Management: bedrest  Taken 9/4/2024 2200 by Myrtle Oshea, RN  Activity Management: bedrest  Taken 9/4/2024 2000 by Myrtle Oshea, RN  Activity Management: bedrest  Intervention: Prevent Infection  Recent Flowsheet  Documentation  Taken 9/5/2024 0400 by Myrtle Oshea RN  Infection Prevention:   environmental surveillance performed   equipment surfaces disinfected   hand hygiene promoted   personal protective equipment utilized   rest/sleep promoted   single patient room provided  Taken 9/5/2024 0200 by Myrtle Oshea RN  Infection Prevention:   environmental surveillance performed   equipment surfaces disinfected   hand hygiene promoted   personal protective equipment utilized   rest/sleep promoted   single patient room provided  Taken 9/5/2024 0000 by Myrtle Oshea RN  Infection Prevention:   environmental surveillance performed   equipment surfaces disinfected   hand hygiene promoted   personal protective equipment utilized   rest/sleep promoted   single patient room provided  Taken 9/4/2024 2200 by Myrtle Oshea RN  Infection Prevention:   environmental surveillance performed   equipment surfaces disinfected   hand hygiene promoted   personal protective equipment utilized   rest/sleep promoted   single patient room provided  Taken 9/4/2024 2000 by Myrtle Oshea RN  Infection Prevention:   environmental surveillance performed   equipment surfaces disinfected   hand hygiene promoted   personal protective equipment utilized   rest/sleep promoted   single patient room provided  Goal: Optimal Comfort and Wellbeing  Outcome: Ongoing, Progressing  Intervention: Provide Person-Centered Care  Recent Flowsheet Documentation  Taken 9/5/2024 0400 by Myrtle Oshea RN  Trust Relationship/Rapport:   care explained   reassurance provided  Taken 9/5/2024 0200 by Myrtle Oshea RN  Trust Relationship/Rapport:   care explained   reassurance provided  Taken 9/5/2024 0000 by Myrtle Oshea RN  Trust Relationship/Rapport:   care explained   reassurance provided  Taken 9/4/2024 2200 by Myrtle Oshea RN  Trust Relationship/Rapport:   care explained   reassurance provided  Taken 9/4/2024 2000 by Myrtle Oshea  RN  Trust Relationship/Rapport:   care explained   reassurance provided  Goal: Readiness for Transition of Care  Outcome: Ongoing, Progressing     Problem: Skin Injury Risk Increased  Goal: Skin Health and Integrity  Outcome: Ongoing, Progressing  Intervention: Optimize Skin Protection  Recent Flowsheet Documentation  Taken 9/5/2024 0400 by Myrtle Oshea RN  Pressure Reduction Techniques:   heels elevated off bed   positioned off wounds   pressure points protected   weight shift assistance provided  Head of Bed (HOB) Positioning: HOB elevated  Pressure Reduction Devices:   positioning supports utilized   pressure-redistributing mattress utilized   specialty bed utilized  Skin Protection:   adhesive use limited   incontinence pads utilized   skin-to-device areas padded   skin-to-skin areas padded   transparent dressing maintained   tubing/devices free from skin contact   silicone foam dressing in place  Taken 9/5/2024 0200 by Myrtle Oshea RN  Pressure Reduction Techniques:   heels elevated off bed   positioned off wounds   pressure points protected   weight shift assistance provided  Head of Bed (hospitals) Positioning: HOB elevated  Pressure Reduction Devices:   positioning supports utilized   pressure-redistributing mattress utilized   specialty bed utilized  Skin Protection:   adhesive use limited   incontinence pads utilized   skin-to-device areas padded   skin-to-skin areas padded   transparent dressing maintained   tubing/devices free from skin contact   silicone foam dressing in place  Taken 9/5/2024 0000 by Myrtle Oshea RN  Pressure Reduction Techniques:   heels elevated off bed   positioned off wounds   pressure points protected   weight shift assistance provided  Head of Bed (hospitals) Positioning: HOB elevated  Pressure Reduction Devices:   positioning supports utilized   pressure-redistributing mattress utilized   specialty bed utilized  Skin Protection:   adhesive use limited   incontinence pads  utilized   skin-to-device areas padded   skin-to-skin areas padded   transparent dressing maintained   tubing/devices free from skin contact   silicone foam dressing in place  Taken 9/4/2024 2200 by Myrtle Oshea RN  Pressure Reduction Techniques:   heels elevated off bed   positioned off wounds   pressure points protected   weight shift assistance provided  Head of Bed (HOB) Positioning: [facility] elevated  Pressure Reduction Devices:   positioning supports utilized   pressure-redistributing mattress utilized   specialty bed utilized  Skin Protection:   adhesive use limited   incontinence pads utilized   skin-to-device areas padded   skin-to-skin areas padded   transparent dressing maintained   tubing/devices free from skin contact   silicone foam dressing in place  Taken 9/4/2024 2000 by Myrtle Oshea, RN  Pressure Reduction Techniques:   heels elevated off bed   positioned off wounds   pressure points protected   weight shift assistance provided  Head of Bed ([facility]) Positioning: [facility] elevated  Pressure Reduction Devices:   positioning supports utilized   pressure-redistributing mattress utilized   specialty bed utilized  Skin Protection:   adhesive use limited   incontinence pads utilized   skin-to-device areas padded   skin-to-skin areas padded   transparent dressing maintained   tubing/devices free from skin contact   silicone foam dressing in place     Problem: Noninvasive Ventilation Acute  Goal: Effective Unassisted Ventilation and Oxygenation  Outcome: Ongoing, Progressing     Problem: Fall Injury Risk  Goal: Absence of Fall and Fall-Related Injury  Outcome: Ongoing, Progressing  Intervention: Identify and Manage Contributors  Recent Flowsheet Documentation  Taken 9/5/2024 0400 by Myrtle Oshea, RN  Medication Review/Management: medications reviewed  Taken 9/5/2024 0200 by Myrtle Oshea, RN  Medication Review/Management: medications reviewed  Taken 9/5/2024 0000 by Myrtle Oshea, RN  Medication  Review/Management: medications reviewed  Taken 9/4/2024 2200 by Myrtle Oshea, RN  Medication Review/Management: medications reviewed  Taken 9/4/2024 2000 by Myrtle Oshea RN  Medication Review/Management: medications reviewed  Intervention: Promote Injury-Free Environment  Recent Flowsheet Documentation  Taken 9/5/2024 0400 by Myrtle Oshea, RN  Safety Promotion/Fall Prevention:   clutter free environment maintained   fall prevention program maintained   lighting adjusted   room organization consistent   safety round/check completed  Taken 9/5/2024 0200 by Myrtle Oshea RN  Safety Promotion/Fall Prevention:   clutter free environment maintained   fall prevention program maintained   lighting adjusted   room organization consistent   safety round/check completed  Taken 9/5/2024 0000 by Myrtle Oshea RN  Safety Promotion/Fall Prevention:   clutter free environment maintained   fall prevention program maintained   lighting adjusted   room organization consistent   safety round/check completed  Taken 9/4/2024 2200 by Myrtle Oshea RN  Safety Promotion/Fall Prevention:   clutter free environment maintained   fall prevention program maintained   lighting adjusted   room organization consistent   safety round/check completed  Taken 9/4/2024 2000 by Myrtle Oshea RN  Safety Promotion/Fall Prevention:   clutter free environment maintained   fall prevention program maintained   lighting adjusted   room organization consistent   safety round/check completed     Problem: Restraint, Nonviolent  Goal: Absence of Harm or Injury  Outcome: Ongoing, Progressing  Intervention: Implement Least Restrictive Safety Strategies  Recent Flowsheet Documentation  Taken 9/5/2024 0400 by Myrtle Oshea, RN  Medical Device Protection: tubing secured  Less Restrictive Alternative:   emotional support provided   environment adjusted   family presence promoted   medication offered  De-Escalation Techniques:    reoriented   quiet time facilitated   stimulation decreased  Diversional Activities: music  Taken 9/5/2024 0200 by Myrtle Oshea RN  Medical Device Protection: tubing secured  Less Restrictive Alternative:   emotional support provided   environment adjusted   family presence promoted   medication offered  De-Escalation Techniques:   reoriented   quiet time facilitated   stimulation decreased  Diversional Activities: music  Taken 9/5/2024 0033 by Myrtle Oshea, RN  Medical Device Protection:   torso covered   tubing secured  Less Restrictive Alternative:   emotional support provided   environment adjusted   family presence promoted   medication offered  De-Escalation Techniques:   reoriented   quiet time facilitated   stimulation decreased  Diversional Activities: music  Taken 9/5/2024 0000 by Myrtle Oshea RN  Medical Device Protection: tubing secured  Less Restrictive Alternative:   emotional support provided   environment adjusted   family presence promoted   medication offered  De-Escalation Techniques:   reoriented   quiet time facilitated   stimulation decreased  Diversional Activities: music  Taken 9/4/2024 2200 by Myrtle Oshea RN  Medical Device Protection: tubing secured  Less Restrictive Alternative:   emotional support provided   environment adjusted   family presence promoted   medication offered  De-Escalation Techniques:   reoriented   quiet time facilitated   stimulation decreased  Diversional Activities: music  Taken 9/4/2024 2000 by Myrtle Oshea, RN  Medical Device Protection: tubing secured  Less Restrictive Alternative:   emotional support provided   environment adjusted   family presence promoted   medication offered  De-Escalation Techniques:   reoriented   quiet time facilitated   stimulation decreased  Diversional Activities: music  Intervention: Protect Dignity, Rights, and Personal Wellbeing  Recent Flowsheet Documentation  Taken 9/5/2024 0400 by Myrtle Oshea  RN  Trust Relationship/Rapport:   care explained   reassurance provided  Taken 9/5/2024 0200 by Myrtle Oshea RN  Trust Relationship/Rapport:   care explained   reassurance provided  Taken 9/5/2024 0000 by Myrtle Oshea RN  Trust Relationship/Rapport:   care explained   reassurance provided  Taken 9/4/2024 2200 by Myrtle Oshea RN  Trust Relationship/Rapport:   care explained   reassurance provided  Taken 9/4/2024 2000 by yMrtle Oshea RN  Trust Relationship/Rapport:   care explained   reassurance provided  Intervention: Protect Skin and Joint Integrity  Recent Flowsheet Documentation  Taken 9/5/2024 0400 by Myrtle Oshea RN  Body Position: turned  Taken 9/5/2024 0200 by Myrtle Oshea RN  Body Position: turned  Taken 9/5/2024 0000 by Myrtle Oshea RN  Body Position: turned  Taken 9/4/2024 2200 by Myrtle Oshea RN  Body Position: turned  Taken 9/4/2024 2000 by Myrtle Oshea RN  Body Position: turned     Problem: Inability to Wean (Mechanical Ventilation, Invasive)  Goal: Mechanical Ventilation Liberation  Outcome: Ongoing, Progressing  Intervention: Promote Extubation and Mechanical Ventilation Liberation  Recent Flowsheet Documentation  Taken 9/5/2024 0400 by Myrtle Oshea RN  Medication Review/Management: medications reviewed  Taken 9/5/2024 0200 by Myrtle Oshea RN  Medication Review/Management: medications reviewed  Taken 9/5/2024 0000 by Myrtle Oshea RN  Medication Review/Management: medications reviewed  Taken 9/4/2024 2200 by Myrtle Oshea RN  Medication Review/Management: medications reviewed  Taken 9/4/2024 2000 by Myrtle Oshea RN  Medication Review/Management: medications reviewed

## 2024-09-05 NOTE — PROGRESS NOTES
"INTENSIVIST   PROGRESS NOTE        SUBJECTIVE     Fernie 64 y.o. male is followed for: Chest Pain       Type 2 respiratory failure    HFrEF (heart failure with reduced ejection fraction)    Coronary artery disease involving native coronary artery of native heart with angina pectoris    NSTEMI (non-ST elevated myocardial infarction)    Right lower lobe pneumonia    As an Intensivist, we provide an integrated approach to the ICU patient and family, medical management of comorbid conditions, including but not limited to electrolytes, glycemic control, organ dysfunction, lead interdisciplinary rounds and coordinate the care with all other services, including those from other specialists.     Interval History:    Periods of agitation and \"shaking\".    Temp  Min: 98.8 °F (37.1 °C)  Max: 99.8 °F (37.7 °C)       History     Last Reviewed by Victor Hugo Cool MD on 9/3/2024 at  6:37 PM    Sections Reviewed    Medical, Surgical, Family, Tobacco, Alcohol, Drug Use, Sexual Activity,   Social Documentation    Problem list reviewed by Victor Hugo Cool MD on 9/3/2024 at  6:37 PM  Medicines reviewed by Victor Hugo Cool MD on 9/3/2024 at  6:37 PM  Allergies reviewed by Victor Hugo Cool MD on 9/3/2024 at  6:37 PM       The patient's relevant past medical, surgical and social history were reviewed and updated in Epic as appropriate.        OBJECTIVE     Vitals:  Temp: 98.9 °F (37.2 °C) (24 0400) Temp  Min: 98.8 °F (37.1 °C)  Max: 99.8 °F (37.7 °C)   Temp core:      BP: 111/82 (24 0715) BP  Min: 57/43  Max: 154/80   MAP (non-invasive) Noninvasive MAP (mmHg): 96 (24 0715) Noninvasive MAP (mmHg)  Av.2  Min: 48  Max: 140   Pulse: 93 (2415) Pulse  Min: 68  Max: 110   Resp: 15 (24 0711) Resp  Min: 15  Max: 17   SpO2: 95 % (24 0715) SpO2  Min: 85 %  Max: 100 %   Device: ventilator (24 07)    Flow Rate: 3 (24 1600) No data recorded         24  1348 24  1405   Weight: 106 kg (233 " lb 11 oz) 113 kg (250 lb)        Intake/Ouptut 24 hrs (7:00AM - 6:59 AM)  Intake & Output (last 2 days)         09/03 0701 09/04 0700 09/04 0701 09/05 0700 09/05 0701 09/06 0700    I.V. (mL/kg) 695.1 (6.2) 3251.4 (28.8)     NG/GT  190     IV Piggyback  200     Total Intake(mL/kg) 695.1 (6.2) 3641.4 (32.2)     Urine (mL/kg/hr) 1450 (0.5) 1550 (0.6)     Total Output 1450 1550     Net -754.9 +2091.4                    Medications (drips):  fentanyl 10 mcg/mL, Last Rate: 300 mcg/hr (09/05/24 0717)  heparin, Last Rate: 21 Units/kg/hr (09/05/24 0717)  midazolam, Last Rate: 10 mg/hr (09/05/24 0125)  norepinephrine, Last Rate: Stopped (09/05/24 0515)  Pharmacy to Dose Heparin        Hemodynamics (Acumen HP Arterial line):   CVP:     PAP:     PAOP:     CO: CO (L/min): 6.2 L/min (09/05/24 0400)   CI: CI (L/min/m2): 2.73 L/min/m2 (09/05/24 0400)   SVI: SVI: 36.4 (09/05/24 0400)   SVR:        Invasive Mechanical Ventilator   Settings: Observed:   Mode: VC+/AC (09/05/24 0709)    Resp Rate (Set): 15 (09/05/24 0709) Resp Rate (Observed) Vent: 17 (09/05/24 0715)   Vt (Set, mL): 490 mL (09/05/24 0709) Vt, Exp (observed, mL): 387 mL (09/05/24 0709)    Minute Ventilation (L/min) (Obs): 6.67 L/min (09/05/24 0709)    I:E Ratio (Obs): 1:2.8 (09/05/24 0709)       FiO2 (%): 35 % (09/05/24 0709) Plateau Pressure (cm H2O): 28 cm H2O (09/05/24 0709)   PEEP/CPAP (cm H2O): 8 cm H20 (09/05/24 0709) Driving Pressure (cm H2O): 19.5 cm H2O (09/05/24 0709)    Static Compliance (L/cm H2O): 23 (09/05/24 0709)      Physical Examination  Telemetry:  Rhythm: normal sinus rhythm (09/05/24 0600)  Conduction Defect: right bundle branch block (09/05/24 0600)   Constitutional:  No acute distress.   Cardiovascular: RRR.    Respiratory: Normal breath sounds  (+) Rhonchi   Abdominal:  Soft with no tenderness.   Extremities: 1+ Edema   Neurological:   Sedated.    Best Eye Response: 4-->(E4) spontaneous (09/05/24 0600)  Best Motor Response: 6-->(M6) obeys  commands (09/05/24 0600)  Best Verbal Response: 1-->(V1) none (09/05/24 0600)  Leo Coma Scale Score: 11 (09/05/24 0600)    RASS (Chery Agitation-Sedation Scale): -1-->drowsy (09/05/24 0600)    Confusion Assessment Method-ICU (CAM-ICU)  Feature 3: Altered Level of Consciousness: Positive (09/05/24 0600)     Results Reviewed:  Laboratory  Microbiology  Radiology  Pathology    Hematology:  Results from last 7 days   Lab Units 09/05/24  0426 09/04/24  0532 09/03/24  0519   WBC 10*3/mm3 5.59 10.48 9.84   HEMOGLOBIN g/dL 11.5* 10.3* 10.6*   MCV fL 92.0 90.9 93.7   PLATELETS 10*3/mm3 231 315 252     Results from last 7 days   Lab Units 09/05/24  0426 09/04/24  0532 09/03/24  0519   NEUTROS ABS 10*3/mm3 3.89 7.94* 7.51*   LYMPHS ABS 10*3/mm3 0.89 1.21 0.97   EOS ABS 10*3/mm3 0.06 0.10 0.23     Chemistry:  Estimated Creatinine Clearance: 131.9 mL/min (A) (by C-G formula based on SCr of 0.71 mg/dL (L)).    Results from last 7 days   Lab Units 09/05/24  0529 09/04/24  0532   SODIUM mmol/L 144 142   POTASSIUM mmol/L 4.3 4.4   CHLORIDE mmol/L 103 100   CO2 mmol/L 29.0 29.0   BUN mg/dL 14 14   CREATININE mg/dL 0.71* 0.82   GLUCOSE mg/dL 95 119*     Results from last 7 days   Lab Units 09/05/24  0529 09/04/24  0532   CALCIUM mg/dL 9.0 9.0   MAGNESIUM mg/dL 2.4 2.6*   PHOSPHORUS mg/dL 2.9  --      Hepatic Panel:  Results from last 7 days   Lab Units 09/04/24  0532 09/02/24  1717 09/02/24  1423   ALBUMIN g/dL 4.0 3.5 3.5   TOTAL PROTEIN g/dL 6.9 6.7 6.7   BILIRUBIN mg/dL 0.5 0.2 0.2   AST (SGOT) U/L 18 24 27   ALT (SGPT) U/L 11 12 12   ALK PHOS U/L 154* 82 85     Coagulation Labs:  Results from last 7 days   Lab Units 09/01/24  1050   PROTIME Seconds 13.0   INR  0.97   APTT seconds 26.1*      Cardiac Labs:  Results from last 7 days   Lab Units 09/05/24  0529 09/04/24  0532 09/03/24  2358 09/03/24  2112 09/02/24  1717   PROBNP pg/mL 2,648.0* 3,834.0*  --  3,154.0*  --    HSTROP T ng/L  --   --  338* 330* 252*      Biomarkers:  Results from last 7 days   Lab Units 09/05/24  0529 09/04/24  0532 09/02/24  0527 09/01/24  1050   LACTATE mmol/L  --   --   --  1.8   PROCALCITONIN ng/mL 0.76* 1.35* 3.32* 4.18*     COVID-19  Lab Results   Component Value Date    COVID19 Not Detected 09/01/2024    COVID19 Not Detected 04/22/2023    COVID19 Negative 03/23/2023       Arterial Blood Gases:  Results from last 7 days   Lab Units 09/05/24  0426 09/04/24  0342 09/03/24 2054   PH, ARTERIAL pH units 7.387 7.413 7.364   PCO2, ARTERIAL mm Hg 53.7* 48.2* 57.6*   PO2 ART mm Hg 115.0* 113.0* 86.5   FIO2 % 35 40 40       Images:  XR Chest 1 View    Result Date: 9/5/2024  Impression: 1. Lines and tubes as above project in expected position 2. Cardiomegaly and mild pulm vascular congestion slightly increased in the comparison. 3. Increased asymmetric superimposed patchy opacities over the right hemithorax which represent asymmetric edema and/or pneumonia. Findings are increased in the comparison Electronically Signed: Mitchell Mallory MD  9/5/2024 7:05 AM EDT  Workstation ID: [id number]    XR Chest 1 View    Result Date: 9/4/2024  Impression: Interval intubation and placement of NG/OG tube. Similar patchy right lung interstitial opacities compatible with multifocal infection. Electronically Signed: Lewis Delong MD  9/4/2024 7:11 AM EDT  Workstation ID: [id number]    XR Chest 1 View    Result Date: 9/3/2024  Impression: 1. Tube and line positioning as described 2. Persistent right sided infiltrates and pleural fluid Electronically Signed: Kartik Coles  9/3/2024 7:11 PM EDT  Workstation ID: [id number]    XR Abdomen KUB    Result Date: 9/3/2024  Impression: Enteric tube tip and sideport project over the stomach. Electronically Signed: Sameer Benz  9/3/2024 7:10 PM EDT  Workstation ID: [id number]     Echo:  Results for orders placed during the hospital encounter of 09/01/24    Adult Transthoracic Echo Complete W/ Cont if Necessary Per  Protocol    Interpretation Summary    Left ventricular systolic function is moderately decreased. Estimated left ventricular EF = 28%.  There is global hypokinesis of left ventricle.    The left ventricular cavity is mildly dilated.    The left atrial cavity is mild to moderately dilated.    Mild aortic valve stenosis is present (mean gradient 15 mmHg)    Mild to moderate mitral regurgitation is present.    Estimated right ventricular systolic pressure from tricuspid regurgitation is normal (<35 mmHg).      Results: Reviewed.  I reviewed the patient's new laboratory and imaging results.  I independently reviewed the patient's new images.    Medications: Reviewed.    Assessment   A/P     Hospital:  LOS: 2 days   ICU: 1d 14h     Active Hospital Problems    Diagnosis  POA    **Type 2 respiratory failure [J96.92]  Yes    Right lower lobe pneumonia [J18.9]  Yes    NSTEMI (non-ST elevated myocardial infarction) [I21.4]  Yes     Elevated troponin in the setting of pneumonia and CHF, 9/1/2024      HFrEF (heart failure with reduced ejection fraction) [I50.20]  Yes     Echo (9/29/2023): LVEF 36%.  Moderate MR.  RVSP 42 mmHg      Coronary artery disease involving native coronary artery of native heart with angina pectoris [I25.119]  Yes     Cardiac catheterization (4/24/2023): Severe 1-vessel CAD involving chronic total occlusion of previously stented distal RCA.  Mild to moderate disease of left coronary system       Fernie is a 64 y.o. male admitted on 9/1/2024 with worsening Dyspnea [R06.00]  Acute on chronic respiratory failure with hypoxia [J96.21]    CTA yesterday revealed no PE, right lower lobe pneumonia, no effusion or edema.  He had diuresed 5 L over 2 days.  On 09/03/24 he developed some respiratory distress and respiratory arrest.  He was emergently intubated. During this admission he had elevated troponin and ST changes consistent with a non-ST elevation MI.  He also has obstructive sleep apnea but is noncompliant  with CPAP.    Post intubation he became hypotensive requiring norepinephrine.  He was placed on Piperacillin-Tazobactam  on admission for right lower lobe pneumonia.      Assessment/Management/Treatment Plan:    Respiratory Failure, type 2, acute on chronic  Intubated 09/03/24   Invasive Mechanical Ventilation   Possible RLL pneumonia  Former smoker. Quit 2022  OAD per history.   Cardiovascular  HFrEF. EF 28%  CAD. NSTEMI  Dyslipidemia   HTN  ID/Antibiotics  Elevated PCT, 4.18, in the range of sepsis, on admission. Improving.  MRSA PCR screen Negative  Antibiotics: Piperacillin-Tazobactam   Endocrine   Body mass index is 36.9 kg/m². Obese Class II: 35-39.9kg/m2  Type 2 diabetes.    Lab Results   Lab Value Date/Time    HGBA1C 6.00 (H) 09/01/2024 1050    HGBA1C 7.30 (H) 04/25/2023 0440     Results from last 7 days   Lab Units 09/05/24  0505 09/04/24  2325 09/04/24  1733 09/04/24  1155 09/04/24  0541 09/03/24  2340 09/03/24  1759 09/02/24  1346   GLUCOSE mg/dL 92 126 135* 113 115 125 205* 168*       Diet: NPO Diet NPO Type: Strict NPO   Advance Directives: Code Status and Medical Interventions: CPR (Attempt to Resuscitate); Full Support   Ordered at: 09/01/24 1318     Level Of Support Discussed With:    Patient     Code Status (Patient has no pulse and is not breathing):    CPR (Attempt to Resuscitate)     Medical Interventions (Patient has pulse or is breathing):    Full Support        VTE Prophylaxis:  Pharmacologic VTE prophylaxis orders are present.         In brief:  Weaning pressors as tolerated.  PCT improving.  Continue Fentanyl  Add ketamine infusion  Add Risperidone 2 mg BID  Continue empiric antibiotics  Not ready to be liberated from the ventilator.  Transition anticoagulation to prophylaxis doses } as per Cardiology  Goal: Glucose < 180 mg/dL.   Insulin SQ - correction scale for now.  Hemodynamic, Normal CI. Borderline low SVI.  Start Enteral Nutrition   Disposition: Keep in ICU.    Plan of care and goals  reviewed during interdisciplinary rounds.  I discussed the patient's findings and my recommendations with patient and nursing staff    MDM:    Problem(s) High due to: Acute or Chronic illness or injury that may poses a threat to life or bodily function  Data: Moderate due to: Review of prior external records from each unique source, Review or results of each unique test, and Ordering of each unique test  Risk: High due to: drug(s) requiring intensive monitoring for toxicity    High    [x] Primary Attending Intensive Care Medicine - Nutrition Support   [] Consultant    Copied text in this note has been reviewed and is accurate as of 09/05/24

## 2024-09-05 NOTE — PROGRESS NOTES
"[facility] at [facility]  IP Progress Note      Chief Complaint: Follow-up of non-STEMI/acute on chronic HFrEF/CM/respiratory failure    Subjective   On 2 sedatives but remains anxious, awakens and responds.  Off vasopressors now.  Denying chest pain.  Hemodynamics are stable.      Objective     Blood pressure 109/79, pulse 73, temperature 98.9 °F (37.2 °C), temperature source Axillary, resp. rate 15, height 175.3 cm (69.02\"), weight 113 kg (250 lb), SpO2 97%.     Intake/Output Summary (Last 24 hours) at 9/5/2024 0649  Last data filed at 9/5/2024 0400  Gross per 24 hour   Intake 3641.35 ml   Output 1550 ml   Net 2091.35 ml       Physical Exam:  General: On ventilator.  Neck: no JVD.  Chest:No respiratory distress, breath sounds are diminished at bases, scattered rhonchi.  Cardiovascular: Normal S1 and S2, no murmur, gallop or rub.    Extremities: 1+ edema.    Results Review:     I reviewed the patient's new clinical results.    Results from last 7 days   Lab Units 09/05/24  0426   WBC 10*3/mm3 5.59   HEMOGLOBIN g/dL 11.5*   HEMATOCRIT % 38.1   PLATELETS 10*3/mm3 231     Results from last 7 days   Lab Units 09/04/24  0532   SODIUM mmol/L 142   POTASSIUM mmol/L 4.4   CHLORIDE mmol/L 100   CO2 mmol/L 29.0   BUN mg/dL 14   CREATININE mg/dL 0.82   CALCIUM mg/dL 9.0   BILIRUBIN mg/dL 0.5   ALK PHOS U/L 154*   ALT (SGPT) U/L 11   AST (SGOT) U/L 18   GLUCOSE mg/dL 119*     Results from last 7 days   Lab Units 09/04/24  0532   SODIUM mmol/L 142   POTASSIUM mmol/L 4.4   CHLORIDE mmol/L 100   CO2 mmol/L 29.0   BUN mg/dL 14   CREATININE mg/dL 0.82   GLUCOSE mg/dL 119*   CALCIUM mg/dL 9.0     Results from last 7 days   Lab Units 09/01/24  1050   INR  0.97     Lab Results   Component Value Date    TROPONINT 338 (C) 09/03/2024         Results from last 7 days   Lab Units 09/01/24  1050   CHOLESTEROL mg/dL 225*   TRIGLYCERIDES mg/dL 112   HDL CHOL mg/dL 61*   LDL CHOL mg/dL 144*     Albuterol Sulfate NEB " Orderable, 2.5 mg, Nebulization, Q6H - RT  arformoterol, 15 mcg, Nebulization, BID - RT  aspirin, 81 mg, Nasogastric, Daily  atorvastatin, 80 mg, Nasogastric, Daily  chlorhexidine, 15 mL, Mouth/Throat, Q12H  empagliflozin, 10 mg, Nasogastric, Daily  haloperidol lactate, 10 mg, Intravenous, Once  insulin regular, 2-7 Units, Subcutaneous, Q6H  methylPREDNISolone sodium succinate, 40 mg, Intravenous, Daily  pantoprazole, 40 mg, Intravenous, Q AM  pharmacy consult - MTM, , Does not apply, Daily  phenytoin, 100 mg, Nasogastric, BID  piperacillin-tazobactam, 3.375 g, Intravenous, Q8H  revefenacin, 175 mcg, Nebulization, Daily - RT  sennosides, 10 mL, Nasogastric, BID  sodium chloride, 10 mL, Intravenous, Q12H       Tele: Sinus Rythym      Assessment:  NSTEMI in the setting of demand ischemia with known chronically occluded RCA, he did not have significant left coronary system disease by cath last year.  Acute on chronic systolic heart failure, EF 28% this admission. Last EF 36% by echo 10/2023  Cardiomyopathy.  Pneumonia  Acute on chronic respiratory failure with hypoxia  Coronary artery disease  Dyslipidemia  Hypertension    Plan:  Continue aspirin and high intensity statin.  Can change IV heparin to subcu heparin for DVT prophylaxis.  Add Plavix when no procedures anticipated.  Lasix 40 mg IV x 1 today.  Continue Jardiance at current dose.  We will continue to optimize GDMT as tolerated.    Marsha Gaitan MD, FACC, [facility]

## 2024-09-06 ENCOUNTER — APPOINTMENT (OUTPATIENT)
Dept: GENERAL RADIOLOGY | Facility: HOSPITAL | Age: 64
End: 2024-09-06
Payer: MEDICAID

## 2024-09-06 LAB
ANION GAP SERPL CALCULATED.3IONS-SCNC: 7 MMOL/L (ref 5–15)
ARTERIAL PATENCY WRIST A: ABNORMAL
ATMOSPHERIC PRESS: ABNORMAL MM[HG]
BACTERIA SPEC AEROBE CULT: NORMAL
BACTERIA SPEC AEROBE CULT: NORMAL
BACTERIA SPEC RESP CULT: NO GROWTH
BASE EXCESS BLDA CALC-SCNC: 6.5 MMOL/L (ref 0–2)
BASOPHILS # BLD AUTO: 0.03 10*3/MM3 (ref 0–0.2)
BASOPHILS NFR BLD AUTO: 0.5 % (ref 0–1.5)
BDY SITE: ABNORMAL
BODY TEMPERATURE: 37
BUN SERPL-MCNC: 19 MG/DL (ref 8–23)
BUN/CREAT SERPL: 20.9 (ref 7–25)
CALCIUM SPEC-SCNC: 9.1 MG/DL (ref 8.6–10.5)
CHLORIDE SERPL-SCNC: 102 MMOL/L (ref 98–107)
CO2 BLDA-SCNC: 34.7 MMOL/L (ref 22–33)
CO2 SERPL-SCNC: 32 MMOL/L (ref 22–29)
COHGB MFR BLD: 1.5 % (ref 0–2)
CREAT SERPL-MCNC: 0.91 MG/DL (ref 0.76–1.27)
DEPRECATED RDW RBC AUTO: 48.7 FL (ref 37–54)
EGFRCR SERPLBLD CKD-EPI 2021: 94.7 ML/MIN/1.73
EOSINOPHIL # BLD AUTO: 0.19 10*3/MM3 (ref 0–0.4)
EOSINOPHIL NFR BLD AUTO: 3.4 % (ref 0.3–6.2)
EPAP: 0
ERYTHROCYTE [DISTWIDTH] IN BLOOD BY AUTOMATED COUNT: 14.3 % (ref 12.3–15.4)
GLUCOSE BLDC GLUCOMTR-MCNC: 119 MG/DL (ref 70–130)
GLUCOSE BLDC GLUCOMTR-MCNC: 126 MG/DL (ref 70–130)
GLUCOSE BLDC GLUCOMTR-MCNC: 92 MG/DL (ref 70–130)
GLUCOSE SERPL-MCNC: 93 MG/DL (ref 65–99)
GRAM STN SPEC: NORMAL
HCO3 BLDA-SCNC: 33 MMOL/L (ref 20–26)
HCT VFR BLD AUTO: 30.2 % (ref 37.5–51)
HCT VFR BLD CALC: 27.5 % (ref 38–51)
HGB BLD-MCNC: 9 G/DL (ref 13–17.7)
HGB BLDA-MCNC: 9 G/DL (ref 13.5–17.5)
IMM GRANULOCYTES # BLD AUTO: 0.03 10*3/MM3 (ref 0–0.05)
IMM GRANULOCYTES NFR BLD AUTO: 0.5 % (ref 0–0.5)
INHALED O2 CONCENTRATION: 35 %
IPAP: 0
LYMPHOCYTES # BLD AUTO: 1.02 10*3/MM3 (ref 0.7–3.1)
LYMPHOCYTES NFR BLD AUTO: 18.1 % (ref 19.6–45.3)
MCH RBC QN AUTO: 27.9 PG (ref 26.6–33)
MCHC RBC AUTO-ENTMCNC: 29.8 G/DL (ref 31.5–35.7)
MCV RBC AUTO: 93.5 FL (ref 79–97)
METHGB BLD QL: 0.5 % (ref 0–1.5)
MODALITY: ABNORMAL
MONOCYTES # BLD AUTO: 0.86 10*3/MM3 (ref 0.1–0.9)
MONOCYTES NFR BLD AUTO: 15.3 % (ref 5–12)
NEUTROPHILS NFR BLD AUTO: 3.49 10*3/MM3 (ref 1.7–7)
NEUTROPHILS NFR BLD AUTO: 62.2 % (ref 42.7–76)
NRBC BLD AUTO-RTO: 0 /100 WBC (ref 0–0.2)
NT-PROBNP SERPL-MCNC: 1656 PG/ML (ref 0–900)
OXYHGB MFR BLDV: 93.2 % (ref 94–99)
PAW @ PEAK INSP FLOW SETTING VENT: 0 CMH2O
PCO2 BLDA: 57.9 MM HG (ref 35–45)
PCO2 TEMP ADJ BLD: 57.9 MM HG (ref 35–48)
PEEP RESPIRATORY: 8 CM[H2O]
PH BLDA: 7.36 PH UNITS (ref 7.35–7.45)
PH, TEMP CORRECTED: 7.36 PH UNITS
PLATELET # BLD AUTO: 277 10*3/MM3 (ref 140–450)
PMV BLD AUTO: 10.3 FL (ref 6–12)
PO2 BLDA: 76 MM HG (ref 83–108)
PO2 TEMP ADJ BLD: 76 MM HG (ref 83–108)
POTASSIUM SERPL-SCNC: 3.8 MMOL/L (ref 3.5–5.2)
PROCALCITONIN SERPL-MCNC: 0.61 NG/ML (ref 0–0.25)
QT INTERVAL: 336 MS
QT INTERVAL: 426 MS
QTC INTERVAL: 466 MS
QTC INTERVAL: 481 MS
RBC # BLD AUTO: 3.23 10*6/MM3 (ref 4.14–5.8)
SODIUM SERPL-SCNC: 141 MMOL/L (ref 136–145)
TOTAL RATE: 15 BREATHS/MINUTE
VENTILATOR MODE: ABNORMAL
VT ON VENT VENT: 0.49 ML
WBC NRBC COR # BLD AUTO: 5.62 10*3/MM3 (ref 3.4–10.8)

## 2024-09-06 PROCEDURE — 94799 UNLISTED PULMONARY SVC/PX: CPT

## 2024-09-06 PROCEDURE — 71045 X-RAY EXAM CHEST 1 VIEW: CPT

## 2024-09-06 PROCEDURE — 25010000002 ENOXAPARIN PER 10 MG: Performed by: INTERNAL MEDICINE

## 2024-09-06 PROCEDURE — 0 MIDAZOLAM 1 MG/ML 100ML NS 100 MG/100ML SOLUTION: Performed by: INTERNAL MEDICINE

## 2024-09-06 PROCEDURE — 80048 BASIC METABOLIC PNL TOTAL CA: CPT | Performed by: INTERNAL MEDICINE

## 2024-09-06 PROCEDURE — 25010000002 MIDAZOLAM PER 1 MG

## 2024-09-06 PROCEDURE — 63710000001 REVEFENACIN 175 MCG/3ML SOLUTION: Performed by: INTERNAL MEDICINE

## 2024-09-06 PROCEDURE — 25010000002 HALOPERIDOL LACTATE PER 5 MG: Performed by: INTERNAL MEDICINE

## 2024-09-06 PROCEDURE — 83050 HGB METHEMOGLOBIN QUAN: CPT

## 2024-09-06 PROCEDURE — 85025 COMPLETE CBC W/AUTO DIFF WBC: CPT | Performed by: INTERNAL MEDICINE

## 2024-09-06 PROCEDURE — 25810000003 SODIUM CHLORIDE 0.9 % SOLUTION: Performed by: INTERNAL MEDICINE

## 2024-09-06 PROCEDURE — 94664 DEMO&/EVAL PT USE INHALER: CPT

## 2024-09-06 PROCEDURE — 87385 HISTOPLASMA CAPSUL AG IA: CPT

## 2024-09-06 PROCEDURE — 82948 REAGENT STRIP/BLOOD GLUCOSE: CPT

## 2024-09-06 PROCEDURE — 87449 NOS EACH ORGANISM AG IA: CPT

## 2024-09-06 PROCEDURE — 82805 BLOOD GASES W/O2 SATURATION: CPT

## 2024-09-06 PROCEDURE — 25010000002 FENTANYL 10 MCG/1 ML NS: Performed by: INTERNAL MEDICINE

## 2024-09-06 PROCEDURE — 25010000002 METHYLPREDNISOLONE PER 40 MG: Performed by: INTERNAL MEDICINE

## 2024-09-06 PROCEDURE — 82375 ASSAY CARBOXYHB QUANT: CPT

## 2024-09-06 PROCEDURE — 99232 SBSQ HOSP IP/OBS MODERATE 35: CPT | Performed by: INTERNAL MEDICINE

## 2024-09-06 PROCEDURE — 25010000002 FOSPHENYTOIN 100 MG PE/2ML SOLUTION 2 ML VIAL: Performed by: INTERNAL MEDICINE

## 2024-09-06 PROCEDURE — 99233 SBSQ HOSP IP/OBS HIGH 50: CPT | Performed by: INTERNAL MEDICINE

## 2024-09-06 PROCEDURE — 83880 ASSAY OF NATRIURETIC PEPTIDE: CPT | Performed by: INTERNAL MEDICINE

## 2024-09-06 PROCEDURE — 84145 PROCALCITONIN (PCT): CPT | Performed by: INTERNAL MEDICINE

## 2024-09-06 PROCEDURE — 25010000002 FUROSEMIDE PER 20 MG: Performed by: INTERNAL MEDICINE

## 2024-09-06 PROCEDURE — 25010000002 PIPERACILLIN SOD-TAZOBACTAM PER 1 G: Performed by: INTERNAL MEDICINE

## 2024-09-06 PROCEDURE — 94761 N-INVAS EAR/PLS OXIMETRY MLT: CPT

## 2024-09-06 RX ORDER — LACTULOSE 10 G/15ML
20 SOLUTION ORAL 3 TIMES DAILY PRN
Status: DISCONTINUED | OUTPATIENT
Start: 2024-09-06 | End: 2024-09-25 | Stop reason: HOSPADM

## 2024-09-06 RX ORDER — CITALOPRAM HYDROBROMIDE 20 MG/1
20 TABLET ORAL DAILY
Status: DISCONTINUED | OUTPATIENT
Start: 2024-09-06 | End: 2024-09-21

## 2024-09-06 RX ORDER — MIDAZOLAM HYDROCHLORIDE 1 MG/ML
2 INJECTION INTRAMUSCULAR; INTRAVENOUS ONCE
Status: CANCELLED | OUTPATIENT
Start: 2024-09-06 | End: 2024-09-06

## 2024-09-06 RX ORDER — MIDAZOLAM HYDROCHLORIDE 1 MG/ML
2 INJECTION INTRAMUSCULAR; INTRAVENOUS ONCE
Status: COMPLETED | OUTPATIENT
Start: 2024-09-06 | End: 2024-09-06

## 2024-09-06 RX ORDER — FUROSEMIDE 10 MG/ML
40 INJECTION INTRAMUSCULAR; INTRAVENOUS ONCE
Status: COMPLETED | OUTPATIENT
Start: 2024-09-06 | End: 2024-09-06

## 2024-09-06 RX ORDER — RISPERIDONE 1 MG/ML
2 SOLUTION ORAL 2 TIMES DAILY
Status: DISCONTINUED | OUTPATIENT
Start: 2024-09-06 | End: 2024-09-18

## 2024-09-06 RX ADMIN — ALBUTEROL SULFATE 2.5 MG: 2.5 SOLUTION RESPIRATORY (INHALATION) at 07:55

## 2024-09-06 RX ADMIN — REVEFENACIN 175 MCG: 175 SOLUTION RESPIRATORY (INHALATION) at 07:55

## 2024-09-06 RX ADMIN — FUROSEMIDE 40 MG: 10 INJECTION, SOLUTION INTRAMUSCULAR; INTRAVENOUS at 10:28

## 2024-09-06 RX ADMIN — Medication 300 MCG/HR: at 08:04

## 2024-09-06 RX ADMIN — ARFORMOTEROL TARTRATE 15 MCG: 15 SOLUTION RESPIRATORY (INHALATION) at 19:00

## 2024-09-06 RX ADMIN — KETAMINE HYDROCHLORIDE 1.26 MG/KG/HR: 100 INJECTION, SOLUTION, CONCENTRATE INTRAMUSCULAR; INTRAVENOUS at 04:55

## 2024-09-06 RX ADMIN — KETAMINE HYDROCHLORIDE 1.9 MG/KG/HR: 100 INJECTION, SOLUTION, CONCENTRATE INTRAMUSCULAR; INTRAVENOUS at 21:43

## 2024-09-06 RX ADMIN — PIPERACILLIN AND TAZOBACTAM 3.38 G: 3; .375 INJECTION, POWDER, LYOPHILIZED, FOR SOLUTION INTRAVENOUS at 18:23

## 2024-09-06 RX ADMIN — PIPERACILLIN AND TAZOBACTAM 3.38 G: 3; .375 INJECTION, POWDER, LYOPHILIZED, FOR SOLUTION INTRAVENOUS at 02:17

## 2024-09-06 RX ADMIN — PANTOPRAZOLE SODIUM 40 MG: 40 INJECTION, POWDER, LYOPHILIZED, FOR SOLUTION INTRAVENOUS at 05:49

## 2024-09-06 RX ADMIN — KETAMINE HYDROCHLORIDE 1.86 MG/KG/HR: 100 INJECTION, SOLUTION, CONCENTRATE INTRAMUSCULAR; INTRAVENOUS at 13:53

## 2024-09-06 RX ADMIN — PIPERACILLIN AND TAZOBACTAM 3.38 G: 3; .375 INJECTION, POWDER, LYOPHILIZED, FOR SOLUTION INTRAVENOUS at 10:28

## 2024-09-06 RX ADMIN — RISPERIDONE 2 MG: 1 SOLUTION ORAL at 08:06

## 2024-09-06 RX ADMIN — KETAMINE HYDROCHLORIDE 1.66 MG/KG/HR: 100 INJECTION, SOLUTION, CONCENTRATE INTRAMUSCULAR; INTRAVENOUS at 11:16

## 2024-09-06 RX ADMIN — ASPIRIN 81 MG 81 MG: 81 TABLET ORAL at 08:07

## 2024-09-06 RX ADMIN — ATORVASTATIN CALCIUM 80 MG: 40 TABLET, FILM COATED ORAL at 08:07

## 2024-09-06 RX ADMIN — MIDAZOLAM HYDROCHLORIDE 10 MG/HR: 1 INJECTION, SOLUTION INTRAVENOUS at 18:22

## 2024-09-06 RX ADMIN — Medication 10 ML: at 21:35

## 2024-09-06 RX ADMIN — HALOPERIDOL LACTATE 5 MG: 5 INJECTION, SOLUTION INTRAMUSCULAR at 21:29

## 2024-09-06 RX ADMIN — RISPERIDONE 2 MG: 1 SOLUTION ORAL at 21:22

## 2024-09-06 RX ADMIN — SENNOSIDES 2 TABLET: 8.6 TABLET, FILM COATED ORAL at 23:18

## 2024-09-06 RX ADMIN — METHYLPREDNISOLONE SODIUM SUCCINATE 40 MG: 40 INJECTION, POWDER, FOR SOLUTION INTRAMUSCULAR; INTRAVENOUS at 08:07

## 2024-09-06 RX ADMIN — MIDAZOLAM HYDROCHLORIDE 10 MG/HR: 1 INJECTION, SOLUTION INTRAVENOUS at 10:26

## 2024-09-06 RX ADMIN — FOSPHENYTOIN SODIUM 100 MG PE: 50 INJECTION, SOLUTION INTRAMUSCULAR; INTRAVENOUS at 21:22

## 2024-09-06 RX ADMIN — EMPAGLIFLOZIN 10 MG: 10 TABLET, FILM COATED ORAL at 08:07

## 2024-09-06 RX ADMIN — HALOPERIDOL LACTATE 5 MG: 5 INJECTION, SOLUTION INTRAMUSCULAR at 02:17

## 2024-09-06 RX ADMIN — CITALOPRAM HYDROBROMIDE 20 MG: 20 TABLET ORAL at 12:07

## 2024-09-06 RX ADMIN — ALBUTEROL SULFATE 2.5 MG: 2.5 SOLUTION RESPIRATORY (INHALATION) at 19:00

## 2024-09-06 RX ADMIN — CHLORHEXIDINE GLUCONATE, 0.12% ORAL RINSE 15 ML: 1.2 SOLUTION DENTAL at 08:07

## 2024-09-06 RX ADMIN — KETAMINE HYDROCHLORIDE 1.86 MG/KG/HR: 100 INJECTION, SOLUTION, CONCENTRATE INTRAMUSCULAR; INTRAVENOUS at 19:03

## 2024-09-06 RX ADMIN — ARFORMOTEROL TARTRATE 15 MCG: 15 SOLUTION RESPIRATORY (INHALATION) at 07:55

## 2024-09-06 RX ADMIN — SENNOSIDES 2 TABLET: 8.6 TABLET, FILM COATED ORAL at 08:07

## 2024-09-06 RX ADMIN — MIDAZOLAM HYDROCHLORIDE 2 MG: 1 INJECTION, SOLUTION INTRAMUSCULAR; INTRAVENOUS at 05:49

## 2024-09-06 RX ADMIN — ALBUTEROL SULFATE 2.5 MG: 2.5 SOLUTION RESPIRATORY (INHALATION) at 00:50

## 2024-09-06 RX ADMIN — Medication 300 MCG/HR: at 16:00

## 2024-09-06 RX ADMIN — KETAMINE HYDROCHLORIDE 1.86 MG/KG/HR: 100 INJECTION, SOLUTION, CONCENTRATE INTRAMUSCULAR; INTRAVENOUS at 16:34

## 2024-09-06 RX ADMIN — ENOXAPARIN SODIUM 40 MG: 100 INJECTION SUBCUTANEOUS at 08:06

## 2024-09-06 RX ADMIN — CHLORHEXIDINE GLUCONATE, 0.12% ORAL RINSE 15 ML: 1.2 SOLUTION DENTAL at 21:22

## 2024-09-06 RX ADMIN — ALBUTEROL SULFATE 2.5 MG: 2.5 SOLUTION RESPIRATORY (INHALATION) at 12:48

## 2024-09-06 RX ADMIN — Medication 10 ML: at 08:06

## 2024-09-06 RX ADMIN — KETAMINE HYDROCHLORIDE 1.66 MG/KG/HR: 100 INJECTION, SOLUTION, CONCENTRATE INTRAMUSCULAR; INTRAVENOUS at 08:04

## 2024-09-06 RX ADMIN — PHENYTOIN 100 MG: 125 SUSPENSION ORAL at 08:07

## 2024-09-06 NOTE — PLAN OF CARE
Goal Outcome Evaluation:  -pt remains mechanically intubated, FiO2 @ 35%, PEEP 8, moderate amount of thick white creamy secretions  -Fentanyl @ 300, Versed @ 10, Ketamine @ 1.86  -sister updated on patient status and plan of care via telephone  -pt restless with stimulation, will pick at sheets and move extremities, follows commands at times   -Lasix x1, UOP:2320ml, however still 1.9L positive   -tolerating tube feed, no BM this shift, increasing to goal at 1900  -restarted home Celexa, Dilantin changed to IV Cerebyx  -pt diaphoretic on and off throughout shift, Tmax 99  -NSR with PVCs, stable BP, levo remains off      Problem: Restraint, Nonviolent  Goal: Absence of Harm or Injury  Outcome: Ongoing, Progressing  Intervention: Implement Least Restrictive Safety Strategies  Recent Flowsheet Documentation  Taken 9/6/2024 1800 by Kimberley Sabillon, RN  Medical Device Protection:   IV pole/bag removed from visual field   torso covered   tubing secured  Diversional Activities: music  Taken 9/6/2024 1600 by Kimberley Sabillon, RN  Medical Device Protection:   IV pole/bag removed from visual field   torso covered   tubing secured  Less Restrictive Alternative:   bed alarm in use   calming techniques promoted   environment adjusted   security enhancements provided   therapeutic music  De-Escalation Techniques:   verbally redirected   stimulation decreased   reoriented  Diversional Activities: music  Taken 9/6/2024 1400 by Kimberley Sabillon, RN  Medical Device Protection:   IV pole/bag removed from visual field   torso covered   tubing secured  Less Restrictive Alternative:   bed alarm in use   calming techniques promoted   environment adjusted   security enhancements provided   therapeutic music  De-Escalation Techniques:   verbally redirected   stimulation decreased   reoriented  Diversional Activities: music  Taken 9/6/2024 1200 by Kimberley Sabillon, RN  Medical Device Protection:   IV pole/bag removed from visual  field   torso covered   tubing secured  Less Restrictive Alternative:   bed alarm in use   calming techniques promoted   environment adjusted   security enhancements provided   therapeutic music  De-Escalation Techniques:   verbally redirected   stimulation decreased   reoriented  Diversional Activities: music  Taken 9/6/2024 1000 by Kimberley Sabillon RN  Medical Device Protection:   IV pole/bag removed from visual field   torso covered   tubing secured  Less Restrictive Alternative:   bed alarm in use   calming techniques promoted   environment adjusted   security enhancements provided   therapeutic music  De-Escalation Techniques:   verbally redirected   stimulation decreased   reoriented  Diversional Activities: music  Taken 9/6/2024 0800 by Kimberley Sabillon RN  Medical Device Protection:   tubing secured   torso covered   IV pole/bag removed from visual field  Less Restrictive Alternative:   bed alarm in use   calming techniques promoted   environment adjusted   security enhancements provided   therapeutic music  De-Escalation Techniques:   appropriate behavior reinforced   reoriented   stimulation decreased   verbally redirected  Diversional Activities: music  Intervention: Protect Dignity, Rights, and Personal Wellbeing  Recent Flowsheet Documentation  Taken 9/6/2024 1800 by Kimberley Sabillon RN  Trust Relationship/Rapport: care explained  Taken 9/6/2024 1600 by Kimberley Sabillon RN  Trust Relationship/Rapport: care explained  Taken 9/6/2024 1400 by Kimberley Sabillon RN  Trust Relationship/Rapport: care explained  Taken 9/6/2024 1200 by Kimberley Sabillon RN  Trust Relationship/Rapport: care explained  Taken 9/6/2024 1000 by Kimberley Sabillon RN  Trust Relationship/Rapport: care explained  Taken 9/6/2024 0800 by Kimberley Sabillon RN  Trust Relationship/Rapport: care explained  Intervention: Protect Skin and Joint Integrity  Recent Flowsheet Documentation  Taken 9/6/2024 1800 by Ridge  KAMILA Chu  Body Position:   turned   supine, legs elevated   upper extremity elevated  Taken 9/6/2024 1600 by Kimberley Sabillon RN  Body Position:   turned   left   upper extremity elevated   lower extremity elevated  Taken 9/6/2024 1400 by Kimberley Sabillon RN  Body Position:   turned   supine, legs elevated   upper extremity elevated  Taken 9/6/2024 1200 by Kimberley Sabillon RN  Body Position:   turned   right   upper extremity elevated   lower extremity elevated  Taken 9/6/2024 1000 by Kimberley Sabillon RN  Body Position:   turned   left   upper extremity elevated   lower extremity elevated  Taken 9/6/2024 0800 by Kimberley Sabillon RN  Body Position:   turned   supine, legs elevated   upper extremity elevated  Range of Motion: ROM (range of motion) performed

## 2024-09-06 NOTE — PLAN OF CARE
Goal Outcome Evaluation:      Patient restless overnight   Ketamine up to 1.56, versed and fentanyl remain maxed  PRN Haldol given x1 dose   Versed 2mg given x1 dose Per APRN  PEEP of 8, Fio2 of 35%  350 ml concentrated uop     Problem: Adult Inpatient Plan of Care  Goal: Plan of Care Review  Outcome: Ongoing, Progressing  Goal: Patient-Specific Goal (Individualized)  Outcome: Ongoing, Progressing  Goal: Absence of Hospital-Acquired Illness or Injury  Outcome: Ongoing, Progressing  Intervention: Identify and Manage Fall Risk  Recent Flowsheet Documentation  Taken 9/6/2024 0200 by Myrtle Oshea RN  Safety Promotion/Fall Prevention:   clutter free environment maintained   fall prevention program maintained   lighting adjusted   room organization consistent   safety round/check completed  Taken 9/6/2024 0000 by Myrtle Oshea RN  Safety Promotion/Fall Prevention:   clutter free environment maintained   fall prevention program maintained   lighting adjusted   room organization consistent   safety round/check completed  Taken 9/5/2024 2200 by Myrtle Oshea RN  Safety Promotion/Fall Prevention:   clutter free environment maintained   fall prevention program maintained   lighting adjusted   room organization consistent   safety round/check completed  Taken 9/5/2024 2000 by Myrtle Oshea RN  Safety Promotion/Fall Prevention:   clutter free environment maintained   fall prevention program maintained   lighting adjusted   room organization consistent   safety round/check completed  Intervention: Prevent Skin Injury  Recent Flowsheet Documentation  Taken 9/6/2024 0200 by Myrtle Oshea RN  Body Position: turned  Skin Protection:   adhesive use limited   incontinence pads utilized   skin-to-device areas padded   skin-to-skin areas padded   transparent dressing maintained   tubing/devices free from skin contact   silicone foam dressing in place  Taken 9/6/2024 0000 by Myrtle Oshea RN  Body Position:  turned  Skin Protection:   adhesive use limited   incontinence pads utilized   skin-to-device areas padded   skin-to-skin areas padded   transparent dressing maintained   tubing/devices free from skin contact   silicone foam dressing in place  Taken 9/5/2024 2200 by Myrtle Oshea RN  Body Position: turned  Skin Protection:   adhesive use limited   incontinence pads utilized   skin-to-device areas padded   skin-to-skin areas padded   transparent dressing maintained   tubing/devices free from skin contact   silicone foam dressing in place  Taken 9/5/2024 2000 by Myrtle Oshea RN  Body Position: turned  Skin Protection:   adhesive use limited   incontinence pads utilized   skin-to-device areas padded   skin-to-skin areas padded   transparent dressing maintained   tubing/devices free from skin contact   silicone foam dressing in place  Intervention: Prevent and Manage VTE (Venous Thromboembolism) Risk  Recent Flowsheet Documentation  Taken 9/6/2024 0200 by Myrtle Oshea RN  Activity Management: bedrest  Taken 9/6/2024 0000 by Myrtle Oshea RN  Activity Management: bedrest  Taken 9/5/2024 2200 by Myrtle Oshea RN  Activity Management: bedrest  Taken 9/5/2024 2000 by Myrtle Oshea RN  Activity Management: bedrest  Intervention: Prevent Infection  Recent Flowsheet Documentation  Taken 9/6/2024 0200 by Myrtle Oshea RN  Infection Prevention:   environmental surveillance performed   equipment surfaces disinfected   hand hygiene promoted   personal protective equipment utilized   rest/sleep promoted   single patient room provided  Taken 9/6/2024 0000 by Myrtle Oshea RN  Infection Prevention:   environmental surveillance performed   equipment surfaces disinfected   hand hygiene promoted   personal protective equipment utilized   rest/sleep promoted   single patient room provided  Taken 9/5/2024 2200 by Myrtle Oshea, RN  Infection Prevention:   environmental surveillance performed    equipment surfaces disinfected   hand hygiene promoted   personal protective equipment utilized   rest/sleep promoted   single patient room provided  Taken 9/5/2024 2000 by Myrtle Oshea RN  Infection Prevention:   environmental surveillance performed   equipment surfaces disinfected   hand hygiene promoted   personal protective equipment utilized   rest/sleep promoted   single patient room provided  Goal: Optimal Comfort and Wellbeing  Outcome: Ongoing, Progressing  Intervention: Provide Person-Centered Care  Recent Flowsheet Documentation  Taken 9/6/2024 0200 by Myrtle Oshea RN  Trust Relationship/Rapport:   care explained   reassurance provided  Taken 9/6/2024 0000 by Myrtle Oshea RN  Trust Relationship/Rapport:   care explained   reassurance provided  Taken 9/5/2024 2200 by Myrtle Oshea RN  Trust Relationship/Rapport:   care explained   reassurance provided  Taken 9/5/2024 2000 by Myrtle Oshea RN  Trust Relationship/Rapport:   care explained   reassurance provided  Goal: Readiness for Transition of Care  Outcome: Ongoing, Progressing     Problem: Skin Injury Risk Increased  Goal: Skin Health and Integrity  Outcome: Ongoing, Progressing  Intervention: Optimize Skin Protection  Recent Flowsheet Documentation  Taken 9/6/2024 0200 by Myrtle Oshea RN  Pressure Reduction Techniques:   heels elevated off bed   positioned off wounds   pressure points protected   weight shift assistance provided  Head of Bed (HOB) Positioning: HOB elevated  Pressure Reduction Devices:   positioning supports utilized   pressure-redistributing mattress utilized   specialty bed utilized  Skin Protection:   adhesive use limited   incontinence pads utilized   skin-to-device areas padded   skin-to-skin areas padded   transparent dressing maintained   tubing/devices free from skin contact   silicone foam dressing in place  Taken 9/6/2024 0000 by Myrtle Oshea RN  Pressure Reduction Techniques:   heels elevated  off bed   positioned off wounds   pressure points protected   weight shift assistance provided  Head of Bed ([facility]) Positioning: [facility] elevated  Pressure Reduction Devices:   positioning supports utilized   pressure-redistributing mattress utilized   specialty bed utilized  Skin Protection:   adhesive use limited   incontinence pads utilized   skin-to-device areas padded   skin-to-skin areas padded   transparent dressing maintained   tubing/devices free from skin contact   silicone foam dressing in place  Taken 9/5/2024 2200 by Myrtle Oshea RN  Pressure Reduction Techniques:   heels elevated off bed   positioned off wounds   pressure points protected   weight shift assistance provided  Head of Bed ([facility]) Positioning: [facility] elevated  Pressure Reduction Devices:   positioning supports utilized   pressure-redistributing mattress utilized   specialty bed utilized  Skin Protection:   adhesive use limited   incontinence pads utilized   skin-to-device areas padded   skin-to-skin areas padded   transparent dressing maintained   tubing/devices free from skin contact   silicone foam dressing in place  Taken 9/5/2024 2000 by Myrtle Oshea RN  Pressure Reduction Techniques:   heels elevated off bed   positioned off wounds   pressure points protected   weight shift assistance provided  Head of Bed ([facility]) Positioning: [facility] elevated  Pressure Reduction Devices:   positioning supports utilized   pressure-redistributing mattress utilized   specialty bed utilized  Skin Protection:   adhesive use limited   incontinence pads utilized   skin-to-device areas padded   skin-to-skin areas padded   transparent dressing maintained   tubing/devices free from skin contact   silicone foam dressing in place     Problem: Noninvasive Ventilation Acute  Goal: Effective Unassisted Ventilation and Oxygenation  Outcome: Ongoing, Progressing     Problem: Fall Injury Risk  Goal: Absence of Fall and Fall-Related Injury  Outcome: Ongoing,  Progressing  Intervention: Identify and Manage Contributors  Recent Flowsheet Documentation  Taken 9/6/2024 0200 by Myrtle Oshea, RN  Medication Review/Management: medications reviewed  Taken 9/6/2024 0000 by Myrtle Oshea, RN  Medication Review/Management: medications reviewed  Taken 9/5/2024 2200 by Myrtle Oshea, RN  Medication Review/Management: medications reviewed  Taken 9/5/2024 2000 by Myrtle Oshea, RN  Medication Review/Management: medications reviewed  Intervention: Promote Injury-Free Environment  Recent Flowsheet Documentation  Taken 9/6/2024 0200 by Myrtle Oshea, RN  Safety Promotion/Fall Prevention:   clutter free environment maintained   fall prevention program maintained   lighting adjusted   room organization consistent   safety round/check completed  Taken 9/6/2024 0000 by Myrtle Oshea RN  Safety Promotion/Fall Prevention:   clutter free environment maintained   fall prevention program maintained   lighting adjusted   room organization consistent   safety round/check completed  Taken 9/5/2024 2200 by Myrtle Oshea RN  Safety Promotion/Fall Prevention:   clutter free environment maintained   fall prevention program maintained   lighting adjusted   room organization consistent   safety round/check completed  Taken 9/5/2024 2000 by Myrtle Oshea RN  Safety Promotion/Fall Prevention:   clutter free environment maintained   fall prevention program maintained   lighting adjusted   room organization consistent   safety round/check completed     Problem: Restraint, Nonviolent  Goal: Absence of Harm or Injury  Outcome: Ongoing, Progressing  Intervention: Implement Least Restrictive Safety Strategies  Recent Flowsheet Documentation  Taken 9/6/2024 0400 by Myrtle Oshea, RN  Medical Device Protection:   torso covered   tubing secured  Less Restrictive Alternative:   emotional support provided   environment adjusted   family presence promoted   medication  offered  De-Escalation Techniques:   reoriented   quiet time facilitated   stimulation decreased  Diversional Activities: music  Taken 9/6/2024 0301 by Myrtle Oshea RN  Medical Device Protection:   torso covered   tubing secured  Less Restrictive Alternative:   emotional support provided   environment adjusted   family presence promoted   medication offered  De-Escalation Techniques:   reoriented   quiet time facilitated   stimulation decreased  Diversional Activities: music  Taken 9/6/2024 0200 by Myrtle Oshea, RN  Medical Device Protection: tubing secured  Less Restrictive Alternative:   emotional support provided   environment adjusted   family presence promoted   medication offered  De-Escalation Techniques:   reoriented   quiet time facilitated   stimulation decreased  Diversional Activities: music  Taken 9/6/2024 0000 by Myrtle Oshea, RN  Medical Device Protection:   torso covered   tubing secured  Less Restrictive Alternative:   emotional support provided   environment adjusted   family presence promoted   medication offered  De-Escalation Techniques:   reoriented   quiet time facilitated   stimulation decreased  Diversional Activities: music  Taken 9/5/2024 2200 by Myrtle Oshea, RN  Medical Device Protection:   torso covered   tubing secured  Less Restrictive Alternative:   emotional support provided   environment adjusted   family presence promoted   medication offered  De-Escalation Techniques:   reoriented   quiet time facilitated   stimulation decreased  Diversional Activities: music  Taken 9/5/2024 2000 by Myrtle Oshea, RN  Medical Device Protection: tubing secured  Less Restrictive Alternative:   emotional support provided   environment adjusted   family presence promoted   medication offered  De-Escalation Techniques:   reoriented   quiet time facilitated   stimulation decreased  Diversional Activities: music  Intervention: Protect Dignity, Rights, and Personal Wellbeing  Recent  Flowsheet Documentation  Taken 9/6/2024 0200 by Myrtle Oshea RN  Trust Relationship/Rapport:   care explained   reassurance provided  Taken 9/6/2024 0000 by Myrtle Oshea RN  Trust Relationship/Rapport:   care explained   reassurance provided  Taken 9/5/2024 2200 by Myrtle Oshea RN  Trust Relationship/Rapport:   care explained   reassurance provided  Taken 9/5/2024 2000 by Myrtle Oshea RN  Trust Relationship/Rapport:   care explained   reassurance provided  Intervention: Protect Skin and Joint Integrity  Recent Flowsheet Documentation  Taken 9/6/2024 0200 by Myrtle Oshea RN  Body Position: turned  Taken 9/6/2024 0000 by Myrtle Oshea RN  Body Position: turned  Taken 9/5/2024 2200 by Myrtle Oshea RN  Body Position: turned  Taken 9/5/2024 2000 by Myrtle Oshea RN  Body Position: turned     Problem: Inability to Wean (Mechanical Ventilation, Invasive)  Goal: Mechanical Ventilation Liberation  Outcome: Ongoing, Progressing  Intervention: Promote Extubation and Mechanical Ventilation Liberation  Recent Flowsheet Documentation  Taken 9/6/2024 0200 by Myrtle Oshea, RN  Medication Review/Management: medications reviewed  Taken 9/6/2024 0000 by Myrtle Oshea RN  Medication Review/Management: medications reviewed  Taken 9/5/2024 2200 by Myrtle Oshea, RN  Medication Review/Management: medications reviewed  Taken 9/5/2024 2000 by Myrtle Oshea, RN  Medication Review/Management: medications reviewed

## 2024-09-06 NOTE — PROGRESS NOTES
"[facility] at [facility]  IP Progress Note      Chief Complaint: Follow-up of non-STEMI/acute on chronic HFrEF/CM/respiratory failure    Subjective   Had been very agitated and anxious, now on Versed/fentanyl and Catemine with increased fluid intake.  Intubated and on ventilator.  Noted to have occasional PVCs, no other issues.    Objective     Blood pressure 97/62, pulse 75, temperature 99 °F (37.2 °C), temperature source Axillary, resp. rate 15, height 175.3 cm (69.02\"), weight 118 kg (260 lb 5.8 oz), SpO2 97%.     Intake/Output Summary (Last 24 hours) at 9/6/2024 0907  Last data filed at 9/6/2024 0800  Gross per 24 hour   Intake 3882.95 ml   Output 1970 ml   Net 1912.95 ml       Physical Exam:  General: On ventilator/sedated  Neck: no JVD.  Chest:No respiratory distress, breath sounds are diminished at bases, scattered rhonchi  Cardiovascular: Normal S1 and S2, distant heart sounds.  Extremities: No significant edema.      Results Review:     I reviewed the patient's new clinical results.    Results from last 7 days   Lab Units 09/06/24  0501   WBC 10*3/mm3 5.62   HEMOGLOBIN g/dL 9.0*   HEMATOCRIT % 30.2*   PLATELETS 10*3/mm3 277     Results from last 7 days   Lab Units 09/06/24  0501 09/05/24  0529 09/04/24  0532   SODIUM mmol/L 141   < > 142   POTASSIUM mmol/L 3.8   < > 4.4   CHLORIDE mmol/L 102   < > 100   CO2 mmol/L 32.0*   < > 29.0   BUN mg/dL 19   < > 14   CREATININE mg/dL 0.91   < > 0.82   CALCIUM mg/dL 9.1   < > 9.0   BILIRUBIN mg/dL  --   --  0.5   ALK PHOS U/L  --   --  154*   ALT (SGPT) U/L  --   --  11   AST (SGOT) U/L  --   --  18   GLUCOSE mg/dL 93   < > 119*    < > = values in this interval not displayed.     Results from last 7 days   Lab Units 09/06/24  0501   SODIUM mmol/L 141   POTASSIUM mmol/L 3.8   CHLORIDE mmol/L 102   CO2 mmol/L 32.0*   BUN mg/dL 19   CREATININE mg/dL 0.91   GLUCOSE mg/dL 93   CALCIUM mg/dL 9.1     Results from last 7 days   Lab Units 09/01/24  1050 "   INR  0.97     Lab Results   Component Value Date    TROPONINT 338 (C) 09/03/2024         Results from last 7 days   Lab Units 09/01/24  1050   CHOLESTEROL mg/dL 225*   TRIGLYCERIDES mg/dL 112   HDL CHOL mg/dL 61*   LDL CHOL mg/dL 144*     Albuterol Sulfate NEB Orderable, 2.5 mg, Nebulization, Q6H - RT  arformoterol, 15 mcg, Nebulization, BID - RT  aspirin, 81 mg, Nasogastric, Daily  atorvastatin, 80 mg, Nasogastric, Daily  chlorhexidine, 15 mL, Mouth/Throat, Q12H  empagliflozin, 10 mg, Nasogastric, Daily  enoxaparin, 40 mg, Subcutaneous, Daily  haloperidol lactate, 10 mg, Intravenous, Once  insulin regular, 2-7 Units, Subcutaneous, Q6H  methylPREDNISolone sodium succinate, 40 mg, Intravenous, Daily  pantoprazole, 40 mg, Intravenous, Q AM  pharmacy consult - MTM, , Does not apply, Daily  phenytoin, 100 mg, Nasogastric, BID  piperacillin-tazobactam, 3.375 g, Intravenous, Q8H  revefenacin, 175 mcg, Nebulization, Daily - RT  risperiDONE, 2 mg, Oral, BID  senna, 2 tablet, Nasogastric, BID  sodium chloride, 10 mL, Intravenous, Q12H       Tele: Sinus Rythym      Assessment:  NSTEMI in the setting of demand ischemia with known chronically occluded RCA, he did not have significant left coronary system disease by cath last year.  Acute on chronic systolic heart failure, EF 28% this admission. Last EF 36% by echo 10/2023  Cardiomyopathy.  Pneumonia  Acute on chronic respiratory failure with hypoxia  Coronary artery disease  Dyslipidemia  Hypertension    Plan:  Needs additional diuresis.    Continue aspirin and high intensity statin.  IV heparin discontinued, started on subcu Lovenox.  Add Plavix when no procedures anticipated.  Another dose of Lasix 40 mg IV x 1 today.  Continue Jardiance at current dose.  We will continue to optimize GDMT as tolerated.  ICU team following.    Marsha Gaitan MD, FACC, [facility]

## 2024-09-06 NOTE — PROGRESS NOTES
INTENSIVIST   PROGRESS NOTE        SUBJECTIVE     Fernie 64 y.o. male is followed for: Chest Pain       Type 2 respiratory failure    HFrEF (heart failure with reduced ejection fraction)    Coronary artery disease involving native coronary artery of native heart with angina pectoris    NSTEMI (non-ST elevated myocardial infarction)    Right lower lobe pneumonia    As an Intensivist, we provide an integrated approach to the ICU patient and family, medical management of comorbid conditions, including but not limited to electrolytes, glycemic control, organ dysfunction, lead interdisciplinary rounds and coordinate the care with all other services, including those from other specialists.     Interval History:    About same.  He continues on midazolam and Ketamine infusions.    Temp  Min: 98.3 °F (36.8 °C)  Max: 100.5 °F (38.1 °C)       History     Last Reviewed by Victor Hugo Cool MD on 9/3/2024 at  6:37 PM    Sections Reviewed    Medical, Surgical, Family, Tobacco, Alcohol, Drug Use, Sexual Activity,   Social Documentation    Problem list reviewed by Victor Hugo Cool MD on 9/3/2024 at  6:37 PM  Medicines reviewed by Victor Hugo Cool MD on 9/3/2024 at  6:37 PM  Allergies reviewed by Victor Hugo Cool MD on 9/3/2024 at  6:37 PM       The patient's relevant past medical, surgical and social history were reviewed and updated in Epic as appropriate.        OBJECTIVE     Vitals:  Temp: 99 °F (37.2 °C) (24 0800) Temp  Min: 98.3 °F (36.8 °C)  Max: 100.5 °F (38.1 °C)   Temp core:      BP: 106/62 (24 1130) BP  Min: 81/71  Max: 181/171   MAP (non-invasive) Noninvasive MAP (mmHg): 70 (24 1130) Noninvasive MAP (mmHg)  Av.2  Min: 48  Max: 177   Pulse: 95 (24 1130) Pulse  Min: 70  Max: 106   Resp: 15 (24 1037) Resp  Min: 15  Max: 22   SpO2: 92 % (24 1130) SpO2  Min: 87 %  Max: 100 %   Device: ventilator (24 1037)    Flow Rate: 3 (24 1600) No data recorded         24  0600  09/06/24  0800   Weight: 118 kg (260 lb 12.9 oz) 118 kg (260 lb 5.8 oz)        Intake/Ouptut 24 hrs (7:00AM - 6:59 AM)  Intake & Output (last 2 days)         09/04 0701 09/05 0700 09/05 0701 09/06 0700 09/06 0701 09/07 0700    I.V. (mL/kg) 3251.4 (28.8) 2592.5 (22) 1344.4 (11.4)    Other  40 30    NG/ 591 273    IV Piggyback 200 100 51    Total Intake(mL/kg) 3641.4 (32.2) 3323.5 (28.2) 1698.4 (14.4)    Urine (mL/kg/hr) 1550 (0.6) 2220 (0.8) 395 (0.7)    Total Output 1550 2220 395    Net +2091.4 +1103.5 +1303.4                   Medications (drips):  fentanyl 10 mcg/mL, Last Rate: 300 mcg/hr (09/06/24 0804)  ketamine, Last Rate: 1.76 mg/kg/hr (09/06/24 1119)  midazolam, Last Rate: 10 mg/hr (09/06/24 1026)  norepinephrine, Last Rate: Stopped (09/05/24 0515)        Hemodynamics (Acumen HP Arterial line):   CVP:     PAP:     PAOP:     CO: CO (L/min): 6 L/min (09/06/24 0800)   CI: CI (L/min/m2): 2.64 L/min/m2 (09/06/24 0800)   SVI: SVI: 37.18 (09/06/24 0800)   SVR:        Invasive Mechanical Ventilator   Settings: Observed:   Mode: VC+/AC (09/06/24 1037)    Resp Rate (Set): 15 (09/06/24 1037) Resp Rate (Observed) Vent: 15 (09/06/24 1130)   Vt (Set, mL): 490 mL (09/06/24 1037) Vt, Exp (observed, mL): 477 mL (09/06/24 1037)    Minute Ventilation (L/min) (Obs): 7.2 L/min (09/06/24 1037)    I:E Ratio (Obs): 1:2.8 (09/06/24 1037)       FiO2 (%): 35 % (09/06/24 1037) Plateau Pressure (cm H2O): (S) 22 cm H2O (09/06/24 0756)   PEEP/CPAP (cm H2O): 8 cm H20 (09/06/24 1037) Driving Pressure (cm H2O): 13.9 cm H2O (09/06/24 0756)    Static Compliance (L/cm H2O): 34 (09/06/24 0756)      Physical Examination  Telemetry:  Rhythm: normal sinus rhythm (09/06/24 1000)  Conduction Defect: right bundle branch block (09/06/24 0600)   Constitutional:  No acute distress.   Cardiovascular: RRR.    Respiratory: Normal breath sounds  (+) Rhonchi   Abdominal:  Soft with no tenderness.   Extremities: 1+ Edema   Neurological:    Sedated.    Best Eye Response: 3-->(E3) to speech (09/06/24 1000)  Best Motor Response: 5-->(M5) localizes pain (09/06/24 1000)  Best Verbal Response: 1-->(V1) none (09/06/24 1000)  [place] Coma Scale Score: 9 (09/06/24 1000)    RASS (Chery Agitation-Sedation Scale): -1-->drowsy (09/06/24 1000)    Confusion Assessment Method-ICU (CAM-ICU)  Feature 3: Altered Level of Consciousness: Positive (09/06/24 1000)     Results Reviewed:  Laboratory  Microbiology  Radiology  Pathology    Hematology:  Results from last 7 days   Lab Units 09/06/24  0501 09/05/24  0426 09/04/24  0532   WBC 10*3/mm3 5.62 5.59 10.48   HEMOGLOBIN g/dL 9.0* 11.5* 10.3*   MCV fL 93.5 92.0 90.9   PLATELETS 10*3/mm3 277 231 315     Results from last 7 days   Lab Units 09/06/24  0501 09/05/24  0426 09/04/24  0532   NEUTROS ABS 10*3/mm3 3.49 3.89 7.94*   LYMPHS ABS 10*3/mm3 1.02 0.89 1.21   EOS ABS 10*3/mm3 0.19 0.06 0.10     Chemistry:  Estimated Creatinine Clearance: 105.3 mL/min (by C-G formula based on SCr of 0.91 mg/dL).    Results from last 7 days   Lab Units 09/06/24  0501 09/05/24  0529   SODIUM mmol/L 141 144   POTASSIUM mmol/L 3.8 4.3   CHLORIDE mmol/L 102 103   CO2 mmol/L 32.0* 29.0   BUN mg/dL 19 14   CREATININE mg/dL 0.91 0.71*   GLUCOSE mg/dL 93 95     Results from last 7 days   Lab Units 09/06/24  0501 09/05/24  0529 09/04/24  0532   CALCIUM mg/dL 9.1 9.0 9.0   MAGNESIUM mg/dL  --  2.4 2.6*   PHOSPHORUS mg/dL  --  2.9  --      Hepatic Panel:  Results from last 7 days   Lab Units 09/04/24  0532 09/02/24  1717 09/02/24  1423   ALBUMIN g/dL 4.0 3.5 3.5   TOTAL PROTEIN g/dL 6.9 6.7 6.7   BILIRUBIN mg/dL 0.5 0.2 0.2   AST (SGOT) U/L 18 24 27   ALT (SGPT) U/L 11 12 12   ALK PHOS U/L 154* 82 85     Coagulation Labs:  Results from last 7 days   Lab Units 09/01/24  1050   PROTIME Seconds 13.0   INR  0.97   APTT seconds 26.1*      Cardiac Labs:  Results from last 7 days   Lab Units 09/06/24  0501 09/05/24  0529 09/04/24  0532 09/03/24  0180  09/03/24  2112 09/02/24  1717   PROBNP pg/mL 1,656.0* 2,648.0* 3,834.0*  --  3,154.0*  --    HSTROP T ng/L  --   --   --  338* 330* 252*     Biomarkers:  Results from last 7 days   Lab Units 09/06/24  0501 09/05/24  0529 09/04/24  0532 09/02/24  0527 09/01/24  1050   LACTATE mmol/L  --   --   --   --  1.8   PROCALCITONIN ng/mL 0.61* 0.76* 1.35*   < > 4.18*    < > = values in this interval not displayed.       Phenytoin Level   Date Value Ref Range Status   09/03/2024 1.7 (L) 10.0 - 20.0 mcg/mL Final     COVID-19  Lab Results   Component Value Date    COVID19 Not Detected 09/01/2024    COVID19 Not Detected 04/22/2023    COVID19 Negative 03/23/2023       Arterial Blood Gases:  Results from last 7 days   Lab Units 09/06/24  0323 09/05/24  0426 09/04/24  0342   PH, ARTERIAL pH units 7.363 7.387 7.413   PCO2, ARTERIAL mm Hg 57.9* 53.7* 48.2*   PO2 ART mm Hg 76.0* 115.0* 113.0*   FIO2 % 35 35 40       Images:  XR Chest 1 View    Result Date: 9/6/2024  Impression: No significant change, with similar diffuse interstitial and airspace opacities with small bilateral pleural effusions. Electronically Signed: Gael Gasca MD  9/6/2024 7:07 AM EDT  Workstation ID: [id number]    XR Chest 1 View    Result Date: 9/5/2024  Impression: 1. Lines and tubes as above project in expected position 2. Cardiomegaly and mild pulm vascular congestion slightly increased in the comparison. 3. Increased asymmetric superimposed patchy opacities over the right hemithorax which represent asymmetric edema and/or pneumonia. Findings are increased in the comparison Electronically Signed: Mitchell Mallory MD  9/5/2024 7:05 AM EDT  Workstation ID: [id number]     Echo:  Results for orders placed during the hospital encounter of 09/01/24    Adult Transthoracic Echo Complete W/ Cont if Necessary Per Protocol    Interpretation Summary    Left ventricular systolic function is moderately decreased. Estimated left ventricular EF = 28%.  There is global hypokinesis  of left ventricle.    The left ventricular cavity is mildly dilated.    The left atrial cavity is mild to moderately dilated.    Mild aortic valve stenosis is present (mean gradient 15 mmHg)    Mild to moderate mitral regurgitation is present.    Estimated right ventricular systolic pressure from tricuspid regurgitation is normal (<35 mmHg).      Results: Reviewed.  I reviewed the patient's new laboratory and imaging results.  I independently reviewed the patient's new images.    Medications: Reviewed.    Assessment   A/P     Hospital:  LOS: 3 days   ICU: 2d 17h     Active Hospital Problems    Diagnosis  POA    **Type 2 respiratory failure [J96.92]  Yes    Right lower lobe pneumonia [J18.9]  Yes    NSTEMI (non-ST elevated myocardial infarction) [I21.4]  Yes     Elevated troponin in the setting of pneumonia and CHF, 9/1/2024      HFrEF (heart failure with reduced ejection fraction) [I50.20]  Yes     Echo (9/29/2023): LVEF 36%.  Moderate MR.  RVSP 42 mmHg      Coronary artery disease involving native coronary artery of native heart with angina pectoris [I25.119]  Yes     Cardiac catheterization (4/24/2023): Severe 1-vessel CAD involving chronic total occlusion of previously stented distal RCA.  Mild to moderate disease of left coronary system       Fernie is a 64 y.o. male admitted on 9/1/2024 with worsening Dyspnea [R06.00]  Acute on chronic respiratory failure with hypoxia [J96.21]    CTA yesterday revealed no PE, right lower lobe pneumonia, no effusion or edema.  He had diuresed 5 L over 2 days.  On 09/03/24 he developed some respiratory distress and respiratory arrest.  He was emergently intubated. During this admission he had elevated troponin and ST changes consistent with a non-ST elevation MI.  He also has obstructive sleep apnea but is noncompliant with CPAP.    Post intubation he became hypotensive requiring norepinephrine.  He was placed on Piperacillin-Tazobactam  on admission for right lower lobe  pneumonia.      Assessment/Management/Treatment Plan:    Respiratory Failure, type 2, acute on chronic  Intubated 09/03/24   Invasive Mechanical Ventilation   Possible RLL pneumonia  Former smoker. Quit 2022  OAD per history.   Cardiovascular  HFrEF. EF 28%  CAD. NSTEMI  Dyslipidemia   HTN  ID/Antibiotics  Elevated PCT, 4.18, in the range of sepsis, on admission. Improving.  MRSA PCR screen Negative  Antibiotics: Piperacillin-Tazobactam   Endocrine   Body mass index is 38.43 kg/m². Obese Class II: 35-39.9kg/m2  Type 2 diabetes.    Lab Results   Lab Value Date/Time    HGBA1C 6.00 (H) 09/01/2024 1050    HGBA1C 7.30 (H) 04/25/2023 0440     Results from last 7 days   Lab Units 09/06/24  1139 09/06/24  0550 09/05/24  2345 09/05/24  1804 09/05/24  1204 09/05/24  0505 09/04/24  2325 09/04/24  1733   GLUCOSE mg/dL 119 92 87 110 115 92 126 135*       Diet: NPO Diet NPO Type: Tube Feeding   Advance Directives: Code Status and Medical Interventions: CPR (Attempt to Resuscitate); Full Support   Ordered at: 09/01/24 1318     Level Of Support Discussed With:    Patient     Code Status (Patient has no pulse and is not breathing):    CPR (Attempt to Resuscitate)     Medical Interventions (Patient has pulse or is breathing):    Full Support        VTE Prophylaxis:  Pharmacologic VTE prophylaxis orders are present.         In brief:  Watch Hb drop from 11.5 -> 9.0  Resume Citalopram  Change Phenobarbital NG to IV (fosphenytoin)  Tolerating Enteral Nutrition   PCT improving.  Continue empiric antibiotics  Not ready to be liberated from the ventilator.  Transition anticoagulation to prophylaxis doses } as per Cardiology  Goal: Glucose < 180 mg/dL.   Insulin SQ - correction scale for now.  Disposition: Keep in ICU.    Plan of care and goals reviewed during interdisciplinary rounds.  I discussed the patient's findings and my recommendations with patient and nursing staff    MDM:    Problem(s) High due to: Acute or Chronic illness or  injury that may poses a threat to life or bodily function  Data: Moderate due to: Review of prior external records from each unique source, Review or results of each unique test, and Ordering of each unique test  Risk: High due to: drug(s) requiring intensive monitoring for toxicity    High    [x] Primary Attending Intensive Care Medicine - Nutrition Support   [] Consultant    Copied text in this note has been reviewed and is accurate as of 09/06/24

## 2024-09-06 NOTE — PROGRESS NOTES
Clinical Nutrition     Patient Name: Fernie Dalal  YOB: 1960  MRN: [id number]  Date of Encounter: 09/06/24 10:54 EDT  Admission date: 9/1/2024  Reason for Visit: MDR, Follow-up protocol, EN    Assessment   Nutrition Assessment   Admission Diagnosis:  Dyspnea [R06.00]  Acute on chronic respiratory failure with hypoxia [J96.21]    Problem List:    Type 2 respiratory failure    HFrEF (heart failure with reduced ejection fraction)    Coronary artery disease involving native coronary artery of native heart with angina pectoris    NSTEMI (non-ST elevated myocardial infarction)    Right lower lobe pneumonia      PMH:   He  has a past medical history of Angina at rest, Anxiety, Arrhythmia, COPD (chronic obstructive pulmonary disease), GERD (gastroesophageal reflux disease), Heart failure, Hyperlipidemia, Hypertension, and Myocardial infarction.    PSH:  He  has a past surgical history that includes Cardiac catheterization; Cardiovascular stress test; and Cardiac catheterization (N/A, 4/24/2023).    Applicable Nutrition History:   ARF/VENT 9-3-24    EN started 9-5-24    Labs    Labs Reviewed: Yes    Results from last 7 days   Lab Units 09/06/24  0501 09/05/24  0529 09/04/24  0532 09/02/24  1717 09/02/24  1423 09/02/24  0527 09/01/24  1050   GLUCOSE mg/dL 93 95 119* 105* 170*   < > 146*   BUN mg/dL 19 14 14 13 14   < > 19   CREATININE mg/dL 0.91 0.71* 0.82 0.80 0.76   < > 0.83   SODIUM mmol/L 141 144 142 138 137   < > 140   CHLORIDE mmol/L 102 103 100 98 99   < > 100   POTASSIUM mmol/L 3.8 4.3 4.4 3.9 4.4   < > 4.6   PHOSPHORUS mg/dL  --  2.9  --   --   --   --   --    MAGNESIUM mg/dL  --  2.4 2.6* 2.4 2.6*   < > 1.7   ALT (SGPT) U/L  --   --  11 12 12  --  13   LACTATE mmol/L  --   --   --   --   --   --  1.8    < > = values in this interval not displayed.       Results from last 7 days   Lab Units 09/04/24  0532 09/02/24  1717 09/02/24  1423 09/01/24  1050   ALBUMIN g/dL 4.0 3.5 3.5 3.9    CHOLESTEROL mg/dL  --   --   --  225*   TRIGLYCERIDES mg/dL  --   --   --  112       Results from last 7 days   Lab Units 09/06/24  0550 09/05/24  2345 09/05/24  1804 09/05/24  1204 09/05/24  0505 09/04/24  2325   GLUCOSE mg/dL 92 87 110 115 92 126     Lab Results   Lab Value Date/Time    HGBA1C 6.00 (H) 09/01/2024 1050    HGBA1C 7.30 (H) 04/25/2023 0440         Results from last 7 days   Lab Units 09/06/24  0501 09/05/24  0529 09/04/24  0532   PROBNP pg/mL 1,656.0* 2,648.0* 3,834.0*       Medications    Medications Reviewed: yes    Scheduled Meds:Albuterol Sulfate NEB Orderable, 2.5 mg, Nebulization, Q6H - RT  arformoterol, 15 mcg, Nebulization, BID - RT  aspirin, 81 mg, Nasogastric, Daily  atorvastatin, 80 mg, Nasogastric, Daily  chlorhexidine, 15 mL, Mouth/Throat, Q12H  empagliflozin, 10 mg, Nasogastric, Daily  enoxaparin, 40 mg, Subcutaneous, Daily  insulin regular, 2-7 Units, Subcutaneous, Q6H  methylPREDNISolone sodium succinate, 40 mg, Intravenous, Daily  pantoprazole, 40 mg, Intravenous, Q AM  pharmacy consult - MTM, , Does not apply, Daily  phenytoin, 100 mg, Nasogastric, BID  piperacillin-tazobactam, 3.375 g, Intravenous, Q8H  revefenacin, 175 mcg, Nebulization, Daily - RT  risperiDONE, 2 mg, Oral, BID  senna, 2 tablet, Nasogastric, BID  sodium chloride, 10 mL, Intravenous, Q12H      Continuous Infusions:fentanyl 10 mcg/mL,  mcg/hr, Last Rate: 300 mcg/hr (09/06/24 0804)  ketamine, 0.06-2.5 mg/kg/hr, Last Rate: 1.66 mg/kg/hr (09/06/24 1029)  midazolam, 1-10 mg/hr, Last Rate: 10 mg/hr (09/06/24 1026)  norepinephrine, 0.02-0.5 mcg/kg/min, Last Rate: Stopped (09/05/24 0515)      PRN Meds:.  acetaminophen    fentaNYL    haloperidol lactate    lactulose    midazolam    polyvinyl alcohol    Potassium Replacement - Follow Nurse / BPA Driven Protocol    sodium chloride    Intake/Ouptut 24 hrs (0701 - 0700)   I&O's Reviewed: yes    Intake & Output (last day)         09/05 0701 09/06 0700 09/06 0701 09/07  "0700    I.V. (mL/kg) 2592.5 (22) 783 (6.6)    Other 40 30    NG/ 148    IV Piggyback 100 51    Total Intake(mL/kg) 3323.5 (28.2) 1012 (8.6)    Urine (mL/kg/hr) 2220 (0.8) 100 (0.2)    Total Output 2220 100    Net +1103.5 +912                 Anthropometrics     Height: Height: 175.3 cm (69.02\")  Last Filed Weight: Weight: 118 kg (260 lb 5.8 oz) (09/06/24 0800)  Method: Weight Method: Bed scale  BMI: BMI (Calculated): 38.4    UBW: ?  Weight change:  17lb wt gain since admit, suspect fluid     Weight       Weight (kg) Weight (lbs) Weight Method Visit Report   4/23/2015 118.84 kg  261 lb 15.9 oz      7/1/2022 117.482 kg  259 lb   --    4/18/2023 --  --   --    4/22/2023 117.482 kg  259 lb  Stated     4/23/2023 117.482 kg  259 lb      4/24/2023 102.967 kg  227 lb  Standing scale     6/27/2023 106.051 kg  233 lb 12.8 oz   --    9/29/2023 105.688 kg  233 lb      10/31/2023 105.688 kg  233 lb   --    9/1/2024 105.688 kg  233 lb  Estimated      106 kg  233 lb 11 oz  Bed scale      113.399 kg  250 lb          Nutrition Focused Physical Exam     Date:     Unable to perform due to Pt unable to participate at time of visit     Subjective   Reported/Observed/Food/Nutrition Related History:       9-6: pt intubated, sedated   + versed, fentanyl, ketamine    Per RN: pt will open eyes, and follow commands sometimes, 2L UOP, tolerating TF, it is @50ml currently, has not had a bm yet    Plan to change enteral Dilantin to IV Cerebryx      9-4: Pt intubated, sedated, will open eyes, is tremulous  + levophed 0.04mcg, fentanyl, heparin    Per RN: pt very anxious, will follow commands, has lots of secretions    Needs Assessment   Date: 9-4-24    Height used:Height: 175.3 cm (69.02\")  Weights used ABW: 233lb/ 106kg  IBW: 160lb/ 73kg    Estimated Calorie needs: ~1500kcal  Method:  14Kcals/KG ABW: 1484kcal  Method:  MSJ ABW: 1845kcal  Method:  PSU ABW: 1915kcal    Estimated Protein needs: ~146g protein  Method: 2g protein per kg IBW: " 146g protein  Method: 1.2-1.5g protein per k-159g protein      Current Nutrition Prescription     PO: NPO Diet NPO Type: Tube Feeding  Oral Nutrition Supplement:  Intake: N/A    EN: Peptamen Intense VHP  Goal Rate: 75ml  Water Flushes: 30ml Q 4 hours  Modular: None  Route: NG  Tube: Large bore    At goal over: 20Hrs/day     Rx will supply:   Goal Volume 1500  mL/day     Flush Volume 180 mL/day     Energy 1500 Kcal/day 100 % Est Need   Protein 138 g/day 95 % Est Need   Fiber 6 g/day     Water in  EN 1260 mL     Total Water 1440 mL     Meet DRI Yes        --------------------------------------------------------------------------  Product/Rate verified at bedside: Yes  Infusing Rate at time of visit: 50ml    Average Delivery from [facility] Day:   Volume 231 mL/day 15  % Goal Vol.   Flush Volume 400 mL/day     Energy  Kcal/day  % Est Need   Protein  g/day  % Est Need   Fiber  g/day     Water in  EN  mL     Total Water  mL     Meet DRI No             Assessment & Plan   Nutrition Diagnosis   Date: 24 Updated:   Problem Inadequate energy intake    Etiology ARF/VENT   Signs/Symptoms 15% goal volume EN   Status: Active TF just started last night    Goal:   Nutrition to support treatment and Adjust EN      Nutrition Intervention      Follow treatment progress, Care plan reviewed    Suggest increase TF to 80ml to better meet est protein needs with MO    TF at goal volume (20 hours) will provide 1600ml, 1600kcal, 107% kcal needs, 147g protein, 101% protein needs, 6g fiber, 1524ml free water      Monitoring/Evaluation:   Per protocol, I&O, Pertinent labs, Weight, Skin status, GI status, Symptoms, Hemodynamic stability    Tania Street RD  Time Spent: 30min

## 2024-09-06 NOTE — PLAN OF CARE
Goal Outcome Evaluation:               FiO2 decreased to 30% today. Still unable to wean ventilator support at this time.

## 2024-09-06 NOTE — CASE MANAGEMENT/SOCIAL WORK
Continued Stay Note  [facility]     Patient Name: Fernie Dalal  MRN: [id number]  Today's Date: 9/6/2024    Admit Date: 9/1/2024    Plan: D/C TBD   Discharge Plan       Row Name 09/06/24 1603       Plan    Plan D/C TBD    Patient/Family in Agreement with Plan other (see comments)    Plan Comments Discussed in MDR, patient currently intubated/sedated. D/C TBD @ this time. CM will continue to follow. Per previous CM note, inpatient rehab @ [facility].    Final Discharge Disposition Code 62 - inpatient rehab facility                   Discharge Codes    No documentation.                 Expected Discharge Date and Time       Expected Discharge Date Expected Discharge Time    Sep 5, 2024               Aniket Marquez RN

## 2024-09-07 ENCOUNTER — APPOINTMENT (OUTPATIENT)
Dept: GENERAL RADIOLOGY | Facility: HOSPITAL | Age: 64
End: 2024-09-07
Payer: MEDICAID

## 2024-09-07 LAB
ALBUMIN SERPL-MCNC: 3.2 G/DL (ref 3.5–5.2)
ALBUMIN/GLOB SERPL: 1.5 G/DL
ALP SERPL-CCNC: 67 U/L (ref 39–117)
ALT SERPL W P-5'-P-CCNC: 13 U/L (ref 1–41)
ANION GAP SERPL CALCULATED.3IONS-SCNC: 11 MMOL/L (ref 5–15)
AST SERPL-CCNC: 19 U/L (ref 1–40)
BASOPHILS # BLD AUTO: 0.03 10*3/MM3 (ref 0–0.2)
BASOPHILS NFR BLD AUTO: 0.6 % (ref 0–1.5)
BILIRUB SERPL-MCNC: 0.2 MG/DL (ref 0–1.2)
BUN SERPL-MCNC: 16 MG/DL (ref 8–23)
BUN/CREAT SERPL: 23.5 (ref 7–25)
CALCIUM SPEC-SCNC: 8.3 MG/DL (ref 8.6–10.5)
CHLORIDE SERPL-SCNC: 108 MMOL/L (ref 98–107)
CO2 SERPL-SCNC: 25 MMOL/L (ref 22–29)
CREAT SERPL-MCNC: 0.68 MG/DL (ref 0.76–1.27)
DEPRECATED RDW RBC AUTO: 50.9 FL (ref 37–54)
EGFRCR SERPLBLD CKD-EPI 2021: 104.4 ML/MIN/1.73
EOSINOPHIL # BLD AUTO: 0.19 10*3/MM3 (ref 0–0.4)
EOSINOPHIL NFR BLD AUTO: 3.7 % (ref 0.3–6.2)
ERYTHROCYTE [DISTWIDTH] IN BLOOD BY AUTOMATED COUNT: 14.6 % (ref 12.3–15.4)
GLOBULIN UR ELPH-MCNC: 2.1 GM/DL
GLUCOSE BLDC GLUCOMTR-MCNC: 102 MG/DL (ref 70–130)
GLUCOSE BLDC GLUCOMTR-MCNC: 140 MG/DL (ref 70–130)
GLUCOSE BLDC GLUCOMTR-MCNC: 147 MG/DL (ref 70–130)
GLUCOSE BLDC GLUCOMTR-MCNC: 96 MG/DL (ref 70–130)
GLUCOSE SERPL-MCNC: 105 MG/DL (ref 65–99)
HCT VFR BLD AUTO: 30.7 % (ref 37.5–51)
HGB BLD-MCNC: 9 G/DL (ref 13–17.7)
IMM GRANULOCYTES # BLD AUTO: 0.04 10*3/MM3 (ref 0–0.05)
IMM GRANULOCYTES NFR BLD AUTO: 0.8 % (ref 0–0.5)
LYMPHOCYTES # BLD AUTO: 0.87 10*3/MM3 (ref 0.7–3.1)
LYMPHOCYTES NFR BLD AUTO: 17 % (ref 19.6–45.3)
MAGNESIUM SERPL-MCNC: 1.9 MG/DL (ref 1.6–2.4)
MCH RBC QN AUTO: 27.9 PG (ref 26.6–33)
MCHC RBC AUTO-ENTMCNC: 29.3 G/DL (ref 31.5–35.7)
MCV RBC AUTO: 95 FL (ref 79–97)
MONOCYTES # BLD AUTO: 0.75 10*3/MM3 (ref 0.1–0.9)
MONOCYTES NFR BLD AUTO: 14.7 % (ref 5–12)
NEUTROPHILS NFR BLD AUTO: 3.23 10*3/MM3 (ref 1.7–7)
NEUTROPHILS NFR BLD AUTO: 63.2 % (ref 42.7–76)
NRBC BLD AUTO-RTO: 0 /100 WBC (ref 0–0.2)
NT-PROBNP SERPL-MCNC: 1133 PG/ML (ref 0–900)
PLATELET # BLD AUTO: 257 10*3/MM3 (ref 140–450)
PMV BLD AUTO: 9.8 FL (ref 6–12)
POTASSIUM SERPL-SCNC: 3.6 MMOL/L (ref 3.5–5.2)
POTASSIUM SERPL-SCNC: 4.9 MMOL/L (ref 3.5–5.2)
PROCALCITONIN SERPL-MCNC: 0.41 NG/ML (ref 0–0.25)
PROT SERPL-MCNC: 5.3 G/DL (ref 6–8.5)
RBC # BLD AUTO: 3.23 10*6/MM3 (ref 4.14–5.8)
SODIUM SERPL-SCNC: 144 MMOL/L (ref 136–145)
WBC NRBC COR # BLD AUTO: 5.11 10*3/MM3 (ref 3.4–10.8)

## 2024-09-07 PROCEDURE — 25010000002 FUROSEMIDE PER 20 MG: Performed by: INTERNAL MEDICINE

## 2024-09-07 PROCEDURE — 99291 CRITICAL CARE FIRST HOUR: CPT | Performed by: INTERNAL MEDICINE

## 2024-09-07 PROCEDURE — 82948 REAGENT STRIP/BLOOD GLUCOSE: CPT

## 2024-09-07 PROCEDURE — 25010000002 FENTANYL 10 MCG/1 ML NS: Performed by: INTERNAL MEDICINE

## 2024-09-07 PROCEDURE — 84145 PROCALCITONIN (PCT): CPT | Performed by: INTERNAL MEDICINE

## 2024-09-07 PROCEDURE — 25010000002 FOSPHENYTOIN 100 MG PE/2ML SOLUTION 2 ML VIAL: Performed by: INTERNAL MEDICINE

## 2024-09-07 PROCEDURE — 83880 ASSAY OF NATRIURETIC PEPTIDE: CPT | Performed by: INTERNAL MEDICINE

## 2024-09-07 PROCEDURE — 94664 DEMO&/EVAL PT USE INHALER: CPT

## 2024-09-07 PROCEDURE — 25010000002 HALOPERIDOL LACTATE PER 5 MG: Performed by: INTERNAL MEDICINE

## 2024-09-07 PROCEDURE — 25010000002 ENOXAPARIN PER 10 MG: Performed by: INTERNAL MEDICINE

## 2024-09-07 PROCEDURE — 84132 ASSAY OF SERUM POTASSIUM: CPT | Performed by: INTERNAL MEDICINE

## 2024-09-07 PROCEDURE — 80053 COMPREHEN METABOLIC PANEL: CPT | Performed by: INTERNAL MEDICINE

## 2024-09-07 PROCEDURE — 99232 SBSQ HOSP IP/OBS MODERATE 35: CPT | Performed by: INTERNAL MEDICINE

## 2024-09-07 PROCEDURE — 94799 UNLISTED PULMONARY SVC/PX: CPT

## 2024-09-07 PROCEDURE — 0 MIDAZOLAM 1 MG/ML 100ML NS 100 MG/100ML SOLUTION: Performed by: INTERNAL MEDICINE

## 2024-09-07 PROCEDURE — 25010000002 MAGNESIUM SULFATE 4 GM/100ML SOLUTION: Performed by: INTERNAL MEDICINE

## 2024-09-07 PROCEDURE — 71045 X-RAY EXAM CHEST 1 VIEW: CPT

## 2024-09-07 PROCEDURE — 85025 COMPLETE CBC W/AUTO DIFF WBC: CPT | Performed by: INTERNAL MEDICINE

## 2024-09-07 PROCEDURE — 63710000001 REVEFENACIN 175 MCG/3ML SOLUTION: Performed by: INTERNAL MEDICINE

## 2024-09-07 PROCEDURE — 25010000002 PROPOFOL 10 MG/ML EMULSION: Performed by: INTERNAL MEDICINE

## 2024-09-07 PROCEDURE — 25010000002 METHYLPREDNISOLONE PER 40 MG: Performed by: INTERNAL MEDICINE

## 2024-09-07 PROCEDURE — 94003 VENT MGMT INPAT SUBQ DAY: CPT

## 2024-09-07 PROCEDURE — 25010000002 PIPERACILLIN SOD-TAZOBACTAM PER 1 G: Performed by: INTERNAL MEDICINE

## 2024-09-07 PROCEDURE — 25810000003 SODIUM CHLORIDE 0.9 % SOLUTION: Performed by: INTERNAL MEDICINE

## 2024-09-07 PROCEDURE — 83735 ASSAY OF MAGNESIUM: CPT | Performed by: INTERNAL MEDICINE

## 2024-09-07 RX ORDER — POTASSIUM CHLORIDE 1.5 G/1.58G
40 POWDER, FOR SOLUTION ORAL EVERY 4 HOURS
Status: COMPLETED | OUTPATIENT
Start: 2024-09-07 | End: 2024-09-07

## 2024-09-07 RX ORDER — FUROSEMIDE 10 MG/ML
40 INJECTION INTRAMUSCULAR; INTRAVENOUS EVERY 12 HOURS
Status: DISCONTINUED | OUTPATIENT
Start: 2024-09-07 | End: 2024-09-25 | Stop reason: HOSPADM

## 2024-09-07 RX ORDER — LORAZEPAM 2 MG/ML
2 INJECTION INTRAMUSCULAR EVERY 4 HOURS PRN
Status: DISPENSED | OUTPATIENT
Start: 2024-09-07 | End: 2024-09-12

## 2024-09-07 RX ORDER — MAGNESIUM SULFATE HEPTAHYDRATE 40 MG/ML
4 INJECTION, SOLUTION INTRAVENOUS ONCE
Status: COMPLETED | OUTPATIENT
Start: 2024-09-07 | End: 2024-09-07

## 2024-09-07 RX ADMIN — PANTOPRAZOLE SODIUM 40 MG: 40 INJECTION, POWDER, LYOPHILIZED, FOR SOLUTION INTRAVENOUS at 06:08

## 2024-09-07 RX ADMIN — ATORVASTATIN CALCIUM 80 MG: 40 TABLET, FILM COATED ORAL at 08:49

## 2024-09-07 RX ADMIN — KETAMINE HYDROCHLORIDE 1.9 MG/KG/HR: 100 INJECTION, SOLUTION, CONCENTRATE INTRAMUSCULAR; INTRAVENOUS at 00:14

## 2024-09-07 RX ADMIN — PROPOFOL 45 MCG/KG/MIN: 10 INJECTION, EMULSION INTRAVENOUS at 18:30

## 2024-09-07 RX ADMIN — PIPERACILLIN AND TAZOBACTAM 3.38 G: 3; .375 INJECTION, POWDER, LYOPHILIZED, FOR SOLUTION INTRAVENOUS at 18:28

## 2024-09-07 RX ADMIN — FOSPHENYTOIN SODIUM 100 MG PE: 50 INJECTION, SOLUTION INTRAMUSCULAR; INTRAVENOUS at 08:50

## 2024-09-07 RX ADMIN — KETAMINE HYDROCHLORIDE 2 MG/KG/HR: 100 INJECTION, SOLUTION, CONCENTRATE INTRAMUSCULAR; INTRAVENOUS at 10:30

## 2024-09-07 RX ADMIN — Medication 300 MCG/HR: at 15:08

## 2024-09-07 RX ADMIN — Medication 10 ML: at 20:06

## 2024-09-07 RX ADMIN — ARFORMOTEROL TARTRATE 15 MCG: 15 SOLUTION RESPIRATORY (INHALATION) at 08:36

## 2024-09-07 RX ADMIN — ACETAMINOPHEN 650 MG: 650 SOLUTION ORAL at 11:22

## 2024-09-07 RX ADMIN — NOREPINEPHRINE BITARTRATE 0.02 MCG/KG/MIN: 0.03 INJECTION, SOLUTION INTRAVENOUS at 15:10

## 2024-09-07 RX ADMIN — PIPERACILLIN AND TAZOBACTAM 3.38 G: 3; .375 INJECTION, POWDER, LYOPHILIZED, FOR SOLUTION INTRAVENOUS at 09:46

## 2024-09-07 RX ADMIN — RISPERIDONE 2 MG: 1 SOLUTION ORAL at 08:49

## 2024-09-07 RX ADMIN — PROPOFOL 50 MCG/KG/MIN: 10 INJECTION, EMULSION INTRAVENOUS at 13:26

## 2024-09-07 RX ADMIN — POTASSIUM CHLORIDE 40 MEQ: 1.5 POWDER, FOR SOLUTION ORAL at 13:46

## 2024-09-07 RX ADMIN — ALBUTEROL SULFATE 2.5 MG: 2.5 SOLUTION RESPIRATORY (INHALATION) at 01:14

## 2024-09-07 RX ADMIN — ENOXAPARIN SODIUM 40 MG: 100 INJECTION SUBCUTANEOUS at 08:50

## 2024-09-07 RX ADMIN — PROPOFOL 45 MCG/KG/MIN: 10 INJECTION, EMULSION INTRAVENOUS at 18:46

## 2024-09-07 RX ADMIN — ALBUTEROL SULFATE 2.5 MG: 2.5 SOLUTION RESPIRATORY (INHALATION) at 13:14

## 2024-09-07 RX ADMIN — PIPERACILLIN AND TAZOBACTAM 3.38 G: 3; .375 INJECTION, POWDER, LYOPHILIZED, FOR SOLUTION INTRAVENOUS at 02:11

## 2024-09-07 RX ADMIN — PROPOFOL 45 MCG/KG/MIN: 10 INJECTION, EMULSION INTRAVENOUS at 22:26

## 2024-09-07 RX ADMIN — ARFORMOTEROL TARTRATE 15 MCG: 15 SOLUTION RESPIRATORY (INHALATION) at 18:56

## 2024-09-07 RX ADMIN — POTASSIUM CHLORIDE 40 MEQ: 1.5 POWDER, FOR SOLUTION ORAL at 16:21

## 2024-09-07 RX ADMIN — EMPAGLIFLOZIN 10 MG: 10 TABLET, FILM COATED ORAL at 08:49

## 2024-09-07 RX ADMIN — MAGNESIUM SULFATE IN WATER FOR 4 G: 40 INJECTION INTRAVENOUS at 16:21

## 2024-09-07 RX ADMIN — CITALOPRAM HYDROBROMIDE 20 MG: 20 TABLET ORAL at 08:49

## 2024-09-07 RX ADMIN — ALBUTEROL SULFATE 2.5 MG: 2.5 SOLUTION RESPIRATORY (INHALATION) at 18:56

## 2024-09-07 RX ADMIN — METHYLPREDNISOLONE SODIUM SUCCINATE 40 MG: 40 INJECTION, POWDER, FOR SOLUTION INTRAMUSCULAR; INTRAVENOUS at 08:49

## 2024-09-07 RX ADMIN — FUROSEMIDE 40 MG: 10 INJECTION, SOLUTION INTRAMUSCULAR; INTRAVENOUS at 20:05

## 2024-09-07 RX ADMIN — PROPOFOL 50 MCG/KG/MIN: 10 INJECTION, EMULSION INTRAVENOUS at 16:21

## 2024-09-07 RX ADMIN — SENNOSIDES 2 TABLET: 8.6 TABLET, FILM COATED ORAL at 20:05

## 2024-09-07 RX ADMIN — KETAMINE HYDROCHLORIDE 1.9 MG/KG/HR: 100 INJECTION, SOLUTION, CONCENTRATE INTRAMUSCULAR; INTRAVENOUS at 07:53

## 2024-09-07 RX ADMIN — Medication 300 MCG/HR: at 07:19

## 2024-09-07 RX ADMIN — PROPOFOL 5 MCG/KG/MIN: 10 INJECTION, EMULSION INTRAVENOUS at 10:56

## 2024-09-07 RX ADMIN — CHLORHEXIDINE GLUCONATE, 0.12% ORAL RINSE 15 ML: 1.2 SOLUTION DENTAL at 20:05

## 2024-09-07 RX ADMIN — HALOPERIDOL LACTATE 5 MG: 5 INJECTION, SOLUTION INTRAMUSCULAR at 09:46

## 2024-09-07 RX ADMIN — KETAMINE HYDROCHLORIDE 1.9 MG/KG/HR: 100 INJECTION, SOLUTION, CONCENTRATE INTRAMUSCULAR; INTRAVENOUS at 05:11

## 2024-09-07 RX ADMIN — REVEFENACIN 175 MCG: 175 SOLUTION RESPIRATORY (INHALATION) at 08:36

## 2024-09-07 RX ADMIN — CHLORHEXIDINE GLUCONATE, 0.12% ORAL RINSE 15 ML: 1.2 SOLUTION DENTAL at 08:49

## 2024-09-07 RX ADMIN — SENNOSIDES 2 TABLET: 8.6 TABLET, FILM COATED ORAL at 08:49

## 2024-09-07 RX ADMIN — FOSPHENYTOIN SODIUM 100 MG PE: 50 INJECTION, SOLUTION INTRAMUSCULAR; INTRAVENOUS at 20:48

## 2024-09-07 RX ADMIN — HALOPERIDOL LACTATE 5 MG: 5 INJECTION, SOLUTION INTRAMUSCULAR at 03:27

## 2024-09-07 RX ADMIN — ASPIRIN 81 MG 81 MG: 81 TABLET ORAL at 08:49

## 2024-09-07 RX ADMIN — Medication 300 MCG/HR: at 00:03

## 2024-09-07 RX ADMIN — RISPERIDONE 2 MG: 1 SOLUTION ORAL at 20:05

## 2024-09-07 RX ADMIN — ALBUTEROL SULFATE 2.5 MG: 2.5 SOLUTION RESPIRATORY (INHALATION) at 08:36

## 2024-09-07 RX ADMIN — MIDAZOLAM HYDROCHLORIDE 10 MG/HR: 1 INJECTION, SOLUTION INTRAVENOUS at 04:24

## 2024-09-07 RX ADMIN — FUROSEMIDE 40 MG: 10 INJECTION, SOLUTION INTRAMUSCULAR; INTRAVENOUS at 08:50

## 2024-09-07 RX ADMIN — KETAMINE HYDROCHLORIDE 1.9 MG/KG/HR: 100 INJECTION, SOLUTION, CONCENTRATE INTRAMUSCULAR; INTRAVENOUS at 02:34

## 2024-09-07 NOTE — PLAN OF CARE
Problem: Adult Inpatient Plan of Care  Goal: Plan of Care Review  Outcome: Ongoing, Progressing  Flowsheets (Taken 9/7/2024 1800)  Progress: improving  Plan of Care Reviewed With: sibling  Outcome Evaluation: Pt remains on mechanical ventilation- PEEP 10 and FiO2 40%. Pt tachycardic and restless with stimulation this AM. Pt not able to follow commands. Ketamine and Versed discontinued. Fent @300. Prop initiated- currently infusing @50. Levo initiated and titrated to maintain SBP >65. Haldol x1 PRN and x2 fent boluses administered this AM. 1975 UOP- lasix administered this AM. TF at 75ml/hr- pt tolerating. x1 BM this shift. K 3.6- replaced throughout shift. 4g Mag replaced x1. Tmax 100.8- tylenol administered and ice packs placed. BUE restraints remain in place for pt safety. Sister updated via phone call.    Problem: Restraint, Nonviolent  Goal: Absence of Harm or Injury  Outcome: Ongoing, Progressing  Intervention: Implement Least Restrictive Safety Strategies  Recent Flowsheet Documentation  Taken 9/7/2024 1800 by Winsome Mir RN  Medical Device Protection:   IV pole/bag removed from visual field   torso covered   tubing secured  Less Restrictive Alternative:   bed alarm in use   calming techniques promoted   coping techniques promoted   emotional support provided   environment adjusted   positive reinforcement provided   sensory stimulation limited   surveillance provided   therapeutic music  De-Escalation Techniques:   quiet time facilitated   stimulation decreased  Diversional Activities: music  Taken 9/7/2024 1600 by Winsome Mir RN  Medical Device Protection:   IV pole/bag removed from visual field   torso covered   tubing secured  Less Restrictive Alternative:   bed alarm in use   calming techniques promoted   coping techniques promoted   emotional support provided   environment adjusted   positive reinforcement provided   sensory stimulation limited   surveillance provided   therapeutic  music  De-Escalation Techniques:   quiet time facilitated   stimulation decreased  Diversional Activities: music  Taken 9/7/2024 1400 by Winsome Mir RN  Medical Device Protection:   IV pole/bag removed from visual field   torso covered   tubing secured  Less Restrictive Alternative:   bed alarm in use   calming techniques promoted   coping techniques promoted   emotional support provided   environment adjusted   positive reinforcement provided   sensory stimulation limited   surveillance provided   therapeutic music  De-Escalation Techniques:   quiet time facilitated   stimulation decreased  Diversional Activities: music  Taken 9/7/2024 1200 by Winsome Mir RN  Medical Device Protection:   IV pole/bag removed from visual field   torso covered   tubing secured  Less Restrictive Alternative:   bed alarm in use   calming techniques promoted   coping techniques promoted   emotional support provided   environment adjusted   positive reinforcement provided   sensory stimulation limited   surveillance provided   therapeutic music  De-Escalation Techniques:   quiet time facilitated   stimulation decreased  Diversional Activities: music  Taken 9/7/2024 1000 by Winsome Mir RN  Medical Device Protection:   IV pole/bag removed from visual field   torso covered   tubing secured  Less Restrictive Alternative:   bed alarm in use   calming techniques promoted   coping techniques promoted   emotional support provided   environment adjusted   positive reinforcement provided   sensory stimulation limited   surveillance provided   therapeutic music  De-Escalation Techniques:   quiet time facilitated   reoriented   stimulation decreased   verbally redirected  Diversional Activities: music  Taken 9/7/2024 0800 by Winsome Mir RN  Medical Device Protection:   IV pole/bag removed from visual field   torso covered   tubing secured  Less Restrictive Alternative:   bed alarm in use   calming techniques promoted   coping  techniques promoted   emotional support provided   environment adjusted   positive reinforcement provided   sensory stimulation limited   surveillance provided   therapeutic music  De-Escalation Techniques:   quiet time facilitated   reoriented   stimulation decreased   verbally redirected  Diversional Activities: music  Intervention: Protect Dignity, Rights, and Personal Wellbeing  Recent Flowsheet Documentation  Taken 9/7/2024 1600 by Winsome Mir RN  Trust Relationship/Rapport: care explained  Taken 9/7/2024 1400 by Winsome Mir RN  Trust Relationship/Rapport: care explained  Taken 9/7/2024 1200 by Winsome Mir RN  Trust Relationship/Rapport: care explained  Taken 9/7/2024 1000 by Winsome Mir RN  Trust Relationship/Rapport: care explained  Taken 9/7/2024 0800 by Winsome Mir RN  Trust Relationship/Rapport: care explained  Intervention: Protect Skin and Joint Integrity  Recent Flowsheet Documentation  Taken 9/7/2024 1600 by Winsome Mir RN  Body Position:   turned   tilted   right   lower extremity elevated   upper extremity elevated  Taken 9/7/2024 1400 by Winsome Mir RN  Body Position:   turned   supine   upper extremity elevated   lower extremity elevated  Taken 9/7/2024 1200 by Winsome Mir RN  Body Position:   turned   tilted   left   lower extremity elevated   upper extremity elevated  Taken 9/7/2024 1000 by Winsome Mir RN  Body Position:   turned   upper extremity elevated   lower extremity elevated  Taken 9/7/2024 0800 by Winsome Mir RN  Body Position:   turned   supine  Range of Motion: ROM (range of motion) performed

## 2024-09-07 NOTE — PROGRESS NOTES
Fernie Dalal  [id number]  1960   LOS: 4 days   Patient Care Team:  Belem Albert APRN as PCP - General (Family Medicine)  Marsha Gaitan MD as Consulting Physician (Cardiology)    Chief Complaint:  SEVERE COPD & ACTE-ON-CHRONIC HYPOXIC HYPERCARBIC RESPIRATORY FAILURE / NSTEMI / HFrEF / ANEMIA    Subjective     Sedated and ventilated.  Appears comfortable and very relaxed.  Tolerating nutritional support with Peptamen Intense VHP at 75 cc/hour.  No posturing or overt seizure activity.  Oximetry 96% on FiO2 0.40 ventilator.    Objective     Vital Sign Min/Max for last 24 hours  Temp  Min: 97.1 °F (36.2 °C)  Max: 99.4 °F (37.4 °C)   BP  Min: 84/65  Max: 124/84   Pulse  Min: 69  Max: 95   Resp  Min: 15  Max: 19   SpO2  Min: 87 %  Max: 100 %      Weight  Min: 118 kg (260 lb 5.8 oz)  Max: 118 kg (260 lb 5.8 oz)         09/06/24  0600 09/06/24  0800   Weight: 118 kg (260 lb 12.9 oz) 118 kg (260 lb 5.8 oz)         Intake/Output Summary (Last 24 hours) at 9/7/2024 0730  Last data filed at 9/7/2024 0600  Gross per 24 hour   Intake 8706.15 ml   Output 2870 ml   Net 5836.15 ml       Physical Exam:     General Appearance:  Sedated and ventilated, in no acute distress   Lungs:   Diffuse diminished breath sounds with bibasilar crackles,respirations regular, even and  unlabored    Heart:    Regular and normal rate, normal S1 and S2, grade 2/6 systolic murmur, no gallop, no rub, no click   Abdomen:  Extremities:   Soft, nontender, bowel sounds audible x4  1+ generalized edema, normal range of motion   Pulses:   Pulses palpable and equal bilaterally      Results Review:   Results from last 7 days   Lab Units 09/06/24  0501 09/05/24  0529 09/04/24  0532   SODIUM mmol/L 141 144 142   POTASSIUM mmol/L 3.8 4.3 4.4   CHLORIDE mmol/L 102 103 100   CO2 mmol/L 32.0* 29.0 29.0   BUN mg/dL 19 14 14   CREATININE mg/dL 0.91 0.71* 0.82   GLUCOSE mg/dL 93 95 119*   CALCIUM mg/dL 9.1 9.0 9.0     Results from last 7 days   Lab Units  09/06/24  0501 09/05/24  0426 09/04/24  0532   WBC 10*3/mm3 5.62 5.59 10.48   HEMOGLOBIN g/dL 9.0* 11.5* 10.3*   HEMATOCRIT % 30.2* 38.1 33.9*   PLATELETS 10*3/mm3 277 231 315     Results from last 7 days   Lab Units 09/01/24  1050   CHOLESTEROL mg/dL 225*   TRIGLYCERIDES mg/dL 112   HDL CHOL mg/dL 61*   LDL CHOL mg/dL 144*     Results from last 7 days   Lab Units 09/01/24  1050   HEMOGLOBIN A1C % 6.00*     Results from last 7 days   Lab Units 09/03/24  2358 09/03/24  2112 09/02/24  1717   HSTROP T ng/L 338* 330* 252*     NO NEW EKG / CXR / LAB RESULTS.    ACCU-CHEKS: 92- 571-102- mg/DL.    Medication Review: REVIEWED; DRIPS:    IV fentanyl 300 mcg/hour continuous infusion  IV Ketamine 1.9 mcg/kilogram/hour continuous infusion  IV midazolam 10 mg/hour continuous infusion    Assessment & Plan     Marked positive intake over output.  Currently receiving approximately 250 cc IV fluids per hour as well as his NG feedings at 75 cc/hour.  Hopefully IV ketamine can be weaned.  Will prescribe IV Lasix 40 mg BID.  Slow progress.  Unfortunately, marginal systolic blood pressure precludes additional guideline directed medical therapy for advanced ischemic cardiomyopathy at this time other than empagliflozin.    Discussed with RN.      Type 2 respiratory failure    HFrEF (heart failure with reduced ejection fraction)    Coronary artery disease involving native coronary artery of native heart with angina pectoris    NSTEMI (non-ST elevated myocardial infarction)    Right lower lobe pneumonia    09/07/24  07:30 EDT

## 2024-09-07 NOTE — PLAN OF CARE
Goal Outcome Evaluation:  -pt remains mechanically intubated, FiO2 @40%, PEEP of 8, moderate amount of subglottal secretions  -VSS   -total , over 3L positive net   -Fentanyl @300, versed @10, ketamine @1.9  -pt was restless and fighting ventilator overnight despite prn bolus.   -Prn haldol was effective given x2. Pt O2 improved and decreased s/s of agitation  -TF at goal rate of 75mL. Pt tolerating  -no BM this shift  -pt moves extremities but not to command.   -Arterial line was pulled, line would not draw back and positional

## 2024-09-07 NOTE — PROGRESS NOTES
INTENSIVIST   PROGRESS NOTE        SUBJECTIVE     Fernie 64 y.o. male is followed for: Chest Pain       Type 2 respiratory failure    HFrEF (heart failure with reduced ejection fraction)    Coronary artery disease involving native coronary artery of native heart with angina pectoris    NSTEMI (non-ST elevated myocardial infarction)    Right lower lobe pneumonia    As an Intensivist, we provide an integrated approach to the ICU patient and family, medical management of comorbid conditions, including but not limited to electrolytes, glycemic control, organ dysfunction, lead interdisciplinary rounds and coordinate the care with all other services, including those from other specialists.     Interval History:    Still anxious and restless despite Midazolam and Ketamine infusions.    Temp  Min: 97.7 °F (36.5 °C)  Max: 100.8 °F (38.2 °C)       History     Last Reviewed by Victor Hugo Cool MD on 9/3/2024 at  6:37 PM    Sections Reviewed    Medical, Surgical, Family, Tobacco, Alcohol, Drug Use, Sexual Activity,   Social Documentation    Problem list reviewed by Victor Hugo Cool MD on 9/3/2024 at  6:37 PM  Medicines reviewed by Victor Hugo Cool MD on 9/3/2024 at  6:37 PM  Allergies reviewed by Victor Hugo Cool MD on 9/3/2024 at  6:37 PM       The patient's relevant past medical, surgical and social history were reviewed and updated in Epic as appropriate.        OBJECTIVE     Vitals:  Temp: 98.1 °F (36.7 °C) (24) Temp  Min: 97.7 °F (36.5 °C)  Max: 100.8 °F (38.2 °C)   Temp core:      BP: 91/58 (24) BP  Min: 79/52  Max: 114/62   MAP (non-invasive) Noninvasive MAP (mmHg): 73 (24) Noninvasive MAP (mmHg)  Av.5  Min: 48  Max: 177   Pulse: 71 (24) Pulse  Min: 69  Max: 109   Resp: 15 (24 1500) Resp  Min: 15  Max: 16   SpO2: 98 % (24) SpO2  Min: 68 %  Max: 100 %   Device: ventilator (24)    Flow Rate: 3 (24 1600) No data recorded         24  0600  09/06/24  0800   Weight: 118 kg (260 lb 12.9 oz) 118 kg (260 lb 5.8 oz)        Intake/Ouptut 24 hrs (7:00AM - 6:59 AM)  Intake & Output (last 2 days)         09/05 0701 09/06 0700 09/06 0701 09/07 0700 09/07 0701 09/08 0700    I.V. (mL/kg) 2592.5 (22) 6298.7 (53.4) 2026.2 (17.2)    Other 40 205 60    NG/ 1774 751    IV Piggyback 100 428.5     Total Intake(mL/kg) 3323.5 (28.2) 8706.2 (73.8) 2837.2 (24)    Urine (mL/kg/hr) 2220 (0.8) 2870 (1) 1625 (1.6)    Stool   0    Total Output 2220 2870 1625    Net +1103.5 +5836.2 +1212.2           Stool Unmeasured Occurrence   1 x            Medications (drips):  fentanyl 10 mcg/mL, Last Rate: 300 mcg/hr (09/07/24 0719)  ketamine, Last Rate: 1.9 mg/kg/hr (09/07/24 0753)  midazolam, Last Rate: 10 mg/hr (09/07/24 0424)  norepinephrine, Last Rate: Stopped (09/05/24 0515)    fentanyl 10 mcg/mL, Last Rate: 300 mcg/hr (09/07/24 1508)  norepinephrine, Last Rate: 0.02 mcg/kg/min (09/07/24 1510)  propofol, Last Rate: 50 mcg/kg/min (09/07/24 1326)         Hemodynamics (Acumen HP Arterial line):   CVP:     PAP:     PAOP:     CO: CO (L/min): 4.1 L/min (09/07/24 0000)   CI: CI (L/min/m2): 1.81 L/min/m2 (09/07/24 0000)   SVI: SVI: 25.14 (09/07/24 0000)   SVR:        Invasive Mechanical Ventilator   Settings: Observed:   Mode: VC+/AC (09/07/24 1500)    Resp Rate (Set): 15 (09/07/24 1500) Resp Rate (Observed) Vent: 15 (09/07/24 1519)   Vt (Set, mL): 490 mL (09/07/24 1500) Vt, Exp (observed, mL): 461 mL (09/07/24 1500)    Minute Ventilation (L/min) (Obs): 6.94 L/min (09/07/24 1500)    I:E Ratio (Obs): 1:2.8 (09/07/24 1500)       FiO2 (%): 50 % (09/07/24 1500) Plateau Pressure (cm H2O): (S) 21 cm H2O (09/07/24 0648)   PEEP/CPAP (cm H2O): 12 cm H20 (09/07/24 1500) Driving Pressure (cm H2O): 12.9 cm H2O (09/07/24 0648)    Static Compliance (L/cm H2O): 37 (09/07/24 0648)      Physical Examination  Telemetry:  Rhythm: normal sinus rhythm (09/07/24 1200)  Conduction Defect: right bundle  branch block (09/06/24 0600)   Constitutional:  No acute distress.   Cardiovascular: RRR.    Respiratory: Normal breath sounds  (+) Rhonchi   Abdominal:  Soft with no tenderness.   Extremities: 1+ Edema   Neurological:   Sedated.    Best Eye Response: 2-->(E2) to pain (09/07/24 1200)  Best Motor Response: 4-->(M4) withdraws from pain (09/07/24 1200)  Best Verbal Response: 1-->(V1) none (09/07/24 1200)  [place] Coma Scale Score: 7 (09/07/24 1200)    RASS (Chery Agitation-Sedation Scale): -1-->drowsy (09/07/24 1200)    Confusion Assessment Method-ICU (CAM-ICU)  Feature 3: Altered Level of Consciousness: Positive (09/07/24 1200)     Results Reviewed:  Laboratory  Microbiology  Radiology  Pathology    Hematology:  Results from last 7 days   Lab Units 09/07/24  0847 09/06/24  0501 09/05/24  0426   WBC 10*3/mm3 5.11 5.62 5.59   HEMOGLOBIN g/dL 9.0* 9.0* 11.5*   MCV fL 95.0 93.5 92.0   PLATELETS 10*3/mm3 257 277 231     Results from last 7 days   Lab Units 09/07/24  0847 09/06/24  0501 09/05/24  0426   NEUTROS ABS 10*3/mm3 3.23 3.49 3.89   LYMPHS ABS 10*3/mm3 0.87 1.02 0.89   EOS ABS 10*3/mm3 0.19 0.19 0.06     Chemistry:  Estimated Creatinine Clearance: 140.8 mL/min (A) (by C-G formula based on SCr of 0.68 mg/dL (L)).    Results from last 7 days   Lab Units 09/07/24  0847 09/06/24  0501   SODIUM mmol/L 144 141   POTASSIUM mmol/L 3.6 3.8   CHLORIDE mmol/L 108* 102   CO2 mmol/L 25.0 32.0*   BUN mg/dL 16 19   CREATININE mg/dL 0.68* 0.91   GLUCOSE mg/dL 105* 93     Results from last 7 days   Lab Units 09/07/24  0847 09/06/24  0501 09/05/24  0529   CALCIUM mg/dL 8.3* 9.1 9.0   MAGNESIUM mg/dL 1.9  --  2.4   PHOSPHORUS mg/dL  --   --  2.9     Hepatic Panel:  Results from last 7 days   Lab Units 09/07/24  0847 09/04/24  0532 09/02/24  1717   ALBUMIN g/dL 3.2* 4.0 3.5   TOTAL PROTEIN g/dL 5.3* 6.9 6.7   BILIRUBIN mg/dL 0.2 0.5 0.2   AST (SGOT) U/L 19 18 24   ALT (SGPT) U/L 13 11 12   ALK PHOS U/L 67 154* 82     Coagulation  Labs:  Results from last 7 days   Lab Units 09/01/24  1050   PROTIME Seconds 13.0   INR  0.97   APTT seconds 26.1*      Cardiac Labs:  Results from last 7 days   Lab Units 09/07/24  0847 09/06/24  0501 09/05/24  0529 09/04/24  0532 09/03/24  2358 09/03/24  2112 09/02/24  1717   PROBNP pg/mL 1,133.0* 1,656.0* 2,648.0*   < >  --  3,154.0*  --    HSTROP T ng/L  --   --   --   --  338* 330* 252*    < > = values in this interval not displayed.     Biomarkers:  Results from last 7 days   Lab Units 09/07/24  0847 09/06/24  0501 09/05/24  0529 09/02/24  0527 09/01/24  1050   LACTATE mmol/L  --   --   --   --  1.8   PROCALCITONIN ng/mL 0.41* 0.61* 0.76*   < > 4.18*    < > = values in this interval not displayed.       Phenytoin Level   Date Value Ref Range Status   09/03/2024 1.7 (L) 10.0 - 20.0 mcg/mL Final     COVID-19  Lab Results   Component Value Date    COVID19 Not Detected 09/01/2024    COVID19 Not Detected 04/22/2023    COVID19 Negative 03/23/2023       Arterial Blood Gases:  Results from last 7 days   Lab Units 09/06/24  0323 09/05/24  0426 09/04/24  0342   PH, ARTERIAL pH units 7.363 7.387 7.413   PCO2, ARTERIAL mm Hg 57.9* 53.7* 48.2*   PO2 ART mm Hg 76.0* 115.0* 113.0*   FIO2 % 35 35 40       Images:  XR Chest 1 View    Result Date: 9/7/2024  Impression: 1. The endotracheal tube tip is in stable and good position. 2. Cardiomegaly. 3. Pulmonary vascular markings are increased representing low-grade pulmonary edema. Electronically Signed: Willie Jenkins MD  9/7/2024 12:37 PM EDT  Workstation ID: [id number]    XR Chest 1 View    Result Date: 9/6/2024  Impression: No significant change, with similar diffuse interstitial and airspace opacities with small bilateral pleural effusions. Electronically Signed: Gael Gasca MD  9/6/2024 7:07 AM EDT  Workstation ID: [id number]     Echo:  Results for orders placed during the hospital encounter of 09/01/24    Adult Transthoracic Echo Complete W/ Cont if Necessary Per  Protocol    Interpretation Summary    Left ventricular systolic function is moderately decreased. Estimated left ventricular EF = 28%.  There is global hypokinesis of left ventricle.    The left ventricular cavity is mildly dilated.    The left atrial cavity is mild to moderately dilated.    Mild aortic valve stenosis is present (mean gradient 15 mmHg)    Mild to moderate mitral regurgitation is present.    Estimated right ventricular systolic pressure from tricuspid regurgitation is normal (<35 mmHg).      Results: Reviewed.  I reviewed the patient's new laboratory and imaging results.  I independently reviewed the patient's new images.    Medications: Reviewed.    Assessment   A/P     Hospital:  LOS: 4 days   ICU: 3d 21h     Active Hospital Problems    Diagnosis  POA    **Type 2 respiratory failure [J96.92]  Yes    Right lower lobe pneumonia [J18.9]  Yes    NSTEMI (non-ST elevated myocardial infarction) [I21.4]  Yes     Elevated troponin in the setting of pneumonia and CHF, 9/1/2024      HFrEF (heart failure with reduced ejection fraction) [I50.20]  Yes     Echo (9/29/2023): LVEF 36%.  Moderate MR.  RVSP 42 mmHg      Coronary artery disease involving native coronary artery of native heart with angina pectoris [I25.119]  Yes     Cardiac catheterization (4/24/2023): Severe 1-vessel CAD involving chronic total occlusion of previously stented distal RCA.  Mild to moderate disease of left coronary system       Fernie is a 64 y.o. male admitted on 9/1/2024 with worsening Dyspnea [R06.00]  Acute on chronic respiratory failure with hypoxia [J96.21]    CTA yesterday revealed no PE, right lower lobe pneumonia, no effusion or edema.  He had diuresed 5 L over 2 days.  On 09/03/24 he developed some respiratory distress and respiratory arrest.  He was emergently intubated. During this admission he had elevated troponin and ST changes consistent with a non-ST elevation MI.  He also has obstructive sleep apnea but is noncompliant  with CPAP.    Post intubation he became hypotensive requiring norepinephrine.  He was placed on Piperacillin-Tazobactam  on admission for right lower lobe pneumonia.      Assessment/Management/Treatment Plan:    Respiratory Failure, type 2, acute on chronic  Intubated 09/03/24   Invasive Mechanical Ventilation   Possible RLL pneumonia  Former smoker. Quit 2022  OAD per history.   Cardiovascular  HFrEF. EF 28%  CAD. NSTEMI  Dyslipidemia   HTN  ID/Antibiotics  Elevated PCT, 4.18, in the range of sepsis, on admission. Improving.  MRSA PCR screen Negative  Antibiotics: Piperacillin-Tazobactam   Endocrine   Body mass index is 38.56 kg/m². Obese Class II: 35-39.9kg/m2  Type 2 diabetes.    Lab Results   Lab Value Date/Time    HGBA1C 6.00 (H) 09/01/2024 1050    HGBA1C 7.30 (H) 04/25/2023 0440     Results from last 7 days   Lab Units 09/07/24  1142 09/07/24  0556 09/07/24  0004 09/06/24  1827 09/06/24  1139 09/06/24  0550 09/05/24  2345 09/05/24  1804   GLUCOSE mg/dL 147* 102 96 126 119 92 87 110       Diet: NPO Diet NPO Type: Tube Feeding   Advance Directives: Code Status and Medical Interventions: CPR (Attempt to Resuscitate); Full Support   Ordered at: 09/01/24 1318     Level Of Support Discussed With:    Patient     Code Status (Patient has no pulse and is not breathing):    CPR (Attempt to Resuscitate)     Medical Interventions (Patient has pulse or is breathing):    Full Support        VTE Prophylaxis:  Pharmacologic VTE prophylaxis orders are present.         In brief:  Hb stable at 9.0 for the past 24 h.  Magnesium replacement today.  Change Midazolam and Ketamine to Propofol  Continue Fentanyl  Norepi started after Propofol started.  Phenobarbital level in AM  Check IV therapy  Continue Enteral Nutrition   Continue empiric antibiotics  Not ready to be liberated from the ventilator.  Transition anticoagulation to prophylaxis doses } as per Cardiology  Goal: Glucose < 180 mg/dL.   Insulin SQ - correction scale for  now.  Disposition: Keep in ICU.    Plan of care and goals reviewed during interdisciplinary rounds.  I discussed the patient's findings and my recommendations with patient and nursing staff    Time: was greater than 40 minutes. This is non-concurrent time.   (This excludes time spent performing separately reportable procedures and services).    He has a high risk of imminent or life-threatening  deterioration, which requires the highest level of physician preparedness to intervene urgently. I devoted my full attention to the direct care of this patient for the amount of time indicated above.     Time spent with family or surrogate(s) is included only if the patient was incapable of providing the necessary information or participating in medical decision making.    He has the following organ/system impairments Respiratory Failure.         [x] Primary Attending Intensive Care Medicine - Nutrition Support   [] Consultant    Copied text in this note has been reviewed and is accurate as of 09/07/24

## 2024-09-08 ENCOUNTER — APPOINTMENT (OUTPATIENT)
Dept: NEUROLOGY | Facility: HOSPITAL | Age: 64
End: 2024-09-08
Payer: MEDICAID

## 2024-09-08 ENCOUNTER — APPOINTMENT (OUTPATIENT)
Dept: GENERAL RADIOLOGY | Facility: HOSPITAL | Age: 64
End: 2024-09-08
Payer: MEDICAID

## 2024-09-08 LAB
ALBUMIN SERPL-MCNC: 3.4 G/DL (ref 3.5–5.2)
ALBUMIN/GLOB SERPL: 1.2 G/DL
ALP SERPL-CCNC: 79 U/L (ref 39–117)
ALT SERPL W P-5'-P-CCNC: 21 U/L (ref 1–41)
ANION GAP SERPL CALCULATED.3IONS-SCNC: 7 MMOL/L (ref 5–15)
ARTERIAL PATENCY WRIST A: POSITIVE
AST SERPL-CCNC: 28 U/L (ref 1–40)
ATMOSPHERIC PRESS: ABNORMAL MM[HG]
BASE EXCESS BLDA CALC-SCNC: 4.2 MMOL/L (ref 0–2)
BASOPHILS # BLD AUTO: 0.03 10*3/MM3 (ref 0–0.2)
BASOPHILS NFR BLD AUTO: 0.5 % (ref 0–1.5)
BDY SITE: ABNORMAL
BILIRUB SERPL-MCNC: 0.2 MG/DL (ref 0–1.2)
BODY TEMPERATURE: 37
BUN SERPL-MCNC: 21 MG/DL (ref 8–23)
BUN/CREAT SERPL: 32.8 (ref 7–25)
CALCIUM SPEC-SCNC: 8.9 MG/DL (ref 8.6–10.5)
CHLORIDE SERPL-SCNC: 107 MMOL/L (ref 98–107)
CO2 BLDA-SCNC: 33.1 MMOL/L (ref 22–33)
CO2 SERPL-SCNC: 30 MMOL/L (ref 22–29)
COHGB MFR BLD: 1.3 % (ref 0–2)
CREAT SERPL-MCNC: 0.64 MG/DL (ref 0.76–1.27)
DEPRECATED RDW RBC AUTO: 52 FL (ref 37–54)
EGFRCR SERPLBLD CKD-EPI 2021: 106.4 ML/MIN/1.73
EOSINOPHIL # BLD AUTO: 0.18 10*3/MM3 (ref 0–0.4)
EOSINOPHIL NFR BLD AUTO: 2.8 % (ref 0.3–6.2)
EPAP: 0
ERYTHROCYTE [DISTWIDTH] IN BLOOD BY AUTOMATED COUNT: 14.9 % (ref 12.3–15.4)
GLOBULIN UR ELPH-MCNC: 2.9 GM/DL
GLUCOSE BLDC GLUCOMTR-MCNC: 126 MG/DL (ref 70–130)
GLUCOSE BLDC GLUCOMTR-MCNC: 137 MG/DL (ref 70–130)
GLUCOSE BLDC GLUCOMTR-MCNC: 139 MG/DL (ref 70–130)
GLUCOSE BLDC GLUCOMTR-MCNC: 145 MG/DL (ref 70–130)
GLUCOSE BLDC GLUCOMTR-MCNC: 148 MG/DL (ref 70–130)
GLUCOSE SERPL-MCNC: 137 MG/DL (ref 65–99)
HCO3 BLDA-SCNC: 31.3 MMOL/L (ref 20–26)
HCT VFR BLD AUTO: 32.7 % (ref 37.5–51)
HCT VFR BLD CALC: 29.8 % (ref 38–51)
HGB BLD-MCNC: 9.4 G/DL (ref 13–17.7)
HGB BLDA-MCNC: 9.7 G/DL (ref 13.5–17.5)
IMM GRANULOCYTES # BLD AUTO: 0.08 10*3/MM3 (ref 0–0.05)
IMM GRANULOCYTES NFR BLD AUTO: 1.3 % (ref 0–0.5)
INHALED O2 CONCENTRATION: 40 %
IPAP: 0
LYMPHOCYTES # BLD AUTO: 0.95 10*3/MM3 (ref 0.7–3.1)
LYMPHOCYTES NFR BLD AUTO: 14.8 % (ref 19.6–45.3)
MAGNESIUM SERPL-MCNC: 2.4 MG/DL (ref 1.6–2.4)
MCH RBC QN AUTO: 27.5 PG (ref 26.6–33)
MCHC RBC AUTO-ENTMCNC: 28.7 G/DL (ref 31.5–35.7)
MCV RBC AUTO: 95.6 FL (ref 79–97)
METHGB BLD QL: 0.3 % (ref 0–1.5)
MODALITY: ABNORMAL
MONOCYTES # BLD AUTO: 0.83 10*3/MM3 (ref 0.1–0.9)
MONOCYTES NFR BLD AUTO: 13 % (ref 5–12)
NEUTROPHILS NFR BLD AUTO: 4.33 10*3/MM3 (ref 1.7–7)
NEUTROPHILS NFR BLD AUTO: 67.6 % (ref 42.7–76)
NRBC BLD AUTO-RTO: 0 /100 WBC (ref 0–0.2)
OXYHGB MFR BLDV: 94.7 % (ref 94–99)
PAW @ PEAK INSP FLOW SETTING VENT: 0 CMH2O
PCO2 BLDA: 60 MM HG (ref 35–45)
PCO2 TEMP ADJ BLD: 60 MM HG (ref 35–48)
PEEP RESPIRATORY: 10 CM[H2O]
PH BLDA: 7.33 PH UNITS (ref 7.35–7.45)
PH, TEMP CORRECTED: 7.33 PH UNITS
PHENYTOIN SERPL-MCNC: 0.9 MCG/ML (ref 10–20)
PLATELET # BLD AUTO: 285 10*3/MM3 (ref 140–450)
PMV BLD AUTO: 9.9 FL (ref 6–12)
PO2 BLDA: 82.8 MM HG (ref 83–108)
PO2 TEMP ADJ BLD: 82.8 MM HG (ref 83–108)
POTASSIUM SERPL-SCNC: 4.3 MMOL/L (ref 3.5–5.2)
PROT SERPL-MCNC: 6.3 G/DL (ref 6–8.5)
RBC # BLD AUTO: 3.42 10*6/MM3 (ref 4.14–5.8)
SODIUM SERPL-SCNC: 144 MMOL/L (ref 136–145)
TOTAL RATE: 15 BREATHS/MINUTE
VENTILATOR MODE: ABNORMAL
VT ON VENT VENT: 0.49 ML
WBC NRBC COR # BLD AUTO: 6.4 10*3/MM3 (ref 3.4–10.8)

## 2024-09-08 PROCEDURE — 95819 EEG AWAKE AND ASLEEP: CPT

## 2024-09-08 PROCEDURE — 25010000002 FENTANYL 10 MCG/1 ML NS: Performed by: INTERNAL MEDICINE

## 2024-09-08 PROCEDURE — 25010000002 METHYLPREDNISOLONE PER 40 MG: Performed by: INTERNAL MEDICINE

## 2024-09-08 PROCEDURE — 83050 HGB METHEMOGLOBIN QUAN: CPT

## 2024-09-08 PROCEDURE — 95819 EEG AWAKE AND ASLEEP: CPT | Performed by: PSYCHIATRY & NEUROLOGY

## 2024-09-08 PROCEDURE — 63710000001 REVEFENACIN 175 MCG/3ML SOLUTION: Performed by: INTERNAL MEDICINE

## 2024-09-08 PROCEDURE — 25010000002 ENOXAPARIN PER 10 MG: Performed by: INTERNAL MEDICINE

## 2024-09-08 PROCEDURE — 25010000002 LORAZEPAM PER 2 MG: Performed by: INTERNAL MEDICINE

## 2024-09-08 PROCEDURE — 25010000002 PIPERACILLIN SOD-TAZOBACTAM PER 1 G: Performed by: INTERNAL MEDICINE

## 2024-09-08 PROCEDURE — 82375 ASSAY CARBOXYHB QUANT: CPT

## 2024-09-08 PROCEDURE — 80053 COMPREHEN METABOLIC PANEL: CPT | Performed by: INTERNAL MEDICINE

## 2024-09-08 PROCEDURE — 85025 COMPLETE CBC W/AUTO DIFF WBC: CPT | Performed by: INTERNAL MEDICINE

## 2024-09-08 PROCEDURE — 94799 UNLISTED PULMONARY SVC/PX: CPT

## 2024-09-08 PROCEDURE — 36600 WITHDRAWAL OF ARTERIAL BLOOD: CPT

## 2024-09-08 PROCEDURE — 82805 BLOOD GASES W/O2 SATURATION: CPT

## 2024-09-08 PROCEDURE — 71045 X-RAY EXAM CHEST 1 VIEW: CPT

## 2024-09-08 PROCEDURE — 80185 ASSAY OF PHENYTOIN TOTAL: CPT | Performed by: INTERNAL MEDICINE

## 2024-09-08 PROCEDURE — 25010000002 PROPOFOL 10 MG/ML EMULSION: Performed by: INTERNAL MEDICINE

## 2024-09-08 PROCEDURE — 83735 ASSAY OF MAGNESIUM: CPT | Performed by: INTERNAL MEDICINE

## 2024-09-08 PROCEDURE — 25010000002 FOSPHENYTOIN 500 MG PE/10ML SOLUTION 10 ML VIAL: Performed by: INTERNAL MEDICINE

## 2024-09-08 PROCEDURE — 82948 REAGENT STRIP/BLOOD GLUCOSE: CPT

## 2024-09-08 PROCEDURE — 99291 CRITICAL CARE FIRST HOUR: CPT | Performed by: INTERNAL MEDICINE

## 2024-09-08 PROCEDURE — 99232 SBSQ HOSP IP/OBS MODERATE 35: CPT | Performed by: INTERNAL MEDICINE

## 2024-09-08 PROCEDURE — 94003 VENT MGMT INPAT SUBQ DAY: CPT

## 2024-09-08 PROCEDURE — 25010000002 FUROSEMIDE PER 20 MG: Performed by: INTERNAL MEDICINE

## 2024-09-08 RX ORDER — DIAZEPAM 10 MG
10 TABLET ORAL 2 TIMES DAILY
Status: DISCONTINUED | OUTPATIENT
Start: 2024-09-08 | End: 2024-09-25 | Stop reason: HOSPADM

## 2024-09-08 RX ADMIN — ALBUTEROL SULFATE 2.5 MG: 2.5 SOLUTION RESPIRATORY (INHALATION) at 01:13

## 2024-09-08 RX ADMIN — CHLORHEXIDINE GLUCONATE, 0.12% ORAL RINSE 15 ML: 1.2 SOLUTION DENTAL at 21:12

## 2024-09-08 RX ADMIN — Medication 150 MCG/HR: at 11:36

## 2024-09-08 RX ADMIN — PROPOFOL 35 MCG/KG/MIN: 10 INJECTION, EMULSION INTRAVENOUS at 00:58

## 2024-09-08 RX ADMIN — PIPERACILLIN AND TAZOBACTAM 3.38 G: 3; .375 INJECTION, POWDER, LYOPHILIZED, FOR SOLUTION INTRAVENOUS at 09:23

## 2024-09-08 RX ADMIN — LORAZEPAM 2 MG: 2 INJECTION INTRAMUSCULAR; INTRAVENOUS at 03:54

## 2024-09-08 RX ADMIN — Medication 10 ML: at 21:13

## 2024-09-08 RX ADMIN — RISPERIDONE 2 MG: 1 SOLUTION ORAL at 09:23

## 2024-09-08 RX ADMIN — CITALOPRAM HYDROBROMIDE 20 MG: 20 TABLET ORAL at 09:23

## 2024-09-08 RX ADMIN — ENOXAPARIN SODIUM 40 MG: 100 INJECTION SUBCUTANEOUS at 09:22

## 2024-09-08 RX ADMIN — LORAZEPAM 2 MG: 2 INJECTION INTRAMUSCULAR; INTRAVENOUS at 09:22

## 2024-09-08 RX ADMIN — DIAZEPAM 10 MG: 10 TABLET ORAL at 21:12

## 2024-09-08 RX ADMIN — PANTOPRAZOLE SODIUM 40 MG: 40 INJECTION, POWDER, LYOPHILIZED, FOR SOLUTION INTRAVENOUS at 05:15

## 2024-09-08 RX ADMIN — ASPIRIN 81 MG 81 MG: 81 TABLET ORAL at 09:31

## 2024-09-08 RX ADMIN — EMPAGLIFLOZIN 10 MG: 10 TABLET, FILM COATED ORAL at 09:23

## 2024-09-08 RX ADMIN — SENNOSIDES 2 TABLET: 8.6 TABLET, FILM COATED ORAL at 21:12

## 2024-09-08 RX ADMIN — FOSPHENYTOIN SODIUM 200 MG PE: 50 INJECTION, SOLUTION INTRAMUSCULAR; INTRAVENOUS at 21:05

## 2024-09-08 RX ADMIN — ALBUTEROL SULFATE 2.5 MG: 2.5 SOLUTION RESPIRATORY (INHALATION) at 13:46

## 2024-09-08 RX ADMIN — PROPOFOL 30 MCG/KG/MIN: 10 INJECTION, EMULSION INTRAVENOUS at 11:13

## 2024-09-08 RX ADMIN — SODIUM CHLORIDE 2000 MG PE: 9 INJECTION, SOLUTION INTRAVENOUS at 11:00

## 2024-09-08 RX ADMIN — ALBUTEROL SULFATE 2.5 MG: 2.5 SOLUTION RESPIRATORY (INHALATION) at 07:22

## 2024-09-08 RX ADMIN — PIPERACILLIN AND TAZOBACTAM 3.38 G: 3; .375 INJECTION, POWDER, LYOPHILIZED, FOR SOLUTION INTRAVENOUS at 17:53

## 2024-09-08 RX ADMIN — SENNOSIDES 2 TABLET: 8.6 TABLET, FILM COATED ORAL at 09:22

## 2024-09-08 RX ADMIN — PIPERACILLIN AND TAZOBACTAM 3.38 G: 3; .375 INJECTION, POWDER, LYOPHILIZED, FOR SOLUTION INTRAVENOUS at 02:11

## 2024-09-08 RX ADMIN — ALBUTEROL SULFATE 2.5 MG: 2.5 SOLUTION RESPIRATORY (INHALATION) at 19:29

## 2024-09-08 RX ADMIN — ARFORMOTEROL TARTRATE 15 MCG: 15 SOLUTION RESPIRATORY (INHALATION) at 19:29

## 2024-09-08 RX ADMIN — RISPERIDONE 2 MG: 1 SOLUTION ORAL at 21:12

## 2024-09-08 RX ADMIN — PROPOFOL 25 MCG/KG/MIN: 10 INJECTION, EMULSION INTRAVENOUS at 19:42

## 2024-09-08 RX ADMIN — PROPOFOL 30 MCG/KG/MIN: 10 INJECTION, EMULSION INTRAVENOUS at 05:50

## 2024-09-08 RX ADMIN — FUROSEMIDE 40 MG: 10 INJECTION, SOLUTION INTRAMUSCULAR; INTRAVENOUS at 21:07

## 2024-09-08 RX ADMIN — METHYLPREDNISOLONE SODIUM SUCCINATE 40 MG: 40 INJECTION, POWDER, FOR SOLUTION INTRAMUSCULAR; INTRAVENOUS at 09:22

## 2024-09-08 RX ADMIN — ARFORMOTEROL TARTRATE 15 MCG: 15 SOLUTION RESPIRATORY (INHALATION) at 07:22

## 2024-09-08 RX ADMIN — CHLORHEXIDINE GLUCONATE, 0.12% ORAL RINSE 15 ML: 1.2 SOLUTION DENTAL at 09:23

## 2024-09-08 RX ADMIN — ATORVASTATIN CALCIUM 80 MG: 40 TABLET, FILM COATED ORAL at 09:22

## 2024-09-08 RX ADMIN — REVEFENACIN 175 MCG: 175 SOLUTION RESPIRATORY (INHALATION) at 07:22

## 2024-09-08 RX ADMIN — Medication 287.5 MCG/HR: at 00:07

## 2024-09-08 RX ADMIN — PROPOFOL 30 MCG/KG/MIN: 10 INJECTION, EMULSION INTRAVENOUS at 15:14

## 2024-09-08 RX ADMIN — FUROSEMIDE 40 MG: 10 INJECTION, SOLUTION INTRAMUSCULAR; INTRAVENOUS at 09:22

## 2024-09-08 NOTE — PLAN OF CARE
Goal Outcome Evaluation:      Unable to wean ventilator support

## 2024-09-08 NOTE — PROGRESS NOTES
"INTENSIVIST   PROGRESS NOTE        SUBJECTIVE     Fernie 64 y.o. male is followed for: Chest Pain       Type 2 respiratory failure    HFrEF (heart failure with reduced ejection fraction)    Coronary artery disease involving native coronary artery of native heart with angina pectoris    NSTEMI (non-ST elevated myocardial infarction)    Right lower lobe pneumonia    As an Intensivist, we provide an integrated approach to the ICU patient and family, medical management of comorbid conditions, including but not limited to electrolytes, glycemic control, organ dysfunction, lead interdisciplinary rounds and coordinate the care with all other services, including those from other specialists.     Interval History:    Sedated on Propofol.    Occasionally \"jerky\" movements with his arms and or shoulders.    Temp  Min: 97.7 °F (36.5 °C)  Max: 100.8 °F (38.2 °C)       History     Last Reviewed by Victor Hugo Cool MD on 9/3/2024 at  6:37 PM    Sections Reviewed    Medical, Surgical, Family, Tobacco, Alcohol, Drug Use, Sexual Activity,   Social Documentation    Problem list reviewed by Victor Hugo Colo MD on 9/3/2024 at  6:37 PM  Medicines reviewed by Victor Hugo Cool MD on 9/3/2024 at  6:37 PM  Allergies reviewed by Victor Hugo Cool MD on 9/3/2024 at  6:37 PM       The patient's relevant past medical, surgical and social history were reviewed and updated in Epic as appropriate.        OBJECTIVE     Vitals:  Temp: 98.9 °F (37.2 °C) (24 0430) Temp  Min: 97.7 °F (36.5 °C)  Max: 100.8 °F (38.2 °C)   Temp core:      BP: 100/67 (24 0645) BP  Min: 79/67  Max: 123/108   MAP (non-invasive) Noninvasive MAP (mmHg): 81 (24 0645) Noninvasive MAP (mmHg)  Av.4  Min: 48  Max: 177   Pulse: 98 (24 0855) Pulse  Min: 67  Max: 109   Resp: 15 (24 0855) Resp  Min: 15  Max: 15   SpO2: 96 % (24 08) SpO2  Min: 86 %  Max: 98 %   Device: ventilator (24)    Flow Rate: 3 (24 1600) No data recorded         " 09/06/24 0600 09/06/24 0800   Weight: 118 kg (260 lb 12.9 oz) 118 kg (260 lb 5.8 oz)        Intake/Ouptut 24 hrs (7:00AM - 6:59 AM)  Intake & Output (last 2 days)         09/06 0701 09/07 0700 09/07 0701 09/08 0700 09/08 0701 09/09 0700    I.V. (mL/kg) 6298.7 (53.4) 3025.6 (25.6)     Other 205 121     NG/GT 1774 2340     IV Piggyback 428.5 227.1     Total Intake(mL/kg) 8706.2 (73.8) 5713.7 (48.4)     Urine (mL/kg/hr) 2870 (1) 4290 (1.5)     Stool  0     Total Output 2870 4290     Net +5836.2 +1423.7            Stool Unmeasured Occurrence  1 x             Medications (drips):    fentanyl 10 mcg/mL, Last Rate: 150 mcg/hr (09/08/24 0633)  norepinephrine, Last Rate: 0.02 mcg/kg/min (09/07/24 1830)  propofol, Last Rate: 30 mcg/kg/min (09/08/24 0550)       Invasive Mechanical Ventilator   Settings: Observed:   Mode: VC+/AC (09/08/24 0855)    Resp Rate (Set): 15 (09/08/24 0855) Resp Rate (Observed) Vent: 15 (09/08/24 0855)   Vt (Set, mL): 490 mL (09/08/24 0855) Vt, Exp (observed, mL): 494 mL (09/08/24 0855)    Minute Ventilation (L/min) (Obs): 7.08 L/min (09/08/24 0855)    I:E Ratio (Obs): 1:2.8 (09/08/24 0855)       FiO2 (%): 40 % (09/08/24 0855) Plateau Pressure (cm H2O): (S) 21 cm H2O (09/07/24 1856)   PEEP/CPAP (cm H2O): 10 cm H20 (09/08/24 0855) Driving Pressure (cm H2O): 11 cm H2O (09/07/24 1856)    Static Compliance (L/cm H2O): 36 (09/07/24 1856)      Physical Examination  Telemetry:  Rhythm: normal sinus rhythm (09/08/24 0600)  Conduction Defect: right bundle branch block (09/06/24 0600)   Constitutional:  No acute distress.   Cardiovascular: RRR.    Respiratory: Normal breath sounds  (+) Rhonchi   Abdominal:  Soft with no tenderness.   Extremities: Trace Edema   Neurological:   Sedated.    Best Eye Response: 3-->(E3) to speech (09/08/24 0600)  Best Motor Response: 4-->(M4) withdraws from pain (09/08/24 0600)  Best Verbal Response: 1-->(V1) none (09/08/24 0600)  Leo Coma Scale Score: 8 (09/08/24  0600)    RASS (Chery Agitation-Sedation Scale): -3-->moderate sedation (09/08/24 0600)    Confusion Assessment Method-ICU (CAM-ICU)  Feature 3: Altered Level of Consciousness: Positive (09/08/24 0600)     Results Reviewed:  Laboratory  Microbiology  Radiology  Pathology    Hematology:  Results from last 7 days   Lab Units 09/08/24  0505 09/07/24  0847 09/06/24  0501   WBC 10*3/mm3 6.40 5.11 5.62   HEMOGLOBIN g/dL 9.4* 9.0* 9.0*   MCV fL 95.6 95.0 93.5   PLATELETS 10*3/mm3 285 257 277     Results from last 7 days   Lab Units 09/08/24  0505 09/07/24  0847 09/06/24  0501   NEUTROS ABS 10*3/mm3 4.33 3.23 3.49   LYMPHS ABS 10*3/mm3 0.95 0.87 1.02   EOS ABS 10*3/mm3 0.18 0.19 0.19     Chemistry:  Estimated Creatinine Clearance: 149.6 mL/min (A) (by C-G formula based on SCr of 0.64 mg/dL (L)).    Results from last 7 days   Lab Units 09/08/24  0505 09/07/24 2042 09/07/24  0847   SODIUM mmol/L 144  --  144   POTASSIUM mmol/L 4.3 4.9 3.6   CHLORIDE mmol/L 107  --  108*   CO2 mmol/L 30.0*  --  25.0   BUN mg/dL 21  --  16   CREATININE mg/dL 0.64*  --  0.68*   GLUCOSE mg/dL 137*  --  105*     Results from last 7 days   Lab Units 09/08/24  0505 09/07/24  0847 09/06/24  0501 09/05/24  0529   CALCIUM mg/dL 8.9 8.3*   < > 9.0   MAGNESIUM mg/dL 2.4 1.9  --  2.4   PHOSPHORUS mg/dL  --   --   --  2.9    < > = values in this interval not displayed.     Hepatic Panel:  Results from last 7 days   Lab Units 09/08/24  0505 09/07/24  0847 09/04/24  0532   ALBUMIN g/dL 3.4* 3.2* 4.0   TOTAL PROTEIN g/dL 6.3 5.3* 6.9   BILIRUBIN mg/dL 0.2 0.2 0.5   AST (SGOT) U/L 28 19 18   ALT (SGPT) U/L 21 13 11   ALK PHOS U/L 79 67 154*     Coagulation Labs:  Results from last 7 days   Lab Units 09/01/24  1050   PROTIME Seconds 13.0   INR  0.97   APTT seconds 26.1*      Cardiac Labs:  Results from last 7 days   Lab Units 09/07/24  0847 09/06/24  0501 09/05/24  0529 09/04/24  0532 09/03/24  2358 09/03/24  2112 09/02/24  1717   PROBNP pg/mL 1,133.0*  1,656.0* 2,648.0*   < >  --  3,154.0*  --    HSTROP T ng/L  --   --   --   --  338* 330* 252*    < > = values in this interval not displayed.     Biomarkers:  Results from last 7 days   Lab Units 09/07/24  0847 09/06/24  0501 09/05/24  0529 09/02/24  0527 09/01/24  1050   LACTATE mmol/L  --   --   --   --  1.8   PROCALCITONIN ng/mL 0.41* 0.61* 0.76*   < > 4.18*    < > = values in this interval not displayed.       Phenytoin Level   Date Value Ref Range Status   09/08/2024 0.9 (L) 10.0 - 20.0 mcg/mL Final   09/03/2024 1.7 (L) 10.0 - 20.0 mcg/mL Final     COVID-19  Lab Results   Component Value Date    COVID19 Not Detected 09/01/2024    COVID19 Not Detected 04/22/2023    COVID19 Negative 03/23/2023       Arterial Blood Gases:  Results from last 7 days   Lab Units 09/08/24  0513 09/06/24  0323 09/05/24  0426   PH, ARTERIAL pH units 7.326* 7.363 7.387   PCO2, ARTERIAL mm Hg 60.0* 57.9* 53.7*   PO2 ART mm Hg 82.8* 76.0* 115.0*   FIO2 % 40 35 35       Images:  XR Chest 1 View    Result Date: 9/8/2024  Impression: 1. Support devices appear in expected position. 2. Cardiomegaly with interstitial edema pattern and probable left-sided pleural effusion, similar to the prior exam. Electronically Signed: Christiano Jimenez  9/8/2024 9:12 AM EDT  Workstation ID: [id number]    XR Chest 1 View    Result Date: 9/7/2024  Impression: 1. The endotracheal tube tip is in stable and good position. 2. Cardiomegaly. 3. Pulmonary vascular markings are increased representing low-grade pulmonary edema. Electronically Signed: Willie Jenkins MD  9/7/2024 12:37 PM EDT  Workstation ID: [id number]     Echo:  Results for orders placed during the hospital encounter of 09/01/24    Adult Transthoracic Echo Complete W/ Cont if Necessary Per Protocol    Interpretation Summary    Left ventricular systolic function is moderately decreased. Estimated left ventricular EF = 28%.  There is global hypokinesis of left ventricle.    The left ventricular cavity is mildly  dilated.    The left atrial cavity is mild to moderately dilated.    Mild aortic valve stenosis is present (mean gradient 15 mmHg)    Mild to moderate mitral regurgitation is present.    Estimated right ventricular systolic pressure from tricuspid regurgitation is normal (<35 mmHg).      Results: Reviewed.  I reviewed the patient's new laboratory and imaging results.  I independently reviewed the patient's new images.    Medications: Reviewed.    Assessment   A/P     Hospital:  LOS: 5 days   ICU: 4d 15h     Active Hospital Problems    Diagnosis  POA    **Type 2 respiratory failure [J96.92]  Yes    Right lower lobe pneumonia [J18.9]  Yes    NSTEMI (non-ST elevated myocardial infarction) [I21.4]  Yes     Elevated troponin in the setting of pneumonia and CHF, 9/1/2024      HFrEF (heart failure with reduced ejection fraction) [I50.20]  Yes     Echo (9/29/2023): LVEF 36%.  Moderate MR.  RVSP 42 mmHg      Coronary artery disease involving native coronary artery of native heart with angina pectoris [I25.119]  Yes     Cardiac catheterization (4/24/2023): Severe 1-vessel CAD involving chronic total occlusion of previously stented distal RCA.  Mild to moderate disease of left coronary system       Fernie is a 64 y.o. male admitted on 9/1/2024 with worsening Dyspnea [R06.00]  Acute on chronic respiratory failure with hypoxia [J96.21]    CTA yesterday revealed no PE, right lower lobe pneumonia, no effusion or edema.  He had diuresed 5 L over 2 days.  On 09/03/24 he developed some respiratory distress and respiratory arrest.  He was emergently intubated. During this admission he had elevated troponin and ST changes consistent with a non-ST elevation MI.  He also has obstructive sleep apnea but is noncompliant with CPAP.    Post intubation he became hypotensive requiring norepinephrine.  He was placed on Piperacillin-Tazobactam  on admission for right lower lobe pneumonia.      Assessment/Management/Treatment Plan:    Respiratory  Failure, type 2, acute on chronic  Intubated 09/03/24   Invasive Mechanical Ventilation   Possible RLL pneumonia  Former smoker. Quit 2022  OAD per history.   Cardiovascular  HFrEF. EF 28%  CAD. NSTEMI  Dyslipidemia   HTN  Antibiotics  Elevated PCT on admission, 4.18 (09/01/24), in the range of sepsis. Improving.    Lab Results   Component Value Date    PROCALCITO 0.41 (H) 09/07/2024    PROCALCITO 1.35 (H) 09/04/2024    PROCALCITO 4.18 (H) 09/01/2024     MRSA PCR screen Negative  Antibiotics: Piperacillin-Tazobactam   Endocrine   Body mass index is 38.56 kg/m². Obese Class II: 35-39.9kg/m2  Type 2 diabetes.    Lab Results   Lab Value Date/Time    HGBA1C 6.00 (H) 09/01/2024 1050    HGBA1C 7.30 (H) 04/25/2023 0440     Results from last 7 days   Lab Units 09/08/24  0551 09/08/24  0006 09/07/24  1734 09/07/24  1142 09/07/24  0556 09/07/24  0004 09/06/24  1827 09/06/24  1139   GLUCOSE mg/dL 137* 126 140* 147* 102 96 126 119       Diet: NPO Diet NPO Type: Tube Feeding   Advance Directives: Code Status and Medical Interventions: CPR (Attempt to Resuscitate); Full Support   Ordered at: 09/01/24 1318     Level Of Support Discussed With:    Patient     Code Status (Patient has no pulse and is not breathing):    CPR (Attempt to Resuscitate)     Medical Interventions (Patient has pulse or is breathing):    Full Support        VTE Prophylaxis:  Pharmacologic VTE prophylaxis orders are present.         In brief:  Increase VE  and monitor gas exchange.  Fosphenytoin bolus dose this morning and increase scheduled doses to 200 mg BID  Follow up level on Tuesday 09/1024  Hb up to 9.4  Continue Propofol  Continue Fentanyl  Add scheduled benzodiazepines to avoid w/d, as he has been on midazolam continuous intravenous infusion, stopped yesterday.  He takes Diazepam 10 mg BID (Home medication)  EEG x 1 today.  Continue Enteral Nutrition   Labs in AM to include PAB, CRP, TG.  Continue empiric antibiotics  Not ready to be liberated from  the ventilator.  Diuresis per Cardiology  Goal: Glucose < 180 mg/dL.   Insulin SQ - correction scale for now.  Disposition: Keep in ICU.    Plan of care and goals reviewed during interdisciplinary rounds.  I discussed the patient's findings and my recommendations with patient and nursing staff    Time: was greater than 50 minutes. This is non-concurrent time.   (This excludes time spent performing separately reportable procedures and services).    He has a high risk of imminent or life-threatening  deterioration, which requires the highest level of physician preparedness to intervene urgently. I devoted my full attention to the direct care of this patient for the amount of time indicated above.     Time spent with family or surrogate(s) is included only if the patient was incapable of providing the necessary information or participating in medical decision making.    He has the following organ/system impairments Respiratory Failure.         [x] Primary Attending Intensive Care Medicine - Nutrition Support   [] Consultant    Copied text in this note has been reviewed and is accurate as of 09/08/24

## 2024-09-08 NOTE — PLAN OF CARE
Goal Outcome Evaluation:  -pt remains on mechanical ventilation FiO2 40% PEEP of 10   -VSS   -does not follow commands, but now will arouse to voice/gentle stimulation  -Fent @150, propofol @30, Levo @.02.  -pt had episode of possible seizure activity at 0347 for 10 mins, APRN notified and gave 2mg ativan prn. Symptoms have improved   -total UOP 2315 mL

## 2024-09-08 NOTE — PLAN OF CARE
Goal Outcome Evaluation:   [facility]; FiO2 40%; rate of 20  -prop at 25 mcg/kg/min; fent at 150 mcg/hr; levo at 0.05 mcg/kg/hr  -UOP~2L; no BM  -tolerating TF  -SisterOumou updated via phone.           Progress: no change

## 2024-09-08 NOTE — PROGRESS NOTES
Fernie Dalal  [id number]  1960   LOS: 5 days   Patient Care Team:  Belem Albert APRN as PCP - General (Family Medicine)  Marsha Gaitan MD as Consulting Physician (Cardiology)    Chief Complaint:   SEVERE COPD & ACUTE-ON-CHRONIC HYPOXIC HYPERCARBIC RESPIRATORY FAILURE / NSTEMI / HFrEF / ANEMIA     Subjective     Sedated and ventilated.  Tonic-clonic activity earlier this morning relieved with IV Ativan after IV midazolam was discontinued yesterday.  No current posturing or seizure activity.  Appears comfortable.  Tolerating nutritional support with Peptamen Intense VHP 75 cc/HR.    Objective     Vital Sign Min/Max for last 24 hours  Temp  Min: 97.7 °F (36.5 °C)  Max: 100.8 °F (38.2 °C)   BP  Min: 79/67  Max: 123/108   Pulse  Min: 67  Max: 109   Resp  Min: 15  Max: 15   SpO2  Min: 68 %  Max: 98 %               09/06/24  0600 09/06/24  0800   Weight: 118 kg (260 lb 12.9 oz) 118 kg (260 lb 5.8 oz)         Intake/Output Summary (Last 24 hours) at 9/8/2024 0726  Last data filed at 9/8/2024 0600  Gross per 24 hour   Intake 5713.65 ml   Output 4290 ml   Net 1423.65 ml       Physical Exam:     General Appearance:  Ventilated, sedated, in no acute distress   Lungs:   Scattered rhonchi and wheezes with left basilar crackles,respirations regular, even and unlabored (ventilator; FiO2 0.40)    Heart:    Regular and normal rate, normal S1 and S2, grade 1/6 systolic murmur, no gallop, no rub, no click   Abdomen:  Extremities:   Soft, nontender, bowel sounds audible x4  2+ generalized edema, normal range of motion   Pulses:   Pulses palpable and equal bilaterally      Results Review:   Results from last 7 days   Lab Units 09/08/24  0505 09/07/24  2042 09/07/24  0847 09/06/24  0501   SODIUM mmol/L 144  --  144 141   POTASSIUM mmol/L 4.3 4.9 3.6 3.8   CHLORIDE mmol/L 107  --  108* 102   CO2 mmol/L 30.0*  --  25.0 32.0*   BUN mg/dL 21  --  16 19   CREATININE mg/dL 0.64*  --  0.68* 0.91   GLUCOSE mg/dL 137*  --  105* 93    CALCIUM mg/dL 8.9  --  8.3* 9.1     Results from last 7 days   Lab Units 09/08/24  0505 09/07/24  0847 09/06/24  0501   WBC 10*3/mm3 6.40 5.11 5.62   HEMOGLOBIN g/dL 9.4* 9.0* 9.0*   HEMATOCRIT % 32.7* 30.7* 30.2*   PLATELETS 10*3/mm3 285 257 277     Results from last 7 days   Lab Units 09/01/24  1050   CHOLESTEROL mg/dL 225*   TRIGLYCERIDES mg/dL 112   HDL CHOL mg/dL 61*   LDL CHOL mg/dL 144*     Results from last 7 days   Lab Units 09/01/24  1050   HEMOGLOBIN A1C % 6.00*     Results from last 7 days   Lab Units 09/03/24  2358 09/03/24  2112 09/02/24  1717   HSTROP T ng/L 338* 330* 252*     ABGs (ventilator; FiO2 0.40): pH 7.33, pCO2 60, pO2 83 (95% saturation)    MAGNESIUM: 2.4    ALBUMIN: 3.4    LFTs: WNL    NO NEW EKG.    ACCU-CHEKS: [id number] mg/DL.    CXR: Marked cardiomegaly with increased pulmonary vasculature and left lower lobe atelectasis/infiltrate/effusion without discernible pneumothorax and with acceptable support tube position; formal official report pending.    Medication Review: REVIEWED; DRIPS:    IV fentanyl 150 mcg/hour continuous infusion  IV norepinephrine 0.02 mcg/kilogram/minute continuous infusion  IV propofol 30 mcg/kilogram/minute continuous infusion    Assessment & Plan     Continued marginal arterial blood gases.  Marginal systolic blood pressure on low-dose IV catecholamine support.  Excellent diuresis but still positive by 1.4 L yesterday.  Would continue current cardiac medications.  Prognosis remains guarded.  Will need to be maintained on benzodiazepines daily; will defer to intensivist.      Type 2 respiratory failure    HFrEF (heart failure with reduced ejection fraction)    Coronary artery disease involving native coronary artery of native heart with angina pectoris    NSTEMI (non-ST elevated myocardial infarction)    Right lower lobe pneumonia    09/08/24  07:26 EDT

## 2024-09-08 NOTE — PLAN OF CARE
Problem: Restraint, Nonviolent  Goal: Absence of Harm or Injury  Outcome: Ongoing, Progressing  Intervention: Implement Least Restrictive Safety Strategies  Recent Flowsheet Documentation  Taken 9/8/2024 1800 by Yenifer Park RN  Medical Device Protection:   IV pole/bag removed from visual field   torso covered   tubing secured  Less Restrictive Alternative:   bed alarm in use   calming techniques promoted   environment adjusted   positive reinforcement provided   self-care activities encouraged   therapeutic music  De-Escalation Techniques:   appropriate behavior reinforced   increased round frequency   medication administered   quiet time facilitated   stimulation decreased   verbally redirected  Diversional Activities: music  Taken 9/8/2024 1600 by Yenifer Park, RN  Medical Device Protection:   IV pole/bag removed from visual field   torso covered   tubing secured  Less Restrictive Alternative:   bed alarm in use   calming techniques promoted   environment adjusted   positive reinforcement provided   self-care activities encouraged   therapeutic music  De-Escalation Techniques:   appropriate behavior reinforced   increased round frequency   medication administered   quiet time facilitated   stimulation decreased   verbally redirected  Diversional Activities: music  Taken 9/8/2024 1400 by Yenifer Park, RN  Medical Device Protection:   IV pole/bag removed from visual field   torso covered   tubing secured  Less Restrictive Alternative:   bed alarm in use   calming techniques promoted   environment adjusted   positive reinforcement provided   self-care activities encouraged   therapeutic music  De-Escalation Techniques:   appropriate behavior reinforced   increased round frequency   medication administered   quiet time facilitated   stimulation decreased   verbally redirected  Diversional Activities: music  Taken 9/8/2024 1200 by Yenifer Park, RN  Medical Device Protection:   IV pole/bag removed from  visual field   torso covered   tubing secured  Less Restrictive Alternative:   bed alarm in use   calming techniques promoted   environment adjusted   positive reinforcement provided   self-care activities encouraged   therapeutic music  De-Escalation Techniques:   appropriate behavior reinforced   increased round frequency   medication administered   quiet time facilitated   stimulation decreased   verbally redirected  Diversional Activities: music  Taken 9/8/2024 1000 by Yenifer Park, RN  Medical Device Protection:   IV pole/bag removed from visual field   torso covered   tubing secured  Less Restrictive Alternative:   bed alarm in use   calming techniques promoted   environment adjusted   positive reinforcement provided   self-care activities encouraged   therapeutic music  De-Escalation Techniques:   appropriate behavior reinforced   increased round frequency   medication administered   quiet time facilitated   stimulation decreased   verbally redirected  Diversional Activities: music  Taken 9/8/2024 0800 by Yenifer Park, RN  Medical Device Protection:   IV pole/bag removed from visual field   torso covered   tubing secured  Less Restrictive Alternative:   bed alarm in use   calming techniques promoted   environment adjusted   positive reinforcement provided   self-care activities encouraged   therapeutic music  De-Escalation Techniques:   diversional activity encouraged   increased round frequency   medication administered   quiet time facilitated   reoriented   stimulation decreased   verbally redirected  Diversional Activities: music  Intervention: Protect Dignity, Rights, and Personal Wellbeing  Recent Flowsheet Documentation  Taken 9/8/2024 1800 by Yenifer Park, RN  Trust Relationship/Rapport:   care explained   reassurance provided  Taken 9/8/2024 1600 by Yenifer Park, RN  Trust Relationship/Rapport:   care explained   reassurance provided  Taken 9/8/2024 1400 by Yenifer Park, RN  Ambrocio  Relationship/Rapport:   care explained   reassurance provided  Taken 9/8/2024 1200 by Yenifer Park RN  Trust Relationship/Rapport:   care explained   reassurance provided  Taken 9/8/2024 1000 by Yenifer Park RN  Trust Relationship/Rapport:   care explained   reassurance provided  Taken 9/8/2024 0800 by Yenifer Park RN  Trust Relationship/Rapport:   care explained   reassurance provided  Intervention: Protect Skin and Joint Integrity  Recent Flowsheet Documentation  Taken 9/8/2024 1800 by Yenifer Park RN  Body Position:   turned   lower extremity elevated   neutral body alignment   neutral head position   weight shifting  Taken 9/8/2024 1600 by Yenifer Park RN  Body Position:   turned   lower extremity elevated   neutral body alignment   neutral head position   weight shifting  Taken 9/8/2024 1400 by Yenifer Park RN  Body Position:   turned   lower extremity elevated   neutral body alignment   neutral head position   weight shifting  Taken 9/8/2024 1200 by Yenifer Park RN  Body Position:   turned   lower extremity elevated   neutral body alignment   neutral head position   weight shifting  Taken 9/8/2024 1000 by Yenifer Park RN  Body Position:   turned   lower extremity elevated   neutral body alignment   neutral head position   weight shifting  Taken 9/8/2024 0800 by Yenifer Park RN  Body Position:   turned   lower extremity elevated   neutral head position   neutral body alignment   weight shifting   Goal Outcome Evaluation:           Progress: no change

## 2024-09-09 ENCOUNTER — APPOINTMENT (OUTPATIENT)
Dept: GENERAL RADIOLOGY | Facility: HOSPITAL | Age: 64
End: 2024-09-09
Payer: MEDICAID

## 2024-09-09 ENCOUNTER — APPOINTMENT (OUTPATIENT)
Dept: PULMONOLOGY | Facility: HOSPITAL | Age: 64
End: 2024-09-09
Payer: MEDICAID

## 2024-09-09 LAB
ALBUMIN SERPL-MCNC: 3.5 G/DL (ref 3.5–5.2)
ALBUMIN/GLOB SERPL: 1.3 G/DL
ALP SERPL-CCNC: 91 U/L (ref 39–117)
ALT SERPL W P-5'-P-CCNC: 49 U/L (ref 1–41)
ANION GAP SERPL CALCULATED.3IONS-SCNC: 10 MMOL/L (ref 5–15)
ARTERIAL PATENCY WRIST A: POSITIVE
AST SERPL-CCNC: 39 U/L (ref 1–40)
ATMOSPHERIC PRESS: ABNORMAL MM[HG]
BASE EXCESS BLDA CALC-SCNC: 7.6 MMOL/L (ref 0–2)
BASOPHILS # BLD AUTO: 0.07 10*3/MM3 (ref 0–0.2)
BASOPHILS NFR BLD AUTO: 0.6 % (ref 0–1.5)
BDY SITE: ABNORMAL
BILIRUB SERPL-MCNC: 0.2 MG/DL (ref 0–1.2)
BODY TEMPERATURE: 37
BUN SERPL-MCNC: 29 MG/DL (ref 8–23)
BUN/CREAT SERPL: 48.3 (ref 7–25)
CALCIUM SPEC-SCNC: 8.6 MG/DL (ref 8.6–10.5)
CHLORIDE SERPL-SCNC: 103 MMOL/L (ref 98–107)
CO2 BLDA-SCNC: 35 MMOL/L (ref 22–33)
CO2 SERPL-SCNC: 31 MMOL/L (ref 22–29)
COHGB MFR BLD: 1.3 % (ref 0–2)
CREAT SERPL-MCNC: 0.6 MG/DL (ref 0.76–1.27)
CRP SERPL-MCNC: 11.36 MG/DL (ref 0–0.5)
DEPRECATED RDW RBC AUTO: 49.7 FL (ref 37–54)
EGFRCR SERPLBLD CKD-EPI 2021: 108.5 ML/MIN/1.73
EOSINOPHIL # BLD AUTO: 0.19 10*3/MM3 (ref 0–0.4)
EOSINOPHIL NFR BLD AUTO: 1.7 % (ref 0.3–6.2)
EPAP: 0
ERYTHROCYTE [DISTWIDTH] IN BLOOD BY AUTOMATED COUNT: 15.2 % (ref 12.3–15.4)
GLOBULIN UR ELPH-MCNC: 2.6 GM/DL
GLUCOSE BLDC GLUCOMTR-MCNC: 127 MG/DL (ref 70–130)
GLUCOSE BLDC GLUCOMTR-MCNC: 147 MG/DL (ref 70–130)
GLUCOSE BLDC GLUCOMTR-MCNC: 150 MG/DL (ref 70–130)
GLUCOSE BLDC GLUCOMTR-MCNC: 161 MG/DL (ref 70–130)
GLUCOSE SERPL-MCNC: 173 MG/DL (ref 65–99)
H CAPSUL AG UR QL IA: NEGATIVE
HCO3 BLDA-SCNC: 33.4 MMOL/L (ref 20–26)
HCT VFR BLD AUTO: 32.7 % (ref 37.5–51)
HCT VFR BLD CALC: 31.2 % (ref 38–51)
HGB BLD-MCNC: 10 G/DL (ref 13–17.7)
HGB BLDA-MCNC: 10.2 G/DL (ref 13.5–17.5)
IMM GRANULOCYTES # BLD AUTO: 0.18 10*3/MM3 (ref 0–0.05)
IMM GRANULOCYTES NFR BLD AUTO: 1.6 % (ref 0–0.5)
INHALED O2 CONCENTRATION: 40 %
IPAP: 0
LYMPHOCYTES # BLD AUTO: 0.64 10*3/MM3 (ref 0.7–3.1)
LYMPHOCYTES NFR BLD AUTO: 5.8 % (ref 19.6–45.3)
MAGNESIUM SERPL-MCNC: 2 MG/DL (ref 1.6–2.4)
MCH RBC QN AUTO: 27.9 PG (ref 26.6–33)
MCHC RBC AUTO-ENTMCNC: 30.6 G/DL (ref 31.5–35.7)
MCV RBC AUTO: 91.3 FL (ref 79–97)
METHGB BLD QL: -0.1 % (ref 0–1.5)
MODALITY: ABNORMAL
MONOCYTES # BLD AUTO: 0.85 10*3/MM3 (ref 0.1–0.9)
MONOCYTES NFR BLD AUTO: 7.7 % (ref 5–12)
NEUTROPHILS NFR BLD AUTO: 82.6 % (ref 42.7–76)
NEUTROPHILS NFR BLD AUTO: 9.1 10*3/MM3 (ref 1.7–7)
NRBC BLD AUTO-RTO: 0 /100 WBC (ref 0–0.2)
NT-PROBNP SERPL-MCNC: 2724 PG/ML (ref 0–900)
OXYHGB MFR BLDV: 95.4 % (ref 94–99)
PAW @ PEAK INSP FLOW SETTING VENT: 0 CMH2O
PCO2 BLDA: 52.2 MM HG (ref 35–45)
PCO2 TEMP ADJ BLD: 52.2 MM HG (ref 35–48)
PEEP RESPIRATORY: 10 CM[H2O]
PH BLDA: 7.41 PH UNITS (ref 7.35–7.45)
PH, TEMP CORRECTED: 7.41 PH UNITS
PHOSPHATE SERPL-MCNC: 2.9 MG/DL (ref 2.5–4.5)
PLATELET # BLD AUTO: 302 10*3/MM3 (ref 140–450)
PMV BLD AUTO: 9.7 FL (ref 6–12)
PO2 BLDA: 82.2 MM HG (ref 83–108)
PO2 TEMP ADJ BLD: 82.2 MM HG (ref 83–108)
POTASSIUM SERPL-SCNC: 4 MMOL/L (ref 3.5–5.2)
PREALB SERPL-MCNC: 14.3 MG/DL (ref 20–40)
PROCALCITONIN SERPL-MCNC: 0.29 NG/ML (ref 0–0.25)
PROT SERPL-MCNC: 6.1 G/DL (ref 6–8.5)
RBC # BLD AUTO: 3.58 10*6/MM3 (ref 4.14–5.8)
SODIUM SERPL-SCNC: 144 MMOL/L (ref 136–145)
TOTAL RATE: 20 BREATHS/MINUTE
TRIGL SERPL-MCNC: 159 MG/DL (ref 0–150)
VENTILATOR MODE: ABNORMAL
VT ON VENT VENT: 0.49 ML
WBC NRBC COR # BLD AUTO: 11.03 10*3/MM3 (ref 3.4–10.8)

## 2024-09-09 PROCEDURE — 85025 COMPLETE CBC W/AUTO DIFF WBC: CPT | Performed by: NURSE PRACTITIONER

## 2024-09-09 PROCEDURE — 25010000002 PROPOFOL 10 MG/ML EMULSION: Performed by: INTERNAL MEDICINE

## 2024-09-09 PROCEDURE — 25010000002 ENOXAPARIN PER 10 MG: Performed by: INTERNAL MEDICINE

## 2024-09-09 PROCEDURE — 80053 COMPREHEN METABOLIC PANEL: CPT | Performed by: NURSE PRACTITIONER

## 2024-09-09 PROCEDURE — 94799 UNLISTED PULMONARY SVC/PX: CPT

## 2024-09-09 PROCEDURE — 25010000002 PIPERACILLIN SOD-TAZOBACTAM PER 1 G: Performed by: INTERNAL MEDICINE

## 2024-09-09 PROCEDURE — 83050 HGB METHEMOGLOBIN QUAN: CPT

## 2024-09-09 PROCEDURE — 82948 REAGENT STRIP/BLOOD GLUCOSE: CPT

## 2024-09-09 PROCEDURE — 94761 N-INVAS EAR/PLS OXIMETRY MLT: CPT

## 2024-09-09 PROCEDURE — 83880 ASSAY OF NATRIURETIC PEPTIDE: CPT | Performed by: NURSE PRACTITIONER

## 2024-09-09 PROCEDURE — 36600 WITHDRAWAL OF ARTERIAL BLOOD: CPT

## 2024-09-09 PROCEDURE — 63710000001 INSULIN REGULAR HUMAN PER 5 UNITS: Performed by: NURSE PRACTITIONER

## 2024-09-09 PROCEDURE — 25010000002 METHYLPREDNISOLONE PER 40 MG: Performed by: INTERNAL MEDICINE

## 2024-09-09 PROCEDURE — 99232 SBSQ HOSP IP/OBS MODERATE 35: CPT | Performed by: INTERNAL MEDICINE

## 2024-09-09 PROCEDURE — 82375 ASSAY CARBOXYHB QUANT: CPT

## 2024-09-09 PROCEDURE — 63710000001 REVEFENACIN 175 MCG/3ML SOLUTION: Performed by: INTERNAL MEDICINE

## 2024-09-09 PROCEDURE — 25010000002 FUROSEMIDE PER 20 MG: Performed by: INTERNAL MEDICINE

## 2024-09-09 PROCEDURE — 71045 X-RAY EXAM CHEST 1 VIEW: CPT

## 2024-09-09 PROCEDURE — 25010000002 FOSPHENYTOIN 500 MG PE/10ML SOLUTION 10 ML VIAL: Performed by: INTERNAL MEDICINE

## 2024-09-09 PROCEDURE — 84100 ASSAY OF PHOSPHORUS: CPT | Performed by: NURSE PRACTITIONER

## 2024-09-09 PROCEDURE — 25010000002 FENTANYL 10 MCG/1 ML NS: Performed by: INTERNAL MEDICINE

## 2024-09-09 PROCEDURE — 82805 BLOOD GASES W/O2 SATURATION: CPT

## 2024-09-09 PROCEDURE — 83735 ASSAY OF MAGNESIUM: CPT | Performed by: NURSE PRACTITIONER

## 2024-09-09 PROCEDURE — 84134 ASSAY OF PREALBUMIN: CPT | Performed by: NURSE PRACTITIONER

## 2024-09-09 PROCEDURE — 84478 ASSAY OF TRIGLYCERIDES: CPT | Performed by: NURSE PRACTITIONER

## 2024-09-09 PROCEDURE — 94003 VENT MGMT INPAT SUBQ DAY: CPT

## 2024-09-09 PROCEDURE — 99291 CRITICAL CARE FIRST HOUR: CPT | Performed by: INTERNAL MEDICINE

## 2024-09-09 PROCEDURE — 94690 O2 UPTK REST INDIRECT: CPT

## 2024-09-09 PROCEDURE — 84145 PROCALCITONIN (PCT): CPT | Performed by: NURSE PRACTITIONER

## 2024-09-09 PROCEDURE — 25010000002 FOSPHENYTOIN 100 MG PE/2ML SOLUTION 2 ML VIAL: Performed by: INTERNAL MEDICINE

## 2024-09-09 PROCEDURE — 86140 C-REACTIVE PROTEIN: CPT | Performed by: NURSE PRACTITIONER

## 2024-09-09 RX ADMIN — CHLORHEXIDINE GLUCONATE, 0.12% ORAL RINSE 15 ML: 1.2 SOLUTION DENTAL at 20:59

## 2024-09-09 RX ADMIN — CITALOPRAM HYDROBROMIDE 20 MG: 20 TABLET ORAL at 09:03

## 2024-09-09 RX ADMIN — PIPERACILLIN AND TAZOBACTAM 3.38 G: 3; .375 INJECTION, POWDER, LYOPHILIZED, FOR SOLUTION INTRAVENOUS at 17:34

## 2024-09-09 RX ADMIN — DIAZEPAM 10 MG: 10 TABLET ORAL at 09:03

## 2024-09-09 RX ADMIN — CHLORHEXIDINE GLUCONATE, 0.12% ORAL RINSE 15 ML: 1.2 SOLUTION DENTAL at 09:02

## 2024-09-09 RX ADMIN — PIPERACILLIN AND TAZOBACTAM 3.38 G: 3; .375 INJECTION, POWDER, LYOPHILIZED, FOR SOLUTION INTRAVENOUS at 01:31

## 2024-09-09 RX ADMIN — PROPOFOL 25 MCG/KG/MIN: 10 INJECTION, EMULSION INTRAVENOUS at 03:48

## 2024-09-09 RX ADMIN — FUROSEMIDE 40 MG: 10 INJECTION, SOLUTION INTRAMUSCULAR; INTRAVENOUS at 09:02

## 2024-09-09 RX ADMIN — SENNOSIDES 2 TABLET: 8.6 TABLET, FILM COATED ORAL at 23:39

## 2024-09-09 RX ADMIN — PIPERACILLIN AND TAZOBACTAM 3.38 G: 3; .375 INJECTION, POWDER, LYOPHILIZED, FOR SOLUTION INTRAVENOUS at 09:22

## 2024-09-09 RX ADMIN — Medication 125 MCG/HR: at 07:05

## 2024-09-09 RX ADMIN — ENOXAPARIN SODIUM 40 MG: 100 INJECTION SUBCUTANEOUS at 09:02

## 2024-09-09 RX ADMIN — PANTOPRAZOLE SODIUM 40 MG: 40 INJECTION, POWDER, LYOPHILIZED, FOR SOLUTION INTRAVENOUS at 05:34

## 2024-09-09 RX ADMIN — ASPIRIN 81 MG 81 MG: 81 TABLET ORAL at 09:03

## 2024-09-09 RX ADMIN — FOSPHENYTOIN SODIUM 200 MG PE: 50 INJECTION, SOLUTION INTRAMUSCULAR; INTRAVENOUS at 08:54

## 2024-09-09 RX ADMIN — Medication 10 ML: at 09:03

## 2024-09-09 RX ADMIN — RISPERIDONE 2 MG: 1 SOLUTION ORAL at 09:02

## 2024-09-09 RX ADMIN — ATORVASTATIN CALCIUM 80 MG: 40 TABLET, FILM COATED ORAL at 09:03

## 2024-09-09 RX ADMIN — INSULIN HUMAN 2 UNITS: 100 INJECTION, SOLUTION PARENTERAL at 12:00

## 2024-09-09 RX ADMIN — ALBUTEROL SULFATE 2.5 MG: 2.5 SOLUTION RESPIRATORY (INHALATION) at 00:50

## 2024-09-09 RX ADMIN — INSULIN HUMAN 2 UNITS: 100 INJECTION, SOLUTION PARENTERAL at 05:34

## 2024-09-09 RX ADMIN — METHYLPREDNISOLONE SODIUM SUCCINATE 40 MG: 40 INJECTION, POWDER, FOR SOLUTION INTRAMUSCULAR; INTRAVENOUS at 09:02

## 2024-09-09 RX ADMIN — NOREPINEPHRINE BITARTRATE 0.05 MCG/KG/MIN: 0.03 INJECTION, SOLUTION INTRAVENOUS at 00:44

## 2024-09-09 RX ADMIN — PROPOFOL 25 MCG/KG/MIN: 10 INJECTION, EMULSION INTRAVENOUS at 01:31

## 2024-09-09 RX ADMIN — Medication 10 ML: at 21:01

## 2024-09-09 RX ADMIN — REVEFENACIN 175 MCG: 175 SOLUTION RESPIRATORY (INHALATION) at 07:29

## 2024-09-09 RX ADMIN — PROPOFOL 25 MCG/KG/MIN: 10 INJECTION, EMULSION INTRAVENOUS at 23:39

## 2024-09-09 RX ADMIN — ARFORMOTEROL TARTRATE 15 MCG: 15 SOLUTION RESPIRATORY (INHALATION) at 19:13

## 2024-09-09 RX ADMIN — SENNOSIDES 2 TABLET: 8.6 TABLET, FILM COATED ORAL at 09:03

## 2024-09-09 RX ADMIN — ARFORMOTEROL TARTRATE 15 MCG: 15 SOLUTION RESPIRATORY (INHALATION) at 07:30

## 2024-09-09 RX ADMIN — RISPERIDONE 2 MG: 1 SOLUTION ORAL at 21:00

## 2024-09-09 RX ADMIN — PROPOFOL 25 MCG/KG/MIN: 10 INJECTION, EMULSION INTRAVENOUS at 13:22

## 2024-09-09 RX ADMIN — ALBUTEROL SULFATE 2.5 MG: 2.5 SOLUTION RESPIRATORY (INHALATION) at 07:30

## 2024-09-09 RX ADMIN — ALBUTEROL SULFATE 2.5 MG: 2.5 SOLUTION RESPIRATORY (INHALATION) at 19:13

## 2024-09-09 RX ADMIN — DIAZEPAM 10 MG: 10 TABLET ORAL at 21:00

## 2024-09-09 RX ADMIN — PROPOFOL 25 MCG/KG/MIN: 10 INJECTION, EMULSION INTRAVENOUS at 17:33

## 2024-09-09 RX ADMIN — EMPAGLIFLOZIN 10 MG: 10 TABLET, FILM COATED ORAL at 11:59

## 2024-09-09 RX ADMIN — FOSPHENYTOIN SODIUM 200 MG PE: 50 INJECTION, SOLUTION INTRAMUSCULAR; INTRAVENOUS at 21:00

## 2024-09-09 RX ADMIN — FUROSEMIDE 40 MG: 10 INJECTION, SOLUTION INTRAMUSCULAR; INTRAVENOUS at 20:59

## 2024-09-09 RX ADMIN — ALBUTEROL SULFATE 2.5 MG: 2.5 SOLUTION RESPIRATORY (INHALATION) at 13:16

## 2024-09-09 NOTE — PLAN OF CARE
Goal Outcome Evaluation:   unable to decrease ventilator support

## 2024-09-09 NOTE — PROGRESS NOTES
"[facility]  IP Progress Note      Chief Complaint: Follow-up of non-STEMI/acute on chronic HFrEF/CM/respiratory failure    Subjective   Intubated remains on ventilator/sedated, had some unsuccessful weaning attempts.    Objective     Blood pressure 108/71, pulse 70, temperature 99.3 °F (37.4 °C), temperature source Oral, resp. rate 20, height 175 cm (68.9\"), weight 120 kg (263 lb 7.2 oz), SpO2 96%.     Intake/Output Summary (Last 24 hours) at 9/9/2024 0805  Last data filed at 9/9/2024 0537  Gross per 24 hour   Intake 3600.89 ml   Output 4250 ml   Net -649.11 ml       Physical Exam:  General: Sedated/on ventilator  Neck: no JVD.  Chest: On ventilator, breath sounds are diminished at bases, scattered rhonchi  Cardiovascular: Normal S1 and S2, distant heart sounds  Extremities: Trace edema.      Results Review:     I reviewed the patient's new clinical results.    Results from last 7 days   Lab Units 09/08/24  0505   WBC 10*3/mm3 6.40   HEMOGLOBIN g/dL 9.4*   HEMATOCRIT % 32.7*   PLATELETS 10*3/mm3 285     Results from last 7 days   Lab Units 09/08/24  0505   SODIUM mmol/L 144   POTASSIUM mmol/L 4.3   CHLORIDE mmol/L 107   CO2 mmol/L 30.0*   BUN mg/dL 21   CREATININE mg/dL 0.64*   CALCIUM mg/dL 8.9   BILIRUBIN mg/dL 0.2   ALK PHOS U/L 79   ALT (SGPT) U/L 21   AST (SGOT) U/L 28   GLUCOSE mg/dL 137*     Results from last 7 days   Lab Units 09/08/24  0505   SODIUM mmol/L 144   POTASSIUM mmol/L 4.3   CHLORIDE mmol/L 107   CO2 mmol/L 30.0*   BUN mg/dL 21   CREATININE mg/dL 0.64*   GLUCOSE mg/dL 137*   CALCIUM mg/dL 8.9           Lab Results   Component Value Date    TROPONINT 338 (C) 09/03/2024               Albuterol Sulfate NEB Orderable, 2.5 mg, Nebulization, Q6H - RT  arformoterol, 15 mcg, Nebulization, BID - RT  aspirin, 81 mg, Nasogastric, Daily  atorvastatin, 80 mg, Nasogastric, Daily  chlorhexidine, 15 mL, Mouth/Throat, Q12H  citalopram, 20 mg, Nasogastric, Daily  diazePAM, 10 " mg, Nasogastric, BID  empagliflozin, 10 mg, Nasogastric, Daily  enoxaparin, 40 mg, Subcutaneous, Daily  fosphenytoin, 200 mg PE, Intravenous, Q12H  furosemide, 40 mg, Intravenous, Q12H  insulin regular, 2-7 Units, Subcutaneous, Q6H  methylPREDNISolone sodium succinate, 40 mg, Intravenous, Daily  pantoprazole, 40 mg, Intravenous, Q AM  pharmacy consult - MTM, , Does not apply, Daily  piperacillin-tazobactam, 3.375 g, Intravenous, Q8H  revefenacin, 175 mcg, Nebulization, Daily - RT  risperiDONE, 2 mg, Nasogastric, BID  senna, 2 tablet, Nasogastric, BID  sodium chloride, 10 mL, Intravenous, Q12H       Tele: Sinus Rythym      Assessment:  NSTEMI in the setting of demand ischemia with known chronically occluded RCA, he did not have significant left coronary system disease by cath last year.  Acute on chronic systolic heart failure, EF 28% this admission. Last EF 36% by echo 10/2023  Cardiomyopathy.  Pneumonia  Acute on chronic respiratory failure with hypoxia  Coronary artery disease  Dyslipidemia  Hypertension    Plan:  Continue current management and supportive care per ICU team.  May need tracheostomy.  Cardiac status is stable, no further procedures are planned at this time.  We will continue to optimize GDMT as tolerated.  I will revisit on Wednesday, September 11, 2024, please call if needed sooner.    Marsha Gaitan MD, FACC, [facility]

## 2024-09-09 NOTE — CASE MANAGEMENT/SOCIAL WORK
Continued Stay Note  [facility]     Patient Name: Fernie Dalal  MRN: [id number]  Today's Date: 9/9/2024    Admit Date: 9/1/2024    Plan: D/C TBD, referred to [facility] on floor, following   Discharge Plan       Row Name 09/09/24 1434       Plan    Plan D/C TBD, referred to [facility] on floor, following    Patient/Family in Agreement with Plan other (see comments)    Plan Comments Discussed in MDR, remains intubated/sedated. D/C TBD @ this time. CM will continue to follow. Had been referred to [facility] on floor, I updated Caitie on Friday.    Final Discharge Disposition Code 30 - still a patient                   Discharge Codes    No documentation.                 Expected Discharge Date and Time       Expected Discharge Date Expected Discharge Time    Sep 13, 2024               Aniket Marquez RN

## 2024-09-09 NOTE — PROGRESS NOTES
"INTENSIVIST NOTE     Hospital Day: 6    Mr. Fernie Dalal, 64 y.o. male is followed for:   Pneumonia and respiratory failure       SUBJECTIVE     Interval history:    Maximum temperature 100.4.  Fluid balance -1 L.  Current drips consist of minimal dose of norepinephrine, fentanyl, and propofol.  Chest x-ray reveals stable bibasilar infiltrates.  Renal function is good.  Current ABGs are acceptable on assist-control 20, tidal volume 490, FiO2 50%, and PEEP 10    The patient's relevant past medical, surgical and social history were reviewed and updated in Epic as appropriate.       OBJECTIVE     Vital Sign Min/Max for last 24 hours  Temp  Min: 97.8 °F (36.6 °C)  Max: 100.4 °F (38 °C)   BP  Min: 88/66  Max: 130/87   Pulse  Min: 67  Max: 95   Resp  Min: 20  Max: 23   SpO2  Min: 89 %  Max: 98 %   No data recorded   Weight  Min: 120 kg (263 lb 7.2 oz)  Max: 120 kg (263 lb 7.2 oz)      Intake/Output Summary (Last 24 hours) at 9/9/2024 1530  Last data filed at 9/9/2024 1200  Gross per 24 hour   Intake 3608.19 ml   Output 4625 ml   Net -1016.81 ml      Flowsheet Rows      Flowsheet Row First Filed Value   Admission Height 175.3 cm (69\") Documented at 09/01/2024 1033   Admission Weight 106 kg (233 lb) Documented at 09/01/2024 1033               09/06/24  0600 09/06/24  0800 09/09/24  0534   Weight: 118 kg (260 lb 12.9 oz) 118 kg (260 lb 5.8 oz) 120 kg (263 lb 7.2 oz)            Objective:  General Appearance:  Ill-appearing.    Vital signs: (most recent): Blood pressure 102/67, pulse 81, temperature 98 °F (36.7 °C), temperature source Axillary, resp. rate 20, height 175 cm (68.9\"), weight 120 kg (263 lb 7.2 oz), SpO2 95%.    HEENT: (Oral ET tube  OG tube)    Lungs:  There are decreased breath sounds and rhonchi.  No rales or wheezes.    Heart: Normal rate.  Regular rhythm.  S1 normal and S2 normal.  No murmur, gallop or friction rub.   Chest: Symmetric chest wall expansion.   Abdomen: Abdomen is soft and non-distended.  " Bowel sounds are normal.   There is no abdominal tenderness.   There is no mass. There is no splenomegaly. There is no hepatomegaly.   Extremities: There is dependent edema.  There is no deformity.  (Right PICC line)  Neurological: (Sedated).    Pupils:  Pupils are equal, round, and reactive to light.    Skin:  Warm and dry.                I reviewed the patient's new clinical results.  I reviewed the patient's new imaging results/reports including actual images and agree with reports.    XR Chest 1 View    Result Date: 9/9/2024  Impression: 1. Stable lines and support tubes. 2. Stable cardiomegaly with central pulmonary vascular congestion and mild pulmonary edema. 3. Persistent patchy bibasilar airspace disease which may relate to atelectasis and/or pneumonia with small bilateral pleural effusions. Electronically Signed: Darius Gregorio MD  9/9/2024 7:20 AM EDT  Workstation ID: [id number]    EEG    Result Date: 9/8/2024  Diffuse cerebral dysfunction of moderate degree, nonspecific.  This is most commonly seen due to toxic/metabolic or hypoxemic cause No ongoing seizures are seen This report is transcribed using the Dragon dictation system.      XR Chest 1 View    Result Date: 9/8/2024  Impression: 1. Support devices appear in expected position. 2. Cardiomegaly with interstitial edema pattern and probable left-sided pleural effusion, similar to the prior exam. Electronically Signed: Christiano Jimenez  9/8/2024 9:12 AM EDT  Workstation ID: [id number]      INFUSIONS  fentanyl 10 mcg/mL,  mcg/hr, Last Rate: 125 mcg/hr (09/09/24 0705)  norepinephrine, 0.02-0.5 mcg/kg/min, Last Rate: Stopped (09/09/24 1150)  propofol, 5-50 mcg/kg/min, Last Rate: 25 mcg/kg/min (09/09/24 1322)        Results from last 7 days   Lab Units 09/09/24  1114 09/08/24  0505 09/07/24  0847   WBC 10*3/mm3 11.03* 6.40 5.11   HEMOGLOBIN g/dL 10.0* 9.4* 9.0*   HEMATOCRIT % 32.7* 32.7* 30.7*   PLATELETS 10*3/mm3 302 285 257     Results from last 7 days    Lab Units 09/09/24  1114 09/08/24  0505 09/07/24  2042 09/07/24  0847 09/06/24  0501 09/05/24  0529   SODIUM mmol/L 144 144  --  144   < > 144   POTASSIUM mmol/L 4.0 4.3 4.9 3.6   < > 4.3   CHLORIDE mmol/L 103 107  --  108*   < > 103   CO2 mmol/L 31.0* 30.0*  --  25.0   < > 29.0   BUN mg/dL 29* 21  --  16   < > 14   GLUCOSE mg/dL 173* 137*  --  105*   < > 95   CREATININE mg/dL 0.60* 0.64*  --  0.68*   < > 0.71*   MAGNESIUM mg/dL 2.0 2.4  --  1.9  --  2.4   PHOSPHORUS mg/dL 2.9  --   --   --   --  2.9   CALCIUM mg/dL 8.6 8.9  --  8.3*   < > 9.0   ALBUMIN g/dL 3.5 3.4*  --  3.2*  --   --     < > = values in this interval not displayed.         Results from last 7 days   Lab Units 09/09/24  0427 09/08/24  0513 09/06/24  0323 09/05/24  0426 09/04/24  0342   PH, ARTERIAL pH units 7.414 7.326* 7.363 7.387 7.413   PCO2, ARTERIAL mm Hg 52.2* 60.0* 57.9* 53.7* 48.2*   PO2 ART mm Hg 82.2* 82.8* 76.0* 115.0* 113.0*   FIO2 % 40 40 35 35 40       Tube Feeding: Formula: Peptamen Intense VHP (Peptide Based, Very High Protein); Feeding Type: Continuous; Start at: 25 mL/hr; Then Advance By: 25 mL/hr; Every: 12 hours; To Goal Rate of: 75 mL/hr; Total Daily Feeding Volume (mL/day): 1500 mL/d, 15...   /h  Patient doesn't have any tube feeding modular orders    Mechanical Ventilator:   Settings: Observed:   Mode: VC+/AC (09/09/24 1308)    Vt (Set, mL): 490 mL (09/09/24 1308) Vt Mandatory Ins (observed, mL): 500 mL (09/09/24 1308)   Resp Rate (Set): 20 (09/09/24 1308) Resp Rate (Observed) Vent: 22 (09/09/24 1308)   Pressure Support (cm H2O): 10 cm H20 (09/03/24 1824) Minute Ventilation (L/min) (Obs): 10.1 L/min (09/09/24 1308)       FiO2 (%): 50 % (09/09/24 1308) Plateau Pressure (cm H2O): (S) 24 cm H2O (09/09/24 0730)   PEEP/CPAP (cm H2O): 10 cm H20 (09/09/24 1308) I:E Ratio (Obs): 1:1.8 (09/09/24 1308)         I reviewed the patient's medications.    Assessment & Plan   ASSESSMENT/PLAN     Active Hospital Problems    Diagnosis      **Type 2 respiratory failure     Right lower lobe pneumonia     NSTEMI (non-ST elevated myocardial infarction)     HFrEF (heart failure with reduced ejection fraction)     Coronary artery disease involving native coronary artery of native heart with angina pectoris        64-year-old male admitted on 9/1 with chest pain, shortness of breath, and chills.  He was diagnosed with right lower lobe pneumonia and exacerbation of COPD.  His past history also includes a known cardiomyopathy with reduced ejection fraction.  He had some mild elevation of his cardiac enzymes on admission which was felt to be demand ischemia.  He has been diagnosed with non-STEMI in the setting of demand ischemia with a chronically occluded RCA.  EF is 28% this admission.    He required intubation on 9/3 and transferred to ICU.  CTA revealed no pulmonary embolism.    He has remained on mechanical ventilation.  This is day #7 on the ventilator.  He has been treated with broad-spectrum antimicrobial therapy.  Currently on Zosyn.  MRSA screen was negative.  He required pressors after intubation and is currently on a minimal dose of norepinephrine and this will likely be weaned off.  He has been resumed on Valium due to his chronic benzodiazepine use.    Today he remains relatively hypoxic.  He is requiring 50% FiO2 and a PEEP of 10.  Maximum temperature 100.4.  Fluid balance -1 L.  Current drips consist of minimal dose of norepinephrine, fentanyl, and propofol.  Chest x-ray reveals stable bibasilar infiltrates.  Renal function is good.  Current ABGs are acceptable on assist-control 20, tidal volume 490, FiO2 50%, and PEEP 10    Plan:    Complete antimicrobial therapy Zosyn  Bronchodilators  IV Cerebyx  DVT prophylaxis with Lovenox  GI prophylaxis with Protonix  Risperidone 2 mg twice daily  Jardiance  Wean off norepinephrine  Tube feeds with Peptamen intense VHP with goal 75 mL/hour     I discussed the patient's findings and my recommendations with  nursing staff     Plan of care and goals reviewed with multidisciplinary team at daily rounds.    Critical Care time spent in direct patient care: 30 minutes (excluding procedure time, if applicable) including high complexity decision making to assess, manipulate, and support vital organ system failure in this individual who has impairment of one or more vital organ systems such that there is a high probability of imminent or life threatening deterioration in the patient's condition.    Peewee Jacobo MD  Pulmonary and Critical Care Medicine  09/09/24 15:30 EDT

## 2024-09-09 NOTE — PLAN OF CARE
Goal Outcome Evaluation:  Plan of Care Reviewed With: patient        Progress: no change     -Remains on ventilator unable to wean  -Levo, Fent, and propofol  -Lasix given x1; UOP <2L   -Tolerating TF  -BUE restraints in place  -Family updated at bedside      Problem: Restraint, Nonviolent  Goal: Absence of Harm or Injury  Outcome: Ongoing, Not Progressing  Intervention: Implement Least Restrictive Safety Strategies  Recent Flowsheet Documentation  Taken 9/9/2024 1800 by Yenifer Park, RN  Medical Device Protection:   IV pole/bag removed from visual field   torso covered   tubing secured  Less Restrictive Alternative:   bed alarm in use   calming techniques promoted   environment adjusted   positive reinforcement provided   self-care activities encouraged   therapeutic music  De-Escalation Techniques:   appropriate behavior reinforced   increased round frequency   medication administered   quiet time facilitated   stimulation decreased   verbally redirected  Diversional Activities: music  Taken 9/9/2024 1600 by Yenifer Park, RN  Medical Device Protection:   IV pole/bag removed from visual field   torso covered   tubing secured  Less Restrictive Alternative:   bed alarm in use   calming techniques promoted   environment adjusted   positive reinforcement provided   self-care activities encouraged   therapeutic music  De-Escalation Techniques:   appropriate behavior reinforced   increased round frequency   medication administered   quiet time facilitated   reoriented   stimulation decreased   verbally redirected  Diversional Activities: music  Taken 9/9/2024 1400 by Yenifer Park, RN  Medical Device Protection:   IV pole/bag removed from visual field   torso covered   tubing secured  Less Restrictive Alternative:   bed alarm in use   calming techniques promoted   environment adjusted   positive reinforcement provided   self-care activities encouraged   therapeutic music  De-Escalation Techniques:   appropriate  behavior reinforced   increased round frequency   medication administered   quiet time facilitated   stimulation decreased   verbally redirected  Diversional Activities: music  Taken 9/9/2024 1200 by Yenifer Park RN  Medical Device Protection:   IV pole/bag removed from visual field   torso covered   tubing secured  Less Restrictive Alternative:   bed alarm in use   calming techniques promoted   environment adjusted   positive reinforcement provided   self-care activities encouraged   therapeutic music  De-Escalation Techniques:   appropriate behavior reinforced   increased round frequency   medication administered   quiet time facilitated   stimulation decreased   verbally redirected  Diversional Activities: music  Taken 9/9/2024 1000 by Yenifer Park, RN  Medical Device Protection:   IV pole/bag removed from visual field   torso covered   tubing secured  Less Restrictive Alternative:   bed alarm in use   calming techniques promoted   environment adjusted   positive reinforcement provided   self-care activities encouraged   therapeutic music  De-Escalation Techniques:   appropriate behavior reinforced   increased round frequency   medication administered   quiet time facilitated   stimulation decreased   verbally redirected  Diversional Activities: music  Taken 9/9/2024 0800 by Yenifer Park, RN  Medical Device Protection:   IV pole/bag removed from visual field   torso covered   tubing secured  Less Restrictive Alternative:   bed alarm in use   calming techniques promoted   environment adjusted   positive reinforcement provided   self-care activities encouraged   therapeutic music  De-Escalation Techniques:   appropriate behavior reinforced   increased round frequency   medication administered   quiet time facilitated   stimulation decreased   verbally redirected  Diversional Activities: music  Intervention: Protect Dignity, Rights, and Personal Wellbeing  Recent Flowsheet Documentation  Taken 9/9/2024  1800 by Yenifer Park RN  Trust Relationship/Rapport:   care explained   reassurance provided  Taken 9/9/2024 1600 by Yenifer Park RN  Trust Relationship/Rapport:   care explained   reassurance provided  Taken 9/9/2024 1400 by Yenifer Park RN  Trust Relationship/Rapport:   care explained   reassurance provided  Taken 9/9/2024 1200 by Yenifer Park RN  Trust Relationship/Rapport:   care explained   reassurance provided  Taken 9/9/2024 1000 by Yenifer Park RN  Trust Relationship/Rapport:   care explained   reassurance provided  Taken 9/9/2024 0800 by Yenifer Park RN  Trust Relationship/Rapport:   care explained   reassurance provided  Intervention: Protect Skin and Joint Integrity  Recent Flowsheet Documentation  Taken 9/9/2024 1800 by Yenifer Park RN  Body Position:   turned   lower extremity elevated   neutral body alignment   neutral head position   weight shifting  Taken 9/9/2024 1600 by Yenifer Park RN  Body Position:   turned   lower extremity elevated   neutral body alignment   neutral head position   weight shifting   tilted  Taken 9/9/2024 1400 by Yenifer Park RN  Body Position:   turned   lower extremity elevated   neutral body alignment   neutral head position   supine   weight shifting  Taken 9/9/2024 1200 by Yenifer Park RN  Body Position:   turned   left   lower extremity elevated   neutral body alignment   neutral head position   weight shifting   tilted  Taken 9/9/2024 1000 by Yenifer Park RN  Body Position:   turned   supine   lower extremity elevated   neutral body alignment   neutral head position   weight shifting  Taken 9/9/2024 0800 by Yenifer Park RN  Body Position:   turned   lower extremity elevated   neutral body alignment   neutral head position   right   weight shifting

## 2024-09-09 NOTE — PROGRESS NOTES
Clinical Nutrition     Patient Name: Fernie Dalal  YOB: 1960  MRN: [id number]  Date of Encounter: 09/09/24 10:07 EDT  Admission date: 9/1/2024  Reason for Visit: MDR, Follow-up protocol, EN    Assessment   Nutrition Assessment   Admission Diagnosis:  Dyspnea [R06.00]  Acute on chronic respiratory failure with hypoxia [J96.21]    Problem List:    Type 2 respiratory failure    HFrEF (heart failure with reduced ejection fraction)    Coronary artery disease involving native coronary artery of native heart with angina pectoris    NSTEMI (non-ST elevated myocardial infarction)    Right lower lobe pneumonia      PMH:   He  has a past medical history of Angina at rest, Anxiety, Arrhythmia, COPD (chronic obstructive pulmonary disease), GERD (gastroesophageal reflux disease), Heart failure, Hyperlipidemia, Hypertension, and Myocardial infarction.    PSH:  He  has a past surgical history that includes Cardiac catheterization; Cardiovascular stress test; and Cardiac catheterization (N/A, 4/24/2023).    Applicable Nutrition History:   ARF/VENT 9-3-24    EN started 9-5-24    Labs    Labs Reviewed: Yes    Results from last 7 days   Lab Units 09/08/24  0505 09/07/24  2042 09/07/24  0847 09/06/24  0501 09/05/24  0529 09/04/24  0532   GLUCOSE mg/dL 137*  --  105* 93 95 119*   BUN mg/dL 21  --  16 19 14 14   CREATININE mg/dL 0.64*  --  0.68* 0.91 0.71* 0.82   SODIUM mmol/L 144  --  144 141 144 142   CHLORIDE mmol/L 107  --  108* 102 103 100   POTASSIUM mmol/L 4.3 4.9 3.6 3.8 4.3 4.4   PHOSPHORUS mg/dL  --   --   --   --  2.9  --    MAGNESIUM mg/dL 2.4  --  1.9  --  2.4 2.6*   ALT (SGPT) U/L 21  --  13  --   --  11       Results from last 7 days   Lab Units 09/08/24  0505 09/07/24  0847 09/04/24  0532   ALBUMIN g/dL 3.4* 3.2* 4.0       Results from last 7 days   Lab Units 09/09/24  0503 09/08/24  2338 09/08/24  1730 09/08/24  1117 09/08/24  0551 09/08/24  0006   GLUCOSE mg/dL 150* 145* 139* 148* 137*  126     Lab Results   Lab Value Date/Time    HGBA1C 6.00 (H) 09/01/2024 1050    HGBA1C 7.30 (H) 04/25/2023 0440         Results from last 7 days   Lab Units 09/07/24  0847 09/06/24  0501 09/05/24  0529   PROBNP pg/mL 1,133.0* 1,656.0* 2,648.0*       Medications    Medications Reviewed: yes    Scheduled Meds:Albuterol Sulfate NEB Orderable, 2.5 mg, Nebulization, Q6H - RT  arformoterol, 15 mcg, Nebulization, BID - RT  aspirin, 81 mg, Nasogastric, Daily  atorvastatin, 80 mg, Nasogastric, Daily  chlorhexidine, 15 mL, Mouth/Throat, Q12H  citalopram, 20 mg, Nasogastric, Daily  diazePAM, 10 mg, Nasogastric, BID  empagliflozin, 10 mg, Nasogastric, Daily  enoxaparin, 40 mg, Subcutaneous, Daily  fosphenytoin, 200 mg PE, Intravenous, Q12H  furosemide, 40 mg, Intravenous, Q12H  insulin regular, 2-7 Units, Subcutaneous, Q6H  pantoprazole, 40 mg, Intravenous, Q AM  pharmacy consult - MTM, , Does not apply, Daily  piperacillin-tazobactam, 3.375 g, Intravenous, Q8H  revefenacin, 175 mcg, Nebulization, Daily - RT  risperiDONE, 2 mg, Nasogastric, BID  senna, 2 tablet, Nasogastric, BID  sodium chloride, 10 mL, Intravenous, Q12H      Continuous Infusions:fentanyl 10 mcg/mL,  mcg/hr, Last Rate: 125 mcg/hr (09/09/24 0705)  norepinephrine, 0.02-0.5 mcg/kg/min, Last Rate: 0.02 mcg/kg/min (09/09/24 0849)  propofol, 5-50 mcg/kg/min, Last Rate: 25 mcg/kg/min (09/09/24 0348)      PRN Meds:.  acetaminophen    fentaNYL    lactulose    LORazepam    polyvinyl alcohol    Potassium Replacement - Follow Nurse / BPA Driven Protocol    sodium chloride    Intake/Ouptut 24 hrs (0701 - 0700)   I&O's Reviewed: yes    Intake & Output (last day)         09/08 0701  09/09 0700 09/09 0701  09/10 0700    I.V. (mL/kg) 1042.9 (8.8)     Other 150     NG/GT 1939     IV Piggyback 469     Total Intake(mL/kg) 3600.9 (30.3)     Urine (mL/kg/hr) 4550 (1.6)     Stool      Total Output 4550     Net -949.1                  Anthropometrics     Height: Height: 175 cm  "(68.9\")  Last Filed Weight: Weight: 120 kg (263 lb 7.2 oz) (24 0534)  Method: Weight Method: Bed scale  BMI: BMI (Calculated): 39    UBW: ?  Weight change:  17lb wt gain since admit, suspect fluid     Weight       Weight (kg) Weight (lbs) Weight Method Visit Report   2015 118.84 kg  261 lb 15.9 oz      2022 117.482 kg  259 lb   --    2023 --  --   --    2023 117.482 kg  259 lb  Stated     2023 117.482 kg  259 lb      2023 102.967 kg  227 lb  Standing scale     2023 106.051 kg  233 lb 12.8 oz   --    2023 105.688 kg  233 lb      10/31/2023 105.688 kg  233 lb   --    2024 105.688 kg  233 lb  Estimated      106 kg  233 lb 11 oz  Bed scale      113.399 kg  250 lb          Nutrition Focused Physical Exam     Date:     Unable to perform due to Pt unable to participate at time of visit     Subjective   Reported/Observed/Food/Nutrition Related History:     Unable to wean from vent. Remains on levo and propofol.  + 1BM .     9-6   pt intubated, sedated   + versed, fentanyl, ketamine  Per RN: pt will open eyes, and follow commands sometimes, 2L UOP, tolerating TF, it is @50ml currently, has not had a bm yet  Plan to change enteral Dilantin to IV Cerebryx    9-4  Pt intubated, sedated, will open eyes, is tremulous  + levophed 0.04mcg, fentanyl, heparin  Per RN: pt very anxious, will follow commands, has lots of secretions    Needs Assessment   Date: 24    Height used:Height: 175 cm (68.9\")  Weights used ABW: 233lb/ 106kg  IBW: 160lb/ 73kg    Estimated Calorie needs: ~1950kcal  Method:  14Kcals/KG ABW: 1484kcal  Method:  MSJ ABW: 1845kcal  Method:  PSU ABW (ve 9.37, Tmax 36.7) = 1943kcal    Estimated Protein needs: ~146g protein  Method: 2g protein per kg IBW: 146g protein  Method: 1.2-1.5g protein per k-159g protein      Current Nutrition Prescription     PO: NPO Diet NPO Type: Tube Feeding  Oral Nutrition Supplement:  Intake: N/A    EN: Peptamen Intense " VHP  Goal Rate: 75ml  Water Flushes: 30ml Q 4 hours  Modular: None  Route: NG  Tube: Large bore    At goal over: 20Hrs/day     Rx will supply:   Goal Volume 1500  mL/day     Flush Volume 180 mL/day     Energy *1500 Kcal/day 100* % Est Need   Protein 138 g/day 95 % Est Need   Fiber 6 g/day     Water in  EN 1260 mL     Total Water 1440 mL     Meet DRI Yes      + 467kcals from propofol for a total of (1967kcals)   --------------------------------------------------------------------------  Product/Rate verified at bedside: Yes  Infusing Rate at time of visit: 75ml/hr water 30ml Q4hrs     Average Delivery from Charting: 3 Days:   Volume 1850 mL/day 123  % Goal Vol.   Flush Volume 476 mL/day     Energy  Kcal/day  % Est Need   Protein  g/day  % Est Need   Fiber  g/day     Water in  EN  mL     Total Water  mL     Meet DRI No             Assessment & Plan   Nutrition Diagnosis   Date: 9-4-24 Updated: 9-6  Problem Inadequate energy intake    Etiology ARF/VENT   Signs/Symptoms 15% goal volume EN   Status: Resolved     Goal:   Nutrition to support treatment and Maintain EN      Nutrition Intervention      Follow treatment progress, Care plan reviewed    IC ordered   Will keep EN at current rate to avoid any changes prior to obtaining IC   Recommend change bowel l regiment to scheduled vs PRN       Monitoring/Evaluation:   Per protocol, I&O, Pertinent labs, Weight, Skin status, GI status, Symptoms, Hemodynamic stability    Beba Lomeli, RD  Time Spent: 30min

## 2024-09-09 NOTE — PLAN OF CARE
Goal Outcome Evaluation:  Plan of Care Reviewed With: patient           Outcome Evaluation: Patient remains on ventilator- PEEP 10 and 40% FiO2. Attempted to decrease oxygen requirement per RT, did not tolerate. Awakens with verbal stimuli. Does not follow commands at this time.

## 2024-09-09 NOTE — PLAN OF CARE
Goal Outcome Evaluation: Unable to wean ventilatory support. Patient requiring 50% FiO2. Patient has copious secretions.

## 2024-09-10 ENCOUNTER — APPOINTMENT (OUTPATIENT)
Dept: GENERAL RADIOLOGY | Facility: HOSPITAL | Age: 64
End: 2024-09-10
Payer: MEDICAID

## 2024-09-10 PROBLEM — I95.9 HYPOTENSION: Status: ACTIVE | Noted: 2024-09-10

## 2024-09-10 LAB
ALBUMIN SERPL-MCNC: 3 G/DL (ref 3.5–5.2)
ALBUMIN SERPL-MCNC: 3.3 G/DL (ref 3.5–5.2)
ALP SERPL-CCNC: 83 U/L (ref 39–117)
ALT SERPL W P-5'-P-CCNC: 39 U/L (ref 1–41)
ANION GAP SERPL CALCULATED.3IONS-SCNC: 7 MMOL/L (ref 5–15)
ARTERIAL PATENCY WRIST A: POSITIVE
AST SERPL-CCNC: 33 U/L (ref 1–40)
ATMOSPHERIC PRESS: ABNORMAL MM[HG]
BASE EXCESS BLDA CALC-SCNC: 10.9 MMOL/L (ref 0–2)
BASOPHILS # BLD AUTO: 0.06 10*3/MM3 (ref 0–0.2)
BASOPHILS NFR BLD AUTO: 0.7 % (ref 0–1.5)
BDY SITE: ABNORMAL
BILIRUB CONJ SERPL-MCNC: <0.2 MG/DL (ref 0–0.3)
BILIRUB INDIRECT SERPL-MCNC: ABNORMAL MG/DL
BILIRUB SERPL-MCNC: <0.2 MG/DL (ref 0–1.2)
BODY TEMPERATURE: 37
BUN SERPL-MCNC: 29 MG/DL (ref 8–23)
BUN/CREAT SERPL: 52.7 (ref 7–25)
CALCIUM SPEC-SCNC: 9 MG/DL (ref 8.6–10.5)
CHLORIDE SERPL-SCNC: 102 MMOL/L (ref 98–107)
CO2 BLDA-SCNC: 38.7 MMOL/L (ref 22–33)
CO2 SERPL-SCNC: 34 MMOL/L (ref 22–29)
COHGB MFR BLD: 1.3 % (ref 0–2)
CREAT SERPL-MCNC: 0.55 MG/DL (ref 0.76–1.27)
DEPRECATED RDW RBC AUTO: 51.6 FL (ref 37–54)
EGFRCR SERPLBLD CKD-EPI 2021: 111.4 ML/MIN/1.73
EOSINOPHIL # BLD AUTO: 0.26 10*3/MM3 (ref 0–0.4)
EOSINOPHIL NFR BLD AUTO: 2.8 % (ref 0.3–6.2)
EPAP: 0
ERYTHROCYTE [DISTWIDTH] IN BLOOD BY AUTOMATED COUNT: 15.3 % (ref 12.3–15.4)
GLUCOSE BLDC GLUCOMTR-MCNC: 113 MG/DL (ref 70–130)
GLUCOSE BLDC GLUCOMTR-MCNC: 124 MG/DL (ref 70–130)
GLUCOSE BLDC GLUCOMTR-MCNC: 125 MG/DL (ref 70–130)
GLUCOSE BLDC GLUCOMTR-MCNC: 136 MG/DL (ref 70–130)
GLUCOSE SERPL-MCNC: 163 MG/DL (ref 65–99)
HCO3 BLDA-SCNC: 36.9 MMOL/L (ref 20–26)
HCT VFR BLD AUTO: 32.7 % (ref 37.5–51)
HCT VFR BLD CALC: 31.3 % (ref 38–51)
HGB BLD-MCNC: 9.9 G/DL (ref 13–17.7)
HGB BLDA-MCNC: 10.2 G/DL (ref 13.5–17.5)
IMM GRANULOCYTES # BLD AUTO: 0.12 10*3/MM3 (ref 0–0.05)
IMM GRANULOCYTES NFR BLD AUTO: 1.3 % (ref 0–0.5)
INHALED O2 CONCENTRATION: 50 %
IPAP: 0
LYMPHOCYTES # BLD AUTO: 0.96 10*3/MM3 (ref 0.7–3.1)
LYMPHOCYTES NFR BLD AUTO: 10.5 % (ref 19.6–45.3)
MAGNESIUM SERPL-MCNC: 2.1 MG/DL (ref 1.6–2.4)
MCH RBC QN AUTO: 27.9 PG (ref 26.6–33)
MCHC RBC AUTO-ENTMCNC: 30.3 G/DL (ref 31.5–35.7)
MCV RBC AUTO: 92.1 FL (ref 79–97)
METHGB BLD QL: 0.1 % (ref 0–1.5)
MODALITY: ABNORMAL
MONOCYTES # BLD AUTO: 0.76 10*3/MM3 (ref 0.1–0.9)
MONOCYTES NFR BLD AUTO: 8.3 % (ref 5–12)
NEUTROPHILS NFR BLD AUTO: 7.02 10*3/MM3 (ref 1.7–7)
NEUTROPHILS NFR BLD AUTO: 76.4 % (ref 42.7–76)
NRBC BLD AUTO-RTO: 0 /100 WBC (ref 0–0.2)
OXYHGB MFR BLDV: 94.4 % (ref 94–99)
PAW @ PEAK INSP FLOW SETTING VENT: 0 CMH2O
PCO2 BLDA: 56.5 MM HG (ref 35–45)
PCO2 TEMP ADJ BLD: 56.5 MM HG (ref 35–48)
PEEP RESPIRATORY: 10 CM[H2O]
PH BLDA: 7.42 PH UNITS (ref 7.35–7.45)
PH, TEMP CORRECTED: 7.42 PH UNITS
PHENYTOIN SERPL-MCNC: 7.4 MCG/ML (ref 10–20)
PHOSPHATE SERPL-MCNC: 2.6 MG/DL (ref 2.5–4.5)
PLAT MORPH BLD: NORMAL
PLATELET # BLD AUTO: 283 10*3/MM3 (ref 140–450)
PMV BLD AUTO: 10.6 FL (ref 6–12)
PO2 BLDA: 77.9 MM HG (ref 83–108)
PO2 TEMP ADJ BLD: 77.9 MM HG (ref 83–108)
POTASSIUM SERPL-SCNC: 3.6 MMOL/L (ref 3.5–5.2)
POTASSIUM SERPL-SCNC: 4.3 MMOL/L (ref 3.5–5.2)
PROT SERPL-MCNC: 6.4 G/DL (ref 6–8.5)
RBC # BLD AUTO: 3.55 10*6/MM3 (ref 4.14–5.8)
RBC MORPH BLD: NORMAL
SODIUM SERPL-SCNC: 143 MMOL/L (ref 136–145)
TOTAL RATE: 20 BREATHS/MINUTE
VENTILATOR MODE: ABNORMAL
VT ON VENT VENT: 0.49 ML
WBC MORPH BLD: NORMAL
WBC NRBC COR # BLD AUTO: 9.18 10*3/MM3 (ref 3.4–10.8)

## 2024-09-10 PROCEDURE — 80185 ASSAY OF PHENYTOIN TOTAL: CPT | Performed by: NURSE PRACTITIONER

## 2024-09-10 PROCEDURE — 25010000002 FOSPHENYTOIN 100 MG PE/2ML SOLUTION 2 ML VIAL: Performed by: INTERNAL MEDICINE

## 2024-09-10 PROCEDURE — 25010000002 FUROSEMIDE PER 20 MG: Performed by: INTERNAL MEDICINE

## 2024-09-10 PROCEDURE — 63710000001 REVEFENACIN 175 MCG/3ML SOLUTION: Performed by: INTERNAL MEDICINE

## 2024-09-10 PROCEDURE — 94799 UNLISTED PULMONARY SVC/PX: CPT

## 2024-09-10 PROCEDURE — 82948 REAGENT STRIP/BLOOD GLUCOSE: CPT

## 2024-09-10 PROCEDURE — 82375 ASSAY CARBOXYHB QUANT: CPT

## 2024-09-10 PROCEDURE — 83735 ASSAY OF MAGNESIUM: CPT | Performed by: INTERNAL MEDICINE

## 2024-09-10 PROCEDURE — 84100 ASSAY OF PHOSPHORUS: CPT | Performed by: INTERNAL MEDICINE

## 2024-09-10 PROCEDURE — 25010000002 PROPOFOL 10 MG/ML EMULSION: Performed by: INTERNAL MEDICINE

## 2024-09-10 PROCEDURE — 82248 BILIRUBIN DIRECT: CPT | Performed by: INTERNAL MEDICINE

## 2024-09-10 PROCEDURE — 94003 VENT MGMT INPAT SUBQ DAY: CPT

## 2024-09-10 PROCEDURE — 82805 BLOOD GASES W/O2 SATURATION: CPT

## 2024-09-10 PROCEDURE — 85007 BL SMEAR W/DIFF WBC COUNT: CPT | Performed by: INTERNAL MEDICINE

## 2024-09-10 PROCEDURE — 25010000002 FENTANYL 10 MCG/1 ML NS: Performed by: INTERNAL MEDICINE

## 2024-09-10 PROCEDURE — 25010000002 ENOXAPARIN PER 10 MG: Performed by: INTERNAL MEDICINE

## 2024-09-10 PROCEDURE — 94761 N-INVAS EAR/PLS OXIMETRY MLT: CPT

## 2024-09-10 PROCEDURE — 84132 ASSAY OF SERUM POTASSIUM: CPT | Performed by: INTERNAL MEDICINE

## 2024-09-10 PROCEDURE — 25010000002 PIPERACILLIN SOD-TAZOBACTAM PER 1 G: Performed by: INTERNAL MEDICINE

## 2024-09-10 PROCEDURE — 83050 HGB METHEMOGLOBIN QUAN: CPT

## 2024-09-10 PROCEDURE — 71045 X-RAY EXAM CHEST 1 VIEW: CPT

## 2024-09-10 PROCEDURE — 85025 COMPLETE CBC W/AUTO DIFF WBC: CPT | Performed by: INTERNAL MEDICINE

## 2024-09-10 PROCEDURE — 36600 WITHDRAWAL OF ARTERIAL BLOOD: CPT

## 2024-09-10 PROCEDURE — 80053 COMPREHEN METABOLIC PANEL: CPT | Performed by: INTERNAL MEDICINE

## 2024-09-10 PROCEDURE — 99291 CRITICAL CARE FIRST HOUR: CPT | Performed by: INTERNAL MEDICINE

## 2024-09-10 RX ORDER — AMINO ACIDS/PROTEIN HYDROLYS 11G-40/45
30 LIQUID IN PACKET (ML) ORAL 3 TIMES DAILY
Status: DISCONTINUED | OUTPATIENT
Start: 2024-09-10 | End: 2024-09-20

## 2024-09-10 RX ORDER — POTASSIUM CHLORIDE 1.5 G/1.58G
40 POWDER, FOR SOLUTION ORAL EVERY 4 HOURS
Status: COMPLETED | OUTPATIENT
Start: 2024-09-10 | End: 2024-09-10

## 2024-09-10 RX ADMIN — ALBUTEROL SULFATE 2.5 MG: 2.5 SOLUTION RESPIRATORY (INHALATION) at 19:11

## 2024-09-10 RX ADMIN — PIPERACILLIN AND TAZOBACTAM 3.38 G: 3; .375 INJECTION, POWDER, LYOPHILIZED, FOR SOLUTION INTRAVENOUS at 18:53

## 2024-09-10 RX ADMIN — ARFORMOTEROL TARTRATE 15 MCG: 15 SOLUTION RESPIRATORY (INHALATION) at 19:11

## 2024-09-10 RX ADMIN — PROPOFOL 25 MCG/KG/MIN: 10 INJECTION, EMULSION INTRAVENOUS at 03:55

## 2024-09-10 RX ADMIN — POTASSIUM CHLORIDE 40 MEQ: 1.5 POWDER, FOR SOLUTION ORAL at 06:22

## 2024-09-10 RX ADMIN — FOSPHENYTOIN SODIUM 200 MG PE: 50 INJECTION, SOLUTION INTRAMUSCULAR; INTRAVENOUS at 21:25

## 2024-09-10 RX ADMIN — REVEFENACIN 175 MCG: 175 SOLUTION RESPIRATORY (INHALATION) at 07:16

## 2024-09-10 RX ADMIN — FUROSEMIDE 40 MG: 10 INJECTION, SOLUTION INTRAMUSCULAR; INTRAVENOUS at 21:25

## 2024-09-10 RX ADMIN — Medication 125 MCG/HR: at 22:31

## 2024-09-10 RX ADMIN — DIAZEPAM 10 MG: 10 TABLET ORAL at 08:05

## 2024-09-10 RX ADMIN — CHLORHEXIDINE GLUCONATE, 0.12% ORAL RINSE 15 ML: 1.2 SOLUTION DENTAL at 08:06

## 2024-09-10 RX ADMIN — PANTOPRAZOLE SODIUM 40 MG: 40 INJECTION, POWDER, LYOPHILIZED, FOR SOLUTION INTRAVENOUS at 06:22

## 2024-09-10 RX ADMIN — DIAZEPAM 10 MG: 10 TABLET ORAL at 21:24

## 2024-09-10 RX ADMIN — Medication 30 ML: at 21:26

## 2024-09-10 RX ADMIN — Medication 125 MCG/HR: at 03:56

## 2024-09-10 RX ADMIN — RISPERIDONE 2 MG: 1 SOLUTION ORAL at 08:05

## 2024-09-10 RX ADMIN — PROPOFOL 25 MCG/KG/MIN: 10 INJECTION, EMULSION INTRAVENOUS at 09:32

## 2024-09-10 RX ADMIN — PIPERACILLIN AND TAZOBACTAM 3.38 G: 3; .375 INJECTION, POWDER, LYOPHILIZED, FOR SOLUTION INTRAVENOUS at 02:06

## 2024-09-10 RX ADMIN — ENOXAPARIN SODIUM 40 MG: 100 INJECTION SUBCUTANEOUS at 08:04

## 2024-09-10 RX ADMIN — CHLORHEXIDINE GLUCONATE, 0.12% ORAL RINSE 15 ML: 1.2 SOLUTION DENTAL at 21:25

## 2024-09-10 RX ADMIN — FUROSEMIDE 40 MG: 10 INJECTION, SOLUTION INTRAMUSCULAR; INTRAVENOUS at 08:04

## 2024-09-10 RX ADMIN — POTASSIUM CHLORIDE 40 MEQ: 1.5 POWDER, FOR SOLUTION ORAL at 10:04

## 2024-09-10 RX ADMIN — PROPOFOL 20 MCG/KG/MIN: 10 INJECTION, EMULSION INTRAVENOUS at 22:30

## 2024-09-10 RX ADMIN — SENNOSIDES 2 TABLET: 8.6 TABLET, FILM COATED ORAL at 08:04

## 2024-09-10 RX ADMIN — ARFORMOTEROL TARTRATE 15 MCG: 15 SOLUTION RESPIRATORY (INHALATION) at 07:17

## 2024-09-10 RX ADMIN — FOSPHENYTOIN SODIUM 200 MG PE: 50 INJECTION, SOLUTION INTRAMUSCULAR; INTRAVENOUS at 08:04

## 2024-09-10 RX ADMIN — ASPIRIN 81 MG 81 MG: 81 TABLET ORAL at 08:05

## 2024-09-10 RX ADMIN — CITALOPRAM HYDROBROMIDE 20 MG: 20 TABLET ORAL at 08:05

## 2024-09-10 RX ADMIN — PROPOFOL 20 MCG/KG/MIN: 10 INJECTION, EMULSION INTRAVENOUS at 15:28

## 2024-09-10 RX ADMIN — ATORVASTATIN CALCIUM 80 MG: 40 TABLET, FILM COATED ORAL at 08:05

## 2024-09-10 RX ADMIN — Medication 10 ML: at 21:25

## 2024-09-10 RX ADMIN — EMPAGLIFLOZIN 10 MG: 10 TABLET, FILM COATED ORAL at 08:05

## 2024-09-10 RX ADMIN — RISPERIDONE 2 MG: 1 SOLUTION ORAL at 21:25

## 2024-09-10 RX ADMIN — Medication 30 ML: at 18:54

## 2024-09-10 RX ADMIN — ALBUTEROL SULFATE 2.5 MG: 2.5 SOLUTION RESPIRATORY (INHALATION) at 01:00

## 2024-09-10 RX ADMIN — SENNOSIDES 2 TABLET: 8.6 TABLET, FILM COATED ORAL at 21:24

## 2024-09-10 RX ADMIN — Medication 10 ML: at 08:05

## 2024-09-10 RX ADMIN — PIPERACILLIN AND TAZOBACTAM 3.38 G: 3; .375 INJECTION, POWDER, LYOPHILIZED, FOR SOLUTION INTRAVENOUS at 10:04

## 2024-09-10 RX ADMIN — ALBUTEROL SULFATE 2.5 MG: 2.5 SOLUTION RESPIRATORY (INHALATION) at 12:29

## 2024-09-10 RX ADMIN — ALBUTEROL SULFATE 2.5 MG: 2.5 SOLUTION RESPIRATORY (INHALATION) at 07:16

## 2024-09-10 NOTE — PROGRESS NOTES
Clinical Nutrition     Patient Name: Fernie Dalal  YOB: 1960  MRN: [id number]  Date of Encounter: 09/10/24 11:51 EDT  Admission date: 9/1/2024  Reason for Visit: Follow-up protocol, EN, Calorimetry    Assessment   Nutrition Assessment   Admission Diagnosis:  Dyspnea [R06.00]  Acute on chronic respiratory failure with hypoxia [J96.21]    Problem List:    Type 2 respiratory failure    HFrEF (heart failure with reduced ejection fraction)    Coronary artery disease involving native coronary artery of native heart with angina pectoris    NSTEMI (non-ST elevated myocardial infarction)    Right lower lobe pneumonia      PMH:   He  has a past medical history of Angina at rest, Anxiety, Arrhythmia, COPD (chronic obstructive pulmonary disease), GERD (gastroesophageal reflux disease), Heart failure, Hyperlipidemia, Hypertension, and Myocardial infarction.    PSH:  He  has a past surgical history that includes Cardiac catheterization; Cardiovascular stress test; and Cardiac catheterization (N/A, 4/24/2023).    Applicable Nutrition History:   ARF/VENT 9-3-24    EN started 9-5-24    Labs    Labs Reviewed: Yes    Results from last 7 days   Lab Units 09/10/24  0351 09/09/24  1114 09/08/24  0505 09/07/24  2042 09/07/24  0847 09/06/24  0501 09/05/24  0529   GLUCOSE mg/dL 163* 173* 137*  --  105*   < > 95   BUN mg/dL 29* 29* 21  --  16   < > 14   CREATININE mg/dL 0.55* 0.60* 0.64*  --  0.68*   < > 0.71*   SODIUM mmol/L 143 144 144  --  144   < > 144   CHLORIDE mmol/L 102 103 107  --  108*   < > 103   POTASSIUM mmol/L 3.6 4.0 4.3   < > 3.6   < > 4.3   PHOSPHORUS mg/dL 2.6 2.9  --   --   --   --  2.9   MAGNESIUM mg/dL 2.1 2.0 2.4  --  1.9  --  2.4   ALT (SGPT) U/L  --  49* 21  --  13  --   --     < > = values in this interval not displayed.       Results from last 7 days   Lab Units 09/10/24  0351 09/09/24  1114 09/08/24  0505   ALBUMIN g/dL 3.3* 3.5 3.4*   PREALBUMIN mg/dL  --  14.3*  --    CRP mg/dL   --  11.36*  --    TRIGLYCERIDES mg/dL  --  159*  --        Results from last 7 days   Lab Units 09/10/24  0549 09/09/24  2349 09/09/24  1732 09/09/24  1153 09/09/24  0503 09/08/24  2338   GLUCOSE mg/dL 124 147* 127 161* 150* 145*     Lab Results   Lab Value Date/Time    HGBA1C 6.00 (H) 09/01/2024 1050    HGBA1C 7.30 (H) 04/25/2023 0440         Results from last 7 days   Lab Units 09/09/24  1114 09/07/24  0847 09/06/24  0501   PROBNP pg/mL 2,724.0* 1,133.0* 1,656.0*       Medications    Medications Reviewed: yes    Scheduled Meds:Albuterol Sulfate NEB Orderable, 2.5 mg, Nebulization, Q6H - RT  arformoterol, 15 mcg, Nebulization, BID - RT  aspirin, 81 mg, Nasogastric, Daily  atorvastatin, 80 mg, Nasogastric, Daily  chlorhexidine, 15 mL, Mouth/Throat, Q12H  citalopram, 20 mg, Nasogastric, Daily  diazePAM, 10 mg, Nasogastric, BID  empagliflozin, 10 mg, Nasogastric, Daily  enoxaparin, 40 mg, Subcutaneous, Daily  fosphenytoin, 200 mg PE, Intravenous, Q12H  furosemide, 40 mg, Intravenous, Q12H  insulin regular, 2-7 Units, Subcutaneous, Q6H  pantoprazole, 40 mg, Intravenous, Q AM  pharmacy consult - [place], , Does not apply, Daily  piperacillin-tazobactam, 3.375 g, Intravenous, Q8H  revefenacin, 175 mcg, Nebulization, Daily - RT  risperiDONE, 2 mg, Nasogastric, BID  senna, 2 tablet, Nasogastric, BID  sodium chloride, 10 mL, Intravenous, Q12H      Continuous Infusions:fentanyl 10 mcg/mL,  mcg/hr, Last Rate: 125 mcg/hr (09/10/24 0356)  norepinephrine, 0.02-0.5 mcg/kg/min, Last Rate: Stopped (09/10/24 0404)  propofol, 5-50 mcg/kg/min, Last Rate: 20 mcg/kg/min (09/10/24 1011)      PRN Meds:.  acetaminophen    fentaNYL    lactulose    LORazepam    polyvinyl alcohol    Potassium Replacement - Follow Nurse / BPA Driven Protocol    sodium chloride    Intake/Ouptut 24 hrs (0701 - 0700)   I&O's Reviewed: yes    Intake & Output (last day)         09/09 0701  09/10 0700 09/10 0701  09/11 0700    I.V. (mL/kg) 554.8 (4.7) 749.3 (6.3)  "   Other 120 60    NG/ 1210    IV Piggyback 199.9 500    Total Intake(mL/kg) 1805.7 (15.2) 2519.3 (21.2)    Urine (mL/kg/hr) 3925 (1.4) 1550 (2.7)    Total Output 3925 1550    Net -2119.3 +969.3                 Anthropometrics     Height: Height: 175 cm (68.9\")  Last Filed Weight: Weight: 120 kg (263 lb 7.2 oz) (09/09/24 0534)  Method: Weight Method: Bed scale  BMI: BMI (Calculated): 39    UBW: ?  Weight change:  17lb wt gain since admit, suspect fluid     Weight       Weight (kg) Weight (lbs) Weight Method Visit Report   4/23/2015 118.84 kg  261 lb 15.9 oz      7/1/2022 117.482 kg  259 lb   --    4/18/2023 --  --   --    4/22/2023 117.482 kg  259 lb  Stated     4/23/2023 117.482 kg  259 lb      4/24/2023 102.967 kg  227 lb  Standing scale     6/27/2023 106.051 kg  233 lb 12.8 oz   --    9/29/2023 105.688 kg  233 lb      10/31/2023 105.688 kg  233 lb   --    9/1/2024 105.688 kg  233 lb  Estimated      106 kg  233 lb 11 oz  Bed scale      113.399 kg  250 lb          Nutrition Focused Physical Exam     Date:     Unable to perform due to Pt unable to participate at time of visit     Subjective   Reported/Observed/Food/Nutrition Related History:     9-10: pt intubated, sedated + fentanyl, propofol 14.2ml    Per RN: pt tolerating TF,  has not had bm since 9-7, has been given senokot    9/9  Unable to wean from vent. Remains on levo and propofol.  + 1BM 9/8.     9-6   pt intubated, sedated   + versed, fentanyl, ketamine  Per RN: pt will open eyes, and follow commands sometimes, 2L UOP, tolerating TF, it is @50ml currently, has not had a bm yet  Plan to change enteral Dilantin to IV Cerebryx    9-4  Pt intubated, sedated, will open eyes, is tremulous  + levophed 0.04mcg, fentanyl, heparin  Per RN: pt very anxious, will follow commands, has lots of secretions    Needs Assessment   Date: 9-10-24    Height used:Height: 175 cm (68.9\")  Weights used ABW: 233lb/ 106kg- admit wt  IBW: 160lb/ 73kg    Estimated Calorie needs: " ~1453kcal/ 70% MREE  Method:  14Kcals/KG ABW:1484kcal  Method:  MSJ ABW: 1845kcal  Method:  PSU ABW (ve 9.37, Tmax 36.7) = 1943kcal  IC: MREE= 2075kcal, RQ= 0.85 wnl  Goal 65-70% [id number]    Estimated Protein needs: ~146g protein  Method: 2g protein per kg IBW: 146g protein  Method: 1.2-1.5g protein per k-159g protein      Current Nutrition Prescription     PO: NPO Diet NPO Type: Tube Feeding  Oral Nutrition Supplement:  Intake: N/A    EN: Peptamen Intense VHP  Goal Rate: 75ml  Water Flushes: 30ml Q 4 hours  Modular: None  Route: NG  Tube: Large bore    At goal over: 20Hrs/day     Rx will supply:   Goal Volume 1500  mL/day     Flush Volume 180 mL/day     Energy 1500 Kcal/day 72 % MREE   Protein 138 g/day 95 % Est Need   Fiber 6 g/day     Water in  EN 1260 mL     Total Water 1440 mL     Meet DRI Yes        --------------------------------------------------------------------------  Product/Rate verified at bedside: Yes  Infusing Rate at time of visit: 75ml/hr water 30ml Q4hrs     Average Delivery from Chartin Day:   Volume 1160 mL/day 77  % Goal Vol.   Flush Volume 110 mL/day     Energy 1373 Kcal/day 66 % MREE   Protein 107 g/day 73 % Est Need   Fiber 5 g/day     Water in   mL     Total Water 1084 mL     Meet DRI No           Propofol intake: 194ml, 213kcal    Assessment & Plan   Nutrition Diagnosis   Date: 24 Updated: 9-10  Problem Inadequate energy intake    Etiology ARF/VENT   Signs/Symptoms 77% goal volume EN   Status: Active     Goal:   Nutrition to support treatment and Adjust EN      Nutrition Intervention      Follow treatment progress, Care plan reviewed    As pt now on propofol will adjust EN to better meet needs based on IC    Change to Peptamen VHP @55ml/hr, Prosource 3x/day, free water 30ml/hr    TF at goal volume will provide 1100ml, 1280kcal, 62% MREE, 146g protein, 100% protein needs, 4g fiber, 1524ml free water     Propofol @ 14.2ml will provide an additional  375kcal    TF + propofol will provide a total of 1655kcal, ~80% MREE    Although goal for MO is ~ 65-70% MREE, unable to lower kcal intake without compromising protein intake    Suggest add MVI as TF volume too low to meet RDI's    Monitoring/Evaluation:   Per protocol, I&O, Pertinent labs, Weight, Skin status, GI status, Symptoms, Hemodynamic stability    Tania Street, TITO  Time Spent: 45min

## 2024-09-10 NOTE — PLAN OF CARE
Goal Outcome Evaluation: Unable to decrease ventilatory support.

## 2024-09-10 NOTE — PLAN OF CARE
Goal Outcome Evaluation:               Unable to decrease vent support

## 2024-09-10 NOTE — PLAN OF CARE
Goal Outcome Evaluation:      ON GOING NOT PROGRESSING-UNABLE TO DECREASE  VENTILATOR SUPPORT

## 2024-09-10 NOTE — PLAN OF CARE
Goal Outcome Evaluation:  Plan of Care Reviewed With: patient              Pt remains on vent, heavy secretions noted. Afebrile. Pt remains on tube feed. Pt did follow commands with squeezing hands and wiggles toes. Pt had increased UOP with diuretic. Pt does get agitated with suctioning. K+ replacement started per protocol.

## 2024-09-10 NOTE — PLAN OF CARE
Goal Outcome Evaluation:      No acute events this shift. VSS. Following commands and alert. Unable to wean ventilator; copious secretions this shift. Lasix given; UOP ~3L's. No BM this shift. Fentanyl and propofol continued for sedation.

## 2024-09-10 NOTE — PROGRESS NOTES
Intensive Care Follow-up     Hospital:  LOS: 7 days   Mr. Fernie Dalal, 64 y.o. male is followed for:   Type 2 respiratory failure            History of present illness:   64-year-old male admitted on 9/1 with chest pain, shortness of breath, and chills.  He was diagnosed with right lower lobe pneumonia and exacerbation of COPD.  His past history also includes a known cardiomyopathy with reduced ejection fraction.  He had some mild elevation of his cardiac enzymes on admission which was felt to be demand ischemia.  He has been diagnosed with non-STEMI in the setting of demand ischemia with a chronically occluded RCA.  EF is 28% this admission.     He required intubation on 9/3 and transferred to ICU.  CTA revealed no pulmonary embolism.     He has remained on mechanical ventilation. He has been treated with broad-spectrum antimicrobial therapy.  Currently on Zosyn.  MRSA screen was negative.  He required pressors after intubation and is currently on a minimal dose of norepinephrine and this will likely be weaned off.  He has been resumed on Valium due to his chronic benzodiazepine use.      Subjective   Interval History:  Patient remains on mechanical ventilation this morning.  FiO2 at 55% 10 of PEEP.  Diuresing well currently renal function has been stable.             The patient's past medical, surgical and social history were reviewed and updated in Epic as appropriate.       Objective     Infusions:  fentanyl 10 mcg/mL,  mcg/hr, Last Rate: 125 mcg/hr (09/10/24 0356)  norepinephrine, 0.02-0.5 mcg/kg/min, Last Rate: Stopped (09/10/24 0404)  propofol, 5-50 mcg/kg/min, Last Rate: 20 mcg/kg/min (09/10/24 1011)      Medications:  Albuterol Sulfate NEB Orderable, 2.5 mg, Nebulization, Q6H - RT  arformoterol, 15 mcg, Nebulization, BID - RT  aspirin, 81 mg, Nasogastric, Daily  atorvastatin, 80 mg, Nasogastric, Daily  chlorhexidine, 15 mL, Mouth/Throat, Q12H  citalopram, 20 mg, Nasogastric, Daily  diazePAM, 10 mg,  Nasogastric, BID  empagliflozin, 10 mg, Nasogastric, Daily  enoxaparin, 40 mg, Subcutaneous, Daily  fosphenytoin, 200 mg PE, Intravenous, Q12H  furosemide, 40 mg, Intravenous, Q12H  insulin regular, 2-7 Units, Subcutaneous, Q6H  pantoprazole, 40 mg, Intravenous, Q AM  pharmacy consult - [place], , Does not apply, Daily  piperacillin-tazobactam, 3.375 g, Intravenous, Q8H  ProSource No Carb, 30 mL, Nasogastric, TID  revefenacin, 175 mcg, Nebulization, Daily - RT  risperiDONE, 2 mg, Nasogastric, BID  senna, 2 tablet, Nasogastric, BID  sodium chloride, 10 mL, Intravenous, Q12H      I reviewed the patient's medications.    Vital Sign Min/Max for last 24 hours  Temp  Min: 97.5 °F (36.4 °C)  Max: 98.5 °F (36.9 °C)   BP  Min: 80/34  Max: 122/82   Pulse  Min: 65  Max: 98   Resp  Min: 20  Max: 24   SpO2  Min: 87 %  Max: 99 %   No data recorded       Input/Output for last 24 hour shift  09/09 0701 - 09/10 0700  In: 1805.7 [I.V.:554.8]  Out: 3925 [Urine:3925]   Mode: VC+/AC  FiO2 (%):  [50 %-55 %] 55 %  S RR:  [20] 20  S VT:  [490 mL] 490 mL  PEEP/CPAP (cm H2O):  [10 cm H20] 10 cm H20  MAP (cm H2O):  [14-21] 17  GENERAL : Sedated, lying in bed, NAD  RESPIRATORY/THORAX : ET tube in place, Coarse breath sounds bilaterally  CARDIOVASCULAR : Normal S1/S2, RRR. 1+ lower ext edema.  GASTROINTESTINAL : Soft, NT/ND. BS x 4 normoactive. No hepatosplenomegaly.  MUSCULOSKELETAL : No cyanosis, clubbing, or ischemia  NEUROLOGICAL: Sedated, Moving all ext.    Results from last 7 days   Lab Units 09/10/24  0351 09/09/24  1114 09/08/24  0505   WBC 10*3/mm3 9.18 11.03* 6.40   HEMOGLOBIN g/dL 9.9* 10.0* 9.4*   PLATELETS 10*3/mm3 283 302 285     Results from last 7 days   Lab Units 09/10/24  0351 09/09/24  1114 09/08/24  0505 09/06/24  0501 09/05/24  0529   SODIUM mmol/L 143 144 144   < > 144   POTASSIUM mmol/L 3.6 4.0 4.3   < > 4.3   CO2 mmol/L 34.0* 31.0* 30.0*   < > 29.0   BUN mg/dL 29* 29* 21   < > 14   CREATININE mg/dL 0.55* 0.60* 0.64*   < >  0.71*   MAGNESIUM mg/dL 2.1 2.0 2.4   < > 2.4   PHOSPHORUS mg/dL 2.6 2.9  --   --  2.9   GLUCOSE mg/dL 163* 173* 137*   < > 95    < > = values in this interval not displayed.     Estimated Creatinine Clearance: 175.6 mL/min (A) (by C-G formula based on SCr of 0.55 mg/dL (L)).    Results from last 7 days   Lab Units 09/10/24  0352   PH, ARTERIAL pH units 7.424   PCO2, ARTERIAL mm Hg 56.5*   PO2 ART mm Hg 77.9*       I reviewed the patient's new clinical results.  I reviewed the patient's new imaging results/reports including actual images and agree with reports.       Imaging Results (Last 24 Hours)       Procedure Component Value Units Date/Time    XR Chest 1 View [[id number]] Collected: 09/10/24 0717     Updated: 09/10/24 0723    Narrative:      XR CHEST 1 VW    Date of Exam: 9/10/2024 3:30 AM EDT    Indication: Intubated    Comparison: 9/9/2024.    Findings:  Very support lines appear unchanged in position. There is stable cardiomegaly with pulmonary vascular congestion. There are continued patchy bibasilar airspace opacities and small pleural effusions.      Impression:      Impression:  Stable cardiomegaly with central pulmonary vascular congestion and mild pulmonary edema.    Persistent patchy bibasilar airspace disease, may relate to atelectasis and/or pneumonia with small bilateral pleural effusions.      Electronically Signed: Ruby So MD    9/10/2024 7:20 AM EDT    Workstation ID: [id number]            Assessment & Plan   Impression        Type 2 respiratory failure    HFrEF (heart failure with reduced ejection fraction)    Coronary artery disease involving native coronary artery of native heart with angina pectoris    NSTEMI (non-ST elevated myocardial infarction)    Right lower lobe pneumonia    Hypotension       Plan        64-year-old male with a history of coronary artery disease, systolic heart failure, COPD, hypertension, hyperlipidemia, and anxiety.  Patient mid to the [facility]  ICU on 9/3/2024.  Was intubated on 9/3/2024.  Been treated for possible pneumonia and decompensated heart failure.  Has not been able to be weaned from the ventilator over the last week.    -Continue mechanical ventilation, wean oxygen to saturation greater than 88%  -Continue propofol and fentanyl for sedation  -Pressors as needed to maintain a MAP greater than 65  -Continue antibiotics with Zosyn x 7 days  -Continued scheduled nebs  -Continue volume with history of chronic use.  -Continue fosphenytoin for now unclear indication at this time.  No known seizure history.  -Cardiology following appreciate recommendations regarding heart failure  -GI prophylaxis  -DVT prophylaxis  -A.m. labs    I conducted multidisciplinary rounds and the plan of care was discussed with the multidisciplinary team at that time. In attendance at multidisciplinary rounds was clinical pharmacist, dietitian, nursing staff, and case management.    I discussed the patient's findings and my recommendations with patient and nursing staff     Critical Care time spent in direct patient care: 35 minutes (excluding procedure time, if applicable) including high complexity decision making to assess, manipulate, and support vital organ system failure in this individual who has impairment of one or more vital organ systems such that there is a high probability of imminent or life threatening deterioration in the patient's condition.      Jarrell Hermosillo, DO  Pulmonary, Critical care and Sleep Medicine

## 2024-09-11 ENCOUNTER — APPOINTMENT (OUTPATIENT)
Dept: GENERAL RADIOLOGY | Facility: HOSPITAL | Age: 64
End: 2024-09-11
Payer: MEDICAID

## 2024-09-11 LAB
ANION GAP SERPL CALCULATED.3IONS-SCNC: 11 MMOL/L (ref 5–15)
ARTERIAL PATENCY WRIST A: ABNORMAL
ATMOSPHERIC PRESS: ABNORMAL MM[HG]
BASE EXCESS BLDA CALC-SCNC: 11.8 MMOL/L (ref 0–2)
BASOPHILS # BLD AUTO: 0.06 10*3/MM3 (ref 0–0.2)
BASOPHILS NFR BLD AUTO: 0.5 % (ref 0–1.5)
BDY SITE: ABNORMAL
BLASTOMYCES AG FLD IA-MCNC: NORMAL NG/ML
BODY TEMPERATURE: 37
BUN SERPL-MCNC: 28 MG/DL (ref 8–23)
BUN/CREAT SERPL: 45.9 (ref 7–25)
CALCIUM SPEC-SCNC: 9.1 MG/DL (ref 8.6–10.5)
CHLORIDE SERPL-SCNC: 98 MMOL/L (ref 98–107)
CO2 BLDA-SCNC: 39 MMOL/L (ref 22–33)
CO2 SERPL-SCNC: 31 MMOL/L (ref 22–29)
COHGB MFR BLD: 1.2 % (ref 0–2)
CREAT SERPL-MCNC: 0.61 MG/DL (ref 0.76–1.27)
DEPRECATED RDW RBC AUTO: 49.3 FL (ref 37–54)
EGFRCR SERPLBLD CKD-EPI 2021: 107.9 ML/MIN/1.73
EOSINOPHIL # BLD AUTO: 0.28 10*3/MM3 (ref 0–0.4)
EOSINOPHIL NFR BLD AUTO: 2.5 % (ref 0.3–6.2)
EPAP: 0
ERYTHROCYTE [DISTWIDTH] IN BLOOD BY AUTOMATED COUNT: 14.9 % (ref 12.3–15.4)
GLUCOSE BLDC GLUCOMTR-MCNC: 106 MG/DL (ref 70–130)
GLUCOSE BLDC GLUCOMTR-MCNC: 109 MG/DL (ref 70–130)
GLUCOSE BLDC GLUCOMTR-MCNC: 122 MG/DL (ref 70–130)
GLUCOSE BLDC GLUCOMTR-MCNC: 149 MG/DL (ref 70–130)
GLUCOSE SERPL-MCNC: 116 MG/DL (ref 65–99)
HCO3 BLDA-SCNC: 37.3 MMOL/L (ref 20–26)
HCT VFR BLD AUTO: 33.8 % (ref 37.5–51)
HCT VFR BLD CALC: 31.4 % (ref 38–51)
HGB BLD-MCNC: 10 G/DL (ref 13–17.7)
HGB BLDA-MCNC: 10.3 G/DL (ref 13.5–17.5)
IMM GRANULOCYTES # BLD AUTO: 0.08 10*3/MM3 (ref 0–0.05)
IMM GRANULOCYTES NFR BLD AUTO: 0.7 % (ref 0–0.5)
IMP & REVIEW OF LAB RESULTS: NEGATIVE
INHALED O2 CONCENTRATION: 60 %
IPAP: 0
LYMPHOCYTES # BLD AUTO: 0.77 10*3/MM3 (ref 0.7–3.1)
LYMPHOCYTES NFR BLD AUTO: 7 % (ref 19.6–45.3)
MAGNESIUM SERPL-MCNC: 2.2 MG/DL (ref 1.6–2.4)
MCH RBC QN AUTO: 27 PG (ref 26.6–33)
MCHC RBC AUTO-ENTMCNC: 29.6 G/DL (ref 31.5–35.7)
MCV RBC AUTO: 91.1 FL (ref 79–97)
METHGB BLD QL: 0 % (ref 0–1.5)
MODALITY: ABNORMAL
MONOCYTES # BLD AUTO: 0.84 10*3/MM3 (ref 0.1–0.9)
MONOCYTES NFR BLD AUTO: 7.6 % (ref 5–12)
NEUTROPHILS NFR BLD AUTO: 81.7 % (ref 42.7–76)
NEUTROPHILS NFR BLD AUTO: 9.01 10*3/MM3 (ref 1.7–7)
NRBC BLD AUTO-RTO: 0 /100 WBC (ref 0–0.2)
OXYHGB MFR BLDV: 93.3 % (ref 94–99)
PAW @ PEAK INSP FLOW SETTING VENT: 0 CMH2O
PCO2 BLDA: 53.4 MM HG (ref 35–45)
PCO2 TEMP ADJ BLD: 53.4 MM HG (ref 35–48)
PH BLDA: 7.45 PH UNITS (ref 7.35–7.45)
PH, TEMP CORRECTED: 7.45 PH UNITS
PHOSPHATE SERPL-MCNC: 3.5 MG/DL (ref 2.5–4.5)
PLATELET # BLD AUTO: 346 10*3/MM3 (ref 140–450)
PMV BLD AUTO: 10.2 FL (ref 6–12)
PO2 BLDA: 70.4 MM HG (ref 83–108)
PO2 TEMP ADJ BLD: 70.4 MM HG (ref 83–108)
POTASSIUM SERPL-SCNC: 4 MMOL/L (ref 3.5–5.2)
RBC # BLD AUTO: 3.71 10*6/MM3 (ref 4.14–5.8)
SODIUM SERPL-SCNC: 140 MMOL/L (ref 136–145)
SPECIMEN SOURCE: NORMAL
TOTAL RATE: 0 BREATHS/MINUTE
WBC NRBC COR # BLD AUTO: 11.04 10*3/MM3 (ref 3.4–10.8)

## 2024-09-11 PROCEDURE — 94799 UNLISTED PULMONARY SVC/PX: CPT

## 2024-09-11 PROCEDURE — 25010000002 LORAZEPAM PER 2 MG: Performed by: INTERNAL MEDICINE

## 2024-09-11 PROCEDURE — 83050 HGB METHEMOGLOBIN QUAN: CPT

## 2024-09-11 PROCEDURE — 25010000002 METHYLPREDNISOLONE PER 125 MG: Performed by: INTERNAL MEDICINE

## 2024-09-11 PROCEDURE — 71045 X-RAY EXAM CHEST 1 VIEW: CPT

## 2024-09-11 PROCEDURE — 25010000002 PIPERACILLIN SOD-TAZOBACTAM PER 1 G: Performed by: INTERNAL MEDICINE

## 2024-09-11 PROCEDURE — 99291 CRITICAL CARE FIRST HOUR: CPT | Performed by: INTERNAL MEDICINE

## 2024-09-11 PROCEDURE — 25010000002 FOSPHENYTOIN 100 MG PE/2ML SOLUTION 2 ML VIAL: Performed by: INTERNAL MEDICINE

## 2024-09-11 PROCEDURE — 94003 VENT MGMT INPAT SUBQ DAY: CPT

## 2024-09-11 PROCEDURE — 82948 REAGENT STRIP/BLOOD GLUCOSE: CPT

## 2024-09-11 PROCEDURE — 94761 N-INVAS EAR/PLS OXIMETRY MLT: CPT

## 2024-09-11 PROCEDURE — 83735 ASSAY OF MAGNESIUM: CPT | Performed by: INTERNAL MEDICINE

## 2024-09-11 PROCEDURE — 80048 BASIC METABOLIC PNL TOTAL CA: CPT | Performed by: INTERNAL MEDICINE

## 2024-09-11 PROCEDURE — 25010000002 ENOXAPARIN PER 10 MG: Performed by: INTERNAL MEDICINE

## 2024-09-11 PROCEDURE — 25010000002 PROPOFOL 10 MG/ML EMULSION: Performed by: INTERNAL MEDICINE

## 2024-09-11 PROCEDURE — 99232 SBSQ HOSP IP/OBS MODERATE 35: CPT | Performed by: INTERNAL MEDICINE

## 2024-09-11 PROCEDURE — 25010000002 FUROSEMIDE PER 20 MG: Performed by: INTERNAL MEDICINE

## 2024-09-11 PROCEDURE — 82805 BLOOD GASES W/O2 SATURATION: CPT

## 2024-09-11 PROCEDURE — 25010000002 FENTANYL 10 MCG/1 ML NS: Performed by: INTERNAL MEDICINE

## 2024-09-11 PROCEDURE — 84100 ASSAY OF PHOSPHORUS: CPT | Performed by: INTERNAL MEDICINE

## 2024-09-11 PROCEDURE — 82375 ASSAY CARBOXYHB QUANT: CPT

## 2024-09-11 PROCEDURE — 85025 COMPLETE CBC W/AUTO DIFF WBC: CPT | Performed by: INTERNAL MEDICINE

## 2024-09-11 PROCEDURE — 36600 WITHDRAWAL OF ARTERIAL BLOOD: CPT

## 2024-09-11 RX ORDER — PHENYTOIN 125 MG/5ML
200 SUSPENSION ORAL 2 TIMES DAILY
Status: DISCONTINUED | OUTPATIENT
Start: 2024-09-11 | End: 2024-09-25 | Stop reason: HOSPADM

## 2024-09-11 RX ORDER — METHYLPREDNISOLONE SODIUM SUCCINATE 125 MG/2ML
60 INJECTION, POWDER, LYOPHILIZED, FOR SOLUTION INTRAMUSCULAR; INTRAVENOUS EVERY 24 HOURS
Status: DISCONTINUED | OUTPATIENT
Start: 2024-09-11 | End: 2024-09-16

## 2024-09-11 RX ADMIN — FUROSEMIDE 40 MG: 10 INJECTION, SOLUTION INTRAMUSCULAR; INTRAVENOUS at 08:02

## 2024-09-11 RX ADMIN — Medication 10 ML: at 21:28

## 2024-09-11 RX ADMIN — RISPERIDONE 2 MG: 1 SOLUTION ORAL at 21:10

## 2024-09-11 RX ADMIN — ENOXAPARIN SODIUM 40 MG: 100 INJECTION SUBCUTANEOUS at 08:02

## 2024-09-11 RX ADMIN — Medication 30 ML: at 22:36

## 2024-09-11 RX ADMIN — PROPOFOL 25 MCG/KG/MIN: 10 INJECTION, EMULSION INTRAVENOUS at 09:28

## 2024-09-11 RX ADMIN — PHENYTOIN 200 MG: 125 SUSPENSION ORAL at 21:22

## 2024-09-11 RX ADMIN — Medication: at 19:30

## 2024-09-11 RX ADMIN — ALBUTEROL SULFATE 2.5 MG: 2.5 SOLUTION RESPIRATORY (INHALATION) at 01:19

## 2024-09-11 RX ADMIN — RISPERIDONE 2 MG: 1 SOLUTION ORAL at 08:02

## 2024-09-11 RX ADMIN — PROPOFOL 25 MCG/KG/MIN: 10 INJECTION, EMULSION INTRAVENOUS at 04:06

## 2024-09-11 RX ADMIN — SENNOSIDES 2 TABLET: 8.6 TABLET, FILM COATED ORAL at 08:02

## 2024-09-11 RX ADMIN — ATORVASTATIN CALCIUM 80 MG: 40 TABLET, FILM COATED ORAL at 08:02

## 2024-09-11 RX ADMIN — FUROSEMIDE 40 MG: 10 INJECTION, SOLUTION INTRAMUSCULAR; INTRAVENOUS at 21:11

## 2024-09-11 RX ADMIN — FOSPHENYTOIN SODIUM 200 MG PE: 50 INJECTION, SOLUTION INTRAMUSCULAR; INTRAVENOUS at 08:02

## 2024-09-11 RX ADMIN — ALBUTEROL SULFATE 2.5 MG: 2.5 SOLUTION RESPIRATORY (INHALATION) at 07:51

## 2024-09-11 RX ADMIN — SENNOSIDES 2 TABLET: 8.6 TABLET, FILM COATED ORAL at 21:11

## 2024-09-11 RX ADMIN — ASPIRIN 81 MG 81 MG: 81 TABLET ORAL at 08:02

## 2024-09-11 RX ADMIN — ALBUTEROL SULFATE 2.5 MG: 2.5 SOLUTION RESPIRATORY (INHALATION) at 16:19

## 2024-09-11 RX ADMIN — PANTOPRAZOLE SODIUM 40 MG: 40 INJECTION, POWDER, LYOPHILIZED, FOR SOLUTION INTRAVENOUS at 06:22

## 2024-09-11 RX ADMIN — DIAZEPAM 10 MG: 10 TABLET ORAL at 21:10

## 2024-09-11 RX ADMIN — PIPERACILLIN AND TAZOBACTAM 3.38 G: 3; .375 INJECTION, POWDER, LYOPHILIZED, FOR SOLUTION INTRAVENOUS at 02:26

## 2024-09-11 RX ADMIN — Medication 125 MCG/HR: at 16:40

## 2024-09-11 RX ADMIN — ARFORMOTEROL TARTRATE 15 MCG: 15 SOLUTION RESPIRATORY (INHALATION) at 19:25

## 2024-09-11 RX ADMIN — CHLORHEXIDINE GLUCONATE, 0.12% ORAL RINSE 15 ML: 1.2 SOLUTION DENTAL at 21:11

## 2024-09-11 RX ADMIN — DIAZEPAM 10 MG: 10 TABLET ORAL at 08:02

## 2024-09-11 RX ADMIN — METHYLPREDNISOLONE SODIUM SUCCINATE 60 MG: 125 INJECTION, POWDER, FOR SOLUTION INTRAMUSCULAR; INTRAVENOUS at 09:29

## 2024-09-11 RX ADMIN — Medication 30 ML: at 17:30

## 2024-09-11 RX ADMIN — CITALOPRAM HYDROBROMIDE 20 MG: 20 TABLET ORAL at 08:02

## 2024-09-11 RX ADMIN — LORAZEPAM 2 MG: 2 INJECTION INTRAMUSCULAR; INTRAVENOUS at 22:33

## 2024-09-11 RX ADMIN — PROPOFOL 25 MCG/KG/MIN: 10 INJECTION, EMULSION INTRAVENOUS at 19:32

## 2024-09-11 RX ADMIN — EMPAGLIFLOZIN 10 MG: 10 TABLET, FILM COATED ORAL at 08:02

## 2024-09-11 RX ADMIN — PROPOFOL 25 MCG/KG/MIN: 10 INJECTION, EMULSION INTRAVENOUS at 13:56

## 2024-09-11 RX ADMIN — ARFORMOTEROL TARTRATE 15 MCG: 15 SOLUTION RESPIRATORY (INHALATION) at 07:51

## 2024-09-11 RX ADMIN — CHLORHEXIDINE GLUCONATE, 0.12% ORAL RINSE 15 ML: 1.2 SOLUTION DENTAL at 08:02

## 2024-09-11 RX ADMIN — ALBUTEROL SULFATE 2.5 MG: 2.5 SOLUTION RESPIRATORY (INHALATION) at 19:25

## 2024-09-11 RX ADMIN — Medication 30 ML: at 08:03

## 2024-09-11 NOTE — PLAN OF CARE
Goal Outcome Evaluation:                 Unable to decrease vent support

## 2024-09-11 NOTE — PLAN OF CARE
Problem: Adult Inpatient Plan of Care  Goal: Plan of Care Review  Outcome: Ongoing, Progressing  Flowsheets (Taken 9/11/2024 1941)  Progress: no change  Outcome Evaluation: Pt remains on mechanical ventilation- PEEP 10 and FiO2 55%. Pt following commands. Fent @125 and Prop @25. 2050 UOP. No BM this shift. TF at 55ml/hr- pt tolerating. BUE restraints remain in place for pt safety. Sisters updated at bedside.     Problem: Restraint, Nonviolent  Goal: Absence of Harm or Injury  Outcome: Ongoing, Progressing  Intervention: Implement Least Restrictive Safety Strategies  Recent Flowsheet Documentation  Taken 9/11/2024 1800 by Winsome Mir RN  Medical Device Protection:   IV pole/bag removed from visual field   torso covered   tubing secured  Less Restrictive Alternative:   bed alarm in use   calming techniques promoted   emotional support provided   environment adjusted   positive reinforcement provided   sensory stimulation limited   therapeutic music  De-Escalation Techniques:   quiet time facilitated   stimulation decreased  Diversional Activities: music  Taken 9/11/2024 1600 by Winsome Mir RN  Medical Device Protection:   IV pole/bag removed from visual field   torso covered   tubing secured  Less Restrictive Alternative:   bed alarm in use   calming techniques promoted   emotional support provided   environment adjusted   positive reinforcement provided   sensory stimulation limited   therapeutic music  De-Escalation Techniques:   quiet time facilitated   stimulation decreased  Diversional Activities: music  Taken 9/11/2024 1400 by Winsome Mir RN  Medical Device Protection:   IV pole/bag removed from visual field   torso covered   tubing secured  Less Restrictive Alternative:   bed alarm in use   calming techniques promoted   emotional support provided   environment adjusted   positive reinforcement provided   sensory stimulation limited   therapeutic music  De-Escalation Techniques:   quiet time  facilitated   stimulation decreased  Diversional Activities: music  Taken 9/11/2024 1200 by Winsome Mir RN  Medical Device Protection:   IV pole/bag removed from visual field   torso covered   tubing secured  Less Restrictive Alternative:   bed alarm in use   calming techniques promoted   emotional support provided   environment adjusted   positive reinforcement provided   sensory stimulation limited   therapeutic music  De-Escalation Techniques:   quiet time facilitated   stimulation decreased  Diversional Activities: music  Taken 9/11/2024 1000 by Winsome Mir RN  Medical Device Protection:   IV pole/bag removed from visual field   torso covered   tubing secured  Less Restrictive Alternative:   bed alarm in use   calming techniques promoted   emotional support provided   environment adjusted   positive reinforcement provided   sensory stimulation limited   therapeutic music  De-Escalation Techniques:   quiet time facilitated   stimulation decreased  Diversional Activities: music  Taken 9/11/2024 0800 by Winsome Mir RN  Medical Device Protection:   IV pole/bag removed from visual field   torso covered   tubing secured  Less Restrictive Alternative:   bed alarm in use   calming techniques promoted   emotional support provided   environment adjusted   positive reinforcement provided   sensory stimulation limited   therapeutic music  De-Escalation Techniques:   quiet time facilitated   reoriented   stimulation decreased   verbally redirected  Diversional Activities: music  Intervention: Protect Dignity, Rights, and Personal Wellbeing  Recent Flowsheet Documentation  Taken 9/11/2024 1800 by Winsome Mir RN  Trust Relationship/Rapport: care explained  Taken 9/11/2024 1600 by Winsome Mir RN  Trust Relationship/Rapport: care explained  Taken 9/11/2024 1400 by Winsome Mir RN  Trust Relationship/Rapport: care explained  Taken 9/11/2024 1200 by Winsome Mir RN  Trust Relationship/Rapport: care  explained  Taken 9/11/2024 1000 by Winsome Mir RN  Trust Relationship/Rapport: care explained  Taken 9/11/2024 0800 by Winsome Mir RN  Trust Relationship/Rapport:   care explained   reassurance provided  Intervention: Protect Skin and Joint Integrity  Recent Flowsheet Documentation  Taken 9/11/2024 1800 by Winsome Mir RN  Body Position:   turned   tilted   left   lower extremity elevated   upper extremity elevated  Taken 9/11/2024 1600 by Winsome Mir RN  Body Position:   turned   tilted   right   lower extremity elevated   upper extremity elevated  Taken 9/11/2024 1400 by Winsome Mir RN  Body Position:   turned   supine   upper extremity elevated   lower extremity elevated  Taken 9/11/2024 1200 by Winsome Mir RN  Body Position:   turned   tilted   right   lower extremity elevated   upper extremity elevated  Taken 9/11/2024 1000 by Winsome Mir RN  Body Position:   turned   supine   upper extremity elevated   lower extremity elevated  Taken 9/11/2024 0800 by Winsome Mir RN  Body Position:   turned   tilted   left   lower extremity elevated   upper extremity elevated  Range of Motion: ROM (range of motion) performed

## 2024-09-11 NOTE — PROGRESS NOTES
Intensive Care Follow-up     Hospital:  LOS: 8 days   Mr. Fernie Dalal, 64 y.o. male is followed for:   Type 2 respiratory failure            History of present illness:   64-year-old male admitted on 9/1 with chest pain, shortness of breath, and chills.  He was diagnosed with right lower lobe pneumonia and exacerbation of COPD.  His past history also includes a known cardiomyopathy with reduced ejection fraction.  He had some mild elevation of his cardiac enzymes on admission which was felt to be demand ischemia.  He has been diagnosed with non-STEMI in the setting of demand ischemia with a chronically occluded RCA.  EF is 28% this admission.     He required intubation on 9/3 and transferred to ICU.  CTA revealed no pulmonary embolism.     He has remained on mechanical ventilation. He has been treated with broad-spectrum antimicrobial therapy.  Currently on Zosyn.  MRSA screen was negative.  He required pressors after intubation and is currently on a minimal dose of norepinephrine and this will likely be weaned off.  He has been resumed on Valium due to his chronic benzodiazepine use.      Subjective   Interval History:  Patient diuresing very well but continues to have slowly increasing oxygen requirements.  Remains on 10 of PEEP but not 60% FiO2.  Renal function overall stable with diuresis.             The patient's past medical, surgical and social history were reviewed and updated in Epic as appropriate.       Objective     Infusions:  fentanyl 10 mcg/mL,  mcg/hr, Last Rate: 125 mcg/hr (09/10/24 2231)  norepinephrine, 0.02-0.5 mcg/kg/min, Last Rate: Stopped (09/10/24 0404)  propofol, 5-50 mcg/kg/min, Last Rate: 25 mcg/kg/min (09/11/24 0928)      Medications:  Albuterol Sulfate NEB Orderable, 2.5 mg, Nebulization, Q6H - RT  arformoterol, 15 mcg, Nebulization, BID - RT  aspirin, 81 mg, Nasogastric, Daily  atorvastatin, 80 mg, Nasogastric, Daily  chlorhexidine, 15 mL, Mouth/Throat, Q12H  citalopram, 20 mg,  Nasogastric, Daily  diazePAM, 10 mg, Nasogastric, BID  empagliflozin, 10 mg, Nasogastric, Daily  enoxaparin, 40 mg, Subcutaneous, Daily  fosphenytoin, 200 mg PE, Intravenous, Q12H  furosemide, 40 mg, Intravenous, Q12H  insulin regular, 2-7 Units, Subcutaneous, Q6H  methylPREDNISolone sodium succinate, 60 mg, Intravenous, Q24H  pantoprazole, 40 mg, Intravenous, Q AM  pharmacy consult - [place], , Does not apply, Daily  ProSource No Carb, 30 mL, Nasogastric, TID  revefenacin, 175 mcg, Nebulization, Daily - RT  risperiDONE, 2 mg, Nasogastric, BID  senna, 2 tablet, Nasogastric, BID  sodium chloride, 10 mL, Intravenous, Q12H      I reviewed the patient's medications.    Vital Sign Min/Max for last 24 hours  Temp  Min: 97.9 °F (36.6 °C)  Max: 99.7 °F (37.6 °C)   BP  Min: 91/58  Max: 136/82   Pulse  Min: 81  Max: 113   Resp  Min: 18  Max: 26   SpO2  Min: 87 %  Max: 100 %   No data recorded       Input/Output for last 24 hour shift  09/10 0701 - 09/11 0700  In: 4476.5 [I.V.:1236.5]  Out: 5025 [Urine:5025]   Mode: VC+/AC  FiO2 (%):  [55 %-60 %] 60 %  S RR:  [20] 20  S VT:  [490 mL] 490 mL  PEEP/CPAP (cm H2O):  [10 cm H20] 10 cm H20  MAP (cm H2O):  [15-18] 15  GENERAL : Sedated, lying in bed, NAD  RESPIRATORY/THORAX : ET tube in place, Coarse breath sounds bilaterally  CARDIOVASCULAR : Normal S1/S2, RRR. 1+ lower ext edema.  GASTROINTESTINAL : Soft, NT/ND. BS x 4 normoactive. No hepatosplenomegaly.  MUSCULOSKELETAL : No cyanosis, clubbing, or ischemia  NEUROLOGICAL: Sedated, Moving all ext.    Results from last 7 days   Lab Units 09/11/24  0342 09/10/24  0351 09/09/24  1114   WBC 10*3/mm3 11.04* 9.18 11.03*   HEMOGLOBIN g/dL 10.0* 9.9* 10.0*   PLATELETS 10*3/mm3 346 283 302     Results from last 7 days   Lab Units 09/11/24  0342 09/10/24  1331 09/10/24  0351 09/09/24  1114   SODIUM mmol/L 140  --  143 144   POTASSIUM mmol/L 4.0 4.3 3.6 4.0   CO2 mmol/L 31.0*  --  34.0* 31.0*   BUN mg/dL 28*  --  29* 29*   CREATININE mg/dL 0.61*   --  0.55* 0.60*   MAGNESIUM mg/dL 2.2  --  2.1 2.0   PHOSPHORUS mg/dL 3.5  --  2.6 2.9   GLUCOSE mg/dL 116*  --  163* 173*     Estimated Creatinine Clearance: 158.3 mL/min (A) (by C-G formula based on SCr of 0.61 mg/dL (L)).    Results from last 7 days   Lab Units 09/11/24  0425   PH, ARTERIAL pH units 7.453*   PCO2, ARTERIAL mm Hg 53.4*   PO2 ART mm Hg 70.4*       I reviewed the patient's new clinical results.  I reviewed the patient's new imaging results/reports including actual images and agree with reports.       Imaging Results (Last 24 Hours)       Procedure Component Value Units Date/Time    XR Chest 1 View [[id number]] Collected: 09/11/24 0759     Updated: 09/11/24 0804    Narrative:      XR CHEST 1 VW    Date of Exam: 9/11/2024 3:34 AM EDT    Indication: Intubated    Comparison: 9/10/2024.    Findings:  Support lines are unchanged in position. There is continued mild cardiomegaly with pulmonary vascular congestion and mild perihilar pulmonary edema. There are continued patchy bibasilar airspace consolidations, greatest on the left, with small effusions.      Impression:      Impression:  No significant change in mild pulmonary edema. No appreciable change in patchy bibasilar airspace disease, which may relate to atelectasis and/or pneumonia with small bilateral effusions.      Electronically Signed: Ruby So MD    9/11/2024 8:01 AM EDT    Workstation ID: [id number]            Assessment & Plan   Impression        Type 2 respiratory failure    HFrEF (heart failure with reduced ejection fraction)    Coronary artery disease involving native coronary artery of native heart with angina pectoris    NSTEMI (non-ST elevated myocardial infarction)    Right lower lobe pneumonia    Hypotension       Plan        64-year-old male with a history of coronary artery disease, systolic heart failure, COPD, hypertension, hyperlipidemia, and anxiety.  Patient mid to the [facility] ICU on 9/3/2024.  Was  intubated on 9/3/2024.  Been treated for possible pneumonia and decompensated heart failure.  Has not been able to be weaned from the ventilator over the last week.     -Continue mechanical ventilation, wean oxygen to saturation greater than 88%  -Continue propofol and fentanyl for sedation  -Pressors as needed to maintain a MAP greater than 65  -Completed 7 days of antibiotics on 9/11/2024  -Continued scheduled nebs, will also add Solu-Medrol treat for an acute exacerbation of underlying COPD given his slow increase in oxygen requirement.  -Continue volume with history of chronic use.  -Will switch fosphenytoin back to the patient's home phenytoin dose.  -Cardiology following appreciate recommendations regarding heart failure  -GI prophylaxis  -DVT prophylaxis  -A.m. labs   -Patient will need a tracheostomy but unfortunately at this point oxygen requirements are still too high    I conducted multidisciplinary rounds and the plan of care was discussed with the multidisciplinary team at that time. In attendance at multidisciplinary rounds was clinical pharmacist, dietitian, nursing staff, and case management.    I discussed the patient's findings and my recommendations with nursing staff     Critical Care time spent in direct patient care: 35 minutes (excluding procedure time, if applicable) including high complexity decision making to assess, manipulate, and support vital organ system failure in this individual who has impairment of one or more vital organ systems such that there is a high probability of imminent or life threatening deterioration in the patient's condition.      Jarrell Hermosillo, DO  Pulmonary, Critical care and Sleep Medicine

## 2024-09-11 NOTE — CASE MANAGEMENT/SOCIAL WORK
Continued Stay Note  [facility]     Patient Name: Fernie Dalal  MRN: [id number]  Today's Date: 9/11/2024    Admit Date: 9/1/2024    Plan: DC TBD   Discharge Plan       Row Name 09/11/24 1552       Plan    Plan DC TBD    Patient/Family in Agreement with Plan yes    Plan Comments Discussed in MDR, patient remains intubated/sedated, unable to wean @ this time. I spoke w/sister, Oumou and we discussed that referral had been sent to [facility] while patient was on telemetry floor, however, that would not be an option for rehab if /when tracheostomy/PEG planned and that we would need to look @ an LTACH  facility. Oumou verbalized understanding, she stated that she does not come everyday due to her  recently having surgery, but I can call her anytime. D/C TBD @ this time. CM will continue to follow.    Final Discharge Disposition Code 30 - still a patient                   Discharge Codes    No documentation.                 Expected Discharge Date and Time       Expected Discharge Date Expected Discharge Time    Sep 13, 2024               Aniket Marquez RN

## 2024-09-11 NOTE — PROGRESS NOTES
"[facility]  IP Progress Note      Chief Complaint: Follow-up of non-STEMI/acute on chronic HFrEF/CM/respiratory failure    Subjective   No significant change in condition, remains on ventilator/sedated.  Responds and opens eyes.    Objective     Blood pressure 95/60, pulse 92, temperature 99 °F (37.2 °C), temperature source Axillary, resp. rate 20, height 175 cm (68.9\"), weight 120 kg (263 lb 7.2 oz), SpO2 91%.     Intake/Output Summary (Last 24 hours) at 9/11/2024 1623  Last data filed at 9/11/2024 1548  Gross per 24 hour   Intake 3050.54 ml   Output 5075 ml   Net -2024.46 ml       Physical Exam:  General: No apparent distress.  Neck: no JVD.  Chest:No respiratory distress, breath sounds are equal, scattered rhonchi.  Cardiovascular: Normal S1 and S2, distant heart sounds.  Extremities: No significant edema.        Results Review:     I reviewed the patient's new clinical results.    Results from last 7 days   Lab Units 09/11/24  0342   WBC 10*3/mm3 11.04*   HEMOGLOBIN g/dL 10.0*   HEMATOCRIT % 33.8*   PLATELETS 10*3/mm3 346     Results from last 7 days   Lab Units 09/11/24  0342 09/10/24  1331   SODIUM mmol/L 140  --    POTASSIUM mmol/L 4.0 4.3   CHLORIDE mmol/L 98  --    CO2 mmol/L 31.0*  --    BUN mg/dL 28*  --    CREATININE mg/dL 0.61*  --    CALCIUM mg/dL 9.1  --    BILIRUBIN mg/dL  --  <0.2   ALK PHOS U/L  --  83   ALT (SGPT) U/L  --  39   AST (SGOT) U/L  --  33   GLUCOSE mg/dL 116*  --      Results from last 7 days   Lab Units 09/11/24  0342   SODIUM mmol/L 140   POTASSIUM mmol/L 4.0   CHLORIDE mmol/L 98   CO2 mmol/L 31.0*   BUN mg/dL 28*   CREATININE mg/dL 0.61*   GLUCOSE mg/dL 116*   CALCIUM mg/dL 9.1           Lab Results   Component Value Date    TROPONINT 338 (C) 09/03/2024         Results from last 7 days   Lab Units 09/09/24  1114   TRIGLYCERIDES mg/dL 159*       Pharmacy Consult, , Does not apply, Q12H  Albuterol Sulfate NEB Orderable, 2.5 mg, Nebulization, Q6H - " RT  arformoterol, 15 mcg, Nebulization, BID - RT  aspirin, 81 mg, Nasogastric, Daily  atorvastatin, 80 mg, Nasogastric, Daily  chlorhexidine, 15 mL, Mouth/Throat, Q12H  citalopram, 20 mg, Nasogastric, Daily  diazePAM, 10 mg, Nasogastric, BID  empagliflozin, 10 mg, Nasogastric, Daily  enoxaparin, 40 mg, Subcutaneous, Daily  furosemide, 40 mg, Intravenous, Q12H  insulin regular, 2-7 Units, Subcutaneous, Q6H  methylPREDNISolone sodium succinate, 60 mg, Intravenous, Q24H  pantoprazole, 40 mg, Intravenous, Q AM  pharmacy consult - MTM, , Does not apply, Daily  phenytoin, 200 mg, Nasogastric, BID  ProSource No Carb, 30 mL, Nasogastric, TID  revefenacin, 175 mcg, Nebulization, Daily - RT  risperiDONE, 2 mg, Nasogastric, BID  senna, 2 tablet, Nasogastric, BID  sodium chloride, 10 mL, Intravenous, Q12H       Tele: Sinus Rythym      Assessment:  NSTEMI in the setting of demand ischemia with known chronically occluded RCA, he did not have significant left coronary system disease by cath last year.  Acute on chronic systolic heart failure, EF 28% this admission. Last EF 36% by echo 10/2023  Cardiomyopathy.  Pneumonia  Acute on chronic respiratory failure with hypoxia  Coronary artery disease  Dyslipidemia  Hypertension    Plan:  Continue current management and supportive care per ICU team.  May need tracheostomy.  Cardiac status is stable, no further procedures are planned at this time.  We will continue to optimize GDMT as tolerated.    Marsha Gaitan MD, FACC, [facility]

## 2024-09-11 NOTE — PLAN OF CARE
Goal Outcome Evaluation:  Plan of Care Reviewed With: patient        Progress: no change     Pt remained afebrile. Pt had increased agitation throughout the night, tapping on the bedside loudly, not wanting to be suctioned and not wanting the nurse to leave the room. Continued to follow commands at times. Propofol increased from 20 to 25 to help decrease agitation. Large amounts of secretions suctioned throughout the night. No bowel movement noted. Adequate urine output.

## 2024-09-12 ENCOUNTER — APPOINTMENT (OUTPATIENT)
Dept: GENERAL RADIOLOGY | Facility: HOSPITAL | Age: 64
End: 2024-09-12
Payer: MEDICAID

## 2024-09-12 LAB
ANION GAP SERPL CALCULATED.3IONS-SCNC: 9 MMOL/L (ref 5–15)
ARTERIAL PATENCY WRIST A: POSITIVE
ATMOSPHERIC PRESS: ABNORMAL MM[HG]
BASE EXCESS BLDA CALC-SCNC: 11.8 MMOL/L (ref 0–2)
BDY SITE: ABNORMAL
BODY TEMPERATURE: 37
BUN SERPL-MCNC: 29 MG/DL (ref 8–23)
BUN/CREAT SERPL: 48.3 (ref 7–25)
CALCIUM SPEC-SCNC: 9.1 MG/DL (ref 8.6–10.5)
CHLORIDE SERPL-SCNC: 97 MMOL/L (ref 98–107)
CO2 BLDA-SCNC: 39.3 MMOL/L (ref 22–33)
CO2 SERPL-SCNC: 34 MMOL/L (ref 22–29)
COHGB MFR BLD: 1.3 % (ref 0–2)
CREAT SERPL-MCNC: 0.6 MG/DL (ref 0.76–1.27)
EGFRCR SERPLBLD CKD-EPI 2021: 108.5 ML/MIN/1.73
EPAP: 0
GLUCOSE BLDC GLUCOMTR-MCNC: 110 MG/DL (ref 70–130)
GLUCOSE BLDC GLUCOMTR-MCNC: 118 MG/DL (ref 70–130)
GLUCOSE BLDC GLUCOMTR-MCNC: 132 MG/DL (ref 70–130)
GLUCOSE BLDC GLUCOMTR-MCNC: 165 MG/DL (ref 70–130)
GLUCOSE SERPL-MCNC: 122 MG/DL (ref 65–99)
HCO3 BLDA-SCNC: 37.7 MMOL/L (ref 20–26)
HCT VFR BLD CALC: 31.6 % (ref 38–51)
HGB BLDA-MCNC: 10.3 G/DL (ref 13.5–17.5)
INHALED O2 CONCENTRATION: 55 %
IPAP: 0
MAGNESIUM SERPL-MCNC: 2.3 MG/DL (ref 1.6–2.4)
METHGB BLD QL: 0.1 % (ref 0–1.5)
MODALITY: ABNORMAL
OXYHGB MFR BLDV: 91 % (ref 94–99)
PAW @ PEAK INSP FLOW SETTING VENT: 0 CMH2O
PCO2 BLDA: 55 MM HG (ref 35–45)
PCO2 TEMP ADJ BLD: 55 MM HG (ref 35–48)
PEEP RESPIRATORY: 10 CM[H2O]
PH BLDA: 7.44 PH UNITS (ref 7.35–7.45)
PH, TEMP CORRECTED: 7.44 PH UNITS
PHOSPHATE SERPL-MCNC: 3.4 MG/DL (ref 2.5–4.5)
PO2 BLDA: 64.2 MM HG (ref 83–108)
PO2 TEMP ADJ BLD: 64.2 MM HG (ref 83–108)
POTASSIUM SERPL-SCNC: 3.6 MMOL/L (ref 3.5–5.2)
POTASSIUM SERPL-SCNC: 5.4 MMOL/L (ref 3.5–5.2)
SODIUM SERPL-SCNC: 140 MMOL/L (ref 136–145)
TOTAL RATE: 21 BREATHS/MINUTE
VENTILATOR MODE: ABNORMAL
VT ON VENT VENT: 0.49 ML

## 2024-09-12 PROCEDURE — 94664 DEMO&/EVAL PT USE INHALER: CPT

## 2024-09-12 PROCEDURE — 63710000001 INSULIN REGULAR HUMAN PER 5 UNITS: Performed by: NURSE PRACTITIONER

## 2024-09-12 PROCEDURE — 25010000002 METHYLPREDNISOLONE PER 125 MG: Performed by: INTERNAL MEDICINE

## 2024-09-12 PROCEDURE — 94799 UNLISTED PULMONARY SVC/PX: CPT

## 2024-09-12 PROCEDURE — 84132 ASSAY OF SERUM POTASSIUM: CPT | Performed by: INTERNAL MEDICINE

## 2024-09-12 PROCEDURE — 82375 ASSAY CARBOXYHB QUANT: CPT

## 2024-09-12 PROCEDURE — 83050 HGB METHEMOGLOBIN QUAN: CPT

## 2024-09-12 PROCEDURE — 25010000002 ENOXAPARIN PER 10 MG: Performed by: INTERNAL MEDICINE

## 2024-09-12 PROCEDURE — 25010000002 PROPOFOL 10 MG/ML EMULSION: Performed by: INTERNAL MEDICINE

## 2024-09-12 PROCEDURE — 94002 VENT MGMT INPAT INIT DAY: CPT

## 2024-09-12 PROCEDURE — 83735 ASSAY OF MAGNESIUM: CPT | Performed by: INTERNAL MEDICINE

## 2024-09-12 PROCEDURE — 71045 X-RAY EXAM CHEST 1 VIEW: CPT

## 2024-09-12 PROCEDURE — 84100 ASSAY OF PHOSPHORUS: CPT | Performed by: INTERNAL MEDICINE

## 2024-09-12 PROCEDURE — 25010000002 FUROSEMIDE PER 20 MG: Performed by: INTERNAL MEDICINE

## 2024-09-12 PROCEDURE — 99232 SBSQ HOSP IP/OBS MODERATE 35: CPT | Performed by: INTERNAL MEDICINE

## 2024-09-12 PROCEDURE — 63710000001 REVEFENACIN 175 MCG/3ML SOLUTION: Performed by: INTERNAL MEDICINE

## 2024-09-12 PROCEDURE — 82805 BLOOD GASES W/O2 SATURATION: CPT

## 2024-09-12 PROCEDURE — 94003 VENT MGMT INPAT SUBQ DAY: CPT

## 2024-09-12 PROCEDURE — 94761 N-INVAS EAR/PLS OXIMETRY MLT: CPT

## 2024-09-12 PROCEDURE — 80048 BASIC METABOLIC PNL TOTAL CA: CPT | Performed by: INTERNAL MEDICINE

## 2024-09-12 PROCEDURE — 82948 REAGENT STRIP/BLOOD GLUCOSE: CPT

## 2024-09-12 PROCEDURE — 36600 WITHDRAWAL OF ARTERIAL BLOOD: CPT

## 2024-09-12 PROCEDURE — 99291 CRITICAL CARE FIRST HOUR: CPT | Performed by: INTERNAL MEDICINE

## 2024-09-12 PROCEDURE — 25010000002 FENTANYL 10 MCG/1 ML NS: Performed by: INTERNAL MEDICINE

## 2024-09-12 RX ORDER — POTASSIUM CHLORIDE 1.5 G/1.58G
40 POWDER, FOR SOLUTION ORAL EVERY 4 HOURS
Status: COMPLETED | OUTPATIENT
Start: 2024-09-12 | End: 2024-09-12

## 2024-09-12 RX ADMIN — SENNOSIDES 2 TABLET: 8.6 TABLET, FILM COATED ORAL at 08:02

## 2024-09-12 RX ADMIN — RISPERIDONE 2 MG: 1 SOLUTION ORAL at 08:02

## 2024-09-12 RX ADMIN — PROPOFOL 25 MCG/KG/MIN: 10 INJECTION, EMULSION INTRAVENOUS at 20:46

## 2024-09-12 RX ADMIN — INSULIN HUMAN 2 UNITS: 100 INJECTION, SOLUTION PARENTERAL at 12:07

## 2024-09-12 RX ADMIN — PROPOFOL 25 MCG/KG/MIN: 10 INJECTION, EMULSION INTRAVENOUS at 15:25

## 2024-09-12 RX ADMIN — ARFORMOTEROL TARTRATE 15 MCG: 15 SOLUTION RESPIRATORY (INHALATION) at 19:00

## 2024-09-12 RX ADMIN — Medication 30 ML: at 20:55

## 2024-09-12 RX ADMIN — PROPOFOL 25 MCG/KG/MIN: 10 INJECTION, EMULSION INTRAVENOUS at 05:46

## 2024-09-12 RX ADMIN — PHENYTOIN 200 MG: 125 SUSPENSION ORAL at 20:47

## 2024-09-12 RX ADMIN — PHENYTOIN 200 MG: 125 SUSPENSION ORAL at 08:02

## 2024-09-12 RX ADMIN — PANTOPRAZOLE SODIUM 40 MG: 40 INJECTION, POWDER, LYOPHILIZED, FOR SOLUTION INTRAVENOUS at 05:46

## 2024-09-12 RX ADMIN — POTASSIUM CHLORIDE 40 MEQ: 1.5 POWDER, FOR SOLUTION ORAL at 10:55

## 2024-09-12 RX ADMIN — Medication 125 MCG/HR: at 12:44

## 2024-09-12 RX ADMIN — Medication 30 ML: at 09:15

## 2024-09-12 RX ADMIN — FUROSEMIDE 40 MG: 10 INJECTION, SOLUTION INTRAMUSCULAR; INTRAVENOUS at 20:46

## 2024-09-12 RX ADMIN — ENOXAPARIN SODIUM 40 MG: 100 INJECTION SUBCUTANEOUS at 08:02

## 2024-09-12 RX ADMIN — LACTULOSE 20 G: 20 SOLUTION ORAL at 10:55

## 2024-09-12 RX ADMIN — ATORVASTATIN CALCIUM 80 MG: 40 TABLET, FILM COATED ORAL at 08:02

## 2024-09-12 RX ADMIN — ARFORMOTEROL TARTRATE 15 MCG: 15 SOLUTION RESPIRATORY (INHALATION) at 07:07

## 2024-09-12 RX ADMIN — DIAZEPAM 10 MG: 10 TABLET ORAL at 20:47

## 2024-09-12 RX ADMIN — DIAZEPAM 10 MG: 10 TABLET ORAL at 08:02

## 2024-09-12 RX ADMIN — ALBUTEROL SULFATE 2.5 MG: 2.5 SOLUTION RESPIRATORY (INHALATION) at 07:08

## 2024-09-12 RX ADMIN — CHLORHEXIDINE GLUCONATE, 0.12% ORAL RINSE 15 ML: 1.2 SOLUTION DENTAL at 20:46

## 2024-09-12 RX ADMIN — ASPIRIN 81 MG 81 MG: 81 TABLET ORAL at 08:02

## 2024-09-12 RX ADMIN — POTASSIUM CHLORIDE 40 MEQ: 1.5 POWDER, FOR SOLUTION ORAL at 05:46

## 2024-09-12 RX ADMIN — SENNOSIDES 2 TABLET: 8.6 TABLET, FILM COATED ORAL at 20:46

## 2024-09-12 RX ADMIN — ALBUTEROL SULFATE 2.5 MG: 2.5 SOLUTION RESPIRATORY (INHALATION) at 14:11

## 2024-09-12 RX ADMIN — RISPERIDONE 2 MG: 1 SOLUTION ORAL at 20:46

## 2024-09-12 RX ADMIN — Medication 10 ML: at 08:02

## 2024-09-12 RX ADMIN — Medication 30 ML: at 15:51

## 2024-09-12 RX ADMIN — CHLORHEXIDINE GLUCONATE, 0.12% ORAL RINSE 15 ML: 1.2 SOLUTION DENTAL at 08:02

## 2024-09-12 RX ADMIN — ALBUTEROL SULFATE 2.5 MG: 2.5 SOLUTION RESPIRATORY (INHALATION) at 00:54

## 2024-09-12 RX ADMIN — Medication: at 19:23

## 2024-09-12 RX ADMIN — FUROSEMIDE 40 MG: 10 INJECTION, SOLUTION INTRAMUSCULAR; INTRAVENOUS at 08:02

## 2024-09-12 RX ADMIN — ALBUTEROL SULFATE 2.5 MG: 2.5 SOLUTION RESPIRATORY (INHALATION) at 19:00

## 2024-09-12 RX ADMIN — METHYLPREDNISOLONE SODIUM SUCCINATE 60 MG: 125 INJECTION, POWDER, FOR SOLUTION INTRAMUSCULAR; INTRAVENOUS at 08:02

## 2024-09-12 RX ADMIN — REVEFENACIN 175 MCG: 175 SOLUTION RESPIRATORY (INHALATION) at 07:08

## 2024-09-12 RX ADMIN — CITALOPRAM HYDROBROMIDE 20 MG: 20 TABLET ORAL at 08:02

## 2024-09-12 RX ADMIN — Medication: at 07:00

## 2024-09-12 RX ADMIN — PROPOFOL 25 MCG/KG/MIN: 10 INJECTION, EMULSION INTRAVENOUS at 11:16

## 2024-09-12 RX ADMIN — PROPOFOL 25 MCG/KG/MIN: 10 INJECTION, EMULSION INTRAVENOUS at 01:13

## 2024-09-12 RX ADMIN — LACTULOSE 20 G: 20 SOLUTION ORAL at 20:46

## 2024-09-12 RX ADMIN — EMPAGLIFLOZIN 10 MG: 10 TABLET, FILM COATED ORAL at 08:02

## 2024-09-12 RX ADMIN — Medication 10 ML: at 20:55

## 2024-09-12 NOTE — NURSING NOTE
WOC consult for left nare.    Visited patient, bridle was slightly too tight and excess secretions has caused irritation to left nare. As of now it is blanching and macerated.     Loosened bridle, cleansed and applied sureprep barrier film wipe. Recommend performing this q shift.     Will follow area.

## 2024-09-12 NOTE — PROGRESS NOTES
Clinical Nutrition     Patient Name: Fernie Dalal  YOB: 1960  MRN: [id number]  Date of Encounter: 09/12/24 14:08 EDT  Admission date: 9/1/2024  Reason for Visit: Follow-up protocol, EN    Assessment   Nutrition Assessment   Admission Diagnosis:  Dyspnea [R06.00]  Acute on chronic respiratory failure with hypoxia [J96.21]    Problem List:    Type 2 respiratory failure    HFrEF (heart failure with reduced ejection fraction)    Coronary artery disease involving native coronary artery of native heart with angina pectoris    NSTEMI (non-ST elevated myocardial infarction)    Right lower lobe pneumonia    Hypotension      PMH:   He  has a past medical history of Angina at rest, Anxiety, Arrhythmia, COPD (chronic obstructive pulmonary disease), GERD (gastroesophageal reflux disease), Heart failure, Hyperlipidemia, Hypertension, and Myocardial infarction.    PSH:  He  has a past surgical history that includes Cardiac catheterization; Cardiovascular stress test; and Cardiac catheterization (N/A, 4/24/2023).    Applicable Nutrition History:   ARF/VENT 9-3-24    EN started 9-5-24    Labs    Labs Reviewed: Yes    Results from last 7 days   Lab Units 09/12/24  0330 09/11/24  0342 09/10/24  1331 09/10/24  0351 09/09/24  1114 09/08/24  0505 09/08/24  0505   GLUCOSE mg/dL 122* 116*  --  163* 173*  --  137*   BUN mg/dL 29* 28*  --  29* 29*  --  21   CREATININE mg/dL 0.60* 0.61*  --  0.55* 0.60*  --  0.64*   SODIUM mmol/L 140 140  --  143 144  --  144   CHLORIDE mmol/L 97* 98  --  102 103  --  107   POTASSIUM mmol/L 3.6 4.0 4.3 3.6 4.0  --  4.3   PHOSPHORUS mg/dL 3.4 3.5  --  2.6 2.9   < >  --    MAGNESIUM mg/dL 2.3 2.2  --  2.1 2.0  --  2.4   ALT (SGPT) U/L  --   --  39  --  49*  --  21    < > = values in this interval not displayed.       Results from last 7 days   Lab Units 09/10/24  1331 09/10/24  0351 09/09/24  1114   ALBUMIN g/dL 3.0* 3.3* 3.5   PREALBUMIN mg/dL  --   --  14.3*   CRP mg/dL  --    --  11.36*   TRIGLYCERIDES mg/dL  --   --  159*       Results from last 7 days   Lab Units 09/12/24  1201 09/12/24  0543 09/11/24  2319 09/11/24  1731 09/11/24  1153 09/11/24  0524   GLUCOSE mg/dL 165* 118 106 122 149* 109     Lab Results   Lab Value Date/Time    HGBA1C 6.00 (H) 09/01/2024 1050    HGBA1C 7.30 (H) 04/25/2023 0440         Results from last 7 days   Lab Units 09/09/24  1114 09/07/24  0847 09/06/24  0501   PROBNP pg/mL 2,724.0* 1,133.0* 1,656.0*       Medications    Medications Reviewed: yes    Scheduled Meds:Pharmacy Consult, , Does not apply, Q12H  Albuterol Sulfate NEB Orderable, 2.5 mg, Nebulization, Q6H - RT  arformoterol, 15 mcg, Nebulization, BID - RT  aspirin, 81 mg, Nasogastric, Daily  atorvastatin, 80 mg, Nasogastric, Daily  chlorhexidine, 15 mL, Mouth/Throat, Q12H  citalopram, 20 mg, Nasogastric, Daily  diazePAM, 10 mg, Nasogastric, BID  empagliflozin, 10 mg, Nasogastric, Daily  enoxaparin, 40 mg, Subcutaneous, Daily  furosemide, 40 mg, Intravenous, Q12H  insulin regular, 2-7 Units, Subcutaneous, Q6H  methylPREDNISolone sodium succinate, 60 mg, Intravenous, Q24H  pantoprazole, 40 mg, Intravenous, Q AM  pharmacy consult - [facility], , Does not apply, Daily  phenytoin, 200 mg, Nasogastric, BID  ProSource No Carb, 30 mL, Nasogastric, TID  revefenacin, 175 mcg, Nebulization, Daily - RT  risperiDONE, 2 mg, Nasogastric, BID  senna, 2 tablet, Nasogastric, BID  sodium chloride, 10 mL, Intravenous, Q12H      Continuous Infusions:fentanyl 10 mcg/mL,  mcg/hr, Last Rate: 125 mcg/hr (09/12/24 1244)  norepinephrine, 0.02-0.5 mcg/kg/min, Last Rate: Stopped (09/10/24 0404)  propofol, 5-50 mcg/kg/min, Last Rate: 25 mcg/kg/min (09/12/24 1116)      PRN Meds:.  acetaminophen    fentaNYL    lactulose    polyvinyl alcohol    Potassium Replacement - Follow Nurse / BPA Driven Protocol    sodium chloride    Intake/Ouptut 24 hrs (0701 - 0700)   I&O's Reviewed: yes    Intake & Output (last day)         09/11  "0701 09/12 0700 09/12 0701 09/13 0700    I.V. (mL/kg) 861.1 (7.4) 178.1 (1.5)    Other 540 30    NG/GT 1291 267    IV Piggyback      Total Intake(mL/kg) 2692.1 (23) 475.1 (4.1)    Urine (mL/kg/hr) 3350 (1.2) 1125 (1.3)    Total Output 3350 1125    Net -657.9 -649.9                 Anthropometrics     Height: Height: 175 cm (68.9\")  Last Filed Weight: Weight: 117 kg (257 lb 8 oz) (09/12/24 0600)  Method: Weight Method: Bed scale  BMI: BMI (Calculated): 38.1    UBW: ?  Weight change:  17lb wt gain since admit, suspect fluid     Weight       Weight (kg) Weight (lbs) Weight Method Visit Report   4/23/2015 118.84 kg  261 lb 15.9 oz      7/1/2022 117.482 kg  259 lb   --    4/18/2023 --  --   --    4/22/2023 117.482 kg  259 lb  Stated     4/23/2023 117.482 kg  259 lb      4/24/2023 102.967 kg  227 lb  Standing scale     6/27/2023 106.051 kg  233 lb 12.8 oz   --    9/29/2023 105.688 kg  233 lb      10/31/2023 105.688 kg  233 lb   --    9/1/2024 105.688 kg  233 lb  Estimated      106 kg  233 lb 11 oz  Bed scale      113.399 kg  250 lb          Nutrition Focused Physical Exam     Date:     Unable to perform due to Pt unable to participate at time of visit     Subjective   Reported/Observed/Food/Nutrition Related History:     9-12: pt intubated, sedated  +fentanyl, propofol 17.7ml    Per RN: pt tolerating TF, will wake up and follow commands    9-10: pt intubated, sedated + fentanyl, propofol 14.2ml    Per RN: pt tolerating TF,  has not had bm since 9-7, has been given senokot    9/9  Unable to wean from vent. Remains on levo and propofol.  + 1BM 9/8.     9-6   pt intubated, sedated   + versed, fentanyl, ketamine  Per RN: pt will open eyes, and follow commands sometimes, 2L UOP, tolerating TF, it is @50ml currently, has not had a bm yet  Plan to change enteral Dilantin to IV Cerebryx    9-4  Pt intubated, sedated, will open eyes, is tremulous  + levophed 0.04mcg, fentanyl, heparin  Per RN: pt very anxious, will follow " "commands, has lots of secretions    Needs Assessment   Date: 9-10-24    Height used:Height: 175 cm (68.9\")  Weights used ABW: 233lb/ 106kg- admit wt  IBW: 160lb/ 73kg    Estimated Calorie needs: ~1453kcal/ 70% MREE  Method:  14Kcals/KG ABW:1484kcal  Method:  MSJ ABW: 1845kcal  Method:  PSU ABW (ve 9.37, Tmax 36.7) = 1943kcal  IC: MREE= 2075kcal, RQ= 0.85 wnl  Goal 65-70% EDXJ=[id number]    Estimated Protein needs: ~146g protein  Method: 2g protein per kg IBW: 146g protein  Method: 1.2-1.5g protein per k-159g protein      Current Nutrition Prescription     PO: NPO Diet NPO Type: Tube Feeding  Oral Nutrition Supplement:  Intake: N/A    EN: Peptamen Intense VHP  Goal Rate: 55ml  Water Flushes: 30ml  Modular: Prosource-no carb 3/day  Route: NG  Tube: Large bore    At goal over: 20Hrs/day     Rx will supply:   Goal Volume 1100  mL/day     Flush Volume 600 mL/day     Energy 1280 Kcal/day 62 % MREE   Protein 146 g/day 100 % Est Need   Fiber 4 g/day     Water in   mL     Total Water 1524 mL     Meet DRI No        --------------------------------------------------------------------------  Product/Rate verified at bedside: Yes  Infusing Rate at time of visit: 55ml    Average Delivery from [facility]:   Volume 1041 mL/day 95  % Goal Vol.   Flush Volume 790 mL/day     Energy 1683 Kcal/day 81 % MREE   Protein 141 g/day 97 % Est Need   Fiber 4 g/day     Water in   mL     Total Water 1664 mL     Meet DRI No           Propofol intake:420ml, 462kcal    Assessment & Plan   Nutrition Diagnosis   Date: 24 Updated:   Problem Inadequate energy intake    Etiology ARF/VENT   Signs/Symptoms 95% goal volume EN   Status: Active     Goal:   Nutrition to support treatment and Maintain EN      Nutrition Intervention      Follow treatment progress, Care plan reviewed    Suggest add MVI as TF volume too low to meet RDI's    Monitoring/Evaluation:   Per protocol, I&O, Pertinent labs, Weight, Skin status, GI status, " Symptoms, Hemodynamic stability    Tania Street RD  Time Spent: 30min

## 2024-09-12 NOTE — PLAN OF CARE
Problem: Adult Inpatient Plan of Care  Goal: Plan of Care Review  Outcome: Ongoing, Progressing  Flowsheets (Taken 9/12/2024 0651)  Outcome Evaluation: Patient remains on MV PEEP 10, FiO2 55%. Fent@125. Prop@25. UOP adequate. Tmax: 1300. No BM noted this shift. BUE restraints remain in place for safety.    Problem: Restraint, Nonviolent  Goal: Absence of Harm or Injury  Outcome: Ongoing, Progressing  Intervention: Implement Least Restrictive Safety Strategies  Recent Flowsheet Documentation  Taken 9/12/2024 0600 by Shilpa Dickerson, RN  Medical Device Protection:   IV pole/bag removed from visual field   tubing secured  Less Restrictive Alternative:   sensory stimulation limited   positive reinforcement provided   environment adjusted   calming techniques promoted   bed alarm in use   appropriate expression promoted  De-Escalation Techniques:   increased round frequency   quiet time facilitated   reoriented   stimulation decreased   verbally redirected  Diversional Activities: television  Taken 9/12/2024 0400 by Shilpa Dickerson, RN  Medical Device Protection:   IV pole/bag removed from visual field   tubing secured  Less Restrictive Alternative:   sensory stimulation limited   positive reinforcement provided   environment adjusted   calming techniques promoted   bed alarm in use   appropriate expression promoted  De-Escalation Techniques:   increased round frequency   quiet time facilitated   reoriented   stimulation decreased   verbally redirected  Diversional Activities: television  Taken 9/12/2024 0200 by Shilpa Dickerson, RN  Medical Device Protection:   IV pole/bag removed from visual field   tubing secured  Less Restrictive Alternative:   sensory stimulation limited   positive reinforcement provided   environment adjusted   calming techniques promoted   bed alarm in use   appropriate expression promoted  De-Escalation Techniques:   increased round frequency   quiet time facilitated   reoriented    stimulation decreased   verbally redirected  Diversional Activities: television  Taken 9/12/2024 0000 by Shilpa Dickerson RN  Medical Device Protection:   IV pole/bag removed from visual field   tubing secured  Less Restrictive Alternative:   sensory stimulation limited   positive reinforcement provided   environment adjusted   calming techniques promoted   bed alarm in use   appropriate expression promoted  De-Escalation Techniques:   increased round frequency   quiet time facilitated   reoriented   stimulation decreased   verbally redirected  Diversional Activities: television  Taken 9/11/2024 2200 by Shilpa Dickerson RN  Medical Device Protection: tubing secured  Less Restrictive Alternative:   sensory stimulation limited   positive reinforcement provided   environment adjusted   calming techniques promoted   bed alarm in use   appropriate expression promoted  De-Escalation Techniques:   increased round frequency   quiet time facilitated   reoriented   stimulation decreased   verbally redirected  Diversional Activities: music  Taken 9/11/2024 2000 by Shilpa Dickerson RN  Medical Device Protection: tubing secured  Less Restrictive Alternative:   sensory stimulation limited   positive reinforcement provided   environment adjusted   calming techniques promoted   bed alarm in use   appropriate expression promoted  De-Escalation Techniques:   increased round frequency   quiet time facilitated   reoriented   stimulation decreased   verbally redirected  Diversional Activities: music  Intervention: Protect Dignity, Rights, and Personal Wellbeing  Recent Flowsheet Documentation  Taken 9/12/2024 0600 by Shilpa Dickerson RN  Trust Relationship/Rapport:   care explained   reassurance provided  Taken 9/12/2024 0400 by Shilpa Dickerson RN  Trust Relationship/Rapport:   care explained   reassurance provided  Taken 9/12/2024 0200 by Shilpa Dickerson RN  Trust Relationship/Rapport:   care explained   reassurance  provided  Taken 9/12/2024 0000 by Shilpa Dickerson RN  Trust Relationship/Rapport:   care explained   reassurance provided  Taken 9/11/2024 2200 by Shilpa Dickerson RN  Trust Relationship/Rapport:   care explained   reassurance provided  Taken 9/11/2024 2000 by Shilpa Dickerson RN  Trust Relationship/Rapport:   care explained   reassurance provided  Intervention: Protect Skin and Joint Integrity  Recent Flowsheet Documentation  Taken 9/12/2024 0600 by Shilpa Dickerson RN  Body Position:   weight shifting   supine  Range of Motion: active ROM (range of motion) encouraged  Taken 9/12/2024 0400 by Shilpa Dickerson RN  Body Position:   weight shifting   tilted   right  Range of Motion:   ROM (range of motion) performed   active ROM (range of motion) encouraged  Taken 9/12/2024 0200 by Shilpa Dickerson RN  Body Position:   weight shifting   supine  Range of Motion:   ROM (range of motion) performed   active ROM (range of motion) encouraged  Taken 9/12/2024 0000 by Shilpa Dickerson RN  Body Position:   weight shifting   tilted   left  Range of Motion: ROM (range of motion) performed  Taken 9/11/2024 2200 by Shilpa Dickerson RN  Body Position:   weight shifting   tilted   right  Range of Motion: ROM (range of motion) performed  Taken 9/11/2024 2000 by Shilpa Dickerson RN  Body Position:   weight shifting   supine  Range of Motion: ROM (range of motion) performed

## 2024-09-12 NOTE — PLAN OF CARE
Problem: Adult Inpatient Plan of Care  Goal: Plan of Care Review  Outcome: Ongoing, Progressing  Flowsheets (Taken 9/12/2024 1757)  Progress: no change  Outcome Evaluation: Pt remains on mechanical ventilation- PEEP 10 and FiO2 55%. Pt following commands. Fent @125 and Prop @25. K replaced- 5.4 recheck. Afebrile. 1800 UOP. No BM this shift- PRN lactulose administered x1. TF at 55ml/hr- pt tolerating. BUE restraints remain in place for pt safety. Sister updated via phone call.     Problem: Restraint, Nonviolent  Goal: Absence of Harm or Injury  Outcome: Ongoing, Progressing  Intervention: Implement Least Restrictive Safety Strategies  Recent Flowsheet Documentation  Taken 9/12/2024 1800 by Winsome Mir RN  Medical Device Protection:   IV pole/bag removed from visual field   torso covered   tubing secured  Less Restrictive Alternative:   bed alarm in use   calming techniques promoted   emotional support provided   environment adjusted   positive reinforcement provided   sensory stimulation limited   therapeutic music  De-Escalation Techniques:   quiet time facilitated   stimulation decreased  Diversional Activities: music  Taken 9/12/2024 1600 by Winsome Mir RN  Medical Device Protection:   IV pole/bag removed from visual field   torso covered   tubing secured  Less Restrictive Alternative:   bed alarm in use   calming techniques promoted   emotional support provided   environment adjusted   positive reinforcement provided   sensory stimulation limited   therapeutic music  De-Escalation Techniques:   quiet time facilitated   reoriented   stimulation decreased   verbally redirected  Diversional Activities: music  Taken 9/12/2024 1400 by Winsome Mir RN  Medical Device Protection:   IV pole/bag removed from visual field   torso covered   tubing secured  Less Restrictive Alternative:   bed alarm in use   calming techniques promoted   emotional support provided   environment adjusted   positive reinforcement  provided   sensory stimulation limited   therapeutic music  De-Escalation Techniques:   quiet time facilitated   reoriented   stimulation decreased   verbally redirected  Diversional Activities: music  Taken 9/12/2024 1200 by Winsome Mir RN  Medical Device Protection:   IV pole/bag removed from visual field   torso covered   tubing secured  Less Restrictive Alternative:   bed alarm in use   calming techniques promoted   emotional support provided   environment adjusted   positive reinforcement provided   sensory stimulation limited   therapeutic music  De-Escalation Techniques:   quiet time facilitated   reoriented   stimulation decreased   verbally redirected  Diversional Activities: music  Taken 9/12/2024 1000 by Winsome Mir RN  Medical Device Protection:   IV pole/bag removed from visual field   torso covered   tubing secured  Less Restrictive Alternative:   bed alarm in use   calming techniques promoted   emotional support provided   environment adjusted   positive reinforcement provided   sensory stimulation limited   therapeutic music  De-Escalation Techniques:   quiet time facilitated   reoriented   stimulation decreased   verbally redirected  Diversional Activities: music  Taken 9/12/2024 0800 by Winsome Mir RN  Medical Device Protection:   IV pole/bag removed from visual field   torso covered   tubing secured  Less Restrictive Alternative:   bed alarm in use   calming techniques promoted   emotional support provided   environment adjusted   positive reinforcement provided   sensory stimulation limited   therapeutic music  De-Escalation Techniques:   quiet time facilitated   reoriented   stimulation decreased   verbally redirected  Diversional Activities: music  Taken 9/12/2024 0710 by Winsome Mir RN  Medical Device Protection:   IV pole/bag removed from visual field   torso covered   tubing secured  Less Restrictive Alternative:   bed alarm in use   calming techniques promoted   emotional  support provided   environment adjusted   positive reinforcement provided   sensory stimulation limited   therapeutic music  De-Escalation Techniques:   quiet time facilitated   stimulation decreased  Diversional Activities: music  Intervention: Protect Dignity, Rights, and Personal Wellbeing  Recent Flowsheet Documentation  Taken 9/12/2024 1600 by Winsome Mir RN  Trust Relationship/Rapport: care explained  Taken 9/12/2024 1400 by Winsome Mir RN  Trust Relationship/Rapport: care explained  Taken 9/12/2024 1200 by Winsome Mir RN  Trust Relationship/Rapport: care explained  Taken 9/12/2024 1000 by Winsome Mir RN  Trust Relationship/Rapport: care explained  Taken 9/12/2024 0800 by Winsome Mir RN  Trust Relationship/Rapport: care explained  Intervention: Protect Skin and Joint Integrity  Recent Flowsheet Documentation  Taken 9/12/2024 1800 by Winsome Mir RN  Body Position:   turned   supine   upper extremity elevated   lower extremity elevated  Taken 9/12/2024 1600 by Winsome Mir RN  Body Position:   turned   tilted   left   lower extremity elevated   upper extremity elevated  Taken 9/12/2024 1400 by Winsome Mir RN  Body Position:   turned   supine   upper extremity elevated   lower extremity elevated  Taken 9/12/2024 1200 by Winsome Mir RN  Body Position:   turned   tilted   right   lower extremity elevated   upper extremity elevated  Taken 9/12/2024 1000 by Winsome Mir RN  Body Position:   turned   supine   upper extremity elevated   lower extremity elevated  Taken 9/12/2024 0800 by Winsome Mir RN  Body Position:   turned   tilted   left   lower extremity elevated   upper extremity elevated

## 2024-09-12 NOTE — PROGRESS NOTES
Intensive Care Follow-up     Hospital:  LOS: 9 days   Mr. Fernie Dalal, 64 y.o. male is followed for:   Type 2 respiratory failure            History of present illness:   64-year-old male admitted on 9/1 with chest pain, shortness of breath, and chills.  He was diagnosed with right lower lobe pneumonia and exacerbation of COPD.  His past history also includes a known cardiomyopathy with reduced ejection fraction.  He had some mild elevation of his cardiac enzymes on admission which was felt to be demand ischemia.  He has been diagnosed with non-STEMI in the setting of demand ischemia with a chronically occluded RCA.  EF is 28% this admission.     He required intubation on 9/3 and transferred to ICU.  CTA revealed no pulmonary embolism.     He has remained on mechanical ventilation. He has been treated with broad-spectrum antimicrobial therapy.  Currently on Zosyn.  MRSA screen was negative.  He required pressors after intubation and is currently on a minimal dose of norepinephrine and this will likely be weaned off.  He has been resumed on Valium due to his chronic benzodiazepine use.      Subjective   Interval History:  Patient diuresing very well, continues to run negative on daily basis.  FiO2 slightly down today to 55%.  But overall no significant change in his ventilator settings.             The patient's past medical, surgical and social history were reviewed and updated in Epic as appropriate.       Objective     Infusions:  fentanyl 10 mcg/mL,  mcg/hr, Last Rate: 125 mcg/hr (09/12/24 1244)  norepinephrine, 0.02-0.5 mcg/kg/min, Last Rate: Stopped (09/10/24 0404)  propofol, 5-50 mcg/kg/min, Last Rate: 25 mcg/kg/min (09/12/24 1116)      Medications:  Pharmacy Consult, , Does not apply, Q12H  Albuterol Sulfate NEB Orderable, 2.5 mg, Nebulization, Q6H - RT  arformoterol, 15 mcg, Nebulization, BID - RT  aspirin, 81 mg, Nasogastric, Daily  atorvastatin, 80 mg, Nasogastric, Daily  chlorhexidine, 15 mL,  Mouth/Throat, Q12H  citalopram, 20 mg, Nasogastric, Daily  diazePAM, 10 mg, Nasogastric, BID  empagliflozin, 10 mg, Nasogastric, Daily  enoxaparin, 40 mg, Subcutaneous, Daily  furosemide, 40 mg, Intravenous, Q12H  insulin regular, 2-7 Units, Subcutaneous, Q6H  methylPREDNISolone sodium succinate, 60 mg, Intravenous, Q24H  pantoprazole, 40 mg, Intravenous, Q AM  pharmacy consult - [facility], , Does not apply, Daily  phenytoin, 200 mg, Nasogastric, BID  ProSource No Carb, 30 mL, Nasogastric, TID  revefenacin, 175 mcg, Nebulization, Daily - RT  risperiDONE, 2 mg, Nasogastric, BID  senna, 2 tablet, Nasogastric, BID  sodium chloride, 10 mL, Intravenous, Q12H      I reviewed the patient's medications.    Vital Sign Min/Max for last 24 hours  Temp  Min: 98.8 °F (37.1 °C)  Max: 99.8 °F (37.7 °C)   BP  Min: 84/52  Max: 115/75   Pulse  Min: 77  Max: 107   Resp  Min: 20  Max: 22   SpO2  Min: 88 %  Max: 100 %   No data recorded       Input/Output for last 24 hour shift  09/11 0701 - 09/12 0700  In: 2692.1 [I.V.:861.1]  Out: 3350 [Urine:3350]   Mode: VC+/AC  FiO2 (%):  [55 %] 55 %  S RR:  [20] 20  S VT:  [490 mL] 490 mL  PEEP/CPAP (cm H2O):  [10 cm H20] 10 cm H20  MAP (cm H2O):  [14-23] 15  GENERAL : Sedated, lying in bed, NAD  RESPIRATORY/THORAX : ET tube in place, Coarse breath sounds bilaterally  CARDIOVASCULAR : Normal S1/S2, RRR. 1+ lower ext edema.  GASTROINTESTINAL : Soft, NT/ND. BS x 4 normoactive. No hepatosplenomegaly.  MUSCULOSKELETAL : No cyanosis, clubbing, or ischemia  NEUROLOGICAL: Sedated, Moving all ext.    Results from last 7 days   Lab Units 09/11/24  0342 09/10/24  0351 09/09/24  1114   WBC 10*3/mm3 11.04* 9.18 11.03*   HEMOGLOBIN g/dL 10.0* 9.9* 10.0*   PLATELETS 10*3/mm3 346 283 302     Results from last 7 days   Lab Units 09/12/24  0330 09/11/24  0342 09/10/24  1331 09/10/24  0351   SODIUM mmol/L 140 140  --  143   POTASSIUM mmol/L 3.6 4.0 4.3 3.6   CO2 mmol/L 34.0* 31.0*  --  34.0*   BUN mg/dL 29* 28*  --  29*    CREATININE mg/dL 0.60* 0.61*  --  0.55*   MAGNESIUM mg/dL 2.3 2.2  --  2.1   PHOSPHORUS mg/dL 3.4 3.5  --  2.6   GLUCOSE mg/dL 122* 116*  --  163*     Estimated Creatinine Clearance: 158.8 mL/min (A) (by C-G formula based on SCr of 0.6 mg/dL (L)).    Results from last 7 days   Lab Units 09/12/24  0341   PH, ARTERIAL pH units 7.444   PCO2, ARTERIAL mm Hg 55.0*   PO2 ART mm Hg 64.2*       I reviewed the patient's new clinical results.  I reviewed the patient's new imaging results/reports including actual images and agree with reports.       Imaging Results (Last 24 Hours)       Procedure Component Value Units Date/Time    XR Chest 1 View [[id number]] Collected: 09/12/24 0713     Updated: 09/12/24 0718    Narrative:      XR CHEST 1 VW    Date of Exam: 9/12/2024 2:48 AM EDT    Indication: Intubated    Comparison: 9/11/2024    Findings:  ET tube is seen in good position a couple of centimeters above the ankit. NG tube passes at least to the level of the proximal stomach. PICC line tip is seen in the distal SVC. The heart is enlarged. The vasculature is cephalized, but a little less   prominent than on yesterday's study. There is persistent left basilar atelectasis and effusion and very mild right basilar atelectasis. No pneumothorax is seen.      Impression:      Impression:    1. Cardiomegaly and mild, improving pulmonary vascular congestion.    2. Stable, moderate left basilar opacity, presumably atelectasis and effusion, and very mild right basilar atelectasis.      Electronically Signed: Dani Trejo MD    9/12/2024 7:15 AM EDT    Workstation ID: [id number]            Assessment & Plan   Impression        Type 2 respiratory failure    HFrEF (heart failure with reduced ejection fraction)    Coronary artery disease involving native coronary artery of native heart with angina pectoris    NSTEMI (non-ST elevated myocardial infarction)    Right lower lobe pneumonia    Hypotension       Plan        64-year-old male with a  history of coronary artery disease, systolic heart failure, COPD, hypertension, hyperlipidemia, and anxiety.  Patient mid to the [facility] ICU on 9/3/2024.  Was intubated on 9/3/2024.  Been treated for possible pneumonia and decompensated heart failure.  Has not been able to be weaned from the ventilator over the last week.     -Continue mechanical ventilation, wean oxygen to saturation greater than 88%  -Continue propofol and fentanyl for sedation  -Completed 7 days of antibiotics on 9/11/2024  -Continued scheduled nebs, and Solu-Medrol for COPD exacerbation  -Continue Valium with history of chronic use.  -Continue home phenytoin  -Cardiology following appreciate recommendations regarding heart failure  -GI prophylaxis  -DVT prophylaxis  -A.m. labs   -Patient will need a tracheostomy but unfortunately at this point oxygen requirements are still too high    I conducted multidisciplinary rounds and the plan of care was discussed with the multidisciplinary team at that time. In attendance at multidisciplinary rounds was clinical pharmacist, dietitian, nursing staff, and case management.    I discussed the patient's findings and my recommendations with patient and nursing staff     Critical Care time spent in direct patient care: 35 minutes (excluding procedure time, if applicable) including high complexity decision making to assess, manipulate, and support vital organ system failure in this individual who has impairment of one or more vital organ systems such that there is a high probability of imminent or life threatening deterioration in the patient's condition.      Jarrell Hermosillo, DO  Pulmonary, Critical care and Sleep Medicine

## 2024-09-13 ENCOUNTER — APPOINTMENT (OUTPATIENT)
Dept: GENERAL RADIOLOGY | Facility: HOSPITAL | Age: 64
End: 2024-09-13
Payer: MEDICAID

## 2024-09-13 PROBLEM — I95.9 HYPOTENSION: Status: RESOLVED | Noted: 2024-09-10 | Resolved: 2024-09-13

## 2024-09-13 LAB
ANION GAP SERPL CALCULATED.3IONS-SCNC: 10 MMOL/L (ref 5–15)
ARTERIAL PATENCY WRIST A: ABNORMAL
ATMOSPHERIC PRESS: ABNORMAL MM[HG]
BASE EXCESS BLDA CALC-SCNC: 11 MMOL/L (ref 0–2)
BASOPHILS # BLD AUTO: 0.05 10*3/MM3 (ref 0–0.2)
BASOPHILS NFR BLD AUTO: 0.5 % (ref 0–1.5)
BDY SITE: ABNORMAL
BODY TEMPERATURE: 37
BUN SERPL-MCNC: 34 MG/DL (ref 8–23)
BUN/CREAT SERPL: 64.2 (ref 7–25)
CALCIUM SPEC-SCNC: 9 MG/DL (ref 8.6–10.5)
CHLORIDE SERPL-SCNC: 96 MMOL/L (ref 98–107)
CO2 BLDA-SCNC: 38.8 MMOL/L (ref 22–33)
CO2 SERPL-SCNC: 32 MMOL/L (ref 22–29)
COHGB MFR BLD: 1.2 % (ref 0–2)
CREAT SERPL-MCNC: 0.53 MG/DL (ref 0.76–1.27)
DEPRECATED RDW RBC AUTO: 47 FL (ref 37–54)
EGFRCR SERPLBLD CKD-EPI 2021: 112.6 ML/MIN/1.73
EOSINOPHIL # BLD AUTO: 0.3 10*3/MM3 (ref 0–0.4)
EOSINOPHIL NFR BLD AUTO: 3 % (ref 0.3–6.2)
EPAP: 0
ERYTHROCYTE [DISTWIDTH] IN BLOOD BY AUTOMATED COUNT: 14.4 % (ref 12.3–15.4)
GLUCOSE BLDC GLUCOMTR-MCNC: 112 MG/DL (ref 70–130)
GLUCOSE BLDC GLUCOMTR-MCNC: 120 MG/DL (ref 70–130)
GLUCOSE BLDC GLUCOMTR-MCNC: 130 MG/DL (ref 70–130)
GLUCOSE BLDC GLUCOMTR-MCNC: 141 MG/DL (ref 70–130)
GLUCOSE SERPL-MCNC: 133 MG/DL (ref 65–99)
HCO3 BLDA-SCNC: 37.1 MMOL/L (ref 20–26)
HCT VFR BLD AUTO: 33.8 % (ref 37.5–51)
HCT VFR BLD CALC: 31.4 % (ref 38–51)
HGB BLD-MCNC: 10.2 G/DL (ref 13–17.7)
HGB BLDA-MCNC: 10.3 G/DL (ref 13.5–17.5)
IMM GRANULOCYTES # BLD AUTO: 0.06 10*3/MM3 (ref 0–0.05)
IMM GRANULOCYTES NFR BLD AUTO: 0.6 % (ref 0–0.5)
INHALED O2 CONCENTRATION: 55 %
IPAP: 0
LYMPHOCYTES # BLD AUTO: 0.89 10*3/MM3 (ref 0.7–3.1)
LYMPHOCYTES NFR BLD AUTO: 9 % (ref 19.6–45.3)
MAGNESIUM SERPL-MCNC: 2.4 MG/DL (ref 1.6–2.4)
MCH RBC QN AUTO: 27.3 PG (ref 26.6–33)
MCHC RBC AUTO-ENTMCNC: 30.2 G/DL (ref 31.5–35.7)
MCV RBC AUTO: 90.4 FL (ref 79–97)
METHGB BLD QL: 0.2 % (ref 0–1.5)
MODALITY: ABNORMAL
MONOCYTES # BLD AUTO: 0.94 10*3/MM3 (ref 0.1–0.9)
MONOCYTES NFR BLD AUTO: 9.5 % (ref 5–12)
NEUTROPHILS NFR BLD AUTO: 7.64 10*3/MM3 (ref 1.7–7)
NEUTROPHILS NFR BLD AUTO: 77.4 % (ref 42.7–76)
NRBC BLD AUTO-RTO: 0 /100 WBC (ref 0–0.2)
OXYHGB MFR BLDV: 93.6 % (ref 94–99)
PAW @ PEAK INSP FLOW SETTING VENT: 0 CMH2O
PCO2 BLDA: 56.2 MM HG (ref 35–45)
PCO2 TEMP ADJ BLD: 56.2 MM HG (ref 35–48)
PEEP RESPIRATORY: 10 CM[H2O]
PH BLDA: 7.43 PH UNITS (ref 7.35–7.45)
PH, TEMP CORRECTED: 7.43 PH UNITS
PHOSPHATE SERPL-MCNC: 3.4 MG/DL (ref 2.5–4.5)
PLATELET # BLD AUTO: 416 10*3/MM3 (ref 140–450)
PMV BLD AUTO: 9.8 FL (ref 6–12)
PO2 BLDA: 74.5 MM HG (ref 83–108)
PO2 TEMP ADJ BLD: 74.5 MM HG (ref 83–108)
POTASSIUM SERPL-SCNC: 3.7 MMOL/L (ref 3.5–5.2)
RBC # BLD AUTO: 3.74 10*6/MM3 (ref 4.14–5.8)
SODIUM SERPL-SCNC: 138 MMOL/L (ref 136–145)
TOTAL RATE: 20 BREATHS/MINUTE
VENTILATOR MODE: ABNORMAL
VT ON VENT VENT: 0.49 ML
WBC NRBC COR # BLD AUTO: 9.88 10*3/MM3 (ref 3.4–10.8)

## 2024-09-13 PROCEDURE — 82948 REAGENT STRIP/BLOOD GLUCOSE: CPT

## 2024-09-13 PROCEDURE — 80048 BASIC METABOLIC PNL TOTAL CA: CPT | Performed by: INTERNAL MEDICINE

## 2024-09-13 PROCEDURE — 85025 COMPLETE CBC W/AUTO DIFF WBC: CPT | Performed by: INTERNAL MEDICINE

## 2024-09-13 PROCEDURE — 84100 ASSAY OF PHOSPHORUS: CPT | Performed by: INTERNAL MEDICINE

## 2024-09-13 PROCEDURE — 82805 BLOOD GASES W/O2 SATURATION: CPT

## 2024-09-13 PROCEDURE — 83050 HGB METHEMOGLOBIN QUAN: CPT

## 2024-09-13 PROCEDURE — 94664 DEMO&/EVAL PT USE INHALER: CPT

## 2024-09-13 PROCEDURE — 94799 UNLISTED PULMONARY SVC/PX: CPT

## 2024-09-13 PROCEDURE — 71045 X-RAY EXAM CHEST 1 VIEW: CPT

## 2024-09-13 PROCEDURE — 36600 WITHDRAWAL OF ARTERIAL BLOOD: CPT

## 2024-09-13 PROCEDURE — 25010000002 FENTANYL 10 MCG/1 ML NS: Performed by: INTERNAL MEDICINE

## 2024-09-13 PROCEDURE — 83735 ASSAY OF MAGNESIUM: CPT | Performed by: INTERNAL MEDICINE

## 2024-09-13 PROCEDURE — 25010000002 ENOXAPARIN PER 10 MG: Performed by: INTERNAL MEDICINE

## 2024-09-13 PROCEDURE — 82375 ASSAY CARBOXYHB QUANT: CPT

## 2024-09-13 PROCEDURE — 94003 VENT MGMT INPAT SUBQ DAY: CPT

## 2024-09-13 PROCEDURE — 25010000002 PROPOFOL 10 MG/ML EMULSION: Performed by: INTERNAL MEDICINE

## 2024-09-13 PROCEDURE — 25010000002 FUROSEMIDE PER 20 MG: Performed by: INTERNAL MEDICINE

## 2024-09-13 PROCEDURE — 25010000002 METHYLNALTREXONE 12 MG/0.6ML SOLUTION: Performed by: INTERNAL MEDICINE

## 2024-09-13 PROCEDURE — 99291 CRITICAL CARE FIRST HOUR: CPT | Performed by: INTERNAL MEDICINE

## 2024-09-13 PROCEDURE — 63710000001 REVEFENACIN 175 MCG/3ML SOLUTION: Performed by: INTERNAL MEDICINE

## 2024-09-13 PROCEDURE — 25010000002 METHYLPREDNISOLONE PER 125 MG: Performed by: INTERNAL MEDICINE

## 2024-09-13 RX ORDER — BISACODYL 10 MG
10 SUPPOSITORY, RECTAL RECTAL DAILY PRN
Status: DISCONTINUED | OUTPATIENT
Start: 2024-09-13 | End: 2024-09-23

## 2024-09-13 RX ORDER — AMOXICILLIN 250 MG
2 CAPSULE ORAL 2 TIMES DAILY
Status: DISCONTINUED | OUTPATIENT
Start: 2024-09-13 | End: 2024-09-23

## 2024-09-13 RX ORDER — POLYETHYLENE GLYCOL 3350 17 G/17G
17 POWDER, FOR SOLUTION ORAL DAILY
Status: DISCONTINUED | OUTPATIENT
Start: 2024-09-13 | End: 2024-09-23

## 2024-09-13 RX ORDER — BISACODYL 5 MG/1
5 TABLET, DELAYED RELEASE ORAL DAILY PRN
Status: DISCONTINUED | OUTPATIENT
Start: 2024-09-13 | End: 2024-09-13

## 2024-09-13 RX ADMIN — Medication 30 ML: at 22:20

## 2024-09-13 RX ADMIN — ASPIRIN 81 MG 81 MG: 81 TABLET ORAL at 09:23

## 2024-09-13 RX ADMIN — EMPAGLIFLOZIN 10 MG: 10 TABLET, FILM COATED ORAL at 09:23

## 2024-09-13 RX ADMIN — CITALOPRAM HYDROBROMIDE 20 MG: 20 TABLET ORAL at 09:23

## 2024-09-13 RX ADMIN — SENNOSIDES AND DOCUSATE SODIUM 2 TABLET: 50; 8.6 TABLET ORAL at 20:32

## 2024-09-13 RX ADMIN — ARFORMOTEROL TARTRATE 15 MCG: 15 SOLUTION RESPIRATORY (INHALATION) at 19:40

## 2024-09-13 RX ADMIN — CHLORHEXIDINE GLUCONATE, 0.12% ORAL RINSE 15 ML: 1.2 SOLUTION DENTAL at 09:24

## 2024-09-13 RX ADMIN — SENNOSIDES AND DOCUSATE SODIUM 2 TABLET: 50; 8.6 TABLET ORAL at 12:24

## 2024-09-13 RX ADMIN — FUROSEMIDE 40 MG: 10 INJECTION, SOLUTION INTRAMUSCULAR; INTRAVENOUS at 20:32

## 2024-09-13 RX ADMIN — FUROSEMIDE 40 MG: 10 INJECTION, SOLUTION INTRAMUSCULAR; INTRAVENOUS at 09:23

## 2024-09-13 RX ADMIN — ALBUTEROL SULFATE 2.5 MG: 2.5 SOLUTION RESPIRATORY (INHALATION) at 12:45

## 2024-09-13 RX ADMIN — Medication 10 ML: at 21:17

## 2024-09-13 RX ADMIN — ALBUTEROL SULFATE 2.5 MG: 2.5 SOLUTION RESPIRATORY (INHALATION) at 19:40

## 2024-09-13 RX ADMIN — CHLORHEXIDINE GLUCONATE, 0.12% ORAL RINSE 15 ML: 1.2 SOLUTION DENTAL at 20:34

## 2024-09-13 RX ADMIN — RISPERIDONE 2 MG: 1 SOLUTION ORAL at 20:32

## 2024-09-13 RX ADMIN — PROPOFOL 35 MCG/KG/MIN: 10 INJECTION, EMULSION INTRAVENOUS at 11:53

## 2024-09-13 RX ADMIN — METHYLNALTREXONE BROMIDE 18 MG: 12 INJECTION, SOLUTION SUBCUTANEOUS at 12:13

## 2024-09-13 RX ADMIN — POLYETHYLENE GLYCOL 3350 17 G: 17 POWDER, FOR SOLUTION ORAL at 12:23

## 2024-09-13 RX ADMIN — PROPOFOL 25 MCG/KG/MIN: 10 INJECTION, EMULSION INTRAVENOUS at 06:54

## 2024-09-13 RX ADMIN — ALBUTEROL SULFATE 2.5 MG: 2.5 SOLUTION RESPIRATORY (INHALATION) at 07:02

## 2024-09-13 RX ADMIN — Medication 30 ML: at 15:40

## 2024-09-13 RX ADMIN — DIAZEPAM 10 MG: 10 TABLET ORAL at 09:23

## 2024-09-13 RX ADMIN — Medication 30 ML: at 09:30

## 2024-09-13 RX ADMIN — PROPOFOL 30 MCG/KG/MIN: 10 INJECTION, EMULSION INTRAVENOUS at 15:40

## 2024-09-13 RX ADMIN — ACETAMINOPHEN 650 MG: 650 SOLUTION ORAL at 01:42

## 2024-09-13 RX ADMIN — REVEFENACIN 175 MCG: 175 SOLUTION RESPIRATORY (INHALATION) at 07:02

## 2024-09-13 RX ADMIN — ATORVASTATIN CALCIUM 80 MG: 40 TABLET, FILM COATED ORAL at 09:23

## 2024-09-13 RX ADMIN — PHENYTOIN 200 MG: 125 SUSPENSION ORAL at 21:16

## 2024-09-13 RX ADMIN — SENNOSIDES 2 TABLET: 8.6 TABLET, FILM COATED ORAL at 09:23

## 2024-09-13 RX ADMIN — LACTULOSE 20 G: 20 SOLUTION ORAL at 05:37

## 2024-09-13 RX ADMIN — PANTOPRAZOLE SODIUM 40 MG: 40 INJECTION, POWDER, LYOPHILIZED, FOR SOLUTION INTRAVENOUS at 05:37

## 2024-09-13 RX ADMIN — ENOXAPARIN SODIUM 40 MG: 100 INJECTION SUBCUTANEOUS at 09:24

## 2024-09-13 RX ADMIN — ARFORMOTEROL TARTRATE 15 MCG: 15 SOLUTION RESPIRATORY (INHALATION) at 07:02

## 2024-09-13 RX ADMIN — PHENYTOIN 200 MG: 125 SUSPENSION ORAL at 09:22

## 2024-09-13 RX ADMIN — DIAZEPAM 10 MG: 10 TABLET ORAL at 20:32

## 2024-09-13 RX ADMIN — Medication 150 MCG/HR: at 08:44

## 2024-09-13 RX ADMIN — METHYLPREDNISOLONE SODIUM SUCCINATE 60 MG: 125 INJECTION, POWDER, FOR SOLUTION INTRAMUSCULAR; INTRAVENOUS at 08:45

## 2024-09-13 RX ADMIN — Medication 10 ML: at 08:47

## 2024-09-13 RX ADMIN — PROPOFOL 20 MCG/KG/MIN: 10 INJECTION, EMULSION INTRAVENOUS at 20:11

## 2024-09-13 RX ADMIN — RISPERIDONE 2 MG: 1 SOLUTION ORAL at 09:24

## 2024-09-13 RX ADMIN — PROPOFOL 25 MCG/KG/MIN: 10 INJECTION, EMULSION INTRAVENOUS at 02:30

## 2024-09-13 RX ADMIN — Medication: at 20:15

## 2024-09-13 NOTE — PROGRESS NOTES
"[facility] at [facility]  IP Progress Note      Chief Complaint: Follow-up of non-STEMI/acute on chronic HFrEF/CM/respiratory failure    Subjective   Status quo, remains sedated and on ventilator.    Objective     Blood pressure 108/81, pulse 86, temperature 99 °F (37.2 °C), temperature source Oral, resp. rate 20, height 175 cm (68.9\"), weight 117 kg (257 lb 8 oz), SpO2 94%.     Intake/Output Summary (Last 24 hours) at 9/12/2024 2156  Last data filed at 9/12/2024 2000  Gross per 24 hour   Intake 2484.48 ml   Output 3025 ml   Net -540.52 ml       Physical Exam:  General: No apparent distress.  Neck: no JVD.  Chest:No respiratory distress, breath sounds are equal, scattered rhonchi.  Cardiovascular: Normal S1 and S2, distant heart sounds.  Extremities: No significant edema.        Results Review:     I reviewed the patient's new clinical results.    Results from last 7 days   Lab Units 09/11/24  0342   WBC 10*3/mm3 11.04*   HEMOGLOBIN g/dL 10.0*   HEMATOCRIT % 33.8*   PLATELETS 10*3/mm3 346     Results from last 7 days   Lab Units 09/12/24  1438 09/12/24  0330 09/11/24  0342 09/10/24  1331   SODIUM mmol/L  --  140   < >  --    POTASSIUM mmol/L 5.4* 3.6   < > 4.3   CHLORIDE mmol/L  --  97*   < >  --    CO2 mmol/L  --  34.0*   < >  --    BUN mg/dL  --  29*   < >  --    CREATININE mg/dL  --  0.60*   < >  --    CALCIUM mg/dL  --  9.1   < >  --    BILIRUBIN mg/dL  --   --   --  <0.2   ALK PHOS U/L  --   --   --  83   ALT (SGPT) U/L  --   --   --  39   AST (SGOT) U/L  --   --   --  33   GLUCOSE mg/dL  --  122*   < >  --     < > = values in this interval not displayed.     Results from last 7 days   Lab Units 09/12/24  1438 09/12/24  0330   SODIUM mmol/L  --  140   POTASSIUM mmol/L 5.4* 3.6   CHLORIDE mmol/L  --  97*   CO2 mmol/L  --  34.0*   BUN mg/dL  --  29*   CREATININE mg/dL  --  0.60*   GLUCOSE mg/dL  --  122*   CALCIUM mg/dL  --  9.1           Lab Results   Component Value Date    TROPONINT 338 " (C) 09/03/2024         Results from last 7 days   Lab Units 09/09/24  1114   TRIGLYCERIDES mg/dL 159*       Pharmacy Consult, , Does not apply, Q12H  Albuterol Sulfate NEB Orderable, 2.5 mg, Nebulization, Q6H - RT  arformoterol, 15 mcg, Nebulization, BID - RT  aspirin, 81 mg, Nasogastric, Daily  atorvastatin, 80 mg, Nasogastric, Daily  chlorhexidine, 15 mL, Mouth/Throat, Q12H  citalopram, 20 mg, Nasogastric, Daily  diazePAM, 10 mg, Nasogastric, BID  empagliflozin, 10 mg, Nasogastric, Daily  enoxaparin, 40 mg, Subcutaneous, Daily  furosemide, 40 mg, Intravenous, Q12H  insulin regular, 2-7 Units, Subcutaneous, Q6H  methylPREDNISolone sodium succinate, 60 mg, Intravenous, Q24H  pantoprazole, 40 mg, Intravenous, Q AM  pharmacy consult - [place], , Does not apply, Daily  phenytoin, 200 mg, Nasogastric, BID  ProSource No Carb, 30 mL, Nasogastric, TID  revefenacin, 175 mcg, Nebulization, Daily - RT  risperiDONE, 2 mg, Nasogastric, BID  senna, 2 tablet, Nasogastric, BID  sodium chloride, 10 mL, Intravenous, Q12H       Tele: Sinus Rythym      Assessment:  NSTEMI in the setting of demand ischemia with known chronically occluded RCA, he did not have significant left coronary system disease by cath last year.  Acute on chronic systolic heart failure, EF 28% this admission. Last EF 36% by echo 10/2023  Cardiomyopathy.  Pneumonia  Acute on chronic respiratory failure with hypoxia  Coronary artery disease  Dyslipidemia  Hypertension    Plan:  Continue current management and supportive care per ICU team.  May need tracheostomy.  Cardiac status is stable, no further procedures are planned at this time.    ICU team managing multiple medical issues.  Cardiology will be available to help out and can see him as needed.  Please call for questions or concerns.    Marsha Gaitan MD, FACC, [facility]

## 2024-09-13 NOTE — PROGRESS NOTES
Intensive Care Follow-up     Hospital:  LOS: 10 days   Mr. Fernie Dalal, 64 y.o. male is followed for:   Type 2 respiratory failure          History of present illness:   64-year-old male admitted on 9/1 with chest pain, shortness of breath, and chills.  He was diagnosed with right lower lobe pneumonia and exacerbation of COPD.  His past history also includes a known cardiomyopathy with reduced ejection fraction.  He had some mild elevation of his cardiac enzymes on admission which was felt to be demand ischemia.  He has been diagnosed with non-STEMI in the setting of demand ischemia with a chronically occluded RCA.  EF is 28% this admission.     He required intubation on 9/3 and transferred to ICU.  CTA revealed no pulmonary embolism.     He has remained on mechanical ventilation. He has been treated with broad-spectrum antimicrobial therapy.  Currently on Zosyn.  MRSA screen was negative.  He required pressors after intubation and is currently on a minimal dose of norepinephrine and this will likely be weaned off.  He has been resumed on Valium due to his chronic benzodiazepine use.      Subjective   Interval History:  Patient stable today.  Wakes up follows commands.  Unfortunately have not been able to decrease FiO2 any further despite significant diuresis, antibiotics for pneumonia, and treatment for COPD exacerbations with scheduled nebs.             The patient's past medical, surgical and social history were reviewed and updated in Epic as appropriate.       Objective     Infusions:  fentanyl 10 mcg/mL,  mcg/hr, Last Rate: 150 mcg/hr (09/13/24 0844)  propofol, 5-50 mcg/kg/min, Last Rate: 35 mcg/kg/min (09/13/24 1153)      Medications:  Pharmacy Consult, , Does not apply, Q12H  Albuterol Sulfate NEB Orderable, 2.5 mg, Nebulization, Q6H - RT  arformoterol, 15 mcg, Nebulization, BID - RT  aspirin, 81 mg, Nasogastric, Daily  atorvastatin, 80 mg, Nasogastric, Daily  chlorhexidine, 15 mL, Mouth/Throat,  Q12H  citalopram, 20 mg, Nasogastric, Daily  diazePAM, 10 mg, Nasogastric, BID  empagliflozin, 10 mg, Nasogastric, Daily  enoxaparin, 40 mg, Subcutaneous, Daily  furosemide, 40 mg, Intravenous, Q12H  insulin regular, 2-7 Units, Subcutaneous, Q6H  methylnaltrexone, 18 mg, Subcutaneous, Every Other Day  methylPREDNISolone sodium succinate, 60 mg, Intravenous, Q24H  pantoprazole, 40 mg, Intravenous, Q AM  pharmacy consult - [place], , Does not apply, Daily  phenytoin, 200 mg, Nasogastric, BID  senna-docusate sodium, 2 tablet, Nasogastric, BID   And  polyethylene glycol, 17 g, Nasogastric, Daily  ProSource No Carb, 30 mL, Nasogastric, TID  revefenacin, 175 mcg, Nebulization, Daily - RT  risperiDONE, 2 mg, Nasogastric, BID  sodium chloride, 10 mL, Intravenous, Q12H      I reviewed the patient's medications.    Vital Sign Min/Max for last 24 hours  Temp  Min: 98.1 °F (36.7 °C)  Max: 100.4 °F (38 °C)   BP  Min: 86/66  Max: 139/86   Pulse  Min: 75  Max: 103   Resp  Min: 20  Max: 23   SpO2  Min: 90 %  Max: 99 %   No data recorded       Input/Output for last 24 hour shift  09/12 0701 - 09/13 0700  In: 2375.4 [I.V.:851.4]  Out: 3000 [Urine:3000]   Mode: VC+/AC  FiO2 (%):  [50 %-55 %] 50 %  S RR:  [20] 20  S VT:  [490 mL] 490 mL  PEEP/CPAP (cm H2O):  [10 cm H20] 10 cm H20  MAP (cm H2O):  [14-15] 15  GENERAL : Sedated, lying in bed, NAD  RESPIRATORY/THORAX : ET tube in place, Coarse breath sounds bilaterally  CARDIOVASCULAR : Normal S1/S2, RRR. 1+ lower ext edema.  GASTROINTESTINAL : Soft, NT/ND. BS x 4 normoactive. No hepatosplenomegaly.  MUSCULOSKELETAL : No cyanosis, clubbing, or ischemia  NEUROLOGICAL: Sedated, Moving all ext.    Results from last 7 days   Lab Units 09/13/24  0319 09/11/24  0342 09/10/24  0351   WBC 10*3/mm3 9.88 11.04* 9.18   HEMOGLOBIN g/dL 10.2* 10.0* 9.9*   PLATELETS 10*3/mm3 416 346 283     Results from last 7 days   Lab Units 09/13/24  0319 09/12/24  1438 09/12/24  0330 09/11/24  0342   SODIUM mmol/L 138   --  140 140   POTASSIUM mmol/L 3.7 5.4* 3.6 4.0   CO2 mmol/L 32.0*  --  34.0* 31.0*   BUN mg/dL 34*  --  29* 28*   CREATININE mg/dL 0.53*  --  0.60* 0.61*   MAGNESIUM mg/dL 2.4  --  2.3 2.2   PHOSPHORUS mg/dL 3.4  --  3.4 3.5   GLUCOSE mg/dL 133*  --  122* 116*     Estimated Creatinine Clearance: 179.8 mL/min (A) (by C-G formula based on SCr of 0.53 mg/dL (L)).    Results from last 7 days   Lab Units 09/13/24  0350   PH, ARTERIAL pH units 7.428   PCO2, ARTERIAL mm Hg 56.2*   PO2 ART mm Hg 74.5*       I reviewed the patient's new clinical results.  I reviewed the patient's new imaging results/reports including actual images and agree with reports.       Imaging Results (Last 24 Hours)       Procedure Component Value Units Date/Time    XR Chest 1 View [[id number]] Collected: 09/13/24 0703     Updated: 09/13/24 0709    Narrative:      XR CHEST 1 VW    Date of Exam: 9/13/2024 1:58 AM EDT    Indication: Intubated    Comparison: Chest radiograph 9/12/2024.    Findings:  Unchanged position of endotracheal tube, right PICC, and visualized portions of the nasogastric tube. Enlarged cardiac silhouette, unchanged. Pulmonary vascular congestion. Diffuse interstitial thickening, similar to slightly increased. Small bilateral   pleural effusions and bibasilar airspace disease, similar to slightly increased. No pneumothorax. Osseous structures are unchanged.      Impression:      Impression:  Slightly increased pulmonary vascular congestion/mild pulmonary edema and small/moderate bilateral pleural effusions with bibasilar atelectasis.      Electronically Signed: Gael Gasca MD    9/13/2024 7:06 AM EDT    Workstation ID: [id number]            Assessment & Plan   Impression        Type 2 respiratory failure    HFrEF (heart failure with reduced ejection fraction)    Coronary artery disease involving native coronary artery of native heart with angina pectoris    NSTEMI (non-ST elevated myocardial infarction)    Right lower lobe  pneumonia       Plan        64-year-old male with a history of coronary artery disease, systolic heart failure, COPD, hypertension, hyperlipidemia, and anxiety.  Patient mid to the [facility] ICU on 9/3/2024.  Was intubated on 9/3/2024.  Been treated for possible pneumonia and decompensated heart failure.  Has not been able to be weaned from the ventilator over the last week.     -Continue mechanical ventilation, wean oxygen to saturation greater than 88%  -Continue propofol and fentanyl for sedation  -Completed 7 days of antibiotics on 9/11/2024  -Continued scheduled nebs, and Solu-Medrol for COPD exacerbation  -Continue Valium with history of chronic use.  -Continue home phenytoin  -Cardiology following appreciate recommendations regarding heart failure  -GI prophylaxis  -DVT prophylaxis  -A.m. labs   -Patient will need a tracheostomy unfortunately have not able to get his oxygen down any further.  Will go ahead and consult general surgery for consideration of a tracheostomy at this point.    I conducted multidisciplinary rounds and the plan of care was discussed with the multidisciplinary team at that time. In attendance at multidisciplinary rounds was clinical pharmacist, dietitian, nursing staff, and case management.    I discussed the patient's findings and my recommendations with patient and nursing staff     Critical Care time spent in direct patient care: 35 minutes (excluding procedure time, if applicable) including high complexity decision making to assess, manipulate, and support vital organ system failure in this individual who has impairment of one or more vital organ systems such that there is a high probability of imminent or life threatening deterioration in the patient's condition.      Jarrell Hermosillo, DO  Pulmonary, Critical care and Sleep Medicine

## 2024-09-13 NOTE — PLAN OF CARE
Goal Outcome Evaluation:         Pt unable to decrease vent support

## 2024-09-13 NOTE — PLAN OF CARE
Goal Outcome Evaluation:      Plan is to decrease ventilator support soon.

## 2024-09-13 NOTE — PLAN OF CARE
Problem: Adult Inpatient Plan of Care  Goal: Plan of Care Review  Outcome: Ongoing, Progressing  Flowsheets (Taken 9/13/2024 0624)  Progress: no change  Plan of Care Reviewed With: patient  Outcome Evaluation: Patient remains om MV PEEP 1, FiO2 55%. Fent@125, Prop@25. Tmax: 100.4. prn Tylenol givenx1. UOP: 1200. No BM noted this shift, passing flatus. PRN Lactulose givenx2. BUE restraints remain in place for safety.    Problem: Restraint, Nonviolent  Goal: Absence of Harm or Injury  Outcome: Ongoing, Progressing  Intervention: Implement Least Restrictive Safety Strategies  Recent Flowsheet Documentation  Taken 9/13/2024 0600 by Shilpa Dickerson, RN  Medical Device Protection:   IV pole/bag removed from visual field   tubing secured  Less Restrictive Alternative:   sensory stimulation limited   positive reinforcement provided   environment adjusted   calming techniques promoted   bed alarm in use   appropriate expression promoted  De-Escalation Techniques:   increased round frequency   quiet time facilitated   reoriented   stimulation decreased   verbally redirected  Diversional Activities: television  Taken 9/13/2024 0400 by Shilpa Dickerson RN  Medical Device Protection: tubing secured  Less Restrictive Alternative:   sensory stimulation limited   positive reinforcement provided   environment adjusted   calming techniques promoted   bed alarm in use   appropriate expression promoted  De-Escalation Techniques:   increased round frequency   quiet time facilitated   reoriented   stimulation decreased   verbally redirected  Diversional Activities: music  Taken 9/13/2024 0200 by Shilpa Dickerson, RN  Medical Device Protection: tubing secured  Less Restrictive Alternative:   sensory stimulation limited   positive reinforcement provided   environment adjusted   calming techniques promoted   bed alarm in use   appropriate expression promoted  De-Escalation Techniques:   increased round frequency   quiet time  facilitated   reoriented   stimulation decreased   verbally redirected  Diversional Activities:   television   music  Taken 9/13/2024 0136 by Shilpa Dickerson RN  Medical Device Protection:   IV pole/bag removed from visual field   tubing secured  Less Restrictive Alternative:   sensory stimulation limited   positive reinforcement provided   environment adjusted   calming techniques promoted   bed alarm in use   appropriate expression promoted  De-Escalation Techniques:   increased round frequency   quiet time facilitated   reoriented   stimulation decreased   verbally redirected  Diversional Activities: television  Taken 9/13/2024 0000 by Shilpa Dickerson RN  Medical Device Protection:   IV pole/bag removed from visual field   tubing secured  Less Restrictive Alternative:   sensory stimulation limited   positive reinforcement provided   environment adjusted   calming techniques promoted   bed alarm in use   appropriate expression promoted  De-Escalation Techniques:   increased round frequency   quiet time facilitated   reoriented   stimulation decreased   verbally redirected  Diversional Activities: television  Taken 9/12/2024 2200 by Shlipa Dickerson RN  Medical Device Protection: tubing secured  Less Restrictive Alternative:   sensory stimulation limited   positive reinforcement provided   environment adjusted   calming techniques promoted   bed alarm in use   appropriate expression promoted  De-Escalation Techniques:   increased round frequency   quiet time facilitated   reoriented   stimulation decreased   verbally redirected  Diversional Activities: television  Taken 9/12/2024 2000 by Shilpa Dickerson RN  Medical Device Protection: tubing secured  Less Restrictive Alternative:   sensory stimulation limited   positive reinforcement provided   environment adjusted   calming techniques promoted   bed alarm in use   appropriate expression promoted  De-Escalation Techniques:   increased round frequency   quiet  time facilitated   reoriented   stimulation decreased   verbally redirected  Diversional Activities: music  Intervention: Protect Dignity, Rights, and Personal Wellbeing  Recent Flowsheet Documentation  Taken 9/13/2024 0600 by Shilpa Dickerson RN  Trust Relationship/Rapport:   care explained   reassurance provided  Taken 9/13/2024 0400 by Shilpa Dickerson RN  Trust Relationship/Rapport:   care explained   reassurance provided  Taken 9/13/2024 0200 by Shilpa Dickerson RN  Trust Relationship/Rapport:   care explained   reassurance provided  Taken 9/13/2024 0000 by Shilpa Dickerson RN  Trust Relationship/Rapport:   care explained   reassurance provided  Taken 9/12/2024 2200 by Shilpa Dickerson RN  Trust Relationship/Rapport:   care explained   reassurance provided  Taken 9/12/2024 2000 by Shilpa Dickerson RN  Trust Relationship/Rapport:   care explained   reassurance provided  Intervention: Protect Skin and Joint Integrity  Recent Flowsheet Documentation  Taken 9/13/2024 0600 by Shilpa Dickerson RN  Body Position:   weight shifting   supine  Range of Motion: ROM (range of motion) performed  Taken 9/13/2024 0400 by Shilpa Dickerson RN  Body Position:   weight shifting   tilted   right  Range of Motion: ROM (range of motion) performed  Taken 9/13/2024 0200 by Shilpa iDckerson RN  Body Position:   weight shifting   supine  Range of Motion: ROM (range of motion) performed  Taken 9/13/2024 0000 by Shilpa Dickerson RN  Body Position:   weight shifting   tilted   left  Range of Motion: ROM (range of motion) performed  Taken 9/12/2024 2200 by Shilpa Dickerson RN  Body Position:   weight shifting   supine  Range of Motion: ROM (range of motion) performed  Taken 9/12/2024 2000 by Shilpa Dickerson RN  Body Position:   weight shifting   tilted   right  Range of Motion: active ROM (range of motion) encouraged

## 2024-09-13 NOTE — PLAN OF CARE
Goal Outcome Evaluation:  Plan of Care Reviewed With: patient, sibling, family              -Pt following commands   -Remains sedated on MV with PEEP of 10 and FiO2 at 50%  -Fentanyl at 150 and prop at 20  -VSS throughout shift   -Moderate oral and in-line secretions   -Tolerating TF, still no BM despite bowel regimen, abdomen rounded and firm   -Adequate UOP  -Surgery consulted today, family updated at bedside                              Problem: Adult Inpatient Plan of Care  Goal: Absence of Hospital-Acquired Illness or Injury  Intervention: Identify and Manage Fall Risk  Recent Flowsheet Documentation  Taken 9/13/2024 1600 by Tsering Keene, RN  Safety Promotion/Fall Prevention:   activity supervised   clutter free environment maintained   fall prevention program maintained   lighting adjusted   safety round/check completed   room organization consistent  Taken 9/13/2024 1400 by Tsering Keene, KAMILA  Safety Promotion/Fall Prevention:   activity supervised   clutter free environment maintained   fall prevention program maintained   lighting adjusted   safety round/check completed   room organization consistent  Taken 9/13/2024 1200 by Tsering Keene, KAMILA  Safety Promotion/Fall Prevention:   activity supervised   clutter free environment maintained   fall prevention program maintained   lighting adjusted   safety round/check completed   room organization consistent  Taken 9/13/2024 1000 by Tsering Keene, RN  Safety Promotion/Fall Prevention:   activity supervised   clutter free environment maintained   fall prevention program maintained   lighting adjusted   safety round/check completed   room organization consistent  Taken 9/13/2024 0800 by Tsering Keene, RN  Safety Promotion/Fall Prevention:   activity supervised   clutter free environment maintained   fall prevention program maintained   lighting adjusted   safety round/check completed   room organization consistent     Problem: Adult Inpatient Plan of Care  Goal: Absence of  Hospital-Acquired Illness or Injury  Intervention: Prevent Skin Injury  Recent Flowsheet Documentation  Taken 9/13/2024 1600 by Tsering Keene RN  Body Position:   turned   lower extremity elevated   neutral head position   neutral body alignment   upper extremity elevated  Skin Protection:   adhesive use limited   incontinence pads utilized   skin-to-device areas padded   tubing/devices free from skin contact   transparent dressing maintained   skin-to-skin areas padded   silicone foam dressing in place  Taken 9/13/2024 1400 by Tsering Keene RN  Body Position:   turned   lower extremity elevated   neutral body alignment   neutral head position   upper extremity elevated  Skin Protection:   adhesive use limited   incontinence pads utilized   skin-to-device areas padded   tubing/devices free from skin contact   skin-to-skin areas padded   transparent dressing maintained   silicone foam dressing in place  Taken 9/13/2024 1200 by Tsering Keene RN  Body Position:   turned   lower extremity elevated   neutral body alignment   neutral head position   upper extremity elevated  Skin Protection:   adhesive use limited   incontinence pads utilized   skin-to-device areas padded   tubing/devices free from skin contact   transparent dressing maintained   skin-to-skin areas padded   silicone foam dressing in place  Taken 9/13/2024 1000 by Tsering Keene RN  Body Position:   turned   lower extremity elevated   neutral body alignment   neutral head position   upper extremity elevated  Skin Protection:   adhesive use limited   incontinence pads utilized   skin-to-device areas padded   tubing/devices free from skin contact   transparent dressing maintained   skin-to-skin areas padded   silicone foam dressing in place  Taken 9/13/2024 0800 by Tsering Keene RN  Body Position:   turned   lower extremity elevated   neutral body alignment   neutral head position   upper extremity elevated  Skin Protection:   adhesive use limited   incontinence pads  utilized   skin-to-device areas padded   tubing/devices free from skin contact   transparent dressing maintained   skin-to-skin areas padded   silicone foam dressing in place     Problem: Skin Injury Risk Increased  Goal: Skin Health and Integrity  Intervention: Optimize Skin Protection  Recent Flowsheet Documentation  Taken 9/13/2024 1600 by Tsering Keene RN  Pressure Reduction Techniques:   pressure points protected   heels elevated off bed   positioned off wounds   weight shift assistance provided  Head of Bed (HOB) Positioning: HOB at 30-45 degrees  Pressure Reduction Devices:   specialty bed utilized   pressure-redistributing mattress utilized   positioning supports utilized   elbow protectors utilized   foam padding utilized   heel offloading device utilized  Skin Protection:   adhesive use limited   incontinence pads utilized   skin-to-device areas padded   tubing/devices free from skin contact   transparent dressing maintained   skin-to-skin areas padded   silicone foam dressing in place  Taken 9/13/2024 1400 by Tsering Keene RN  Pressure Reduction Techniques:   pressure points protected   weight shift assistance provided   positioned off wounds   heels elevated off bed  Head of Bed (HOB) Positioning: HOB at 30-45 degrees  Pressure Reduction Devices:   specialty bed utilized   pressure-redistributing mattress utilized   positioning supports utilized   foam padding utilized   elbow protectors utilized   heel offloading device utilized  Skin Protection:   adhesive use limited   incontinence pads utilized   skin-to-device areas padded   tubing/devices free from skin contact   skin-to-skin areas padded   transparent dressing maintained   silicone foam dressing in place  Taken 9/13/2024 1200 by Tsering Keene RN  Pressure Reduction Techniques:   pressure points protected   weight shift assistance provided   heels elevated off bed   positioned off wounds  Head of Bed (HOB) Positioning: HOB at 30-45 degrees  Pressure  Reduction Devices:   specialty bed utilized   pressure-redistributing mattress utilized   positioning supports utilized   foam padding utilized   elbow protectors utilized   heel offloading device utilized  Skin Protection:   adhesive use limited   incontinence pads utilized   skin-to-device areas padded   tubing/devices free from skin contact   transparent dressing maintained   skin-to-skin areas padded   silicone foam dressing in place  Taken 9/13/2024 1000 by Tsering Keene RN  Pressure Reduction Techniques:   pressure points protected   weight shift assistance provided   heels elevated off bed   positioned off wounds  Head of Bed (HOB) Positioning: HOB at 30-45 degrees  Pressure Reduction Devices:   specialty bed utilized   pressure-redistributing mattress utilized   positioning supports utilized   heel offloading device utilized   elbow protectors utilized   foam padding utilized  Skin Protection:   adhesive use limited   incontinence pads utilized   skin-to-device areas padded   tubing/devices free from skin contact   transparent dressing maintained   skin-to-skin areas padded   silicone foam dressing in place  Taken 9/13/2024 0800 by Tsering Keene RN  Pressure Reduction Techniques:   pressure points protected   weight shift assistance provided   heels elevated off bed   positioned off wounds  Head of Bed (HOB) Positioning: HOB at 30-45 degrees  Pressure Reduction Devices:   specialty bed utilized   pressure-redistributing mattress utilized   positioning supports utilized   heel offloading device utilized   foam padding utilized   elbow protectors utilized  Skin Protection:   adhesive use limited   incontinence pads utilized   skin-to-device areas padded   tubing/devices free from skin contact   transparent dressing maintained   skin-to-skin areas padded   silicone foam dressing in place     Problem: Fall Injury Risk  Goal: Absence of Fall and Fall-Related Injury  Intervention: Promote Injury-Free  Environment  Recent Flowsheet Documentation  Taken 9/13/2024 1600 by Tsering Keene, RN  Safety Promotion/Fall Prevention:   activity supervised   clutter free environment maintained   fall prevention program maintained   lighting adjusted   safety round/check completed   room organization consistent  Taken 9/13/2024 1400 by Tsering Keene, KAMILA  Safety Promotion/Fall Prevention:   activity supervised   clutter free environment maintained   fall prevention program maintained   lighting adjusted   safety round/check completed   room organization consistent  Taken 9/13/2024 1200 by Tsering Keene, KAMILA  Safety Promotion/Fall Prevention:   activity supervised   clutter free environment maintained   fall prevention program maintained   lighting adjusted   safety round/check completed   room organization consistent  Taken 9/13/2024 1000 by Tsering Keene, KAMILA  Safety Promotion/Fall Prevention:   activity supervised   clutter free environment maintained   fall prevention program maintained   lighting adjusted   safety round/check completed   room organization consistent  Taken 9/13/2024 0800 by Tsering Keene, RN  Safety Promotion/Fall Prevention:   activity supervised   clutter free environment maintained   fall prevention program maintained   lighting adjusted   safety round/check completed   room organization consistent

## 2024-09-13 NOTE — CONSULTS
Patient Name:  Fernie Dalal  YOB: 1960  [id number]       Patient Care Team:  Belem Albert APRN as PCP - General (Family Medicine)  Marsha Gaitan MD as Consulting Physician (Cardiology)      General Surgery Consult Note     Date of Consultation: 09/13/24    Consulting Physician - Sam Hermosillo*    Reason for Consult - Respiratory failure and feeding difficulty     Subjective     I have been asked to see  Fernie Dalal , a 64 y.o. male in consultation for respiratory failure and feeding difficulty.  He has a longstanding history of coronary disease, systolic heart failure, hypertension, hyperlipidemia, COPD and anxiety.  He was admitted to our hospital on 9/3/2020 for an respiratory failure and was intubated at that time.  He was being treated for possible pneumonia as well as decompensated heart failure.  Despite aggressive maneuvers he has been unable to be weaned from the ventilator we have been asked to see him for long term respiratory and feeding access.    Due to his illness, he is unable to participate in his history, so all history is obtained from the hospital chart.         Allergy: No Known Allergies    Medications:  Pharmacy Consult, , Does not apply, Q12H  Albuterol Sulfate NEB Orderable, 2.5 mg, Nebulization, Q6H - RT  arformoterol, 15 mcg, Nebulization, BID - RT  aspirin, 81 mg, Nasogastric, Daily  atorvastatin, 80 mg, Nasogastric, Daily  chlorhexidine, 15 mL, Mouth/Throat, Q12H  citalopram, 20 mg, Nasogastric, Daily  diazePAM, 10 mg, Nasogastric, BID  empagliflozin, 10 mg, Nasogastric, Daily  enoxaparin, 40 mg, Subcutaneous, Daily  furosemide, 40 mg, Intravenous, Q12H  insulin regular, 2-7 Units, Subcutaneous, Q6H  methylnaltrexone, 18 mg, Subcutaneous, Every Other Day  methylPREDNISolone sodium succinate, 60 mg, Intravenous, Q24H  pantoprazole, 40 mg, Intravenous, Q AM  pharmacy consult - [facility], , Does not apply, Daily  phenytoin, 200 mg, Nasogastric,  BID  senna-docusate sodium, 2 tablet, Nasogastric, BID   And  polyethylene glycol, 17 g, Nasogastric, Daily  ProSource No Carb, 30 mL, Nasogastric, TID  revefenacin, 175 mcg, Nebulization, Daily - RT  risperiDONE, 2 mg, Nasogastric, BID  sodium chloride, 10 mL, Intravenous, Q12H      fentanyl 10 mcg/mL,  mcg/hr, Last Rate: 150 mcg/hr (09/13/24 0844)  propofol, 5-50 mcg/kg/min, Last Rate: 30 mcg/kg/min (09/13/24 1540)      No current facility-administered medications on file prior to encounter.     Current Outpatient Medications on File Prior to Encounter   Medication Sig    bumetanide (BUMEX) 1 MG tablet Take 1 tablet by mouth Daily.    calcium citrate (CALCITRATE) 950 (200 Ca) MG tablet Take 1 tablet by mouth Daily. OTC    citalopram (CeleXA) 20 MG tablet Take 1 tablet by mouth Every Morning.    cyclobenzaprine (FLEXERIL) 5 MG tablet Take 1 tablet by mouth 3 (Three) Times a Day As Needed for Muscle Spasms.    diazePAM (VALIUM) 10 MG tablet Take 1 tablet by mouth 2 (Two) Times a Day.    empagliflozin (JARDIANCE) 10 MG tablet tablet Take 1 tablet by mouth Daily.    ipratropium-albuterol (DUO-NEB) 0.5-2.5 mg/3 ml nebulizer Take 3 mL by nebulization 3 (Three) Times a Day.    pantoprazole (PROTONIX) 40 MG EC tablet Take 1 tablet by mouth Daily.    phenytoin ER (DILANTIN) 100 MG capsule Take 1 capsule by mouth 2 (Two) Times a Day.    potassium chloride ER (K-TAB) 20 MEQ tablet controlled-release ER tablet Take 1 tablet by mouth 2 (Two) Times a Day With Meals.    QUEtiapine Fumarate 150 MG tablet Take 1 tablet by mouth every night at bedtime.    sacubitril-valsartan (ENTRESTO) 24-26 MG tablet Take 1 tablet by mouth Every 12 (Twelve) Hours.    ProAir  (90 Base) MCG/ACT inhaler Inhale 2 puffs Every 6 (Six) Hours As Needed for Shortness of Air or Wheezing.       PMHx:   Past Medical History:   Diagnosis Date    Angina at rest     Anxiety     Arrhythmia     COPD (chronic obstructive pulmonary disease)     GERD  "(gastroesophageal reflux disease)     Heart failure     congestive    Hyperlipidemia     Hypertension     Myocardial infarction        Past Surgical History:  Past Surgical History:   Procedure Laterality Date    CARDIAC CATHETERIZATION      CARDIAC CATHETERIZATION N/A 4/24/2023    Procedure: Left Heart Cath;  Surgeon: Marsha Gaitan MD;  Location: [facility] CATH INVAS[facility];  Service: Cardiology;  Laterality: N/A;    CARDIOVASCULAR STRESS TEST           Family History: Noncontributory     Social History:     Tobacco use: Quit 2 years ago    EtOH use : None    Illicit drug use: h/o THC     Review of Systems   Review of systems could not be obtained due to   patient intubated.               Objective     Physical Exam:      Vital Signs  BP 96/67   Pulse 77   Temp 98.1 °F (36.7 °C) (Axillary)   Resp 20   Ht 175 cm (68.9\")   Wt 117 kg (257 lb 8 oz)   SpO2 92%   BMI 38.14 kg/m²     Intake/Output Summary (Last 24 hours) at 9/13/2024 1545  Last data filed at 9/13/2024 1200  Gross per 24 hour   Intake 2645.34 ml   Output 2725 ml   Net -79.66 ml         Physical Exam:    Head: Normocephalic, atraumatic.   Eyes: Pupils equal, round, react to light and accommodation.  Mouth: Oral mucosa without lesions, intubated    Neck: No masses, lymphadenopathy or carotid bruits bilaterally   CV: Rhythm  and rate regular , no  murmurs, rubs or gallops  Lungs: Rhonchi to auscultation bilaterally   Abdomen: Bowel sounds positive  , soft, small umbilical hernia  Groin : No obvious hernias bilaterally   Extremities:  No cyanosis, clubbing or edema bilaterally   Lymphatics: No abnormal lymphadenopathy appreciated         Results Review: I have personally reviewed all of the recent lab and imaging results available at this time.        Assessment & Plan     Assessment and Plan:      Respiratory failure - We will plan for Trach and PEG on 9/17/24. The risks and benefits were discussed with the family, and they agree to " proceed.    Problem List Items Addressed This Visit          Cardiac and Vasculature    HFrEF (heart failure with reduced ejection fraction)    Relevant Orders    Insert Arterial Line (Completed)       Pulmonary and Pneumonias    * (Principal) Type 2 respiratory failure    Right lower lobe pneumonia    Relevant Medications    arformoterol (BROVANA) nebulizer solution 15 mcg    revefenacin (YUPELRI) nebulizer solution 175 mcg    albuterol (PROVENTIL) nebulizer solution 0.083% 2.5 mg/3mL     Other Visit Diagnoses       Acute respiratory failure with hypoxia    -  Primary    Relevant Orders    Insert Arterial Line (Completed)    Chest pain, unspecified type                 Active Hospital Problems    Diagnosis  POA    **Type 2 respiratory failure [J96.92]  Yes    Right lower lobe pneumonia [J18.9]  Yes    NSTEMI (non-ST elevated myocardial infarction) [I21.4]  Yes    HFrEF (heart failure with reduced ejection fraction) [I50.20]  Yes    Coronary artery disease involving native coronary artery of native heart with angina pectoris [I25.119]  Yes      Resolved Hospital Problems    Diagnosis Date Resolved POA    Hypotension [I95.9] 09/13/2024 Yes    Dyspnea [R06.00] 09/01/2024 Yes              I discussed the patient's findings and my recommendations with the patient and/or family, as well as the primary team     Jon Mclean MD  09/13/24  15:45 EDT        Dictated using Dragon Dictation

## 2024-09-13 NOTE — CASE MANAGEMENT/SOCIAL WORK
Continued Stay Note  [facility]     Patient Name: Fernie Dalal  MRN: [id number]  Today's Date: 9/13/2024    Admit Date: 9/1/2024    Plan: D/C TBD   Discharge Plan       Row Name 09/13/24 1552       Plan    Plan D/C TBD    Patient/Family in Agreement with Plan other (see comments)    Plan Comments Discussed in MDR, remains intubated/sedated unable to wean, per Dr. Hermosillo will reach out to OR for trach/Peg. CM will continue to follow, I spoke w/sister on Wed, aware of trach and LTACH post.    Final Discharge Disposition Code 30 - still a patient                   Discharge Codes    No documentation.                 Expected Discharge Date and Time       Expected Discharge Date Expected Discharge Time    Sep 20, 2024               Aniket Marquez RN

## 2024-09-14 ENCOUNTER — APPOINTMENT (OUTPATIENT)
Dept: GENERAL RADIOLOGY | Facility: HOSPITAL | Age: 64
End: 2024-09-14
Payer: MEDICAID

## 2024-09-14 LAB
ALBUMIN SERPL-MCNC: 3.4 G/DL (ref 3.5–5.2)
ALP SERPL-CCNC: 99 U/L (ref 39–117)
ALT SERPL W P-5'-P-CCNC: 24 U/L (ref 1–41)
ANION GAP SERPL CALCULATED.3IONS-SCNC: 13 MMOL/L (ref 5–15)
ARTERIAL PATENCY WRIST A: ABNORMAL
AST SERPL-CCNC: 21 U/L (ref 1–40)
ATMOSPHERIC PRESS: ABNORMAL MM[HG]
BASE EXCESS BLDA CALC-SCNC: 11.4 MMOL/L (ref 0–2)
BDY SITE: ABNORMAL
BILIRUB CONJ SERPL-MCNC: <0.2 MG/DL (ref 0–0.3)
BILIRUB INDIRECT SERPL-MCNC: ABNORMAL MG/DL
BILIRUB SERPL-MCNC: <0.2 MG/DL (ref 0–1.2)
BODY TEMPERATURE: 37
BUN SERPL-MCNC: 34 MG/DL (ref 8–23)
BUN/CREAT SERPL: 65.4 (ref 7–25)
CALCIUM SPEC-SCNC: 9 MG/DL (ref 8.6–10.5)
CHLORIDE SERPL-SCNC: 95 MMOL/L (ref 98–107)
CO2 BLDA-SCNC: 38.7 MMOL/L (ref 22–33)
CO2 SERPL-SCNC: 31 MMOL/L (ref 22–29)
COHGB MFR BLD: 1.2 % (ref 0–2)
CREAT SERPL-MCNC: 0.52 MG/DL (ref 0.76–1.27)
EGFRCR SERPLBLD CKD-EPI 2021: 113.3 ML/MIN/1.73
EPAP: 0
GLUCOSE BLDC GLUCOMTR-MCNC: 107 MG/DL (ref 70–130)
GLUCOSE BLDC GLUCOMTR-MCNC: 114 MG/DL (ref 70–130)
GLUCOSE BLDC GLUCOMTR-MCNC: 118 MG/DL (ref 70–130)
GLUCOSE BLDC GLUCOMTR-MCNC: 155 MG/DL (ref 70–130)
GLUCOSE SERPL-MCNC: 113 MG/DL (ref 65–99)
HCO3 BLDA-SCNC: 37 MMOL/L (ref 20–26)
HCT VFR BLD CALC: 31.6 % (ref 38–51)
HGB BLDA-MCNC: 10.3 G/DL (ref 13.5–17.5)
INHALED O2 CONCENTRATION: 50 %
IPAP: 0
MAGNESIUM SERPL-MCNC: 2.3 MG/DL (ref 1.6–2.4)
METHGB BLD QL: 0.2 % (ref 0–1.5)
MODALITY: ABNORMAL
OXYHGB MFR BLDV: 92.9 % (ref 94–99)
PAW @ PEAK INSP FLOW SETTING VENT: 0 CMH2O
PCO2 BLDA: 53.6 MM HG (ref 35–45)
PCO2 TEMP ADJ BLD: 53.6 MM HG (ref 35–48)
PEEP RESPIRATORY: 10 CM[H2O]
PH BLDA: 7.45 PH UNITS (ref 7.35–7.45)
PH, TEMP CORRECTED: 7.45 PH UNITS
PHOSPHATE SERPL-MCNC: 3.4 MG/DL (ref 2.5–4.5)
PO2 BLDA: 69.7 MM HG (ref 83–108)
PO2 TEMP ADJ BLD: 69.7 MM HG (ref 83–108)
POTASSIUM SERPL-SCNC: 4 MMOL/L (ref 3.5–5.2)
PROT SERPL-MCNC: 6.2 G/DL (ref 6–8.5)
SODIUM SERPL-SCNC: 139 MMOL/L (ref 136–145)
TOTAL RATE: 20 BREATHS/MINUTE
VENTILATOR MODE: ABNORMAL
VT ON VENT VENT: 0.49 ML

## 2024-09-14 PROCEDURE — 25010000002 ENOXAPARIN PER 10 MG: Performed by: INTERNAL MEDICINE

## 2024-09-14 PROCEDURE — 71045 X-RAY EXAM CHEST 1 VIEW: CPT

## 2024-09-14 PROCEDURE — 83050 HGB METHEMOGLOBIN QUAN: CPT

## 2024-09-14 PROCEDURE — 94799 UNLISTED PULMONARY SVC/PX: CPT

## 2024-09-14 PROCEDURE — 99291 CRITICAL CARE FIRST HOUR: CPT | Performed by: INTERNAL MEDICINE

## 2024-09-14 PROCEDURE — 94761 N-INVAS EAR/PLS OXIMETRY MLT: CPT

## 2024-09-14 PROCEDURE — 36600 WITHDRAWAL OF ARTERIAL BLOOD: CPT

## 2024-09-14 PROCEDURE — 82375 ASSAY CARBOXYHB QUANT: CPT

## 2024-09-14 PROCEDURE — 25010000002 PROPOFOL 10 MG/ML EMULSION: Performed by: INTERNAL MEDICINE

## 2024-09-14 PROCEDURE — 80048 BASIC METABOLIC PNL TOTAL CA: CPT | Performed by: INTERNAL MEDICINE

## 2024-09-14 PROCEDURE — 94003 VENT MGMT INPAT SUBQ DAY: CPT

## 2024-09-14 PROCEDURE — 84100 ASSAY OF PHOSPHORUS: CPT | Performed by: INTERNAL MEDICINE

## 2024-09-14 PROCEDURE — 82805 BLOOD GASES W/O2 SATURATION: CPT

## 2024-09-14 PROCEDURE — 25010000002 FENTANYL 10 MCG/1 ML NS: Performed by: INTERNAL MEDICINE

## 2024-09-14 PROCEDURE — 82948 REAGENT STRIP/BLOOD GLUCOSE: CPT

## 2024-09-14 PROCEDURE — 25010000002 FUROSEMIDE PER 20 MG: Performed by: INTERNAL MEDICINE

## 2024-09-14 PROCEDURE — 63710000001 INSULIN REGULAR HUMAN PER 5 UNITS: Performed by: NURSE PRACTITIONER

## 2024-09-14 PROCEDURE — 83735 ASSAY OF MAGNESIUM: CPT | Performed by: INTERNAL MEDICINE

## 2024-09-14 PROCEDURE — 80076 HEPATIC FUNCTION PANEL: CPT | Performed by: INTERNAL MEDICINE

## 2024-09-14 PROCEDURE — 25010000002 METHYLPREDNISOLONE PER 125 MG: Performed by: INTERNAL MEDICINE

## 2024-09-14 RX ADMIN — Medication 175 MCG/HR: at 02:12

## 2024-09-14 RX ADMIN — PROPOFOL 25 MCG/KG/MIN: 10 INJECTION, EMULSION INTRAVENOUS at 12:07

## 2024-09-14 RX ADMIN — SENNOSIDES AND DOCUSATE SODIUM 2 TABLET: 50; 8.6 TABLET ORAL at 21:14

## 2024-09-14 RX ADMIN — RISPERIDONE 2 MG: 1 SOLUTION ORAL at 21:14

## 2024-09-14 RX ADMIN — FUROSEMIDE 40 MG: 10 INJECTION, SOLUTION INTRAMUSCULAR; INTRAVENOUS at 21:14

## 2024-09-14 RX ADMIN — ALBUTEROL SULFATE 2.5 MG: 2.5 SOLUTION RESPIRATORY (INHALATION) at 12:53

## 2024-09-14 RX ADMIN — CHLORHEXIDINE GLUCONATE, 0.12% ORAL RINSE 15 ML: 1.2 SOLUTION DENTAL at 08:02

## 2024-09-14 RX ADMIN — FUROSEMIDE 40 MG: 10 INJECTION, SOLUTION INTRAMUSCULAR; INTRAVENOUS at 08:02

## 2024-09-14 RX ADMIN — PROPOFOL 25 MCG/KG/MIN: 10 INJECTION, EMULSION INTRAVENOUS at 07:09

## 2024-09-14 RX ADMIN — CHLORHEXIDINE GLUCONATE, 0.12% ORAL RINSE 15 ML: 1.2 SOLUTION DENTAL at 21:14

## 2024-09-14 RX ADMIN — SENNOSIDES AND DOCUSATE SODIUM 2 TABLET: 50; 8.6 TABLET ORAL at 08:02

## 2024-09-14 RX ADMIN — LACTULOSE 20 G: 20 SOLUTION ORAL at 15:20

## 2024-09-14 RX ADMIN — ALBUTEROL SULFATE 2.5 MG: 2.5 SOLUTION RESPIRATORY (INHALATION) at 00:52

## 2024-09-14 RX ADMIN — ENOXAPARIN SODIUM 40 MG: 100 INJECTION SUBCUTANEOUS at 08:02

## 2024-09-14 RX ADMIN — PANTOPRAZOLE SODIUM 40 MG: 40 INJECTION, POWDER, LYOPHILIZED, FOR SOLUTION INTRAVENOUS at 06:05

## 2024-09-14 RX ADMIN — Medication 30 ML: at 15:34

## 2024-09-14 RX ADMIN — PHENYTOIN 200 MG: 125 SUSPENSION ORAL at 21:15

## 2024-09-14 RX ADMIN — PROPOFOL 25 MCG/KG/MIN: 10 INJECTION, EMULSION INTRAVENOUS at 22:13

## 2024-09-14 RX ADMIN — RISPERIDONE 2 MG: 1 SOLUTION ORAL at 08:02

## 2024-09-14 RX ADMIN — ASPIRIN 81 MG 81 MG: 81 TABLET ORAL at 08:02

## 2024-09-14 RX ADMIN — DIAZEPAM 10 MG: 10 TABLET ORAL at 08:02

## 2024-09-14 RX ADMIN — METHYLPREDNISOLONE SODIUM SUCCINATE 60 MG: 125 INJECTION, POWDER, FOR SOLUTION INTRAMUSCULAR; INTRAVENOUS at 08:02

## 2024-09-14 RX ADMIN — INSULIN HUMAN 2 UNITS: 100 INJECTION, SOLUTION PARENTERAL at 12:07

## 2024-09-14 RX ADMIN — Medication: at 07:11

## 2024-09-14 RX ADMIN — PROPOFOL 20 MCG/KG/MIN: 10 INJECTION, EMULSION INTRAVENOUS at 03:32

## 2024-09-14 RX ADMIN — Medication 200 MCG/HR: at 14:16

## 2024-09-14 RX ADMIN — Medication 30 ML: at 22:14

## 2024-09-14 RX ADMIN — PHENYTOIN 200 MG: 125 SUSPENSION ORAL at 09:24

## 2024-09-14 RX ADMIN — Medication: at 20:09

## 2024-09-14 RX ADMIN — ALBUTEROL SULFATE 2.5 MG: 2.5 SOLUTION RESPIRATORY (INHALATION) at 06:53

## 2024-09-14 RX ADMIN — ALBUTEROL SULFATE 2.5 MG: 2.5 SOLUTION RESPIRATORY (INHALATION) at 19:10

## 2024-09-14 RX ADMIN — EMPAGLIFLOZIN 10 MG: 10 TABLET, FILM COATED ORAL at 08:02

## 2024-09-14 RX ADMIN — BISACODYL 10 MG: 10 SUPPOSITORY RECTAL at 20:10

## 2024-09-14 RX ADMIN — Medication 10 ML: at 21:26

## 2024-09-14 RX ADMIN — POLYETHYLENE GLYCOL 3350 17 G: 17 POWDER, FOR SOLUTION ORAL at 08:02

## 2024-09-14 RX ADMIN — ARFORMOTEROL TARTRATE 15 MCG: 15 SOLUTION RESPIRATORY (INHALATION) at 06:51

## 2024-09-14 RX ADMIN — ARFORMOTEROL TARTRATE 15 MCG: 15 SOLUTION RESPIRATORY (INHALATION) at 19:10

## 2024-09-14 RX ADMIN — Medication 30 ML: at 08:08

## 2024-09-14 RX ADMIN — CITALOPRAM HYDROBROMIDE 20 MG: 20 TABLET ORAL at 08:02

## 2024-09-14 RX ADMIN — ATORVASTATIN CALCIUM 80 MG: 40 TABLET, FILM COATED ORAL at 08:02

## 2024-09-14 RX ADMIN — DIAZEPAM 10 MG: 10 TABLET ORAL at 21:14

## 2024-09-14 RX ADMIN — Medication 10 ML: at 08:02

## 2024-09-14 RX ADMIN — PROPOFOL 25 MCG/KG/MIN: 10 INJECTION, EMULSION INTRAVENOUS at 18:03

## 2024-09-14 NOTE — PLAN OF CARE
Goal Outcome Evaluation:  Plan of Care Reviewed With: patient, family           Outcome Evaluation:     -Pt remains on mechanical ventilation with PEEP of 10 and oxygen at 50%.  -Moderate in-line and subglottal secretions    -Fentanyl infusing at 200 mcg/hr, propofol infusing at 25 mcg/kg/min.  -Vital signs stable throughout shift.  -Pt following commands, but has required increased doses of sedation medication due to restlessness/anxiety  -No BM despite bowel regimen, abdomen distended and firm. Bowel sounds audible and normoactive, pt is passing flatus.  -TF infusing at goal rate, residual 20 ml.   -Adequate UOP

## 2024-09-14 NOTE — PLAN OF CARE
Goal Outcome Evaluation:      Unable to decrease ventilatory support, maintaining current vent setting & stable.

## 2024-09-14 NOTE — PROGRESS NOTES
"Patient Name:  Fernie Dalal  YOB: 1960  [id number]    Surgery Progress Note    Date of visit: 9/14/2024    Subjective   Subjective: Stable overnight, no improvement to respiratory status         Objective     Objective:     BP (!) 88/64   Pulse 85   Temp 98.7 °F (37.1 °C) (Axillary)   Resp 20   Ht 175 cm (68.9\")   Wt 116 kg (255 lb 4.7 oz)   SpO2 90%   BMI 37.81 kg/m²     Intake/Output Summary (Last 24 hours) at 9/14/2024 0725  Last data filed at 9/14/2024 0710  Gross per 24 hour   Intake 2992.8 ml   Output 2400 ml   Net 592.8 ml       CV:  Rhythm  regular and rate regular   L:  Rhonchi to auscultation bilaterally   Abd:  Bowel sounds positive , soft, nontender  Ext:  No cyanosis, clubbing, edema    Recent labs that are back at this time have been reviewed.            Assessment/ Plan:    Problem List Items Addressed This Visit          Cardiac and Vasculature    HFrEF (heart failure with reduced ejection fraction)    Relevant Orders    Insert Arterial Line (Completed)       Pulmonary and Pneumonias    * (Principal) Type 2 respiratory failure- Plan for Trach/PEG Tuesday 9/17    Right lower lobe pneumonia    Relevant Medications    arformoterol (BROVANA) nebulizer solution 15 mcg    revefenacin (YUPELRI) nebulizer solution 175 mcg    albuterol (PROVENTIL) nebulizer solution 0.083% 2.5 mg/3mL     Other Visit Diagnoses       Acute respiratory failure with hypoxia    -  Primary    Relevant Orders    Insert Arterial Line (Completed)    Chest pain, unspecified type                 Active Hospital Problems    Diagnosis  POA    **Type 2 respiratory failure [J96.92]  Yes    Right lower lobe pneumonia [J18.9]  Yes    NSTEMI (non-ST elevated myocardial infarction) [I21.4]  Yes    HFrEF (heart failure with reduced ejection fraction) [I50.20]  Yes    Coronary artery disease involving native coronary artery of native heart with angina pectoris [I25.119]  Yes      Resolved Hospital Problems    Diagnosis Date " Resolved POA    Hypotension [I95.9] 09/13/2024 Yes    Dyspnea [R06.00] 09/01/2024 Yes              Jon Mclean MD  9/14/2024  07:25 EDT

## 2024-09-14 NOTE — PROGRESS NOTES
Intensive Care Follow-up     Hospital:  LOS: 11 days   Mr. Fernie Dalal, 64 y.o. male is followed for:   Type 2 respiratory failure            History of present illness:   64-year-old male admitted on 9/1 with chest pain, shortness of breath, and chills.  He was diagnosed with right lower lobe pneumonia and exacerbation of COPD.  His past history also includes a known cardiomyopathy with reduced ejection fraction.  He had some mild elevation of his cardiac enzymes on admission which was felt to be demand ischemia.  He has been diagnosed with non-STEMI in the setting of demand ischemia with a chronically occluded RCA.  EF is 28% this admission.     He required intubation on 9/3 and transferred to ICU.  CTA revealed no pulmonary embolism.     He has remained on mechanical ventilation. He has been treated with broad-spectrum antimicrobial therapy.  Currently on Zosyn.  MRSA screen was negative.  He required pressors after intubation and is currently on a minimal dose of norepinephrine and this will likely be weaned off.  He has been resumed on Valium due to his chronic benzodiazepine use.      Subjective   Interval History:  Patient doing well this morning.  Down to 50% FiO2 and 10 of PEEP.             The patient's past medical, surgical and social history were reviewed and updated in Epic as appropriate.       Objective     Infusions:  fentanyl 10 mcg/mL,  mcg/hr, Last Rate: 200 mcg/hr (09/14/24 0220)  propofol, 5-50 mcg/kg/min, Last Rate: 25 mcg/kg/min (09/14/24 0709)      Medications:  Pharmacy Consult, , Does not apply, Q12H  Albuterol Sulfate NEB Orderable, 2.5 mg, Nebulization, Q6H - RT  arformoterol, 15 mcg, Nebulization, BID - RT  aspirin, 81 mg, Nasogastric, Daily  atorvastatin, 80 mg, Nasogastric, Daily  chlorhexidine, 15 mL, Mouth/Throat, Q12H  citalopram, 20 mg, Nasogastric, Daily  diazePAM, 10 mg, Nasogastric, BID  empagliflozin, 10 mg, Nasogastric, Daily  enoxaparin, 40 mg, Subcutaneous,  Daily  furosemide, 40 mg, Intravenous, Q12H  insulin regular, 2-7 Units, Subcutaneous, Q6H  methylnaltrexone, 18 mg, Subcutaneous, Every Other Day  methylPREDNISolone sodium succinate, 60 mg, Intravenous, Q24H  pantoprazole, 40 mg, Intravenous, Q AM  pharmacy consult - [facility], , Does not apply, Daily  phenytoin, 200 mg, Nasogastric, BID  senna-docusate sodium, 2 tablet, Nasogastric, BID   And  polyethylene glycol, 17 g, Nasogastric, Daily  ProSource No Carb, 30 mL, Nasogastric, TID  revefenacin, 175 mcg, Nebulization, Daily - RT  risperiDONE, 2 mg, Nasogastric, BID  sodium chloride, 10 mL, Intravenous, Q12H      I reviewed the patient's medications.    Vital Sign Min/Max for last 24 hours  Temp  Min: 97.9 °F (36.6 °C)  Max: 99.3 °F (37.4 °C)   BP  Min: 81/59  Max: 117/81   Pulse  Min: 73  Max: 107   Resp  Min: 20  Max: 22   SpO2  Min: 87 %  Max: 98 %   No data recorded       Input/Output for last 24 hour shift  09/13 0701 - 09/14 0700  In: 2796.8 [I.V.:814.8]  Out: 2400 [Urine:2400]   Mode: VC+/AC  FiO2 (%):  [50 %-55 %] 50 %  S RR:  [20] 20  S VT:  [490 mL] 490 mL  PEEP/CPAP (cm H2O):  [10 cm H20] 10 cm H20  MAP (cm H2O):  [14-18] 15  GENERAL : Sedated, lying in bed, NAD  RESPIRATORY/THORAX : ET tube in place, Coarse breath sounds bilaterally  CARDIOVASCULAR : Normal S1/S2, RRR. 1+ lower ext edema.  GASTROINTESTINAL : Soft, NT/ND. BS x 4 normoactive. No hepatosplenomegaly.  MUSCULOSKELETAL : No cyanosis, clubbing, or ischemia  NEUROLOGICAL: Sedated, Moving all ext.    Results from last 7 days   Lab Units 09/13/24  0319 09/11/24  0342 09/10/24  0351   WBC 10*3/mm3 9.88 11.04* 9.18   HEMOGLOBIN g/dL 10.2* 10.0* 9.9*   PLATELETS 10*3/mm3 416 346 283     Results from last 7 days   Lab Units 09/14/24  0304 09/13/24  0319 09/12/24  1438 09/12/24  0330   SODIUM mmol/L 139 138  --  140   POTASSIUM mmol/L 4.0 3.7 5.4* 3.6   CO2 mmol/L 31.0* 32.0*  --  34.0*   BUN mg/dL 34* 34*  --  29*   CREATININE mg/dL 0.52* 0.53*  --  0.60*    MAGNESIUM mg/dL 2.3 2.4  --  2.3   PHOSPHORUS mg/dL 3.4 3.4  --  3.4   GLUCOSE mg/dL 113* 133*  --  122*     Estimated Creatinine Clearance: 182.4 mL/min (A) (by C-G formula based on SCr of 0.52 mg/dL (L)).    Results from last 7 days   Lab Units 09/13/24  0350   PH, ARTERIAL pH units 7.428   PCO2, ARTERIAL mm Hg 56.2*   PO2 ART mm Hg 74.5*       I reviewed the patient's new clinical results.  I reviewed the patient's new imaging results/reports including actual images and agree with reports.       Imaging Results (Last 24 Hours)       Procedure Component Value Units Date/Time    XR Chest 1 View [[id number]] Resulted: 09/14/24 0342     Updated: 09/14/24 0500            Assessment & Plan   Impression        Type 2 respiratory failure    HFrEF (heart failure with reduced ejection fraction)    Coronary artery disease involving native coronary artery of native heart with angina pectoris    NSTEMI (non-ST elevated myocardial infarction)    Right lower lobe pneumonia       Plan        64-year-old male with a history of coronary artery disease, systolic heart failure, COPD, hypertension, hyperlipidemia, and anxiety.  Patient mid to the [facility] ICU on 9/3/2024.  Was intubated on 9/3/2024.  Been treated for possible pneumonia and decompensated heart failure.  Has not been able to be with the ventilator any further than 10 of PEEP and 50 to 60% FiO2.  Patient has completed 7 days of antibiotics on 9/11/2024, treated for COPD-the patient has had aggressive diuresis.     -Continue mechanical ventilation, wean oxygen to saturation greater than 88%  -We been unable to wean the vent any further, general surgery consulted and plan for tracheostomy next week.  -Continue propofol and fentanyl for sedation  -Completed 7 days of antibiotics on 9/11/2024  -Continued scheduled nebs, and Solu-Medrol for COPD exacerbation  -Continue Valium with history of chronic use.  -Continue home phenytoin  -GI prophylaxis  -DVT  prophylaxis  -A.m. labs     I conducted multidisciplinary rounds and the plan of care was discussed with the multidisciplinary team at that time. In attendance at multidisciplinary rounds was clinical pharmacist, dietitian, nursing staff, and case management.    I discussed the patient's findings and my recommendations with patient and nursing staff     Critical Care time spent in direct patient care: 35 minutes (excluding procedure time, if applicable) including high complexity decision making to assess, manipulate, and support vital organ system failure in this individual who has impairment of one or more vital organ systems such that there is a high probability of imminent or life threatening deterioration in the patient's condition.      Jarrell Hermosillo, DO  Pulmonary, Critical care and Sleep Medicine

## 2024-09-14 NOTE — PLAN OF CARE
Problem: Adult Inpatient Plan of Care  Goal: Plan of Care Review  Outcome: Ongoing, Progressing  Flowsheets (Taken 9/14/2024 1837)  Progress: no change  Plan of Care Reviewed With: sibling  Outcome Evaluation: Pt remains on mechanical ventilation- PEEP 10 and FiO2 60%. Pt following commands. Fent @200 and Prop @25. Afebrile. 2300ml UOP. No BM this shift, bowel sounds audible and normoactive- bowel regimen and PRN lactulose administered x1. TF at 55ml/hr- pt tolerating. BUE restraints remain in place for pt safety. Sister updated via phone call.     Problem: Restraint, Nonviolent  Goal: Absence of Harm or Injury  Outcome: Ongoing, Progressing  Intervention: Implement Least Restrictive Safety Strategies  Recent Flowsheet Documentation  Taken 9/14/2024 1800 by Winsome Mir RN  Medical Device Protection:   IV pole/bag removed from visual field   torso covered   tubing secured  Less Restrictive Alternative:   bed alarm in use   calming techniques promoted   emotional support provided   environment adjusted   positive reinforcement provided   sensory stimulation limited   therapeutic music  De-Escalation Techniques:   quiet time facilitated   stimulation decreased  Diversional Activities: music  Taken 9/14/2024 1600 by Winsome Mir RN  Medical Device Protection:   IV pole/bag removed from visual field   torso covered   tubing secured  Less Restrictive Alternative:   bed alarm in use   calming techniques promoted   emotional support provided   environment adjusted   positive reinforcement provided   sensory stimulation limited   therapeutic music  De-Escalation Techniques:   quiet time facilitated   stimulation decreased  Diversional Activities: music  Taken 9/14/2024 1400 by Winsome Mir, RN  Medical Device Protection:   IV pole/bag removed from visual field   torso covered   tubing secured  Less Restrictive Alternative:   bed alarm in use   calming techniques promoted   emotional support provided   environment  adjusted   positive reinforcement provided   sensory stimulation limited   therapeutic music  De-Escalation Techniques:   quiet time facilitated   stimulation decreased  Diversional Activities: music  Taken 9/14/2024 1200 by Winsome Mir RN  Medical Device Protection:   IV pole/bag removed from visual field   torso covered   tubing secured  Less Restrictive Alternative:   bed alarm in use   calming techniques promoted   emotional support provided   environment adjusted   positive reinforcement provided   sensory stimulation limited   therapeutic music  De-Escalation Techniques:   quiet time facilitated   stimulation decreased  Diversional Activities: music  Taken 9/14/2024 1000 by Winsome Mir RN  Medical Device Protection:   IV pole/bag removed from visual field   torso covered   tubing secured  Less Restrictive Alternative:   bed alarm in use   calming techniques promoted   emotional support provided   environment adjusted   positive reinforcement provided   sensory stimulation limited   therapeutic music  De-Escalation Techniques:   quiet time facilitated   stimulation decreased  Diversional Activities: music  Taken 9/14/2024 0800 by Winsome Mir RN  Medical Device Protection:   IV pole/bag removed from visual field   torso covered   tubing secured  Less Restrictive Alternative:   bed alarm in use   calming techniques promoted   emotional support provided   environment adjusted   positive reinforcement provided   sensory stimulation limited   therapeutic music  De-Escalation Techniques:   quiet time facilitated   reoriented   stimulation decreased   verbally redirected  Diversional Activities: music  Intervention: Protect Dignity, Rights, and Personal Wellbeing  Recent Flowsheet Documentation  Taken 9/14/2024 1800 by Winsome Mir RN  Trust Relationship/Rapport:   care explained   reassurance provided  Taken 9/14/2024 1600 by Winsome Mir RN  Trust Relationship/Rapport:   care explained   emotional  support provided   reassurance provided  Taken 9/14/2024 1400 by Winsome Mir RN  Trust Relationship/Rapport: care explained  Taken 9/14/2024 1200 by Winsome Mir RN  Trust Relationship/Rapport: care explained  Taken 9/14/2024 1000 by Winsome Mir RN  Trust Relationship/Rapport: care explained  Taken 9/14/2024 0800 by Winsome Mir RN  Trust Relationship/Rapport:   care explained   emotional support provided   reassurance provided  Intervention: Protect Skin and Joint Integrity  Recent Flowsheet Documentation  Taken 9/14/2024 1800 by Winsome Mir RN  Body Position:   turned   supine   upper extremity elevated   lower extremity elevated  Taken 9/14/2024 1600 by Winsome Mir RN  Body Position:   turned   tilted   left   lower extremity elevated   upper extremity elevated  Taken 9/14/2024 1400 by Winsome Mir RN  Body Position:   turned   supine   upper extremity elevated   lower extremity elevated  Taken 9/14/2024 1200 by Winsome Mir RN  Body Position:   turned   tilted   right   lower extremity elevated   upper extremity elevated  Taken 9/14/2024 1000 by Winsome Mir RN  Body Position:   turned   supine   upper extremity elevated   lower extremity elevated  Taken 9/14/2024 0800 by Winsome Mir RN  Body Position:   turned   tilted   left   lower extremity elevated   upper extremity elevated  Range of Motion: ROM (range of motion) performed

## 2024-09-15 ENCOUNTER — APPOINTMENT (OUTPATIENT)
Dept: GENERAL RADIOLOGY | Facility: HOSPITAL | Age: 64
End: 2024-09-15
Payer: MEDICAID

## 2024-09-15 LAB
ANION GAP SERPL CALCULATED.3IONS-SCNC: 8 MMOL/L (ref 5–15)
ARTERIAL PATENCY WRIST A: ABNORMAL
ATMOSPHERIC PRESS: ABNORMAL MM[HG]
BASE EXCESS BLDA CALC-SCNC: 10.9 MMOL/L (ref 0–2)
BASOPHILS # BLD AUTO: 0.05 10*3/MM3 (ref 0–0.2)
BASOPHILS NFR BLD AUTO: 0.5 % (ref 0–1.5)
BDY SITE: ABNORMAL
BODY TEMPERATURE: 37
BUN SERPL-MCNC: 39 MG/DL (ref 8–23)
BUN/CREAT SERPL: 70.9 (ref 7–25)
CALCIUM SPEC-SCNC: 9.3 MG/DL (ref 8.6–10.5)
CHLORIDE SERPL-SCNC: 97 MMOL/L (ref 98–107)
CO2 BLDA-SCNC: 38.2 MMOL/L (ref 22–33)
CO2 SERPL-SCNC: 34 MMOL/L (ref 22–29)
COHGB MFR BLD: 1.2 % (ref 0–2)
CREAT SERPL-MCNC: 0.55 MG/DL (ref 0.76–1.27)
DEPRECATED RDW RBC AUTO: 46.8 FL (ref 37–54)
EGFRCR SERPLBLD CKD-EPI 2021: 111.4 ML/MIN/1.73
EOSINOPHIL # BLD AUTO: 0.31 10*3/MM3 (ref 0–0.4)
EOSINOPHIL NFR BLD AUTO: 2.9 % (ref 0.3–6.2)
EPAP: 0
ERYTHROCYTE [DISTWIDTH] IN BLOOD BY AUTOMATED COUNT: 14.4 % (ref 12.3–15.4)
GLUCOSE BLDC GLUCOMTR-MCNC: 113 MG/DL (ref 70–130)
GLUCOSE BLDC GLUCOMTR-MCNC: 134 MG/DL (ref 70–130)
GLUCOSE BLDC GLUCOMTR-MCNC: 135 MG/DL (ref 70–130)
GLUCOSE BLDC GLUCOMTR-MCNC: 146 MG/DL (ref 70–130)
GLUCOSE SERPL-MCNC: 122 MG/DL (ref 65–99)
HCO3 BLDA-SCNC: 36.6 MMOL/L (ref 20–26)
HCT VFR BLD AUTO: 33.3 % (ref 37.5–51)
HCT VFR BLD CALC: 31.1 % (ref 38–51)
HGB BLD-MCNC: 10.1 G/DL (ref 13–17.7)
HGB BLDA-MCNC: 10.2 G/DL (ref 13.5–17.5)
IMM GRANULOCYTES # BLD AUTO: 0.06 10*3/MM3 (ref 0–0.05)
IMM GRANULOCYTES NFR BLD AUTO: 0.6 % (ref 0–0.5)
INHALED O2 CONCENTRATION: 50 %
IPAP: 0
LYMPHOCYTES # BLD AUTO: 1.04 10*3/MM3 (ref 0.7–3.1)
LYMPHOCYTES NFR BLD AUTO: 9.6 % (ref 19.6–45.3)
MAGNESIUM SERPL-MCNC: 2.5 MG/DL (ref 1.6–2.4)
MCH RBC QN AUTO: 27.2 PG (ref 26.6–33)
MCHC RBC AUTO-ENTMCNC: 30.3 G/DL (ref 31.5–35.7)
MCV RBC AUTO: 89.5 FL (ref 79–97)
METHGB BLD QL: 0.2 % (ref 0–1.5)
MODALITY: ABNORMAL
MONOCYTES # BLD AUTO: 1.17 10*3/MM3 (ref 0.1–0.9)
MONOCYTES NFR BLD AUTO: 10.8 % (ref 5–12)
NEUTROPHILS NFR BLD AUTO: 75.6 % (ref 42.7–76)
NEUTROPHILS NFR BLD AUTO: 8.21 10*3/MM3 (ref 1.7–7)
NRBC BLD AUTO-RTO: 0 /100 WBC (ref 0–0.2)
OXYHGB MFR BLDV: 92.3 % (ref 94–99)
PAW @ PEAK INSP FLOW SETTING VENT: 0 CMH2O
PCO2 BLDA: 53.7 MM HG (ref 35–45)
PCO2 TEMP ADJ BLD: 53.7 MM HG (ref 35–48)
PEEP RESPIRATORY: 10 CM[H2O]
PH BLDA: 7.44 PH UNITS (ref 7.35–7.45)
PH, TEMP CORRECTED: 7.44 PH UNITS
PHOSPHATE SERPL-MCNC: 3.5 MG/DL (ref 2.5–4.5)
PLATELET # BLD AUTO: 513 10*3/MM3 (ref 140–450)
PMV BLD AUTO: 9.6 FL (ref 6–12)
PO2 BLDA: 67.7 MM HG (ref 83–108)
PO2 TEMP ADJ BLD: 67.7 MM HG (ref 83–108)
POTASSIUM SERPL-SCNC: 3.7 MMOL/L (ref 3.5–5.2)
RBC # BLD AUTO: 3.72 10*6/MM3 (ref 4.14–5.8)
SODIUM SERPL-SCNC: 139 MMOL/L (ref 136–145)
TOTAL RATE: 20 BREATHS/MINUTE
VENTILATOR MODE: ABNORMAL
VT ON VENT VENT: 0.49 ML
WBC NRBC COR # BLD AUTO: 10.84 10*3/MM3 (ref 3.4–10.8)

## 2024-09-15 PROCEDURE — 25010000002 FENTANYL 10 MCG/1 ML NS: Performed by: INTERNAL MEDICINE

## 2024-09-15 PROCEDURE — 25010000002 PROPOFOL 10 MG/ML EMULSION: Performed by: INTERNAL MEDICINE

## 2024-09-15 PROCEDURE — 82805 BLOOD GASES W/O2 SATURATION: CPT

## 2024-09-15 PROCEDURE — 94761 N-INVAS EAR/PLS OXIMETRY MLT: CPT

## 2024-09-15 PROCEDURE — 94799 UNLISTED PULMONARY SVC/PX: CPT

## 2024-09-15 PROCEDURE — 94003 VENT MGMT INPAT SUBQ DAY: CPT

## 2024-09-15 PROCEDURE — 25010000002 ENOXAPARIN PER 10 MG: Performed by: INTERNAL MEDICINE

## 2024-09-15 PROCEDURE — 25010000002 METHYLNALTREXONE 12 MG/0.6ML SOLUTION: Performed by: INTERNAL MEDICINE

## 2024-09-15 PROCEDURE — 82948 REAGENT STRIP/BLOOD GLUCOSE: CPT

## 2024-09-15 PROCEDURE — 84100 ASSAY OF PHOSPHORUS: CPT | Performed by: INTERNAL MEDICINE

## 2024-09-15 PROCEDURE — 25010000002 METHYLPREDNISOLONE PER 125 MG: Performed by: INTERNAL MEDICINE

## 2024-09-15 PROCEDURE — 80048 BASIC METABOLIC PNL TOTAL CA: CPT | Performed by: INTERNAL MEDICINE

## 2024-09-15 PROCEDURE — 99233 SBSQ HOSP IP/OBS HIGH 50: CPT | Performed by: INTERNAL MEDICINE

## 2024-09-15 PROCEDURE — 25010000002 FUROSEMIDE PER 20 MG: Performed by: INTERNAL MEDICINE

## 2024-09-15 PROCEDURE — 36600 WITHDRAWAL OF ARTERIAL BLOOD: CPT

## 2024-09-15 PROCEDURE — 63710000001 REVEFENACIN 175 MCG/3ML SOLUTION: Performed by: INTERNAL MEDICINE

## 2024-09-15 PROCEDURE — 83050 HGB METHEMOGLOBIN QUAN: CPT

## 2024-09-15 PROCEDURE — P9047 ALBUMIN (HUMAN), 25%, 50ML: HCPCS

## 2024-09-15 PROCEDURE — 82375 ASSAY CARBOXYHB QUANT: CPT

## 2024-09-15 PROCEDURE — 71045 X-RAY EXAM CHEST 1 VIEW: CPT

## 2024-09-15 PROCEDURE — 85025 COMPLETE CBC W/AUTO DIFF WBC: CPT | Performed by: INTERNAL MEDICINE

## 2024-09-15 PROCEDURE — 94664 DEMO&/EVAL PT USE INHALER: CPT

## 2024-09-15 PROCEDURE — 83735 ASSAY OF MAGNESIUM: CPT | Performed by: INTERNAL MEDICINE

## 2024-09-15 PROCEDURE — 25010000002 ALBUMIN HUMAN 25% PER 50 ML

## 2024-09-15 RX ORDER — ALBUMIN (HUMAN) 12.5 G/50ML
25 SOLUTION INTRAVENOUS ONCE
Status: COMPLETED | OUTPATIENT
Start: 2024-09-15 | End: 2024-09-15

## 2024-09-15 RX ADMIN — PANTOPRAZOLE SODIUM 40 MG: 40 INJECTION, POWDER, LYOPHILIZED, FOR SOLUTION INTRAVENOUS at 06:07

## 2024-09-15 RX ADMIN — PROPOFOL 25 MCG/KG/MIN: 10 INJECTION, EMULSION INTRAVENOUS at 04:32

## 2024-09-15 RX ADMIN — FUROSEMIDE 40 MG: 10 INJECTION, SOLUTION INTRAMUSCULAR; INTRAVENOUS at 09:32

## 2024-09-15 RX ADMIN — ALBUTEROL SULFATE 2.5 MG: 2.5 SOLUTION RESPIRATORY (INHALATION) at 01:10

## 2024-09-15 RX ADMIN — CITALOPRAM HYDROBROMIDE 20 MG: 20 TABLET ORAL at 09:34

## 2024-09-15 RX ADMIN — METHYLNALTREXONE BROMIDE 18 MG: 12 INJECTION, SOLUTION SUBCUTANEOUS at 09:33

## 2024-09-15 RX ADMIN — REVEFENACIN 175 MCG: 175 SOLUTION RESPIRATORY (INHALATION) at 07:32

## 2024-09-15 RX ADMIN — ALBUTEROL SULFATE 2.5 MG: 2.5 SOLUTION RESPIRATORY (INHALATION) at 19:26

## 2024-09-15 RX ADMIN — ARFORMOTEROL TARTRATE 15 MCG: 15 SOLUTION RESPIRATORY (INHALATION) at 07:32

## 2024-09-15 RX ADMIN — ALBUTEROL SULFATE 2.5 MG: 2.5 SOLUTION RESPIRATORY (INHALATION) at 13:47

## 2024-09-15 RX ADMIN — ALBUTEROL SULFATE 2.5 MG: 2.5 SOLUTION RESPIRATORY (INHALATION) at 07:32

## 2024-09-15 RX ADMIN — ASPIRIN 81 MG 81 MG: 81 TABLET ORAL at 09:34

## 2024-09-15 RX ADMIN — PROPOFOL 20 MCG/KG/MIN: 10 INJECTION, EMULSION INTRAVENOUS at 22:54

## 2024-09-15 RX ADMIN — Medication: at 20:10

## 2024-09-15 RX ADMIN — RISPERIDONE 2 MG: 1 SOLUTION ORAL at 21:07

## 2024-09-15 RX ADMIN — CHLORHEXIDINE GLUCONATE, 0.12% ORAL RINSE 15 ML: 1.2 SOLUTION DENTAL at 21:07

## 2024-09-15 RX ADMIN — METHYLPREDNISOLONE SODIUM SUCCINATE 60 MG: 125 INJECTION, POWDER, FOR SOLUTION INTRAMUSCULAR; INTRAVENOUS at 09:33

## 2024-09-15 RX ADMIN — PROPOFOL 20 MCG/KG/MIN: 10 INJECTION, EMULSION INTRAVENOUS at 16:29

## 2024-09-15 RX ADMIN — Medication 225 MCG/HR: at 13:03

## 2024-09-15 RX ADMIN — SENNOSIDES AND DOCUSATE SODIUM 2 TABLET: 50; 8.6 TABLET ORAL at 21:07

## 2024-09-15 RX ADMIN — POLYETHYLENE GLYCOL 3350 17 G: 17 POWDER, FOR SOLUTION ORAL at 09:33

## 2024-09-15 RX ADMIN — DIAZEPAM 10 MG: 10 TABLET ORAL at 09:34

## 2024-09-15 RX ADMIN — CHLORHEXIDINE GLUCONATE, 0.12% ORAL RINSE 15 ML: 1.2 SOLUTION DENTAL at 09:32

## 2024-09-15 RX ADMIN — ENOXAPARIN SODIUM 40 MG: 100 INJECTION SUBCUTANEOUS at 09:32

## 2024-09-15 RX ADMIN — Medication 200 MCG/HR: at 03:24

## 2024-09-15 RX ADMIN — DIAZEPAM 10 MG: 10 TABLET ORAL at 21:07

## 2024-09-15 RX ADMIN — ATORVASTATIN CALCIUM 80 MG: 40 TABLET, FILM COATED ORAL at 09:34

## 2024-09-15 RX ADMIN — PROPOFOL 20 MCG/KG/MIN: 10 INJECTION, EMULSION INTRAVENOUS at 09:11

## 2024-09-15 RX ADMIN — Medication: at 08:01

## 2024-09-15 RX ADMIN — ARFORMOTEROL TARTRATE 15 MCG: 15 SOLUTION RESPIRATORY (INHALATION) at 19:26

## 2024-09-15 RX ADMIN — FUROSEMIDE 40 MG: 10 INJECTION, SOLUTION INTRAMUSCULAR; INTRAVENOUS at 21:07

## 2024-09-15 RX ADMIN — EMPAGLIFLOZIN 10 MG: 10 TABLET, FILM COATED ORAL at 09:34

## 2024-09-15 RX ADMIN — RISPERIDONE 2 MG: 1 SOLUTION ORAL at 09:33

## 2024-09-15 RX ADMIN — Medication 30 ML: at 22:47

## 2024-09-15 RX ADMIN — ALBUMIN (HUMAN) 25 G: 0.25 INJECTION, SOLUTION INTRAVENOUS at 21:07

## 2024-09-15 RX ADMIN — PHENYTOIN 200 MG: 125 SUSPENSION ORAL at 21:07

## 2024-09-15 RX ADMIN — PHENYTOIN 200 MG: 125 SUSPENSION ORAL at 09:32

## 2024-09-15 RX ADMIN — SENNOSIDES AND DOCUSATE SODIUM 2 TABLET: 50; 8.6 TABLET ORAL at 09:33

## 2024-09-15 RX ADMIN — Medication 10 ML: at 21:08

## 2024-09-15 NOTE — PROGRESS NOTES
Intensive Care Follow-up     Hospital:  LOS: 12 days   Mr. Fernie Dalal, 64 y.o. male is followed for:   Type 2 respiratory failure            History of present illness:   64-year-old male admitted on 9/1 with chest pain, shortness of breath, and chills.  He was diagnosed with right lower lobe pneumonia and exacerbation of COPD.  His past history also includes a known cardiomyopathy with reduced ejection fraction.  He had some mild elevation of his cardiac enzymes on admission which was felt to be demand ischemia.  He has been diagnosed with non-STEMI in the setting of demand ischemia with a chronically occluded RCA.  EF is 28% this admission.     He required intubation on 9/3 and transferred to ICU.  CTA revealed no pulmonary embolism.     He has remained on mechanical ventilation. He has been treated with broad-spectrum antimicrobial therapy.  Currently on Zosyn.  MRSA screen was negative.  He required pressors after intubation and is currently on a minimal dose of norepinephrine and this will likely be weaned off.  He has been resumed on Valium due to his chronic benzodiazepine use.      Subjective   Interval History:  Patient doing well this morning.  Continues on roughly 50% FiO2 and 10 of PEEP.             The patient's past medical, surgical and social history were reviewed and updated in Epic as appropriate.       Objective     Infusions:  fentanyl 10 mcg/mL,  mcg/hr, Last Rate: 225 mcg/hr (09/15/24 0636)  propofol, 5-50 mcg/kg/min, Last Rate: 20 mcg/kg/min (09/15/24 0911)      Medications:  Pharmacy Consult, , Does not apply, Q12H  Albuterol Sulfate NEB Orderable, 2.5 mg, Nebulization, Q6H - RT  arformoterol, 15 mcg, Nebulization, BID - RT  aspirin, 81 mg, Nasogastric, Daily  atorvastatin, 80 mg, Nasogastric, Daily  chlorhexidine, 15 mL, Mouth/Throat, Q12H  citalopram, 20 mg, Nasogastric, Daily  diazePAM, 10 mg, Nasogastric, BID  empagliflozin, 10 mg, Nasogastric, Daily  enoxaparin, 40 mg,  Subcutaneous, Daily  furosemide, 40 mg, Intravenous, Q12H  insulin regular, 2-7 Units, Subcutaneous, Q6H  methylPREDNISolone sodium succinate, 60 mg, Intravenous, Q24H  pantoprazole, 40 mg, Intravenous, Q AM  pharmacy consult - [facility], , Does not apply, Daily  phenytoin, 200 mg, Nasogastric, BID  senna-docusate sodium, 2 tablet, Nasogastric, BID   And  polyethylene glycol, 17 g, Nasogastric, Daily  ProSource No Carb, 30 mL, Nasogastric, TID  revefenacin, 175 mcg, Nebulization, Daily - RT  risperiDONE, 2 mg, Nasogastric, BID  sodium chloride, 10 mL, Intravenous, Q12H      I reviewed the patient's medications.    Vital Sign Min/Max for last 24 hours  Temp  Min: 96.7 °F (35.9 °C)  Max: 99.7 °F (37.6 °C)   BP  Min: 78/60  Max: 116/69   Pulse  Min: 69  Max: 100   Resp  Min: 20  Max: 21   SpO2  Min: 89 %  Max: 99 %   No data recorded       Input/Output for last 24 hour shift  09/14 0701 - 09/15 0700  In: 2548.5 [I.V.:895.5]  Out: 3325 [Urine:3325]   Mode: VC+/AC  FiO2 (%):  [50 %-60 %] 60 %  S RR:  [20] 20  S VT:  [490 mL] 490 mL  PEEP/CPAP (cm H2O):  [10 cm H20] 10 cm H20  MAP (cm H2O):  [15-17] 15  GENERAL : Sedated, lying in bed, NAD  RESPIRATORY/THORAX : ET tube in place, Coarse breath sounds bilaterally  CARDIOVASCULAR : Normal S1/S2, RRR. 1+ lower ext edema.  GASTROINTESTINAL : Soft, NT/ND. BS x 4 normoactive. No hepatosplenomegaly.  MUSCULOSKELETAL : No cyanosis, clubbing, or ischemia  NEUROLOGICAL: Sedated, Moving all ext.    Results from last 7 days   Lab Units 09/15/24  0305 09/13/24  0319 09/11/24  0342   WBC 10*3/mm3 10.84* 9.88 11.04*   HEMOGLOBIN g/dL 10.1* 10.2* 10.0*   PLATELETS 10*3/mm3 513* 416 346     Results from last 7 days   Lab Units 09/15/24  0305 09/14/24  0304 09/13/24  0319   SODIUM mmol/L 139 139 138   POTASSIUM mmol/L 3.7 4.0 3.7   CO2 mmol/L 34.0* 31.0* 32.0*   BUN mg/dL 39* 34* 34*   CREATININE mg/dL 0.55* 0.52* 0.53*   MAGNESIUM mg/dL 2.5* 2.3 2.4   PHOSPHORUS mg/dL 3.5 3.4 3.4   GLUCOSE mg/dL  122* 113* 133*     Estimated Creatinine Clearance: 172.5 mL/min (A) (by C-G formula based on SCr of 0.55 mg/dL (L)).    Results from last 7 days   Lab Units 09/15/24  0329   PH, ARTERIAL pH units 7.442   PCO2, ARTERIAL mm Hg 53.7*   PO2 ART mm Hg 67.7*       I reviewed the patient's new clinical results.  I reviewed the patient's new imaging results/reports including actual images and agree with reports.       Imaging Results (Last 24 Hours)       Procedure Component Value Units Date/Time    XR Chest 1 View [[id number]] Collected: 09/15/24 0937     Updated: 09/15/24 0941    Narrative:      XR CHEST 1 VW    Date of Exam: 9/15/2024 5:00 AM EDT    Indication: Intubated    Comparison: 9/14/2024 at 0418 hours    Findings:  Interstitial prominence is again seen throughout the lungs bilaterally. Worsening densities are noted within the lung bases suggesting worsening atelectasis versus infiltrates. The cardiac silhouette and mediastinum are stable. Endotracheal tube is   stable in position. The gastric tube extends to the stomach. The right PICC line is stable in position.      Impression:      Impression:  1.Mild worsening of opacities in the lung bases suggesting worsening atelectasis versus infiltrates. Diffuse interstitial changes are again seen bilaterally.  2.Stable positioning of support lines/tubes.      Electronically Signed: Robin Bustso MD    9/15/2024 9:38 AM EDT    Workstation ID: [id number]            Assessment & Plan   Impression        Type 2 respiratory failure    HFrEF (heart failure with reduced ejection fraction)    Coronary artery disease involving native coronary artery of native heart with angina pectoris    NSTEMI (non-ST elevated myocardial infarction)    Right lower lobe pneumonia       Plan        64-year-old male with a history of coronary artery disease, systolic heart failure, COPD, hypertension, hyperlipidemia, and anxiety.  Patient mid to the [facility] ICU on 9/3/2024.  Was  intubated on 9/3/2024.  Been treated for possible pneumonia and decompensated heart failure.  Has not been able to be with the ventilator any further than 10 of PEEP and 50 to 60% FiO2.  Patient has completed 7 days of antibiotics on 9/11/2024, treated for COPD-the patient has had aggressive diuresis.     -Continue mechanical ventilation, wean oxygen to saturation greater than 88%  -We been unable to wean the vent any further, general surgery consulted and plan for tracheostomy next week.  -Continue propofol and fentanyl for sedation  -Completed 7 days of antibiotics on 9/11/2024  -Continued scheduled nebs, and Solu-Medrol for COPD exacerbation  -Continue Valium with history of chronic use.  -Continue home phenytoin  -GI prophylaxis  -DVT prophylaxis  -A.m. labs     I conducted multidisciplinary rounds and the plan of care was discussed with the multidisciplinary team at that time. In attendance at multidisciplinary rounds was clinical pharmacist, dietitian, nursing staff, and case management.    I discussed the patient's findings and my recommendations with patient and nursing staff     High level of risk due to:  parenteral controlled substances.      Jarrell Hermosillo, DO  Pulmonary, Critical care and Sleep Medicine

## 2024-09-15 NOTE — PLAN OF CARE
Goal Outcome Evaluation:  Plan of Care Reviewed With: patient, family        Progress: no change  Outcome Evaluation:     -Pt remains on mechanical ventilation with a PEEP of 10 and 60% FIO2.   -Moderate subglottal and in-line secretions   -Vital signs stable throughout the shift. Sinus rhythm with occasional PVCs. One 6 beat run of vtach this am.   -Pt continues to follow commands. Fentanyl infusing at 225 mcg/hr, propofol infusing at 20 mcg/kg/hr.  -Suppository given x1 as pt has yet to have BM despite bowel regimen. Bowel sounds audible and normoactive. Pt is passing flatus and small amounts of liquid stool this am.  -TF infusing, residual 105   -adequate UOP  -Tmax 99.7

## 2024-09-15 NOTE — PLAN OF CARE
Goal Outcome Evaluation:      O2 HAS BEEN ABLE TO BE TITRATED TODAY FROM 60% TO 45%. WILL CONTINUE TO WEAN O2 AS TOLERATED.

## 2024-09-15 NOTE — PROGRESS NOTES
"Patient Name:  Fernie Dalal  YOB: 1960  [id number]    Surgery Progress Note    Date of visit: 9/15/2024    Subjective   Subjective: Stable. No new issues.         Objective     Objective:     BP 91/60   Pulse 73   Temp 99.7 °F (37.6 °C) (Oral)   Resp 20   Ht 175 cm (68.9\")   Wt 115 kg (254 lb 10.1 oz)   SpO2 96%   BMI 37.71 kg/m²     Intake/Output Summary (Last 24 hours) at 9/15/2024 0808  Last data filed at 9/15/2024 0636  Gross per 24 hour   Intake 2232.5 ml   Output 3325 ml   Net -1092.5 ml       CV:  Rhythm  regular and rate regular   L:  Rhonchi to auscultation bilaterally   Abd:  Bowel sounds positive , soft,   Ext:  No cyanosis, clubbing, edema    Recent labs that are back at this time have been reviewed.            Assessment/ Plan:    Problem List Items Addressed This Visit          Cardiac and Vasculature    HFrEF (heart failure with reduced ejection fraction)    Relevant Orders    Insert Arterial Line (Completed)       Pulmonary and Pneumonias    * (Principal) Type 2 respiratory failure- Plan for Trach/PEG 9/17/24 (Tuesday)    Right lower lobe pneumonia    Relevant Medications    arformoterol (BROVANA) nebulizer solution 15 mcg    revefenacin (YUPELRI) nebulizer solution 175 mcg    albuterol (PROVENTIL) nebulizer solution 0.083% 2.5 mg/3mL     Other Visit Diagnoses       Acute respiratory failure with hypoxia    -  Primary    Relevant Orders    Insert Arterial Line (Completed)    Chest pain, unspecified type                 Active Hospital Problems    Diagnosis  POA    **Type 2 respiratory failure [J96.92]  Yes    Right lower lobe pneumonia [J18.9]  Yes    NSTEMI (non-ST elevated myocardial infarction) [I21.4]  Yes    HFrEF (heart failure with reduced ejection fraction) [I50.20]  Yes    Coronary artery disease involving native coronary artery of native heart with angina pectoris [I25.119]  Yes      Resolved Hospital Problems    Diagnosis Date Resolved POA    Hypotension [I95.9] " 09/13/2024 Yes    Dyspnea [R06.00] 09/01/2024 Yes              Jon Mclean MD  9/15/2024  08:08 EDT

## 2024-09-15 NOTE — PLAN OF CARE
Goal Outcome Evaluation:           Problem: Inability to Wean (Mechanical Ventilation, Invasive)  Goal: Mechanical Ventilation Liberation  Outcome: Ongoing, Not Progressing    Mechanical ventilation on going. Required FiO2 increase to 60%, remains at PEEP of 10.

## 2024-09-16 ENCOUNTER — APPOINTMENT (OUTPATIENT)
Dept: GENERAL RADIOLOGY | Facility: HOSPITAL | Age: 64
End: 2024-09-16
Payer: MEDICAID

## 2024-09-16 ENCOUNTER — ANESTHESIA (OUTPATIENT)
Dept: PERIOP | Facility: HOSPITAL | Age: 64
End: 2024-09-16
Payer: MEDICAID

## 2024-09-16 ENCOUNTER — ANESTHESIA EVENT (OUTPATIENT)
Dept: PERIOP | Facility: HOSPITAL | Age: 64
End: 2024-09-16
Payer: MEDICAID

## 2024-09-16 LAB
ANION GAP SERPL CALCULATED.3IONS-SCNC: 14 MMOL/L (ref 5–15)
ARTERIAL PATENCY WRIST A: ABNORMAL
ATMOSPHERIC PRESS: ABNORMAL MM[HG]
BASE EXCESS BLDA CALC-SCNC: 11.2 MMOL/L (ref 0–2)
BDY SITE: ABNORMAL
BODY TEMPERATURE: 37
BUN SERPL-MCNC: 36 MG/DL (ref 8–23)
BUN/CREAT SERPL: 66.7 (ref 7–25)
CALCIUM SPEC-SCNC: 9.2 MG/DL (ref 8.6–10.5)
CHLORIDE SERPL-SCNC: 98 MMOL/L (ref 98–107)
CO2 BLDA-SCNC: 36.9 MMOL/L (ref 22–33)
CO2 SERPL-SCNC: 30 MMOL/L (ref 22–29)
COHGB MFR BLD: 1.4 % (ref 0–2)
CREAT SERPL-MCNC: 0.54 MG/DL (ref 0.76–1.27)
EGFRCR SERPLBLD CKD-EPI 2021: 112 ML/MIN/1.73
EPAP: 0
GLUCOSE BLDC GLUCOMTR-MCNC: 107 MG/DL (ref 70–130)
GLUCOSE BLDC GLUCOMTR-MCNC: 118 MG/DL (ref 70–130)
GLUCOSE BLDC GLUCOMTR-MCNC: 135 MG/DL (ref 70–130)
GLUCOSE BLDC GLUCOMTR-MCNC: 172 MG/DL (ref 70–130)
GLUCOSE SERPL-MCNC: 128 MG/DL (ref 65–99)
HCO3 BLDA-SCNC: 35.5 MMOL/L (ref 20–26)
HCT VFR BLD CALC: 29.7 % (ref 38–51)
HGB BLDA-MCNC: 9.7 G/DL (ref 13.5–17.5)
INHALED O2 CONCENTRATION: 45 %
IPAP: 0
MAGNESIUM SERPL-MCNC: 2.3 MG/DL (ref 1.6–2.4)
METHGB BLD QL: 0 % (ref 0–1.5)
MODALITY: ABNORMAL
OXYHGB MFR BLDV: 94.7 % (ref 94–99)
PAW @ PEAK INSP FLOW SETTING VENT: 0 CMH2O
PCO2 BLDA: 45.1 MM HG (ref 35–45)
PCO2 TEMP ADJ BLD: 45.1 MM HG (ref 35–48)
PEEP RESPIRATORY: 10 CM[H2O]
PH BLDA: 7.5 PH UNITS (ref 7.35–7.45)
PH, TEMP CORRECTED: 7.5 PH UNITS
PHOSPHATE SERPL-MCNC: 3.2 MG/DL (ref 2.5–4.5)
PO2 BLDA: 72.9 MM HG (ref 83–108)
PO2 TEMP ADJ BLD: 72.9 MM HG (ref 83–108)
POTASSIUM SERPL-SCNC: 3.3 MMOL/L (ref 3.5–5.2)
POTASSIUM SERPL-SCNC: 5.2 MMOL/L (ref 3.5–5.2)
SODIUM SERPL-SCNC: 142 MMOL/L (ref 136–145)
TOTAL RATE: 20 BREATHS/MINUTE
VENTILATOR MODE: ABNORMAL
VT ON VENT VENT: 0.49 ML

## 2024-09-16 PROCEDURE — 83735 ASSAY OF MAGNESIUM: CPT | Performed by: INTERNAL MEDICINE

## 2024-09-16 PROCEDURE — 94003 VENT MGMT INPAT SUBQ DAY: CPT

## 2024-09-16 PROCEDURE — 25010000002 PROPOFOL 10 MG/ML EMULSION

## 2024-09-16 PROCEDURE — 25010000002 METHYLPREDNISOLONE PER 125 MG: Performed by: INTERNAL MEDICINE

## 2024-09-16 PROCEDURE — 71045 X-RAY EXAM CHEST 1 VIEW: CPT

## 2024-09-16 PROCEDURE — 82948 REAGENT STRIP/BLOOD GLUCOSE: CPT

## 2024-09-16 PROCEDURE — 25010000002 PROPOFOL 10 MG/ML EMULSION: Performed by: INTERNAL MEDICINE

## 2024-09-16 PROCEDURE — 84132 ASSAY OF SERUM POTASSIUM: CPT | Performed by: NURSE PRACTITIONER

## 2024-09-16 PROCEDURE — 63710000001 REVEFENACIN 175 MCG/3ML SOLUTION: Performed by: INTERNAL MEDICINE

## 2024-09-16 PROCEDURE — 94799 UNLISTED PULMONARY SVC/PX: CPT

## 2024-09-16 PROCEDURE — 83050 HGB METHEMOGLOBIN QUAN: CPT

## 2024-09-16 PROCEDURE — 82375 ASSAY CARBOXYHB QUANT: CPT

## 2024-09-16 PROCEDURE — 63710000001 INSULIN REGULAR HUMAN PER 5 UNITS: Performed by: NURSE PRACTITIONER

## 2024-09-16 PROCEDURE — 99233 SBSQ HOSP IP/OBS HIGH 50: CPT | Performed by: INTERNAL MEDICINE

## 2024-09-16 PROCEDURE — 80048 BASIC METABOLIC PNL TOTAL CA: CPT | Performed by: INTERNAL MEDICINE

## 2024-09-16 PROCEDURE — 82805 BLOOD GASES W/O2 SATURATION: CPT

## 2024-09-16 PROCEDURE — 25010000002 FENTANYL 10 MCG/1 ML NS: Performed by: INTERNAL MEDICINE

## 2024-09-16 PROCEDURE — 36600 WITHDRAWAL OF ARTERIAL BLOOD: CPT

## 2024-09-16 PROCEDURE — 84100 ASSAY OF PHOSPHORUS: CPT | Performed by: INTERNAL MEDICINE

## 2024-09-16 PROCEDURE — 25010000002 FUROSEMIDE PER 20 MG: Performed by: INTERNAL MEDICINE

## 2024-09-16 RX ORDER — SODIUM CHLORIDE 9 MG/ML
40 INJECTION, SOLUTION INTRAVENOUS AS NEEDED
Status: CANCELLED | OUTPATIENT
Start: 2024-09-16

## 2024-09-16 RX ORDER — POTASSIUM CHLORIDE 1.5 G/1.58G
40 POWDER, FOR SOLUTION ORAL EVERY 4 HOURS
Status: COMPLETED | OUTPATIENT
Start: 2024-09-16 | End: 2024-09-16

## 2024-09-16 RX ORDER — FAMOTIDINE 10 MG/ML
20 INJECTION, SOLUTION INTRAVENOUS ONCE
Status: CANCELLED | OUTPATIENT
Start: 2024-09-16 | End: 2024-09-16

## 2024-09-16 RX ORDER — METHYLPREDNISOLONE SODIUM SUCCINATE 40 MG/ML
40 INJECTION, POWDER, LYOPHILIZED, FOR SOLUTION INTRAMUSCULAR; INTRAVENOUS EVERY 24 HOURS
Status: DISCONTINUED | OUTPATIENT
Start: 2024-09-17 | End: 2024-09-18

## 2024-09-16 RX ORDER — MIDAZOLAM HYDROCHLORIDE 1 MG/ML
1 INJECTION INTRAMUSCULAR; INTRAVENOUS
Status: CANCELLED | OUTPATIENT
Start: 2024-09-16

## 2024-09-16 RX ORDER — SODIUM CHLORIDE 0.9 % (FLUSH) 0.9 %
10 SYRINGE (ML) INJECTION EVERY 12 HOURS SCHEDULED
Status: CANCELLED | OUTPATIENT
Start: 2024-09-16

## 2024-09-16 RX ORDER — NYSTATIN 100000 [USP'U]/ML
5 SUSPENSION ORAL 4 TIMES DAILY
Status: DISPENSED | OUTPATIENT
Start: 2024-09-16 | End: 2024-09-23

## 2024-09-16 RX ORDER — SODIUM CHLORIDE, SODIUM LACTATE, POTASSIUM CHLORIDE, CALCIUM CHLORIDE 600; 310; 30; 20 MG/100ML; MG/100ML; MG/100ML; MG/100ML
9 INJECTION, SOLUTION INTRAVENOUS CONTINUOUS
Status: CANCELLED | OUTPATIENT
Start: 2024-09-16

## 2024-09-16 RX ORDER — FAMOTIDINE 20 MG/1
20 TABLET, FILM COATED ORAL ONCE
Status: CANCELLED | OUTPATIENT
Start: 2024-09-16 | End: 2024-09-16

## 2024-09-16 RX ORDER — LIDOCAINE HYDROCHLORIDE 10 MG/ML
0.5 INJECTION, SOLUTION EPIDURAL; INFILTRATION; INTRACAUDAL; PERINEURAL ONCE AS NEEDED
Status: CANCELLED | OUTPATIENT
Start: 2024-09-16

## 2024-09-16 RX ORDER — SODIUM CHLORIDE 0.9 % (FLUSH) 0.9 %
10 SYRINGE (ML) INJECTION AS NEEDED
Status: CANCELLED | OUTPATIENT
Start: 2024-09-16

## 2024-09-16 RX ORDER — CLOTRIMAZOLE 10 MG/1
10 LOZENGE ORAL
Status: DISCONTINUED | OUTPATIENT
Start: 2024-09-16 | End: 2024-09-16

## 2024-09-16 RX ADMIN — POTASSIUM CHLORIDE 40 MEQ: 1.5 POWDER, FOR SOLUTION ORAL at 05:14

## 2024-09-16 RX ADMIN — RISPERIDONE 2 MG: 1 SOLUTION ORAL at 08:31

## 2024-09-16 RX ADMIN — ALBUTEROL SULFATE 2.5 MG: 2.5 SOLUTION RESPIRATORY (INHALATION) at 12:37

## 2024-09-16 RX ADMIN — SENNOSIDES AND DOCUSATE SODIUM 2 TABLET: 50; 8.6 TABLET ORAL at 08:31

## 2024-09-16 RX ADMIN — PANTOPRAZOLE SODIUM 40 MG: 40 INJECTION, POWDER, LYOPHILIZED, FOR SOLUTION INTRAVENOUS at 05:14

## 2024-09-16 RX ADMIN — CHLORHEXIDINE GLUCONATE, 0.12% ORAL RINSE 15 ML: 1.2 SOLUTION DENTAL at 21:24

## 2024-09-16 RX ADMIN — REVEFENACIN 175 MCG: 175 SOLUTION RESPIRATORY (INHALATION) at 07:06

## 2024-09-16 RX ADMIN — Medication 10 ML: at 08:39

## 2024-09-16 RX ADMIN — CITALOPRAM HYDROBROMIDE 20 MG: 20 TABLET ORAL at 08:31

## 2024-09-16 RX ADMIN — EMPAGLIFLOZIN 10 MG: 10 TABLET, FILM COATED ORAL at 08:31

## 2024-09-16 RX ADMIN — PROPOFOL 30 MCG/KG/MIN: 10 INJECTION, EMULSION INTRAVENOUS at 23:41

## 2024-09-16 RX ADMIN — NYSTATIN 500000 UNITS: 100000 SUSPENSION ORAL at 08:31

## 2024-09-16 RX ADMIN — SENNOSIDES AND DOCUSATE SODIUM 2 TABLET: 50; 8.6 TABLET ORAL at 21:28

## 2024-09-16 RX ADMIN — METHYLPREDNISOLONE SODIUM SUCCINATE 60 MG: 125 INJECTION, POWDER, FOR SOLUTION INTRAMUSCULAR; INTRAVENOUS at 08:32

## 2024-09-16 RX ADMIN — ARFORMOTEROL TARTRATE 15 MCG: 15 SOLUTION RESPIRATORY (INHALATION) at 07:06

## 2024-09-16 RX ADMIN — FUROSEMIDE 40 MG: 10 INJECTION, SOLUTION INTRAMUSCULAR; INTRAVENOUS at 08:32

## 2024-09-16 RX ADMIN — PHENYTOIN 200 MG: 125 SUSPENSION ORAL at 21:29

## 2024-09-16 RX ADMIN — Medication 225 MCG/HR: at 00:04

## 2024-09-16 RX ADMIN — CHLORHEXIDINE GLUCONATE, 0.12% ORAL RINSE 15 ML: 1.2 SOLUTION DENTAL at 08:31

## 2024-09-16 RX ADMIN — PROPOFOL 20 MCG/KG/MIN: 10 INJECTION, EMULSION INTRAVENOUS at 16:34

## 2024-09-16 RX ADMIN — RISPERIDONE 2 MG: 1 SOLUTION ORAL at 21:29

## 2024-09-16 RX ADMIN — Medication 10 ML: at 21:25

## 2024-09-16 RX ADMIN — POLYETHYLENE GLYCOL 3350 17 G: 17 POWDER, FOR SOLUTION ORAL at 08:32

## 2024-09-16 RX ADMIN — Medication 30 ML: at 08:40

## 2024-09-16 RX ADMIN — ALBUTEROL SULFATE 2.5 MG: 2.5 SOLUTION RESPIRATORY (INHALATION) at 18:56

## 2024-09-16 RX ADMIN — POTASSIUM CHLORIDE 40 MEQ: 1.5 POWDER, FOR SOLUTION ORAL at 08:32

## 2024-09-16 RX ADMIN — NYSTATIN 500000 UNITS: 100000 SUSPENSION ORAL at 16:34

## 2024-09-16 RX ADMIN — Medication 250 MCG/HR: at 20:22

## 2024-09-16 RX ADMIN — ARFORMOTEROL TARTRATE 15 MCG: 15 SOLUTION RESPIRATORY (INHALATION) at 18:56

## 2024-09-16 RX ADMIN — DIAZEPAM 10 MG: 10 TABLET ORAL at 08:31

## 2024-09-16 RX ADMIN — PROPOFOL 20 MCG/KG/MIN: 10 INJECTION, EMULSION INTRAVENOUS at 04:07

## 2024-09-16 RX ADMIN — PHENYTOIN 200 MG: 125 SUSPENSION ORAL at 08:31

## 2024-09-16 RX ADMIN — INSULIN HUMAN 2 UNITS: 100 INJECTION, SOLUTION PARENTERAL at 11:53

## 2024-09-16 RX ADMIN — NYSTATIN 500000 UNITS: 100000 SUSPENSION ORAL at 21:24

## 2024-09-16 RX ADMIN — FUROSEMIDE 40 MG: 10 INJECTION, SOLUTION INTRAMUSCULAR; INTRAVENOUS at 21:24

## 2024-09-16 RX ADMIN — ALBUTEROL SULFATE 2.5 MG: 2.5 SOLUTION RESPIRATORY (INHALATION) at 01:15

## 2024-09-16 RX ADMIN — Medication 30 ML: at 21:29

## 2024-09-16 RX ADMIN — Medication 250 MCG/HR: at 10:15

## 2024-09-16 RX ADMIN — ATORVASTATIN CALCIUM 80 MG: 40 TABLET, FILM COATED ORAL at 08:31

## 2024-09-16 RX ADMIN — PROPOFOL 20 MCG/KG/MIN: 10 INJECTION, EMULSION INTRAVENOUS at 11:47

## 2024-09-16 RX ADMIN — DIAZEPAM 10 MG: 10 TABLET ORAL at 21:28

## 2024-09-16 RX ADMIN — ALBUTEROL SULFATE 2.5 MG: 2.5 SOLUTION RESPIRATORY (INHALATION) at 07:06

## 2024-09-16 RX ADMIN — ASPIRIN 81 MG 81 MG: 81 TABLET ORAL at 08:31

## 2024-09-16 NOTE — PLAN OF CARE
Problem: Inability to Wean (Mechanical Ventilation, Invasive)  Goal: Mechanical Ventilation Liberation  Outcome: Ongoing, Not Progressing   Goal Outcome Evaluation:   Patient remains mechanically ventilated fio2 .45 and PEEP 10

## 2024-09-16 NOTE — NURSING NOTE
This am, noted patient had been added to OR schedule for this afternoon. Per Dr Mclean (at bedside), the plan remains for patient to have Trach and Peg tomorrow.

## 2024-09-16 NOTE — PLAN OF CARE
Goal Outcome Evaluation:         Unable to decrease ventilator support. Pt on 50% and +10 of peep.

## 2024-09-16 NOTE — PROGRESS NOTES
Clinical Nutrition     Patient Name: Fernie Dalal  YOB: 1960  MRN: [id number]  Date of Encounter: 09/16/24 11:39 EDT  Admission date: 9/1/2024  Reason for Visit: MDR, Follow-up protocol, EN    Assessment   Nutrition Assessment   Admission Diagnosis:  Dyspnea [R06.00]  Acute on chronic respiratory failure with hypoxia [J96.21]    Problem List:    Type 2 respiratory failure    HFrEF (heart failure with reduced ejection fraction)    NSTEMI (non-ST elevated myocardial infarction)    Right lower lobe pneumonia      PMH:   He  has a past medical history of Angina at rest, Anxiety, Arrhythmia, COPD (chronic obstructive pulmonary disease), GERD (gastroesophageal reflux disease), Heart failure, Hyperlipidemia, Hypertension, and Myocardial infarction.    PSH:  He  has a past surgical history that includes Cardiac catheterization; Cardiovascular stress test; and Cardiac catheterization (N/A, 4/24/2023).    Applicable Nutrition History:   ARF/VENT 9-3-24    EN started 9-5-24    Plan for Trach, PEG 9-17-24      Labs    Labs Reviewed: Yes    Results from last 7 days   Lab Units 09/16/24  0307 09/15/24  0305 09/14/24  0304 09/11/24  0342 09/10/24  1331   GLUCOSE mg/dL 128* 122* 113*   < >  --    BUN mg/dL 36* 39* 34*   < >  --    CREATININE mg/dL 0.54* 0.55* 0.52*   < >  --    SODIUM mmol/L 142 139 139   < >  --    CHLORIDE mmol/L 98 97* 95*   < >  --    POTASSIUM mmol/L 3.3* 3.7 4.0   < > 4.3   PHOSPHORUS mg/dL 3.2 3.5 3.4   < >  --    MAGNESIUM mg/dL 2.3 2.5* 2.3   < >  --    ALT (SGPT) U/L  --   --  24  --  39    < > = values in this interval not displayed.       Results from last 7 days   Lab Units 09/14/24  0304 09/10/24  1331 09/10/24  0351   ALBUMIN g/dL 3.4* 3.0* 3.3*       Results from last 7 days   Lab Units 09/16/24  0553 09/15/24  2344 09/15/24  1803 09/15/24  1148 09/15/24  0525 09/14/24  2332   GLUCOSE mg/dL 135* 113 135* 146* 134* 107     Lab Results   Lab Value Date/Time    HGBA1C  6.00 (H) 09/01/2024 1050    HGBA1C 7.30 (H) 04/25/2023 0440                 Medications    Medications Reviewed: yes    Scheduled Meds:Pharmacy Consult, , Does not apply, Q12H  Albuterol Sulfate NEB Orderable, 2.5 mg, Nebulization, Q6H - RT  arformoterol, 15 mcg, Nebulization, BID - RT  aspirin, 81 mg, Nasogastric, Daily  atorvastatin, 80 mg, Nasogastric, Daily  chlorhexidine, 15 mL, Mouth/Throat, Q12H  citalopram, 20 mg, Nasogastric, Daily  diazePAM, 10 mg, Nasogastric, BID  empagliflozin, 10 mg, Nasogastric, Daily  enoxaparin, 40 mg, Subcutaneous, Daily  furosemide, 40 mg, Intravenous, Q12H  insulin regular, 2-7 Units, Subcutaneous, Q6H  [START ON 9/17/2024] methylPREDNISolone sodium succinate, 40 mg, Intravenous, Q24H  nystatin, 5 mL, Swish & Spit, 4x Daily  pantoprazole, 40 mg, Intravenous, Q AM  pharmacy consult - [place], , Does not apply, Daily  phenytoin, 200 mg, Nasogastric, BID  senna-docusate sodium, 2 tablet, Nasogastric, BID   And  polyethylene glycol, 17 g, Nasogastric, Daily  ProSource No Carb, 30 mL, Nasogastric, TID  revefenacin, 175 mcg, Nebulization, Daily - RT  risperiDONE, 2 mg, Nasogastric, BID  sodium chloride, 10 mL, Intravenous, Q12H      Continuous Infusions:fentanyl 10 mcg/mL,  mcg/hr, Last Rate: 250 mcg/hr (09/16/24 1015)  propofol, 5-50 mcg/kg/min, Last Rate: 20 mcg/kg/min (09/16/24 0647)      PRN Meds:.  acetaminophen    senna-docusate sodium **AND** polyethylene glycol **AND** [DISCONTINUED] bisacodyl **AND** bisacodyl    lactulose    polyvinyl alcohol    Potassium Replacement - Follow Nurse / BPA Driven Protocol    sodium chloride    Intake/Ouptut 24 hrs (0701 - 0700)   I&O's Reviewed: yes    Intake & Output (last day)         09/15 0701  09/16 0700 09/16 0701  09/17 0700    I.V. (mL/kg) 885.7 (7.6) 168 (1.4)    Other 450 30    NG/GT 1406 277    Total Intake(mL/kg) 2741.7 (23.6) 475 (4.1)    Urine (mL/kg/hr) 2125 (0.8) 250 (0.5)    Stool      Total Output 2125 250    Net +616.7 +225  "                Anthropometrics     Height: Height: 175 cm (68.9\")  Last Filed Weight: Weight: 116 kg (255 lb 4.7 oz) (09/16/24 0519)  Method: Weight Method: Bed scale  BMI: BMI (Calculated): 37.8    UBW: ?  Weight change:  17lb wt gain since admit, suspect fluid     Weight       Weight (kg) Weight (lbs) Weight Method Visit Report   4/23/2015 118.84 kg  261 lb 15.9 oz      7/1/2022 117.482 kg  259 lb   --    4/18/2023 --  --   --    4/22/2023 117.482 kg  259 lb  Stated     4/23/2023 117.482 kg  259 lb      4/24/2023 102.967 kg  227 lb  Standing scale     6/27/2023 106.051 kg  233 lb 12.8 oz   --    9/29/2023 105.688 kg  233 lb      10/31/2023 105.688 kg  233 lb   --    9/1/2024 105.688 kg  233 lb  Estimated      106 kg  233 lb 11 oz  Bed scale      113.399 kg  250 lb          Nutrition Focused Physical Exam     Date:     Unable to perform due to Pt unable to participate at time of visit     Subjective   Reported/Observed/Food/Nutrition Related History:     9-16: pt intubated, sedated, propofol 14.2ml/hr    Per RN: pt afebrile, had 800ml UOP last night, tolerating TF, had to be disimpacted yesterday    Plan for Trach, PEG Tuesday 9-12: pt intubated, sedated  +fentanyl, propofol 17.7ml    Per RN: pt tolerating TF, will wake up and follow commands    9-10: pt intubated, sedated + fentanyl, propofol 14.2ml    Per RN: pt tolerating TF,  has not had bm since 9-7, has been given senokot    9/9  Unable to wean from vent. Remains on levo and propofol.  + 1BM 9/8.     9-6   pt intubated, sedated   + versed, fentanyl, ketamine  Per RN: pt will open eyes, and follow commands sometimes, 2L UOP, tolerating TF, it is @50ml currently, has not had a bm yet  Plan to change enteral Dilantin to IV Cerebryx    9-4  Pt intubated, sedated, will open eyes, is tremulous  + levophed 0.04mcg, fentanyl, heparin  Per RN: pt very anxious, will follow commands, has lots of secretions    Needs Assessment   Date: 9-10-24    Height used:Height: " "175 cm (68.9\")  Weights used ABW: 233lb/ 106kg- admit wt  IBW: 160lb/ 73kg    Estimated Calorie needs: ~1453kcal/ 70% MREE  Method:  14Kcals/KG ABW:1484kcal  Method:  MSJ ABW: 1845kcal  Method:  PSU ABW (ve 9.37, Tmax 36.7) = 1943kcal  IC: MREE= 2075kcal, RQ= 0.85 wnl  Goal 65-70% EWBB=[id number]    Estimated Protein needs: ~146g protein  Method: 2g protein per kg IBW: 146g protein  Method: 1.2-1.5g protein per k-159g protein      Current Nutrition Prescription     PO: NPO Diet NPO Type: Strict NPO, Sips with Meds  Oral Nutrition Supplement:  Intake: N/A    EN: Peptamen Intense VHP  Goal Rate: 55ml  Water Flushes: 30ml  Modular: Prosource-no carb 3/day  Route: NG  Tube: Large bore    At goal over: 20Hrs/day     Rx will supply:   Goal Volume 1100  mL/day     Flush Volume 600 mL/day     Energy 1280 Kcal/day 62 % MREE   Protein 146 g/day 100 % Est Need   Fiber 4 g/day     Water in   mL     Total Water 1524 mL     Meet DRI No        --------------------------------------------------------------------------  Product/Rate verified at bedside: Yes  Infusing Rate at time of visit: 55ml    Average Delivery from [facility]:   Volume 846 mL/day 77  % Goal Vol.   Flush Volume 1010 mL/day     Energy 1560 Kcal/day 75 % MREE   Protein 123 g/day 84 % Est Need   Fiber 3 g/day     Water in  EN 711m L     Total Water 1721 mL     Meet DRI No           Propofol intake:336ml, 370kcal    Assessment & Plan   Nutrition Diagnosis   Date: 24 Updated:   Problem Inadequate energy intake    Etiology ARF/VENT   Signs/Symptoms 77% goal volume EN   Status: Active     Goal:   Nutrition to support treatment and Maintain EN      Nutrition Intervention      Follow treatment progress, Care plan reviewed    Suggest add MVI as TF volume too low to meet RDI's    Continue to hold TF 1 hour before and after each dilantin dose    Monitoring/Evaluation:   Per protocol, I&O, Pertinent labs, Weight, Skin status, GI status, Symptoms, " Hemodynamic stability    Tania Street RD  Time Spent: 30min

## 2024-09-16 NOTE — CASE MANAGEMENT/SOCIAL WORK
Continued Stay Note  [facility]     Patient Name: Fernie Dalal  MRN: [id number]  Today's Date: 9/16/2024    Admit Date: 9/1/2024    Plan: [facility]   Discharge Plan       Row Name 09/16/24 1425       Plan    Plan [facility]    Patient/Family in Agreement with Plan other (see comments)    Plan Comments I had a return call from Debra Trujillo @ [facility], she is very interested in Mr. Dalal @ d/c. Asked me to fax face sheet to her @ [phone] and she will review in Epic.    Final Discharge Disposition Code 30 - still a patient      Row Name 09/16/24 8049       Plan    Plan D/C TBD,  LTACH    Patient/Family in Agreement with Plan other (see comments)    Plan Comments When I spoke w/sibling, she asked me to see if [facility] could take Mr. Dalal @ d/c from here? [facility] . I explained that I did not know if he could come there post initially, that [facility] is a place where people go reside if unable to come off [facility], but I would check, aware that he may need to go to [facility], if [facility] is  unable to accept. Per MDR, plan is for trach/Peg tomorrow. CM will continue to follow.    Final Discharge Disposition Code 30 - still a patient                   Discharge Codes    No documentation.                 Expected Discharge Date and Time       Expected Discharge Date Expected Discharge Time    Sep 20, 2024               Aniket Marquez RN

## 2024-09-16 NOTE — PROGRESS NOTES
"Patient Name:  Fernie Dalal  YOB: 1960  [id number]    Surgery Progress Note    Date of visit: 9/16/2024    Subjective   Subjective: Stable overnight. No new changes.         Objective     Objective:     BP (!) 87/59   Pulse 73   Temp 99.2 °F (37.3 °C) (Axillary)   Resp 20   Ht 175 cm (68.9\")   Wt 116 kg (255 lb 4.7 oz)   SpO2 98%   BMI 37.81 kg/m²     Intake/Output Summary (Last 24 hours) at 9/16/2024 0712  Last data filed at 9/16/2024 0600  Gross per 24 hour   Intake 2741.68 ml   Output 2125 ml   Net 616.68 ml       CV:  Rhythm  regular and rate regular   L:  Rhonchi to auscultation bilaterally   Abd:  Bowel sounds positive , soft, nontender  Ext:  No cyanosis, clubbing, edema    Recent labs that are back at this time have been reviewed.            Assessment/ Plan:    Problem List Items Addressed This Visit          Cardiac and Vasculature    HFrEF (heart failure with reduced ejection fraction)    Relevant Orders    Insert Arterial Line (Completed)       Pulmonary and Pneumonias    * (Principal) Type 2 respiratory failure- For Trach/PEG tomorrow (9/17/24).    Right lower lobe pneumonia    Relevant Medications    arformoterol (BROVANA) nebulizer solution 15 mcg    revefenacin (YUPELRI) nebulizer solution 175 mcg    albuterol (PROVENTIL) nebulizer solution 0.083% 2.5 mg/3mL     Other Visit Diagnoses       Acute respiratory failure with hypoxia    -  Primary    Relevant Orders    Insert Arterial Line (Completed)    Chest pain, unspecified type                 Active Hospital Problems    Diagnosis  POA    **Type 2 respiratory failure [J96.92]  Yes    Right lower lobe pneumonia [J18.9]  Yes    NSTEMI (non-ST elevated myocardial infarction) [I21.4]  Yes    HFrEF (heart failure with reduced ejection fraction) [I50.20]  Yes    Coronary artery disease involving native coronary artery of native heart with angina pectoris [I25.119]  Yes      Resolved Hospital Problems    Diagnosis Date Resolved POA    " Hypotension [I95.9] 09/13/2024 Yes    Dyspnea [R06.00] 09/01/2024 Yes              Jon Mclean MD  9/16/2024  07:12 EDT

## 2024-09-16 NOTE — PROGRESS NOTES
Intensive Care Follow-up     Hospital:  LOS: 13 days   Mr. Fernie Dalal, 64 y.o. male is followed for:   Type 2 respiratory failure        Subjective     64-year-old male admitted on 9/1 with chest pain, shortness of breath, and chills.  He was diagnosed with right lower lobe pneumonia and exacerbation of COPD.  His past history also includes a known cardiomyopathy with reduced ejection fraction.  He had some mild elevation of his cardiac enzymes on admission which was felt to be demand ischemia.  He has been diagnosed with non-STEMI in the setting of demand ischemia with a chronically occluded RCA.  EF is 28% this admission.     He required intubation on 9/3 and transferred to ICU.  CTA revealed no pulmonary embolism.  Interval History:  The chart has been reviewed.  The patient remained afebrile overnight.  Culture data has remained negative.  Good urinary outflow overnight.    The patient's past medical, surgical and social history were reviewed and updated in Epic as appropriate.        Objective     Infusions:  fentanyl 10 mcg/mL,  mcg/hr, Last Rate: 250 mcg/hr (09/16/24 1015)  propofol, 5-50 mcg/kg/min, Last Rate: 20 mcg/kg/min (09/16/24 0647)      Medications:  Pharmacy Consult, , Does not apply, Q12H  Albuterol Sulfate NEB Orderable, 2.5 mg, Nebulization, Q6H - RT  arformoterol, 15 mcg, Nebulization, BID - RT  aspirin, 81 mg, Nasogastric, Daily  atorvastatin, 80 mg, Nasogastric, Daily  chlorhexidine, 15 mL, Mouth/Throat, Q12H  citalopram, 20 mg, Nasogastric, Daily  diazePAM, 10 mg, Nasogastric, BID  empagliflozin, 10 mg, Nasogastric, Daily  enoxaparin, 40 mg, Subcutaneous, Daily  furosemide, 40 mg, Intravenous, Q12H  insulin regular, 2-7 Units, Subcutaneous, Q6H  methylPREDNISolone sodium succinate, 60 mg, Intravenous, Q24H  nystatin, 5 mL, Swish & Spit, 4x Daily  pantoprazole, 40 mg, Intravenous, Q AM  pharmacy consult - [facility], , Does not apply, Daily  phenytoin, 200 mg, Nasogastric,  "BID  senna-docusate sodium, 2 tablet, Nasogastric, BID   And  polyethylene glycol, 17 g, Nasogastric, Daily  ProSource No Carb, 30 mL, Nasogastric, TID  revefenacin, 175 mcg, Nebulization, Daily - RT  risperiDONE, 2 mg, Nasogastric, BID  sodium chloride, 10 mL, Intravenous, Q12H        Vital Sign Min/Max for last 24 hours  Temp  Min: 98.7 °F (37.1 °C)  Max: 99.2 °F (37.3 °C)   BP  Min: 79/53  Max: 110/68   Pulse  Min: 66  Max: 102   Resp  Min: 20  Max: 21   SpO2  Min: 88 %  Max: 99 %   No data recorded       Input/Output for last 24 hour shift  09/15 0701 - 09/16 0700  In: 2741.7 [I.V.:885.7]  Out: 2125 [Urine:2125]   Mode: VC+/AC  FiO2 (%):  [40 %-55 %] 40 %  S RR:  [20] 20  S VT:  [490 mL] 490 mL  PEEP/CPAP (cm H2O):  [10 cm H20] 10 cm H20  MAP (cm H2O):  [15-19] 19  Objective:  General Appearance:  Uncomfortable, ill-appearing and not in pain (Sedated however arousable).    Vital signs: (most recent): Blood pressure (!) 87/59, pulse 73, temperature 99.2 °F (37.3 °C), temperature source Axillary, resp. rate 20, height 175 cm (68.9\"), weight 116 kg (255 lb 4.7 oz), SpO2 98%.    HEENT: (Endotracheal tube in place)    Lungs:  Normal effort and normal respiratory rate.  There are decreased breath sounds and rhonchi.    Heart: Normal rate.  Regular rhythm.  S1 normal and S2 normal.  No murmur.   Chest: Symmetric chest wall expansion.   Abdomen: Abdomen is soft.  Bowel sounds are normal.   There is no abdominal tenderness.     Extremities: There is dependent edema.    Neurological: (Sedated.  Minimally arousable.  Follows minimally.).    Pupils:  Pupils are equal, round, and reactive to light.  Pupils are equal.   Skin:  Warm.                Results from last 7 days   Lab Units 09/15/24  0305 09/13/24  0319 09/11/24  0342   WBC 10*3/mm3 10.84* 9.88 11.04*   HEMOGLOBIN g/dL 10.1* 10.2* 10.0*   PLATELETS 10*3/mm3 513* 416 346     Results from last 7 days   Lab Units 09/16/24  0307 09/15/24  0305 09/14/24  0304   SODIUM " mmol/L 142 139 139   POTASSIUM mmol/L 3.3* 3.7 4.0   CO2 mmol/L 30.0* 34.0* 31.0*   BUN mg/dL 36* 39* 34*   CREATININE mg/dL 0.54* 0.55* 0.52*   MAGNESIUM mg/dL 2.3 2.5* 2.3   PHOSPHORUS mg/dL 3.2 3.5 3.4   GLUCOSE mg/dL 128* 122* 113*     Estimated Creatinine Clearance: 175.7 mL/min (A) (by C-G formula based on SCr of 0.54 mg/dL (L)).    Results from last 7 days   Lab Units 09/16/24  0350   PH, ARTERIAL pH units 7.505*   PCO2, ARTERIAL mm Hg 45.1*   PO2 ART mm Hg 72.9*         I reviewed the patient's results and images.     Assessment & Plan   Impression        Type 2 respiratory failure    HFrEF (heart failure with reduced ejection fraction)    Coronary artery disease involving native coronary artery of native heart with angina pectoris    NSTEMI (non-ST elevated myocardial infarction)    Right lower lobe pneumonia       Plan        For respiratory failure and COPD exacerbation, we will continue on with Solu-Medrol however I will cut back the dose to 40 mg IV daily and plan to wean over the next 2 weeks.  Continue with diuresis before for decompensated heart failure and underlying coronary disease.  Antibiotics have been completed.  Plan is for tracheostomy placement tomorrow for further ventilator weaning.  Nutritional support as before.  Electrolyte replacement per protocols for hypokalemia.      Plan of care and goals reviewed with mulitdisciplinary/antibiotic stewardship team during rounds.   I discussed the patient's findings and my recommendations with patient and nursing staff     High level of risk due to:  drug(s) requiring intensive monitoring for toxicity and parenteral controlled substances.        Jon Longoria MD, [facility]  Pulmonology and Critical Care Medicine

## 2024-09-16 NOTE — PLAN OF CARE
Goal Outcome Evaluation:  Plan of Care Reviewed With: patient, family        Progress: improving  Outcome Evaluation:     -Pt remains on mechanical ventilation. FIO2 at 45%, peep of 10.   -Vital signs stable throughout shift. Tmax 99.2. Sinus rhythm with occasional PVCs, potassium replacement initiated this am.   -Pt following commands. Propofol infusing at 20 mcg/kg/min, fentanyl infusing at 250 mcg/hr. Restraints continue for patient's safety.   -Several times while patient was resting between care, SBP decreased to 80s with MAP low 60s. BP improves with stimulation. Albumin given x1 dose for soft BP with adequate sedation. Resting BP improved after albumin.   -Adequate UOP  -Pt required disimpaction yesterday with small amount of hard stool and liquid results reported. No further stool overnight. Checked for further impaction, but not present. Good Bowel sounds, pt continues to pass flatus.                                 How Severe Is Your Skin Lesion?: mild Have Your Skin Lesions Been Treated?: not been treated Is This A New Presentation, Or A Follow-Up?: Skin Lesions

## 2024-09-16 NOTE — CASE MANAGEMENT/SOCIAL WORK
Continued Stay Note  [facility]     Patient Name: Fernie Dalal  MRN: [id number]  Today's Date: 9/16/2024    Admit Date: 9/1/2024    Plan: D/C TBD,  LTACH   Discharge Plan       Row Name 09/16/24 1259       Plan    Plan D/C TBD,  LTACH    Patient/Family in Agreement with Plan other (see comments)    Plan Comments When I spoke w/sibling, she asked me to see if [facility] could take Mr. Dalal @ d/c from here? [facility] . I explained that I did not know if he could come there post initially, that [facility] is a place where people go reside if unable to come off [facility], but I would check, aware that he may need to go to [facility], if [facility] is  unable to accept. Per MDR, plan is for trach/Peg tomorrow. CM will continue to follow.    Final Discharge Disposition Code 30 - still a patient                   Discharge Codes    No documentation.                 Expected Discharge Date and Time       Expected Discharge Date Expected Discharge Time    Sep 20, 2024               Aniket Marquez RN

## 2024-09-16 NOTE — PLAN OF CARE
Goal Outcome Evaluation:  Plan of Care Reviewed With: patient        Progress: no change     -Remains on mech ventilation   -Fent at 250; Prop at 20  -Afebrile  -VSS  -Adequate UOP; no BM  -BUE restraints in place.  -Plan for trach/peg 9/17.

## 2024-09-17 ENCOUNTER — APPOINTMENT (OUTPATIENT)
Dept: GENERAL RADIOLOGY | Facility: HOSPITAL | Age: 64
End: 2024-09-17
Payer: MEDICAID

## 2024-09-17 LAB
ALBUMIN SERPL-MCNC: 3.6 G/DL (ref 3.5–5.2)
ALP SERPL-CCNC: 101 U/L (ref 39–117)
ALT SERPL W P-5'-P-CCNC: 20 U/L (ref 1–41)
ANION GAP SERPL CALCULATED.3IONS-SCNC: 12 MMOL/L (ref 5–15)
ARTERIAL PATENCY WRIST A: ABNORMAL
AST SERPL-CCNC: 19 U/L (ref 1–40)
ATMOSPHERIC PRESS: ABNORMAL MM[HG]
BASE EXCESS BLDA CALC-SCNC: 8.7 MMOL/L (ref 0–2)
BASOPHILS # BLD AUTO: 0.05 10*3/MM3 (ref 0–0.2)
BASOPHILS NFR BLD AUTO: 0.6 % (ref 0–1.5)
BDY SITE: ABNORMAL
BILIRUB CONJ SERPL-MCNC: <0.2 MG/DL (ref 0–0.3)
BILIRUB INDIRECT SERPL-MCNC: NORMAL MG/DL
BILIRUB SERPL-MCNC: 0.3 MG/DL (ref 0–1.2)
BODY TEMPERATURE: 37
BUN SERPL-MCNC: 29 MG/DL (ref 8–23)
BUN/CREAT SERPL: 61.7 (ref 7–25)
CALCIUM SPEC-SCNC: 9.4 MG/DL (ref 8.6–10.5)
CHLORIDE SERPL-SCNC: 99 MMOL/L (ref 98–107)
CO2 BLDA-SCNC: 34.7 MMOL/L (ref 22–33)
CO2 SERPL-SCNC: 30 MMOL/L (ref 22–29)
COHGB MFR BLD: 1.4 % (ref 0–2)
CREAT SERPL-MCNC: 0.47 MG/DL (ref 0.76–1.27)
DEPRECATED RDW RBC AUTO: 46.6 FL (ref 37–54)
EGFRCR SERPLBLD CKD-EPI 2021: 116.8 ML/MIN/1.73
EOSINOPHIL # BLD AUTO: 0.21 10*3/MM3 (ref 0–0.4)
EOSINOPHIL NFR BLD AUTO: 2.6 % (ref 0.3–6.2)
EPAP: 0
ERYTHROCYTE [DISTWIDTH] IN BLOOD BY AUTOMATED COUNT: 14.5 % (ref 12.3–15.4)
GLUCOSE BLDC GLUCOMTR-MCNC: 109 MG/DL (ref 70–130)
GLUCOSE BLDC GLUCOMTR-MCNC: 109 MG/DL (ref 70–130)
GLUCOSE BLDC GLUCOMTR-MCNC: 120 MG/DL (ref 70–130)
GLUCOSE BLDC GLUCOMTR-MCNC: 129 MG/DL (ref 70–130)
GLUCOSE SERPL-MCNC: 104 MG/DL (ref 65–99)
HCO3 BLDA-SCNC: 33.3 MMOL/L (ref 20–26)
HCT VFR BLD AUTO: 32.2 % (ref 37.5–51)
HCT VFR BLD CALC: 30.3 % (ref 38–51)
HGB BLD-MCNC: 9.8 G/DL (ref 13–17.7)
HGB BLDA-MCNC: 9.9 G/DL (ref 13.5–17.5)
IMM GRANULOCYTES # BLD AUTO: 0.04 10*3/MM3 (ref 0–0.05)
IMM GRANULOCYTES NFR BLD AUTO: 0.5 % (ref 0–0.5)
INHALED O2 CONCENTRATION: 21 %
IPAP: 0
LYMPHOCYTES # BLD AUTO: 1.1 10*3/MM3 (ref 0.7–3.1)
LYMPHOCYTES NFR BLD AUTO: 13.8 % (ref 19.6–45.3)
MAGNESIUM SERPL-MCNC: 2.4 MG/DL (ref 1.6–2.4)
MCH RBC QN AUTO: 27.1 PG (ref 26.6–33)
MCHC RBC AUTO-ENTMCNC: 30.4 G/DL (ref 31.5–35.7)
MCV RBC AUTO: 89.2 FL (ref 79–97)
METHGB BLD QL: ABNORMAL
MODALITY: ABNORMAL
MONOCYTES # BLD AUTO: 1.01 10*3/MM3 (ref 0.1–0.9)
MONOCYTES NFR BLD AUTO: 12.7 % (ref 5–12)
NEUTROPHILS NFR BLD AUTO: 5.54 10*3/MM3 (ref 1.7–7)
NEUTROPHILS NFR BLD AUTO: 69.8 % (ref 42.7–76)
NRBC BLD AUTO-RTO: 0 /100 WBC (ref 0–0.2)
OXYHGB MFR BLDV: 95.1 % (ref 94–99)
PAW @ PEAK INSP FLOW SETTING VENT: 0 CMH2O
PCO2 BLDA: 45.4 MM HG (ref 35–45)
PCO2 TEMP ADJ BLD: 45.4 MM HG (ref 35–48)
PH BLDA: 7.47 PH UNITS (ref 7.35–7.45)
PH, TEMP CORRECTED: 7.47 PH UNITS
PHENYTOIN SERPL-MCNC: 3.3 MCG/ML (ref 10–20)
PHOSPHATE SERPL-MCNC: 3.7 MG/DL (ref 2.5–4.5)
PLATELET # BLD AUTO: 558 10*3/MM3 (ref 140–450)
PMV BLD AUTO: 9.5 FL (ref 6–12)
PO2 BLDA: 76.7 MM HG (ref 83–108)
PO2 TEMP ADJ BLD: 76.7 MM HG (ref 83–108)
POTASSIUM SERPL-SCNC: 3.8 MMOL/L (ref 3.5–5.2)
PROT SERPL-MCNC: 6.5 G/DL (ref 6–8.5)
RBC # BLD AUTO: 3.61 10*6/MM3 (ref 4.14–5.8)
SODIUM SERPL-SCNC: 141 MMOL/L (ref 136–145)
TOTAL RATE: 0 BREATHS/MINUTE
TRIGL SERPL-MCNC: 170 MG/DL (ref 0–150)
WBC NRBC COR # BLD AUTO: 7.95 10*3/MM3 (ref 3.4–10.8)

## 2024-09-17 PROCEDURE — 80185 ASSAY OF PHENYTOIN TOTAL: CPT

## 2024-09-17 PROCEDURE — 99233 SBSQ HOSP IP/OBS HIGH 50: CPT | Performed by: INTERNAL MEDICINE

## 2024-09-17 PROCEDURE — 25010000002 FUROSEMIDE PER 20 MG: Performed by: INTERNAL MEDICINE

## 2024-09-17 PROCEDURE — 94799 UNLISTED PULMONARY SVC/PX: CPT

## 2024-09-17 PROCEDURE — 80076 HEPATIC FUNCTION PANEL: CPT | Performed by: INTERNAL MEDICINE

## 2024-09-17 PROCEDURE — 25010000002 FENTANYL 10 MCG/1 ML NS: Performed by: INTERNAL MEDICINE

## 2024-09-17 PROCEDURE — 80048 BASIC METABOLIC PNL TOTAL CA: CPT

## 2024-09-17 PROCEDURE — 25010000002 PROPOFOL 10 MG/ML EMULSION

## 2024-09-17 PROCEDURE — 84478 ASSAY OF TRIGLYCERIDES: CPT

## 2024-09-17 PROCEDURE — 83735 ASSAY OF MAGNESIUM: CPT | Performed by: INTERNAL MEDICINE

## 2024-09-17 PROCEDURE — 82375 ASSAY CARBOXYHB QUANT: CPT

## 2024-09-17 PROCEDURE — 82948 REAGENT STRIP/BLOOD GLUCOSE: CPT

## 2024-09-17 PROCEDURE — 82805 BLOOD GASES W/O2 SATURATION: CPT

## 2024-09-17 PROCEDURE — 94761 N-INVAS EAR/PLS OXIMETRY MLT: CPT

## 2024-09-17 PROCEDURE — 25010000002 METHYLPREDNISOLONE PER 40 MG: Performed by: INTERNAL MEDICINE

## 2024-09-17 PROCEDURE — 63710000001 REVEFENACIN 175 MCG/3ML SOLUTION: Performed by: INTERNAL MEDICINE

## 2024-09-17 PROCEDURE — 94003 VENT MGMT INPAT SUBQ DAY: CPT

## 2024-09-17 PROCEDURE — 36600 WITHDRAWAL OF ARTERIAL BLOOD: CPT

## 2024-09-17 PROCEDURE — 71045 X-RAY EXAM CHEST 1 VIEW: CPT

## 2024-09-17 PROCEDURE — 84100 ASSAY OF PHOSPHORUS: CPT | Performed by: INTERNAL MEDICINE

## 2024-09-17 PROCEDURE — 83050 HGB METHEMOGLOBIN QUAN: CPT

## 2024-09-17 PROCEDURE — 85025 COMPLETE CBC W/AUTO DIFF WBC: CPT | Performed by: INTERNAL MEDICINE

## 2024-09-17 RX ORDER — SODIUM CHLORIDE, SODIUM LACTATE, POTASSIUM CHLORIDE, CALCIUM CHLORIDE 600; 310; 30; 20 MG/100ML; MG/100ML; MG/100ML; MG/100ML
9 INJECTION, SOLUTION INTRAVENOUS CONTINUOUS
Status: CANCELLED | OUTPATIENT
Start: 2024-09-17

## 2024-09-17 RX ORDER — SODIUM CHLORIDE 0.9 % (FLUSH) 0.9 %
10 SYRINGE (ML) INJECTION AS NEEDED
Status: CANCELLED | OUTPATIENT
Start: 2024-09-17

## 2024-09-17 RX ORDER — LIDOCAINE HYDROCHLORIDE 10 MG/ML
0.5 INJECTION, SOLUTION EPIDURAL; INFILTRATION; INTRACAUDAL; PERINEURAL ONCE AS NEEDED
Status: CANCELLED | OUTPATIENT
Start: 2024-09-17

## 2024-09-17 RX ORDER — SODIUM CHLORIDE 0.9 % (FLUSH) 0.9 %
10 SYRINGE (ML) INJECTION EVERY 12 HOURS SCHEDULED
Status: CANCELLED | OUTPATIENT
Start: 2024-09-17

## 2024-09-17 RX ORDER — FAMOTIDINE 10 MG/ML
20 INJECTION, SOLUTION INTRAVENOUS ONCE
Status: CANCELLED | OUTPATIENT
Start: 2024-09-17 | End: 2024-09-17

## 2024-09-17 RX ORDER — SODIUM CHLORIDE 9 MG/ML
40 INJECTION, SOLUTION INTRAVENOUS AS NEEDED
Status: CANCELLED | OUTPATIENT
Start: 2024-09-17

## 2024-09-17 RX ORDER — FAMOTIDINE 20 MG/1
20 TABLET, FILM COATED ORAL ONCE
Status: CANCELLED | OUTPATIENT
Start: 2024-09-17 | End: 2024-09-17

## 2024-09-17 RX ORDER — MIDAZOLAM HYDROCHLORIDE 1 MG/ML
1 INJECTION INTRAMUSCULAR; INTRAVENOUS
Status: CANCELLED | OUTPATIENT
Start: 2024-09-17

## 2024-09-17 RX ADMIN — PROPOFOL 25 MCG/KG/MIN: 10 INJECTION, EMULSION INTRAVENOUS at 14:46

## 2024-09-17 RX ADMIN — METHYLPREDNISOLONE SODIUM SUCCINATE 40 MG: 40 INJECTION, POWDER, FOR SOLUTION INTRAMUSCULAR; INTRAVENOUS at 08:06

## 2024-09-17 RX ADMIN — REVEFENACIN 175 MCG: 175 SOLUTION RESPIRATORY (INHALATION) at 09:53

## 2024-09-17 RX ADMIN — NYSTATIN 500000 UNITS: 100000 SUSPENSION ORAL at 20:03

## 2024-09-17 RX ADMIN — Medication 10 ML: at 20:02

## 2024-09-17 RX ADMIN — FUROSEMIDE 40 MG: 10 INJECTION, SOLUTION INTRAMUSCULAR; INTRAVENOUS at 08:06

## 2024-09-17 RX ADMIN — Medication 10 ML: at 08:07

## 2024-09-17 RX ADMIN — ALBUTEROL SULFATE 2.5 MG: 2.5 SOLUTION RESPIRATORY (INHALATION) at 12:50

## 2024-09-17 RX ADMIN — ALBUTEROL SULFATE 2.5 MG: 2.5 SOLUTION RESPIRATORY (INHALATION) at 06:56

## 2024-09-17 RX ADMIN — NYSTATIN 500000 UNITS: 100000 SUSPENSION ORAL at 11:45

## 2024-09-17 RX ADMIN — Medication 30 ML: at 08:08

## 2024-09-17 RX ADMIN — ALBUTEROL SULFATE 2.5 MG: 2.5 SOLUTION RESPIRATORY (INHALATION) at 01:16

## 2024-09-17 RX ADMIN — Medication 250 MCG/HR: at 16:55

## 2024-09-17 RX ADMIN — ARFORMOTEROL TARTRATE 15 MCG: 15 SOLUTION RESPIRATORY (INHALATION) at 09:53

## 2024-09-17 RX ADMIN — EMPAGLIFLOZIN 10 MG: 10 TABLET, FILM COATED ORAL at 08:05

## 2024-09-17 RX ADMIN — PROPOFOL 25 MCG/KG/MIN: 10 INJECTION, EMULSION INTRAVENOUS at 21:22

## 2024-09-17 RX ADMIN — PROPOFOL 25 MCG/KG/MIN: 10 INJECTION, EMULSION INTRAVENOUS at 03:59

## 2024-09-17 RX ADMIN — ASPIRIN 81 MG 81 MG: 81 TABLET ORAL at 08:05

## 2024-09-17 RX ADMIN — PHENYTOIN 200 MG: 125 SUSPENSION ORAL at 08:05

## 2024-09-17 RX ADMIN — NYSTATIN 500000 UNITS: 100000 SUSPENSION ORAL at 08:05

## 2024-09-17 RX ADMIN — POLYETHYLENE GLYCOL 3350 17 G: 17 POWDER, FOR SOLUTION ORAL at 08:05

## 2024-09-17 RX ADMIN — RISPERIDONE 2 MG: 1 SOLUTION ORAL at 20:03

## 2024-09-17 RX ADMIN — SENNOSIDES AND DOCUSATE SODIUM 2 TABLET: 50; 8.6 TABLET ORAL at 20:02

## 2024-09-17 RX ADMIN — Medication 30 ML: at 16:05

## 2024-09-17 RX ADMIN — RISPERIDONE 2 MG: 1 SOLUTION ORAL at 08:06

## 2024-09-17 RX ADMIN — NYSTATIN 500000 UNITS: 100000 SUSPENSION ORAL at 17:11

## 2024-09-17 RX ADMIN — ARFORMOTEROL TARTRATE 15 MCG: 15 SOLUTION RESPIRATORY (INHALATION) at 18:26

## 2024-09-17 RX ADMIN — ALBUTEROL SULFATE 2.5 MG: 2.5 SOLUTION RESPIRATORY (INHALATION) at 18:26

## 2024-09-17 RX ADMIN — CHLORHEXIDINE GLUCONATE, 0.12% ORAL RINSE 15 ML: 1.2 SOLUTION DENTAL at 08:05

## 2024-09-17 RX ADMIN — SENNOSIDES AND DOCUSATE SODIUM 2 TABLET: 50; 8.6 TABLET ORAL at 08:05

## 2024-09-17 RX ADMIN — FUROSEMIDE 40 MG: 10 INJECTION, SOLUTION INTRAMUSCULAR; INTRAVENOUS at 20:03

## 2024-09-17 RX ADMIN — Medication 250 MCG/HR: at 06:41

## 2024-09-17 RX ADMIN — Medication 30 ML: at 20:08

## 2024-09-17 RX ADMIN — DIAZEPAM 10 MG: 10 TABLET ORAL at 08:05

## 2024-09-17 RX ADMIN — CHLORHEXIDINE GLUCONATE, 0.12% ORAL RINSE 15 ML: 1.2 SOLUTION DENTAL at 20:03

## 2024-09-17 RX ADMIN — PHENYTOIN 200 MG: 125 SUSPENSION ORAL at 21:22

## 2024-09-17 RX ADMIN — PANTOPRAZOLE SODIUM 40 MG: 40 INJECTION, POWDER, LYOPHILIZED, FOR SOLUTION INTRAVENOUS at 06:42

## 2024-09-17 RX ADMIN — PROPOFOL 25 MCG/KG/MIN: 10 INJECTION, EMULSION INTRAVENOUS at 10:08

## 2024-09-17 RX ADMIN — ATORVASTATIN CALCIUM 80 MG: 40 TABLET, FILM COATED ORAL at 08:05

## 2024-09-17 RX ADMIN — DIAZEPAM 10 MG: 10 TABLET ORAL at 20:03

## 2024-09-17 RX ADMIN — CITALOPRAM HYDROBROMIDE 20 MG: 20 TABLET ORAL at 08:19

## 2024-09-17 NOTE — PLAN OF CARE
Goal Outcome Evaluation:  Plan of Care Reviewed With: family        Progress: no change     - Pt remains on ventilator  - Pt is wearing soft wrist restraints  - Pt on fentanyl gtt   - Pt on propofol gtt  - Pt had adequate UOP  - Pt ran NSR on the monitor  - Pt trach/peg tube surgery rescheduled tentatively for tomorrow 9/18 pt sister's notified  - Pt sisters visited at bedside, sisters asked about money pt brought with him to hospital at admit. Pt's sisters stated that they believed he was missing money when they went to pay his bills. Security was notified and answered/helped sisters questions/situation - security released pt's locked belongings to sister Oumou Palafox

## 2024-09-17 NOTE — PROGRESS NOTES
"Patient Name:  Fernie Dalal  YOB: 1960  [id number]    Surgery Progress Note    Date of visit: 9/17/2024    Subjective   Subjective: Stable overnight.          Objective     Objective:     /71   Pulse 66   Temp 99.7 °F (37.6 °C) (Axillary)   Resp 20   Ht 175 cm (68.9\")   Wt 116 kg (255 lb 4.7 oz)   SpO2 95%   BMI 37.81 kg/m²     Intake/Output Summary (Last 24 hours) at 9/17/2024 0710  Last data filed at 9/17/2024 0600  Gross per 24 hour   Intake 2218.87 ml   Output 2050 ml   Net 168.87 ml       CV:  Rhythm  regular and rate regular   L:  Rhonchi to auscultation bilaterally   Abd:  Bowel sounds positive , soft, nontender  Ext:  No cyanosis, clubbing, edema    Recent labs that are back at this time have been reviewed.            Assessment/ Plan:    Problem List Items Addressed This Visit          Cardiac and Vasculature    HFrEF (heart failure with reduced ejection fraction)    Relevant Orders    Insert Arterial Line (Completed)       Pulmonary and Pneumonias    * (Principal) Type 2 respiratory failure-= For Trach/PEG today.    Right lower lobe pneumonia    Relevant Medications    arformoterol (BROVANA) nebulizer solution 15 mcg    revefenacin (YUPELRI) nebulizer solution 175 mcg    albuterol (PROVENTIL) nebulizer solution 0.083% 2.5 mg/3mL     Other Visit Diagnoses       Acute respiratory failure with hypoxia    -  Primary    Relevant Orders    Insert Arterial Line (Completed)    Chest pain, unspecified type                 Active Hospital Problems    Diagnosis  POA    **Type 2 respiratory failure [J96.92]  Yes    Right lower lobe pneumonia [J18.9]  Yes    NSTEMI (non-ST elevated myocardial infarction) [I21.4]  Yes    HFrEF (heart failure with reduced ejection fraction) [I50.20]  Yes      Resolved Hospital Problems    Diagnosis Date Resolved POA    Hypotension [I95.9] 09/13/2024 Yes    Dyspnea [R06.00] 09/01/2024 Yes              Jon Mclean MD  9/17/2024  07:10 EDT      "

## 2024-09-17 NOTE — PLAN OF CARE
Goal Outcome Evaluation:      Problem: Inability to Wean (Mechanical Ventilation, Invasive)  Goal: Mechanical Ventilation Liberation  Outcome: Ongoing, Not Progressing   Goal Outcome Evaluation:   Patient remains mechanically ventilated fio2 .45 and PEEP 10

## 2024-09-17 NOTE — CASE MANAGEMENT/SOCIAL WORK
Continued Stay Note  [facility]     Patient Name: Fernie Dalal  MRN: [id number]  Today's Date: 9/17/2024    Admit Date: 9/1/2024    Plan: [facility]   Discharge Plan       Row Name 09/17/24 1500       Plan    Plan [facility]    Patient/Family in Agreement with Plan yes  Sister in agreement    Plan Comments CM received call from Debra, liaison with [facility]. Debra is planning an on-site visit on Wednesday, 9/18 or Thursday, 9/19 to review patient for acceptance to their [facility]. Debra spoke with patient's sister, Oumou and she is in agreement with this DC plan. CM following.    Final Discharge Disposition Code 63 - [facility]                   Discharge Codes    No documentation.                 Expected Discharge Date and Time       Expected Discharge Date Expected Discharge Time    Sep 20, 2024               Arabella Middleton RN

## 2024-09-17 NOTE — PLAN OF CARE
"Goal Outcome Evaluation:  -pt remains intubated PEEP of 10 FiO2 45%  -Fent @250 and propofol @25  -Afebrile  -Follows command. Is oriented to self and place, RN attempted to communicate using the communication board. Pt responds best to \"yes\" or \"no\" questions  -VSS   -no bm this shift  -total UOP 1L  -disscussed POC with patient family via telephone and received verbal consent for patient procedure in the AM.   "

## 2024-09-17 NOTE — PROGRESS NOTES
Intensive Care Follow-up     Hospital:  LOS: 14 days   Mr. Fernie Dalal, 64 y.o. male is followed for:   Type 2 respiratory failure        Subjective     64-year-old male admitted on 9/1 with chest pain, shortness of breath, and chills.  He was diagnosed with right lower lobe pneumonia and exacerbation of COPD.  His past history also includes a known cardiomyopathy with reduced ejection fraction.  He had some mild elevation of his cardiac enzymes on admission which was felt to be demand ischemia.  He has been diagnosed with non-STEMI in the setting of demand ischemia with a chronically occluded RCA.  EF is 28% this admission.     He required intubation on 9/3 and transferred to ICU.  CTA revealed no pulmonary embolism.  Interval History:  The chart has been reviewed.  The patient has remained afebrile.  He remains on 40% FiO2 with a PEEP of 10.  He is awake today although not very interactive.  Hemodynamics have remained stable.  He is currently n.p.o. for tracheostomy and PEG placement.  It was noted on telemetry that he had some nonsustained ventricular tachycardia through the night.  He continues with a good diuresis with loop diuretics.    The patient's past medical, surgical and social history were reviewed and updated in Epic as appropriate.        Objective     Infusions:  fentanyl 10 mcg/mL,  mcg/hr, Last Rate: 250 mcg/hr (09/17/24 0641)  propofol, 5-50 mcg/kg/min, Last Rate: 25 mcg/kg/min (09/17/24 1008)      Medications:  Pharmacy Consult, , Does not apply, Q12H  Albuterol Sulfate NEB Orderable, 2.5 mg, Nebulization, Q6H - RT  arformoterol, 15 mcg, Nebulization, BID - RT  aspirin, 81 mg, Nasogastric, Daily  atorvastatin, 80 mg, Nasogastric, Daily  chlorhexidine, 15 mL, Mouth/Throat, Q12H  citalopram, 20 mg, Nasogastric, Daily  diazePAM, 10 mg, Nasogastric, BID  empagliflozin, 10 mg, Nasogastric, Daily  enoxaparin, 40 mg, Subcutaneous, Daily  furosemide, 40 mg, Intravenous, Q12H  insulin regular, 2-7  "Units, Subcutaneous, Q6H  methylPREDNISolone sodium succinate, 40 mg, Intravenous, Q24H  nystatin, 5 mL, Swish & Spit, 4x Daily  pantoprazole, 40 mg, Intravenous, Q AM  pharmacy consult - [facility], , Does not apply, Daily  phenytoin, 200 mg, Nasogastric, BID  senna-docusate sodium, 2 tablet, Nasogastric, BID   And  polyethylene glycol, 17 g, Nasogastric, Daily  ProSource No Carb, 30 mL, Nasogastric, TID  revefenacin, 175 mcg, Nebulization, Daily - RT  risperiDONE, 2 mg, Nasogastric, BID  sodium chloride, 10 mL, Intravenous, Q12H        Vital Sign Min/Max for last 24 hours  Temp  Min: 97.9 °F (36.6 °C)  Max: 99.7 °F (37.6 °C)   BP  Min: 85/57  Max: 119/78   Pulse  Min: 60  Max: 98   Resp  Min: 20  Max: 20   SpO2  Min: 88 %  Max: 100 %   No data recorded       Input/Output for last 24 hour shift  09/16 0701 - 09/17 0700  In: 2218.9 [I.V.:1000.9]  Out: 2050 [Urine:2050]   Mode: VC+/AC  FiO2 (%):  [45 %-50 %] 45 %  S RR:  [20] 20  S VT:  [490 mL] 490 mL  PEEP/CPAP (cm H2O):  [10 cm H20] 10 cm H20  MAP (cm H2O):  [15-19] 15  Objective:  General Appearance:  Uncomfortable, ill-appearing, not in pain and in no acute distress.    Vital signs: (most recent): Blood pressure 106/65, pulse 74, temperature 98.9 °F (37.2 °C), temperature source Axillary, resp. rate 20, height 175 cm (68.9\"), weight 116 kg (255 lb 4.7 oz), SpO2 96%.    HEENT: (Endotracheal tube in place)    Lungs:  Normal effort and normal respiratory rate.  There are decreased breath sounds and rhonchi.    Heart: Normal rate.  Regular rhythm.  S1 normal and S2 normal.  No murmur.   Chest: Symmetric chest wall expansion.   Abdomen: Abdomen is soft.  Bowel sounds are normal.   There is no abdominal tenderness.     Extremities: Normal range of motion.    Neurological: (Awake.  He does follow minimally.  Not nodding at questions however.).    Pupils:  Pupils are equal, round, and reactive to light.  Pupils are equal.   Skin:  Warm.                Results from last 7 days "   Lab Units 09/17/24  0257 09/15/24  0305 09/13/24  0319   WBC 10*3/mm3 7.95 10.84* 9.88   HEMOGLOBIN g/dL 9.8* 10.1* 10.2*   PLATELETS 10*3/mm3 558* 513* 416     Results from last 7 days   Lab Units 09/17/24  0258 09/16/24  1328 09/16/24  0307 09/15/24  0305   SODIUM mmol/L 141  --  142 139   POTASSIUM mmol/L 3.8 5.2 3.3* 3.7   CO2 mmol/L 30.0*  --  30.0* 34.0*   BUN mg/dL 29*  --  36* 39*   CREATININE mg/dL 0.47*  --  0.54* 0.55*   MAGNESIUM mg/dL 2.4  --  2.3 2.5*   PHOSPHORUS mg/dL 3.7  --  3.2 3.5   GLUCOSE mg/dL 104*  --  128* 122*     Estimated Creatinine Clearance: 201.8 mL/min (A) (by C-G formula based on SCr of 0.47 mg/dL (L)).    Results from last 7 days   Lab Units 09/17/24  0332   PH, ARTERIAL pH units 7.474*   PCO2, ARTERIAL mm Hg 45.4*   PO2 ART mm Hg 76.7*         I reviewed the patient's results and images.     Assessment & Plan   Impression        Type 2 respiratory failure    HFrEF (heart failure with reduced ejection fraction)    NSTEMI (non-ST elevated myocardial infarction)    Right lower lobe pneumonia       Plan        Continue diuresis as before for heart failure.  Continue with steroids at current dose for respiratory failure.  Continue with current ventilator support.  Plan is for tracheostomy and PEG placement today.  Will work towards pressure support and later trach collar trials as able.  Antimicrobial therapy completed.    Plan of care and goals reviewed with mulitdisciplinary/antibiotic stewardship team during rounds.   I discussed the patient's findings and my recommendations with patient and nursing staff     High level of risk due to:  drug(s) requiring intensive monitoring for toxicity and parenteral controlled substances.    Jon Longoria MD, [facility]  Pulmonology and Critical Care Medicine

## 2024-09-18 ENCOUNTER — APPOINTMENT (OUTPATIENT)
Dept: GENERAL RADIOLOGY | Facility: HOSPITAL | Age: 64
End: 2024-09-18
Payer: MEDICAID

## 2024-09-18 LAB
ANION GAP SERPL CALCULATED.3IONS-SCNC: 13 MMOL/L (ref 5–15)
ARTERIAL PATENCY WRIST A: ABNORMAL
ATMOSPHERIC PRESS: ABNORMAL MM[HG]
BACTERIA UR QL AUTO: NORMAL /HPF
BASE EXCESS BLDA CALC-SCNC: 8.8 MMOL/L (ref 0–2)
BASOPHILS # BLD AUTO: 0.06 10*3/MM3 (ref 0–0.2)
BASOPHILS NFR BLD AUTO: 0.7 % (ref 0–1.5)
BDY SITE: ABNORMAL
BILIRUB UR QL STRIP: NEGATIVE
BODY TEMPERATURE: 37
BUN SERPL-MCNC: 32 MG/DL (ref 8–23)
BUN/CREAT SERPL: 57.1 (ref 7–25)
CALCIUM SPEC-SCNC: 9.1 MG/DL (ref 8.6–10.5)
CHLORIDE SERPL-SCNC: 100 MMOL/L (ref 98–107)
CLARITY UR: ABNORMAL
CO2 BLDA-SCNC: 35.3 MMOL/L (ref 22–33)
CO2 SERPL-SCNC: 29 MMOL/L (ref 22–29)
COHGB MFR BLD: 1.5 % (ref 0–2)
COLOR UR: YELLOW
CREAT SERPL-MCNC: 0.56 MG/DL (ref 0.76–1.27)
D-LACTATE SERPL-SCNC: 0.9 MMOL/L (ref 0.5–2)
DEPRECATED RDW RBC AUTO: 47.2 FL (ref 37–54)
EGFRCR SERPLBLD CKD-EPI 2021: 110.8 ML/MIN/1.73
EOSINOPHIL # BLD AUTO: 0.25 10*3/MM3 (ref 0–0.4)
EOSINOPHIL NFR BLD AUTO: 2.8 % (ref 0.3–6.2)
EPAP: 0
ERYTHROCYTE [DISTWIDTH] IN BLOOD BY AUTOMATED COUNT: 14.6 % (ref 12.3–15.4)
GLUCOSE BLDC GLUCOMTR-MCNC: 116 MG/DL (ref 70–130)
GLUCOSE BLDC GLUCOMTR-MCNC: 121 MG/DL (ref 70–130)
GLUCOSE BLDC GLUCOMTR-MCNC: 135 MG/DL (ref 70–130)
GLUCOSE BLDC GLUCOMTR-MCNC: 137 MG/DL (ref 70–130)
GLUCOSE SERPL-MCNC: 131 MG/DL (ref 65–99)
GLUCOSE UR STRIP-MCNC: ABNORMAL MG/DL
HCO3 BLDA-SCNC: 33.8 MMOL/L (ref 20–26)
HCT VFR BLD AUTO: 33.4 % (ref 37.5–51)
HCT VFR BLD CALC: 31 % (ref 38–51)
HGB BLD-MCNC: 10.2 G/DL (ref 13–17.7)
HGB BLDA-MCNC: 10.1 G/DL (ref 13.5–17.5)
HGB UR QL STRIP.AUTO: NEGATIVE
HYALINE CASTS UR QL AUTO: NORMAL /LPF
IMM GRANULOCYTES # BLD AUTO: 0.04 10*3/MM3 (ref 0–0.05)
IMM GRANULOCYTES NFR BLD AUTO: 0.4 % (ref 0–0.5)
INHALED O2 CONCENTRATION: 35 %
IPAP: 0
KETONES UR QL STRIP: NEGATIVE
LEUKOCYTE ESTERASE UR QL STRIP.AUTO: ABNORMAL
LYMPHOCYTES # BLD AUTO: 0.8 10*3/MM3 (ref 0.7–3.1)
LYMPHOCYTES NFR BLD AUTO: 8.8 % (ref 19.6–45.3)
MAGNESIUM SERPL-MCNC: 2.3 MG/DL (ref 1.6–2.4)
MCH RBC QN AUTO: 27.1 PG (ref 26.6–33)
MCHC RBC AUTO-ENTMCNC: 30.5 G/DL (ref 31.5–35.7)
MCV RBC AUTO: 88.8 FL (ref 79–97)
METHGB BLD QL: 0.1 % (ref 0–1.5)
MODALITY: ABNORMAL
MONOCYTES # BLD AUTO: 1.37 10*3/MM3 (ref 0.1–0.9)
MONOCYTES NFR BLD AUTO: 15.1 % (ref 5–12)
NEUTROPHILS NFR BLD AUTO: 6.54 10*3/MM3 (ref 1.7–7)
NEUTROPHILS NFR BLD AUTO: 72.2 % (ref 42.7–76)
NITRITE UR QL STRIP: NEGATIVE
NRBC BLD AUTO-RTO: 0 /100 WBC (ref 0–0.2)
OXYHGB MFR BLDV: 90.3 % (ref 94–99)
PAW @ PEAK INSP FLOW SETTING VENT: 0 CMH2O
PCO2 BLDA: 48.1 MM HG (ref 35–45)
PCO2 TEMP ADJ BLD: 48.1 MM HG (ref 35–48)
PH BLDA: 7.46 PH UNITS (ref 7.35–7.45)
PH UR STRIP.AUTO: 5.5 [PH] (ref 5–8)
PH, TEMP CORRECTED: 7.46 PH UNITS
PLATELET # BLD AUTO: 560 10*3/MM3 (ref 140–450)
PMV BLD AUTO: 9.6 FL (ref 6–12)
PO2 BLDA: 61.3 MM HG (ref 83–108)
PO2 TEMP ADJ BLD: 61.3 MM HG (ref 83–108)
POTASSIUM SERPL-SCNC: 4 MMOL/L (ref 3.5–5.2)
PROCALCITONIN SERPL-MCNC: 0.13 NG/ML (ref 0–0.25)
PROT UR QL STRIP: NEGATIVE
RBC # BLD AUTO: 3.76 10*6/MM3 (ref 4.14–5.8)
RBC # UR STRIP: NORMAL /HPF
REF LAB TEST METHOD: NORMAL
SODIUM SERPL-SCNC: 142 MMOL/L (ref 136–145)
SP GR UR STRIP: 1.03 (ref 1–1.03)
SQUAMOUS #/AREA URNS HPF: NORMAL /HPF
TOTAL RATE: 0 BREATHS/MINUTE
TRANS CELLS #/AREA URNS HPF: NORMAL /HPF
URATE CRY URNS QL MICRO: NORMAL /HPF
UROBILINOGEN UR QL STRIP: ABNORMAL
WBC # UR STRIP: NORMAL /HPF
WBC NRBC COR # BLD AUTO: 9.06 10*3/MM3 (ref 3.4–10.8)

## 2024-09-18 PROCEDURE — 0DH63UZ INSERTION OF FEEDING DEVICE INTO STOMACH, PERCUTANEOUS APPROACH: ICD-10-PCS | Performed by: SURGERY

## 2024-09-18 PROCEDURE — 71045 X-RAY EXAM CHEST 1 VIEW: CPT

## 2024-09-18 PROCEDURE — 87040 BLOOD CULTURE FOR BACTERIA: CPT

## 2024-09-18 PROCEDURE — 25010000002 FUROSEMIDE PER 20 MG

## 2024-09-18 PROCEDURE — 94799 UNLISTED PULMONARY SVC/PX: CPT

## 2024-09-18 PROCEDURE — 25010000002 PROPOFOL 10 MG/ML EMULSION: Performed by: SURGERY

## 2024-09-18 PROCEDURE — 82375 ASSAY CARBOXYHB QUANT: CPT

## 2024-09-18 PROCEDURE — 99233 SBSQ HOSP IP/OBS HIGH 50: CPT | Performed by: INTERNAL MEDICINE

## 2024-09-18 PROCEDURE — 25010000002 PROPOFOL 10 MG/ML EMULSION

## 2024-09-18 PROCEDURE — 81001 URINALYSIS AUTO W/SCOPE: CPT

## 2024-09-18 PROCEDURE — 63710000001 REVEFENACIN 175 MCG/3ML SOLUTION: Performed by: INTERNAL MEDICINE

## 2024-09-18 PROCEDURE — 25010000002 METHYLPREDNISOLONE PER 40 MG: Performed by: INTERNAL MEDICINE

## 2024-09-18 PROCEDURE — 85025 COMPLETE CBC W/AUTO DIFF WBC: CPT | Performed by: INTERNAL MEDICINE

## 2024-09-18 PROCEDURE — 87070 CULTURE OTHR SPECIMN AEROBIC: CPT

## 2024-09-18 PROCEDURE — 0B113F4 BYPASS TRACHEA TO CUTANEOUS WITH TRACHEOSTOMY DEVICE, PERCUTANEOUS APPROACH: ICD-10-PCS | Performed by: SURGERY

## 2024-09-18 PROCEDURE — 25010000002 ESMOLOL 100 MG/10ML SOLUTION: Performed by: ANESTHESIOLOGY

## 2024-09-18 PROCEDURE — 25010000002 PIPERACILLIN SOD-TAZOBACTAM PER 1 G: Performed by: INTERNAL MEDICINE

## 2024-09-18 PROCEDURE — 83605 ASSAY OF LACTIC ACID: CPT

## 2024-09-18 PROCEDURE — 25010000002 FENTANYL 10 MCG/1 ML NS: Performed by: SURGERY

## 2024-09-18 PROCEDURE — 80048 BASIC METABOLIC PNL TOTAL CA: CPT

## 2024-09-18 PROCEDURE — 84145 PROCALCITONIN (PCT): CPT

## 2024-09-18 PROCEDURE — 94003 VENT MGMT INPAT SUBQ DAY: CPT

## 2024-09-18 PROCEDURE — 25010000002 PIPERACILLIN SOD-TAZOBACTAM PER 1 G: Performed by: SURGERY

## 2024-09-18 PROCEDURE — 25810000003 SODIUM CHLORIDE 0.9 % SOLUTION: Performed by: ANESTHESIOLOGY

## 2024-09-18 PROCEDURE — 87205 SMEAR GRAM STAIN: CPT

## 2024-09-18 PROCEDURE — 25010000002 FUROSEMIDE PER 20 MG: Performed by: INTERNAL MEDICINE

## 2024-09-18 PROCEDURE — 25010000002 ONDANSETRON PER 1 MG: Performed by: ANESTHESIOLOGY

## 2024-09-18 PROCEDURE — 25010000002 FENTANYL 10 MCG/1 ML NS: Performed by: INTERNAL MEDICINE

## 2024-09-18 PROCEDURE — 83050 HGB METHEMOGLOBIN QUAN: CPT

## 2024-09-18 PROCEDURE — 82948 REAGENT STRIP/BLOOD GLUCOSE: CPT

## 2024-09-18 PROCEDURE — 82805 BLOOD GASES W/O2 SATURATION: CPT

## 2024-09-18 PROCEDURE — 36600 WITHDRAWAL OF ARTERIAL BLOOD: CPT

## 2024-09-18 PROCEDURE — 94761 N-INVAS EAR/PLS OXIMETRY MLT: CPT

## 2024-09-18 PROCEDURE — 83735 ASSAY OF MAGNESIUM: CPT | Performed by: INTERNAL MEDICINE

## 2024-09-18 PROCEDURE — 3E0G76Z INTRODUCTION OF NUTRITIONAL SUBSTANCE INTO UPPER GI, VIA NATURAL OR ARTIFICIAL OPENING: ICD-10-PCS | Performed by: SURGERY

## 2024-09-18 RX ORDER — ONDANSETRON 2 MG/ML
INJECTION INTRAMUSCULAR; INTRAVENOUS AS NEEDED
Status: DISCONTINUED | OUTPATIENT
Start: 2024-09-18 | End: 2024-09-18 | Stop reason: SURG

## 2024-09-18 RX ORDER — BUPIVACAINE HCL/0.9 % NACL/PF 0.125 %
PLASTIC BAG, INJECTION (ML) EPIDURAL AS NEEDED
Status: DISCONTINUED | OUTPATIENT
Start: 2024-09-18 | End: 2024-09-18 | Stop reason: SURG

## 2024-09-18 RX ORDER — ROCURONIUM BROMIDE 10 MG/ML
INJECTION, SOLUTION INTRAVENOUS AS NEEDED
Status: DISCONTINUED | OUTPATIENT
Start: 2024-09-18 | End: 2024-09-18 | Stop reason: SURG

## 2024-09-18 RX ORDER — FUROSEMIDE 10 MG/ML
40 INJECTION INTRAMUSCULAR; INTRAVENOUS ONCE
Status: COMPLETED | OUTPATIENT
Start: 2024-09-18 | End: 2024-09-18

## 2024-09-18 RX ORDER — BUPIVACAINE HYDROCHLORIDE AND EPINEPHRINE 5; 5 MG/ML; UG/ML
INJECTION, SOLUTION PERINEURAL AS NEEDED
Status: DISCONTINUED | OUTPATIENT
Start: 2024-09-18 | End: 2024-09-18 | Stop reason: HOSPADM

## 2024-09-18 RX ORDER — DEXMEDETOMIDINE HYDROCHLORIDE 4 UG/ML
INJECTION, SOLUTION INTRAVENOUS AS NEEDED
Status: DISCONTINUED | OUTPATIENT
Start: 2024-09-18 | End: 2024-09-18 | Stop reason: SURG

## 2024-09-18 RX ORDER — METHYLPREDNISOLONE SODIUM SUCCINATE 40 MG/ML
20 INJECTION, POWDER, LYOPHILIZED, FOR SOLUTION INTRAMUSCULAR; INTRAVENOUS EVERY 24 HOURS
Status: DISCONTINUED | OUTPATIENT
Start: 2024-09-19 | End: 2024-09-20

## 2024-09-18 RX ORDER — RISPERIDONE 1 MG/ML
1 SOLUTION ORAL 2 TIMES DAILY
Status: DISCONTINUED | OUTPATIENT
Start: 2024-09-18 | End: 2024-09-22

## 2024-09-18 RX ORDER — MAGNESIUM HYDROXIDE 1200 MG/15ML
LIQUID ORAL AS NEEDED
Status: DISCONTINUED | OUTPATIENT
Start: 2024-09-18 | End: 2024-09-18 | Stop reason: HOSPADM

## 2024-09-18 RX ORDER — SODIUM CHLORIDE 9 MG/ML
INJECTION, SOLUTION INTRAVENOUS CONTINUOUS PRN
Status: DISCONTINUED | OUTPATIENT
Start: 2024-09-18 | End: 2024-09-18 | Stop reason: SURG

## 2024-09-18 RX ORDER — ESMOLOL HYDROCHLORIDE 10 MG/ML
INJECTION INTRAVENOUS AS NEEDED
Status: DISCONTINUED | OUTPATIENT
Start: 2024-09-18 | End: 2024-09-18 | Stop reason: SURG

## 2024-09-18 RX ADMIN — ALBUTEROL SULFATE 2.5 MG: 2.5 SOLUTION RESPIRATORY (INHALATION) at 13:05

## 2024-09-18 RX ADMIN — ASPIRIN 81 MG 81 MG: 81 TABLET ORAL at 08:01

## 2024-09-18 RX ADMIN — Medication 100 MCG: at 15:00

## 2024-09-18 RX ADMIN — FUROSEMIDE 40 MG: 10 INJECTION, SOLUTION INTRAMUSCULAR; INTRAVENOUS at 08:00

## 2024-09-18 RX ADMIN — ALBUTEROL SULFATE 2.5 MG: 2.5 SOLUTION RESPIRATORY (INHALATION) at 00:26

## 2024-09-18 RX ADMIN — DEXMEDETOMIDINE HYDROCHLORIDE IN 0.9% SODIUM CHLORIDE 8 MCG: 4 INJECTION INTRAVENOUS at 15:36

## 2024-09-18 RX ADMIN — ROCURONIUM BROMIDE 50 MG: 10 INJECTION INTRAVENOUS at 14:58

## 2024-09-18 RX ADMIN — ARFORMOTEROL TARTRATE 15 MCG: 15 SOLUTION RESPIRATORY (INHALATION) at 09:38

## 2024-09-18 RX ADMIN — PIPERACILLIN AND TAZOBACTAM 3.38 G: 3; .375 INJECTION, POWDER, LYOPHILIZED, FOR SOLUTION INTRAVENOUS at 23:40

## 2024-09-18 RX ADMIN — Medication 250 MCG/HR: at 13:12

## 2024-09-18 RX ADMIN — ESMOLOL HYDROCHLORIDE 30 MG: 10 INJECTION, SOLUTION INTRAVENOUS at 15:33

## 2024-09-18 RX ADMIN — PHENYTOIN 200 MG: 125 SUSPENSION ORAL at 21:04

## 2024-09-18 RX ADMIN — PIPERACILLIN AND TAZOBACTAM 3.38 G: 3; .375 INJECTION, POWDER, LYOPHILIZED, FOR SOLUTION INTRAVENOUS at 16:31

## 2024-09-18 RX ADMIN — ROCURONIUM BROMIDE 30 MG: 10 INJECTION INTRAVENOUS at 15:12

## 2024-09-18 RX ADMIN — Medication 30 ML: at 20:40

## 2024-09-18 RX ADMIN — PROPOFOL 30 MCG/KG/MIN: 10 INJECTION, EMULSION INTRAVENOUS at 19:00

## 2024-09-18 RX ADMIN — ATORVASTATIN CALCIUM 80 MG: 40 TABLET, FILM COATED ORAL at 08:01

## 2024-09-18 RX ADMIN — DIAZEPAM 10 MG: 10 TABLET ORAL at 20:40

## 2024-09-18 RX ADMIN — CHLORHEXIDINE GLUCONATE, 0.12% ORAL RINSE 15 ML: 1.2 SOLUTION DENTAL at 08:00

## 2024-09-18 RX ADMIN — PROPOFOL 30 MCG/KG/MIN: 10 INJECTION, EMULSION INTRAVENOUS at 01:47

## 2024-09-18 RX ADMIN — PIPERACILLIN AND TAZOBACTAM 3.38 G: 3; .375 INJECTION, POWDER, LYOPHILIZED, FOR SOLUTION INTRAVENOUS at 10:29

## 2024-09-18 RX ADMIN — ALBUTEROL SULFATE 2.5 MG: 2.5 SOLUTION RESPIRATORY (INHALATION) at 18:38

## 2024-09-18 RX ADMIN — RISPERIDONE 2 MG: 1 SOLUTION ORAL at 08:00

## 2024-09-18 RX ADMIN — RISPERIDONE 1 MG: 1 SOLUTION ORAL at 20:40

## 2024-09-18 RX ADMIN — ACETAMINOPHEN 650 MG: 650 SOLUTION ORAL at 01:47

## 2024-09-18 RX ADMIN — Medication 100 MCG: at 15:28

## 2024-09-18 RX ADMIN — NYSTATIN 500000 UNITS: 100000 SUSPENSION ORAL at 12:37

## 2024-09-18 RX ADMIN — EMPAGLIFLOZIN 10 MG: 10 TABLET, FILM COATED ORAL at 08:01

## 2024-09-18 RX ADMIN — Medication 100 MCG: at 15:07

## 2024-09-18 RX ADMIN — POLYETHYLENE GLYCOL 3350 17 G: 17 POWDER, FOR SOLUTION ORAL at 08:01

## 2024-09-18 RX ADMIN — CITALOPRAM HYDROBROMIDE 20 MG: 20 TABLET ORAL at 08:01

## 2024-09-18 RX ADMIN — ACETAMINOPHEN 650 MG: 650 SOLUTION ORAL at 08:17

## 2024-09-18 RX ADMIN — SENNOSIDES AND DOCUSATE SODIUM 2 TABLET: 50; 8.6 TABLET ORAL at 20:40

## 2024-09-18 RX ADMIN — PANTOPRAZOLE SODIUM 40 MG: 40 INJECTION, POWDER, LYOPHILIZED, FOR SOLUTION INTRAVENOUS at 05:23

## 2024-09-18 RX ADMIN — DIAZEPAM 10 MG: 10 TABLET ORAL at 08:01

## 2024-09-18 RX ADMIN — PROPOFOL 30 MCG/KG/MIN: 10 INJECTION, EMULSION INTRAVENOUS at 06:59

## 2024-09-18 RX ADMIN — FUROSEMIDE 40 MG: 10 INJECTION, SOLUTION INTRAMUSCULAR; INTRAVENOUS at 17:49

## 2024-09-18 RX ADMIN — NYSTATIN 500000 UNITS: 100000 SUSPENSION ORAL at 07:59

## 2024-09-18 RX ADMIN — SENNOSIDES AND DOCUSATE SODIUM 2 TABLET: 50; 8.6 TABLET ORAL at 08:01

## 2024-09-18 RX ADMIN — SODIUM CHLORIDE: 9 INJECTION, SOLUTION INTRAVENOUS at 14:54

## 2024-09-18 RX ADMIN — CHLORHEXIDINE GLUCONATE, 0.12% ORAL RINSE 15 ML: 1.2 SOLUTION DENTAL at 20:40

## 2024-09-18 RX ADMIN — REVEFENACIN 175 MCG: 175 SOLUTION RESPIRATORY (INHALATION) at 09:38

## 2024-09-18 RX ADMIN — Medication 30 ML: at 17:23

## 2024-09-18 RX ADMIN — ALBUTEROL SULFATE 2.5 MG: 2.5 SOLUTION RESPIRATORY (INHALATION) at 06:47

## 2024-09-18 RX ADMIN — Medication 250 MCG/HR: at 02:54

## 2024-09-18 RX ADMIN — PROPOFOL 30 MCG/KG/MIN: 10 INJECTION, EMULSION INTRAVENOUS at 14:15

## 2024-09-18 RX ADMIN — Medication 200 MCG/HR: at 23:31

## 2024-09-18 RX ADMIN — PROPOFOL 30 MCG/KG/MIN: 10 INJECTION, EMULSION INTRAVENOUS at 10:34

## 2024-09-18 RX ADMIN — Medication 10 ML: at 21:00

## 2024-09-18 RX ADMIN — METHYLPREDNISOLONE SODIUM SUCCINATE 40 MG: 40 INJECTION, POWDER, FOR SOLUTION INTRAMUSCULAR; INTRAVENOUS at 08:00

## 2024-09-18 RX ADMIN — ARFORMOTEROL TARTRATE 15 MCG: 15 SOLUTION RESPIRATORY (INHALATION) at 18:38

## 2024-09-18 RX ADMIN — NYSTATIN 500000 UNITS: 100000 SUSPENSION ORAL at 20:40

## 2024-09-18 RX ADMIN — ONDANSETRON 4 MG: 2 INJECTION INTRAMUSCULAR; INTRAVENOUS at 15:39

## 2024-09-18 RX ADMIN — ESMOLOL HYDROCHLORIDE 30 MG: 10 INJECTION, SOLUTION INTRAVENOUS at 15:24

## 2024-09-18 RX ADMIN — Medication 10 ML: at 08:02

## 2024-09-18 RX ADMIN — NYSTATIN 500000 UNITS: 100000 SUSPENSION ORAL at 17:41

## 2024-09-18 RX ADMIN — PHENYTOIN 200 MG: 125 SUSPENSION ORAL at 08:00

## 2024-09-18 NOTE — PROGRESS NOTES
Clinical Nutrition     Patient Name: Fernie Dalal  YOB: 1960  MRN: [id number]  Date of Encounter: 09/18/24 10:59 EDT  Admission date: 9/1/2024  Reason for Visit: MDR, Follow-up protocol, EN    Assessment   Nutrition Assessment   Admission Diagnosis:  Dyspnea [R06.00]  Acute on chronic respiratory failure with hypoxia [J96.21]    Problem List:    Type 2 respiratory failure    HFrEF (heart failure with reduced ejection fraction)    NSTEMI (non-ST elevated myocardial infarction)    Right lower lobe pneumonia      PMH:   He  has a past medical history of Angina at rest, Anxiety, Arrhythmia, COPD (chronic obstructive pulmonary disease), GERD (gastroesophageal reflux disease), Heart failure, Hyperlipidemia, Hypertension, and Myocardial infarction.    PSH:  He  has a past surgical history that includes Cardiac catheterization; Cardiovascular stress test; and Cardiac catheterization (N/A, 4/24/2023).    Applicable Nutrition History:   ARF/VENT 9-3-24    EN started 9-5-24    Plan for Trach, PEG 9-18-24      Labs    Labs Reviewed: Yes    Results from last 7 days   Lab Units 09/18/24  0242 09/17/24  0258 09/16/24  1328 09/16/24  0307 09/15/24  0305 09/14/24  0304   GLUCOSE mg/dL 131* 104*  --  128* 122* 113*   BUN mg/dL 32* 29*  --  36* 39* 34*   CREATININE mg/dL 0.56* 0.47*  --  0.54* 0.55* 0.52*   SODIUM mmol/L 142 141  --  142 139 139   CHLORIDE mmol/L 100 99  --  98 97* 95*   POTASSIUM mmol/L 4.0 3.8 5.2 3.3* 3.7 4.0   PHOSPHORUS mg/dL  --  3.7  --  3.2 3.5 3.4   MAGNESIUM mg/dL 2.3 2.4  --  2.3 2.5* 2.3   ALT (SGPT) U/L  --  20  --   --   --  24   LACTATE mmol/L 0.9  --   --   --   --   --        Results from last 7 days   Lab Units 09/17/24  0258 09/14/24  0304   ALBUMIN g/dL 3.6 3.4*   TRIGLYCERIDES mg/dL 170*  --        Results from last 7 days   Lab Units 09/18/24  0519 09/17/24  2313 09/17/24  1721 09/17/24  1149 09/17/24  0557 09/16/24  2354   GLUCOSE mg/dL 116 109 120 129 109 107      Lab Results   Lab Value Date/Time    HGBA1C 6.00 (H) 09/01/2024 1050    HGBA1C 7.30 (H) 04/25/2023 0440                 Medications    Medications Reviewed: yes    Scheduled Meds:Pharmacy Consult, , Does not apply, Q12H  Albuterol Sulfate NEB Orderable, 2.5 mg, Nebulization, Q6H - RT  arformoterol, 15 mcg, Nebulization, BID - RT  aspirin, 81 mg, Nasogastric, Daily  atorvastatin, 80 mg, Nasogastric, Daily  chlorhexidine, 15 mL, Mouth/Throat, Q12H  citalopram, 20 mg, Nasogastric, Daily  diazePAM, 10 mg, Nasogastric, BID  empagliflozin, 10 mg, Nasogastric, Daily  enoxaparin, 40 mg, Subcutaneous, Daily  furosemide, 40 mg, Intravenous, Q12H  insulin regular, 2-7 Units, Subcutaneous, Q6H  methylPREDNISolone sodium succinate, 40 mg, Intravenous, Q24H  nystatin, 5 mL, Swish & Spit, 4x Daily  pantoprazole, 40 mg, Intravenous, Q AM  pharmacy consult - [facility], , Does not apply, Daily  phenytoin, 200 mg, Nasogastric, BID  piperacillin-tazobactam, 3.375 g, Intravenous, Q8H  senna-docusate sodium, 2 tablet, Nasogastric, BID   And  polyethylene glycol, 17 g, Nasogastric, Daily  ProSource No Carb, 30 mL, Nasogastric, TID  revefenacin, 175 mcg, Nebulization, Daily - RT  risperiDONE, 2 mg, Nasogastric, BID  sodium chloride, 10 mL, Intravenous, Q12H      Continuous Infusions:fentanyl 10 mcg/mL,  mcg/hr, Last Rate: 250 mcg/hr (09/18/24 0254)  propofol, 5-50 mcg/kg/min, Last Rate: 30 mcg/kg/min (09/18/24 1034)      PRN Meds:.  acetaminophen    senna-docusate sodium **AND** polyethylene glycol **AND** [DISCONTINUED] bisacodyl **AND** bisacodyl    fentaNYL    lactulose    polyvinyl alcohol    Potassium Replacement - Follow Nurse / BPA Driven Protocol    sodium chloride    Intake/Ouptut 24 hrs (0701 - 0700)   I&O's Reviewed: yes    Intake & Output (last day)         09/17 0701 09/18 0700 09/18 0701 09/19 0700    I.V. (mL/kg) 952.2 (8.2) 213 (1.8)    Other 210     NG/     Total Intake(mL/kg) 1740.2 (15) 213 (1.8)    Urine  "(mL/kg/hr) 2510 (0.9) 200 (0.4)    Total Output 2510 200    Net -769.9 +13                 Anthropometrics     Height: Height: 175 cm (68.9\")  Last Filed Weight: Weight: 116 kg (255 lb 4.7 oz) (09/16/24 0519)  Method: Weight Method: Bed scale  BMI: BMI (Calculated): 37.8    UBW: ?  Weight change:  17lb wt gain since admit, suspect fluid     Weight       Weight (kg) Weight (lbs) Weight Method Visit Report   4/23/2015 118.84 kg  261 lb 15.9 oz      7/1/2022 117.482 kg  259 lb   --    4/18/2023 --  --   --    4/22/2023 117.482 kg  259 lb  Stated     4/23/2023 117.482 kg  259 lb      4/24/2023 102.967 kg  227 lb  Standing scale     6/27/2023 106.051 kg  233 lb 12.8 oz   --    9/29/2023 105.688 kg  233 lb      10/31/2023 105.688 kg  233 lb   --    9/1/2024 105.688 kg  233 lb  Estimated      106 kg  233 lb 11 oz  Bed scale      113.399 kg  250 lb          Nutrition Focused Physical Exam     Date:     Unable to perform due to Pt unable to participate at time of visit     Subjective   Reported/Observed/Food/Nutrition Related History:     9-18-24: pt intubated, sedated  + fentanyl, propofol 20.9ml    Per RN: plan fro Trach + PEG today, pt spiked temp last night, temp this am 101.3, is not following commands      9-16: pt intubated, sedated, propofol 14.2ml/hr    Per RN: pt afebrile, had 800ml UOP last night, tolerating TF, had to be disimpacted yesterday    Plan for Trach, PEG Tuesday 9-12: pt intubated, sedated  +fentanyl, propofol 17.7ml    Per RN: pt tolerating TF, will wake up and follow commands    9-10: pt intubated, sedated + fentanyl, propofol 14.2ml    Per RN: pt tolerating TF,  has not had bm since 9-7, has been given senokot    9/9  Unable to wean from vent. Remains on levo and propofol.  + 1BM 9/8.     9-6   pt intubated, sedated   + versed, fentanyl, ketamine  Per RN: pt will open eyes, and follow commands sometimes, 2L UOP, tolerating TF, it is @50ml currently, has not had a bm yet  Plan to change enteral " "Dilantin to IV Cerebryx    9-4  Pt intubated, sedated, will open eyes, is tremulous  + levophed 0.04mcg, fentanyl, heparin  Per RN: pt very anxious, will follow commands, has lots of secretions    Needs Assessment   Date: 9-10-24    Height used:Height: 175 cm (68.9\")  Weights used ABW: 233lb/ 106kg- admit wt  IBW: 160lb/ 73kg    Estimated Calorie needs: ~1453kcal/ 70% MREE  Method:  14Kcals/KG ABW:1484kcal  Method:  MSJ ABW: 1845kcal  Method:  PSU ABW (ve 9.37, Tmax 36.7) = 1943kcal  IC: MREE= 2075kcal, RQ= 0.85 wnl  Goal 65-70% GXHK=[id number]    Estimated Protein needs: ~146g protein  Method: 2g protein per kg IBW: 146g protein  Method: 1.2-1.5g protein per k-159g protein      Current Nutrition Prescription     PO: NPO Diet NPO Type: Strict NPO, Sips with Meds  Oral Nutrition Supplement:  Intake: N/A    EN: Peptamen Intense VHP  Goal Rate: 55ml  Water Flushes: 30ml  Modular: Prosource-no carb 3/day  Route: NG  Tube: Large bore    At goal over: 20Hrs/day     Rx will supply:   Goal Volume 1100  mL/day     Flush Volume 600 mL/day     Energy 1280 Kcal/day 62 % MREE   Protein 146 g/day 100 % Est Need   Fiber 4 g/day     Water in   mL     Total Water 1524 mL     Meet DRI No        --------------------------------------------------------------------------  Product/Rate verified at bedside: Yes  Infusing Rate at time of visit: off    Average Delivery from [facility] Day: ( TF held yesterday for surgery, which was cancelled)    Volume 408 mL/day 37  % Goal Vol.   Flush Volume 380 mL/day     Energy 1051 Kcal/day 51 % MREE   Protein 83 g/day 57 % Est Need   Fiber 2 g/day     Water in  EN 343m L     Total Water 723 mL     Meet DRI No           Propofol intake:421ml, 463kcal    Assessment & Plan   Nutrition Diagnosis   Date: 24 Updated:   Problem Inadequate energy intake    Etiology ARF/VENT   Signs/Symptoms 37% goal volume EN   Status: Active     Goal:   Nutrition to support treatment and Initiate EN " s/p surgery      Nutrition Intervention      Follow treatment progress, Care plan reviewed    Plan for Trach, PEG today, advance TF when ok per Surgery    Suggest add MVI as TF volume too low to meet RDI's    Continue to hold TF 1 hour before and after each dilantin dose    Monitoring/Evaluation:   Per protocol, I&O, Pertinent labs, Weight, Skin status, GI status, Symptoms, Hemodynamic stability    Tania Street, [place]  Time Spent: 30min

## 2024-09-18 NOTE — PLAN OF CARE
"Goal Outcome Evaluation:  -NSR/Sinus Tach this shift  -pt remains intubated PEEP of 10 FiO2 35%  -pt is able to intermittently follow commands. Will respond to \"yes\" or \"no\" questions  -soft wrist restraint still in use  -fentanyl @250, Propofol @25  -total UOP 1260  -no Bm this shift   -TF held at 0000  -pt had new onset fever 100.9 and became tachycardic, prn tylenol given. Blood, sputum, and urine cultures obtained. Urine &sputum negative.   -new temp sensing tom placed   -plan to trach and peg tomorrow  "

## 2024-09-18 NOTE — PROGRESS NOTES
"Patient Name:  Fernie Dalal  YOB: 1960  [id number]    Surgery Progress Note    Date of visit: 9/18/2024    Subjective   Subjective: Stable. Had to delay Trach/PEG due to OR scheduling issues. Plan to proceed today.         Objective     Objective:     BP 91/58   Pulse 89   Temp (!) 100.8 °F (38.2 °C) (Bladder)   Resp 20   Ht 175 cm (68.9\")   Wt 116 kg (255 lb 4.7 oz)   SpO2 90%   BMI 37.81 kg/m²     Intake/Output Summary (Last 24 hours) at 9/18/2024 0726  Last data filed at 9/18/2024 0600  Gross per 24 hour   Intake 1740.15 ml   Output 2510 ml   Net -769.85 ml       CV:  Rhythm  regular and rate regular   L:  Rhonchi to auscultation bilaterally   Abd:  Bowel sounds positive , soft, nontender  Ext:  No cyanosis, clubbing, edema    Recent labs that are back at this time have been reviewed.            Assessment/ Plan:    Problem List Items Addressed This Visit          Cardiac and Vasculature    HFrEF (heart failure with reduced ejection fraction)    Relevant Orders    Insert Arterial Line (Completed)       Pulmonary and Pneumonias    * (Principal) Type 2 respiratory failure- For Trach/PEG today.      Right lower lobe pneumonia    Relevant Medications    arformoterol (BROVANA) nebulizer solution 15 mcg    revefenacin (YUPELRI) nebulizer solution 175 mcg    albuterol (PROVENTIL) nebulizer solution 0.083% 2.5 mg/3mL     Other Visit Diagnoses       Acute respiratory failure with hypoxia    -  Primary    Relevant Orders    Insert Arterial Line (Completed)    Chest pain, unspecified type                 Active Hospital Problems    Diagnosis  POA    **Type 2 respiratory failure [J96.92]  Yes    Right lower lobe pneumonia [J18.9]  Yes    NSTEMI (non-ST elevated myocardial infarction) [I21.4]  Yes    HFrEF (heart failure with reduced ejection fraction) [I50.20]  Yes      Resolved Hospital Problems    Diagnosis Date Resolved POA    Hypotension [I95.9] 09/13/2024 Yes    Dyspnea [R06.00] 09/01/2024 Yes    "           Jon Mclean MD  9/18/2024  07:26 EDT

## 2024-09-18 NOTE — OP NOTE
"Operative Report    Patient Name:  Fernie Dalal  YOB: 1960  [id number]    9/18/2024        PREOPERATIVE DIAGNOSIS:    1) Respiratory Failure (J96.00)   2) Feeding difficulty (R63.39)      POSTOPERATIVE DIAGNOSIS: Same      PROCEDURE:     1) Tracheostomy (49274)   2) EGD with PEG placement (81010)      SURGEON: Jon Mclean MD       ASSISTANT:        ANESTHESIA: General      SPECIMENS: None      FINDINGS:      1) # 8 XLT tracheostomy tube placed between the second and third tracheal ring   2) 20 Japanese PEG tube placed at the 5 cm level      INDICATIONS:      The patient is a 64 y.o. year old male with a history of respiratory failure and feeding difficulty who we have been asked to see for long term respiratory and feeding access. The risks and benefits of tracheostomy and PEG placement were discussed at length with the patient's family and they agreed to proceed.    DESCRIPTION OF PROCEDURE:     After obtaining informed consent, the patient was taken to the operating room and placed in supine position. After general anesthesia was induced, appropriate positioning was performed, and the neck and chest were prepped and draped in standard sterile fashion. After infiltrating the skin with local anesthetic, a 2.5cm  skin incision was made over the trachea. Electrocautery dissection was carried down to the thyroid. This was split in the midline and stay sutures placed bilaterally to control bleeding. At this point, the anesthetist advanced the endotracheal tube distally, and the trachea was entered between the second and third tracheal ring using a \"T\" shaped incision.  The trachea was quite calcified and difficult to divide.  Stay sutures of Prolene were placed on each side. The anesthetist then withdrew the endotracheal tube, and a # 8 XLT tracheostomy tube was placed through the defect into the trachea. The cuff was inflated, and the tracheostomy attached to the ventilator circuit. Excellent tidal " "volumes were returned. The tracheostomy was then affixed using a tracheostomy collar. It was dressed in standard sterile fashion. The Prolene sutures were then affixed to the anterior chest wall, to aid in reintubation in the future, should that become necessary.      The patient was then placed in modified Lindquist position, and a bite block was placed into the patient's mouth. The endoscope was advanced into the mouth and into the esophagus without difficulty. It was advanced into the stomach, and into the duodenum, which was normal  . The scope was then returned to the stomach, and the stomach was maximally insufflated. An appropriate site for PEG placement was then determined by palpation, transillumination, and the \"safe track\" needle technique. This area was prepped and draped in standard sterile fashion, and after infiltrating the skin with local anesthetic, a 7mm skin incision was made in this location. A needle was advanced through this incision and into the stomach under endoscopic guidance. A wire was then placed through the needle, grasped with the endoscope, and brought out through the mouth. At this point, using the Ponsky Pull technique, a 20 Occitan PEG tube was brought through the abdominal wall in this location. The endoscope was again advanced through the mouth and esophagus and into the stomach, where the PEG bumper could be seen abutting the anterior gastric wall in standard fashion without bleeding. The endoscope was then used to desufflate the stomach, and removed from the patient's mouth without difficulty. The PEG tube was secured at the 5 cm level, with the bumper at the 5.5 cm level. It was sutured to the skin, dressed in standard sterile fashion, and placed to gravity drainage. The patient was then transported back to the ICU in stable, yet critical, condition. All sponge and needle counts were correct times two at the completion of the case. There were no immediate complications. "           Jon Mclean MD  9/18/2024  15:41 EDT

## 2024-09-18 NOTE — PROGRESS NOTES
Intensive Care Follow-up     Hospital:  LOS: 15 days   Mr. Fernie Dalal, 64 y.o. male is followed for:   Type 2 respiratory failure        Subjective     64-year-old male admitted on 9/1 with chest pain, shortness of breath, and chills.  He was diagnosed with right lower lobe pneumonia and exacerbation of COPD.  His past history also includes a known cardiomyopathy with reduced ejection fraction.  He had some mild elevation of his cardiac enzymes on admission which was felt to be demand ischemia.  He has been diagnosed with non-STEMI in the setting of demand ischemia with a chronically occluded RCA.  EF is 28% this admission.     He required intubation on 9/3 and transferred to ICU.  CTA revealed no pulmonary embolism.  Interval History:  The chart has been reviewed.  The patient had a fever overnight.  Cultures were drawn and chest x-ray has been reviewed.  Secretions have been minimal.  Per nursing, he is less interactive today though I do not feel that he was very interactive yesterday either.  He does seem to wax and wane somewhat.  Continues to have a good diuresis.    The patient's past medical, surgical and social history were reviewed and updated in Epic as appropriate.        Objective     Infusions:  fentanyl 10 mcg/mL,  mcg/hr, Last Rate: 250 mcg/hr (09/18/24 0254)  propofol, 5-50 mcg/kg/min, Last Rate: 30 mcg/kg/min (09/18/24 1034)      Medications:  Pharmacy Consult, , Does not apply, Q12H  Albuterol Sulfate NEB Orderable, 2.5 mg, Nebulization, Q6H - RT  arformoterol, 15 mcg, Nebulization, BID - RT  aspirin, 81 mg, Nasogastric, Daily  atorvastatin, 80 mg, Nasogastric, Daily  chlorhexidine, 15 mL, Mouth/Throat, Q12H  citalopram, 20 mg, Nasogastric, Daily  diazePAM, 10 mg, Nasogastric, BID  empagliflozin, 10 mg, Nasogastric, Daily  enoxaparin, 40 mg, Subcutaneous, Daily  furosemide, 40 mg, Intravenous, Q12H  insulin regular, 2-7 Units, Subcutaneous, Q6H  [START ON 9/19/2024] methylPREDNISolone  "sodium succinate, 20 mg, Intravenous, Q24H  nystatin, 5 mL, Swish & Spit, 4x Daily  pantoprazole, 40 mg, Intravenous, Q AM  pharmacy consult - [place], , Does not apply, Daily  phenytoin, 200 mg, Nasogastric, BID  piperacillin-tazobactam, 3.375 g, Intravenous, Q8H  senna-docusate sodium, 2 tablet, Nasogastric, BID   And  polyethylene glycol, 17 g, Nasogastric, Daily  ProSource No Carb, 30 mL, Nasogastric, TID  revefenacin, 175 mcg, Nebulization, Daily - RT  risperiDONE, 1 mg, Nasogastric, BID  sodium chloride, 10 mL, Intravenous, Q12H        Vital Sign Min/Max for last 24 hours  Temp  Min: 97 °F (36.1 °C)  Max: 101.9 °F (38.8 °C)   BP  Min: 81/52  Max: 116/78   Pulse  Min: 63  Max: 105   Resp  Min: 20  Max: 20   SpO2  Min: 86 %  Max: 99 %   No data recorded       Input/Output for last 24 hour shift  09/17 0701 - 09/18 0700  In: 1740.2 [I.V.:952.2]  Out: 2510 [Urine:2510]   Mode: VC+/AC  FiO2 (%):  [35 %-50 %] 50 %  S RR:  [20] 20  S VT:  [490 mL] 490 mL  PEEP/CPAP (cm H2O):  [10 cm H20] 10 cm H20  MAP (cm H2O):  [13-16] 14  Objective:  General Appearance:  Not in pain, in no acute distress and comfortable.    Vital signs: (most recent): Blood pressure (!) 88/55, pulse 82, temperature 97 °F (36.1 °C), temperature source Bladder, resp. rate 20, height 175 cm (68.9\"), weight 116 kg (255 lb 4.7 oz), SpO2 93%.    HEENT: (Endotracheal tube in place)    Lungs:  Normal effort and normal respiratory rate.  There are decreased breath sounds.  No rhonchi.    Heart: Normal rate.  Regular rhythm.  S1 normal and S2 normal.  No murmur.   Chest: Symmetric chest wall expansion.   Abdomen: Abdomen is soft.  Bowel sounds are normal.   There is no abdominal tenderness.     Extremities: Normal range of motion.    Neurological: (Patient is awake.  He is really not following commands today.  This is similar to yesterday.  He does withdraw from noxious stimuli x 4.).    Pupils:  Pupils are equal, round, and reactive to light.  Pupils are " equal.   Skin:  Warm.                Results from last 7 days   Lab Units 09/18/24  0242 09/17/24  0257 09/15/24  0305   WBC 10*3/mm3 9.06 7.95 10.84*   HEMOGLOBIN g/dL 10.2* 9.8* 10.1*   PLATELETS 10*3/mm3 560* 558* 513*     Results from last 7 days   Lab Units 09/18/24  0242 09/17/24  0258 09/16/24  1328 09/16/24  0307 09/15/24  0305   SODIUM mmol/L 142 141  --  142 139   POTASSIUM mmol/L 4.0 3.8 5.2 3.3* 3.7   CO2 mmol/L 29.0 30.0*  --  30.0* 34.0*   BUN mg/dL 32* 29*  --  36* 39*   CREATININE mg/dL 0.56* 0.47*  --  0.54* 0.55*   MAGNESIUM mg/dL 2.3 2.4  --  2.3 2.5*   PHOSPHORUS mg/dL  --  3.7  --  3.2 3.5   GLUCOSE mg/dL 131* 104*  --  128* 122*     Estimated Creatinine Clearance: 169.4 mL/min (A) (by C-G formula based on SCr of 0.56 mg/dL (L)).    Results from last 7 days   Lab Units 09/18/24  0339   PH, ARTERIAL pH units 7.455*   PCO2, ARTERIAL mm Hg 48.1*   PO2 ART mm Hg 61.3*       I reviewed the patient's results and images.     Assessment & Plan   Impression        Type 2 respiratory failure    HFrEF (heart failure with reduced ejection fraction)    NSTEMI (non-ST elevated myocardial infarction)    Right lower lobe pneumonia       Plan        Unclear what precipitated fever overnight.  Will watch all cultures and for the meantime I am going to restart Zosyn as pulmonary infection would be the most likely though there is not any direct evidence for this.  Continue with diuresis as before.  We will likely cut back within the next day or 2 depending upon his balance.  Plan is for tracheostomy and PEG tube placement today.  Continue with nutritional support as able.  Decreased Risperdal to 1 mg twice daily.  Decrease Solu-Medrol to 20 mg IV daily.  Patient does remain at very high risk of worsening secondary to ongoing respiratory failure.    Plan of care and goals reviewed with mulitdisciplinary/antibiotic stewardship team during rounds.   I discussed the patient's findings and my recommendations with  patient and nursing staff     High level of risk due to:  drug(s) requiring intensive monitoring for toxicity and parenteral controlled substances.      Jon Longoria MD, [facility]  Pulmonology and Critical Care Medicine

## 2024-09-18 NOTE — BRIEF OP NOTE
TRACHEOSTOMY AND PERCUTANEOUS ENDOSCOPIC GASTROSTOMY TUBE INSERTION  Progress Note    Fernie Dalal  9/18/2024    Pre-op Diagnosis:   1.  Respiratory failure  2. Feeding difficulty       Post-Op Diagnosis Codes:  Same    Procedure/CPT® Codes:        Procedure(s):  TRACHEOSTOMY AND PERCUTANEOUS ENDOSCOPIC GASTROSTOMY TUBE INSERTION              Surgeon(s):  Jon Mclean MD    Anesthesia: General    Staff:   Circulator: Tania Staton RN  Scrub Person: Yeimy Peres         Estimated Blood Loss: minimal    Urine Voided: * No values recorded between 9/18/2024  2:51 PM and 9/18/2024  3:38 PM *    Specimens:                None          Drains:   NG/OG Tube Nasogastric 18 Fr Left nostril (Active)   Placement Verification X-ray 09/18/24 1400   Site Assessment Clean;Dry;Intact 09/18/24 1400   Securement nasal septal tie 09/18/24 1400   Secured at (cm) 70 09/18/24 1400   NG/OG Site Interventions Site assessed;Nasal/Oral care performed;Pressure offloaded 09/18/24 1400   Status Clamped 09/18/24 1400   Drainage Appearance None 09/18/24 1400   Surrounding Skin Dry;Intact 09/18/24 1400   Tube Feeding Frequency Other (Comment) 09/18/24 1400   Tube Feeding Product Peptamen VHP 09/18/24 1000   Tube Feeding Strength Full strength 09/17/24 2230   Tube Feeding Method Continuous per pump 09/17/24 2230   Tube Feeding Rate (mL/hr) 55 mL/HR 09/17/24 2230   Tube Feeding Bag Changed No 09/17/24 2230   Tube Feeding Residual (mL) 105 mL 09/14/24 2000   Tube Feeding Residual Returned (mL) 105 mL 09/14/24 2000   Feeding Tube Flushed With Sterile water 09/18/24 0000   Flush/ Irrigation Intake (mL) 30 09/18/24 0000   Tube Feeding Intake (mL) 72 09/18/24 0000   Intake (mL) 25 mL 09/18/24 0000       Gastrostomy/Enterostomy Percutaneous endoscopic gastrostomy (PEG) 1 20 Fr. LLQ (Active)       Urethral Catheter Temperature probe (Active)   Daily Indications Hourly Output in Critical Unstable Patient requiring Frequent Intervention  (hemodynamics, titration or life supportive therapy) 09/18/24 1400   Site Assessment Skin intact;Clean 09/18/24 1400   Collection Container Standard drainage bag 09/18/24 1400   Securement Method Securing device 09/18/24 1400   Catheter care complete Yes 09/18/24 0800   Output (mL) 375 mL 09/18/24 1200       [REMOVED] Urethral Catheter (Removed)   Daily Indications Hourly Output in Critical Unstable Patient requiring Frequent Intervention (hemodynamics, titration or life supportive therapy) 09/18/24 0000   Site Assessment Clean;Skin intact 09/18/24 0000   Collection Container Standard drainage bag 09/18/24 0000   Securement Method Securing device 09/18/24 0000   Catheter care complete Yes 09/17/24 0800   Output (mL) 200 mL 09/18/24 0147       Findings:   #8 XLT tracheostomy tube placed between second and third tracheal ring  20 Serbian PEG tube placed with the skin at the 5 cm level        Complications: None          Jon Mclean MD     Date: 9/18/2024  Time: 15:40 EDT

## 2024-09-18 NOTE — PLAN OF CARE
Goal Outcome Evaluation:  Plan of Care Reviewed With: family        Progress: declining     -Pt had trach/peg placed today - peg tube flushed with 60 ml water, and tube feeding resumed at 25 ml/hr per orders  -Pt on ventilator at PEEP of 14, FiO2 at 100  -Pt w/ frequent desatting after OR APRN at bedside adjusted vent settings  -Pt is wearing soft wrist restraints  -Pt ran sinus tach w/ rare PVC's and NSR throughout shift  -Fentanyl gtt at 250  -Propofol gtt at 30   -Pt had adequate UOP  -Pt did not have a BM during shift  -Pt family was at bedside today

## 2024-09-18 NOTE — CASE MANAGEMENT/SOCIAL WORK
Continued Stay Note  [facility]     Patient Name: Fernie Dalal  MRN: [id number]  Today's Date: 9/18/2024    Admit Date: 9/1/2024    Plan: [facility]   Discharge Plan       Row Name 09/18/24 1025       Plan    Plan [facility]    Patient/Family in Agreement with Plan other (see comments)    Plan Comments I received call from Debra gutierrez w/[facility]. She will plan to do on site visit tomorrow. Patient awaiting Trach/Peg today. Per MDR, febrile, initiating IV Zosyn, currently intubated/sedated. Cultures sent. I met w/sister, Oumou in room, she is agreeable to [facility]. CM will continue to follow.    Final Discharge Disposition Code 63 - LT                   Discharge Codes    No documentation.                 Expected Discharge Date and Time       Expected Discharge Date Expected Discharge Time    Sep 20, 2024               Aniket Marquez RN

## 2024-09-19 ENCOUNTER — APPOINTMENT (OUTPATIENT)
Dept: GENERAL RADIOLOGY | Facility: HOSPITAL | Age: 64
End: 2024-09-19
Payer: MEDICAID

## 2024-09-19 LAB
ANION GAP SERPL CALCULATED.3IONS-SCNC: 15 MMOL/L (ref 5–15)
ARTERIAL PATENCY WRIST A: ABNORMAL
ATMOSPHERIC PRESS: ABNORMAL MM[HG]
BASE EXCESS BLDA CALC-SCNC: 6.5 MMOL/L (ref 0–2)
BASOPHILS # BLD AUTO: 0.06 10*3/MM3 (ref 0–0.2)
BASOPHILS NFR BLD AUTO: 0.5 % (ref 0–1.5)
BDY SITE: ABNORMAL
BODY TEMPERATURE: 37
BUN SERPL-MCNC: 34 MG/DL (ref 8–23)
BUN/CREAT SERPL: 49.3 (ref 7–25)
CALCIUM SPEC-SCNC: 9.1 MG/DL (ref 8.6–10.5)
CHLORIDE SERPL-SCNC: 98 MMOL/L (ref 98–107)
CO2 BLDA-SCNC: 34.4 MMOL/L (ref 22–33)
CO2 SERPL-SCNC: 27 MMOL/L (ref 22–29)
COHGB MFR BLD: 1.5 % (ref 0–2)
CREAT SERPL-MCNC: 0.69 MG/DL (ref 0.76–1.27)
DEPRECATED RDW RBC AUTO: 46.8 FL (ref 37–54)
EGFRCR SERPLBLD CKD-EPI 2021: 104 ML/MIN/1.73
EOSINOPHIL # BLD AUTO: 0.11 10*3/MM3 (ref 0–0.4)
EOSINOPHIL NFR BLD AUTO: 0.9 % (ref 0.3–6.2)
EPAP: 0
ERYTHROCYTE [DISTWIDTH] IN BLOOD BY AUTOMATED COUNT: 14.5 % (ref 12.3–15.4)
GLUCOSE BLDC GLUCOMTR-MCNC: 128 MG/DL (ref 70–130)
GLUCOSE BLDC GLUCOMTR-MCNC: 151 MG/DL (ref 70–130)
GLUCOSE BLDC GLUCOMTR-MCNC: 174 MG/DL (ref 70–130)
GLUCOSE BLDC GLUCOMTR-MCNC: 175 MG/DL (ref 70–130)
GLUCOSE SERPL-MCNC: 154 MG/DL (ref 65–99)
HCO3 BLDA-SCNC: 32.7 MMOL/L (ref 20–26)
HCT VFR BLD AUTO: 33.3 % (ref 37.5–51)
HCT VFR BLD CALC: 31.6 % (ref 38–51)
HGB BLD-MCNC: 9.8 G/DL (ref 13–17.7)
HGB BLDA-MCNC: 10.3 G/DL (ref 13.5–17.5)
IMM GRANULOCYTES # BLD AUTO: 0.06 10*3/MM3 (ref 0–0.05)
IMM GRANULOCYTES NFR BLD AUTO: 0.5 % (ref 0–0.5)
INHALED O2 CONCENTRATION: 80 %
IPAP: 0
LYMPHOCYTES # BLD AUTO: 0.57 10*3/MM3 (ref 0.7–3.1)
LYMPHOCYTES NFR BLD AUTO: 4.5 % (ref 19.6–45.3)
MCH RBC QN AUTO: 26.1 PG (ref 26.6–33)
MCHC RBC AUTO-ENTMCNC: 29.4 G/DL (ref 31.5–35.7)
MCV RBC AUTO: 88.8 FL (ref 79–97)
METHGB BLD QL: 0.1 % (ref 0–1.5)
MODALITY: ABNORMAL
MONOCYTES # BLD AUTO: 1.64 10*3/MM3 (ref 0.1–0.9)
MONOCYTES NFR BLD AUTO: 13.1 % (ref 5–12)
NEUTROPHILS NFR BLD AUTO: 10.11 10*3/MM3 (ref 1.7–7)
NEUTROPHILS NFR BLD AUTO: 80.5 % (ref 42.7–76)
NRBC BLD AUTO-RTO: 0 /100 WBC (ref 0–0.2)
OXYHGB MFR BLDV: 91.7 % (ref 94–99)
PAW @ PEAK INSP FLOW SETTING VENT: 0 CMH2O
PCO2 BLDA: 55 MM HG (ref 35–45)
PCO2 TEMP ADJ BLD: 55 MM HG (ref 35–48)
PEEP RESPIRATORY: 14 CM[H2O]
PH BLDA: 7.38 PH UNITS (ref 7.35–7.45)
PH, TEMP CORRECTED: 7.38 PH UNITS
PLATELET # BLD AUTO: 450 10*3/MM3 (ref 140–450)
PMV BLD AUTO: 9.4 FL (ref 6–12)
PO2 BLDA: 69.3 MM HG (ref 83–108)
PO2 TEMP ADJ BLD: 69.3 MM HG (ref 83–108)
POTASSIUM SERPL-SCNC: 4 MMOL/L (ref 3.5–5.2)
PROCALCITONIN SERPL-MCNC: 0.2 NG/ML (ref 0–0.25)
RBC # BLD AUTO: 3.75 10*6/MM3 (ref 4.14–5.8)
SODIUM SERPL-SCNC: 140 MMOL/L (ref 136–145)
TOTAL RATE: 20 BREATHS/MINUTE
VENTILATOR MODE: ABNORMAL
VT ON VENT VENT: 0.49 ML
WBC NRBC COR # BLD AUTO: 12.55 10*3/MM3 (ref 3.4–10.8)

## 2024-09-19 PROCEDURE — 94003 VENT MGMT INPAT SUBQ DAY: CPT

## 2024-09-19 PROCEDURE — 84145 PROCALCITONIN (PCT): CPT | Performed by: SURGERY

## 2024-09-19 PROCEDURE — 25010000002 PIPERACILLIN SOD-TAZOBACTAM PER 1 G: Performed by: SURGERY

## 2024-09-19 PROCEDURE — 94799 UNLISTED PULMONARY SVC/PX: CPT

## 2024-09-19 PROCEDURE — 82805 BLOOD GASES W/O2 SATURATION: CPT

## 2024-09-19 PROCEDURE — 63710000001 REVEFENACIN 175 MCG/3ML SOLUTION: Performed by: SURGERY

## 2024-09-19 PROCEDURE — 85025 COMPLETE CBC W/AUTO DIFF WBC: CPT | Performed by: SURGERY

## 2024-09-19 PROCEDURE — 25010000002 FENTANYL 10 MCG/1 ML NS: Performed by: SURGERY

## 2024-09-19 PROCEDURE — 71045 X-RAY EXAM CHEST 1 VIEW: CPT

## 2024-09-19 PROCEDURE — 82375 ASSAY CARBOXYHB QUANT: CPT

## 2024-09-19 PROCEDURE — 25010000002 PROPOFOL 10 MG/ML EMULSION: Performed by: SURGERY

## 2024-09-19 PROCEDURE — 80048 BASIC METABOLIC PNL TOTAL CA: CPT | Performed by: SURGERY

## 2024-09-19 PROCEDURE — 25010000002 METHYLPREDNISOLONE PER 40 MG: Performed by: SURGERY

## 2024-09-19 PROCEDURE — 25010000002 FUROSEMIDE PER 20 MG: Performed by: SURGERY

## 2024-09-19 PROCEDURE — 63710000001 INSULIN REGULAR HUMAN PER 5 UNITS: Performed by: SURGERY

## 2024-09-19 PROCEDURE — 83050 HGB METHEMOGLOBIN QUAN: CPT

## 2024-09-19 PROCEDURE — 99291 CRITICAL CARE FIRST HOUR: CPT | Performed by: INTERNAL MEDICINE

## 2024-09-19 PROCEDURE — 82948 REAGENT STRIP/BLOOD GLUCOSE: CPT

## 2024-09-19 PROCEDURE — 25010000002 ENOXAPARIN PER 10 MG: Performed by: SURGERY

## 2024-09-19 RX ORDER — METOLAZONE 5 MG/1
5 TABLET ORAL
Status: COMPLETED | OUTPATIENT
Start: 2024-09-19 | End: 2024-09-19

## 2024-09-19 RX ORDER — NOREPINEPHRINE BITARTRATE 0.03 MG/ML
.02-.3 INJECTION, SOLUTION INTRAVENOUS
Status: DISCONTINUED | OUTPATIENT
Start: 2024-09-19 | End: 2024-09-22

## 2024-09-19 RX ADMIN — PIPERACILLIN AND TAZOBACTAM 3.38 G: 3; .375 INJECTION, POWDER, LYOPHILIZED, FOR SOLUTION INTRAVENOUS at 08:14

## 2024-09-19 RX ADMIN — ARFORMOTEROL TARTRATE 15 MCG: 15 SOLUTION RESPIRATORY (INHALATION) at 07:15

## 2024-09-19 RX ADMIN — RISPERIDONE 1 MG: 1 SOLUTION ORAL at 20:14

## 2024-09-19 RX ADMIN — PHENYTOIN 200 MG: 125 SUSPENSION ORAL at 08:13

## 2024-09-19 RX ADMIN — PROPOFOL 20 MCG/KG/MIN: 10 INJECTION, EMULSION INTRAVENOUS at 14:57

## 2024-09-19 RX ADMIN — PROPOFOL 20 MCG/KG/MIN: 10 INJECTION, EMULSION INTRAVENOUS at 07:18

## 2024-09-19 RX ADMIN — ALBUTEROL SULFATE 2.5 MG: 2.5 SOLUTION RESPIRATORY (INHALATION) at 13:41

## 2024-09-19 RX ADMIN — Medication 30 ML: at 16:12

## 2024-09-19 RX ADMIN — CHLORHEXIDINE GLUCONATE, 0.12% ORAL RINSE 15 ML: 1.2 SOLUTION DENTAL at 20:14

## 2024-09-19 RX ADMIN — METHYLPREDNISOLONE SODIUM SUCCINATE 20 MG: 40 INJECTION, POWDER, FOR SOLUTION INTRAMUSCULAR; INTRAVENOUS at 08:14

## 2024-09-19 RX ADMIN — LACTULOSE 20 G: 20 SOLUTION ORAL at 08:14

## 2024-09-19 RX ADMIN — METOLAZONE 5 MG: 5 TABLET ORAL at 13:58

## 2024-09-19 RX ADMIN — PROPOFOL 20 MCG/KG/MIN: 10 INJECTION, EMULSION INTRAVENOUS at 02:19

## 2024-09-19 RX ADMIN — CHLORHEXIDINE GLUCONATE, 0.12% ORAL RINSE 15 ML: 1.2 SOLUTION DENTAL at 08:14

## 2024-09-19 RX ADMIN — DIAZEPAM 10 MG: 10 TABLET ORAL at 08:13

## 2024-09-19 RX ADMIN — Medication 10 ML: at 20:14

## 2024-09-19 RX ADMIN — Medication 10 ML: at 08:15

## 2024-09-19 RX ADMIN — PIPERACILLIN AND TAZOBACTAM 3.38 G: 3; .375 INJECTION, POWDER, LYOPHILIZED, FOR SOLUTION INTRAVENOUS at 16:12

## 2024-09-19 RX ADMIN — Medication 30 ML: at 20:14

## 2024-09-19 RX ADMIN — EMPAGLIFLOZIN 10 MG: 10 TABLET, FILM COATED ORAL at 08:13

## 2024-09-19 RX ADMIN — ALBUTEROL SULFATE 2.5 MG: 2.5 SOLUTION RESPIRATORY (INHALATION) at 00:51

## 2024-09-19 RX ADMIN — INSULIN HUMAN 2 UNITS: 100 INJECTION, SOLUTION PARENTERAL at 05:21

## 2024-09-19 RX ADMIN — ACETAMINOPHEN 650 MG: 650 SOLUTION ORAL at 16:20

## 2024-09-19 RX ADMIN — NYSTATIN 500000 UNITS: 100000 SUSPENSION ORAL at 20:13

## 2024-09-19 RX ADMIN — SENNOSIDES AND DOCUSATE SODIUM 2 TABLET: 50; 8.6 TABLET ORAL at 20:14

## 2024-09-19 RX ADMIN — FUROSEMIDE 40 MG: 10 INJECTION, SOLUTION INTRAMUSCULAR; INTRAVENOUS at 20:13

## 2024-09-19 RX ADMIN — INSULIN HUMAN 2 UNITS: 100 INJECTION, SOLUTION PARENTERAL at 18:21

## 2024-09-19 RX ADMIN — DIAZEPAM 10 MG: 10 TABLET ORAL at 20:14

## 2024-09-19 RX ADMIN — NYSTATIN 500000 UNITS: 100000 SUSPENSION ORAL at 08:14

## 2024-09-19 RX ADMIN — PROPOFOL 20 MCG/KG/MIN: 10 INJECTION, EMULSION INTRAVENOUS at 20:14

## 2024-09-19 RX ADMIN — ENOXAPARIN SODIUM 40 MG: 100 INJECTION SUBCUTANEOUS at 08:13

## 2024-09-19 RX ADMIN — REVEFENACIN 175 MCG: 175 SOLUTION RESPIRATORY (INHALATION) at 07:15

## 2024-09-19 RX ADMIN — POLYETHYLENE GLYCOL 3350 17 G: 17 POWDER, FOR SOLUTION ORAL at 08:13

## 2024-09-19 RX ADMIN — Medication 200 MCG/HR: at 12:08

## 2024-09-19 RX ADMIN — ALBUTEROL SULFATE 2.5 MG: 2.5 SOLUTION RESPIRATORY (INHALATION) at 19:01

## 2024-09-19 RX ADMIN — Medication 30 ML: at 08:18

## 2024-09-19 RX ADMIN — ALBUTEROL SULFATE 2.5 MG: 2.5 SOLUTION RESPIRATORY (INHALATION) at 07:15

## 2024-09-19 RX ADMIN — ARFORMOTEROL TARTRATE 15 MCG: 15 SOLUTION RESPIRATORY (INHALATION) at 19:01

## 2024-09-19 RX ADMIN — SENNOSIDES AND DOCUSATE SODIUM 2 TABLET: 50; 8.6 TABLET ORAL at 08:13

## 2024-09-19 RX ADMIN — PHENYTOIN 200 MG: 125 SUSPENSION ORAL at 20:13

## 2024-09-19 RX ADMIN — CITALOPRAM HYDROBROMIDE 20 MG: 20 TABLET ORAL at 08:13

## 2024-09-19 RX ADMIN — NYSTATIN 500000 UNITS: 100000 SUSPENSION ORAL at 12:08

## 2024-09-19 RX ADMIN — FUROSEMIDE 40 MG: 10 INJECTION, SOLUTION INTRAMUSCULAR; INTRAVENOUS at 08:14

## 2024-09-19 RX ADMIN — NYSTATIN 500000 UNITS: 100000 SUSPENSION ORAL at 18:26

## 2024-09-19 RX ADMIN — ATORVASTATIN CALCIUM 80 MG: 40 TABLET, FILM COATED ORAL at 08:13

## 2024-09-19 RX ADMIN — PANTOPRAZOLE SODIUM 40 MG: 40 INJECTION, POWDER, LYOPHILIZED, FOR SOLUTION INTRAVENOUS at 05:21

## 2024-09-19 RX ADMIN — ASPIRIN 81 MG 81 MG: 81 TABLET ORAL at 08:13

## 2024-09-19 RX ADMIN — INSULIN HUMAN 2 UNITS: 100 INJECTION, SOLUTION PARENTERAL at 12:08

## 2024-09-19 RX ADMIN — RISPERIDONE 1 MG: 1 SOLUTION ORAL at 08:12

## 2024-09-19 RX ADMIN — METOLAZONE 5 MG: 5 TABLET ORAL at 18:21

## 2024-09-19 NOTE — PROGRESS NOTES
Enter Query Response Below      Query Response: unable to determine             If applicable, please update the problem list.     Patient: Fernie Dalal        : 1960  Account: [id number]           Admit Date:         How to Respond to this query:       a. Click New Note     b. Answer query within the yellow box.                c. Update the Problem List, if applicable.      If you have any questions about this query contact me at: [email or web address]     ,    64 year old male with history of COPD on baseline home oxygen was admitted  with chest pain and right lower lobe pneumonia. Patient reports a history of recurrent pneumonia per ED note.  Respiratory viral panel negative, sputum culture 9/3 with normal respiratory willy, nare swab for MRSA PCR negative.    Treatment: IV zosyn (), (-) and again beginning  projected x5 days       Please clarify the type of pneumonia the patient was treated/monitored for:    Gram negative pneumonia (excluding Haemophilus influenzae)  Bacterial pneumonia unspecified  Other- specify______    By submitting this query, we are merely seeking further clarification of documentation to accurately reflect all conditions that you are monitoring, evaluating, treating or that extend the hospitalization or utilize additional resources of care. Please utilize your independent clinical judgment when addressing the question(s) above.     This query and your response, once completed, will be entered into the legal medical record.    Sincerely,  Kat Prieto RN CCDS  Clinical Documentation Integrity Program

## 2024-09-19 NOTE — PLAN OF CARE
Goal Outcome Evaluation:  Plan of Care Reviewed With: family             -Pt on vent at FiO2 70 and PEEP 12  -Pt bilateral wrist restraints  -Pt trach care completed  -Pt on fent gtt at 200  -Pt on propofol gtt at 20  -Pt had 3 large BM's   -Pt febrile, APRN notified, tylenol given, and light bedding/clothing  -Pt family called in AM and PM family informed of pt's status

## 2024-09-19 NOTE — PROGRESS NOTES
"Enter Query Response Below      Query Response: Unable to determine              If applicable, please update the problem list.     Patient: Fernie Dalal        : 1960  Account: [id number]           Admit Date:         How to Respond to this query:       a. Click New Note     b. Answer query within the yellow box.                c. Update the Problem List, if applicable.      If you have any questions about this query contact me at: [email or web address]     Dr. Hermosillo,    64 year old male admitted  with chest pain and right lower lobe pneumonia.   Sepsis is included on the H&P and progress notes through 9/3.  Progress notes -, \"Elevated PCT, 4.18, in the range of sepsis, on admission.\"  Sepsis is not documented in progress notes beginning .    Clinical findings: B/P 90/60, heart rate 106, WBC 22.09, procalcitonin 4.18, lactate 1.8.    Treatment: IV albumin, IV  mL x2 (), IV zosyn (), (-) and again beginning  projected x5 days    Please clarify the following:    Sepsis ruled in  Sepsis ruled out  Other- specify______  Unable to determine    By submitting this query, we are merely seeking further clarification of documentation to accurately reflect all conditions that you are monitoring, evaluating, treating or that extend the hospitalization or utilize additional resources of care. Please utilize your independent clinical judgment when addressing the question(s) above.     This query and your response, once completed, will be entered into the legal medical record.    Sincerely,  Kat Prieto RN CCDS  Clinical Documentation Integrity Program     "

## 2024-09-19 NOTE — PLAN OF CARE
Goal Outcome Evaluation:   Unable to decrease ventilator support

## 2024-09-19 NOTE — PROGRESS NOTES
Intensive Care Follow-up     Hospital:  LOS: 16 days   Mr. Fernie Dalal, 64 y.o. male is followed for:   Type 2 respiratory failure        Subjective     64-year-old male admitted on 9/1 with chest pain, shortness of breath, and chills.  He was diagnosed with right lower lobe pneumonia and exacerbation of COPD.  His past history also includes a known cardiomyopathy with reduced ejection fraction.  He had some mild elevation of his cardiac enzymes on admission which was felt to be demand ischemia.  He has been diagnosed with non-STEMI in the setting of demand ischemia with a chronically occluded RCA.  EF is 28% this admission.     He required intubation on 9/3 and transferred to ICU.  CTA revealed no pulmonary embolism.  Interval History:  The chart has been reviewed.  The patient has had a maximum temperature of 100.4 °F overnight.  Chest x-ray was reviewed and does show increase in the left basilar infiltrate versus effusion.  Cultures remain negative.  He is on Zosyn currently.  Yesterday he underwent tracheostomy and PEG placement.  In the aftermath of that his saturations did drop quite a bit and he is currently on 80% FiO2 with a PEEP of 12.  Diuresis has continued.    The patient's past medical, surgical and social history were reviewed and updated in Epic as appropriate.        Objective     Infusions:  fentanyl 10 mcg/mL,  mcg/hr, Last Rate: 200 mcg/hr (09/19/24 1208)  propofol, 5-50 mcg/kg/min, Last Rate: 20 mcg/kg/min (09/19/24 0718)      Medications:  Pharmacy Consult, , Does not apply, Q12H  Albuterol Sulfate NEB Orderable, 2.5 mg, Nebulization, Q6H - RT  arformoterol, 15 mcg, Nebulization, BID - RT  aspirin, 81 mg, Nasogastric, Daily  atorvastatin, 80 mg, Nasogastric, Daily  chlorhexidine, 15 mL, Mouth/Throat, Q12H  citalopram, 20 mg, Nasogastric, Daily  diazePAM, 10 mg, Nasogastric, BID  empagliflozin, 10 mg, Nasogastric, Daily  enoxaparin, 40 mg, Subcutaneous, Daily  furosemide, 40 mg,  "Intravenous, Q12H  insulin regular, 2-7 Units, Subcutaneous, Q6H  methylPREDNISolone sodium succinate, 20 mg, Intravenous, Q24H  nystatin, 5 mL, Swish & Spit, 4x Daily  pantoprazole, 40 mg, Intravenous, Q AM  pharmacy consult - [place], , Does not apply, Daily  phenytoin, 200 mg, Nasogastric, BID  piperacillin-tazobactam, 3.375 g, Intravenous, Q8H  senna-docusate sodium, 2 tablet, Nasogastric, BID   And  polyethylene glycol, 17 g, Nasogastric, Daily  ProSource No Carb, 30 mL, Nasogastric, TID  revefenacin, 175 mcg, Nebulization, Daily - RT  risperiDONE, 1 mg, Nasogastric, BID  sodium chloride, 10 mL, Intravenous, Q12H        Vital Sign Min/Max for last 24 hours  Temp  Min: 98 °F (36.7 °C)  Max: 100.4 °F (38 °C)   BP  Min: 80/59  Max: 110/70   Pulse  Min: 71  Max: 113   Resp  Min: 20  Max: 27   SpO2  Min: 89 %  Max: 97 %   No data recorded       Input/Output for last 24 hour shift  09/18 0701 - 09/19 0700  In: 2128 [I.V.:1335.4]  Out: 1650 [Urine:1650]   Mode: VC+/AC  FiO2 (%):  [50 %-100 %] 80 %  S RR:  [8-20] 20  S VT:  [490 mL] 490 mL  PEEP/CPAP (cm H2O):  [10 cm H20-14 cm H20] 14 cm H20  MAP (cm H2O):  [14-25] 17  Objective:  General Appearance:  In no acute distress and uncomfortable.    Vital signs: (most recent): Blood pressure 110/70, pulse 104, temperature 100.4 °F (38 °C), temperature source Bladder, resp. rate 22, height 175 cm (68.9\"), weight 116 kg (255 lb 4.7 oz), SpO2 95%.    HEENT: (Tracheostomy tube in place)    Lungs:  Normal effort and normal respiratory rate.  There are decreased breath sounds.  No rhonchi.    Heart: Tachycardia.  Regular rhythm.  S1 normal and S2 normal.  No murmur.   Chest: Symmetric chest wall expansion.   Abdomen: Abdomen is soft.  Bowel sounds are normal.  (PEG in place).   There is generalized tenderness.     Extremities: Normal range of motion.    Neurological: (Patient is arousable.  Not really following commands for me.  Appears to be uncomfortable.).    Pupils:  Pupils are " equal, round, and reactive to light.  Pupils are equal.   Skin:  Warm.                Results from last 7 days   Lab Units 09/19/24  0359 09/18/24  0242 09/17/24  0257   WBC 10*3/mm3 12.55* 9.06 7.95   HEMOGLOBIN g/dL 9.8* 10.2* 9.8*   PLATELETS 10*3/mm3 450 560* 558*     Results from last 7 days   Lab Units 09/19/24  0359 09/18/24  0242 09/17/24  0258 09/16/24  1328 09/16/24  0307 09/15/24  0305   SODIUM mmol/L 140 142 141  --  142 139   POTASSIUM mmol/L 4.0 4.0 3.8   < > 3.3* 3.7   CO2 mmol/L 27.0 29.0 30.0*  --  30.0* 34.0*   BUN mg/dL 34* 32* 29*  --  36* 39*   CREATININE mg/dL 0.69* 0.56* 0.47*  --  0.54* 0.55*   MAGNESIUM mg/dL  --  2.3 2.4  --  2.3 2.5*   PHOSPHORUS mg/dL  --   --  3.7  --  3.2 3.5   GLUCOSE mg/dL 154* 131* 104*  --  128* 122*    < > = values in this interval not displayed.     Estimated Creatinine Clearance: 137.5 mL/min (A) (by C-G formula based on SCr of 0.69 mg/dL (L)).    Results from last 7 days   Lab Units 09/19/24  0453   PH, ARTERIAL pH units 7.383   PCO2, ARTERIAL mm Hg 55.0*   PO2 ART mm Hg 69.3*         I reviewed the patient's results and images.     Assessment & Plan   Impression        Type 2 respiratory failure    HFrEF (heart failure with reduced ejection fraction)    NSTEMI (non-ST elevated myocardial infarction)    Right lower lobe pneumonia       Plan        Continue to wean ventilator as able.  Right now he is requiring elevated levels of support.  We will continue antibiotics for possible further pneumonia though cultures remain negative.  We will also do an ultrasound to see if there is any sizable effusion that drainage could help with respiratory insufficiency.  Continue current cardiac medications.  I will intensify his diuresis today.  Continue with nutritional support via PEG.  This patient remains critically ill and at imminent risk of death secondary to worsened respiratory failure.    Plan of care and goals reviewed with mulitdisciplinary/antibiotic stewardship  team during rounds.   I discussed the patient's findings and my recommendations with patient and nursing staff     High level of risk due to:  drug(s) requiring intensive monitoring for toxicity and parenteral controlled substances.    Time spent Critical care 34 min (exclusive of procedure time)  including high complexity decision making to assess, manipulate, and support vital organ system failure in this individual who has impairment of one or more vital organ systems such that there is a high probability of imminent or life threatening deterioration in the patient’s condition.      Jon Longoria MD, [facility] within [facility]  Pulmonology and Critical Care Medicine

## 2024-09-19 NOTE — CASE MANAGEMENT/SOCIAL WORK
Continued Stay Note  [facility]     Patient Name: Fernie Dalal  MRN: [id number]  Today's Date: 9/19/2024    Admit Date: 9/1/2024    Plan: [facility]   Discharge Plan       Row Name 09/19/24 1323       Plan    Plan [facility]    Patient/Family in Agreement with Plan other (see comments)    Plan Comments Debra gutierrez w/[facility] did inpatient visit. Can accept, will need to be off sedative drips. She will f/u on Monday w/me and follow in [facility] tomorrow.  Will be able to accept next week if medically ready. CM will continue to follow.    Final Discharge Disposition Code 63 - LT                   Discharge Codes    No documentation.                 Expected Discharge Date and Time       Expected Discharge Date Expected Discharge Time    Sep 23, 2024               Aniket Marquez RN

## 2024-09-19 NOTE — PLAN OF CARE
Goal Outcome Evaluation:  -pt had trach and peg this morning. Inner canula changed this shift  -TF running at 55mL/hr   -on ventilator PEEP of 14, FiO2 of 80%  -pt not able to follow commands consistently but will move feet to command intermittently  -pt NSR to sinus tach during shift  -held lasix this shift per aprn due to lower MAP.  -remains wearing soft wrist restraints  -fentanyl gtt @200, propofol @20  -had adequate uop  -no BM this shift  -pt sister updated via phone on pt status and POC

## 2024-09-19 NOTE — PROGRESS NOTES
"Patient Name:  Fernie Dalal  YOB: 1960  [id number]    Surgery Progress Note    Date of visit: 9/19/2024    Subjective   Subjective: Stable overnight, required some increase in PEEP.         Objective     Objective:     BP (!) 81/64   Pulse 103   Temp 98.7 °F (37.1 °C) (Axillary)   Resp 27   Ht 175 cm (68.9\")   Wt 116 kg (255 lb 4.7 oz)   SpO2 94%   BMI 37.81 kg/m²     Intake/Output Summary (Last 24 hours) at 9/19/2024 0711  Last data filed at 9/19/2024 0600  Gross per 24 hour   Intake 2127.96 ml   Output 1650 ml   Net 477.96 ml       CV:  Rhythm  regular and rate regular   L:  Rhonchi to auscultation bilaterally, Trach c/d/i  Abd:  Bowel sounds positive , soft, PEG c/d/i  Ext:  No cyanosis, clubbing, edema    Recent labs that are back at this time have been reviewed.            Assessment/ Plan:    Problem List Items Addressed This Visit          Cardiac and Vasculature    HFrEF (heart failure with reduced ejection fraction)    Relevant Orders    Insert Arterial Line (Completed)       Pulmonary and Pneumonias    * (Principal) Type 2 respiratory failure- Stable after Trach/PEG. Will sign off. Please call with questions.      Right lower lobe pneumonia    Relevant Medications    arformoterol (BROVANA) nebulizer solution 15 mcg    revefenacin (YUPELRI) nebulizer solution 175 mcg    albuterol (PROVENTIL) nebulizer solution 0.083% 2.5 mg/3mL    piperacillin-tazobactam (ZOSYN) 3.375 g IVPB in 100 mL NS MBP (CD) (Completed)    piperacillin-tazobactam (ZOSYN) 3.375 g IVPB in 100 mL NS MBP (CD)     Other Visit Diagnoses       Acute respiratory failure with hypoxia    -  Primary    Relevant Orders    Insert Arterial Line (Completed)    Chest pain, unspecified type                 Active Hospital Problems    Diagnosis  POA    **Type 2 respiratory failure [J96.92]  Yes    Right lower lobe pneumonia [J18.9]  Yes    NSTEMI (non-ST elevated myocardial infarction) [I21.4]  Yes    HFrEF (heart failure with " reduced ejection fraction) [I50.20]  Yes      Resolved Hospital Problems    Diagnosis Date Resolved POA    Hypotension [I95.9] 09/13/2024 Yes    Dyspnea [R06.00] 09/01/2024 Yes              Jon Mclean MD  9/19/2024  07:11 EDT

## 2024-09-20 ENCOUNTER — APPOINTMENT (OUTPATIENT)
Dept: GENERAL RADIOLOGY | Facility: HOSPITAL | Age: 64
End: 2024-09-20
Payer: MEDICAID

## 2024-09-20 LAB
ANION GAP SERPL CALCULATED.3IONS-SCNC: 13 MMOL/L (ref 5–15)
ARTERIAL PATENCY WRIST A: ABNORMAL
ATMOSPHERIC PRESS: ABNORMAL MM[HG]
B PARAPERT DNA SPEC QL NAA+PROBE: NOT DETECTED
B PERT DNA SPEC QL NAA+PROBE: NOT DETECTED
BACTERIA SPEC RESP CULT: NORMAL
BASE EXCESS BLDA CALC-SCNC: 9.9 MMOL/L (ref 0–2)
BASOPHILS # BLD AUTO: 0.06 10*3/MM3 (ref 0–0.2)
BASOPHILS NFR BLD AUTO: 0.5 % (ref 0–1.5)
BDY SITE: ABNORMAL
BODY TEMPERATURE: 37
BUN SERPL-MCNC: 37 MG/DL (ref 8–23)
BUN/CREAT SERPL: 56.9 (ref 7–25)
C PNEUM DNA NPH QL NAA+NON-PROBE: NOT DETECTED
CALCIUM SPEC-SCNC: 9.2 MG/DL (ref 8.6–10.5)
CHLORIDE SERPL-SCNC: 98 MMOL/L (ref 98–107)
CO2 BLDA-SCNC: 38.9 MMOL/L (ref 22–33)
CO2 SERPL-SCNC: 32 MMOL/L (ref 22–29)
COHGB MFR BLD: 1.4 % (ref 0–2)
CREAT SERPL-MCNC: 0.65 MG/DL (ref 0.76–1.27)
DEPRECATED RDW RBC AUTO: 49.1 FL (ref 37–54)
EGFRCR SERPLBLD CKD-EPI 2021: 105.9 ML/MIN/1.73
EOSINOPHIL # BLD AUTO: 0.11 10*3/MM3 (ref 0–0.4)
EOSINOPHIL NFR BLD AUTO: 0.9 % (ref 0.3–6.2)
EPAP: 0
ERYTHROCYTE [DISTWIDTH] IN BLOOD BY AUTOMATED COUNT: 14.5 % (ref 12.3–15.4)
FLUAV SUBTYP SPEC NAA+PROBE: NOT DETECTED
FLUBV RNA ISLT QL NAA+PROBE: NOT DETECTED
GLUCOSE BLDC GLUCOMTR-MCNC: 149 MG/DL (ref 70–130)
GLUCOSE BLDC GLUCOMTR-MCNC: 156 MG/DL (ref 70–130)
GLUCOSE BLDC GLUCOMTR-MCNC: 158 MG/DL (ref 70–130)
GLUCOSE BLDC GLUCOMTR-MCNC: 176 MG/DL (ref 70–130)
GLUCOSE SERPL-MCNC: 153 MG/DL (ref 65–99)
GRAM STN SPEC: NORMAL
GRAM STN SPEC: NORMAL
HADV DNA SPEC NAA+PROBE: NOT DETECTED
HCO3 BLDA-SCNC: 36.9 MMOL/L (ref 20–26)
HCOV 229E RNA SPEC QL NAA+PROBE: NOT DETECTED
HCOV HKU1 RNA SPEC QL NAA+PROBE: NOT DETECTED
HCOV NL63 RNA SPEC QL NAA+PROBE: NOT DETECTED
HCOV OC43 RNA SPEC QL NAA+PROBE: NOT DETECTED
HCT VFR BLD AUTO: 33.8 % (ref 37.5–51)
HCT VFR BLD CALC: 30.6 % (ref 38–51)
HGB BLD-MCNC: 9.8 G/DL (ref 13–17.7)
HGB BLDA-MCNC: 10 G/DL (ref 13.5–17.5)
HMPV RNA NPH QL NAA+NON-PROBE: NOT DETECTED
HPIV1 RNA ISLT QL NAA+PROBE: NOT DETECTED
HPIV2 RNA SPEC QL NAA+PROBE: NOT DETECTED
HPIV3 RNA NPH QL NAA+PROBE: NOT DETECTED
HPIV4 P GENE NPH QL NAA+PROBE: NOT DETECTED
IMM GRANULOCYTES # BLD AUTO: 0.06 10*3/MM3 (ref 0–0.05)
IMM GRANULOCYTES NFR BLD AUTO: 0.5 % (ref 0–0.5)
INHALED O2 CONCENTRATION: 60 %
IPAP: 0
LYMPHOCYTES # BLD AUTO: 0.7 10*3/MM3 (ref 0.7–3.1)
LYMPHOCYTES NFR BLD AUTO: 5.6 % (ref 19.6–45.3)
M PNEUMO IGG SER IA-ACNC: NOT DETECTED
MAGNESIUM SERPL-MCNC: 2.4 MG/DL (ref 1.6–2.4)
MCH RBC QN AUTO: 26.7 PG (ref 26.6–33)
MCHC RBC AUTO-ENTMCNC: 29 G/DL (ref 31.5–35.7)
MCV RBC AUTO: 92.1 FL (ref 79–97)
METHGB BLD QL: 0.1 % (ref 0–1.5)
MODALITY: ABNORMAL
MONOCYTES # BLD AUTO: 1.6 10*3/MM3 (ref 0.1–0.9)
MONOCYTES NFR BLD AUTO: 12.8 % (ref 5–12)
NEUTROPHILS NFR BLD AUTO: 79.7 % (ref 42.7–76)
NEUTROPHILS NFR BLD AUTO: 9.93 10*3/MM3 (ref 1.7–7)
NRBC BLD AUTO-RTO: 0 /100 WBC (ref 0–0.2)
OXYHGB MFR BLDV: 95.9 % (ref 94–99)
PAW @ PEAK INSP FLOW SETTING VENT: 0 CMH2O
PCO2 BLDA: 63.4 MM HG (ref 35–45)
PCO2 TEMP ADJ BLD: 63.4 MM HG (ref 35–48)
PEEP RESPIRATORY: 12 CM[H2O]
PH BLDA: 7.37 PH UNITS (ref 7.35–7.45)
PH, TEMP CORRECTED: 7.37 PH UNITS
PLATELET # BLD AUTO: 408 10*3/MM3 (ref 140–450)
PMV BLD AUTO: 10 FL (ref 6–12)
PO2 BLDA: 89.7 MM HG (ref 83–108)
PO2 TEMP ADJ BLD: 89.7 MM HG (ref 83–108)
POTASSIUM SERPL-SCNC: 3.5 MMOL/L (ref 3.5–5.2)
POTASSIUM SERPL-SCNC: 4.1 MMOL/L (ref 3.5–5.2)
RBC # BLD AUTO: 3.67 10*6/MM3 (ref 4.14–5.8)
RHINOVIRUS RNA SPEC NAA+PROBE: NOT DETECTED
RSV RNA NPH QL NAA+NON-PROBE: NOT DETECTED
SARS-COV-2 RNA NPH QL NAA+NON-PROBE: NOT DETECTED
SODIUM SERPL-SCNC: 143 MMOL/L (ref 136–145)
TOTAL RATE: 20 BREATHS/MINUTE
VENTILATOR MODE: ABNORMAL
VT ON VENT VENT: 0.49 ML
WBC NRBC COR # BLD AUTO: 12.46 10*3/MM3 (ref 3.4–10.8)

## 2024-09-20 PROCEDURE — 71045 X-RAY EXAM CHEST 1 VIEW: CPT

## 2024-09-20 PROCEDURE — 82375 ASSAY CARBOXYHB QUANT: CPT

## 2024-09-20 PROCEDURE — 25010000002 PIPERACILLIN SOD-TAZOBACTAM PER 1 G: Performed by: SURGERY

## 2024-09-20 PROCEDURE — 94799 UNLISTED PULMONARY SVC/PX: CPT

## 2024-09-20 PROCEDURE — 63710000001 INSULIN REGULAR HUMAN PER 5 UNITS: Performed by: SURGERY

## 2024-09-20 PROCEDURE — 36600 WITHDRAWAL OF ARTERIAL BLOOD: CPT

## 2024-09-20 PROCEDURE — 80048 BASIC METABOLIC PNL TOTAL CA: CPT | Performed by: SURGERY

## 2024-09-20 PROCEDURE — 82948 REAGENT STRIP/BLOOD GLUCOSE: CPT

## 2024-09-20 PROCEDURE — 25010000002 METHYLPREDNISOLONE PER 40 MG: Performed by: SURGERY

## 2024-09-20 PROCEDURE — 83050 HGB METHEMOGLOBIN QUAN: CPT

## 2024-09-20 PROCEDURE — 25010000002 FENTANYL 10 MCG/1 ML NS: Performed by: SURGERY

## 2024-09-20 PROCEDURE — 82805 BLOOD GASES W/O2 SATURATION: CPT

## 2024-09-20 PROCEDURE — 63710000001 REVEFENACIN 175 MCG/3ML SOLUTION: Performed by: SURGERY

## 2024-09-20 PROCEDURE — 94003 VENT MGMT INPAT SUBQ DAY: CPT

## 2024-09-20 PROCEDURE — 0202U NFCT DS 22 TRGT SARS-COV-2: CPT | Performed by: INTERNAL MEDICINE

## 2024-09-20 PROCEDURE — 85025 COMPLETE CBC W/AUTO DIFF WBC: CPT | Performed by: INTERNAL MEDICINE

## 2024-09-20 PROCEDURE — 84132 ASSAY OF SERUM POTASSIUM: CPT | Performed by: INTERNAL MEDICINE

## 2024-09-20 PROCEDURE — 25010000002 FUROSEMIDE PER 20 MG: Performed by: SURGERY

## 2024-09-20 PROCEDURE — 25010000002 PROPOFOL 10 MG/ML EMULSION: Performed by: SURGERY

## 2024-09-20 PROCEDURE — 99233 SBSQ HOSP IP/OBS HIGH 50: CPT | Performed by: INTERNAL MEDICINE

## 2024-09-20 PROCEDURE — 83735 ASSAY OF MAGNESIUM: CPT | Performed by: INTERNAL MEDICINE

## 2024-09-20 PROCEDURE — 25010000002 ENOXAPARIN PER 10 MG: Performed by: SURGERY

## 2024-09-20 RX ORDER — AMINO ACIDS/PROTEIN HYDROLYS 11G-40/45
30 LIQUID IN PACKET (ML) ORAL DAILY
Status: DISCONTINUED | OUTPATIENT
Start: 2024-09-21 | End: 2024-09-25 | Stop reason: HOSPADM

## 2024-09-20 RX ORDER — POTASSIUM CHLORIDE 1.5 G/1.58G
40 POWDER, FOR SOLUTION ORAL EVERY 4 HOURS
Status: COMPLETED | OUTPATIENT
Start: 2024-09-20 | End: 2024-09-20

## 2024-09-20 RX ORDER — FENTANYL 75 UG/1
1 PATCH TRANSDERMAL
Status: COMPLETED | OUTPATIENT
Start: 2024-09-20 | End: 2024-09-24

## 2024-09-20 RX ADMIN — CHLORHEXIDINE GLUCONATE, 0.12% ORAL RINSE 15 ML: 1.2 SOLUTION DENTAL at 09:19

## 2024-09-20 RX ADMIN — Medication 100 MCG/HR: at 16:00

## 2024-09-20 RX ADMIN — DIAZEPAM 10 MG: 10 TABLET ORAL at 20:36

## 2024-09-20 RX ADMIN — NYSTATIN 500000 UNITS: 100000 SUSPENSION ORAL at 11:34

## 2024-09-20 RX ADMIN — Medication 200 MCG/HR: at 00:27

## 2024-09-20 RX ADMIN — ATORVASTATIN CALCIUM 80 MG: 40 TABLET, FILM COATED ORAL at 09:19

## 2024-09-20 RX ADMIN — FUROSEMIDE 40 MG: 10 INJECTION, SOLUTION INTRAMUSCULAR; INTRAVENOUS at 09:17

## 2024-09-20 RX ADMIN — ARFORMOTEROL TARTRATE 15 MCG: 15 SOLUTION RESPIRATORY (INHALATION) at 07:47

## 2024-09-20 RX ADMIN — ENOXAPARIN SODIUM 40 MG: 100 INJECTION SUBCUTANEOUS at 09:18

## 2024-09-20 RX ADMIN — Medication 10 ML: at 20:36

## 2024-09-20 RX ADMIN — ARFORMOTEROL TARTRATE 15 MCG: 15 SOLUTION RESPIRATORY (INHALATION) at 19:54

## 2024-09-20 RX ADMIN — PHENYTOIN 200 MG: 125 SUSPENSION ORAL at 09:19

## 2024-09-20 RX ADMIN — CITALOPRAM HYDROBROMIDE 20 MG: 20 TABLET ORAL at 09:19

## 2024-09-20 RX ADMIN — ALBUTEROL SULFATE 2.5 MG: 2.5 SOLUTION RESPIRATORY (INHALATION) at 13:32

## 2024-09-20 RX ADMIN — NYSTATIN 500000 UNITS: 100000 SUSPENSION ORAL at 22:10

## 2024-09-20 RX ADMIN — REVEFENACIN 175 MCG: 175 SOLUTION RESPIRATORY (INHALATION) at 07:47

## 2024-09-20 RX ADMIN — POTASSIUM CHLORIDE 40 MEQ: 1.5 POWDER, FOR SOLUTION ORAL at 09:19

## 2024-09-20 RX ADMIN — SENNOSIDES AND DOCUSATE SODIUM 2 TABLET: 50; 8.6 TABLET ORAL at 09:19

## 2024-09-20 RX ADMIN — RISPERIDONE 1 MG: 1 SOLUTION ORAL at 20:36

## 2024-09-20 RX ADMIN — PROPOFOL 20 MCG/KG/MIN: 10 INJECTION, EMULSION INTRAVENOUS at 03:44

## 2024-09-20 RX ADMIN — PROPOFOL 10 MCG/KG/MIN: 10 INJECTION, EMULSION INTRAVENOUS at 09:20

## 2024-09-20 RX ADMIN — PHENYTOIN 200 MG: 125 SUSPENSION ORAL at 20:35

## 2024-09-20 RX ADMIN — DIAZEPAM 10 MG: 10 TABLET ORAL at 09:19

## 2024-09-20 RX ADMIN — INSULIN HUMAN 2 UNITS: 100 INJECTION, SOLUTION PARENTERAL at 12:00

## 2024-09-20 RX ADMIN — POTASSIUM CHLORIDE 40 MEQ: 1.5 POWDER, FOR SOLUTION ORAL at 06:05

## 2024-09-20 RX ADMIN — CHLORHEXIDINE GLUCONATE, 0.12% ORAL RINSE 15 ML: 1.2 SOLUTION DENTAL at 20:35

## 2024-09-20 RX ADMIN — ALBUTEROL SULFATE 2.5 MG: 2.5 SOLUTION RESPIRATORY (INHALATION) at 19:54

## 2024-09-20 RX ADMIN — ALBUTEROL SULFATE 2.5 MG: 2.5 SOLUTION RESPIRATORY (INHALATION) at 00:43

## 2024-09-20 RX ADMIN — ALBUTEROL SULFATE 2.5 MG: 2.5 SOLUTION RESPIRATORY (INHALATION) at 07:47

## 2024-09-20 RX ADMIN — PANTOPRAZOLE SODIUM 40 MG: 40 INJECTION, POWDER, LYOPHILIZED, FOR SOLUTION INTRAVENOUS at 06:05

## 2024-09-20 RX ADMIN — ASPIRIN 81 MG 81 MG: 81 TABLET ORAL at 09:19

## 2024-09-20 RX ADMIN — PIPERACILLIN AND TAZOBACTAM 3.38 G: 3; .375 INJECTION, POWDER, LYOPHILIZED, FOR SOLUTION INTRAVENOUS at 00:27

## 2024-09-20 RX ADMIN — Medication 10 ML: at 09:18

## 2024-09-20 RX ADMIN — RISPERIDONE 1 MG: 1 SOLUTION ORAL at 09:17

## 2024-09-20 RX ADMIN — EMPAGLIFLOZIN 10 MG: 10 TABLET, FILM COATED ORAL at 09:19

## 2024-09-20 RX ADMIN — SENNOSIDES AND DOCUSATE SODIUM 2 TABLET: 50; 8.6 TABLET ORAL at 20:36

## 2024-09-20 RX ADMIN — FUROSEMIDE 40 MG: 10 INJECTION, SOLUTION INTRAMUSCULAR; INTRAVENOUS at 20:36

## 2024-09-20 RX ADMIN — FENTANYL 1 PATCH: 75 PATCH TRANSDERMAL at 10:09

## 2024-09-20 RX ADMIN — ACETAMINOPHEN 650 MG: 650 SOLUTION ORAL at 11:39

## 2024-09-20 RX ADMIN — METHYLPREDNISOLONE SODIUM SUCCINATE 20 MG: 40 INJECTION, POWDER, FOR SOLUTION INTRAMUSCULAR; INTRAVENOUS at 09:18

## 2024-09-20 RX ADMIN — NYSTATIN 500000 UNITS: 100000 SUSPENSION ORAL at 09:19

## 2024-09-20 RX ADMIN — ACETAMINOPHEN 650 MG: 650 SOLUTION ORAL at 18:57

## 2024-09-20 RX ADMIN — PIPERACILLIN AND TAZOBACTAM 3.38 G: 3; .375 INJECTION, POWDER, LYOPHILIZED, FOR SOLUTION INTRAVENOUS at 17:09

## 2024-09-20 RX ADMIN — INSULIN HUMAN 2 UNITS: 100 INJECTION, SOLUTION PARENTERAL at 18:40

## 2024-09-20 RX ADMIN — Medication 30 ML: at 09:19

## 2024-09-20 RX ADMIN — PIPERACILLIN AND TAZOBACTAM 3.38 G: 3; .375 INJECTION, POWDER, LYOPHILIZED, FOR SOLUTION INTRAVENOUS at 09:17

## 2024-09-20 NOTE — CASE MANAGEMENT/SOCIAL WORK
Continued Stay Note   [facility]     Patient Name: Fernie Dalal  MRN: [id number]  Today's Date: 9/20/2024    Admit Date: 9/1/2024    Plan: [facility]   Discharge Plan       Row Name 09/20/24 1404       Plan    Plan [facility]    Patient/Family in Agreement with Plan other (see comments)    Plan Comments Discussed in MDR, remains mechanically ventilated via trach. Plan to wean sedation. [facility] following, per liaison Debra, can accept but will need to be of IV sedation. I will f/u w/her on Monday for updates, she is hopeful for him to come next week. Sibling, Oumou updated via daughter.. CM will continue to follow.    Final Discharge Disposition Code 63 - LTCH                   Discharge Codes    No documentation.                 Expected Discharge Date and Time       Expected Discharge Date Expected Discharge Time    Sep 23, 2024               Aniket Marquez RN

## 2024-09-20 NOTE — PROGRESS NOTES
Intensive Care Follow-up     Hospital:  LOS: 17 days   Mr. Fernie Dalal, 64 y.o. male is followed for:   Type 2 respiratory failure        Subjective     64-year-old male admitted on 9/1 with chest pain, shortness of breath, and chills.  He was diagnosed with right lower lobe pneumonia and exacerbation of COPD.  His past history also includes a known cardiomyopathy with reduced ejection fraction.  He had some mild elevation of his cardiac enzymes on admission which was felt to be demand ischemia.  He has been diagnosed with non-STEMI in the setting of demand ischemia with a chronically occluded RCA.  EF is 28% this admission.     He required intubation on 9/3 and transferred to ICU.  CTA revealed no pulmonary embolism.    Due to inability to wean ventilator, tracheostomy and PEG tube placement were completed on 9/18/2024.  Interval History:  Chart has been reviewed.  The patient has had a maximum temperature of 100.9 °F.  We have been able to slowly decrease him to 50% FiO2 with 10 cmH2O PEEP.  This morning he is a bit more alert but he is not following commands.  Cultures have remained negative.  He is on Zosyn.  He has not required pressors.  Yesterday we did perform bedside ultrasonography and there is minimal pleural fluid on the left.  Subsequent chest x-ray today shows interval improvement in the bibasilar atelectasis versus effusion.    The patient's past medical, surgical and social history were reviewed and updated in Epic as appropriate.        Objective     Infusions:  fentanyl 10 mcg/mL,  mcg/hr, Last Rate: 150 mcg/hr (09/20/24 1005)  norepinephrine, 0.02-0.3 mcg/kg/min, Last Rate: Stopped (09/19/24 1316)  propofol, 5-50 mcg/kg/min, Last Rate: 10 mcg/kg/min (09/20/24 0920)      Medications:  Pharmacy Consult, , Does not apply, Q12H  Albuterol Sulfate NEB Orderable, 2.5 mg, Nebulization, Q6H - RT  arformoterol, 15 mcg, Nebulization, BID - RT  aspirin, 81 mg, Nasogastric, Daily  atorvastatin, 80 mg,  "Nasogastric, Daily  fentaNYL, 1 patch, Transdermal, Q72H   And  [START ON 9/21/2024] Check Fentanyl Patch Placement, 1 each, Does not apply, Q12H  chlorhexidine, 15 mL, Mouth/Throat, Q12H  citalopram, 20 mg, Nasogastric, Daily  diazePAM, 10 mg, Nasogastric, BID  empagliflozin, 10 mg, Nasogastric, Daily  enoxaparin, 40 mg, Subcutaneous, Daily  furosemide, 40 mg, Intravenous, Q12H  insulin regular, 2-7 Units, Subcutaneous, Q6H  nystatin, 5 mL, Swish & Spit, 4x Daily  pantoprazole, 40 mg, Intravenous, Q AM  pharmacy consult - [place], , Does not apply, Daily  phenytoin, 200 mg, Nasogastric, BID  piperacillin-tazobactam, 3.375 g, Intravenous, Q8H  senna-docusate sodium, 2 tablet, Nasogastric, BID   And  polyethylene glycol, 17 g, Nasogastric, Daily  ProSource No Carb, 30 mL, Nasogastric, TID  revefenacin, 175 mcg, Nebulization, Daily - RT  risperiDONE, 1 mg, Nasogastric, BID  sodium chloride, 10 mL, Intravenous, Q12H        Vital Sign Min/Max for last 24 hours  Temp  Min: 96.8 °F (36 °C)  Max: 100.9 °F (38.3 °C)   BP  Min: 82/56  Max: 144/75   Pulse  Min: 72  Max: 110   Resp  Min: 20  Max: 27   SpO2  Min: 91 %  Max: 99 %   Flow (L/min)  Min: 70  Max: 70       Input/Output for last 24 hour shift  09/19 0701 - 09/20 0700  In: 2542.4 [I.V.:1197.6]  Out: 2600 [Urine:2600]   Mode: VC+/AC  FiO2 (%):  [60 %-80 %] 60 %  S RR:  [20] 20  S VT:  [490 mL] 490 mL  PEEP/CPAP (cm H2O):  [12 cm H20-14 cm H20] 12 cm H20  MAP (cm H2O):  [15-19] 18  Objective:  General Appearance:  In no acute distress and uncomfortable.    Vital signs: (most recent): Blood pressure 135/83, pulse 101, temperature 99.3 °F (37.4 °C), temperature source Bladder, resp. rate 27, height 175 cm (68.9\"), weight 110 kg (242 lb 15.2 oz), SpO2 91%.    HEENT: (Tracheostomy tube in place)    Lungs:  Normal effort and normal respiratory rate.  There are decreased breath sounds.  No rhonchi.    Heart: Tachycardia.  Regular rhythm.  S1 normal and S2 normal.  No murmur. "   Chest: Symmetric chest wall expansion.   Abdomen: Abdomen is soft.  Bowel sounds are normal.  (PEG in place).   There is generalized tenderness.     Extremities: Normal range of motion.  There is dependent edema.  (Anasarca)  Neurological: (Patient is arousable.  Not really following commands for me.  Appears to be uncomfortable.).    Pupils:  Pupils are equal, round, and reactive to light.  Pupils are equal.   Skin:  Warm.  No rash or cyanosis.               Results from last 7 days   Lab Units 09/20/24  0245 09/19/24  0359 09/18/24  0242   WBC 10*3/mm3 12.46* 12.55* 9.06   HEMOGLOBIN g/dL 9.8* 9.8* 10.2*   PLATELETS 10*3/mm3 408 450 560*     Results from last 7 days   Lab Units 09/20/24  0245 09/19/24  0359 09/18/24  0242 09/17/24  0258 09/16/24  1328 09/16/24  0307 09/15/24  0305   SODIUM mmol/L 143 140 142 141  --  142 139   POTASSIUM mmol/L 3.5 4.0 4.0 3.8   < > 3.3* 3.7   CO2 mmol/L 32.0* 27.0 29.0 30.0*  --  30.0* 34.0*   BUN mg/dL 37* 34* 32* 29*  --  36* 39*   CREATININE mg/dL 0.65* 0.69* 0.56* 0.47*  --  0.54* 0.55*   MAGNESIUM mg/dL 2.4  --  2.3 2.4  --  2.3 2.5*   PHOSPHORUS mg/dL  --   --   --  3.7  --  3.2 3.5   GLUCOSE mg/dL 153* 154* 131* 104*  --  128* 122*    < > = values in this interval not displayed.     Estimated Creatinine Clearance: 142 mL/min (A) (by C-G formula based on SCr of 0.65 mg/dL (L)).    Results from last 7 days   Lab Units 09/20/24  0322   PH, ARTERIAL pH units 7.373   PCO2, ARTERIAL mm Hg 63.4*   PO2 ART mm Hg 89.7         I reviewed the patient's results and images.     Assessment & Plan   Impression        Type 2 respiratory failure    HFrEF (heart failure with reduced ejection fraction)    NSTEMI (non-ST elevated myocardial infarction)    Right lower lobe pneumonia       Plan        I would like to continue to wean as he is tolerating the ventilator a bit better.  To help facilitate this, I am going to place a fentanyl patch and wean his IV fentanyl.  We may also be able to  work on the propofol soon.  Continue with diuresis as before.  He does remain somewhat edematous however we are catching up in terms of his fluid balance.  Physical and Occupational Therapy for bed exercises.  Continue with Zosyn for now.  We will continue to wean the ventilator as tolerated.  Nutritional support as before.  The patient does remain at very high risk of worsening.  I am hopeful that he is getting close for consideration of transfer to LTAC for further weaning.    Plan of care and goals reviewed with mulitdisciplinary/antibiotic stewardship team during rounds.   I discussed the patient's findings and my recommendations with patient and nursing staff     High level of risk due to:  drug(s) requiring intensive monitoring for toxicity, parenteral controlled substances, and decision to de-escalate care.      Jon Longoria MD, [facility]  Pulmonology and Critical Care Medicine

## 2024-09-20 NOTE — PLAN OF CARE
Goal Outcome Evaluation:  Plan of Care Reviewed With: patient        Progress: no change  Outcome Evaluation: .            - Pt alert to voice. Intermittently following some commands. Calm and compliant on vent on current infusing doses of fentanyl and propofol  - NSR per monitor. BP stable off pressors.   - Vent remains on PEEP 12, FiO2 60%  - Tube feed infusing at goal rate. BM x 1 this shift  - 1.9 L UOP per tom. Scheduled Lasix x 1 given   - tmax tonight 37.9

## 2024-09-20 NOTE — PROGRESS NOTES
Clinical Nutrition     Patient Name: Fernie Dalal  YOB: 1960  MRN: [id number]  Date of Encounter: 09/20/24 14:34 EDT  Admission date: 9/1/2024  Reason for Visit: MDR, Follow-up protocol, EN    Assessment   Nutrition Assessment   Admission Diagnosis:  Dyspnea [R06.00]  Acute on chronic respiratory failure with hypoxia [J96.21]    Problem List:    Type 2 respiratory failure    HFrEF (heart failure with reduced ejection fraction)    NSTEMI (non-ST elevated myocardial infarction)    Right lower lobe pneumonia      PMH:   He  has a past medical history of Angina at rest, Anxiety, Arrhythmia, COPD (chronic obstructive pulmonary disease), GERD (gastroesophageal reflux disease), Heart failure, Hyperlipidemia, Hypertension, and Myocardial infarction.    PSH:  He  has a past surgical history that includes Cardiac catheterization; Cardiovascular stress test; Cardiac catheterization (N/A, 4/24/2023); and tracheostomy and peg tube insertion (N/A, 9/18/2024).    Applicable Nutrition History:   ARF/VENT 9-3-24    EN started 9-5-24    s/p Trach, PEG 9-18-24      Labs    Labs Reviewed: Yes    Results from last 7 days   Lab Units 09/20/24  0245 09/19/24  0359 09/18/24  0242 09/17/24  0258 09/16/24  1328 09/16/24  0307 09/15/24  0305 09/14/24  0304   GLUCOSE mg/dL 153* 154* 131* 104*  --  128* 122* 113*   BUN mg/dL 37* 34* 32* 29*  --  36* 39* 34*   CREATININE mg/dL 0.65* 0.69* 0.56* 0.47*  --  0.54* 0.55* 0.52*   SODIUM mmol/L 143 140 142 141  --  142 139 139   CHLORIDE mmol/L 98 98 100 99  --  98 97* 95*   POTASSIUM mmol/L 3.5 4.0 4.0 3.8   < > 3.3* 3.7 4.0   PHOSPHORUS mg/dL  --   --   --  3.7  --  3.2 3.5 3.4   MAGNESIUM mg/dL 2.4  --  2.3 2.4  --  2.3 2.5* 2.3   ALT (SGPT) U/L  --   --   --  20  --   --   --  24   LACTATE mmol/L  --   --  0.9  --   --   --   --   --     < > = values in this interval not displayed.       Results from last 7 days   Lab Units 09/17/24  0258 09/14/24  0304   ALBUMIN  g/dL 3.6 3.4*   TRIGLYCERIDES mg/dL 170*  --        Results from last 7 days   Lab Units 09/20/24  1157 09/20/24  0517 09/19/24  2312 09/19/24  1735 09/19/24  1202 09/19/24  0505   GLUCOSE mg/dL 158* 149* 128 174* 175* 151*     Lab Results   Lab Value Date/Time    HGBA1C 6.00 (H) 09/01/2024 1050    HGBA1C 7.30 (H) 04/25/2023 0440                 Medications    Medications Reviewed: yes    Scheduled Meds:Pharmacy Consult, , Does not apply, Q12H  Albuterol Sulfate NEB Orderable, 2.5 mg, Nebulization, Q6H - RT  arformoterol, 15 mcg, Nebulization, BID - RT  aspirin, 81 mg, Nasogastric, Daily  atorvastatin, 80 mg, Nasogastric, Daily  fentaNYL, 1 patch, Transdermal, Q72H   And  [START ON 9/21/2024] Check Fentanyl Patch Placement, 1 each, Does not apply, Q12H  chlorhexidine, 15 mL, Mouth/Throat, Q12H  citalopram, 20 mg, Nasogastric, Daily  diazePAM, 10 mg, Nasogastric, BID  empagliflozin, 10 mg, Nasogastric, Daily  enoxaparin, 40 mg, Subcutaneous, Daily  furosemide, 40 mg, Intravenous, Q12H  insulin regular, 2-7 Units, Subcutaneous, Q6H  nystatin, 5 mL, Swish & Spit, 4x Daily  pantoprazole, 40 mg, Intravenous, Q AM  pharmacy consult - [facility], , Does not apply, Daily  phenytoin, 200 mg, Nasogastric, BID  piperacillin-tazobactam, 3.375 g, Intravenous, Q8H  senna-docusate sodium, 2 tablet, Nasogastric, BID   And  polyethylene glycol, 17 g, Nasogastric, Daily  ProSource No Carb, 30 mL, Nasogastric, TID  revefenacin, 175 mcg, Nebulization, Daily - RT  risperiDONE, 1 mg, Nasogastric, BID  sodium chloride, 10 mL, Intravenous, Q12H      Continuous Infusions:fentanyl 10 mcg/mL,  mcg/hr, Last Rate: 100 mcg/hr (09/20/24 1142)  norepinephrine, 0.02-0.3 mcg/kg/min, Last Rate: Stopped (09/19/24 1316)  propofol, 5-50 mcg/kg/min, Last Rate: 10 mcg/kg/min (09/20/24 1300)      PRN Meds:.  acetaminophen    senna-docusate sodium **AND** polyethylene glycol **AND** [DISCONTINUED] bisacodyl **AND** bisacodyl    fentaNYL    lactulose     "polyvinyl alcohol    Potassium Replacement - Follow Nurse / BPA Driven Protocol    sodium chloride    Intake/Ouptut 24 hrs (0701 - 0700)   I&O's Reviewed: yes    Intake & Output (last day)         09/19 0701 09/20 0700 09/20 0701 09/21 0700    I.V. (mL/kg) 1197.6 (10.3) 216.3 (2)    Other 390 240    NG/ 564    IV Piggyback 148.8 116.1    Total Intake(mL/kg) 2542.4 (21.9) 1136.4 (10.3)    Urine (mL/kg/hr) 2600 (0.9) 1515 (1.8)    Stool 0     Total Output 2600 1515    Net -57.6 -378.6          Stool Unmeasured Occurrence 3 x            Anthropometrics     Height: Height: 175 cm (68.9\")  Last Filed Weight: Weight: 110 kg (242 lb 15.2 oz) (09/20/24 0900)  Method: Weight Method: Bed scale  BMI: BMI (Calculated): 36    UBW: ?  Weight change:  17lb wt gain since admit, suspect fluid     Weight       Weight (kg) Weight (lbs) Weight Method Visit Report   4/23/2015 118.84 kg  261 lb 15.9 oz      7/1/2022 117.482 kg  259 lb   --    4/18/2023 --  --   --    4/22/2023 117.482 kg  259 lb  Stated     4/23/2023 117.482 kg  259 lb      4/24/2023 102.967 kg  227 lb  Standing scale     6/27/2023 106.051 kg  233 lb 12.8 oz   --    9/29/2023 105.688 kg  233 lb      10/31/2023 105.688 kg  233 lb   --    9/1/2024 105.688 kg  233 lb  Estimated      106 kg  233 lb 11 oz  Bed scale      113.399 kg  250 lb          Nutrition Focused Physical Exam     Date:     Unable to perform due to Pt unable to participate at time of visit     Subjective   Reported/Observed/Food/Nutrition Related History:       9-20: pt alert intubated, + fentanyl, propofol 3.5ml    Per RN: pt tolerating TF, + bm, has moderate secretions, not really following commands, plan to wean off propofol    9-18-24: pt intubated, sedated  + fentanyl, propofol 20.9ml    Per RN: plan for Trach + PEG today, pt spiked temp last night, temp this am 101.3, is not following commands      9-16: pt intubated, sedated, propofol 14.2ml/hr    Per RN: pt afebrile, had 800ml UOP last " "night, tolerating TF, had to be disimpacted yesterday    Plan for Trach, PEG : pt intubated, sedated  +fentanyl, propofol 17.7ml    Per RN: pt tolerating TF, will wake up and follow commands    9-10: pt intubated, sedated + fentanyl, propofol 14.2ml    Per RN: pt tolerating TF,  has not had bm since , has been given senokot      Unable to wean from vent. Remains on levo and propofol.  + 1BM .     9-6   pt intubated, sedated   + versed, fentanyl, ketamine  Per RN: pt will open eyes, and follow commands sometimes, 2L UOP, tolerating TF, it is @50ml currently, has not had a bm yet  Plan to change enteral Dilantin to IV Cerebryx      Pt intubated, sedated, will open eyes, is tremulous  + levophed 0.04mcg, fentanyl, heparin  Per RN: pt very anxious, will follow commands, has lots of secretions    Needs Assessment   Date: 9-10-24    Height used:Height: 175 cm (68.9\")  Weights used ABW: 233lb/ 106kg- admit wt  IBW: 160lb/ 73kg    Estimated Calorie needs: ~1453kcal/ 70% MREE  Method:  14Kcals/KG ABW:1484kcal  Method:  MSJ ABW: 1845kcal  Method:  PSU ABW (ve 9.37, Tmax 36.7) = 1943kcal  IC: MREE= 2075kcal, RQ= 0.85 wnl  Goal 65-70% ATNK=[id number]    Estimated Protein needs: ~146g protein  Method: 2g protein per kg IBW: 146g protein  Method: 1.2-1.5g protein per k-159g protein      Current Nutrition Prescription     PO: NPO Diet NPO Type: Strict NPO, Sips with Meds  Oral Nutrition Supplement:  Intake: N/A    EN: Peptamen Intense VHP  Goal Rate: 55ml  Water Flushes: 30ml  Modular: Prosource-no carb 3/day  Route: NG  Tube: 20 PEG    At goal over: 20Hrs/day     Rx will supply:   Goal Volume 1100  mL/day     Flush Volume 600 mL/day     Energy 1280 Kcal/day 62 % MREE   Protein 146 g/day 100 % Est Need   Fiber 4 g/day     Water in   mL     Total Water 1524 mL     Meet DRI No        --------------------------------------------------------------------------  Product/Rate verified at " bedside: Yes  Infusing Rate at time of visit: 55ml    Average Delivery from [facility]:     Volume 756 mL/day 69  % Goal Vol.   Flush Volume 440 mL/day     Energy 1402 Kcal/day 68 % MREE   Protein 115 g/day 79 % Est Need   Fiber 3 g/day     Water in  EN 635m L     Total Water 1075 mL     Meet DRI No           Propofol intake:388ml, 466kcal    Assessment & Plan   Nutrition Diagnosis   Date: 24 Updated:   Problem Inadequate energy intake    Etiology ARF/VENT   Signs/Symptoms 69% goal volume EN   Status: Active     Goal:   Nutrition to support treatment and Adjust EN       Nutrition Intervention      Follow treatment progress, Care plan reviewed    As plan to wean off propofol, will increase TF to 75ml/hr, Prosource 1x/day, free water @30ml/hr    TF at goal volume will provide 1500ml, 1560kcal, 75% MREE, 153g protein,105% protein needs, 6g fiber, 1860ml free water    Continue to hold TF 1 hour before and after each dilantin dose    Repeat calorimetry ordered to re-assess kcal needs, study pending    Monitoring/Evaluation:   Per protocol, I&O, Pertinent labs, Weight, Skin status, GI status, Symptoms, Hemodynamic stability    Tania [address], [place]  Time Spent: 30min

## 2024-09-21 ENCOUNTER — APPOINTMENT (OUTPATIENT)
Dept: GENERAL RADIOLOGY | Facility: HOSPITAL | Age: 64
End: 2024-09-21
Payer: MEDICAID

## 2024-09-21 LAB
ANION GAP SERPL CALCULATED.3IONS-SCNC: 9 MMOL/L (ref 5–15)
ARTERIAL PATENCY WRIST A: ABNORMAL
ATMOSPHERIC PRESS: ABNORMAL MM[HG]
BASE EXCESS BLDA CALC-SCNC: 13 MMOL/L (ref 0–2)
BASOPHILS # BLD AUTO: 0.06 10*3/MM3 (ref 0–0.2)
BASOPHILS NFR BLD AUTO: 0.5 % (ref 0–1.5)
BDY SITE: ABNORMAL
BODY TEMPERATURE: 37
BUN SERPL-MCNC: 33 MG/DL (ref 8–23)
BUN/CREAT SERPL: 55.9 (ref 7–25)
CALCIUM SPEC-SCNC: 9.2 MG/DL (ref 8.6–10.5)
CHLORIDE SERPL-SCNC: 99 MMOL/L (ref 98–107)
CO2 BLDA-SCNC: 39.7 MMOL/L (ref 22–33)
CO2 SERPL-SCNC: 35 MMOL/L (ref 22–29)
COHGB MFR BLD: 1.4 % (ref 0–2)
CREAT SERPL-MCNC: 0.59 MG/DL (ref 0.76–1.27)
DEPRECATED RDW RBC AUTO: 47.3 FL (ref 37–54)
EGFRCR SERPLBLD CKD-EPI 2021: 109 ML/MIN/1.73
EOSINOPHIL # BLD AUTO: 0.02 10*3/MM3 (ref 0–0.4)
EOSINOPHIL NFR BLD AUTO: 0.2 % (ref 0.3–6.2)
EPAP: 0
ERYTHROCYTE [DISTWIDTH] IN BLOOD BY AUTOMATED COUNT: 14.5 % (ref 12.3–15.4)
GLUCOSE BLDC GLUCOMTR-MCNC: 145 MG/DL (ref 70–130)
GLUCOSE BLDC GLUCOMTR-MCNC: 163 MG/DL (ref 70–130)
GLUCOSE BLDC GLUCOMTR-MCNC: 164 MG/DL (ref 70–130)
GLUCOSE BLDC GLUCOMTR-MCNC: 184 MG/DL (ref 70–130)
GLUCOSE SERPL-MCNC: 175 MG/DL (ref 65–99)
HCO3 BLDA-SCNC: 38.1 MMOL/L (ref 20–26)
HCT VFR BLD AUTO: 34.1 % (ref 37.5–51)
HCT VFR BLD CALC: 31.2 % (ref 38–51)
HGB BLD-MCNC: 10.1 G/DL (ref 13–17.7)
HGB BLDA-MCNC: 10.2 G/DL (ref 13.5–17.5)
IMM GRANULOCYTES # BLD AUTO: 0.08 10*3/MM3 (ref 0–0.05)
IMM GRANULOCYTES NFR BLD AUTO: 0.6 % (ref 0–0.5)
INHALED O2 CONCENTRATION: 45 %
IPAP: 0
LYMPHOCYTES # BLD AUTO: 0.71 10*3/MM3 (ref 0.7–3.1)
LYMPHOCYTES NFR BLD AUTO: 5.5 % (ref 19.6–45.3)
MAGNESIUM SERPL-MCNC: 2.4 MG/DL (ref 1.6–2.4)
MCH RBC QN AUTO: 26.5 PG (ref 26.6–33)
MCHC RBC AUTO-ENTMCNC: 29.6 G/DL (ref 31.5–35.7)
MCV RBC AUTO: 89.5 FL (ref 79–97)
METHGB BLD QL: 0 % (ref 0–1.5)
MODALITY: ABNORMAL
MONOCYTES # BLD AUTO: 1.61 10*3/MM3 (ref 0.1–0.9)
MONOCYTES NFR BLD AUTO: 12.5 % (ref 5–12)
NEUTROPHILS NFR BLD AUTO: 10.37 10*3/MM3 (ref 1.7–7)
NEUTROPHILS NFR BLD AUTO: 80.7 % (ref 42.7–76)
NRBC BLD AUTO-RTO: 0 /100 WBC (ref 0–0.2)
OXYHGB MFR BLDV: 93.9 % (ref 94–99)
PAW @ PEAK INSP FLOW SETTING VENT: 0 CMH2O
PCO2 BLDA: 50.9 MM HG (ref 35–45)
PCO2 TEMP ADJ BLD: 50.9 MM HG (ref 35–48)
PEEP RESPIRATORY: 10 CM[H2O]
PH BLDA: 7.48 PH UNITS (ref 7.35–7.45)
PH, TEMP CORRECTED: 7.48 PH UNITS
PLATELET # BLD AUTO: 472 10*3/MM3 (ref 140–450)
PMV BLD AUTO: 9.8 FL (ref 6–12)
PO2 BLDA: 70.1 MM HG (ref 83–108)
PO2 TEMP ADJ BLD: 70.1 MM HG (ref 83–108)
POTASSIUM SERPL-SCNC: 3.1 MMOL/L (ref 3.5–5.2)
POTASSIUM SERPL-SCNC: 4 MMOL/L (ref 3.5–5.2)
RBC # BLD AUTO: 3.81 10*6/MM3 (ref 4.14–5.8)
SODIUM SERPL-SCNC: 143 MMOL/L (ref 136–145)
TOTAL RATE: 20 BREATHS/MINUTE
VENTILATOR MODE: ABNORMAL
VT ON VENT VENT: 0.49 ML
WBC NRBC COR # BLD AUTO: 12.85 10*3/MM3 (ref 3.4–10.8)

## 2024-09-21 PROCEDURE — 36600 WITHDRAWAL OF ARTERIAL BLOOD: CPT

## 2024-09-21 PROCEDURE — 94761 N-INVAS EAR/PLS OXIMETRY MLT: CPT

## 2024-09-21 PROCEDURE — 85025 COMPLETE CBC W/AUTO DIFF WBC: CPT | Performed by: INTERNAL MEDICINE

## 2024-09-21 PROCEDURE — 82948 REAGENT STRIP/BLOOD GLUCOSE: CPT

## 2024-09-21 PROCEDURE — 97168 OT RE-EVAL EST PLAN CARE: CPT

## 2024-09-21 PROCEDURE — 82805 BLOOD GASES W/O2 SATURATION: CPT

## 2024-09-21 PROCEDURE — 63710000001 REVEFENACIN 175 MCG/3ML SOLUTION: Performed by: SURGERY

## 2024-09-21 PROCEDURE — 25010000002 METHYLNALTREXONE 12 MG/0.6ML SOLUTION: Performed by: INTERNAL MEDICINE

## 2024-09-21 PROCEDURE — 71045 X-RAY EXAM CHEST 1 VIEW: CPT

## 2024-09-21 PROCEDURE — 25010000002 PIPERACILLIN SOD-TAZOBACTAM PER 1 G: Performed by: SURGERY

## 2024-09-21 PROCEDURE — 25010000002 FUROSEMIDE PER 20 MG: Performed by: SURGERY

## 2024-09-21 PROCEDURE — 94664 DEMO&/EVAL PT USE INHALER: CPT

## 2024-09-21 PROCEDURE — 94799 UNLISTED PULMONARY SVC/PX: CPT

## 2024-09-21 PROCEDURE — 82375 ASSAY CARBOXYHB QUANT: CPT

## 2024-09-21 PROCEDURE — 94003 VENT MGMT INPAT SUBQ DAY: CPT

## 2024-09-21 PROCEDURE — 63710000001 INSULIN REGULAR HUMAN PER 5 UNITS: Performed by: SURGERY

## 2024-09-21 PROCEDURE — 97530 THERAPEUTIC ACTIVITIES: CPT

## 2024-09-21 PROCEDURE — 84132 ASSAY OF SERUM POTASSIUM: CPT | Performed by: INTERNAL MEDICINE

## 2024-09-21 PROCEDURE — 25010000002 ENOXAPARIN PER 10 MG: Performed by: SURGERY

## 2024-09-21 PROCEDURE — 83050 HGB METHEMOGLOBIN QUAN: CPT

## 2024-09-21 PROCEDURE — 97164 PT RE-EVAL EST PLAN CARE: CPT

## 2024-09-21 PROCEDURE — 80048 BASIC METABOLIC PNL TOTAL CA: CPT | Performed by: SURGERY

## 2024-09-21 PROCEDURE — 99233 SBSQ HOSP IP/OBS HIGH 50: CPT | Performed by: INTERNAL MEDICINE

## 2024-09-21 PROCEDURE — 83735 ASSAY OF MAGNESIUM: CPT | Performed by: INTERNAL MEDICINE

## 2024-09-21 RX ORDER — CITALOPRAM HYDROBROMIDE 20 MG/1
20 TABLET ORAL ONCE
Status: COMPLETED | OUTPATIENT
Start: 2024-09-21 | End: 2024-09-21

## 2024-09-21 RX ORDER — POTASSIUM CHLORIDE 1.5 G/1.58G
40 POWDER, FOR SOLUTION ORAL EVERY 4 HOURS
Status: COMPLETED | OUTPATIENT
Start: 2024-09-21 | End: 2024-09-21

## 2024-09-21 RX ORDER — METOLAZONE 5 MG/1
5 TABLET ORAL ONCE
Status: COMPLETED | OUTPATIENT
Start: 2024-09-21 | End: 2024-09-21

## 2024-09-21 RX ORDER — CITALOPRAM HYDROBROMIDE 40 MG/1
40 TABLET ORAL DAILY
Status: DISCONTINUED | OUTPATIENT
Start: 2024-09-22 | End: 2024-09-25 | Stop reason: HOSPADM

## 2024-09-21 RX ADMIN — FUROSEMIDE 40 MG: 10 INJECTION, SOLUTION INTRAMUSCULAR; INTRAVENOUS at 09:13

## 2024-09-21 RX ADMIN — ACETAMINOPHEN 650 MG: 650 SOLUTION ORAL at 09:08

## 2024-09-21 RX ADMIN — POLYETHYLENE GLYCOL 3350 17 G: 17 POWDER, FOR SOLUTION ORAL at 09:14

## 2024-09-21 RX ADMIN — EMPAGLIFLOZIN 10 MG: 10 TABLET, FILM COATED ORAL at 09:15

## 2024-09-21 RX ADMIN — CHLORHEXIDINE GLUCONATE, 0.12% ORAL RINSE 15 ML: 1.2 SOLUTION DENTAL at 09:13

## 2024-09-21 RX ADMIN — POTASSIUM CHLORIDE 40 MEQ: 1.5 POWDER, FOR SOLUTION ORAL at 06:11

## 2024-09-21 RX ADMIN — REVEFENACIN 175 MCG: 175 SOLUTION RESPIRATORY (INHALATION) at 07:19

## 2024-09-21 RX ADMIN — INSULIN HUMAN 2 UNITS: 100 INJECTION, SOLUTION PARENTERAL at 12:12

## 2024-09-21 RX ADMIN — ALBUTEROL SULFATE 2.5 MG: 2.5 SOLUTION RESPIRATORY (INHALATION) at 18:55

## 2024-09-21 RX ADMIN — PHENYTOIN 200 MG: 125 SUSPENSION ORAL at 20:03

## 2024-09-21 RX ADMIN — INSULIN HUMAN 2 UNITS: 100 INJECTION, SOLUTION PARENTERAL at 18:17

## 2024-09-21 RX ADMIN — ACETAMINOPHEN 650 MG: 650 SOLUTION ORAL at 16:10

## 2024-09-21 RX ADMIN — NYSTATIN 500000 UNITS: 100000 SUSPENSION ORAL at 09:14

## 2024-09-21 RX ADMIN — RISPERIDONE 1 MG: 1 SOLUTION ORAL at 20:03

## 2024-09-21 RX ADMIN — METOLAZONE 5 MG: 5 TABLET ORAL at 12:13

## 2024-09-21 RX ADMIN — POTASSIUM CHLORIDE 40 MEQ: 1.5 POWDER, FOR SOLUTION ORAL at 09:13

## 2024-09-21 RX ADMIN — ATORVASTATIN CALCIUM 80 MG: 40 TABLET, FILM COATED ORAL at 09:13

## 2024-09-21 RX ADMIN — INSULIN HUMAN 2 UNITS: 100 INJECTION, SOLUTION PARENTERAL at 06:11

## 2024-09-21 RX ADMIN — PIPERACILLIN AND TAZOBACTAM 3.38 G: 3; .375 INJECTION, POWDER, LYOPHILIZED, FOR SOLUTION INTRAVENOUS at 16:10

## 2024-09-21 RX ADMIN — NYSTATIN 500000 UNITS: 100000 SUSPENSION ORAL at 20:03

## 2024-09-21 RX ADMIN — PIPERACILLIN AND TAZOBACTAM 3.38 G: 3; .375 INJECTION, POWDER, LYOPHILIZED, FOR SOLUTION INTRAVENOUS at 00:12

## 2024-09-21 RX ADMIN — POTASSIUM CHLORIDE 40 MEQ: 1.5 POWDER, FOR SOLUTION ORAL at 14:17

## 2024-09-21 RX ADMIN — NYSTATIN 500000 UNITS: 100000 SUSPENSION ORAL at 12:12

## 2024-09-21 RX ADMIN — DIAZEPAM 10 MG: 10 TABLET ORAL at 20:03

## 2024-09-21 RX ADMIN — Medication 10 ML: at 20:04

## 2024-09-21 RX ADMIN — FUROSEMIDE 40 MG: 10 INJECTION, SOLUTION INTRAMUSCULAR; INTRAVENOUS at 20:03

## 2024-09-21 RX ADMIN — PIPERACILLIN AND TAZOBACTAM 3.38 G: 3; .375 INJECTION, POWDER, LYOPHILIZED, FOR SOLUTION INTRAVENOUS at 09:14

## 2024-09-21 RX ADMIN — ASPIRIN 81 MG 81 MG: 81 TABLET ORAL at 09:13

## 2024-09-21 RX ADMIN — ALBUTEROL SULFATE 2.5 MG: 2.5 SOLUTION RESPIRATORY (INHALATION) at 07:19

## 2024-09-21 RX ADMIN — METHYLNALTREXONE BROMIDE 12 MG: 12 INJECTION, SOLUTION SUBCUTANEOUS at 14:16

## 2024-09-21 RX ADMIN — DIAZEPAM 10 MG: 10 TABLET ORAL at 09:13

## 2024-09-21 RX ADMIN — Medication 30 ML: at 09:15

## 2024-09-21 RX ADMIN — ACETAMINOPHEN 650 MG: 650 SOLUTION ORAL at 02:35

## 2024-09-21 RX ADMIN — CITALOPRAM HYDROBROMIDE 20 MG: 20 TABLET ORAL at 14:16

## 2024-09-21 RX ADMIN — PHENYTOIN 200 MG: 125 SUSPENSION ORAL at 09:14

## 2024-09-21 RX ADMIN — Medication 10 ML: at 09:14

## 2024-09-21 RX ADMIN — INSULIN HUMAN 2 UNITS: 100 INJECTION, SOLUTION PARENTERAL at 00:12

## 2024-09-21 RX ADMIN — ENOXAPARIN SODIUM 40 MG: 100 INJECTION SUBCUTANEOUS at 09:14

## 2024-09-21 RX ADMIN — PANTOPRAZOLE SODIUM 40 MG: 40 INJECTION, POWDER, LYOPHILIZED, FOR SOLUTION INTRAVENOUS at 06:11

## 2024-09-21 RX ADMIN — CHLORHEXIDINE GLUCONATE, 0.12% ORAL RINSE 15 ML: 1.2 SOLUTION DENTAL at 20:03

## 2024-09-21 RX ADMIN — ALBUTEROL SULFATE 2.5 MG: 2.5 SOLUTION RESPIRATORY (INHALATION) at 01:15

## 2024-09-21 RX ADMIN — RISPERIDONE 1 MG: 1 SOLUTION ORAL at 09:14

## 2024-09-21 RX ADMIN — ARFORMOTEROL TARTRATE 15 MCG: 15 SOLUTION RESPIRATORY (INHALATION) at 18:55

## 2024-09-21 RX ADMIN — ALBUTEROL SULFATE 2.5 MG: 2.5 SOLUTION RESPIRATORY (INHALATION) at 13:40

## 2024-09-21 RX ADMIN — ARFORMOTEROL TARTRATE 15 MCG: 15 SOLUTION RESPIRATORY (INHALATION) at 07:19

## 2024-09-21 RX ADMIN — CITALOPRAM HYDROBROMIDE 20 MG: 20 TABLET ORAL at 09:13

## 2024-09-21 RX ADMIN — SENNOSIDES AND DOCUSATE SODIUM 2 TABLET: 50; 8.6 TABLET ORAL at 09:13

## 2024-09-21 NOTE — PLAN OF CARE
Goal Outcome Evaluation:  Plan of Care Reviewed With: patient        Progress: declining  Outcome Evaluation: PT re-evaluation complete s/p PEG placement and tracheostomy and goals adjusted to reflect pt's current functional level. PT re-evaluation limited to bed level due to pt inability to follow commands. Pt was able to mouth his name however unable to demonstrate active movement of BLE's. In addition pt demonstrated significant resistance w/ all passive movement this date. Pt would benefit from continued skilled therapy while hospitalized to maximize functional independence. D/c rec remains SNF for best outcome      Anticipated Discharge Disposition (PT): skilled nursing facility

## 2024-09-21 NOTE — PLAN OF CARE
Problem: Restraint, Nonviolent  Goal: Absence of Harm or Injury  Outcome: Ongoing, Progressing  Intervention: Implement Least Restrictive Safety Strategies  Recent Flowsheet Documentation  Taken 9/21/2024 1800 by Kimberley Sabillon RN  Diversional Activities: television  Taken 9/21/2024 1600 by Kimberley Sabillon RN  Diversional Activities: television  Taken 9/21/2024 1400 by Kimberley Sabillon RN  Diversional Activities: television  Taken 9/21/2024 1200 by Kimberley Sabillon RN  Medical Device Protection:   IV pole/bag removed from visual field   torso covered   tubing secured  Less Restrictive Alternative:   bed alarm in use   calming techniques promoted   environment adjusted   safety enhancements provided   sensory stimulation limited  De-Escalation Techniques:   verbally redirected   stimulation decreased   reoriented   appropriate behavior reinforced  Diversional Activities: television  Taken 9/21/2024 1000 by Kimberley Sabillon RN  Medical Device Protection:   IV pole/bag removed from visual field   torso covered   tubing secured  Less Restrictive Alternative:   bed alarm in use   calming techniques promoted   environment adjusted   safety enhancements provided   sensory stimulation limited  De-Escalation Techniques:   verbally redirected   reoriented   stimulation decreased   quiet time facilitated   appropriate behavior reinforced  Diversional Activities: television  Taken 9/21/2024 0800 by Kimberley Sabillon RN  Medical Device Protection:   IV pole/bag removed from visual field   torso covered   tubing secured  Less Restrictive Alternative:   bed alarm in use   calming techniques promoted   environment adjusted   safety enhancements provided   sensory stimulation limited  De-Escalation Techniques:   verbally redirected   stimulation decreased   reoriented   appropriate behavior reinforced  Diversional Activities: television  Intervention: Protect Dignity, Rights, and Personal Wellbeing  Recent  Flowsheet Documentation  Taken 9/21/2024 1800 by Kimberley Sabillon RN  Trust Relationship/Rapport:   care explained   empathic listening provided   emotional support provided   questions answered   questions encouraged   thoughts/feelings acknowledged  Taken 9/21/2024 1600 by Kimberley Sabillon RN  Trust Relationship/Rapport:   care explained   emotional support provided   questions encouraged   thoughts/feelings acknowledged  Taken 9/21/2024 1400 by Kimberley Sabillon RN  Trust Relationship/Rapport: care explained  Taken 9/21/2024 1200 by Kimberley Sabillon RN  Trust Relationship/Rapport: care explained  Taken 9/21/2024 1000 by Kimberley Sabillon RN  Trust Relationship/Rapport: care explained  Taken 9/21/2024 0800 by Kimberley Sabillon RN  Trust Relationship/Rapport:   care explained   emotional support provided   empathic listening provided   questions answered   questions encouraged   thoughts/feelings acknowledged  Intervention: Protect Skin and Joint Integrity  Recent Flowsheet Documentation  Taken 9/21/2024 1800 by Kimberley Sabillon RN  Body Position: legs elevated  Taken 9/21/2024 1705 by Kimberley Sabillon RN  Body Position: legs elevated  Taken 9/21/2024 1600 by Kimberley Sabillon RN  Body Position:   turned   left   lower extremity elevated   upper extremity elevated  Taken 9/21/2024 1400 by Kimberley Sabillon RN  Body Position:   turned   supine  Taken 9/21/2024 1200 by Kimberley Sabillon RN  Body Position:   turned   right   upper extremity elevated   lower extremity elevated  Taken 9/21/2024 1000 by Kimberley Sabillon RN  Body Position:   turned   left   upper extremity elevated   lower extremity elevated  Taken 9/21/2024 0800 by Kimberley Sabillon RN  Body Position:   turned   right   upper extremity elevated   lower extremity elevated   Goal Outcome Evaluation:  Plan of Care Reviewed With: patient        Progress: improving  -pt more alert today, mouthing words, nods head to simple  "\"yes/no\" questions, follows commands  -NSR/Sinus tach with PVCs, 2 short Vruns- asymptomatic, no change in vital signs  -low grade fever .8, Tylenol x2  -on mechanical ventilation, weaned to FiO2 35%, PEEP 5, moderate/large creamy white secretions, overbreathing vent RR 23-27  -lifted to chair ~1700, ROM with therapy, activity encouraged   -2 large BMs, UOP: 1825ml (lasix x1), tolerating tube feeding  -potassium replaced, redraw 4  -Fentanyl gtt weaned off, stopped around noon  -sister updated on care via telephone and at bedside  "

## 2024-09-21 NOTE — PROGRESS NOTES
Intensive Care Follow-up     Hospital:  LOS: 18 days   Mr. Fernie Dalal, 64 y.o. male is followed for:   Type 2 respiratory failure        Subjective     64-year-old male admitted on 9/1 with chest pain, shortness of breath, and chills.  He was diagnosed with right lower lobe pneumonia and exacerbation of COPD.  His past history also includes a known cardiomyopathy with reduced ejection fraction.  He had some mild elevation of his cardiac enzymes on admission which was felt to be demand ischemia.  He has been diagnosed with non-STEMI in the setting of demand ischemia with a chronically occluded RCA.  EF is 28% this admission.     He required intubation on 9/3 and transferred to ICU.  CTA revealed no pulmonary embolism.     Due to inability to wean ventilator, tracheostomy and PEG tube placement were completed on 9/18/2024.  Interval History:  Chart has been reviewed.  Culture data has remained negative.  Secretions have improved somewhat.  The patient has remained febrile.  He appears to be a bit more awake.  I was able to reduce his PEEP from 10 down to 5 and he seems to hold his saturations without difficulty.  Pressors have remained off x 24 hours at least.    The patient's past medical, surgical and social history were reviewed and updated in Epic as appropriate.        Objective     Infusions:  fentanyl 10 mcg/mL,  mcg/hr, Last Rate: 50 mcg/hr (09/21/24 0918)  norepinephrine, 0.02-0.3 mcg/kg/min, Last Rate: Stopped (09/19/24 1316)  propofol, 5-50 mcg/kg/min, Last Rate: Stopped (09/20/24 1710)      Medications:  Pharmacy Consult, , Does not apply, Q12H  Albuterol Sulfate NEB Orderable, 2.5 mg, Nebulization, Q6H - RT  arformoterol, 15 mcg, Nebulization, BID - RT  aspirin, 81 mg, Nasogastric, Daily  atorvastatin, 80 mg, Nasogastric, Daily  fentaNYL, 1 patch, Transdermal, Q72H   And  Check Fentanyl Patch Placement, 1 each, Does not apply, Q12H  chlorhexidine, 15 mL, Mouth/Throat, Q12H  citalopram, 20 mg,  "Nasogastric, Daily  diazePAM, 10 mg, Nasogastric, BID  empagliflozin, 10 mg, Nasogastric, Daily  enoxaparin, 40 mg, Subcutaneous, Daily  furosemide, 40 mg, Intravenous, Q12H  insulin regular, 2-7 Units, Subcutaneous, Q6H  methylnaltrexone, 12 mg, Subcutaneous, Once  metOLazone, 5 mg, Per PEG Tube, Once  nystatin, 5 mL, Swish & Spit, 4x Daily  pantoprazole, 40 mg, Intravenous, Q AM  pharmacy consult - [place], , Does not apply, Daily  phenytoin, 200 mg, Nasogastric, BID  piperacillin-tazobactam, 3.375 g, Intravenous, Q8H  senna-docusate sodium, 2 tablet, Nasogastric, BID   And  polyethylene glycol, 17 g, Nasogastric, Daily  potassium chloride, 40 mEq, Oral, Q4H  ProSource No Carb, 30 mL, Nasogastric, Daily  revefenacin, 175 mcg, Nebulization, Daily - RT  risperiDONE, 1 mg, Nasogastric, BID  sodium chloride, 10 mL, Intravenous, Q12H        Vital Sign Min/Max for last 24 hours  Temp  Min: 100.1 °F (37.8 °C)  Max: 102.8 °F (39.3 °C)   BP  Min: 105/68  Max: 161/97   Pulse  Min: 90  Max: 123   Resp  Min: 21  Max: 30   SpO2  Min: 89 %  Max: 98 %   No data recorded       Input/Output for last 24 hour shift  09/20 0701 - 09/21 0700  In: 2774.6 [I.V.:439.1]  Out: 4205 [Urine:4205]   Mode: VC+/AC  FiO2 (%):  [45 %] 45 %  S RR:  [20] 20  S VT:  [490 mL] 490 mL  PEEP/CPAP (cm H2O):  [10 cm H20] 10 cm H20  MAP (cm H2O):  [14-21] 14  Objective:  General Appearance:  In no acute distress, uncomfortable and not in pain.    Vital signs: (most recent): Blood pressure 131/81, pulse 90, temperature 100.4 °F (38 °C), resp. rate 23, height 175 cm (68.9\"), weight 110 kg (241 lb 7.2 oz), SpO2 94%.    HEENT: (Tracheostomy tube in place)    Lungs:  Normal effort and normal respiratory rate.  There are decreased breath sounds.  No rhonchi.    Heart: Tachycardia.  Regular rhythm.  S1 normal and S2 normal.  No murmur.   Chest: Symmetric chest wall expansion.   Abdomen: Abdomen is soft.  Bowel sounds are normal.  (PEG in place).   There is no " abdominal tenderness.     Extremities: Normal range of motion.  There is dependent edema.  (Anasarca)  Neurological: (Awake.  Follows some commands.  Seems to nod appropriately.).    Pupils:  Pupils are equal, round, and reactive to light.  Pupils are equal.   Skin:  Warm.  No rash or cyanosis.               Results from last 7 days   Lab Units 09/21/24  0355 09/20/24  0245 09/19/24  0359   WBC 10*3/mm3 12.85* 12.46* 12.55*   HEMOGLOBIN g/dL 10.1* 9.8* 9.8*   PLATELETS 10*3/mm3 472* 408 450     Results from last 7 days   Lab Units 09/21/24  0355 09/20/24  1600 09/20/24  0245 09/19/24  0359 09/18/24  0242 09/17/24  0258 09/16/24  1328 09/16/24  0307 09/15/24  0305   SODIUM mmol/L 143  --  143 140 142 141  --  142 139   POTASSIUM mmol/L 3.1* 4.1 3.5 4.0 4.0 3.8   < > 3.3* 3.7   CO2 mmol/L 35.0*  --  32.0* 27.0 29.0 30.0*  --  30.0* 34.0*   BUN mg/dL 33*  --  37* 34* 32* 29*  --  36* 39*   CREATININE mg/dL 0.59*  --  0.65* 0.69* 0.56* 0.47*  --  0.54* 0.55*   MAGNESIUM mg/dL 2.4  --  2.4  --  2.3 2.4  --  2.3 2.5*   PHOSPHORUS mg/dL  --   --   --   --   --  3.7  --  3.2 3.5   GLUCOSE mg/dL 175*  --  153* 154* 131* 104*  --  128* 122*    < > = values in this interval not displayed.     Estimated Creatinine Clearance: 156.4 mL/min (A) (by C-G formula based on SCr of 0.59 mg/dL (L)).    Results from last 7 days   Lab Units 09/21/24  0333   PH, ARTERIAL pH units 7.482*   PCO2, ARTERIAL mm Hg 50.9*   PO2 ART mm Hg 70.1*         I reviewed the patient's results and images.     Assessment & Plan   Impression        Type 2 respiratory failure    HFrEF (heart failure with reduced ejection fraction)    NSTEMI (non-ST elevated myocardial infarction)    Right lower lobe pneumonia       Plan        Patient does continue to have fevers though cultures have all remain negative.  We will continue on with Zosyn for now.  If these do continue, we may need to consider removal of his PICC line though I do note that the blood cultures have  been negative.  We will continue to monitor closely.  Continue with diuresis as before for volume overload from heart failure.  We will add Zaroxolyn 1 dose of 5 mg p.o. today.  I would like to go ahead and increase his Celexa dose given his depression and likely need for increased dose given his current situation.  Replace electrolytes for hypokalemia.  I have decreased him to 5 of PEEP.  Will continue to move towards working on spontaneous trials.  He is not yet ready today.  Continue with nutritional support as before.  Patient does remain at very high risk of worsening.    Plan of care and goals reviewed with mulitdisciplinary/antibiotic stewardship team during rounds.   I discussed the patient's findings and my recommendations with patient and nursing staff     High level of risk due to:  drug(s) requiring intensive monitoring for toxicity and parenteral controlled substances.        Jon Longoria MD, [facility]  Pulmonology and Critical Care Medicine

## 2024-09-21 NOTE — PLAN OF CARE
Goal Outcome Evaluation:  Plan of Care Reviewed With: patient           Outcome Evaluation: OT initial eval and expanded chart review completed. Pt presents with multiple comorbidities and decreased strength, balance, activity tolerance and SOA limiting independence with ADL's and mobility from baseline status. Recommend continued skilled OT services and transfer to SNF at d/c for best functional outcomes.      Anticipated Discharge Disposition (OT): skilled nursing facility

## 2024-09-21 NOTE — THERAPY RE-EVALUATION
Patient Name: Fernie Dalal  : 1960    MRN: [id number]                              Today's Date: 2024       Admit Date: 2024    Visit Dx:     ICD-10-CM ICD-9-CM   1. Acute respiratory failure with hypoxia  J96.01 518.81   2. Aspiration pneumonia of right lower lobe, unspecified aspiration pneumonia type  J69.0 507.0   3. Chest pain, unspecified type  R07.9 786.50   4. Acute on chronic respiratory failure with hypoxia  J96.21 518.84     799.02   5. HFrEF (heart failure with reduced ejection fraction)  I50.20 428.20     Patient Active Problem List   Diagnosis    Adiposity    Allergic rhinitis    Anxiety    Blues    Chronic low back pain    HFrEF (heart failure with reduced ejection fraction)    Coronary artery disease involving native coronary artery of native heart with angina pectoris    Dyslipidemia    Muscle spasm    Seizure disorder    Sleep apnea    Essential hypertension    Cardiomyopathy, dilated    NSTEMI (non-ST elevated myocardial infarction)    Type 2 respiratory failure    Right lower lobe pneumonia     Past Medical History:   Diagnosis Date    Angina at rest     Anxiety     Arrhythmia     COPD (chronic obstructive pulmonary disease)     GERD (gastroesophageal reflux disease)     Heart failure     congestive    Hyperlipidemia     Hypertension     Myocardial infarction      Past Surgical History:   Procedure Laterality Date    CARDIAC CATHETERIZATION      CARDIAC CATHETERIZATION N/A 2023    Procedure: Left Heart Cath;  Surgeon: Marsha Gaitan MD;  Location: [facility] CATH INVASIVE LOCATION;  Service: Cardiology;  Laterality: N/A;    CARDIOVASCULAR STRESS TEST      TRACHEOSTOMY AND PEG TUBE INSERTION N/A 2024    Procedure: TRACHEOSTOMY AND PERCUTANEOUS ENDOSCOPIC GASTROSTOMY TUBE INSERTION;  Surgeon: Jon Mclean MD;  Location: [facility] OR;  Service: General;  Laterality: N/A;      General Information       Row Name 24 1220          OT Time and Intention    Document Type  re-evaluation  -     Mode of Treatment occupational therapy  -       Row Name 09/21/24 1220          General Information    Patient Profile Reviewed yes  -JR     Prior Level of Function --  Please refer to initial eval  -JR     Existing Precautions/Restrictions fall;oxygen therapy device and L/min;other (see comments)  trach with vent, PEG  -JR     Barriers to Rehab previous functional deficit;cognitive status;medically complex  -JR       Row Name 09/21/24 1220          Living Environment    People in Home alone  -JR       Row Name 09/21/24 1220          Home Main Entrance    Number of Stairs, Main Entrance other (see comments)  ramp  -JR       Row Name 09/21/24 1220          Stairs Within Home, Primary    Number of Stairs, Within Home, Primary none  -       Row Name 09/21/24 1220          Cognition    Orientation Status (Cognition) unable/difficult to assess;other (see comments)  Following limited commands, intermittently mouthing words. Turned head to name and able to state name this date.  -       Row Name 09/21/24 1220          Safety Issues, Functional Mobility    Safety Issues Affecting Function (Mobility) ability to follow commands;awareness of need for assistance;insight into deficits/self-awareness;judgment;problem-solving;safety precaution awareness;safety precautions follow-through/compliance;sequencing abilities  -JR     Impairments Affecting Function (Mobility) balance;cognition;endurance/activity tolerance;postural/trunk control;range of motion (ROM);shortness of breath;strength  -JR     Cognitive Impairments, Mobility Safety/Performance attention;awareness, need for assistance;insight into deficits/self-awareness;problem-solving/reasoning;judgment;safety precaution awareness;safety precaution follow-through  -               User Key  (r) = Recorded By, (t) = Taken By, (c) = Cosigned By      Initials Name Provider Type    JR Erika Villalobos OT Occupational Therapist                      Mobility/ADL's       [facility] Name 09/21/24 1222          Bed Mobility    Supine-Sit [place] (Bed Mobility) unable to assess  -     Sit-Supine [place] (Bed Mobility) unable to assess  -     Comment, (Bed Mobility) Unable to assess bed ,mobility this date due to limited command following  -               User Key  (r) = Recorded By, (t) = Taken By, (c) = Cosigned By      Initials Name Provider Type    JR Erika Villalobos, OT Occupational Therapist                   Obj/Interventions       [facility] Name 09/21/24 1223          Sensory Assessment (Somatosensory)    Sensory Assessment Unable to assess this date due to vent and decreased command following  -[facility] Name 09/21/24 1223          Vision Assessment/Intervention    Visual Impairment/Limitations corrective lenses full-time  -[facility] Name 09/21/24 1223          Range of Motion Comprehensive    General Range of Motion bilateral upper extremity ROM WFL  -     Comment, General Range of Motion B UE ROM WFL however pt resisting with ROM this date.  -[facility] Name 09/21/24 1223          Strength Comprehensive (MMT)    Comment, General Manual Muscle Testing (MMT) Assessment Unable to fully assess due to cognitive status.  -[facility] Name 09/21/24 1223          Shoulder (Therapeutic Exercise)    Shoulder (Therapeutic Exercise) PROM (passive range of motion)  -     Shoulder PROM (Therapeutic Exercise) bilateral;flexion;extension;aBduction;aDduction;10 repetitions;supine  Pt resisting with ROM this date, attempting to follow commands at times, but very limited  -[facility] Name 09/21/24 1223          Elbow/Forearm (Therapeutic Exercise)    Elbow/Forearm (Therapeutic Exercise) PROM (passive range of motion)  -     Elbow/Forearm PROM (Therapeutic Exercise) bilateral;flexion;extension;supination;pronation;10 repetitions;supine  -[facility] Name 09/21/24 1223          Wrist (Therapeutic Exercise)    Wrist (Therapeutic Exercise) PROM (passive  range of motion)  -     Wrist PROM (Therapeutic Exercise) bilateral;flexion;extension;10 repetitions  -       Row Name 09/21/24 1223          Hand (Therapeutic Exercise)    Hand (Therapeutic Exercise) PROM (passive range of motion)  -     Hand PROM (Therapeutic Exercise) bilateral;finger flexion;finger extension;10 repetitions  -       Row Name 09/21/24 1223          Motor Skills    Therapeutic Exercise shoulder;elbow/forearm;wrist;hand  -JR               User Key  (r) = Recorded By, (t) = Taken By, (c) = Cosigned By      Initials Name Provider Type    JR Erika Villalobos, OT Occupational Therapist                   Goals/Plan       Row Name 09/21/24 1230          Transfer Goal 1 (OT)    Activity/Assistive Device (Transfer Goal 1, OT) bed-to-chair/chair-to-bed;sit-to-stand/stand-to-sit  AD recs per PT  -JR     [place] Level/Cues Needed (Transfer Goal 1, OT) verbal cues required;maximum assist (25-49% patient effort)  -     Time Frame (Transfer Goal 1, OT) long term goal (LTG);by discharge  -     Progress/Outcome (Transfer Goal 1, OT) goal revised this date  -       Row Name 09/21/24 1230          Dressing Goal 1 (OT)    Activity/Device (Dressing Goal 1, OT) upper body dressing;lower body dressing;other (see comments)  d/d TB garments with AE PRN  -     [place]/Cues Needed (Dressing Goal 1, OT) moderate assist (50-74% patient effort)  -     Time Frame (Dressing Goal 1, OT) long term goal (LTG);by discharge  -     Progress/Outcome (Dressing Goal 1, OT) goal revised this date  -       Row Name 09/21/24 1230          Toileting Goal 1 (OT)    Activity/Device (Toileting Goal 1, OT) adjust/manage clothing;perform perineal hygiene;commode, bedside without drop arms;commode;grab bar/safety frame;raised toilet seat  -     [place] Level/Cues Needed (Toileting Goal 1, OT) moderate assist (50-74% patient effort);verbal cues required  -     Time Frame (Toileting Goal 1, OT) long term goal  (LTG);by discharge  -     Progress/Outcome (Toileting Goal 1, OT) goal revised this date  -       Row Name 09/21/24 1230          Strength Goal 1 (OT)    Strength Goal 1 (OT) PT to complete seated HEP encompassing BUEs targeting strength and endurance with progressive sets/reps/resistance in order to improve integration in ADLs, related t/fs, mobility  -     Time Frame (Strength Goal 1, OT) short term goal (STG);5 days  -JR     Progress/Outcome (Strength Goal 1, OT) goal revised this date  -       Row Name 09/21/24 1230          Therapy Assessment/Plan (OT)    Planned Therapy Interventions (OT) activity tolerance training;adaptive equipment training;BADL retraining;functional balance retraining;occupation/activity based interventions;ROM/therapeutic exercise;strengthening exercise;transfer/mobility retraining;patient/caregiver education/training;cognitive/visual perception retraining  -               User Key  (r) = Recorded By, (t) = Taken By, (c) = Cosigned By      Initials Name Provider Type    JR Erika Villalobos, OT Occupational Therapist                   Clinical Impression       Row Name 09/21/24 1226          Pain Assessment    Pain Intervention(s) Repositioned;Ambulation/increased activity  -       Row Name 09/21/24 1226          Pain Scale: FACES Pre/Post-Treatment    Pain: FACES Scale, Pretreatment 2-->hurts little bit  -JR     Posttreatment Pain Rating 2-->hurts little bit  -     Pain Location generalized  -       Row Name 09/21/24 1226          Plan of Care Review    Plan of Care Reviewed With patient  -JR     Outcome Evaluation OT initial eval and expanded chart review completed. Pt presents with multiple comorbidities and decreased strength, balance, activity tolerance and SOA limiting independence with ADL's and mobility from baseline status. Recommend continued skilled OT services and transfer to SNF at d/c for best functional outcomes.  -       Row Name 09/21/24 5114           Therapy Assessment/Plan (OT)    Patient/Family Therapy Goal Statement (OT) pt unable to state goal  -JR     Criteria for Skilled Therapeutic Interventions Met (OT) yes;meets criteria;skilled treatment is necessary  -JR     Therapy Frequency (OT) daily  -JR     Predicted Duration of Therapy Intervention (OT) 10 days  -JR       Row Name 09/21/24 1226          Therapy Plan Review/Discharge Plan (OT)    Anticipated Discharge Disposition (OT) [facility] nursing facility  -JR       Row Name 09/21/24 1226          Vital Signs    Pre Systolic BP Rehab 141  -JR     Pre Treatment Diastolic BP 84  -JR     Post Systolic BP Rehab 148  -JR     Post Treatment Diastolic BP 90  -JR     Pretreatment Heart Rate (beats/min) 95  -JR     Posttreatment Heart Rate (beats/min) 95  -JR     Pre SpO2 (%) 96  -JR     O2 Delivery Pre Treatment ventilator  -JR     Post SpO2 (%) 95  -JR     O2 Delivery Post Treatment ventilator  -JR     Pre Patient Position Supine  -JR     Intra Patient Position Supine  -JR     Post Patient Position Supine  -JR       Row Name 09/21/24 1226          Positioning and Restraints    Pre-Treatment Position in bed  -JR     Post Treatment Position bed  -JR     In Bed notified nsg;supine;call light within reach;encouraged to call for assist;exit alarm on;SCD pump applied;heels elevated  -JR     Restraints released:;reapplied:;notified nsg:;soft limb  -JR               User Key  (r) = Recorded By, (t) = Taken By, (c) = Cosigned By      Initials Name Provider Type    Erika Doran, OT Occupational Therapist                   Outcome Measures       Row Name 09/21/24 1231          How much help from another is currently needed...    Putting on and taking off regular lower body clothing? 1  -JR     Bathing (including washing, rinsing, and drying) 1  -JR     Toileting (which includes using toilet bed pan or urinal) 1  -JR     Putting on and taking off regular upper body clothing 1  -JR     Taking care of personal grooming  (such as brushing teeth) 1  -JR     Eating meals 1  -     AM-PAC 6 Clicks Score (OT) 6  -       Row Name 09/21/24 1147          How much help from another person do you currently need...    Turning from your back to your side while in flat bed without using bedrails? 1  -AC     Moving from lying on back to sitting on the side of a flat bed without bedrails? 1  -AC     Moving to and from a bed to a chair (including a wheelchair)? 1  -AC     Standing up from a chair using your arms (e.g., wheelchair, bedside chair)? 1  -AC     Climbing 3-5 steps with a railing? 1  -AC     To walk in hospital room? 1  -AC     AM-PAC 6 Clicks Score (PT) 6  -AC     Highest Level of Mobility Goal 2 --> Bed activities/dependent transfer  -       Row Name 09/21/24 1231 09/21/24 1147       Functional Assessment    Outcome Measure Options AM-PAC 6 Clicks Daily Activity (OT)  - AM-PAC 6 Clicks Basic Mobility (PT)  -              User Key  (r) = Recorded By, (t) = Taken By, (c) = Cosigned By      Initials Name Provider Type    JR Erika Villalobos, OT Occupational Therapist    AC Quyen Torres, PT Physical Therapist                    Occupational Therapy Education       Title: PT OT SLP Therapies (In Progress)       Topic: Occupational Therapy (In Progress)       Point: ADL training (In Progress)       Description:   Instruct learner(s) on proper safety adaptation and remediation techniques during self care or transfers.   Instruct in proper use of assistive devices.                  Learning Progress Summary             Patient Acceptance, E, NR by AC at 9/21/2024 1148    Acceptance, E,TB, VU by EB at 9/10/2024 1740    Acceptance, E, NR by PN at 9/4/2024 0620    Acceptance, E,D, NR by JY at 9/2/2024 0916   Family Acceptance, E,TB, VU by EB at 9/10/2024 1740    Acceptance, E, NR by PN at 9/4/2024 0620                         Point: Home exercise program (Done)       Description:   Instruct learner(s) on appropriate technique for  no... monitoring, assisting and/or progressing therapeutic exercises/activities.                  Learning Progress Summary             Patient Acceptance, E, NR by AC at 9/21/2024 1148    Acceptance, E, VU,NR by JR at 9/21/2024 0825    Comment: role of therapy, ongoing treatment plan, therex    Acceptance, E,TB, VU by EB at 9/10/2024 1740    Acceptance, E, NR by PN at 9/4/2024 0620   Family Acceptance, E,TB, VU by EB at 9/10/2024 1740    Acceptance, E, NR by PN at 9/4/2024 0620                         Point: Precautions (In Progress)       Description:   Instruct learner(s) on prescribed precautions during self-care and functional transfers.                  Learning Progress Summary             Patient Acceptance, E, NR by AC at 9/21/2024 1148    Acceptance, E,TB, VU by EB at 9/10/2024 1740    Acceptance, E, NR by PN at 9/4/2024 0620    Acceptance, E,D, NR by JY at 9/2/2024 0916   Family Acceptance, E,TB, VU by EB at 9/10/2024 1740    Acceptance, E, NR by PN at 9/4/2024 0620                         Point: Body mechanics (In Progress)       Description:   Instruct learner(s) on proper positioning and spine alignment during self-care, functional mobility activities and/or exercises.                  Learning Progress Summary             Patient Acceptance, E, NR by AC at 9/21/2024 1148    Acceptance, E,TB, VU by EB at 9/10/2024 1740    Acceptance, E, NR by PN at 9/4/2024 0620    Acceptance, E,D, NR by JY at 9/2/2024 0916   Family Acceptance, E,TB, VU by EB at 9/10/2024 1740    Acceptance, E, NR by PN at 9/4/2024 0620                                         User Key       Initials Effective Dates Name Provider Type Discipline     02/03/23 -  Erika Villalobos OT Occupational Therapist OT    EB 09/22/22 -  Erick Fontana RN Registered Nurse Nurse    [place] 06/16/21 -  Bren Hillman OT Occupational Therapist OT    PN 07/11/24 -  Alicia Funes, RN Registered Nurse Nurse    AC 07/11/24 -  Quyen Torres, PT Physical Therapist PT                   OT Recommendation and Plan  Planned Therapy Interventions (OT): activity tolerance training, adaptive equipment training, BADL retraining, functional balance retraining, occupation/activity based interventions, ROM/therapeutic exercise, strengthening exercise, transfer/mobility retraining, patient/caregiver education/training, cognitive/visual perception retraining  Therapy Frequency (OT): daily  Plan of Care Review  Plan of Care Reviewed With: patient  Outcome Evaluation: OT initial eval and expanded chart review completed. Pt presents with multiple comorbidities and decreased strength, balance, activity tolerance and SOA limiting independence with ADL's and mobility from baseline status. Recommend continued skilled OT services and transfer to SNF at d/c for best functional outcomes.     Time Calculation:         Time Calculation- OT       Row Name 09/21/24 1232             Time Calculation- OT    OT Start Time 0825  -JR      OT Received On 09/21/24  -JR      OT Goal Re-Cert Due Date 10/01/24  -JR         Timed Charges    99120 - OT Therapeutic Activity Minutes 15  -JR         Untimed Charges    OT Eval/Re-eval Minutes 30  -JR         Total Minutes    Timed Charges Total Minutes 15  -JR      Untimed Charges Total Minutes 30  -JR       Total Minutes 45  -JR                User Key  (r) = Recorded By, (t) = Taken By, (c) = Cosigned By      Initials Name Provider Type    JR Erika Villalobos OT Occupational Therapist                  Therapy Charges for Today       Code Description Service Date Service Provider Modifiers Qty    [id number]  OT THERAPEUTIC ACT EA 15 MIN 9/21/2024 Erika Villalobos OT GO 1    [id number] HC OT RE-EVAL 2 9/21/2024 Erika Villalobos OT GO 1                 Erika Villalobos OT  9/21/2024   yes...

## 2024-09-21 NOTE — THERAPY RE-EVALUATION
Patient Name: Fernie Dalal  : 1960    MRN: [id number]                              Today's Date: 2024       Admit Date: 2024    Visit Dx:     ICD-10-CM ICD-9-CM   1. Acute respiratory failure with hypoxia  J96.01 518.81   2. Aspiration pneumonia of right lower lobe, unspecified aspiration pneumonia type  J69.0 507.0   3. Chest pain, unspecified type  R07.9 786.50   4. Acute on chronic respiratory failure with hypoxia  J96.21 518.84     799.02   5. HFrEF (heart failure with reduced ejection fraction)  I50.20 428.20     Patient Active Problem List   Diagnosis    Adiposity    Allergic rhinitis    Anxiety    Blues    Chronic low back pain    HFrEF (heart failure with reduced ejection fraction)    Coronary artery disease involving native coronary artery of native heart with angina pectoris    Dyslipidemia    Muscle spasm    Seizure disorder    Sleep apnea    Essential hypertension    Cardiomyopathy, dilated    NSTEMI (non-ST elevated myocardial infarction)    Type 2 respiratory failure    Right lower lobe pneumonia     Past Medical History:   Diagnosis Date    Angina at rest     Anxiety     Arrhythmia     COPD (chronic obstructive pulmonary disease)     GERD (gastroesophageal reflux disease)     Heart failure     congestive    Hyperlipidemia     Hypertension     Myocardial infarction      Past Surgical History:   Procedure Laterality Date    CARDIAC CATHETERIZATION      CARDIAC CATHETERIZATION N/A 2023    Procedure: Left Heart Cath;  Surgeon: Marsha Gaitan MD;  Location: [facility] CATH INVASIVE LOCATION;  Service: Cardiology;  Laterality: N/A;    CARDIOVASCULAR STRESS TEST      TRACHEOSTOMY AND PEG TUBE INSERTION N/A 2024    Procedure: TRACHEOSTOMY AND PERCUTANEOUS ENDOSCOPIC GASTROSTOMY TUBE INSERTION;  Surgeon: Jon Mclean MD;  Location: [facility] OR;  Service: General;  Laterality: N/A;      General Information       Row Name 24 1129          Physical Therapy Time and Intention     Document Type re-evaluation  -     Mode of Treatment physical therapy  -       Row Name 09/21/24 1129          General Information    Patient Profile Reviewed yes  -     Prior Level of Function --  refer to IE  -     Existing Precautions/Restrictions fall;oxygen therapy device and L/min;other (see comments)  trached to vent, PEG  -     Barriers to Rehab previous functional deficit;medically complex  -       Row Name 09/21/24 1129          Living Environment    People in Home alone  -       Row Name 09/21/24 1129          Home Main Entrance    Number of Stairs, Main Entrance none  ramp  -       Row Name 09/21/24 1129          Cognition    Orientation Status (Cognition) other (see comments);unable/difficult to assess  intermittently mouthing words/ nodding. Able to mouth his name  -       Row Name 09/21/24 1129          Safety Issues, Functional Mobility    Safety Issues Affecting Function (Mobility) ability to follow commands;awareness of need for assistance;insight into deficits/self-awareness;judgment;safety precaution awareness;safety precautions follow-through/compliance  -     Impairments Affecting Function (Mobility) balance;endurance/activity tolerance;pain;shortness of breath;strength;cognition;range of motion (ROM)  -     Cognitive Impairments, Mobility Safety/Performance attention;awareness, need for assistance;insight into deficits/self-awareness;safety precaution awareness;safety precaution follow-through  -               User Key  (r) = Recorded By, (t) = Taken By, (c) = Cosigned By      Initials Name Provider Type     Quyen Torres, PT Physical Therapist                   Mobility       Row Name 09/21/24 1132          Bed Mobility    Supine-Sit [place] (Bed Mobility) unable to assess  -     Sit-Supine [place] (Bed Mobility) unable to assess  -       Row Name 09/21/24 1132          Transfers    Comment, (Transfers) bed level eval only  -       Row Name 09/21/24  1132          Bed-Chair Transfer    Bed-Chair [place] (Transfers) unable to assess  -[facility] Name 09/21/24 1132          Sit-Stand Transfer    Sit-Stand [place] (Transfers) unable to assess  -[facility] 09/21/24 1132          Gait/Stairs (Locomotion)    [place] Level (Gait) unable to assess  -     Reason Patient was unable to Ambulate Excessive Weakness  -               User Key  (r) = Recorded By, (t) = Taken By, (c) = Cosigned By      Initials Name Provider Type    AC Quyen Torres PT Physical Therapist                   Obj/Interventions       Row Name 09/21/24 1134          Range of Motion Comprehensive    General Range of Motion lower extremity range of motion deficits identified  -     Comment, General Range of Motion Pt demonstrated resistance to PROM of BLE's (L>R), pt able to demonstrate minimal AROM of ankles  -AC       Row Name 09/21/24 1134          Strength Comprehensive (MMT)    General Manual Muscle Testing (MMT) Assessment lower extremity strength deficits identified  -AC     Comment, General Manual Muscle Testing (MMT) Assessment BLE's grossly 1/5  -               User Key  (r) = Recorded By, (t) = Taken By, (c) = Cosigned By      Initials Name Provider Type     Quyen Torres, PT Physical Therapist                   Goals/Plan       Row Name 09/21/24 1146          Bed Mobility Goal 1 (PT)    Activity/Assistive Device (Bed Mobility Goal 1, PT) bed mobility activities, all  -AC     [place] Level/Cues Needed (Bed Mobility Goal 1, PT) minimum assist (75% or more patient effort)  -AC     Time Frame (Bed Mobility Goal 1, PT) short term goal (STG);5 days  -AC       Row Name 09/21/24 1146          Transfer Goal 1 (PT)    Activity/Assistive Device (Transfer Goal 1, PT) sit-to-stand/stand-to-sit;bed-to-chair/chair-to-bed  -AC     [place] Level/Cues Needed (Transfer Goal 1, PT) moderate assist (50-74% patient effort)  -AC     Time Frame (Transfer Goal 1, PT) long  term goal (LTG);10 days  -AC       Row Name 09/21/24 1146          Therapy Assessment/Plan (PT)    Planned Therapy Interventions (PT) balance training;bed mobility training;gait training;neuromuscular re-education;transfer training;strengthening;stretching;patient/family education  -               User Key  (r) = Recorded By, (t) = Taken By, (c) = Cosigned By      Initials Name Provider Type    AC Quyen Torres, PT Physical Therapist                   Clinical Impression       Row Name 09/21/24 1136          Pain Scale: FACES Pre/Post-Treatment    Pain: FACES Scale, Pretreatment 4-->hurts little more  -AC     Posttreatment Pain Rating 4-->hurts little more  -AC     Pain Location generalized  -AC       Row Name 09/21/24 1136          Plan of Care Review    Plan of Care Reviewed With patient  -AC     Progress declining  -     Outcome Evaluation PT re-evaluation complete s/p PEG placement and tracheostomy and goals adjusted to reflect pt's current functional level. PT re-evaluation limited to bed level due to pt inability to follow commands. Pt was able to mouth his name however unable to demonstrate active movement of BLE's. In addition pt demonstrated significant resistance w/ all passive movement this date. Pt would benefit from continued skilled therapy while hospitalized to maximize functional independence. D/c rec remains SNF for best outcome  -       Row Name 09/21/24 1136          Therapy Assessment/Plan (PT)    Rehab Potential (PT) fair, will monitor progress closely  -     Criteria for Skilled Interventions Met (PT) yes  -AC     Therapy Frequency (PT) daily  -     Predicted Duration of Therapy Intervention (PT) 10 days  -AC       Row Name 09/21/24 1136          Vital Signs    Pre Systolic BP Rehab 141  -AC     Pre Treatment Diastolic BP 84  -AC     Post Systolic BP Rehab 148  -AC     Post Treatment Diastolic BP 90  -AC     Pretreatment Heart Rate (beats/min) 94  -AC     Posttreatment Heart Rate  (beats/min) 94  -AC     Pre SpO2 (%) 97  -AC     O2 Delivery Pre Treatment ventilator  -AC     Post SpO2 (%) 95  -AC     O2 Delivery Post Treatment ventilator  -AC     Pre Patient Position Supine  -AC     Intra Patient Position Supine  -AC     Post Patient Position Supine  -AC       Row Name 09/21/24 1136          Positioning and Restraints    Pre-Treatment Position in bed  -AC     Post Treatment Position bed  -AC     In Bed supine;call light within reach;encouraged to call for assist;exit alarm on;with nsg  -AC               User Key  (r) = Recorded By, (t) = Taken By, (c) = Cosigned By      Initials Name Provider Type    AC Quyen Torres, PT Physical Therapist                   Outcome Measures       Row Name 09/21/24 1147          How much help from another person do you currently need...    Turning from your back to your side while in flat bed without using bedrails? 1  -AC     Moving from lying on back to sitting on the side of a flat bed without bedrails? 1  -AC     Moving to and from a bed to a chair (including a wheelchair)? 1  -AC     Standing up from a chair using your arms (e.g., wheelchair, bedside chair)? 1  -AC     Climbing 3-5 steps with a railing? 1  -AC     To walk in hospital room? 1  -AC     AM-PAC 6 Clicks Score (PT) 6  -AC     Highest Level of Mobility Goal 2 --> Bed activities/dependent transfer  -AC       Row Name 09/21/24 1147          Functional Assessment    Outcome Measure Options AM-PAC 6 Clicks Basic Mobility (PT)  -AC               User Key  (r) = Recorded By, (t) = Taken By, (c) = Cosigned By      Initials Name Provider Type    AC Quyen Torres, ELSA Physical Therapist                                 Physical Therapy Education       Title: PT OT SLP Therapies (In Progress)       Topic: Physical Therapy (In Progress)       Point: Mobility training (In Progress)       Learning Progress Summary             Patient Acceptance, E, NR by AC at 9/21/2024 1148    Acceptance, E,TB, VU by EB at  9/10/2024 1740    Acceptance, E, NR by PN at 9/4/2024 0620    Acceptance, E, VU,NR by LH at 9/3/2024 1452   Family Acceptance, E,TB, VU by EB at 9/10/2024 1740    Acceptance, E, NR by PN at 9/4/2024 0620                         Point: Home exercise program (In Progress)       Learning Progress Summary             Patient Acceptance, E, NR by  at 9/21/2024 1148    Acceptance, E,TB, VU by EB at 9/10/2024 1740    Acceptance, E, NR by PN at 9/4/2024 0620   Family Acceptance, E,TB, VU by EB at 9/10/2024 1740    Acceptance, E, NR by PN at 9/4/2024 0620                         Point: Body mechanics (In Progress)       Learning Progress Summary             Patient Acceptance, E, NR by  at 9/21/2024 1148    Acceptance, E,TB, VU by EB at 9/10/2024 1740    Acceptance, E, NR by PN at 9/4/2024 0620    Acceptance, E, VU,NR by  at 9/3/2024 1452   Family Acceptance, E,TB, VU by EB at 9/10/2024 1740    Acceptance, E, NR by PN at 9/4/2024 0620                         Point: Precautions (In Progress)       Learning Progress Summary             Patient Acceptance, E, NR by  at 9/21/2024 1148    Acceptance, E,TB, VU by EB at 9/10/2024 1740    Acceptance, E, NR by PN at 9/4/2024 0620    Acceptance, E, VU,NR by  at 9/3/2024 1452   Family Acceptance, E,TB, VU by EB at 9/10/2024 1740    Acceptance, E, NR by PN at 9/4/2024 0620                                         User Key       Initials Effective Dates Name Provider Type Discipline    EB 09/22/22 -  Erick Fontana, RN Registered Nurse Nurse     09/21/23 -  Angelika Mcneill, PT Physical Therapist PT    PN 07/11/24 -  Alicia Funes, KAMILA Registered Nurse Nurse    AC 07/11/24 -  Quyen Torres, PT Physical Therapist PT                  PT Recommendation and Plan  Planned Therapy Interventions (PT): balance training, bed mobility training, gait training, neuromuscular re-education, transfer training, strengthening, stretching, patient/family education  Plan of Care Reviewed With:  patient  Progress: declining  Outcome Evaluation: PT re-evaluation complete s/p PEG placement and tracheostomy and goals adjusted to reflect pt's current functional level. PT re-evaluation limited to bed level due to pt inability to follow commands. Pt was able to mouth his name however unable to demonstrate active movement of BLE's. In addition pt demonstrated significant resistance w/ all passive movement this date. Pt would benefit from continued skilled therapy while hospitalized to maximize functional independence. D/c rec remains SNF for best outcome     Time Calculation:         PT Charges       Row Name 09/21/24 1148             Time Calculation    Start Time 0842  -AC      PT Received On 09/21/24  -AC      PT Goal Re-Cert Due Date 10/01/24  -AC         Time Calculation- PT    Total Timed Code Minutes- PT 12 minute(s)  -AC         Timed Charges    49239 - PT Therapeutic Activity Minutes 12  -AC         Untimed Charges    PT Eval/Re-eval Minutes 26  -AC         Total Minutes    Timed Charges Total Minutes 12  -AC      Untimed Charges Total Minutes 26  -AC       Total Minutes 38  -AC                User Key  (r) = Recorded By, (t) = Taken By, (c) = Cosigned By      Initials Name Provider Type    AC Quyen Torres, PT Physical Therapist                  Therapy Charges for Today       Code Description Service Date Service Provider Modifiers Qty    03112782384  PT THERAPEUTIC ACT EA 15 MIN 9/21/2024 Quyen Torres, PT GP 1    [id number] HC PT RE-EVAL ESTABLISHED PLAN 2 9/21/2024 Quyen Torres, PT GP 1            PT G-Codes  Outcome Measure Options: AM-PAC 6 Clicks Basic Mobility (PT)  AM-PAC 6 Clicks Score (PT): 6  AM-PAC 6 Clicks Score (OT): 14  PT Discharge Summary  Anticipated Discharge Disposition (PT): skilled nursing facility    Quyen Torres PT  9/21/2024

## 2024-09-21 NOTE — PLAN OF CARE
Problem: Restraint, Nonviolent  Goal: Absence of Harm or Injury  Outcome: Ongoing, Progressing  Intervention: Implement Least Restrictive Safety Strategies  Recent Flowsheet Documentation  Taken 9/20/2024 1800 by Kimberley Sabillon RN  Medical Device Protection:   IV pole/bag removed from visual field   torso covered   tubing secured  Less Restrictive Alternative:   calming techniques promoted   environment adjusted   sensory stimulation limited   emotional support provided  De-Escalation Techniques:   reoriented   stimulation decreased  Diversional Activities: television  Taken 9/20/2024 1600 by Kimberley Sabillon RN  Medical Device Protection:   IV pole/bag removed from visual field   tubing secured   torso covered  Less Restrictive Alternative:   calming techniques promoted   environment adjusted   sensory stimulation limited   emotional support provided  De-Escalation Techniques:   stimulation decreased   reoriented  Diversional Activities: television  Taken 9/20/2024 1400 by Kimberley Sabillon RN  Medical Device Protection:   IV pole/bag removed from visual field   torso covered   tubing secured  Less Restrictive Alternative:   calming techniques promoted   environment adjusted   sensory stimulation limited   emotional support provided  De-Escalation Techniques:   stimulation decreased   reoriented   appropriate behavior reinforced  Diversional Activities: television  Taken 9/20/2024 1200 by Kimberley Sabillon RN  Medical Device Protection:   IV pole/bag removed from visual field   tubing secured   torso covered  Less Restrictive Alternative:   calming techniques promoted   environment adjusted   sensory stimulation limited   emotional support provided  De-Escalation Techniques:   stimulation decreased   reoriented   appropriate behavior reinforced  Diversional Activities: television  Taken 9/20/2024 1000 by Kimberley Sabillon RN  Medical Device Protection:   IV pole/bag removed from visual field   torso  covered   tubing secured  Less Restrictive Alternative:   calming techniques promoted   environment adjusted   sensory stimulation limited   emotional support provided  De-Escalation Techniques:   stimulation decreased   reoriented   quiet time facilitated   appropriate behavior reinforced  Diversional Activities: television  Taken 9/20/2024 0800 by Kimberley Sabillon RN  Medical Device Protection:   IV pole/bag removed from visual field   torso covered   tubing secured  Less Restrictive Alternative:   calming techniques promoted   environment adjusted   sensory stimulation limited   emotional support provided  De-Escalation Techniques:   verbally redirected   stimulation decreased   reoriented   appropriate behavior reinforced  Diversional Activities: television  Intervention: Protect Dignity, Rights, and Personal Wellbeing  Recent Flowsheet Documentation  Taken 9/20/2024 1800 by Kimberley Sabillon RN  Trust Relationship/Rapport: care explained  Taken 9/20/2024 1600 by Kimberley Sabillon RN  Trust Relationship/Rapport: care explained  Taken 9/20/2024 1400 by Kimberley Sabillon RN  Trust Relationship/Rapport: care explained  Taken 9/20/2024 1200 by Kimberley Sabillon RN  Trust Relationship/Rapport: care explained  Taken 9/20/2024 1000 by Kimberley Sabillon RN  Trust Relationship/Rapport:   care explained   emotional support provided  Taken 9/20/2024 0800 by Kimberley Sabillon RN  Trust Relationship/Rapport:   care explained   emotional support provided  Intervention: Protect Skin and Joint Integrity  Recent Flowsheet Documentation  Taken 9/20/2024 1800 by Kimberley Sabillon RN  Body Position:   turned   right   upper extremity elevated   lower extremity elevated  Taken 9/20/2024 1600 by Kimberley Sabillon RN  Body Position:   turned   left   upper extremity elevated   lower extremity elevated  Taken 9/20/2024 1400 by Kimberley Sabillon RN  Body Position:   turned   supine, legs elevated   upper extremity  elevated  Taken 9/20/2024 1200 by Kimberley Sabillon RN  Body Position:   turned   right   upper extremity elevated   lower extremity elevated  Taken 9/20/2024 1000 by Kimberley Sabillon RN  Body Position:   turned   left   upper extremity elevated   lower extremity elevated  Taken 9/20/2024 0800 by Kimberley Sabillon RN  Body Position:   turned   supine, legs elevated   upper extremity elevated  Range of Motion: ROM (range of motion) performed   Goal Outcome Evaluation:  Plan of Care Reviewed With: patient        Progress: no change  -pt continues on mechanical ventilation, FiO2 weaned to 45%, PEEP 10, moderate/large amount of thick creamy secretions  -pt responds to voice, tracks RN with eyes and face, follows commands intermittently, extremely withdrawn/teary at times   -potassium replaced, redraw 4.1  -scheduled lasix, UOP: 2290 mls  - BM smear this shift, tolerating tube feed- rate increased to 75ml/hr  -weaned sedation throughout shift, fentanyl patched placed, Propofol stopped ~1140, Fentanyl @ 75  -trach site clean and intact, small amount of  clear/ red tinged thick oozing  -max T 102.8, PRN tylenol x2, packed with ice, continuous fever, respiratory PCR completed   -sister Oumou updated on care via telephone and at bedside

## 2024-09-21 NOTE — PLAN OF CARE
Goal Outcome Evaluation:           Progress: no change     -Pt alert to voice, occasionally requests water/follows commands.  -Continued on PEEP 10, FiO2 45%.  -x2 sustained SVT for ~1 minute each. Converted self. Asymptomatic.   -Fentanyl gtt continued at 75.   -TF at goal rate.   -Increased UOP with no BM for this shift.   -PRN Tylenol given x1. Ice packs applied. Tmax 38.8.  -Potassium replaced per protocol.

## 2024-09-22 ENCOUNTER — APPOINTMENT (OUTPATIENT)
Dept: GENERAL RADIOLOGY | Facility: HOSPITAL | Age: 64
End: 2024-09-22
Payer: MEDICAID

## 2024-09-22 LAB
ANION GAP SERPL CALCULATED.3IONS-SCNC: 11 MMOL/L (ref 5–15)
ARTERIAL PATENCY WRIST A: POSITIVE
ATMOSPHERIC PRESS: ABNORMAL MM[HG]
BASE EXCESS BLDA CALC-SCNC: 13 MMOL/L (ref 0–2)
BASOPHILS # BLD AUTO: 0.1 10*3/MM3 (ref 0–0.2)
BASOPHILS NFR BLD AUTO: 0.8 % (ref 0–1.5)
BDY SITE: ABNORMAL
BODY TEMPERATURE: 37
BUN SERPL-MCNC: 29 MG/DL (ref 8–23)
BUN/CREAT SERPL: 43.3 (ref 7–25)
CALCIUM SPEC-SCNC: 9.5 MG/DL (ref 8.6–10.5)
CHLORIDE SERPL-SCNC: 99 MMOL/L (ref 98–107)
CO2 BLDA-SCNC: 39 MMOL/L (ref 22–33)
CO2 SERPL-SCNC: 33 MMOL/L (ref 22–29)
COHGB MFR BLD: 1.3 % (ref 0–2)
CREAT SERPL-MCNC: 0.67 MG/DL (ref 0.76–1.27)
DEPRECATED RDW RBC AUTO: 47.2 FL (ref 37–54)
EGFRCR SERPLBLD CKD-EPI 2021: 104.9 ML/MIN/1.73
EOSINOPHIL # BLD AUTO: 0.13 10*3/MM3 (ref 0–0.4)
EOSINOPHIL NFR BLD AUTO: 1.1 % (ref 0.3–6.2)
EPAP: 0
ERYTHROCYTE [DISTWIDTH] IN BLOOD BY AUTOMATED COUNT: 14.5 % (ref 12.3–15.4)
GLUCOSE BLDC GLUCOMTR-MCNC: 175 MG/DL (ref 70–130)
GLUCOSE BLDC GLUCOMTR-MCNC: 177 MG/DL (ref 70–130)
GLUCOSE BLDC GLUCOMTR-MCNC: 184 MG/DL (ref 70–130)
GLUCOSE BLDC GLUCOMTR-MCNC: 201 MG/DL (ref 70–130)
GLUCOSE SERPL-MCNC: 177 MG/DL (ref 65–99)
HCO3 BLDA-SCNC: 37.6 MMOL/L (ref 20–26)
HCT VFR BLD AUTO: 36.7 % (ref 37.5–51)
HCT VFR BLD CALC: 33.9 % (ref 38–51)
HGB BLD-MCNC: 11 G/DL (ref 13–17.7)
HGB BLDA-MCNC: 11.1 G/DL (ref 13.5–17.5)
IMM GRANULOCYTES # BLD AUTO: 0.04 10*3/MM3 (ref 0–0.05)
IMM GRANULOCYTES NFR BLD AUTO: 0.3 % (ref 0–0.5)
INHALED O2 CONCENTRATION: 35 %
IPAP: 0
LYMPHOCYTES # BLD AUTO: 0.82 10*3/MM3 (ref 0.7–3.1)
LYMPHOCYTES NFR BLD AUTO: 6.7 % (ref 19.6–45.3)
MCH RBC QN AUTO: 26.9 PG (ref 26.6–33)
MCHC RBC AUTO-ENTMCNC: 30 G/DL (ref 31.5–35.7)
MCV RBC AUTO: 89.7 FL (ref 79–97)
METHGB BLD QL: 0.2 % (ref 0–1.5)
MODALITY: ABNORMAL
MONOCYTES # BLD AUTO: 1.51 10*3/MM3 (ref 0.1–0.9)
MONOCYTES NFR BLD AUTO: 12.4 % (ref 5–12)
NEUTROPHILS NFR BLD AUTO: 78.7 % (ref 42.7–76)
NEUTROPHILS NFR BLD AUTO: 9.58 10*3/MM3 (ref 1.7–7)
NRBC BLD AUTO-RTO: 0 /100 WBC (ref 0–0.2)
OXYHGB MFR BLDV: 95.8 % (ref 94–99)
PAW @ PEAK INSP FLOW SETTING VENT: 0 CMH2O
PCO2 BLDA: 46.9 MM HG (ref 35–45)
PCO2 TEMP ADJ BLD: 46.9 MM HG (ref 35–48)
PEEP RESPIRATORY: 5 CM[H2O]
PH BLDA: 7.51 PH UNITS (ref 7.35–7.45)
PH, TEMP CORRECTED: 7.51 PH UNITS
PLATELET # BLD AUTO: 500 10*3/MM3 (ref 140–450)
PMV BLD AUTO: 9.8 FL (ref 6–12)
PO2 BLDA: 83.7 MM HG (ref 83–108)
PO2 TEMP ADJ BLD: 83.7 MM HG (ref 83–108)
POTASSIUM SERPL-SCNC: 3.2 MMOL/L (ref 3.5–5.2)
POTASSIUM SERPL-SCNC: 3.8 MMOL/L (ref 3.5–5.2)
RBC # BLD AUTO: 4.09 10*6/MM3 (ref 4.14–5.8)
SODIUM SERPL-SCNC: 143 MMOL/L (ref 136–145)
TOTAL RATE: 23 BREATHS/MINUTE
VENTILATOR MODE: ABNORMAL
VT ON VENT VENT: 0.49 ML
WBC NRBC COR # BLD AUTO: 12.18 10*3/MM3 (ref 3.4–10.8)

## 2024-09-22 PROCEDURE — 82948 REAGENT STRIP/BLOOD GLUCOSE: CPT

## 2024-09-22 PROCEDURE — 94799 UNLISTED PULMONARY SVC/PX: CPT

## 2024-09-22 PROCEDURE — 80048 BASIC METABOLIC PNL TOTAL CA: CPT | Performed by: SURGERY

## 2024-09-22 PROCEDURE — 94761 N-INVAS EAR/PLS OXIMETRY MLT: CPT

## 2024-09-22 PROCEDURE — 82375 ASSAY CARBOXYHB QUANT: CPT

## 2024-09-22 PROCEDURE — 87040 BLOOD CULTURE FOR BACTERIA: CPT | Performed by: INTERNAL MEDICINE

## 2024-09-22 PROCEDURE — 63710000001 INSULIN REGULAR HUMAN PER 5 UNITS: Performed by: SURGERY

## 2024-09-22 PROCEDURE — 94664 DEMO&/EVAL PT USE INHALER: CPT

## 2024-09-22 PROCEDURE — 94003 VENT MGMT INPAT SUBQ DAY: CPT

## 2024-09-22 PROCEDURE — 85025 COMPLETE CBC W/AUTO DIFF WBC: CPT | Performed by: INTERNAL MEDICINE

## 2024-09-22 PROCEDURE — 84132 ASSAY OF SERUM POTASSIUM: CPT | Performed by: INTERNAL MEDICINE

## 2024-09-22 PROCEDURE — 71045 X-RAY EXAM CHEST 1 VIEW: CPT

## 2024-09-22 PROCEDURE — 36600 WITHDRAWAL OF ARTERIAL BLOOD: CPT

## 2024-09-22 PROCEDURE — 63710000001 REVEFENACIN 175 MCG/3ML SOLUTION: Performed by: SURGERY

## 2024-09-22 PROCEDURE — 99233 SBSQ HOSP IP/OBS HIGH 50: CPT | Performed by: INTERNAL MEDICINE

## 2024-09-22 PROCEDURE — 25010000002 ENOXAPARIN PER 10 MG: Performed by: SURGERY

## 2024-09-22 PROCEDURE — 25010000002 FUROSEMIDE PER 20 MG: Performed by: SURGERY

## 2024-09-22 PROCEDURE — 83050 HGB METHEMOGLOBIN QUAN: CPT

## 2024-09-22 PROCEDURE — 25010000002 PIPERACILLIN SOD-TAZOBACTAM PER 1 G: Performed by: SURGERY

## 2024-09-22 PROCEDURE — 82805 BLOOD GASES W/O2 SATURATION: CPT

## 2024-09-22 RX ORDER — POTASSIUM CHLORIDE 1.5 G/1.58G
40 POWDER, FOR SOLUTION ORAL EVERY 4 HOURS
Status: COMPLETED | OUTPATIENT
Start: 2024-09-22 | End: 2024-09-22

## 2024-09-22 RX ADMIN — ALBUTEROL SULFATE 2.5 MG: 2.5 SOLUTION RESPIRATORY (INHALATION) at 01:10

## 2024-09-22 RX ADMIN — ALBUTEROL SULFATE 2.5 MG: 2.5 SOLUTION RESPIRATORY (INHALATION) at 20:25

## 2024-09-22 RX ADMIN — NYSTATIN 500000 UNITS: 100000 SUSPENSION ORAL at 20:04

## 2024-09-22 RX ADMIN — NYSTATIN 500000 UNITS: 100000 SUSPENSION ORAL at 17:55

## 2024-09-22 RX ADMIN — ARFORMOTEROL TARTRATE 15 MCG: 15 SOLUTION RESPIRATORY (INHALATION) at 20:25

## 2024-09-22 RX ADMIN — DIAZEPAM 10 MG: 10 TABLET ORAL at 20:04

## 2024-09-22 RX ADMIN — REVEFENACIN 175 MCG: 175 SOLUTION RESPIRATORY (INHALATION) at 08:08

## 2024-09-22 RX ADMIN — CITALOPRAM HYDROBROMIDE 40 MG: 40 TABLET ORAL at 08:09

## 2024-09-22 RX ADMIN — CHLORHEXIDINE GLUCONATE, 0.12% ORAL RINSE 15 ML: 1.2 SOLUTION DENTAL at 08:08

## 2024-09-22 RX ADMIN — ACETAMINOPHEN 650 MG: 650 SOLUTION ORAL at 09:46

## 2024-09-22 RX ADMIN — ASPIRIN 81 MG 81 MG: 81 TABLET ORAL at 08:09

## 2024-09-22 RX ADMIN — CHLORHEXIDINE GLUCONATE, 0.12% ORAL RINSE 15 ML: 1.2 SOLUTION DENTAL at 20:04

## 2024-09-22 RX ADMIN — PIPERACILLIN AND TAZOBACTAM 3.38 G: 3; .375 INJECTION, POWDER, LYOPHILIZED, FOR SOLUTION INTRAVENOUS at 16:12

## 2024-09-22 RX ADMIN — Medication 10 ML: at 20:05

## 2024-09-22 RX ADMIN — POTASSIUM CHLORIDE 40 MEQ: 1.5 POWDER, FOR SOLUTION ORAL at 08:09

## 2024-09-22 RX ADMIN — PHENYTOIN 200 MG: 125 SUSPENSION ORAL at 20:04

## 2024-09-22 RX ADMIN — EMPAGLIFLOZIN 10 MG: 10 TABLET, FILM COATED ORAL at 08:09

## 2024-09-22 RX ADMIN — INSULIN HUMAN 3 UNITS: 100 INJECTION, SOLUTION PARENTERAL at 17:55

## 2024-09-22 RX ADMIN — DIAZEPAM 10 MG: 10 TABLET ORAL at 08:08

## 2024-09-22 RX ADMIN — NYSTATIN 500000 UNITS: 100000 SUSPENSION ORAL at 08:08

## 2024-09-22 RX ADMIN — PANTOPRAZOLE SODIUM 40 MG: 40 INJECTION, POWDER, LYOPHILIZED, FOR SOLUTION INTRAVENOUS at 05:41

## 2024-09-22 RX ADMIN — INSULIN HUMAN 2 UNITS: 100 INJECTION, SOLUTION PARENTERAL at 05:41

## 2024-09-22 RX ADMIN — ATORVASTATIN CALCIUM 80 MG: 40 TABLET, FILM COATED ORAL at 08:09

## 2024-09-22 RX ADMIN — ALBUTEROL SULFATE 2.5 MG: 2.5 SOLUTION RESPIRATORY (INHALATION) at 13:32

## 2024-09-22 RX ADMIN — Medication 10 ML: at 08:11

## 2024-09-22 RX ADMIN — ALBUTEROL SULFATE 2.5 MG: 2.5 SOLUTION RESPIRATORY (INHALATION) at 07:47

## 2024-09-22 RX ADMIN — ARFORMOTEROL TARTRATE 15 MCG: 15 SOLUTION RESPIRATORY (INHALATION) at 07:46

## 2024-09-22 RX ADMIN — ENOXAPARIN SODIUM 40 MG: 100 INJECTION SUBCUTANEOUS at 08:08

## 2024-09-22 RX ADMIN — RISPERIDONE 1 MG: 1 SOLUTION ORAL at 08:08

## 2024-09-22 RX ADMIN — NYSTATIN 500000 UNITS: 100000 SUSPENSION ORAL at 12:02

## 2024-09-22 RX ADMIN — ACETAMINOPHEN 650 MG: 650 SOLUTION ORAL at 16:17

## 2024-09-22 RX ADMIN — POTASSIUM CHLORIDE 40 MEQ: 1.5 POWDER, FOR SOLUTION ORAL at 05:42

## 2024-09-22 RX ADMIN — PIPERACILLIN AND TAZOBACTAM 3.38 G: 3; .375 INJECTION, POWDER, LYOPHILIZED, FOR SOLUTION INTRAVENOUS at 08:08

## 2024-09-22 RX ADMIN — FUROSEMIDE 40 MG: 10 INJECTION, SOLUTION INTRAMUSCULAR; INTRAVENOUS at 08:08

## 2024-09-22 RX ADMIN — FUROSEMIDE 40 MG: 10 INJECTION, SOLUTION INTRAMUSCULAR; INTRAVENOUS at 20:04

## 2024-09-22 RX ADMIN — Medication 30 ML: at 08:21

## 2024-09-22 RX ADMIN — INSULIN HUMAN 2 UNITS: 100 INJECTION, SOLUTION PARENTERAL at 12:02

## 2024-09-22 RX ADMIN — PHENYTOIN 200 MG: 125 SUSPENSION ORAL at 08:08

## 2024-09-22 RX ADMIN — PIPERACILLIN AND TAZOBACTAM 3.38 G: 3; .375 INJECTION, POWDER, LYOPHILIZED, FOR SOLUTION INTRAVENOUS at 00:06

## 2024-09-22 NOTE — PLAN OF CARE
Goal Outcome Evaluation:  Plan of Care Reviewed With: patient        Progress: no change     -Pt alert, moves all extremities, follows commands. Mouths words.   -Continues on PEEP of 5, FiO2 35%.   -Increased UOP, no BM for this shift.   -Bladder temp controlled.   -Potassium replacement per protocol.

## 2024-09-22 NOTE — PLAN OF CARE
Goal Outcome Evaluation:  Plan of Care Reviewed With: patient, family        Progress: improving                -Pt on vent FiO2 of 35, PEEP of 5  -Pt trach care completed  -Pt had small BM  -Pt had adequate UOP  -Pt remained febrile MD made aware, tylenol given, ice packs placed to pt, and blood cultures drawn from PICC line  -Pt family was present at bedside throughout shift

## 2024-09-22 NOTE — PROGRESS NOTES
Intensive Care Follow-up     Hospital:  LOS: 19 days   Mr. Fernie Dalal, 64 y.o. male is followed for:   Type 2 respiratory failure        Subjective     64-year-old male admitted on 9/1 with chest pain, shortness of breath, and chills.  He was diagnosed with right lower lobe pneumonia and exacerbation of COPD.  His past history also includes a known cardiomyopathy with reduced ejection fraction.  He had some mild elevation of his cardiac enzymes on admission which was felt to be demand ischemia.  He has been diagnosed with non-STEMI in the setting of demand ischemia with a chronically occluded RCA.  EF is 28% this admission.     He required intubation on 9/3 and transferred to ICU.  CTA revealed no pulmonary embolism.     Due to inability to wean ventilator, tracheostomy and PEG tube placement were completed on 9/18/2024.  Interval History:  The chart has been reviewed.  Cultures have remained negative and nonetheless the patient has had fever.  We will go ahead and recheck a culture through his PICC line.  If these fevers do not resolve, we likely will need to remove the PICC and his Basilio catheter.  The Basilio was exchanged several days back during a previous fever.  The patient is arousable on the ventilator.  He was able to tolerate weaning of PEEP and FiO2.  Secretions are minimal.    The patient's past medical, surgical and social history were reviewed and updated in Epic as appropriate.        Objective     Infusions:  propofol, 5-50 mcg/kg/min, Last Rate: Stopped (09/20/24 1710)      Medications:  Pharmacy Consult, , Does not apply, Q12H  Albuterol Sulfate NEB Orderable, 2.5 mg, Nebulization, Q6H - RT  arformoterol, 15 mcg, Nebulization, BID - RT  aspirin, 81 mg, Nasogastric, Daily  atorvastatin, 80 mg, Nasogastric, Daily  fentaNYL, 1 patch, Transdermal, Q72H   And  Check Fentanyl Patch Placement, 1 each, Does not apply, Q12H  chlorhexidine, 15 mL, Mouth/Throat, Q12H  citalopram, 40 mg, Nasogastric,  "Daily  diazePAM, 10 mg, Nasogastric, BID  empagliflozin, 10 mg, Nasogastric, Daily  enoxaparin, 40 mg, Subcutaneous, Daily  furosemide, 40 mg, Intravenous, Q12H  insulin regular, 2-7 Units, Subcutaneous, Q6H  nystatin, 5 mL, Swish & Spit, 4x Daily  pantoprazole, 40 mg, Intravenous, Q AM  pharmacy consult - [place], , Does not apply, Daily  phenytoin, 200 mg, Nasogastric, BID  piperacillin-tazobactam, 3.375 g, Intravenous, Q8H  senna-docusate sodium, 2 tablet, Nasogastric, BID   And  polyethylene glycol, 17 g, Nasogastric, Daily  ProSource No Carb, 30 mL, Nasogastric, Daily  revefenacin, 175 mcg, Nebulization, Daily - RT  risperiDONE, 1 mg, Nasogastric, BID  sodium chloride, 10 mL, Intravenous, Q12H        Vital Sign Min/Max for last 24 hours  Temp  Min: 97 °F (36.1 °C)  Max: 103.1 °F (39.5 °C)   BP  Min: 114/85  Max: 152/90   Pulse  Min: 90  Max: 121   Resp  Min: 22  Max: 27   SpO2  Min: 91 %  Max: 100 %   No data recorded       Input/Output for last 24 hour shift  09/21 0701 - 09/22 0700  In: 2204.1 [I.V.:145.1]  Out: 3400 [Urine:3400]   Mode: VC+/AC  FiO2 (%):  [35 %-45 %] 35 %  S RR:  [20] 20  S VT:  [490 mL] 490 mL  PEEP/CPAP (cm H2O):  [5 cm H20] 5 cm H20  MAP (cm H2O):  [10-15] 12  Objective:  General Appearance:  Uncomfortable and in no acute distress.    Vital signs: (most recent): Blood pressure 123/98, pulse 118, temperature (!) 103.1 °F (39.5 °C), temperature source Oral, resp. rate 26, height 175 cm (68.9\"), weight 110 kg (241 lb 7.2 oz), SpO2 94%.    HEENT: (Tracheostomy tube in place)    Lungs:  Normal effort and normal respiratory rate.  There are decreased breath sounds.  No rales, wheezes or rhonchi.    Heart: Tachycardia.  Regular rhythm.  S1 normal and S2 normal.  No murmur.   Chest: Symmetric chest wall expansion.   Abdomen: Abdomen is soft.  Bowel sounds are normal.  (PEG in place).   There is no abdominal tenderness.     Extremities: Normal range of motion.  There is dependent edema.  " (Anasarca)  Neurological: (Awake.  Follows some commands.  ).    Pupils:  Pupils are equal, round, and reactive to light.  Pupils are equal.   Skin:  Warm.  No rash or cyanosis.               Results from last 7 days   Lab Units 09/22/24  0340 09/21/24  0355 09/20/24  0245   WBC 10*3/mm3 12.18* 12.85* 12.46*   HEMOGLOBIN g/dL 11.0* 10.1* 9.8*   PLATELETS 10*3/mm3 500* 472* 408     Results from last 7 days   Lab Units 09/22/24  0340 09/21/24  1827 09/21/24  0355 09/20/24  1600 09/20/24  0245 09/19/24  0359 09/18/24  0242 09/17/24  0258 09/16/24  1328 09/16/24  0307   SODIUM mmol/L 143  --  143  --  143   < > 142 141  --  142   POTASSIUM mmol/L 3.2* 4.0 3.1*   < > 3.5   < > 4.0 3.8   < > 3.3*   CO2 mmol/L 33.0*  --  35.0*  --  32.0*   < > 29.0 30.0*  --  30.0*   BUN mg/dL 29*  --  33*  --  37*   < > 32* 29*  --  36*   CREATININE mg/dL 0.67*  --  0.59*  --  0.65*   < > 0.56* 0.47*  --  0.54*   MAGNESIUM mg/dL  --   --  2.4  --  2.4  --  2.3 2.4  --  2.3   PHOSPHORUS mg/dL  --   --   --   --   --   --   --  3.7  --  3.2   GLUCOSE mg/dL 177*  --  175*  --  153*   < > 131* 104*  --  128*    < > = values in this interval not displayed.     Estimated Creatinine Clearance: 137.8 mL/min (A) (by C-G formula based on SCr of 0.67 mg/dL (L)).    Results from last 7 days   Lab Units 09/22/24  0343   PH, ARTERIAL pH units 7.512*   PCO2, ARTERIAL mm Hg 46.9*   PO2 ART mm Hg 83.7         I reviewed the patient's results and images.     Assessment & Plan   Impression        Type 2 respiratory failure    HFrEF (heart failure with reduced ejection fraction)    NSTEMI (non-ST elevated myocardial infarction)    Right lower lobe pneumonia       Plan        We will continue to follow cultures.  If he were to continue to have fevers over the next 24 hours, I would plan to remove both the Basilio catheter and the PICC.  Continue with diuresis as before.  Continue empiric Zosyn for now.  Stop Risperdal for now.  The fevers are concerning though  no source has been located.  It is possible that this is medication related as well.  We will initiate pressure support trials today.  He remains at very high risk of worsening.    Plan of care and goals reviewed with mulitdisciplinary/antibiotic stewardship team during rounds.   I discussed the patient's findings and my recommendations with patient and nursing staff     High level of risk due to:  parenteral controlled substances and decision to de-escalate care.      Jon Longoria MD, [facility]  Pulmonology and Critical Care Medicine

## 2024-09-23 ENCOUNTER — APPOINTMENT (OUTPATIENT)
Dept: GENERAL RADIOLOGY | Facility: HOSPITAL | Age: 64
End: 2024-09-23
Payer: MEDICAID

## 2024-09-23 LAB
ALBUMIN SERPL-MCNC: 3.5 G/DL (ref 3.5–5.2)
ALBUMIN/GLOB SERPL: 0.8 G/DL
ALP SERPL-CCNC: 129 U/L (ref 39–117)
ALT SERPL W P-5'-P-CCNC: 85 U/L (ref 1–41)
ANION GAP SERPL CALCULATED.3IONS-SCNC: 13 MMOL/L (ref 5–15)
ARTERIAL PATENCY WRIST A: POSITIVE
AST SERPL-CCNC: 105 U/L (ref 1–40)
ATMOSPHERIC PRESS: ABNORMAL MM[HG]
BACTERIA SPEC AEROBE CULT: NORMAL
BACTERIA SPEC AEROBE CULT: NORMAL
BASE EXCESS BLDA CALC-SCNC: 12.9 MMOL/L (ref 0–2)
BASOPHILS # BLD AUTO: 0.09 10*3/MM3 (ref 0–0.2)
BASOPHILS NFR BLD AUTO: 0.8 % (ref 0–1.5)
BDY SITE: ABNORMAL
BILIRUB SERPL-MCNC: 0.4 MG/DL (ref 0–1.2)
BODY TEMPERATURE: 37
BUN SERPL-MCNC: 35 MG/DL (ref 8–23)
BUN/CREAT SERPL: 47.9 (ref 7–25)
CALCIUM SPEC-SCNC: 9.4 MG/DL (ref 8.6–10.5)
CHLORIDE SERPL-SCNC: 103 MMOL/L (ref 98–107)
CO2 BLDA-SCNC: 38.6 MMOL/L (ref 22–33)
CO2 SERPL-SCNC: 33 MMOL/L (ref 22–29)
COHGB MFR BLD: 1.4 % (ref 0–2)
CREAT SERPL-MCNC: 0.73 MG/DL (ref 0.76–1.27)
CRP SERPL-MCNC: 19.4 MG/DL (ref 0–0.5)
DEPRECATED RDW RBC AUTO: 47.2 FL (ref 37–54)
EGFRCR SERPLBLD CKD-EPI 2021: 102.2 ML/MIN/1.73
EOSINOPHIL # BLD AUTO: 0.26 10*3/MM3 (ref 0–0.4)
EOSINOPHIL NFR BLD AUTO: 2.2 % (ref 0.3–6.2)
EPAP: 0
ERYTHROCYTE [DISTWIDTH] IN BLOOD BY AUTOMATED COUNT: 14.6 % (ref 12.3–15.4)
GLOBULIN UR ELPH-MCNC: 4.5 GM/DL
GLUCOSE BLDC GLUCOMTR-MCNC: 153 MG/DL (ref 70–130)
GLUCOSE BLDC GLUCOMTR-MCNC: 155 MG/DL (ref 70–130)
GLUCOSE BLDC GLUCOMTR-MCNC: 178 MG/DL (ref 70–130)
GLUCOSE BLDC GLUCOMTR-MCNC: 178 MG/DL (ref 70–130)
GLUCOSE SERPL-MCNC: 166 MG/DL (ref 65–99)
HCO3 BLDA-SCNC: 37.2 MMOL/L (ref 20–26)
HCT VFR BLD AUTO: 37.7 % (ref 37.5–51)
HCT VFR BLD CALC: 34.7 % (ref 38–51)
HGB BLD-MCNC: 11.2 G/DL (ref 13–17.7)
HGB BLDA-MCNC: 11.3 G/DL (ref 13.5–17.5)
IMM GRANULOCYTES # BLD AUTO: 0.06 10*3/MM3 (ref 0–0.05)
IMM GRANULOCYTES NFR BLD AUTO: 0.5 % (ref 0–0.5)
INHALED O2 CONCENTRATION: 35 %
IPAP: 0
LYMPHOCYTES # BLD AUTO: 1.08 10*3/MM3 (ref 0.7–3.1)
LYMPHOCYTES NFR BLD AUTO: 9 % (ref 19.6–45.3)
MAGNESIUM SERPL-MCNC: 2.8 MG/DL (ref 1.6–2.4)
MCH RBC QN AUTO: 26.4 PG (ref 26.6–33)
MCHC RBC AUTO-ENTMCNC: 29.7 G/DL (ref 31.5–35.7)
MCV RBC AUTO: 88.7 FL (ref 79–97)
METHGB BLD QL: -0.1 % (ref 0–1.5)
MODALITY: ABNORMAL
MONOCYTES # BLD AUTO: 1.41 10*3/MM3 (ref 0.1–0.9)
MONOCYTES NFR BLD AUTO: 11.8 % (ref 5–12)
NEUTROPHILS NFR BLD AUTO: 75.7 % (ref 42.7–76)
NEUTROPHILS NFR BLD AUTO: 9.09 10*3/MM3 (ref 1.7–7)
NRBC BLD AUTO-RTO: 0 /100 WBC (ref 0–0.2)
OXYHGB MFR BLDV: 94.1 % (ref 94–99)
PAW @ PEAK INSP FLOW SETTING VENT: 0 CMH2O
PCO2 BLDA: 45.7 MM HG (ref 35–45)
PCO2 TEMP ADJ BLD: 45.7 MM HG (ref 35–48)
PEEP RESPIRATORY: 5 CM[H2O]
PH BLDA: 7.52 PH UNITS (ref 7.35–7.45)
PH, TEMP CORRECTED: 7.52 PH UNITS
PHOSPHATE SERPL-MCNC: 1.3 MG/DL (ref 2.5–4.5)
PHOSPHATE SERPL-MCNC: 2.3 MG/DL (ref 2.5–4.5)
PLATELET # BLD AUTO: 515 10*3/MM3 (ref 140–450)
PMV BLD AUTO: 9.8 FL (ref 6–12)
PO2 BLDA: 68.1 MM HG (ref 83–108)
PO2 TEMP ADJ BLD: 68.1 MM HG (ref 83–108)
POTASSIUM SERPL-SCNC: 3.3 MMOL/L (ref 3.5–5.2)
POTASSIUM SERPL-SCNC: 3.9 MMOL/L (ref 3.5–5.2)
PREALB SERPL-MCNC: 12.1 MG/DL (ref 20–40)
PROT SERPL-MCNC: 8 G/DL (ref 6–8.5)
RBC # BLD AUTO: 4.25 10*6/MM3 (ref 4.14–5.8)
SODIUM SERPL-SCNC: 149 MMOL/L (ref 136–145)
TOTAL RATE: 25 BREATHS/MINUTE
TRIGL SERPL-MCNC: 152 MG/DL (ref 0–150)
VENTILATOR MODE: ABNORMAL
VT ON VENT VENT: 0.49 ML
WBC NRBC COR # BLD AUTO: 11.99 10*3/MM3 (ref 3.4–10.8)

## 2024-09-23 PROCEDURE — 36600 WITHDRAWAL OF ARTERIAL BLOOD: CPT

## 2024-09-23 PROCEDURE — 83050 HGB METHEMOGLOBIN QUAN: CPT

## 2024-09-23 PROCEDURE — 25010000002 ADENOSINE PER 6 MG

## 2024-09-23 PROCEDURE — 82805 BLOOD GASES W/O2 SATURATION: CPT

## 2024-09-23 PROCEDURE — 25010000002 PROPOFOL 1000 MG/100ML EMULSION: Performed by: INTERNAL MEDICINE

## 2024-09-23 PROCEDURE — 84100 ASSAY OF PHOSPHORUS: CPT | Performed by: INTERNAL MEDICINE

## 2024-09-23 PROCEDURE — 94761 N-INVAS EAR/PLS OXIMETRY MLT: CPT

## 2024-09-23 PROCEDURE — 82948 REAGENT STRIP/BLOOD GLUCOSE: CPT

## 2024-09-23 PROCEDURE — 93010 ELECTROCARDIOGRAM REPORT: CPT | Performed by: INTERNAL MEDICINE

## 2024-09-23 PROCEDURE — 63710000001 REVEFENACIN 175 MCG/3ML SOLUTION: Performed by: SURGERY

## 2024-09-23 PROCEDURE — 80053 COMPREHEN METABOLIC PANEL: CPT | Performed by: INTERNAL MEDICINE

## 2024-09-23 PROCEDURE — 25010000002 FUROSEMIDE PER 20 MG: Performed by: SURGERY

## 2024-09-23 PROCEDURE — 94799 UNLISTED PULMONARY SVC/PX: CPT

## 2024-09-23 PROCEDURE — 94003 VENT MGMT INPAT SUBQ DAY: CPT

## 2024-09-23 PROCEDURE — 93005 ELECTROCARDIOGRAM TRACING: CPT | Performed by: INTERNAL MEDICINE

## 2024-09-23 PROCEDURE — 85025 COMPLETE CBC W/AUTO DIFF WBC: CPT | Performed by: INTERNAL MEDICINE

## 2024-09-23 PROCEDURE — 25010000002 ENOXAPARIN PER 10 MG: Performed by: SURGERY

## 2024-09-23 PROCEDURE — 84132 ASSAY OF SERUM POTASSIUM: CPT | Performed by: INTERNAL MEDICINE

## 2024-09-23 PROCEDURE — 82375 ASSAY CARBOXYHB QUANT: CPT

## 2024-09-23 PROCEDURE — 25010000002 PIPERACILLIN SOD-TAZOBACTAM PER 1 G: Performed by: SURGERY

## 2024-09-23 PROCEDURE — 99291 CRITICAL CARE FIRST HOUR: CPT | Performed by: INTERNAL MEDICINE

## 2024-09-23 PROCEDURE — 71045 X-RAY EXAM CHEST 1 VIEW: CPT

## 2024-09-23 PROCEDURE — 84134 ASSAY OF PREALBUMIN: CPT | Performed by: INTERNAL MEDICINE

## 2024-09-23 PROCEDURE — 84478 ASSAY OF TRIGLYCERIDES: CPT | Performed by: INTERNAL MEDICINE

## 2024-09-23 PROCEDURE — 25010000002 AMIODARONE IN DEXTROSE 5% 150-4.21 MG/100ML-% SOLUTION: Performed by: INTERNAL MEDICINE

## 2024-09-23 PROCEDURE — 86140 C-REACTIVE PROTEIN: CPT | Performed by: INTERNAL MEDICINE

## 2024-09-23 PROCEDURE — 83735 ASSAY OF MAGNESIUM: CPT | Performed by: INTERNAL MEDICINE

## 2024-09-23 PROCEDURE — 25010000002 AMIODARONE IN DEXTROSE 5% 360-4.14 MG/200ML-% SOLUTION: Performed by: INTERNAL MEDICINE

## 2024-09-23 PROCEDURE — 63710000001 INSULIN REGULAR HUMAN PER 5 UNITS: Performed by: SURGERY

## 2024-09-23 PROCEDURE — 99232 SBSQ HOSP IP/OBS MODERATE 35: CPT | Performed by: INTERNAL MEDICINE

## 2024-09-23 RX ORDER — METOPROLOL TARTRATE 1 MG/ML
5 INJECTION, SOLUTION INTRAVENOUS ONCE
Status: COMPLETED | OUTPATIENT
Start: 2024-09-23 | End: 2024-09-23

## 2024-09-23 RX ORDER — POTASSIUM CHLORIDE 1.5 G/1.58G
40 POWDER, FOR SOLUTION ORAL EVERY 4 HOURS
Status: COMPLETED | OUTPATIENT
Start: 2024-09-23 | End: 2024-09-23

## 2024-09-23 RX ORDER — ADENOSINE 3 MG/ML
INJECTION, SOLUTION INTRAVENOUS
Status: COMPLETED
Start: 2024-09-23 | End: 2024-09-23

## 2024-09-23 RX ORDER — ADENOSINE 3 MG/ML
6 INJECTION, SOLUTION INTRAVENOUS ONCE
Status: DISCONTINUED | OUTPATIENT
Start: 2024-09-23 | End: 2024-09-24

## 2024-09-23 RX ORDER — METOPROLOL TARTRATE 25 MG/1
25 TABLET, FILM COATED ORAL 2 TIMES DAILY
Status: DISCONTINUED | OUTPATIENT
Start: 2024-09-23 | End: 2024-09-25

## 2024-09-23 RX ORDER — ADENOSINE 3 MG/ML
12 INJECTION, SOLUTION INTRAVENOUS ONCE
Status: COMPLETED | OUTPATIENT
Start: 2024-09-23 | End: 2024-09-23

## 2024-09-23 RX ORDER — SENNOSIDES A AND B 8.6 MG/1
2 TABLET, FILM COATED ORAL 2 TIMES DAILY
Status: DISCONTINUED | OUTPATIENT
Start: 2024-09-23 | End: 2024-09-25 | Stop reason: HOSPADM

## 2024-09-23 RX ORDER — PROPOFOL 10 MG/ML
100 INJECTION, EMULSION INTRAVENOUS ONCE
Status: COMPLETED | OUTPATIENT
Start: 2024-09-23 | End: 2024-09-23

## 2024-09-23 RX ADMIN — ALBUTEROL SULFATE 2.5 MG: 2.5 SOLUTION RESPIRATORY (INHALATION) at 15:13

## 2024-09-23 RX ADMIN — AMIODARONE HYDROCHLORIDE 150 MG: 1.5 INJECTION, SOLUTION INTRAVENOUS at 13:38

## 2024-09-23 RX ADMIN — POTASSIUM & SODIUM PHOSPHATES POWDER PACK 280-160-250 MG 2 PACKET: 280-160-250 PACK at 06:09

## 2024-09-23 RX ADMIN — REVEFENACIN 175 MCG: 175 SOLUTION RESPIRATORY (INHALATION) at 07:50

## 2024-09-23 RX ADMIN — DIAZEPAM 10 MG: 10 TABLET ORAL at 20:34

## 2024-09-23 RX ADMIN — ARFORMOTEROL TARTRATE 15 MCG: 15 SOLUTION RESPIRATORY (INHALATION) at 19:22

## 2024-09-23 RX ADMIN — CITALOPRAM HYDROBROMIDE 40 MG: 40 TABLET ORAL at 09:15

## 2024-09-23 RX ADMIN — INSULIN HUMAN 2 UNITS: 100 INJECTION, SOLUTION PARENTERAL at 23:42

## 2024-09-23 RX ADMIN — ALBUTEROL SULFATE 2.5 MG: 2.5 SOLUTION RESPIRATORY (INHALATION) at 01:57

## 2024-09-23 RX ADMIN — Medication 10 ML: at 20:46

## 2024-09-23 RX ADMIN — FUROSEMIDE 40 MG: 10 INJECTION, SOLUTION INTRAMUSCULAR; INTRAVENOUS at 20:34

## 2024-09-23 RX ADMIN — METOPROLOL TARTRATE 25 MG: 25 TABLET, FILM COATED ORAL at 14:33

## 2024-09-23 RX ADMIN — CHLORHEXIDINE GLUCONATE, 0.12% ORAL RINSE 15 ML: 1.2 SOLUTION DENTAL at 20:34

## 2024-09-23 RX ADMIN — CHLORHEXIDINE GLUCONATE, 0.12% ORAL RINSE 15 ML: 1.2 SOLUTION DENTAL at 09:15

## 2024-09-23 RX ADMIN — PHENYTOIN 200 MG: 125 SUSPENSION ORAL at 09:15

## 2024-09-23 RX ADMIN — ENOXAPARIN SODIUM 40 MG: 100 INJECTION SUBCUTANEOUS at 09:16

## 2024-09-23 RX ADMIN — ACETAMINOPHEN 650 MG: 650 SOLUTION ORAL at 04:11

## 2024-09-23 RX ADMIN — ASPIRIN 81 MG 81 MG: 81 TABLET ORAL at 09:15

## 2024-09-23 RX ADMIN — Medication 10 ML: at 09:16

## 2024-09-23 RX ADMIN — Medication 30 ML: at 09:16

## 2024-09-23 RX ADMIN — POTASSIUM CHLORIDE 40 MEQ: 1.5 POWDER, FOR SOLUTION ORAL at 06:09

## 2024-09-23 RX ADMIN — NYSTATIN 500000 UNITS: 100000 SUSPENSION ORAL at 09:14

## 2024-09-23 RX ADMIN — ATORVASTATIN CALCIUM 80 MG: 40 TABLET, FILM COATED ORAL at 09:15

## 2024-09-23 RX ADMIN — INSULIN HUMAN 2 UNITS: 100 INJECTION, SOLUTION PARENTERAL at 06:09

## 2024-09-23 RX ADMIN — INSULIN HUMAN 2 UNITS: 100 INJECTION, SOLUTION PARENTERAL at 00:08

## 2024-09-23 RX ADMIN — ALBUTEROL SULFATE 2.5 MG: 2.5 SOLUTION RESPIRATORY (INHALATION) at 19:22

## 2024-09-23 RX ADMIN — METOPROLOL TARTRATE 5 MG: 5 INJECTION INTRAVENOUS at 13:35

## 2024-09-23 RX ADMIN — ALBUTEROL SULFATE 2.5 MG: 2.5 SOLUTION RESPIRATORY (INHALATION) at 07:50

## 2024-09-23 RX ADMIN — INSULIN HUMAN 2 UNITS: 100 INJECTION, SOLUTION PARENTERAL at 12:10

## 2024-09-23 RX ADMIN — PIPERACILLIN AND TAZOBACTAM 3.38 G: 3; .375 INJECTION, POWDER, LYOPHILIZED, FOR SOLUTION INTRAVENOUS at 00:06

## 2024-09-23 RX ADMIN — ADENOSINE 6 MG: 3 INJECTION INTRAVENOUS at 12:38

## 2024-09-23 RX ADMIN — PIPERACILLIN AND TAZOBACTAM 3.38 G: 3; .375 INJECTION, POWDER, LYOPHILIZED, FOR SOLUTION INTRAVENOUS at 09:17

## 2024-09-23 RX ADMIN — ACETAMINOPHEN 650 MG: 650 SOLUTION ORAL at 12:10

## 2024-09-23 RX ADMIN — METOPROLOL TARTRATE 25 MG: 25 TABLET, FILM COATED ORAL at 20:35

## 2024-09-23 RX ADMIN — PROPOFOL 100 MG: 10 INJECTION, EMULSION INTRAVENOUS at 12:55

## 2024-09-23 RX ADMIN — FENTANYL 1 PATCH: 75 PATCH TRANSDERMAL at 10:39

## 2024-09-23 RX ADMIN — EMPAGLIFLOZIN 10 MG: 10 TABLET, FILM COATED ORAL at 09:15

## 2024-09-23 RX ADMIN — ARFORMOTEROL TARTRATE 15 MCG: 15 SOLUTION RESPIRATORY (INHALATION) at 07:50

## 2024-09-23 RX ADMIN — PANTOPRAZOLE SODIUM 40 MG: 40 INJECTION, POWDER, LYOPHILIZED, FOR SOLUTION INTRAVENOUS at 06:09

## 2024-09-23 RX ADMIN — ADENOSINE 12 MG: 3 INJECTION, SOLUTION INTRAVENOUS at 12:48

## 2024-09-23 RX ADMIN — POTASSIUM CHLORIDE 40 MEQ: 1.5 POWDER, FOR SOLUTION ORAL at 09:15

## 2024-09-23 RX ADMIN — AMIODARONE HYDROCHLORIDE 0.5 MG/MIN: 1.8 INJECTION, SOLUTION INTRAVENOUS at 21:28

## 2024-09-23 RX ADMIN — ADENOSINE 12 MG: 3 INJECTION INTRAVENOUS at 12:48

## 2024-09-23 RX ADMIN — PHENYTOIN 200 MG: 125 SUSPENSION ORAL at 21:00

## 2024-09-23 RX ADMIN — FUROSEMIDE 40 MG: 10 INJECTION, SOLUTION INTRAMUSCULAR; INTRAVENOUS at 09:15

## 2024-09-23 RX ADMIN — DIAZEPAM 10 MG: 10 TABLET ORAL at 09:15

## 2024-09-23 RX ADMIN — INSULIN HUMAN 2 UNITS: 100 INJECTION, SOLUTION PARENTERAL at 18:03

## 2024-09-23 NOTE — PLAN OF CARE
Goal Outcome Evaluation:           Progress: no change  Outcome Evaluation: Patient continues on MV with a peep of 5 FiO2@35%. Patient awake and remains unrestrained. x3 BM, adequate UOP. Fevers continue PRN tylenol given x1. Trach care completed. Patient HR up into the 180s see significant note. Patient was scheduled for transport to LTACH today, however due to HR it was rescheduled for 9/25. Amio gtt infusing. TF infusing at goal and patient tolerating well. Attempted to updated sister Oumou but unable to reach her this afternoon.

## 2024-09-23 NOTE — PROGRESS NOTES
"[facility]  IP Progress Note      Chief Complaint: Tachycardia/atrial flutter    Subjective   I was asked to reevaluate the patient due to tachycardia.  Events of hospitalization reviewed.  Patient has undergone tracheostomy and PEG tube placement and was getting ready to be transferred to LTAC and developed tachycardia with ventricular rate 160-170.  This appeared to be SVT versus rapid atrial flutter with 2-1 block and right bundle branch block.  The patient did not complain of chest pain and appeared stable.  Adenosine 6 mg then 12 mg was administered with persistent tachycardia and subsequently the patient was cardioverted with 50 J synchronized shock with conversion to sinus rhythm.  Objective     Blood pressure (!) 85/66, pulse 112, temperature (!) 102 °F (38.9 °C), temperature source Bladder, resp. rate 22, height 175 cm (68.9\"), weight 110 kg (241 lb 7.2 oz), SpO2 91%.     Intake/Output Summary (Last 24 hours) at 9/23/2024 1354  Last data filed at 9/23/2024 1200  Gross per 24 hour   Intake 2241.46 ml   Output 2850 ml   Net -608.54 ml       Physical Exam:  General: No acute distress.   Neck: no JVD.  Tracheostomy noted  Chest:No respiratory distress, breath sounds equal, scattered rhonchi  Cardiovascular: Normal S1 and S2, distant heart sounds  Extremities: No significant edema.    Results Review:     I reviewed the patient's new clinical results.    Results from last 7 days   Lab Units 09/23/24  0405   WBC 10*3/mm3 11.99*   HEMOGLOBIN g/dL 11.2*   HEMATOCRIT % 37.7   PLATELETS 10*3/mm3 515*     Results from last 7 days   Lab Units 09/23/24  0405   SODIUM mmol/L 149*   POTASSIUM mmol/L 3.3*   CHLORIDE mmol/L 103   CO2 mmol/L 33.0*   BUN mg/dL 35*   CREATININE mg/dL 0.73*   CALCIUM mg/dL 9.4   BILIRUBIN mg/dL 0.4   ALK PHOS U/L 129*   ALT (SGPT) U/L 85*   AST (SGOT) U/L 105*   GLUCOSE mg/dL 166*     Results from last 7 days   Lab Units 09/23/24  0405   SODIUM mmol/L 149* "   POTASSIUM mmol/L 3.3*   CHLORIDE mmol/L 103   CO2 mmol/L 33.0*   BUN mg/dL 35*   CREATININE mg/dL 0.73*   GLUCOSE mg/dL 166*   CALCIUM mg/dL 9.4         Lab Results   Component Value Date    TROPONINT 338 (C) 09/03/2024         Results from last 7 days   Lab Units 09/23/24  0405   TRIGLYCERIDES mg/dL 152*     Pharmacy Consult, , Does not apply, Q12H  adenosine, 6 mg, Intravenous, Once  Albuterol Sulfate NEB Orderable, 2.5 mg, Nebulization, Q6H - RT  arformoterol, 15 mcg, Nebulization, BID - RT  aspirin, 81 mg, Nasogastric, Daily  atorvastatin, 80 mg, Nasogastric, Daily  fentaNYL, 1 patch, Transdermal, Q72H   And  Check Fentanyl Patch Placement, 1 each, Does not apply, Q12H  chlorhexidine, 15 mL, Mouth/Throat, Q12H  citalopram, 40 mg, Nasogastric, Daily  diazePAM, 10 mg, Nasogastric, BID  empagliflozin, 10 mg, Nasogastric, Daily  enoxaparin, 40 mg, Subcutaneous, Daily  furosemide, 40 mg, Intravenous, Q12H  insulin regular, 2-7 Units, Subcutaneous, Q6H  metoprolol tartrate, 25 mg, Nasogastric, BID  pantoprazole, 40 mg, Intravenous, Q AM  pharmacy consult - [facility], , Does not apply, Daily  phenytoin, 200 mg, Nasogastric, BID  senna-docusate sodium, 2 tablet, Nasogastric, BID   And  polyethylene glycol, 17 g, Nasogastric, Daily  ProSource No Carb, 30 mL, Nasogastric, Daily  revefenacin, 175 mcg, Nebulization, Daily - RT  sodium chloride, 10 mL, Intravenous, Q12H         Tele: Sinus Rythym with PACs.      Assessment:  Atrial flutter with RVR, now status post ECV and in sinus rhythm  NSTEMI in the setting of demand ischemia with known chronically occluded RCA, he did not have significant left coronary system disease by cath last year.  Acute on chronic systolic heart failure, EF 28% this admission. Last EF 36% by echo 10/2023  Cardiomyopathy.  Pneumonia  Acute on chronic respiratory failure with hypoxia  Coronary artery disease  Dyslipidemia  Hypertension    Plan:  Metoprolol 5 mg IV followed by 25 mg p.o. twice  daily.  Start amiodarone per protocol.  Continue rest of the current medications.  Observe in the hospital another day, if no recurrence of atrial flutter he can be transferred to LTAC tomorrow.  In case of recurrent arrhythmias we will need to involve EP service and consider ablation for atrial flutter.    Marsha Gaitan MD, FAC, [facility]

## 2024-09-23 NOTE — CASE MANAGEMENT/SOCIAL WORK
Continued Stay Note  [facility]     Patient Name: Fernie Dalal  MRN: [id number]  Today's Date: 9/23/2024    Admit Date: 9/1/2024    Plan: [facility]   Discharge Plan       Row Name 09/23/24 1036       Plan    Plan [facility]    Patient/Family in Agreement with Plan other (see comments)    Plan Comments I had VM from [facility], that they have availability to accept today if medically ready. Discussed in MDR, patient medically ready for transfer to [facility]. I called [facility] and updated, gave her updated vent settings/v/s. She will review and contact me once she had transport scheduled.    Final Discharge Disposition Code 63 - [facility]                   Discharge Codes    No documentation.                 Expected Discharge Date and Time       Expected Discharge Date Expected Discharge Time    Sep 27, 2024               Aniket Marquez RN

## 2024-09-23 NOTE — PLAN OF CARE
Goal Outcome Evaluation:  Plan of Care Reviewed With: patient        Progress: no change     -Pt awake and able to answer yes or no questions. Attempts to speak in sentences.   -Continues on PEEP 5, FiO2 35%.   -Fever throughout shift, PRN tylenol given x1.   -Adequate UOP with no BM for this shift.   -Potassium replacement per protocol.

## 2024-09-23 NOTE — PLAN OF CARE
Goal Outcome Evaluation:            Unable to decrease vent support at this time

## 2024-09-23 NOTE — PLAN OF CARE
Goal Outcome Evaluation:                                            Unable to decrease vent support

## 2024-09-23 NOTE — PROGRESS NOTES
INTENSIVIST   PROGRESS NOTE        SUBJECTIVE     Fernie 64 y.o. male is followed for: Chest Pain       Respiratory Failure Type 2    HFrEF (heart failure with reduced ejection fraction)    NSTEMI (non-ST elevated myocardial infarction)    Right lower lobe pneumonia    As an Intensivist, we provide an integrated approach to the ICU patient and family, medical management of comorbid conditions, including but not limited to electrolytes, glycemic control, organ dysfunction, lead interdisciplinary rounds and coordinate the care with all other services, including those from other specialists.     Interval History:    He has been tolerating PS trials.    Today, plan was to transfer to [facility].    He went into A Flutter with RVR, .    Two doses of adenosine were given, 6mg, and the 12 mg. Then he underwent ECV by Dr. Gaitan, who came to the bedside. He converted to NSR.    Fevers.    Temp  Min: 101.1 °F (38.4 °C)  Max: 102 °F (38.9 °C)       History     Last Reviewed by Victor Hugo Cool MD on 9/3/2024 at  6:37 PM    Sections Reviewed    Medical, Surgical, Family, Tobacco, Alcohol, Drug Use, Sexual Activity,   Social Documentation    Problem list reviewed by Victor Hugo Cool MD on 9/3/2024 at  6:37 PM  Medicines reviewed by Victor Hugo Cool MD on 9/3/2024 at  6:37 PM  Allergies reviewed by Victor Hugo Cool MD on 9/3/2024 at  6:37 PM    The patient's relevant past medical, surgical and social history were reviewed and updated in Epic as appropriate.        OBJECTIVE     Vitals:  Temp: (!) 101.5 °F (38.6 °C) (24 1600) Temp  Min: 101.1 °F (38.4 °C)  Max: 102 °F (38.9 °C)   Temp core:      BP: 124/74 (24) BP  Min: 85/66  Max: 143/92   MAP (non-invasive) Noninvasive MAP (mmHg): 93 (24) Noninvasive MAP (mmHg)  Av.9  Min: 41  Max: 177   Pulse: 87 (24) Pulse  Min: 87  Max: 179   Resp: 20 (24) Resp  Min: 20  Max: 30   SpO2: 94 % (24) SpO2  Min: 86 %  Max: 100 %    Device: ventilator (09/23/24 2126)    Flow Rate: 70 (09/19/24 1800) No data recorded         09/20/24  0900 09/21/24  0900   Weight: 110 kg (242 lb 15.2 oz) 110 kg (241 lb 7.2 oz)        Intake/Ouptut 24 hrs (7:00AM - 6:59 AM)  Intake & Output (last 2 days)         09/22 0701 09/23 0700 09/23 0701 09/24 0700    I.V. (mL/kg) 171.1 (1.6) 318.5 (2.9)    Other 576 265    NG/GT 1548 718    IV Piggyback 185.6     Total Intake(mL/kg) 2480.6 (22.6) 1301.5 (11.8)    Urine (mL/kg/hr) 3270 (1.2) 1775 (1.1)    Stool 0 0    Total Output 3270 1775    Net -789.4 -473.5          Stool Unmeasured Occurrence 1 x 2 x            Medications (drips):    amiodarone, Last Rate: 0.5 mg/min (09/23/24 2128)  propofol, Last Rate: Stopped (09/20/24 1710)       Invasive Mechanical Ventilator   Settings: Observed:   Mode: VC+/AC (09/23/24 2126)    Resp Rate (Set): 16 (09/23/24 2126) Resp Rate (Observed) Vent: 19 (09/23/24 2126)   Vt (Set, mL): 490 mL (09/23/24 2126) Vt, Exp (observed, mL): 545 mL (09/23/24 2126)    Minute Ventilation (L/min) (Obs): 8.69 L/min (09/23/24 2126)    I:E Ratio (Obs): 1:1.6 (09/23/24 2126)       FiO2 (%): 35 % (09/23/24 2126) Plateau Pressure (cm H2O): (S) 18 cm H2O (09/23/24 1922)   PEEP/CPAP (cm H2O): 5 cm H20 (09/23/24 2126) Driving Pressure (cm H2O): 13.3 cm H2O (09/23/24 1922)    Static Compliance (L/cm H2O): 31 (09/23/24 1922)      Physical Examination  Telemetry:  Rhythm: normal sinus rhythm (09/23/24 1800)  Conduction Defect: right bundle branch block (09/14/24 1800)   Constitutional:  No acute distress.   Cardiovascular: RRR.    Respiratory: Normal breath sounds  (+) Rhonchi   Abdominal:  Soft with no tenderness.   Extremities: Trace Edema   Neurological:   Awake.    Best Eye Response: 4-->(E4) spontaneous (09/23/24 1800)  Best Motor Response: 6-->(M6) obeys commands (09/23/24 1800)  Best Verbal Response: 1-->(V1) none (09/23/24 1800)  Leo Coma Scale Score: 11 (09/23/24 1800)    RASS (Vik  Agitation-Sedation Scale): 0-->alert and calm (09/22/24 1800)    Confusion Assessment Method-ICU (CAM-ICU)  Feature 3: Altered Level of Consciousness: Negative (09/22/24 1800)     Results Reviewed:  Laboratory  Microbiology  Radiology  Pathology    Hematology:  Results from last 7 days   Lab Units 09/23/24  0405 09/22/24  0340 09/21/24  0355   WBC 10*3/mm3 11.99* 12.18* 12.85*   HEMOGLOBIN g/dL 11.2* 11.0* 10.1*   MCV fL 88.7 89.7 89.5   PLATELETS 10*3/mm3 515* 500* 472*     Results from last 7 days   Lab Units 09/23/24  0405 09/22/24  0340 09/21/24  0355   NEUTROS ABS 10*3/mm3 9.09* 9.58* 10.37*   LYMPHS ABS 10*3/mm3 1.08 0.82 0.71   EOS ABS 10*3/mm3 0.26 0.13 0.02     Chemistry:  Estimated Creatinine Clearance: 126.4 mL/min (A) (by C-G formula based on SCr of 0.73 mg/dL (L)).    Results from last 7 days   Lab Units 09/23/24  1404 09/23/24  0405 09/22/24  1420 09/22/24  0340   SODIUM mmol/L  --  149*  --  143   POTASSIUM mmol/L 3.9 3.3*   < > 3.2*   CHLORIDE mmol/L  --  103  --  99   CO2 mmol/L  --  33.0*  --  33.0*   BUN mg/dL  --  35*  --  29*   CREATININE mg/dL  --  0.73*  --  0.67*   GLUCOSE mg/dL  --  166*  --  177*    < > = values in this interval not displayed.     Results from last 7 days   Lab Units 09/23/24  1404 09/23/24  0405 09/22/24  0340 09/21/24  0355   CALCIUM mg/dL  --  9.4 9.5 9.2   MAGNESIUM mg/dL  --  2.8*  --  2.4   PHOSPHORUS mg/dL 2.3* 1.3*  --   --      Hepatic Panel:  Results from last 7 days   Lab Units 09/23/24  0405 09/17/24  0258   ALBUMIN g/dL 3.5 3.6   TOTAL PROTEIN g/dL 8.0 6.5   BILIRUBIN mg/dL 0.4 0.3   BILIRUBIN DIRECT mg/dL  --  <0.2   AST (SGOT) U/L 105* 19   ALT (SGPT) U/L 85* 20   ALK PHOS U/L 129* 101     Biomarkers:  Results from last 7 days   Lab Units 09/23/24  0405 09/19/24  0359 09/18/24  0242   CRP mg/dL 19.40*  --   --    LACTATE mmol/L  --   --  0.9   PROCALCITONIN ng/mL  --  0.20 0.13       Phenytoin Level   Date Value Ref Range Status   09/17/2024 3.3 (L) 10.0 -  20.0 mcg/mL Final   09/10/2024 7.4 (L) 10.0 - 20.0 mcg/mL Final     COVID-19  Lab Results   Component Value Date    COVID19 Not Detected 09/20/2024    COVID19 Not Detected 09/01/2024    COVID19 Not Detected 04/22/2023    COVID19 Negative 03/23/2023       Arterial Blood Gases:  Results from last 7 days   Lab Units 09/23/24  0356 09/22/24  0343 09/21/24  0333   PH, ARTERIAL pH units 7.520* 7.512* 7.482*   PCO2, ARTERIAL mm Hg 45.7* 46.9* 50.9*   PO2 ART mm Hg 68.1* 83.7 70.1*   FIO2 % 35 35 45       Images:  XR Chest 1 View    Result Date: 9/23/2024  Impression: No radiographic evidence of acute cardiopulmonary process. Electronically Signed: Alfredito Johnson MD  9/23/2024 7:15 AM EDT  Workstation ID: [id number]    XR Chest 1 View    Result Date: 9/22/2024  Impression: No significant change in comparison to 9/21/2024. Electronically Signed: Will Rodriguez MD  9/22/2024 11:39 AM EDT  Workstation ID: [id number]     Echo:  Results for orders placed during the hospital encounter of 09/01/24    Adult Transthoracic Echo Complete W/ Cont if Necessary Per Protocol    Interpretation Summary    Left ventricular systolic function is moderately decreased. Estimated left ventricular EF = 28%.  There is global hypokinesis of left ventricle.    The left ventricular cavity is mildly dilated.    The left atrial cavity is mild to moderately dilated.    Mild aortic valve stenosis is present (mean gradient 15 mmHg)    Mild to moderate mitral regurgitation is present.    Estimated right ventricular systolic pressure from tricuspid regurgitation is normal (<35 mmHg).      Results: Reviewed.  I reviewed the patient's new laboratory and imaging results.  I independently reviewed the patient's new images.    Medications: Reviewed.    Assessment   A/P     Hospital:  LOS: 20 days   ICU: 20d 3h     Active Hospital Problems    Diagnosis  POA    **Respiratory Failure Type 2 [J96.92]  Yes    Right lower lobe pneumonia [J18.9]  Yes    NSTEMI (non-ST  elevated myocardial infarction) [I21.4]  Yes     Elevated troponin in the setting of pneumonia and CHF, 9/1/2024      HFrEF (heart failure with reduced ejection fraction) [I50.20]  Yes     Echo (9/29/2023): LVEF 36%.  Moderate MR.  RVSP 42 mmHg       Fernie is a 64 y.o. male admitted on 9/1/2024 with worsening Dyspnea [R06.00]  Acute on chronic respiratory failure with hypoxia [J96.21]    Admitted with chest pain, heart failure, dyspnea and chills, which was attributed to COPD with exacerbation due to a RLL pneumonia. Despite effective diuresis, he continued to decline.    On 09/03/24 he developed some respiratory distress and respiratory arrest.  He was emergently intubated. During this admission he had elevated troponin and ST changes consistent with a non-ST elevation MI.  He also has obstructive sleep apnea but is noncompliant with CPAP.    09/18/24 His clinical course has been complicated with prolonged Invasive Mechanical Ventilation requiring Tracheostomy and a PEG.    On 09/23/24 A Flutter with RVR, requiring ECV.      Assessment/Management/Treatment Plan:    Respiratory Failure, type 2, acute on chronic  Intubated 09/03/24   Invasive Mechanical Ventilation   Trach 09/18/24  Possible RLL pneumonia  Former smoker. Quit 2022  OAD per history.   Cardiovascular  A Flutter with RVRT, requiring ECV on 09/23/24   HFrEF. EF 28%  CAD. NSTEMI. Chronically occluded RCA.  Dyslipidemia   HTN  Antibiotics  Elevated PCT on admission, 4.18 (09/01/24), in the range of sepsis. Improving.    Lab Results   Component Value Date    PROCALCITO 0.20 09/19/2024    PROCALCITO 0.13 09/18/2024    PROCALCITO 0.29 (H) 09/09/2024    PROCALCITO 0.41 (H) 09/07/2024    PROCALCITO 0.61 (H) 09/06/2024    PROCALCITO 0.76 (H) 09/05/2024     MRSA PCR screen Negative  Antibiotics: Piperacillin-Tazobactam   Nutrition Support  Tube Feeding: Formula: Peptamen Intense VHP (Peptide Based, Very High Protein); Feeding Type: Continuous; Start at: Other;  Other: 75; Then Advance By: Do Not Advance; Total Daily Feeding Volume (mL/day): 1500; Water Flush: 30 mL; Every: 1 hour  Peptamen Intense (1.0 kcal/mL, 92 g protein/L, 4 g fiber/L)  He is receiving Phenytoin, and Enteral Nutrition is on hold 1 h before and after each dose. (BID)    Lab Results   Component Value Date    PREALBUMIN 12.1 (L) 09/23/2024    PREALBUMIN 14.3 (L) 09/09/2024    CRP 19.40 (H) 09/23/2024    CRP 11.36 (H) 09/09/2024     Endocrine   Body mass index is 35.76 kg/m². Obese Class II: 35-39.9kg/m2  Type 2 diabetes.    Lab Results   Lab Value Date/Time    HGBA1C 6.00 (H) 09/01/2024 1050    HGBA1C 7.30 (H) 04/25/2023 0440     Results from last 7 days   Lab Units 09/23/24  1752 09/23/24  1203 09/23/24  0524 09/22/24  2351 09/22/24  1656 09/22/24  1119 09/22/24  0525 09/21/24  2313   GLUCOSE mg/dL 178* 153* 178* 175* 201* 184* 177* 145*       Diet: NPO Diet NPO Type: Strict NPO, Sips with Meds   Advance Directives: Code Status and Medical Interventions: CPR (Attempt to Resuscitate); Full Support   Ordered at: 09/01/24 1318     Level Of Support Discussed With:    Patient     Code Status (Patient has no pulse and is not breathing):    CPR (Attempt to Resuscitate)     Medical Interventions (Patient has pulse or is breathing):    Full Support        VTE Prophylaxis:  Mechanical VTE prophylaxis orders are signed & held. Pharmacologic VTE prophylaxis orders are present.         In brief:  A Flutter per Dr. Gaitan  Amiodarone  Metoprolol  Goal: Glucose < 180 mg/dL.   Insulin SQ - correction scale for now.  Disposition: Keep in ICU.    Plan of care and goals reviewed during interdisciplinary rounds.  I discussed the patient's findings and my recommendations with patient and nursing staff    Time: was greater than 50 minutes. This is non-concurrent time.   (This excludes time spent performing separately reportable procedures and services).    He has a high risk of imminent or life-threatening  deterioration,  which requires the highest level of physician preparedness to intervene urgently. I devoted my full attention to the direct care of this patient for the amount of time indicated above.     Time spent with family or surrogate(s) is included only if the patient was incapable of providing the necessary information or participating in medical decision making.    He has the following organ/system impairments Respiratory Failure.         [x] Primary Attending Intensive Care Medicine - Nutrition Support   [] Consultant    Copied text in this note has been reviewed and is accurate as of 09/23/24

## 2024-09-23 NOTE — PROGRESS NOTES
Clinical Nutrition     Patient Name: Fernie Dalal  YOB: 1960  MRN: [id number]  Date of Encounter: 09/23/24 11:55 EDT  Admission date: 9/1/2024  Reason for Visit: MDR, Follow-up protocol, EN    Assessment   Nutrition Assessment   Admission Diagnosis:  Dyspnea [R06.00]  Acute on chronic respiratory failure with hypoxia [J96.21]    Problem List:    Respiratory Failure Type 2    HFrEF (heart failure with reduced ejection fraction)    NSTEMI (non-ST elevated myocardial infarction)    Right lower lobe pneumonia      PMH:   He  has a past medical history of Angina at rest, Anxiety, Arrhythmia, COPD (chronic obstructive pulmonary disease), GERD (gastroesophageal reflux disease), Heart failure, Hyperlipidemia, Hypertension, and Myocardial infarction.    PSH:  He  has a past surgical history that includes Cardiac catheterization; Cardiovascular stress test; Cardiac catheterization (N/A, 4/24/2023); and tracheostomy and peg tube insertion (N/A, 9/18/2024).    Applicable Nutrition History:   ARF/VENT 9-3-24    EN started 9-5-24    s/p Trach, PEG 9-18-24      Labs    Labs Reviewed: Yes    Results from last 7 days   Lab Units 09/23/24  0405 09/22/24  1420 09/22/24  0340 09/21/24  1827 09/21/24  0355 09/20/24  1600 09/20/24  0245 09/19/24  0359 09/18/24  0242 09/17/24  0258   GLUCOSE mg/dL 166*  --  177*  --  175*  --  153*   < > 131* 104*   BUN mg/dL 35*  --  29*  --  33*  --  37*   < > 32* 29*   CREATININE mg/dL 0.73*  --  0.67*  --  0.59*  --  0.65*   < > 0.56* 0.47*   SODIUM mmol/L 149*  --  143  --  143  --  143   < > 142 141   CHLORIDE mmol/L 103  --  99  --  99  --  98   < > 100 99   POTASSIUM mmol/L 3.3* 3.8 3.2*   < > 3.1*   < > 3.5   < > 4.0 3.8   PHOSPHORUS mg/dL 1.3*  --   --   --   --   --   --   --   --  3.7   MAGNESIUM mg/dL 2.8*  --   --   --  2.4  --  2.4  --  2.3 2.4   ALT (SGPT) U/L 85*  --   --   --   --   --   --   --   --  20   LACTATE mmol/L  --   --   --   --   --   --   --    --  0.9  --     < > = values in this interval not displayed.       Results from last 7 days   Lab Units 09/23/24  0405 09/17/24  0258   ALBUMIN g/dL 3.5 3.6   PREALBUMIN mg/dL 12.1*  --    CRP mg/dL 19.40*  --    TRIGLYCERIDES mg/dL 152* 170*       Results from last 7 days   Lab Units 09/23/24  0524 09/22/24  2351 09/22/24  1656 09/22/24  1119 09/22/24  0525 09/21/24  2313   GLUCOSE mg/dL 178* 175* 201* 184* 177* 145*     Lab Results   Lab Value Date/Time    HGBA1C 6.00 (H) 09/01/2024 1050    HGBA1C 7.30 (H) 04/25/2023 0440                 Medications    Medications Reviewed: yes    Scheduled Meds:Pharmacy Consult, , Does not apply, Q12H  Albuterol Sulfate NEB Orderable, 2.5 mg, Nebulization, Q6H - RT  arformoterol, 15 mcg, Nebulization, BID - RT  aspirin, 81 mg, Nasogastric, Daily  atorvastatin, 80 mg, Nasogastric, Daily  fentaNYL, 1 patch, Transdermal, Q72H   And  Check Fentanyl Patch Placement, 1 each, Does not apply, Q12H  chlorhexidine, 15 mL, Mouth/Throat, Q12H  citalopram, 40 mg, Nasogastric, Daily  diazePAM, 10 mg, Nasogastric, BID  empagliflozin, 10 mg, Nasogastric, Daily  enoxaparin, 40 mg, Subcutaneous, Daily  furosemide, 40 mg, Intravenous, Q12H  insulin regular, 2-7 Units, Subcutaneous, Q6H  nystatin, 5 mL, Swish & Spit, 4x Daily  pantoprazole, 40 mg, Intravenous, Q AM  pharmacy consult - [facility], , Does not apply, Daily  phenytoin, 200 mg, Nasogastric, BID  piperacillin-tazobactam, 3.375 g, Intravenous, Q8H  senna-docusate sodium, 2 tablet, Nasogastric, BID   And  polyethylene glycol, 17 g, Nasogastric, Daily  ProSource No Carb, 30 mL, Nasogastric, Daily  revefenacin, 175 mcg, Nebulization, Daily - RT  sodium chloride, 10 mL, Intravenous, Q12H      Continuous Infusions:propofol, 5-50 mcg/kg/min, Last Rate: Stopped (09/20/24 4390)      PRN Meds:.  acetaminophen    senna-docusate sodium **AND** polyethylene glycol **AND** [DISCONTINUED] bisacodyl **AND** bisacodyl    lactulose    Phosphorus Replacement -  "Follow Nurse / BPA Driven Protocol    polyvinyl alcohol    Potassium Replacement - Follow Nurse / BPA Driven Protocol    sodium chloride    Intake/Ouptut 24 hrs (0701 - 0700)   I&O's Reviewed: yes    Intake & Output (last day)         09/22 0701 09/23 0700 09/23 0701 09/24 0700    I.V. (mL/kg) 171.1 (1.6) 8.9 (0.1)    Other 576 55    NG/GT 1548 209    IV Piggyback 185.6     Total Intake(mL/kg) 2480.6 (22.6) 272.9 (2.5)    Urine (mL/kg/hr) 3270 (1.2) 175 (0.3)    Stool 0     Total Output 3270 175    Net -789.4 +97.9          Stool Unmeasured Occurrence 1 x            Anthropometrics     Height: Height: 175 cm (68.9\")  Last Filed Weight: Weight: 110 kg (241 lb 7.2 oz) (09/21/24 0900)  Method: Weight Method: Bed scale  BMI: BMI (Calculated): 35.8    UBW: ?  Weight change:  17lb wt gain since admit, suspect fluid     Weight       Weight (kg) Weight (lbs) Weight Method Visit Report   4/23/2015 118.84 kg  261 lb 15.9 oz      7/1/2022 117.482 kg  259 lb   --    4/18/2023 --  --   --    4/22/2023 117.482 kg  259 lb  Stated     4/23/2023 117.482 kg  259 lb      4/24/2023 102.967 kg  227 lb  Standing scale     6/27/2023 106.051 kg  233 lb 12.8 oz   --    9/29/2023 105.688 kg  233 lb      10/31/2023 105.688 kg  233 lb   --    9/1/2024 105.688 kg  233 lb  Estimated      106 kg  233 lb 11 oz  Bed scale      113.399 kg  250 lb          Nutrition Focused Physical Exam     Date:     Unable to perform due to Pt unable to participate at time of visit     Subjective   Reported/Observed/Food/Nutrition Related History:     9-23-24: pt intubated, off sedation    Per RN: pt able to follow commands, has been trying to talk, febrile    Plan to send to [facility] today    9-20: pt alert intubated, + fentanyl, propofol 3.5ml    Per RN: pt tolerating TF, + bm, has moderate secretions, not really following commands, plan to wean off propofol    9-18-24: pt intubated, sedated  + fentanyl, propofol 20.9ml    Per RN: plan for Trach + PEG today, " "pt spiked temp last night, temp this am 101.3, is not following commands      -: pt intubated, sedated, propofol 14.2ml/hr    Per RN: pt afebrile, had 800ml UOP last night, tolerating TF, had to be disimpacted yesterday    Plan for Trach, PEG : pt intubated, sedated  +fentanyl, propofol 17.7ml    Per RN: pt tolerating TF, will wake up and follow commands    9-10: pt intubated, sedated + fentanyl, propofol 14.2ml    Per RN: pt tolerating TF,  has not had bm since , has been given senokot      Unable to wean from vent. Remains on levo and propofol.  + 1BM .     9-6   pt intubated, sedated   + versed, fentanyl, ketamine  Per RN: pt will open eyes, and follow commands sometimes, 2L UOP, tolerating TF, it is @50ml currently, has not had a bm yet  Plan to change enteral Dilantin to IV Cerebryx      Pt intubated, sedated, will open eyes, is tremulous  + levophed 0.04mcg, fentanyl, heparin  Per RN: pt very anxious, will follow commands, has lots of secretions    Needs Assessment   Date: 9-10-24    Height used:Height: 175 cm (68.9\")  Weights used ABW: 233lb/ 106kg- admit wt  IBW: 160lb/ 73kg    Estimated Calorie needs: ~1453kcal/ 70% MREE  Method:  14Kcals/KG ABW:1484kcal  Method:  MSJ ABW: 1845kcal  Method:  PSU ABW (ve 9.37, Tmax 36.7) = 1943kcal  IC: MREE= 2075kcal, RQ= 0.85 wnl  Goal 65-70% CFME=[id number]    Estimated Protein needs: ~146g protein  Method: 2g protein per kg IBW: 146g protein  Method: 1.2-1.5g protein per k-159g protein    Current Nutrition Prescription     PO: NPO Diet NPO Type: Strict NPO, Sips with Meds  Oral Nutrition Supplement:  Intake: N/A    EN: Peptamen Intense VHP  Goal Rate: 75ml  Water Flushes: 30ml  Modular: Prosource-no carb 3/day  Route: NG  Tube: 20 PEG    At goal over: 20Hrs/day     Rx will supply:   Goal Volume 1500  mL/day     Flush Volume 600 mL/day     Energy 1560 Kcal/day 75 % MREE   Protein 153 g/day 105 % Est Need   Fiber 6 g/day     Water " in  EN 1260 mL     Total Water 1860 mL     Meet DRI Yes        --------------------------------------------------------------------------  Product/Rate verified at bedside: Yes  Infusing Rate at time of visit: off    Average Delivery from [facility]:     Volume 1453 mL/day 97  % Goal Vol.   Flush Volume 671 mL/day     Energy  Kcal/day  % MREE   Protein  g/day  % Est Need   Fiber  g/day     Water in  EN m L     Total Water  mL     Meet DRI No          Assessment & Plan   Nutrition Diagnosis   Date: 24 Updated:   Problem Inadequate energy intake    Etiology ARF/VENT   Signs/Symptoms 97% goal volume EN   Status:  improved      Goal:   Nutrition to support treatment and Adjust EN       Nutrition Intervention      Follow treatment progress, Care plan reviewed    Na+ 149, suggest increase free water to 50ml/hr     TF and flush will provide 2260ml free water total    May need IVF's if hypernatremia persists    Continue to hold TF 1 hour before and after each dilantin dose    *Repeat calorimetry ordered  24 to re-assess kcal needs, study pending    Per RT: unable to perform cart study as short of supplies for machine    Monitoring/Evaluation:   Per protocol, I&O, Pertinent labs, Weight, Skin status, GI status, Symptoms, Hemodynamic stability    Tania Street, RD  Time Spent: 30min

## 2024-09-23 NOTE — CASE MANAGEMENT/SOCIAL WORK
Continued Stay Note  [facility]     Patient Name: Fernie Dalal  MRN: [id number]  Today's Date: 9/23/2024    Admit Date: 9/1/2024    Plan: [facility]   Discharge Plan       Row Name 09/23/24 1526       Plan    Plan [facility]    Patient/Family in Agreement with Plan yes    Plan Comments I spoke w/Debra again and updated that amiodarone drip will be discontinued tomorrow, she checked w/director, they would like patient to be off amiodarone drip  for 1 day, so can accept on Wednesday. [facility] has an ICU @ [facility] that they do send patients to, but no open beds, so would like to wait til Wednesday.    Final Discharge Disposition Code 03 - skilled nursing facility (SNF)      Row Name 09/23/24 4090       Plan    Plan [facility]    Patient/Family in Agreement with Plan yes    Plan Comments I contacted Debra post MDR, plan was to d/c to [facility] today @ 1400pm. While I was in room, speaking w/patient, HR increased to 170's. Cardiology saw. Per RN note, received 2 doses of Adenosine, then cardioverted, amiodarone drip initiated. I spoke w/Debra and updated on above, d/c today cancelled. Per Dr. Cool, and Debra, plan to d/c to [facility] on Tuesday. [facility] can not transport on Tuesday. I sent EMS ALS request to transport team.    Final Discharge Disposition Code 63 - [facility]                   Discharge Codes    No documentation.                 Expected Discharge Date and Time       Expected Discharge Date Expected Discharge Time    Sep 27, 2024               Aniket Marquez RN

## 2024-09-23 NOTE — SIGNIFICANT NOTE
Pt -180s. Patient received 6mg and 12mg of adenosine, pt did not respond. Dr. Gaitan and Dr. Cool at bedside. Patient cardioverted at 1255 with 1 shock delivered. EKG completed, IV lopressor given, and Amiodarone gtt with loading dose initiated after.

## 2024-09-23 NOTE — CASE MANAGEMENT/SOCIAL WORK
Continued Stay Note  [facility]     Patient Name: Fernie Dalal  MRN: [id number]  Today's Date: 9/23/2024    Admit Date: 9/1/2024    Plan: [facility]   Discharge Plan       Row Name 09/23/24 1450       Plan    Plan [facility]    Patient/Family in Agreement with Plan yes    Plan Comments I contacted Debra post MDR, plan was to d/c to [facility] today @ 1400pm. While I was in room, speaking w/patient, HR increased to 170's. Cardiology saw. Per RN note, received 2 doses of Adenosine, then cardioverted, amiodarone drip initiated. I spoke w/Debra and updated on above, d/c today cancelled. Per Dr. Colo, and Debra, plan to d/c to [facility] on Tuesday. [facility] can not transport on Tuesday. I sent EMS ALS request to transport team.    Final Discharge Disposition Code 63 - [facility]                   Discharge Codes    No documentation.                 Expected Discharge Date and Time       Expected Discharge Date Expected Discharge Time    Sep 27, 2024               Aniket Marquez RN

## 2024-09-24 ENCOUNTER — APPOINTMENT (OUTPATIENT)
Dept: GENERAL RADIOLOGY | Facility: HOSPITAL | Age: 64
End: 2024-09-24
Payer: MEDICAID

## 2024-09-24 LAB
ANION GAP SERPL CALCULATED.3IONS-SCNC: 12 MMOL/L (ref 5–15)
ARTERIAL PATENCY WRIST A: POSITIVE
ATMOSPHERIC PRESS: ABNORMAL MM[HG]
BASE EXCESS BLDA CALC-SCNC: 10.4 MMOL/L (ref 0–2)
BASOPHILS # BLD AUTO: 0.09 10*3/MM3 (ref 0–0.2)
BASOPHILS NFR BLD AUTO: 0.7 % (ref 0–1.5)
BDY SITE: ABNORMAL
BODY TEMPERATURE: 37
BUN SERPL-MCNC: 36 MG/DL (ref 8–23)
BUN/CREAT SERPL: 51.4 (ref 7–25)
CALCIUM SPEC-SCNC: 9.2 MG/DL (ref 8.6–10.5)
CHLORIDE SERPL-SCNC: 103 MMOL/L (ref 98–107)
CO2 BLDA-SCNC: 36.8 MMOL/L (ref 22–33)
CO2 SERPL-SCNC: 31 MMOL/L (ref 22–29)
COHGB MFR BLD: 1.4 % (ref 0–2)
CREAT SERPL-MCNC: 0.7 MG/DL (ref 0.76–1.27)
DEPRECATED RDW RBC AUTO: 49 FL (ref 37–54)
EGFRCR SERPLBLD CKD-EPI 2021: 103.5 ML/MIN/1.73
EOSINOPHIL # BLD AUTO: 0.29 10*3/MM3 (ref 0–0.4)
EOSINOPHIL NFR BLD AUTO: 2.4 % (ref 0.3–6.2)
EPAP: 0
ERYTHROCYTE [DISTWIDTH] IN BLOOD BY AUTOMATED COUNT: 14.9 % (ref 12.3–15.4)
GLUCOSE BLDC GLUCOMTR-MCNC: 131 MG/DL (ref 70–130)
GLUCOSE BLDC GLUCOMTR-MCNC: 150 MG/DL (ref 70–130)
GLUCOSE BLDC GLUCOMTR-MCNC: 164 MG/DL (ref 70–130)
GLUCOSE BLDC GLUCOMTR-MCNC: 165 MG/DL (ref 70–130)
GLUCOSE SERPL-MCNC: 147 MG/DL (ref 65–99)
HCO3 BLDA-SCNC: 35.4 MMOL/L (ref 20–26)
HCT VFR BLD AUTO: 37.6 % (ref 37.5–51)
HCT VFR BLD CALC: 34.1 % (ref 38–51)
HGB BLD-MCNC: 11 G/DL (ref 13–17.7)
HGB BLDA-MCNC: 11.1 G/DL (ref 13.5–17.5)
IMM GRANULOCYTES # BLD AUTO: 0.06 10*3/MM3 (ref 0–0.05)
IMM GRANULOCYTES NFR BLD AUTO: 0.5 % (ref 0–0.5)
INHALED O2 CONCENTRATION: 35 %
IPAP: 0
LYMPHOCYTES # BLD AUTO: 1.17 10*3/MM3 (ref 0.7–3.1)
LYMPHOCYTES NFR BLD AUTO: 9.7 % (ref 19.6–45.3)
MAGNESIUM SERPL-MCNC: 2.6 MG/DL (ref 1.6–2.4)
MCH RBC QN AUTO: 26.4 PG (ref 26.6–33)
MCHC RBC AUTO-ENTMCNC: 29.3 G/DL (ref 31.5–35.7)
MCV RBC AUTO: 90.4 FL (ref 79–97)
METHGB BLD QL: 0 % (ref 0–1.5)
MODALITY: ABNORMAL
MONOCYTES # BLD AUTO: 1.23 10*3/MM3 (ref 0.1–0.9)
MONOCYTES NFR BLD AUTO: 10.2 % (ref 5–12)
MYCOBACTERIUM SPEC CULT: NORMAL
NEUTROPHILS NFR BLD AUTO: 76.5 % (ref 42.7–76)
NEUTROPHILS NFR BLD AUTO: 9.18 10*3/MM3 (ref 1.7–7)
NIGHT BLUE STAIN TISS: NORMAL
NRBC BLD AUTO-RTO: 0 /100 WBC (ref 0–0.2)
OXYHGB MFR BLDV: 95.3 % (ref 94–99)
PAW @ PEAK INSP FLOW SETTING VENT: 0 CMH2O
PCO2 BLDA: 48.2 MM HG (ref 35–45)
PCO2 TEMP ADJ BLD: 48.2 MM HG (ref 35–48)
PEEP RESPIRATORY: 5 CM[H2O]
PH BLDA: 7.47 PH UNITS (ref 7.35–7.45)
PH, TEMP CORRECTED: 7.47 PH UNITS
PHOSPHATE SERPL-MCNC: 2.7 MG/DL (ref 2.5–4.5)
PLATELET # BLD AUTO: 468 10*3/MM3 (ref 140–450)
PMV BLD AUTO: 10.3 FL (ref 6–12)
PO2 BLDA: 79.4 MM HG (ref 83–108)
PO2 TEMP ADJ BLD: 79.4 MM HG (ref 83–108)
POTASSIUM SERPL-SCNC: 3.5 MMOL/L (ref 3.5–5.2)
POTASSIUM SERPL-SCNC: 4.1 MMOL/L (ref 3.5–5.2)
RBC # BLD AUTO: 4.16 10*6/MM3 (ref 4.14–5.8)
SODIUM SERPL-SCNC: 146 MMOL/L (ref 136–145)
TOTAL RATE: 18 BREATHS/MINUTE
VENTILATOR MODE: ABNORMAL
VT ON VENT VENT: 0.49 ML
WBC NRBC COR # BLD AUTO: 12.02 10*3/MM3 (ref 3.4–10.8)

## 2024-09-24 PROCEDURE — 25010000002 FUROSEMIDE PER 20 MG: Performed by: SURGERY

## 2024-09-24 PROCEDURE — 97530 THERAPEUTIC ACTIVITIES: CPT

## 2024-09-24 PROCEDURE — 63710000001 INSULIN REGULAR HUMAN PER 5 UNITS: Performed by: SURGERY

## 2024-09-24 PROCEDURE — 84100 ASSAY OF PHOSPHORUS: CPT | Performed by: INTERNAL MEDICINE

## 2024-09-24 PROCEDURE — 99232 SBSQ HOSP IP/OBS MODERATE 35: CPT | Performed by: INTERNAL MEDICINE

## 2024-09-24 PROCEDURE — 85025 COMPLETE CBC W/AUTO DIFF WBC: CPT | Performed by: INTERNAL MEDICINE

## 2024-09-24 PROCEDURE — 71045 X-RAY EXAM CHEST 1 VIEW: CPT

## 2024-09-24 PROCEDURE — 84132 ASSAY OF SERUM POTASSIUM: CPT | Performed by: INTERNAL MEDICINE

## 2024-09-24 PROCEDURE — 94799 UNLISTED PULMONARY SVC/PX: CPT

## 2024-09-24 PROCEDURE — 25010000002 ENOXAPARIN PER 10 MG: Performed by: SURGERY

## 2024-09-24 PROCEDURE — 82948 REAGENT STRIP/BLOOD GLUCOSE: CPT

## 2024-09-24 PROCEDURE — 80048 BASIC METABOLIC PNL TOTAL CA: CPT | Performed by: INTERNAL MEDICINE

## 2024-09-24 PROCEDURE — 83735 ASSAY OF MAGNESIUM: CPT | Performed by: INTERNAL MEDICINE

## 2024-09-24 PROCEDURE — 63710000001 REVEFENACIN 175 MCG/3ML SOLUTION: Performed by: SURGERY

## 2024-09-24 PROCEDURE — 99238 HOSP IP/OBS DSCHRG MGMT 30/<: CPT

## 2024-09-24 PROCEDURE — 36600 WITHDRAWAL OF ARTERIAL BLOOD: CPT

## 2024-09-24 PROCEDURE — 94761 N-INVAS EAR/PLS OXIMETRY MLT: CPT

## 2024-09-24 PROCEDURE — 83050 HGB METHEMOGLOBIN QUAN: CPT

## 2024-09-24 PROCEDURE — 93005 ELECTROCARDIOGRAM TRACING: CPT | Performed by: INTERNAL MEDICINE

## 2024-09-24 PROCEDURE — 94003 VENT MGMT INPAT SUBQ DAY: CPT

## 2024-09-24 PROCEDURE — 25010000002 AMIODARONE IN DEXTROSE 5% 360-4.14 MG/200ML-% SOLUTION: Performed by: INTERNAL MEDICINE

## 2024-09-24 PROCEDURE — 93010 ELECTROCARDIOGRAM REPORT: CPT | Performed by: INTERNAL MEDICINE

## 2024-09-24 PROCEDURE — 82805 BLOOD GASES W/O2 SATURATION: CPT

## 2024-09-24 PROCEDURE — 82375 ASSAY CARBOXYHB QUANT: CPT

## 2024-09-24 RX ORDER — FENTANYL 75 UG/1
1 PATCH TRANSDERMAL
Status: DISCONTINUED | OUTPATIENT
Start: 2024-09-26 | End: 2024-09-25 | Stop reason: HOSPADM

## 2024-09-24 RX ORDER — AMIODARONE HYDROCHLORIDE 200 MG/1
200 TABLET ORAL DAILY
Status: DISCONTINUED | OUTPATIENT
Start: 2024-10-15 | End: 2024-09-25 | Stop reason: HOSPADM

## 2024-09-24 RX ORDER — AMIODARONE HYDROCHLORIDE 200 MG/1
200 TABLET ORAL EVERY 12 HOURS
Status: DISCONTINUED | OUTPATIENT
Start: 2024-10-01 | End: 2024-09-25 | Stop reason: HOSPADM

## 2024-09-24 RX ORDER — AMIODARONE HYDROCHLORIDE 200 MG/1
200 TABLET ORAL EVERY 8 HOURS
Status: DISCONTINUED | OUTPATIENT
Start: 2024-09-24 | End: 2024-09-25 | Stop reason: HOSPADM

## 2024-09-24 RX ORDER — POTASSIUM CHLORIDE 1.5 G/1.58G
40 POWDER, FOR SOLUTION ORAL EVERY 4 HOURS
Status: COMPLETED | OUTPATIENT
Start: 2024-09-24 | End: 2024-09-24

## 2024-09-24 RX ADMIN — ENOXAPARIN SODIUM 40 MG: 100 INJECTION SUBCUTANEOUS at 09:48

## 2024-09-24 RX ADMIN — ALBUTEROL SULFATE 2.5 MG: 2.5 SOLUTION RESPIRATORY (INHALATION) at 19:07

## 2024-09-24 RX ADMIN — PHENYTOIN 200 MG: 125 SUSPENSION ORAL at 21:16

## 2024-09-24 RX ADMIN — METOPROLOL TARTRATE 25 MG: 25 TABLET, FILM COATED ORAL at 20:02

## 2024-09-24 RX ADMIN — Medication 30 ML: at 09:49

## 2024-09-24 RX ADMIN — Medication 10 ML: at 09:48

## 2024-09-24 RX ADMIN — METOPROLOL TARTRATE 25 MG: 25 TABLET, FILM COATED ORAL at 09:47

## 2024-09-24 RX ADMIN — CHLORHEXIDINE GLUCONATE, 0.12% ORAL RINSE 15 ML: 1.2 SOLUTION DENTAL at 20:02

## 2024-09-24 RX ADMIN — ACETAMINOPHEN 650 MG: 650 SOLUTION ORAL at 20:03

## 2024-09-24 RX ADMIN — POTASSIUM CHLORIDE 40 MEQ: 1.5 POWDER, FOR SOLUTION ORAL at 05:39

## 2024-09-24 RX ADMIN — INSULIN HUMAN 2 UNITS: 100 INJECTION, SOLUTION PARENTERAL at 05:39

## 2024-09-24 RX ADMIN — REVEFENACIN 175 MCG: 175 SOLUTION RESPIRATORY (INHALATION) at 07:06

## 2024-09-24 RX ADMIN — CHLORHEXIDINE GLUCONATE, 0.12% ORAL RINSE 15 ML: 1.2 SOLUTION DENTAL at 09:48

## 2024-09-24 RX ADMIN — ARFORMOTEROL TARTRATE 15 MCG: 15 SOLUTION RESPIRATORY (INHALATION) at 19:07

## 2024-09-24 RX ADMIN — INSULIN HUMAN 2 UNITS: 100 INJECTION, SOLUTION PARENTERAL at 12:57

## 2024-09-24 RX ADMIN — ALBUTEROL SULFATE 2.5 MG: 2.5 SOLUTION RESPIRATORY (INHALATION) at 13:17

## 2024-09-24 RX ADMIN — ACETAMINOPHEN 650 MG: 650 SOLUTION ORAL at 00:03

## 2024-09-24 RX ADMIN — PANTOPRAZOLE SODIUM 40 MG: 40 INJECTION, POWDER, LYOPHILIZED, FOR SOLUTION INTRAVENOUS at 05:39

## 2024-09-24 RX ADMIN — PHENYTOIN 200 MG: 125 SUSPENSION ORAL at 09:47

## 2024-09-24 RX ADMIN — ACETAMINOPHEN 650 MG: 650 SOLUTION ORAL at 06:06

## 2024-09-24 RX ADMIN — ACETAMINOPHEN 650 MG: 650 SOLUTION ORAL at 12:51

## 2024-09-24 RX ADMIN — ATORVASTATIN CALCIUM 80 MG: 40 TABLET, FILM COATED ORAL at 09:47

## 2024-09-24 RX ADMIN — SENNOSIDES 2 TABLET: 8.6 TABLET, FILM COATED ORAL at 09:47

## 2024-09-24 RX ADMIN — INSULIN HUMAN 2 UNITS: 100 INJECTION, SOLUTION PARENTERAL at 18:29

## 2024-09-24 RX ADMIN — Medication 10 ML: at 20:03

## 2024-09-24 RX ADMIN — ALBUTEROL SULFATE 2.5 MG: 2.5 SOLUTION RESPIRATORY (INHALATION) at 07:06

## 2024-09-24 RX ADMIN — AMIODARONE HYDROCHLORIDE 0.5 MG/MIN: 1.8 INJECTION, SOLUTION INTRAVENOUS at 09:42

## 2024-09-24 RX ADMIN — DIAZEPAM 10 MG: 10 TABLET ORAL at 20:03

## 2024-09-24 RX ADMIN — EMPAGLIFLOZIN 10 MG: 10 TABLET, FILM COATED ORAL at 09:47

## 2024-09-24 RX ADMIN — ASPIRIN 81 MG 81 MG: 81 TABLET ORAL at 09:47

## 2024-09-24 RX ADMIN — FUROSEMIDE 40 MG: 10 INJECTION, SOLUTION INTRAMUSCULAR; INTRAVENOUS at 20:03

## 2024-09-24 RX ADMIN — CITALOPRAM HYDROBROMIDE 40 MG: 40 TABLET ORAL at 09:47

## 2024-09-24 RX ADMIN — ARFORMOTEROL TARTRATE 15 MCG: 15 SOLUTION RESPIRATORY (INHALATION) at 07:06

## 2024-09-24 RX ADMIN — DIAZEPAM 10 MG: 10 TABLET ORAL at 09:47

## 2024-09-24 RX ADMIN — FUROSEMIDE 40 MG: 10 INJECTION, SOLUTION INTRAMUSCULAR; INTRAVENOUS at 09:48

## 2024-09-24 RX ADMIN — ALBUTEROL SULFATE 2.5 MG: 2.5 SOLUTION RESPIRATORY (INHALATION) at 01:17

## 2024-09-24 RX ADMIN — POTASSIUM CHLORIDE 40 MEQ: 1.5 POWDER, FOR SOLUTION ORAL at 09:47

## 2024-09-24 RX ADMIN — AMIODARONE HYDROCHLORIDE 200 MG: 200 TABLET ORAL at 12:50

## 2024-09-24 RX ADMIN — AMIODARONE HYDROCHLORIDE 200 MG: 200 TABLET ORAL at 18:29

## 2024-09-24 NOTE — PLAN OF CARE
Goal Outcome Evaluation: Unable to wean ventilatory support at this time.

## 2024-09-24 NOTE — THERAPY TREATMENT NOTE
Patient Name: Fernie Dalal  : 1960    MRN: [id number]                              Today's Date: 2024       Admit Date: 2024    Visit Dx:     ICD-10-CM ICD-9-CM   1. Acute respiratory failure with hypoxia  J96.01 518.81   2. Aspiration pneumonia of right lower lobe, unspecified aspiration pneumonia type  J69.0 507.0   3. Chest pain, unspecified type  R07.9 786.50   4. Acute on chronic respiratory failure with hypoxia  J96.21 518.84     799.02   5. HFrEF (heart failure with reduced ejection fraction)  I50.20 428.20     Patient Active Problem List   Diagnosis    Adiposity    Allergic rhinitis    Anxiety    Blues    Chronic low back pain    HFrEF (heart failure with reduced ejection fraction)    Coronary artery disease involving native coronary artery of native heart with angina pectoris    Dyslipidemia    Muscle spasm    Seizure disorder    Sleep apnea    Essential hypertension    Cardiomyopathy, dilated    NSTEMI (non-ST elevated myocardial infarction)    Respiratory Failure Type 2    Right lower lobe pneumonia     Past Medical History:   Diagnosis Date    Angina at rest     Anxiety     Arrhythmia     COPD (chronic obstructive pulmonary disease)     GERD (gastroesophageal reflux disease)     Heart failure     congestive    Hyperlipidemia     Hypertension     Myocardial infarction      Past Surgical History:   Procedure Laterality Date    CARDIAC CATHETERIZATION      CARDIAC CATHETERIZATION N/A 2023    Procedure: Left Heart Cath;  Surgeon: Marsha Gaitan MD;  Location: [facility] CATH INVASIVE LOCATION;  Service: Cardiology;  Laterality: N/A;    CARDIOVASCULAR STRESS TEST      TRACHEOSTOMY AND PEG TUBE INSERTION N/A 2024    Procedure: TRACHEOSTOMY AND PERCUTANEOUS ENDOSCOPIC GASTROSTOMY TUBE INSERTION;  Surgeon: Jon Mclean MD;  Location: [facility] OR;  Service: General;  Laterality: N/A;      General Information       Row Name 24 0916          Physical Therapy Time and Intention     Document Type therapy note (daily note)  -ES     Mode of Treatment physical therapy  -ES       Row Name 09/24/24 0916          General Information    Patient Profile Reviewed yes  -ES     Existing Precautions/Restrictions fall;oxygen therapy device and L/min;other (see comments)  trach to vent, peg  -ES     Barriers to Rehab medically complex;previous functional deficit;cognitive status  -ES       Row Name 09/24/24 0916          Cognition    Orientation Status (Cognition) unable/difficult to assess;other (see comments)  Following limited commands, intermittently mouthing words.  -ES       Row Name 09/24/24 0916          Safety Issues, Functional Mobility    Safety Issues Affecting Function (Mobility) ability to follow commands;awareness of need for assistance;insight into deficits/self-awareness;safety precaution awareness;safety precautions follow-through/compliance;sequencing abilities  -ES     Impairments Affecting Function (Mobility) balance;cognition;endurance/activity tolerance;postural/trunk control;range of motion (ROM);shortness of breath;strength  -ES     Cognitive Impairments, Mobility Safety/Performance awareness, need for assistance;attention;insight into deficits/self-awareness;problem-solving/reasoning;safety precaution awareness;safety precaution follow-through;sequencing abilities  -ES               User Key  (r) = Recorded By, (t) = Taken By, (c) = Cosigned By      Initials Name Provider Type    ES Anjelica Willson PT Physical Therapist                   Mobility       Row Name 09/24/24 0917          Bed Mobility    Bed Mobility rolling left;rolling right  -ES     Rolling Left [place] (Bed Mobility) maximum assist (25% patient effort);2 person assist;verbal cues  -ES     Rolling Right [place] (Bed Mobility) maximum assist (25% patient effort);2 person assist;verbal cues  -ES     Comment, (Bed Mobility) Required max cueing, able to initiate reaching R/L for sling placement  -ES       Row  Name 09/24/24 0917          Bed-Chair Transfer    Bed-Chair [place] (Transfers) dependent (less than 25% patient effort)  -ES     Assistive Device (Bed-Chair Transfers) lift device  -ES     Comment, (Bed-Chair Transfer) tolerated. denied dizziness. SpO2 92% after lift to chair.  -ES       Row Name 09/24/24 0917          Sit-Stand Transfer    Sit-Stand Turner (Transfers) unable to assess  -ES       Row Name 09/24/24 0917          Gait/Stairs (Locomotion)    Turner Level (Gait) unable to assess  -ES               User Key  (r) = Recorded By, (t) = Taken By, (c) = Cosigned By      Initials Name Provider Type    ES Anjelica Willson PT Physical Therapist                   Obj/Interventions       Row Name 09/24/24 0918          Balance    Balance Assessment sitting static balance  -ES     Static Sitting Balance moderate assist  -ES     Position, Sitting Balance supported;sitting in chair  -ES     Balance Interventions sitting;supported;static  -ES               User Key  (r) = Recorded By, (t) = Taken By, (c) = Cosigned By      Initials Name Provider Type    ES Anjelica Willson PT Physical Therapist                   Goals/Plan    No documentation.                  Clinical Impression       Row Name 09/24/24 0918          Pain    Pain Intervention(s) Repositioned;Ambulation/increased activity  -ES     Additional Documentation Pain Scale: FACES Pre/Post-Treatment (Group)  -ES       [facility] Name 09/24/24 0918          Pain Scale: FACES Pre/Post-Treatment    Pain: FACES Scale, Pretreatment 2-->hurts little bit  -ES     Posttreatment Pain Rating 2-->hurts little bit  -ES     Pain Location generalized  -ES     Pre/Posttreatment Pain Comment Pt unable to specify pain location  -ES       Row Name 09/24/24 0918          Plan of Care Review    Plan of Care Reviewed With patient  -ES     Progress no change  -ES     Outcome Evaluation Pt able to progress mobility this date by tolerating lift to chair. Pt continues to  be limited by generalized weakness, ROM deficits, and decreased activity tolerance. PT rec LTACH at d/c.  -ES       Row Name 09/24/24 0918          Therapy Assessment/Plan (PT)    Rehab Potential (PT) fair, will monitor progress closely  -ES     Criteria for Skilled Interventions Met (PT) yes  -ES     Therapy Frequency (PT) daily  -ES     Predicted Duration of Therapy Intervention (PT) 10 days  -ES       Row Name 09/24/24 0918          Vital Signs    Pre Systolic BP Rehab 114  -ES     Pre Treatment Diastolic BP 80  -ES     Post Systolic BP Rehab --  w/ OT  -ES     Pretreatment Heart Rate (beats/min) 104  -ES     Pre SpO2 (%) 93  -ES     O2 Delivery Pre Treatment ventilator  -ES     Intra SpO2 (%) 92  -ES     O2 Delivery Intra Treatment ventilator  -ES     Post SpO2 (%) 94  -ES     O2 Delivery Post Treatment ventilator  -ES     Pre Patient Position Supine  -ES     Intra Patient Position Side Lying  -ES     Post Patient Position Sitting  -ES       Row Name 09/24/24 0918          Positioning and Restraints    Pre-Treatment Position in bed  -ES     Post Treatment Position chair  -ES     In Chair notified nsg;reclined;call light within reach;sitting;exit alarm on;with OT;waffle cushion;on mechanical lift sling;legs elevated  -ES               User Key  (r) = Recorded By, (t) = Taken By, (c) = Cosigned By      Initials Name Provider Type    ES Anjelica Willson, PT Physical Therapist                   Outcome Measures       Row Name 09/24/24 0920          How much help from another person do you currently need...    Turning from your back to your side while in flat bed without using bedrails? 2  -ES     Moving from lying on back to sitting on the side of a flat bed without bedrails? 1  -ES     Moving to and from a bed to a chair (including a wheelchair)? 1  -ES     Standing up from a chair using your arms (e.g., wheelchair, bedside chair)? 1  -ES     Climbing 3-5 steps with a railing? 1  -ES     To walk in hospital room? 1   -ES     AM-PAC 6 Clicks Score (PT) 7  -ES     Highest Level of Mobility Goal 2 --> Bed activities/dependent transfer  -ES       Row Name 09/24/24 0920          Functional Assessment    Outcome Measure Options AM-PAC 6 Clicks Basic Mobility (PT)  -ES               User Key  (r) = Recorded By, (t) = Taken By, (c) = Cosigned By      Initials Name Provider Type    Anjelica Romero, ELSA Physical Therapist                                 Physical Therapy Education       Title: PT OT SLP Therapies (In Progress)       Topic: Physical Therapy (In Progress)       Point: Mobility training (In Progress)       Learning Progress Summary             Patient Acceptance, E, NR by AC at 9/21/2024 1148    Acceptance, E,TB, VU by EB at 9/10/2024 1740    Acceptance, E, NR by PN at 9/4/2024 0620    Acceptance, E, VU,NR by  at 9/3/2024 1452   Family Acceptance, E,TB, VU by EB at 9/10/2024 1740    Acceptance, E, NR by PN at 9/4/2024 0620                         Point: Home exercise program (In Progress)       Learning Progress Summary             Patient Acceptance, E, NR by AC at 9/21/2024 1148    Acceptance, E,TB, VU by EB at 9/10/2024 1740    Acceptance, E, NR by PN at 9/4/2024 0620   Family Acceptance, E,TB, VU by EB at 9/10/2024 1740    Acceptance, E, NR by PN at 9/4/2024 0620                         Point: Body mechanics (In Progress)       Learning Progress Summary             Patient Acceptance, E, NR by AC at 9/21/2024 1148    Acceptance, E,TB, VU by EB at 9/10/2024 1740    Acceptance, E, NR by PN at 9/4/2024 0620    Acceptance, E, VU,NR by  at 9/3/2024 1452   Family Acceptance, E,TB, VU by EB at 9/10/2024 1740    Acceptance, E, NR by PN at 9/4/2024 0620                         Point: Precautions (In Progress)       Learning Progress Summary             Patient Acceptance, E, NR by AC at 9/21/2024 1148    Acceptance, E,TB, VU by EB at 9/10/2024 1740    Acceptance, E, NR by PN at 9/4/2024 0620    Acceptance, E, ELVIN,NR by   at 9/3/2024 1452   Family Acceptance, E,TB, VU by EB at 9/10/2024 1740    Acceptance, E, NR by PN at 9/4/2024 0620                                         User Key       Initials Effective Dates Name Provider Type Discipline    EB 09/22/22 -  Erick Fontana, RN Registered Nurse Nurse    LH 09/21/23 -  Angelika Mcneill, PT Physical Therapist PT    PN 07/11/24 -  Alicia Funes, RN Registered Nurse Nurse    AC 07/11/24 -  Quyen Torres PT Physical Therapist PT                  PT Recommendation and Plan     Plan of Care Reviewed With: patient  Progress: no change  Outcome Evaluation: Pt able to progress mobility this date by tolerating lift to chair. Pt continues to be limited by generalized weakness, ROM deficits, and decreased activity tolerance. PT rec LTACH at d/c.     Time Calculation:         PT Charges       Row Name 09/24/24 0921             Time Calculation    Start Time 0832  -ES      PT Received On 09/24/24  -ES      PT Goal Re-Cert Due Date 10/01/24  -ES         Time Calculation- PT    Total Timed Code Minutes- PT 18 minute(s)  -ES         Timed Charges    06239 - PT Therapeutic Activity Minutes 18  -ES         Total Minutes    Timed Charges Total Minutes 18  -ES       Total Minutes 18  -ES                User Key  (r) = Recorded By, (t) = Taken By, (c) = Cosigned By      Initials Name Provider Type    ES Anjelica Willson PT Physical Therapist                  Therapy Charges for Today       Code Description Service Date Service Provider Modifiers Qty    [id number] HC PT THERAPEUTIC ACT EA 15 MIN 9/24/2024 Anjelica Willson PT GP 1            PT G-Codes  Outcome Measure Options: AM-PAC 6 Clicks Basic Mobility (PT)  AM-PAC 6 Clicks Score (PT): 7  AM-PAC 6 Clicks Score (OT): 6  PT Discharge Summary  Anticipated Discharge Disposition (PT): [facility] ([facility]term [facility])    Anjelica Willson PT  9/24/2024

## 2024-09-24 NOTE — DISCHARGE SUMMARY
DISCHARGE SUMMARY    Patient Name: Fernie Dalal  : 1960  MRN: [id number]    Date of Admission: 2024 10:34 AM  Date of Discharge: 2024 11:38 AM  Primary Care Physician: Belem Albert APRN    Reason for hospitalization     HPI:  Fernie Dalal is a 64 y.o. male was admitted on 2024 with the initial diagnosis of Dyspnea [R06.00]  Acute on chronic respiratory failure with hypoxia [J96.21].    He has a PMH of COPD, CAD with chronic RCA occlusion, HFrEF, hypertension, hyperlipidemia, and anxiety. He presented to [facility] ED with dyspnea and chest pain.    Significant findings.  Discharge diagnosis/problems:     Active Hospital Problems    Diagnosis  POA    **Respiratory Failure Type 2 [J96.92]  Yes    Right lower lobe pneumonia [J18.9]  Yes    NSTEMI (non-ST elevated myocardial infarction) [I21.4]  Yes    HFrEF (heart failure with reduced ejection fraction) [I50.20]  Yes      Resolved Hospital Problems    Diagnosis Date Resolved POA    Hypotension [I95.9] 2024 Yes    Dyspnea [R06.00] 2024 Yes        Physical Examination and Data     Vitals:  Temp: (!) 100.8 °F (38.2 °C) (24 0800) Temp  Min: 98.2 °F (36.8 °C)  Max: 103.4 °F (39.7 °C)   Temp core:      BP: 109/75 (24 1100) BP  Min: 81/63  Max: 149/63   Pulse: 79 (24 1100) Pulse  Min: 74  Max: 105   Resp: 20 (24 0800) Resp  Min: 20  Max: 21   SpO2: 95 % (24 1100) SpO2  Min: 86 %  Max: 100 %   Device: ventilator (24 08)    Flow Rate: 70 (24 1800) No data recorded     Invasive Mechanical Ventilator   Settings: Observed:   Mode: VC+/AC (24 1000)    Resp Rate (Set): 16 (24 1000) Resp Rate (Observed) Vent: 21 (24 1100)   Vt (Set, mL): 490 mL (24) Vt, Exp (observed, mL): 469 mL (24)    Minute Ventilation (L/min) (Obs): 8.08 L/min (24)    I:E Ratio (Obs): 1:2 (24)       FiO2 (%): 35 % (24) Plateau Pressure (cm H2O): (S) 24 cm H2O  (09/25/24 0736)   PEEP/CPAP (cm H2O): 5 cm H20 (09/25/24 1000) Driving Pressure (cm H2O): 18.6 cm H2O (09/25/24 0736)    Static Compliance (L/cm H2O): 19 (09/25/24 0736)      Physical Examination  Telemetry:  Rhythm: normal sinus rhythm (09/25/24 1000)  Conduction Defect: right bundle branch block (09/14/24 1800)   Constitutional:  No acute distress.   Cardiovascular: RRR.    Respiratory: Coarse breath sounds  No adventitious sounds   Abdominal:  Soft with no tenderness.   Extremities: Trace Edema   Neurological:             Awake.     Best Eye Response: 4-->(E4) spontaneous (09/25/24 1000)  Best Motor Response: 6-->(M6) obeys commands (09/25/24 1000)  Best Verbal Response: 1-->(V1) none (09/25/24 1000)  [place] Coma Scale Score: 11 (09/25/24 1000)     RASS (Chery Agitation-Sedation Scale): 0-->alert and calm (09/22/24 1800)     Confusion Assessment Method-ICU (CAM-ICU)  Feature 3: Altered Level of Consciousness: Negative (09/22/24 1800)     Results Reviewed:  Laboratory  Microbiology  Radiology  Pathology    Hematology:  Results from last 7 days   Lab Units 09/25/24  0407 09/24/24  0311 09/23/24  0405   WBC 10*3/mm3 12.53* 12.02* 11.99*   HEMOGLOBIN g/dL 10.9* 11.0* 11.2*   MCV fL 89.2 90.4 88.7   PLATELETS 10*3/mm3 435 468* 515*     Results from last 7 days   Lab Units 09/25/24  0407 09/24/24  0311 09/23/24  0405   NEUTROS ABS 10*3/mm3 9.30* 9.18* 9.09*   LYMPHS ABS 10*3/mm3 1.50 1.17 1.08   EOS ABS 10*3/mm3 0.49* 0.29 0.26     Chemistry:  Estimated Creatinine Clearance: 156.4 mL/min (A) (by C-G formula based on SCr of 0.59 mg/dL (L)).    Results from last 7 days   Lab Units 09/25/24  0407 09/24/24  1405 09/24/24  0311   SODIUM mmol/L 154*  --  146*   POTASSIUM mmol/L 4.0 4.1 3.5   CHLORIDE mmol/L 111*  --  103   CO2 mmol/L 28.0  --  31.0*   BUN mg/dL 33*  --  36*   CREATININE mg/dL 0.59*  --  0.70*   GLUCOSE mg/dL 153*  --  147*     Results from last 7 days   Lab Units 09/25/24  0407 09/24/24 0311  09/23/24  1404 09/23/24  0405   CALCIUM mg/dL 9.3 9.2  --  9.4   MAGNESIUM mg/dL  --  2.6*  --  2.8*   PHOSPHORUS mg/dL  --  2.7 2.3* 1.3*       Hepatic Panel:  Results from last 7 days   Lab Units 09/23/24  0405   ALBUMIN g/dL 3.5   TOTAL PROTEIN g/dL 8.0   BILIRUBIN mg/dL 0.4   AST (SGOT) U/L 105*   ALT (SGPT) U/L 85*   ALK PHOS U/L 129*     Biomarkers:  Results from last 7 days   Lab Units 09/25/24  0407 09/23/24  0405 09/19/24  0359   CRP mg/dL  --  19.40*  --    PROCALCITONIN ng/mL 0.17  --  0.20       COVID-19  Lab Results   Component Value Date    COVID19 Not Detected 09/20/2024    COVID19 Not Detected 09/01/2024    COVID19 Not Detected 04/22/2023    COVID19 Negative 03/23/2023       Arterial Blood Gases:  Results from last 7 days   Lab Units 09/25/24  0338 09/24/24  0412 09/23/24  0356   PH, ARTERIAL pH units 7.511* 7.474* 7.520*   PCO2, ARTERIAL mm Hg 40.6 48.2* 45.7*   PO2 ART mm Hg 114.0* 79.4* 68.1*   FIO2 % 40 35 35       Images:  XR Chest 1 View    Result Date: 9/25/2024  Impression: Stable, mild nonspecific pulmonary interstitial disease pattern and borderline heart shadow enlargement. Small nodular opacity at the right apex again noted. No new chest disease. Electronically Signed: Dani Trejo MD  9/25/2024 7:23 AM EDT  Workstation ID: [id number]    XR Chest 1 View    Result Date: 9/24/2024  Impression: 1. Mostly stable, chronic appearing interstitial lung changes similar to yesterday's exam. 2. Subtle 15 mm nodular density superimposed over the right apex, which may be artifactual or focal airspace disease. Electronically Signed: Dani Trejo MD  9/24/2024 7:37 AM EDT  Workstation ID: [id number]    XR Chest 1 View    Result Date: 9/23/2024  Impression: No radiographic evidence of acute cardiopulmonary process. Electronically Signed: Alfredito Johnson MD  9/23/2024 7:15 AM EDT  Workstation ID: [id number]    XR Chest 1 View    Result Date: 9/22/2024  Impression: No significant change in comparison to 9/21/2024.  Electronically Signed: Will Rodriguez MD  9/22/2024 11:39 AM EDT  Workstation ID: [id number]    XR Chest 1 View    Result Date: 9/21/2024  Impression: 1. Improving right basilar airspace disease with no change in left basilar airspace disease. Electronically Signed: Negrito Forrest MD  9/21/2024 8:01 AM EDT  Workstation ID: [id number]    XR Chest 1 View    Result Date: 9/20/2024  Impression: 1.Bibasilar opacities and suspected small pleural effusions, as before. 2.Stable cardiomegaly. Electronically Signed: Jon Nathan  9/20/2024 6:59 AM EDT  Workstation ID: [id number]    XR Chest 1 View    Result Date: 9/19/2024  Impression: Interval enlargement of left pleural effusion. Grossly stable right pleural effusion. Persistent left basilar airspace disease Electronically Signed: Kartik Coles  9/19/2024 7:55 AM EDT  Workstation ID: [id number]    XR Chest 1 View    Result Date: 9/18/2024  Impression: 1.Interval placement of a tracheostomy tube terminating overlying the upper/mid trachea. Endotracheal tube has been removed. 2.Cardiomegaly with left greater than right pleural effusions and bibasilar atelectasis or infiltrates. This appears similar since chest x-ray from earlier the same day. Electronically Signed: Ed Mims MD  9/18/2024 5:28 PM EDT  Workstation ID: [id number]    XR Chest 1 View    Result Date: 9/18/2024  Impression: 1. Tubes and lines as above. 2. Enlargement of the cardiac silhouette with central pulmonary vascular congestion and increasing perihilar haziness suggesting pulmonary edema/CHF pattern or less likely atypical infection pattern. 2. Increasing bibasilar opacities suggesting small amounts of pleural fluid with overlying atelectasis or worsening infiltrates. Electronically Signed: Esteban García MD  9/18/2024 7:29 AM EDT  Workstation ID: [id number]    XR Chest 1 View    Result Date: 9/17/2024  Impression: 1. Left greater than right basilar airspace disease which may represent atelectasis or pneumonia.  There is some improved aeration within the lung bases compared to 1 day prior. 2. Stable cardiac enlargement. 3. Support line and tube placements as described. No visible pneumothorax. Electronically Signed: Marcy Mcmanus MD  9/17/2024 7:01 AM EDT  Workstation ID: [id number]    XR Chest 1 View    Result Date: 9/16/2024  Impression: 1.Cardiomegaly with bibasilar opacities which may represent atelectasis, edema, or infiltrates. Probable small bilateral pleural effusions. 2.No significant change since 9/15/2024. Electronically Signed: Ed Mims MD  9/16/2024 7:11 AM EDT  Workstation ID: [id number]    XR Chest 1 View    Result Date: 9/15/2024  Impression: 1.Mild worsening of opacities in the lung bases suggesting worsening atelectasis versus infiltrates. Diffuse interstitial changes are again seen bilaterally. 2.Stable positioning of support lines/tubes. Electronically Signed: Robin Bustos MD  9/15/2024 9:38 AM EDT  Workstation ID: [id number]    XR Chest 1 View    Result Date: 9/14/2024  Impression: 1. Cardiomegaly and mildly improved pulmonary vascular congestion, compared to 9/13/2024 exam. 2. Persistent left basilar atelectasis. Mild remaining bibasilar atelectasis. Electronically Signed: Dani Trejo MD  9/14/2024 8:32 AM EDT  Workstation ID: [id number]    XR Chest 1 View    Result Date: 9/13/2024  Impression: Slightly increased pulmonary vascular congestion/mild pulmonary edema and small/moderate bilateral pleural effusions with bibasilar atelectasis. Electronically Signed: Gael Gasca MD  9/13/2024 7:06 AM EDT  Workstation ID: [id number]    XR Chest 1 View    Result Date: 9/12/2024  Impression: 1. Cardiomegaly and mild, improving pulmonary vascular congestion. 2. Stable, moderate left basilar opacity, presumably atelectasis and effusion, and very mild right basilar atelectasis. Electronically Signed: Dani Trejo MD  9/12/2024 7:15 AM EDT  Workstation ID: [id number]    XR Chest 1 View    Result Date:  9/11/2024  Impression: No significant change in mild pulmonary edema. No appreciable change in patchy bibasilar airspace disease, which may relate to atelectasis and/or pneumonia with small bilateral effusions. Electronically Signed: Ruby So MD  9/11/2024 8:01 AM EDT  Workstation ID: [id number]    XR Chest 1 View    Result Date: 9/10/2024  Impression: Stable cardiomegaly with central pulmonary vascular congestion and mild pulmonary edema. Persistent patchy bibasilar airspace disease, may relate to atelectasis and/or pneumonia with small bilateral pleural effusions. Electronically Signed: Ruby So MD  9/10/2024 7:20 AM EDT  Workstation ID: [id number]    XR Chest 1 View    Result Date: 9/9/2024  Impression: 1. Stable lines and support tubes. 2. Stable cardiomegaly with central pulmonary vascular congestion and mild pulmonary edema. 3. Persistent patchy bibasilar airspace disease which may relate to atelectasis and/or pneumonia with small bilateral pleural effusions. Electronically Signed: Darius Gregorio MD  9/9/2024 7:20 AM EDT  Workstation ID: [id number]    EEG    Result Date: 9/8/2024  Diffuse cerebral dysfunction of moderate degree, nonspecific.  This is most commonly seen due to toxic/metabolic or hypoxemic cause No ongoing seizures are seen This report is transcribed using the Dragon dictation system.      XR Chest 1 View    Result Date: 9/8/2024  Impression: 1. Support devices appear in expected position. 2. Cardiomegaly with interstitial edema pattern and probable left-sided pleural effusion, similar to the prior exam. Electronically Signed: Christiano Jimenez  9/8/2024 9:12 AM EDT  Workstation ID: [id number]    XR Chest 1 View    Result Date: 9/7/2024  Impression: 1. The endotracheal tube tip is in stable and good position. 2. Cardiomegaly. 3. Pulmonary vascular markings are increased representing low-grade pulmonary edema. Electronically Signed: Willie Jenkins MD  9/7/2024 12:37 PM EDT  Workstation ID:  [id number]    XR Chest 1 View    Result Date: 9/6/2024  Impression: No significant change, with similar diffuse interstitial and airspace opacities with small bilateral pleural effusions. Electronically Signed: Gael Gasca MD  9/6/2024 7:07 AM EDT  Workstation ID: [id number]    XR Chest 1 View    Result Date: 9/5/2024  Impression: 1. Lines and tubes as above project in expected position 2. Cardiomegaly and mild pulm vascular congestion slightly increased in the comparison. 3. Increased asymmetric superimposed patchy opacities over the right hemithorax which represent asymmetric edema and/or pneumonia. Findings are increased in the comparison Electronically Signed: Mitchell Mallory MD  9/5/2024 7:05 AM EDT  Workstation ID: [id number]    XR Chest 1 View    Result Date: 9/4/2024  Impression: Interval intubation and placement of NG/OG tube. Similar patchy right lung interstitial opacities compatible with multifocal infection. Electronically Signed: Lewis Delong MD  9/4/2024 7:11 AM EDT  Workstation ID: [id number]    XR Chest 1 View    Result Date: 9/3/2024  Impression: 1. Tube and line positioning as described 2. Persistent right sided infiltrates and pleural fluid Electronically Signed: Kartik Coles  9/3/2024 7:11 PM EDT  Workstation ID: [id number]    XR Abdomen KUB    Result Date: 9/3/2024  Impression: Enteric tube tip and sideport project over the stomach. Electronically Signed: Sameer Benz  9/3/2024 7:10 PM EDT  Workstation ID: [id number]    CT Angiogram Chest    Result Date: 9/2/2024  Impression: 1.No evidence of pulmonary embolism. 2.Multifocal right lung pneumonia with reactive mediastinal and right hilar lymphadenopathy. 3.Small partially loculated right pleural effusion. Electronically Signed: Esteban Jeffries MD  9/2/2024 2:40 AM EDT  Workstation ID: [id number]    CT Head Without Contrast    Result Date: 9/2/2024  Impression: No acute intracranial abnormality. Electronically Signed: Esteban Jeffries MD  9/2/2024 2:31 AM  EDT  Workstation ID: [id number]    XR Chest 1 View    Result Date: 9/1/2024  Impression: Right basilar airspace opacities, concerning for pneumonia and/or atelectasis. Electronically Signed: Yolis Bustillo  9/1/2024 11:04 AM EDT  Workstation ID: [id number]     Echo:  Results for orders placed during the hospital encounter of 09/01/24    Adult Transthoracic Echo Complete W/ Cont if Necessary Per Protocol    Interpretation Summary    Left ventricular systolic function is moderately decreased. Estimated left ventricular EF = 28%.  There is global hypokinesis of left ventricle.    The left ventricular cavity is mildly dilated.    The left atrial cavity is mild to moderately dilated.    Mild aortic valve stenosis is present (mean gradient 15 mmHg)    Mild to moderate mitral regurgitation is present.    Estimated right ventricular systolic pressure from tricuspid regurgitation is normal (<35 mmHg).    Results: Reviewed.  I reviewed the patient's new laboratory and imaging results.  I independently reviewed the patient's new images.    Medications: Reviewed.    Hospital Course, Consults and Procedures  provided:   Mr. Dalal was initially admitted to Hospital Medicine, started on a heparin drip secondary to elevated troponin and chest pain. Cardiology was consulted and he was diagnosed with non-STEMI in the setting of demand ischemia with a chronically occluded RCA. Past history includes known cardiomyopathy with reduced ejection fraction (28% this admission), GDMT maximized. He went into atrial flutter RVRT requiring ECV on 9/23/24, with restoration of NSR. He was started on amiodarone and metoprolol.    He was diagnosed with right lower lobe pneumonia and COPD exacerbation, treated with 7 days of antibiotics, inhaled bronchodilators, and Sol-Medrol. He required intubation on 9/3/2024 and was transferred to the ICU. CTA revealed no PE. He required pressors after intubation, which was quickly weaned off. He was resumed on Valium  therapy due to chronic benzodiazepine use. He was unable to wean from the ventilator and general surgery was consulted for tracheostomy and PEG placement, which was performed on 9/18/24.    PT/OT worked with patient throughout hospitalization, with ability to tolerate a lift to the chair by discharge. He continued to have generalized weakness, ROM deficits, and decreased activity tolerance, with recommendations for LTACH at discharge. He received enteral nutrition and will require ongoing SLP services.     As of 09/25/24, he was felt appropriate for discharge to [facility] for long-term care. Discharge instructions below.    Consults:  Consults       Date and Time Order Name Status Description    9/13/2024 10:53 AM Inpatient General Surgery Consult Completed     9/1/2024 11:47 AM Inpatient Cardiology Consult Completed            Procedures:  Procedure(s):  TRACHEOSTOMY AND PERCUTANEOUS ENDOSCOPIC GASTROSTOMY TUBE INSERTION         Condition on discharge     Good. (Vital signs within normal limits. Patient conscious and comfortable)    Patient and family instructions     Discharge Disposition: Long-term care facility    CODE STATUS:   Code Status and Medical Interventions: CPR (Attempt to Resuscitate); Full Support   Ordered at: 09/01/24 1318     Level Of Support Discussed With:    Patient     Code Status (Patient has no pulse and is not breathing):    CPR (Attempt to Resuscitate)     Medical Interventions (Patient has pulse or is breathing):    Full Support       No Known Allergies    Discharge Medications:     Discharge Medications        New Medications        Instructions Start Date   acetaminophen 160 MG/5ML solution  Commonly known as: TYLENOL   650 mg, Per G Tube, Every 6 Hours PRN      albuterol (2.5 MG/3ML) 0.083% nebulizer solution  Commonly known as: PROVENTIL  Replaces: ProAir  (90 Base) MCG/ACT inhaler   2.5 mg, Nebulization, Every 6 Hours - RT      amiodarone 200 MG tablet  Commonly known as:  PACERONE   Administer 1 tablet per G tube Every 8 (Eight) Hours for 6 days, THEN 1 tablet Every 12 (Twelve) Hours for 14 days, THEN 1 tablet Daily for 90 days.   Start Date: September 25, 2024     arformoterol 15 MCG/2ML nebulizer solution  Commonly known as: BROVANA   15 mcg, Nebulization, 2 Times Daily - RT      aspirin 81 MG chewable tablet   81 mg, Per G Tube, Daily   Start Date: September 26, 2024     chlorhexidine 0.12 % solution  Commonly known as: PERIDEX   15 mL, Mouth/Throat, Every 12 Hours Scheduled      dextrose 5 % solution  Commonly known as: D5W   100 mL/hr (100 mL/hr), Intravenous, Continuous      Enoxaparin Sodium 40 MG/0.4ML solution prefilled syringe syringe  Commonly known as: LOVENOX   40 mg, Subcutaneous, Daily   Start Date: September 26, 2024     fentaNYL 75 MCG/HR patch  Commonly known as: DURAGESIC   1 patch, Transdermal, Every 72 Hours   Start Date: September 26, 2024     furosemide 10 MG/ML injection  Commonly known as: LASIX   40 mg, Intravenous, Every 12 Hours      insulin regular 100 UNIT/ML injection  Commonly known as: humuLIN R,novoLIN R   2-7 Units, Subcutaneous, Every 6 Hours Scheduled      lactulose 10 GM/15ML solution  Commonly known as: CHRONULAC   20 g, Per G Tube, 3 Times Daily PRN      metoprolol tartrate 50 MG tablet  Commonly known as: LOPRESSOR   50 mg, Per G Tube, 2 Times Daily      pantoprazole 40 MG injection  Commonly known as: PROTONIX  Replaces: pantoprazole 40 MG EC tablet   40 mg, Intravenous, Every Early Morning   Start Date: September 26, 2024     PHARMACY CONSULT   Does not apply, Every 12 Hours      phenytoin 125 MG/5ML suspension  Commonly known as: DILANTIN   200 mg, Per G Tube, 2 Times Daily      polyvinyl alcohol 1.4 % ophthalmic solution  Commonly known as: LIQUIFILM   2 drops, Both Eyes, Every 1 Hour PRN      ProSource No Carb liquid   30 mL, Nasogastric, Daily   Start Date: September 26, 2024     revefenacin 175 MCG/3ML nebulizer solution  Commonly known  as: YUPELRI   175 mcg, Nebulization, Daily - RT   Start Date: September 26, 2024     senna 8.6 MG tablet  Commonly known as: SENOKOT   2 tablets, Oral, 2 Times Daily             Changes to Medications        Instructions Start Date   atorvastatin 80 MG tablet  Commonly known as: LIPITOR  What changed:   medication strength  how much to take  how to take this  when to take this   80 mg, Per G Tube, Daily   Start Date: September 26, 2024     citalopram 40 MG tablet  Commonly known as: CeleXA  What changed:   medication strength  how much to take  how to take this  when to take this   40 mg, Per G Tube, Daily   Start Date: September 26, 2024     diazePAM 10 MG tablet  Commonly known as: VALIUM  What changed: how to take this   10 mg, Per G Tube, 2 Times Daily      empagliflozin 10 MG tablet tablet  Commonly known as: JARDIANCE  What changed: how to take this   10 mg, Nasogastric, Daily   Start Date: September 26, 2024            Stop These Medications      bumetanide 1 MG tablet  Commonly known as: BUMEX     calcium citrate 950 (200 Ca) MG tablet  Commonly known as: CALCITRATE     cyclobenzaprine 5 MG tablet  Commonly known as: FLEXERIL     Entresto 24-26 MG tablet  Generic drug: sacubitril-valsartan     ipratropium-albuterol 0.5-2.5 mg/3 ml nebulizer  Commonly known as: DUO-NEB     pantoprazole 40 MG EC tablet  Commonly known as: PROTONIX  Replaced by: pantoprazole 40 MG injection     phenytoin  MG capsule  Commonly known as: DILANTIN     potassium chloride ER 20 MEQ tablet controlled-release ER tablet  Commonly known as: K-TAB     ProAir  (90 Base) MCG/ACT inhaler  Generic drug: albuterol sulfate HFA  Replaced by: albuterol (2.5 MG/3ML) 0.083% nebulizer solution     QUEtiapine Fumarate 150 MG tablet              Activity: As tolerated        PT/OT/ST orders: [facility]     Diet: NPO Diet NPO Type: Strict NPO, Sips with Meds. Tube feeds: Peptamen intense VHP (peptide based, very high protein). 1500  mL of water.        Pending Results       Procedure [Order ID] Specimen - Date/Time    ECG 12 Lead Drug Monitoring; Amiodarone [[id number]]     XR Chest 1 View [[id number]]     XR Chest 1 View [[id number]]             Pending Labs       Order Current Status    AFB Culture - Sputum, ET Suction Preliminary result    Blood Culture - Blood, Blood, PICC Line Preliminary result    Fungus Culture - Sputum, ET Suction Preliminary result          Time Spent on Discharge:    I spent 15 minutes on this discharge activity which included: face-to-face encounter with the patient, reviewing the data in the system, coordination of the care with the nursing staff as well as consultants, documentation, and entering orders.

## 2024-09-24 NOTE — THERAPY TREATMENT NOTE
Patient Name: Fernie Dalal  : 1960    MRN: [id number]                              Today's Date: 2024       Admit Date: 2024    Visit Dx:     ICD-10-CM ICD-9-CM   1. Acute respiratory failure with hypoxia  J96.01 518.81   2. Aspiration pneumonia of right lower lobe, unspecified aspiration pneumonia type  J69.0 507.0   3. Chest pain, unspecified type  R07.9 786.50   4. Acute on chronic respiratory failure with hypoxia  J96.21 518.84     799.02   5. HFrEF (heart failure with reduced ejection fraction)  I50.20 428.20     Patient Active Problem List   Diagnosis    Adiposity    Allergic rhinitis    Anxiety    Blues    Chronic low back pain    HFrEF (heart failure with reduced ejection fraction)    Coronary artery disease involving native coronary artery of native heart with angina pectoris    Dyslipidemia    Muscle spasm    Seizure disorder    Sleep apnea    Essential hypertension    Cardiomyopathy, dilated    NSTEMI (non-ST elevated myocardial infarction)    Respiratory Failure Type 2    Right lower lobe pneumonia     Past Medical History:   Diagnosis Date    Angina at rest     Anxiety     Arrhythmia     COPD (chronic obstructive pulmonary disease)     GERD (gastroesophageal reflux disease)     Heart failure     congestive    Hyperlipidemia     Hypertension     Myocardial infarction      Past Surgical History:   Procedure Laterality Date    CARDIAC CATHETERIZATION      CARDIAC CATHETERIZATION N/A 2023    Procedure: Left Heart Cath;  Surgeon: Marsha Gaitan MD;  Location: [facility] CATH INVASIVE LOCATION;  Service: Cardiology;  Laterality: N/A;    CARDIOVASCULAR STRESS TEST      TRACHEOSTOMY AND PEG TUBE INSERTION N/A 2024    Procedure: TRACHEOSTOMY AND PERCUTANEOUS ENDOSCOPIC GASTROSTOMY TUBE INSERTION;  Surgeon: Jon Mclean MD;  Location: [facility] OR;  Service: General;  Laterality: N/A;      General Information       Row Name 24 1020          OT Time and Intention    Document Type  therapy note (daily note)  -     Mode of Treatment occupational therapy  -       Row Name 09/24/24 1020          General Information    Patient Profile Reviewed yes  -     Existing Precautions/Restrictions fall;oxygen therapy device and L/min  trach to vent; PEG tube  -     Barriers to Rehab medically complex;previous functional deficit;cognitive status  -       Row Name 09/24/24 1020          Cognition    Orientation Status (Cognition) unable/difficult to assess  -       Row Name 09/24/24 1020          Safety Issues, Functional Mobility    Safety Issues Affecting Function (Mobility) awareness of need for assistance;insight into deficits/self-awareness;safety precaution awareness;safety precautions follow-through/compliance;sequencing abilities  -     Impairments Affecting Function (Mobility) balance;cognition;endurance/activity tolerance;range of motion (ROM);shortness of breath;strength  -               User Key  (r) = Recorded By, (t) = Taken By, (c) = Cosigned By      Initials Name Provider Type    Morenita Bentley OT Occupational Therapist                     Mobility/ADL's       Row Name 09/24/24 1022          Bed Mobility    Bed Mobility rolling left;rolling right  -     Rolling Left [place] (Bed Mobility) maximum assist (25% patient effort);2 person assist;verbal cues  -     Rolling Right [place] (Bed Mobility) maximum assist (25% patient effort);2 person assist;verbal cues;nonverbal cues (demo/gesture)  -       Row Name 09/24/24 1022          Transfers    Transfers bed-chair transfer  -       Row Name 09/24/24 1022          Bed-Chair Transfer    Bed-Chair [place] (Transfers) dependent (less than 25% patient effort)  -     Assistive Device (Bed-Chair Transfers) lift device  -       Row Name 09/24/24 1022          Activities of Daily Living    BADL Assessment/Intervention upper body dressing  -       Row Name 09/24/24 1022          Upper Body Dressing  Assessment/Training    [place] Level (Upper Body Dressing) don;front opening garment;maximum assist (25% patient effort)  -     Position (Upper Body Dressing) supported sitting  -               User Key  (r) = Recorded By, (t) = Taken By, (c) = Cosigned By      Initials Name Provider Type    Morenita Bentley OT Occupational Therapist                   Obj/Interventions       Row Name 09/24/24 1023          Motor Skills    Functional Endurance Pt provided with B hand sponges to occupy hands during anxious episodes to prevent line disconnection.  -       Row Name 09/24/24 1023          Balance    Balance Assessment sitting static balance  -     Static Sitting Balance moderate assist  -     Position, Sitting Balance supported;sitting in chair  -     Balance Interventions sitting;static  -               User Key  (r) = Recorded By, (t) = Taken By, (c) = Cosigned By      Initials Name Provider Type    Morenita Bentley OT Occupational Therapist                   Goals/Plan    No documentation.                  Clinical Impression       Row Name 09/24/24 1028          Pain Assessment    Pain Intervention(s) Repositioned;Ambulation/increased activity  -       Row Name 09/24/24 1028          Pain Scale: FACES Pre/Post-Treatment    Pain: FACES Scale, Pretreatment 2-->hurts little bit  -     Posttreatment Pain Rating 2-->hurts little bit  -     Pain Location generalized  -       Row Name 09/24/24 1028          Plan of Care Review    Plan of Care Reviewed With patient  -     Progress no change  -     Outcome Evaluation Pt presents below fxl baseline with decreased activity tolerance, strength, cognition and ROM. Recommend d/t to LTAC.  -       Row Name 09/24/24 1028          Therapy Plan Review/Discharge Plan (OT)    Anticipated Discharge Disposition (OT) LT ([facility])  -       Row Name 09/24/24 1028          Vital Signs    Pre Systolic BP Rehab 112  -     Pre  Treatment Diastolic BP 80  -AJ     Post Systolic BP Rehab 116  -AJ     Post Treatment Diastolic BP 71  -AJ     Pretreatment Heart Rate (beats/min) 89  -AJ     Posttreatment Heart Rate (beats/min) 88  -AJ     Pre SpO2 (%) 93  -AJ     O2 Delivery Pre Treatment ventilator  -AJ     Post SpO2 (%) 94  -AJ     O2 Delivery Post Treatment ventilator  -AJ     Pre Patient Position Supine  -AJ     Intra Patient Position Side Lying  -AJ     Post Patient Position Sitting  -AJ       Row Name 09/24/24 1028          Positioning and Restraints    Pre-Treatment Position in bed  -AJ     Post Treatment Position chair  -AJ     In Chair notified nsg;reclined;sitting;call light within reach;encouraged to call for assist;waffle cushion;exit alarm on;legs elevated  -AJ               User Key  (r) = Recorded By, (t) = Taken By, (c) = Cosigned By      Initials Name Provider Type    Morenita Bentley, OT Occupational Therapist                   Outcome Measures       Row Name 09/24/24 1031          How much help from another is currently needed...    Putting on and taking off regular lower body clothing? 1  -AJ     Bathing (including washing, rinsing, and drying) 1  -AJ     Toileting (which includes using toilet bed pan or urinal) 1  -AJ     Putting on and taking off regular upper body clothing 1  -AJ     Taking care of personal grooming (such as brushing teeth) 1  -AJ     Eating meals 1  -AJ     AM-PAC 6 Clicks Score (OT) 6  -AJ       Row Name 09/24/24 0920          How much help from another person do you currently need...    Turning from your back to your side while in flat bed without using bedrails? 2  -ES     Moving from lying on back to sitting on the side of a flat bed without bedrails? 1  -ES     Moving to and from a bed to a chair (including a wheelchair)? 1  -ES     Standing up from a chair using your arms (e.g., wheelchair, bedside chair)? 1  -ES     Climbing 3-5 steps with a railing? 1  -ES     To walk in hospital room? 1  -ES      AM-PAC 6 Clicks Score (PT) 7  -ES     Highest Level of Mobility Goal 2 --> Bed activities/dependent transfer  -ES       Row Name 09/24/24 1031 09/24/24 0920       Functional Assessment    Outcome Measure Options AM-PAC 6 Clicks Daily Activity (OT)  -AJ AM-PAC 6 Clicks Basic Mobility (PT)  -ES              User Key  (r) = Recorded By, (t) = Taken By, (c) = Cosigned By      Initials Name Provider Type    Anjelica Romero, PT Physical Therapist    Morenita Bentley, OT Occupational Therapist                    Occupational Therapy Education       Title: PT OT SLP Therapies (In Progress)       Topic: Occupational Therapy (In Progress)       Point: ADL training (In Progress)       Description:   Instruct learner(s) on proper safety adaptation and remediation techniques during self care or transfers.   Instruct in proper use of assistive devices.                  Learning Progress Summary             Patient Acceptance, E, NR by HARRIET at 9/24/2024 1032    Acceptance, E, NR by AC at 9/21/2024 1148    Acceptance, E,TB, VU by EB at 9/10/2024 1740    Acceptance, E, NR by PN at 9/4/2024 0620    Acceptance, E,D, NR by FLOWER at 9/2/2024 0916   Family Acceptance, E,TB, VU by EB at 9/10/2024 1740    Acceptance, E, NR by PN at 9/4/2024 0620                         Point: Home exercise program (Done)       Description:   Instruct learner(s) on appropriate technique for monitoring, assisting and/or progressing therapeutic exercises/activities.                  Learning Progress Summary             Patient Acceptance, E, NR by AC at 9/21/2024 1148    Acceptance, E, VU,NR by JR at 9/21/2024 0825    Comment: role of therapy, ongoing treatment plan, therex    Acceptance, E,TB, VU by EB at 9/10/2024 1740    Acceptance, E, NR by PN at 9/4/2024 0620   Family Acceptance, E,TB, VU by EB at 9/10/2024 1740    Acceptance, E, NR by PN at 9/4/2024 0620                         Point: Precautions (In Progress)       Description:   Instruct learner(s)  on prescribed precautions during self-care and functional transfers.                  Learning Progress Summary             Patient Acceptance, E, NR by AJ at 9/24/2024 1032    Acceptance, E, NR by AC at 9/21/2024 1148    Acceptance, E,TB, VU by EB at 9/10/2024 1740    Acceptance, E, NR by PN at 9/4/2024 0620    Acceptance, E,D, NR by JY at 9/2/2024 0916   Family Acceptance, E,TB, VU by EB at 9/10/2024 1740    Acceptance, E, NR by PN at 9/4/2024 0620                         Point: Body mechanics (In Progress)       Description:   Instruct learner(s) on proper positioning and spine alignment during self-care, functional mobility activities and/or exercises.                  Learning Progress Summary             Patient Acceptance, E, NR by AJ at 9/24/2024 1032    Acceptance, E, NR by AC at 9/21/2024 1148    Acceptance, E,TB, VU by EB at 9/10/2024 1740    Acceptance, E, NR by PN at 9/4/2024 0620    Acceptance, E,D, NR by JY at 9/2/2024 0916   Family Acceptance, E,TB, VU by EB at 9/10/2024 1740    Acceptance, E, NR by PN at 9/4/2024 0620                                         User Key       Initials Effective Dates Name Provider Type Discipline     02/03/23 -  Erika Villalobos OT Occupational Therapist OT    EB 09/22/22 -  Erick Fontana, RN Registered Nurse Nurse    FLOWER 06/16/21 -  Bren Hillman OT Occupational Therapist OT    PN 07/11/24 -  Alicia Funes, RN Registered Nurse Nurse    AC 07/11/24 -  Quyen Torres PT Physical Therapist PT     08/26/24 -  Morenita Olvera OT Occupational Therapist OT                  OT Recommendation and Plan     Plan of Care Review  Plan of Care Reviewed With: patient  Progress: no change  Outcome Evaluation: Pt presents below fxl baseline with decreased activity tolerance, strength, cognition and ROM. Recommend d/t to LTAC.     Time Calculation:         Time Calculation- OT       Row Name 09/24/24 1033             Time Calculation- OT    OT Start Time 0834  -      OT Received On  09/24/24  -      OT Goal Re-Cert Due Date 10/01/24  -         Timed Charges    30629 - OT Therapeutic Activity Minutes 12  -AJ         Total Minutes    Timed Charges Total Minutes 12  -AJ       Total Minutes 12  -AJ                User Key  (r) = Recorded By, (t) = Taken By, (c) = Cosigned By      Initials Name Provider Type    Morenita Bentley OT Occupational Therapist                  Therapy Charges for Today       Code Description Service Date Service Provider Modifiers Qty    [id number]  OT THERAPEUTIC ACT EA 15 MIN 9/24/2024 Morenita Olvera OT GO 1                 Morenita Olvera OT  9/24/2024

## 2024-09-24 NOTE — PLAN OF CARE
Goal Outcome Evaluation:  Plan of Care Reviewed With: patient        Progress: no change  Outcome Evaluation: Pt presents below fxl baseline with decreased activity tolerance, strength, cognition and ROM. Recommend d/t to LTAC.      Anticipated Discharge Disposition (OT): [facility] (long-term care [facility])

## 2024-09-24 NOTE — PLAN OF CARE
Goal Outcome Evaluation:  -pt remains on mechanical ventilation PEEP of 5 FiO2 @35%  -pt awake and unrestrained.   -pt had high temp of 103. Ice packs added, tylenol given x2  -Bmx1   -amio gtt @.5  -replaced K   -pt follows commands and can answer yes or no questions

## 2024-09-24 NOTE — PLAN OF CARE
Goal Outcome Evaluation:  Plan of Care Reviewed With: patient        Progress: no change  Outcome Evaluation: Pt able to progress mobility this date by tolerating lift to chair. Pt continues to be limited by generalized weakness, ROM deficits, and decreased activity tolerance. PT rec LTACH at d/c.      Anticipated Discharge Disposition (PT): [facility] ([facility])

## 2024-09-24 NOTE — PROGRESS NOTES
INTENSIVIST   PROGRESS NOTE        SUBJECTIVE     Fernie 64 y.o. male is followed for: Chest Pain       Respiratory Failure Type 2    HFrEF (heart failure with reduced ejection fraction)    NSTEMI (non-ST elevated myocardial infarction)    Right lower lobe pneumonia    As an Intensivist, we provide an integrated approach to the ICU patient and family, medical management of comorbid conditions, including but not limited to electrolytes, glycemic control, organ dysfunction, lead interdisciplinary rounds and coordinate the care with all other services, including those from other specialists.     Interval History:    He has remained on NSR.    Amiodarone IV to transition to PO today.    Temp  Min: 98.6 °F (37 °C)  Max: 103 °F (39.4 °C)       History     Last Reviewed by Victor Hugo Cool MD on 9/3/2024 at  6:37 PM    Sections Reviewed    Medical, Surgical, Family, Tobacco, Alcohol, Drug Use, Sexual Activity,   Social Documentation    Problem list reviewed by Victor Hugo Cool MD on 9/3/2024 at  6:37 PM  Medicines reviewed by Victor Hugo Cool MD on 9/3/2024 at  6:37 PM  Allergies reviewed by Victor Hugo Cool MD on 9/3/2024 at  6:37 PM    The patient's relevant past medical, surgical and social history were reviewed and updated in Epic as appropriate.        OBJECTIVE     Vitals:  Temp: (!) 101.3 °F (38.5 °C) (24 0600) Temp  Min: 98.6 °F (37 °C)  Max: 103 °F (39.4 °C)   Temp core:      BP: 121/86 (24 0645) BP  Min: 85/66  Max: 160/102   MAP (non-invasive) Noninvasive MAP (mmHg): 98 (24 0645) Noninvasive MAP (mmHg)  Av.3  Min: 41  Max: 177   Pulse: 86 (24 0706) Pulse  Min: 79  Max: 179   Resp: 21 (24 0408) Resp  Min: 18  Max: 23   SpO2: 95 % (24 0706) SpO2  Min: 86 %  Max: 99 %   Device: ventilator (24 07)    Flow Rate: 70 (24 1800) No data recorded         09/20/24  0900 09/21/24  0900   Weight: 110 kg (242 lb 15.2 oz) 110 kg (241 lb 7.2 oz)        Intake/Ouptut 24 hrs  (7:00AM - 6:59 AM)  Intake & Output (last 2 days)         09/22 0701 09/23 0700 09/23 0701 09/24 0700 09/24 0701 09/25 0700    I.V. (mL/kg) 171.1 (1.6) 555.7 (5.1)     Other 576 476     NG/GT 1548 1188     IV Piggyback 185.6      Total Intake(mL/kg) 2480.6 (22.6) 2219.7 (20.2)     Urine (mL/kg/hr) 3270 (1.2) 3255 (1.2)     Stool 0 0     Total Output 3270 3255     Net -789.4 -1035.3            Stool Unmeasured Occurrence 1 x 3 x             Medications (drips):    amiodarone, Last Rate: 0.5 mg/min (09/23/24 2128)  propofol, Last Rate: Stopped (09/20/24 1710)       Invasive Mechanical Ventilator   Settings: Observed:   Mode: VC+/AC (09/24/24 0706)    Resp Rate (Set): 16 (09/24/24 0706) Resp Rate (Observed) Vent: 20 (09/24/24 0706)   Vt (Set, mL): 490 mL (09/24/24 0706) Vt, Exp (observed, mL): 516 mL (09/24/24 0706)    Minute Ventilation (L/min) (Obs): 10.2 L/min (09/24/24 0706)    I:E Ratio (Obs): 1:1.2 (09/24/24 0706)       FiO2 (%): 35 % (09/24/24 0706) Plateau Pressure (cm H2O): 16 cm H2O (09/24/24 0706)   PEEP/CPAP (cm H2O): 5 cm H20 (09/24/24 0706) Driving Pressure (cm H2O): 11.4 cm H2O (09/24/24 0706)    Static Compliance (L/cm H2O): 28 (09/24/24 0706)      Physical Examination  Telemetry:  Rhythm: normal sinus rhythm (09/24/24 0600)  Conduction Defect: right bundle branch block (09/14/24 1800)   Constitutional:  No acute distress.   Cardiovascular: RRR.    Respiratory: Normal breath sounds  (+) Rhonchi   Abdominal:  Soft with no tenderness.   Extremities: Trace Edema   Neurological:   Awake.    Best Eye Response: 4-->(E4) spontaneous (09/24/24 0600)  Best Motor Response: 6-->(M6) obeys commands (09/24/24 0600)  Best Verbal Response: 1-->(V1) none (09/24/24 0600)  Leo Coma Scale Score: 11 (09/24/24 0600)    RASS (Chery Agitation-Sedation Scale): 0-->alert and calm (09/22/24 1800)    Confusion Assessment Method-ICU (CAM-ICU)  Feature 3: Altered Level of Consciousness: Negative (09/22/24 1800)      Results Reviewed:  Laboratory  Microbiology  Radiology  Pathology    Hematology:  Results from last 7 days   Lab Units 09/24/24  0311 09/23/24  0405 09/22/24  0340   WBC 10*3/mm3 12.02* 11.99* 12.18*   HEMOGLOBIN g/dL 11.0* 11.2* 11.0*   MCV fL 90.4 88.7 89.7   PLATELETS 10*3/mm3 468* 515* 500*     Results from last 7 days   Lab Units 09/24/24  0311 09/23/24  0405 09/22/24  0340   NEUTROS ABS 10*3/mm3 9.18* 9.09* 9.58*   LYMPHS ABS 10*3/mm3 1.17 1.08 0.82   EOS ABS 10*3/mm3 0.29 0.26 0.13     Chemistry:  Estimated Creatinine Clearance: 131.8 mL/min (A) (by C-G formula based on SCr of 0.7 mg/dL (L)).    Results from last 7 days   Lab Units 09/24/24  0311 09/23/24  1404 09/23/24  0405   SODIUM mmol/L 146*  --  149*   POTASSIUM mmol/L 3.5 3.9 3.3*   CHLORIDE mmol/L 103  --  103   CO2 mmol/L 31.0*  --  33.0*   BUN mg/dL 36*  --  35*   CREATININE mg/dL 0.70*  --  0.73*   GLUCOSE mg/dL 147*  --  166*     Results from last 7 days   Lab Units 09/24/24  0311 09/23/24  1404 09/23/24  0405   CALCIUM mg/dL 9.2  --  9.4   MAGNESIUM mg/dL 2.6*  --  2.8*   PHOSPHORUS mg/dL 2.7 2.3* 1.3*     Hepatic Panel:  Results from last 7 days   Lab Units 09/23/24  0405   ALBUMIN g/dL 3.5   TOTAL PROTEIN g/dL 8.0   BILIRUBIN mg/dL 0.4   AST (SGOT) U/L 105*   ALT (SGPT) U/L 85*   ALK PHOS U/L 129*     Biomarkers:  Results from last 7 days   Lab Units 09/23/24  0405 09/19/24  0359 09/18/24  0242   CRP mg/dL 19.40*  --   --    LACTATE mmol/L  --   --  0.9   PROCALCITONIN ng/mL  --  0.20 0.13       Phenytoin Level   Date Value Ref Range Status   09/17/2024 3.3 (L) 10.0 - 20.0 mcg/mL Final   09/10/2024 7.4 (L) 10.0 - 20.0 mcg/mL Final     COVID-19  Lab Results   Component Value Date    COVID19 Not Detected 09/20/2024    COVID19 Not Detected 09/01/2024    COVID19 Not Detected 04/22/2023    COVID19 Negative 03/23/2023       Arterial Blood Gases:  Results from last 7 days   Lab Units 09/24/24  0412 09/23/24  0356 09/22/24  0343   PH, ARTERIAL pH  units 7.474* 7.520* 7.512*   PCO2, ARTERIAL mm Hg 48.2* 45.7* 46.9*   PO2 ART mm Hg 79.4* 68.1* 83.7   FIO2 % 35 35 35       Images:  XR Chest 1 View    Result Date: 9/24/2024  Impression: 1. Mostly stable, chronic appearing interstitial lung changes similar to yesterday's exam. 2. Subtle 15 mm nodular density superimposed over the right apex, which may be artifactual or focal airspace disease. Electronically Signed: Dani Trejo MD  9/24/2024 7:37 AM EDT  Workstation ID: [id number]    XR Chest 1 View    Result Date: 9/23/2024  Impression: No radiographic evidence of acute cardiopulmonary process. Electronically Signed: Alfredito Johnson MD  9/23/2024 7:15 AM EDT  Workstation ID: [id number]     Echo:  Results for orders placed during the hospital encounter of 09/01/24    Adult Transthoracic Echo Complete W/ Cont if Necessary Per Protocol    Interpretation Summary    Left ventricular systolic function is moderately decreased. Estimated left ventricular EF = 28%.  There is global hypokinesis of left ventricle.    The left ventricular cavity is mildly dilated.    The left atrial cavity is mild to moderately dilated.    Mild aortic valve stenosis is present (mean gradient 15 mmHg)    Mild to moderate mitral regurgitation is present.    Estimated right ventricular systolic pressure from tricuspid regurgitation is normal (<35 mmHg).      Results: Reviewed.  I reviewed the patient's new laboratory and imaging results.  I independently reviewed the patient's new images.    Medications: Reviewed.    Assessment   A/P     Hospital:  LOS: 21 days   ICU: 20d 14h     Active Hospital Problems    Diagnosis  POA    **Respiratory Failure Type 2 [J96.92]  Yes    Right lower lobe pneumonia [J18.9]  Yes    NSTEMI (non-ST elevated myocardial infarction) [I21.4]  Yes     Elevated troponin in the setting of pneumonia and CHF, 9/1/2024      HFrEF (heart failure with reduced ejection fraction) [I50.20]  Yes     Echo (9/29/2023): LVEF 36%.   Moderate MR.  RVSP 42 mmHg       Fernie is a 64 y.o. male admitted on 9/1/2024 with worsening Dyspnea [R06.00]  Acute on chronic respiratory failure with hypoxia [J96.21]    Admitted with chest pain, heart failure, dyspnea and chills, which was attributed to COPD with exacerbation due to a RLL pneumonia. Despite effective diuresis, he continued to decline.    On 09/03/24 he developed some respiratory distress and respiratory arrest.  He was emergently intubated. During this admission he had elevated troponin and ST changes consistent with a non-ST elevation MI.  He also has obstructive sleep apnea but is noncompliant with CPAP.    09/18/24 His clinical course has been complicated with prolonged Invasive Mechanical Ventilation requiring Tracheostomy and a PEG.    On 09/23/24 A Flutter with RVR, requiring ECV.      Assessment/Management/Treatment Plan:    Respiratory Failure, type 2, acute on chronic  Intubated 09/03/24   Invasive Mechanical Ventilation   Trach 09/18/24  Possible RLL pneumonia  Former smoker. Quit 2022  OAD per history.   Cardiovascular  A Flutter with RVRT, requiring ECV on 09/23/24 ? NSR  HFrEF. EF 28%  CAD. NSTEMI. Chronically occluded RCA.  Dyslipidemia   HTN  Antibiotics  Elevated PCT on admission, 4.18 (09/01/24), in the range of sepsis. Improving.    Lab Results   Component Value Date    PROCALCITO 0.20 09/19/2024    PROCALCITO 0.13 09/18/2024    PROCALCITO 0.29 (H) 09/09/2024    PROCALCITO 0.41 (H) 09/07/2024    PROCALCITO 0.61 (H) 09/06/2024    PROCALCITO 0.76 (H) 09/05/2024     MRSA PCR screen Negative  Antibiotics: Piperacillin-Tazobactam   Nutrition Support  Tube Feeding: Formula: Peptamen Intense VHP (Peptide Based, Very High Protein); Feeding Type: Continuous; Start at: Other; Other: 75; Then Advance By: Do Not Advance; Total Daily Feeding Volume (mL/day): 1500; Water Flush: 30 mL; Every: 1 hour  Peptamen Intense (1.0 kcal/mL, 92 g protein/L, 4 g fiber/L)  He is receiving Phenytoin, and  Enteral Nutrition is on hold 1 h before and after each dose. (BID)    Lab Results   Component Value Date    PREALBUMIN 12.1 (L) 09/23/2024    PREALBUMIN 14.3 (L) 09/09/2024    CRP 19.40 (H) 09/23/2024    CRP 11.36 (H) 09/09/2024     Endocrine   Body mass index is 35.76 kg/m². Obese Class II: 35-39.9kg/m2  Type 2 diabetes.    Lab Results   Lab Value Date/Time    HGBA1C 6.00 (H) 09/01/2024 1050    HGBA1C 7.30 (H) 04/25/2023 0440     Results from last 7 days   Lab Units 09/24/24  0506 09/23/24  2331 09/23/24  1752 09/23/24  1203 09/23/24  0524 09/22/24  2351 09/22/24  1656 09/22/24  1119   GLUCOSE mg/dL 150* 155* 178* 153* 178* 175* 201* 184*       Diet: NPO Diet NPO Type: Strict NPO, Sips with Meds   Advance Directives: Code Status and Medical Interventions: CPR (Attempt to Resuscitate); Full Support   Ordered at: 09/01/24 1318     Level Of Support Discussed With:    Patient     Code Status (Patient has no pulse and is not breathing):    CPR (Attempt to Resuscitate)     Medical Interventions (Patient has pulse or is breathing):    Full Support        VTE Prophylaxis:  Mechanical VTE prophylaxis orders are signed & held. Pharmacologic VTE prophylaxis orders are present.         In brief:  A Flutter per Dr. Gaitan  Amiodarone  Metoprolol  Check phenytoin level in AM.  Goal: Glucose < 180 mg/dL.   Insulin SQ - correction scale for now.  Disposition:  Transfer to [facility], operated by [facility] tomorrow, if no more complications.    Plan of care and goals reviewed during interdisciplinary rounds.  I discussed the patient's findings and my recommendations with patient and nursing staff    Time: was greater than 50 minutes. This is non-concurrent time.   (This excludes time spent performing separately reportable procedures and services).    He has a high risk of imminent or life-threatening  deterioration, which requires the highest level of physician preparedness to intervene urgently. I devoted my full attention to the direct care of this  patient for the amount of time indicated above.     Time spent with family or surrogate(s) is included only if the patient was incapable of providing the necessary information or participating in medical decision making.    He has the following organ/system impairments Respiratory Failure.         [x] Primary Attending Intensive Care Medicine - Nutrition Support   [] Consultant    Copied text in this note has been reviewed and is accurate as of 09/24/24

## 2024-09-24 NOTE — CASE MANAGEMENT/SOCIAL WORK
Continued Stay Note  [facility]     Patient Name: Fernie Dalal  MRN: [id number]  Today's Date: 9/24/2024    Admit Date: 9/1/2024    Plan: [facility]   Discharge Plan       Row Name 09/24/24 1104       Plan    Plan [facility]    Patient/Family in Agreement with Plan yes    Plan Comments Discussed in MDR, plan for d/c tomorrow Wed 9/25/24 to [facility] for vent weaning.  Patient will be of amiodarone drip today @ 1400. I spoke w/Debra post [facility], updated will be able to transfer on Wednesday. [facility] does not have any transport available. I have arranged [facility] for tomorrow. PCS in dropbox. I attempted to call sibling Omuou, no answer on cell phone, left VM.    Final Discharge Disposition Code 03 - skilled nursing facility (SNF)                   Discharge Codes    No documentation.                 Expected Discharge Date and Time       Expected Discharge Date Expected Discharge Time    Sep 27, 2024               Aniket Marquez RN

## 2024-09-24 NOTE — PROGRESS NOTES
"[facility]  IP Progress Note      Chief Complaint: Tachycardia/atrial flutter    Subjective   Update 9/24/24  Several hours of atrial flutter yesterday s/p cardioversion. Currently in sinus tachycardia.  He is confused and febrile currently.    Objective     Blood pressure 121/86, pulse 87, temperature (!) 101.3 °F (38.5 °C), resp. rate 21, height 175 cm (68.9\"), weight 110 kg (241 lb 7.2 oz), SpO2 95%.     Intake/Output Summary (Last 24 hours) at 9/24/2024 0953  Last data filed at 9/24/2024 0600  Gross per 24 hour   Intake 1946.84 ml   Output 3080 ml   Net -1133.16 ml       Physical Exam:  General: No acute distress.   Neck: no JVD.  Tracheostomy noted  Chest:No respiratory distress, breath sounds equal, scattered rhonchi  Cardiovascular: Normal S1 and S2, distant heart sounds  Extremities: No significant edema.    Results Review:     I reviewed the patient's new clinical results.    Results from last 7 days   Lab Units 09/24/24  0311   WBC 10*3/mm3 12.02*   HEMOGLOBIN g/dL 11.0*   HEMATOCRIT % 37.6   PLATELETS 10*3/mm3 468*     Results from last 7 days   Lab Units 09/24/24  0311 09/23/24  1404 09/23/24  0405   SODIUM mmol/L 146*  --  149*   POTASSIUM mmol/L 3.5   < > 3.3*   CHLORIDE mmol/L 103  --  103   CO2 mmol/L 31.0*  --  33.0*   BUN mg/dL 36*  --  35*   CREATININE mg/dL 0.70*  --  0.73*   CALCIUM mg/dL 9.2  --  9.4   BILIRUBIN mg/dL  --   --  0.4   ALK PHOS U/L  --   --  129*   ALT (SGPT) U/L  --   --  85*   AST (SGOT) U/L  --   --  105*   GLUCOSE mg/dL 147*  --  166*    < > = values in this interval not displayed.     Results from last 7 days   Lab Units 09/24/24  0311   SODIUM mmol/L 146*   POTASSIUM mmol/L 3.5   CHLORIDE mmol/L 103   CO2 mmol/L 31.0*   BUN mg/dL 36*   CREATININE mg/dL 0.70*   GLUCOSE mg/dL 147*   CALCIUM mg/dL 9.2         Lab Results   Component Value Date    TROPONINT 338 (C) 09/03/2024         Results from last 7 days   Lab Units 09/23/24  0400 "   TRIGLYCERIDES mg/dL 152*     Pharmacy Consult, , Does not apply, Q12H  Albuterol Sulfate NEB Orderable, 2.5 mg, Nebulization, Q6H - RT  amiodarone, 200 mg, Oral, Q8H   Followed by  [START ON 10/1/2024] amiodarone, 200 mg, Oral, Q12H   Followed by  [START ON 10/15/2024] amiodarone, 200 mg, Oral, Daily  arformoterol, 15 mcg, Nebulization, BID - RT  aspirin, 81 mg, Nasogastric, Daily  atorvastatin, 80 mg, Nasogastric, Daily  fentaNYL, 1 patch, Transdermal, Q72H   And  Check Fentanyl Patch Placement, 1 each, Does not apply, Q12H  chlorhexidine, 15 mL, Mouth/Throat, Q12H  citalopram, 40 mg, Nasogastric, Daily  diazePAM, 10 mg, Nasogastric, BID  empagliflozin, 10 mg, Nasogastric, Daily  enoxaparin, 40 mg, Subcutaneous, Daily  furosemide, 40 mg, Intravenous, Q12H  insulin regular, 2-7 Units, Subcutaneous, Q6H  metoprolol tartrate, 25 mg, Nasogastric, BID  pantoprazole, 40 mg, Intravenous, Q AM  pharmacy consult - [facility], , Does not apply, Daily  phenytoin, 200 mg, Nasogastric, BID  ProSource No Carb, 30 mL, Nasogastric, Daily  revefenacin, 175 mcg, Nebulization, Daily - RT  senna, 2 tablet, Oral, BID  sodium chloride, 10 mL, Intravenous, Q12H         Tele: Sinus Rythym, few hours of atrial flutter yesterday      Assessment:  Atrial flutter with RVR, now status post ECV and in sinus rhythm  NSTEMI in the setting of demand ischemia with known chronically occluded RCA, he did not have significant left coronary system disease by cath last year.  Acute on chronic systolic heart failure, EF 28% this admission. Last EF 36% by echo 10/2023  Cardiomyopathy.  Pneumonia  Acute on chronic respiratory failure with hypoxia  Coronary artery disease  Dyslipidemia  Hypertension    Plan:  Cont. amiodarone per protocol.  Continue rest of the current medications.  QTC stable.  Will defer anticoagulation.  Maintaining NSR.  Currently confused and febrile.  Maintain normal sinus rhythm..  Currently in sinus tachycardia.

## 2024-09-25 ENCOUNTER — APPOINTMENT (OUTPATIENT)
Dept: GENERAL RADIOLOGY | Facility: HOSPITAL | Age: 64
End: 2024-09-25
Payer: MEDICAID

## 2024-09-25 VITALS
HEIGHT: 69 IN | DIASTOLIC BLOOD PRESSURE: 77 MMHG | SYSTOLIC BLOOD PRESSURE: 112 MMHG | RESPIRATION RATE: 20 BRPM | HEART RATE: 89 BPM | TEMPERATURE: 100.8 F | WEIGHT: 241.4 LBS | BODY MASS INDEX: 35.76 KG/M2 | OXYGEN SATURATION: 96 %

## 2024-09-25 LAB
ANION GAP SERPL CALCULATED.3IONS-SCNC: 15 MMOL/L (ref 5–15)
ARTERIAL PATENCY WRIST A: ABNORMAL
ATMOSPHERIC PRESS: ABNORMAL MM[HG]
BASE EXCESS BLDA CALC-SCNC: 8.7 MMOL/L (ref 0–2)
BASOPHILS # BLD AUTO: 0.07 10*3/MM3 (ref 0–0.2)
BASOPHILS NFR BLD AUTO: 0.6 % (ref 0–1.5)
BDY SITE: ABNORMAL
BODY TEMPERATURE: 37
BUN SERPL-MCNC: 33 MG/DL (ref 8–23)
BUN/CREAT SERPL: 55.9 (ref 7–25)
CALCIUM SPEC-SCNC: 9.3 MG/DL (ref 8.6–10.5)
CHLORIDE SERPL-SCNC: 111 MMOL/L (ref 98–107)
CO2 BLDA-SCNC: 33.7 MMOL/L (ref 22–33)
CO2 SERPL-SCNC: 28 MMOL/L (ref 22–29)
COHGB MFR BLD: 1.2 % (ref 0–2)
CREAT SERPL-MCNC: 0.59 MG/DL (ref 0.76–1.27)
DEPRECATED RDW RBC AUTO: 48.4 FL (ref 37–54)
EGFRCR SERPLBLD CKD-EPI 2021: 109 ML/MIN/1.73
EOSINOPHIL # BLD AUTO: 0.49 10*3/MM3 (ref 0–0.4)
EOSINOPHIL NFR BLD AUTO: 3.9 % (ref 0.3–6.2)
EPAP: 0
ERYTHROCYTE [DISTWIDTH] IN BLOOD BY AUTOMATED COUNT: 14.9 % (ref 12.3–15.4)
FUNGUS WND CULT: NORMAL
GLUCOSE BLDC GLUCOMTR-MCNC: 133 MG/DL (ref 70–130)
GLUCOSE SERPL-MCNC: 153 MG/DL (ref 65–99)
HCO3 BLDA-SCNC: 32.4 MMOL/L (ref 20–26)
HCT VFR BLD AUTO: 36.5 % (ref 37.5–51)
HCT VFR BLD CALC: 33.3 % (ref 38–51)
HGB BLD-MCNC: 10.9 G/DL (ref 13–17.7)
HGB BLDA-MCNC: 10.9 G/DL (ref 13.5–17.5)
IMM GRANULOCYTES # BLD AUTO: 0.08 10*3/MM3 (ref 0–0.05)
IMM GRANULOCYTES NFR BLD AUTO: 0.6 % (ref 0–0.5)
INHALED O2 CONCENTRATION: 40 %
IPAP: 0
LYMPHOCYTES # BLD AUTO: 1.5 10*3/MM3 (ref 0.7–3.1)
LYMPHOCYTES NFR BLD AUTO: 12 % (ref 19.6–45.3)
MCH RBC QN AUTO: 26.7 PG (ref 26.6–33)
MCHC RBC AUTO-ENTMCNC: 29.9 G/DL (ref 31.5–35.7)
MCV RBC AUTO: 89.2 FL (ref 79–97)
METHGB BLD QL: 0 % (ref 0–1.5)
MODALITY: ABNORMAL
MONOCYTES # BLD AUTO: 1.09 10*3/MM3 (ref 0.1–0.9)
MONOCYTES NFR BLD AUTO: 8.7 % (ref 5–12)
NEUTROPHILS NFR BLD AUTO: 74.2 % (ref 42.7–76)
NEUTROPHILS NFR BLD AUTO: 9.3 10*3/MM3 (ref 1.7–7)
NRBC BLD AUTO-RTO: 0 /100 WBC (ref 0–0.2)
OXYHGB MFR BLDV: 97.9 % (ref 94–99)
PAW @ PEAK INSP FLOW SETTING VENT: 0 CMH2O
PCO2 BLDA: 40.6 MM HG (ref 35–45)
PCO2 TEMP ADJ BLD: 40.6 MM HG (ref 35–48)
PEEP RESPIRATORY: 5 CM[H2O]
PH BLDA: 7.51 PH UNITS (ref 7.35–7.45)
PH, TEMP CORRECTED: 7.51 PH UNITS
PHENYTOIN SERPL-MCNC: 2.8 MCG/ML (ref 10–20)
PLATELET # BLD AUTO: 435 10*3/MM3 (ref 140–450)
PMV BLD AUTO: 10.3 FL (ref 6–12)
PO2 BLDA: 114 MM HG (ref 83–108)
PO2 TEMP ADJ BLD: 114 MM HG (ref 83–108)
POTASSIUM SERPL-SCNC: 4 MMOL/L (ref 3.5–5.2)
PROCALCITONIN SERPL-MCNC: 0.17 NG/ML (ref 0–0.25)
RBC # BLD AUTO: 4.09 10*6/MM3 (ref 4.14–5.8)
SODIUM SERPL-SCNC: 154 MMOL/L (ref 136–145)
TOTAL RATE: 23 BREATHS/MINUTE
VENTILATOR MODE: ABNORMAL
VT ON VENT VENT: 0.52 ML
WBC NRBC COR # BLD AUTO: 12.53 10*3/MM3 (ref 3.4–10.8)

## 2024-09-25 PROCEDURE — 99232 SBSQ HOSP IP/OBS MODERATE 35: CPT | Performed by: INTERNAL MEDICINE

## 2024-09-25 PROCEDURE — 94761 N-INVAS EAR/PLS OXIMETRY MLT: CPT

## 2024-09-25 PROCEDURE — 82948 REAGENT STRIP/BLOOD GLUCOSE: CPT

## 2024-09-25 PROCEDURE — 94799 UNLISTED PULMONARY SVC/PX: CPT

## 2024-09-25 PROCEDURE — 80048 BASIC METABOLIC PNL TOTAL CA: CPT | Performed by: INTERNAL MEDICINE

## 2024-09-25 PROCEDURE — 36600 WITHDRAWAL OF ARTERIAL BLOOD: CPT

## 2024-09-25 PROCEDURE — 25010000002 ENOXAPARIN PER 10 MG: Performed by: SURGERY

## 2024-09-25 PROCEDURE — 84145 PROCALCITONIN (PCT): CPT | Performed by: INTERNAL MEDICINE

## 2024-09-25 PROCEDURE — 71045 X-RAY EXAM CHEST 1 VIEW: CPT

## 2024-09-25 PROCEDURE — 94003 VENT MGMT INPAT SUBQ DAY: CPT

## 2024-09-25 PROCEDURE — 82805 BLOOD GASES W/O2 SATURATION: CPT

## 2024-09-25 PROCEDURE — 83050 HGB METHEMOGLOBIN QUAN: CPT

## 2024-09-25 PROCEDURE — 63710000001 REVEFENACIN 175 MCG/3ML SOLUTION: Performed by: SURGERY

## 2024-09-25 PROCEDURE — 25010000002 FUROSEMIDE PER 20 MG: Performed by: SURGERY

## 2024-09-25 PROCEDURE — 85025 COMPLETE CBC W/AUTO DIFF WBC: CPT | Performed by: INTERNAL MEDICINE

## 2024-09-25 PROCEDURE — 82375 ASSAY CARBOXYHB QUANT: CPT

## 2024-09-25 PROCEDURE — 80185 ASSAY OF PHENYTOIN TOTAL: CPT | Performed by: INTERNAL MEDICINE

## 2024-09-25 PROCEDURE — 0 DEXTROSE 5 % SOLUTION: Performed by: INTERNAL MEDICINE

## 2024-09-25 RX ORDER — METOPROLOL TARTRATE 50 MG
50 TABLET ORAL 2 TIMES DAILY
Status: DISCONTINUED | OUTPATIENT
Start: 2024-09-25 | End: 2024-09-25 | Stop reason: HOSPADM

## 2024-09-25 RX ORDER — PHENYTOIN 125 MG/5ML
200 SUSPENSION ORAL 2 TIMES DAILY
Start: 2024-09-25

## 2024-09-25 RX ORDER — FUROSEMIDE 10 MG/ML
40 INJECTION INTRAMUSCULAR; INTRAVENOUS EVERY 12 HOURS
Start: 2024-09-25

## 2024-09-25 RX ORDER — ALBUTEROL SULFATE 0.83 MG/ML
2.5 SOLUTION RESPIRATORY (INHALATION)
Start: 2024-09-25

## 2024-09-25 RX ORDER — DEXTROSE MONOHYDRATE 50 MG/ML
100 INJECTION, SOLUTION INTRAVENOUS CONTINUOUS
Start: 2024-09-25 | End: 2024-09-26

## 2024-09-25 RX ORDER — POLYVINYL ALCOHOL 14 MG/ML
2 SOLUTION/ DROPS OPHTHALMIC
Start: 2024-09-25

## 2024-09-25 RX ORDER — ATORVASTATIN CALCIUM 80 MG/1
80 TABLET, FILM COATED ORAL DAILY
Start: 2024-09-26

## 2024-09-25 RX ORDER — AMINO ACIDS/PROTEIN HYDROLYS 11G-40/45
30 LIQUID IN PACKET (ML) ORAL DAILY
Start: 2024-09-26

## 2024-09-25 RX ORDER — DEXTROSE MONOHYDRATE 50 MG/ML
100 INJECTION, SOLUTION INTRAVENOUS CONTINUOUS
Status: DISCONTINUED | OUTPATIENT
Start: 2024-09-25 | End: 2024-09-25 | Stop reason: HOSPADM

## 2024-09-25 RX ORDER — CITALOPRAM HYDROBROMIDE 40 MG/1
40 TABLET ORAL DAILY
Start: 2024-09-26

## 2024-09-25 RX ORDER — ENOXAPARIN SODIUM 100 MG/ML
40 INJECTION SUBCUTANEOUS DAILY
Start: 2024-09-26

## 2024-09-25 RX ORDER — SENNOSIDES A AND B 8.6 MG/1
2 TABLET, FILM COATED ORAL 2 TIMES DAILY
Start: 2024-09-25

## 2024-09-25 RX ORDER — ACETAMINOPHEN 160 MG/5ML
650 SOLUTION ORAL EVERY 6 HOURS PRN
Start: 2024-09-25

## 2024-09-25 RX ORDER — DIAZEPAM 10 MG
10 TABLET ORAL 2 TIMES DAILY
Start: 2024-09-25 | End: 2024-09-28

## 2024-09-25 RX ORDER — LACTULOSE 10 G/15ML
20 SOLUTION ORAL 3 TIMES DAILY PRN
Start: 2024-09-25

## 2024-09-25 RX ORDER — AMIODARONE HYDROCHLORIDE 200 MG/1
TABLET ORAL
Start: 2024-09-25 | End: 2025-01-13

## 2024-09-25 RX ORDER — PANTOPRAZOLE SODIUM 40 MG/10ML
40 INJECTION, POWDER, LYOPHILIZED, FOR SOLUTION INTRAVENOUS
Start: 2024-09-26

## 2024-09-25 RX ORDER — FENTANYL 75 UG/1
1 PATCH TRANSDERMAL
Start: 2024-09-26 | End: 2024-09-30

## 2024-09-25 RX ORDER — ASPIRIN 81 MG/1
81 TABLET, CHEWABLE ORAL DAILY
Start: 2024-09-26

## 2024-09-25 RX ORDER — ARFORMOTEROL TARTRATE 15 UG/2ML
15 SOLUTION RESPIRATORY (INHALATION)
Start: 2024-09-25

## 2024-09-25 RX ORDER — CHLORHEXIDINE GLUCONATE ORAL RINSE 1.2 MG/ML
15 SOLUTION DENTAL EVERY 12 HOURS SCHEDULED
Start: 2024-09-25

## 2024-09-25 RX ORDER — METOPROLOL TARTRATE 50 MG
50 TABLET ORAL 2 TIMES DAILY
Start: 2024-09-25

## 2024-09-25 RX ADMIN — EMPAGLIFLOZIN 10 MG: 10 TABLET, FILM COATED ORAL at 09:04

## 2024-09-25 RX ADMIN — ENOXAPARIN SODIUM 40 MG: 100 INJECTION SUBCUTANEOUS at 09:03

## 2024-09-25 RX ADMIN — ATORVASTATIN CALCIUM 80 MG: 40 TABLET, FILM COATED ORAL at 09:04

## 2024-09-25 RX ADMIN — REVEFENACIN 175 MCG: 175 SOLUTION RESPIRATORY (INHALATION) at 07:33

## 2024-09-25 RX ADMIN — FUROSEMIDE 40 MG: 10 INJECTION, SOLUTION INTRAMUSCULAR; INTRAVENOUS at 09:03

## 2024-09-25 RX ADMIN — CHLORHEXIDINE GLUCONATE, 0.12% ORAL RINSE 15 ML: 1.2 SOLUTION DENTAL at 09:03

## 2024-09-25 RX ADMIN — DEXTROSE MONOHYDRATE 100 ML/HR: 50 INJECTION, SOLUTION INTRAVENOUS at 09:03

## 2024-09-25 RX ADMIN — ASPIRIN 81 MG 81 MG: 81 TABLET ORAL at 09:04

## 2024-09-25 RX ADMIN — ARFORMOTEROL TARTRATE 15 MCG: 15 SOLUTION RESPIRATORY (INHALATION) at 07:33

## 2024-09-25 RX ADMIN — PANTOPRAZOLE SODIUM 40 MG: 40 INJECTION, POWDER, LYOPHILIZED, FOR SOLUTION INTRAVENOUS at 05:45

## 2024-09-25 RX ADMIN — METOPROLOL TARTRATE 50 MG: 50 TABLET, FILM COATED ORAL at 09:04

## 2024-09-25 RX ADMIN — ALBUTEROL SULFATE 2.5 MG: 2.5 SOLUTION RESPIRATORY (INHALATION) at 00:25

## 2024-09-25 RX ADMIN — CITALOPRAM HYDROBROMIDE 40 MG: 40 TABLET ORAL at 09:04

## 2024-09-25 RX ADMIN — AMIODARONE HYDROCHLORIDE 200 MG: 200 TABLET ORAL at 02:08

## 2024-09-25 RX ADMIN — ACETAMINOPHEN 650 MG: 650 SOLUTION ORAL at 02:08

## 2024-09-25 RX ADMIN — AMIODARONE HYDROCHLORIDE 200 MG: 200 TABLET ORAL at 11:08

## 2024-09-25 RX ADMIN — DIAZEPAM 10 MG: 10 TABLET ORAL at 09:04

## 2024-09-25 RX ADMIN — PHENYTOIN 200 MG: 125 SUSPENSION ORAL at 09:03

## 2024-09-25 RX ADMIN — SENNOSIDES 2 TABLET: 8.6 TABLET, FILM COATED ORAL at 09:04

## 2024-09-25 RX ADMIN — ALBUTEROL SULFATE 2.5 MG: 2.5 SOLUTION RESPIRATORY (INHALATION) at 07:32

## 2024-09-25 RX ADMIN — Medication 10 ML: at 09:04

## 2024-09-25 NOTE — PLAN OF CARE
Problem: Adult Inpatient Plan of Care  Goal: Plan of Care Review  Outcome: Adequate for Care Transition  Flowsheets  Taken 9/25/2024 1105 by Lluvia Porter RN  Outcome Evaluation: Discharging to [facility] rehab  Taken 9/24/2024 1028 by Morenita Olvera OT  Plan of Care Reviewed With: patient   Goal Outcome Evaluation:              Outcome [facility]

## 2024-09-25 NOTE — PROGRESS NOTES
INTENSIVIST   PROGRESS NOTE        SUBJECTIVE     Fernie 64 y.o. male is followed for: Chest Pain       Respiratory Failure Type 2    HFrEF (heart failure with reduced ejection fraction)    NSTEMI (non-ST elevated myocardial infarction)    Right lower lobe pneumonia    As an Intensivist, we provide an integrated approach to the ICU patient and family, medical management of comorbid conditions, including but not limited to electrolytes, glycemic control, organ dysfunction, lead interdisciplinary rounds and coordinate the care with all other services, including those from other specialists.     Interval History:    Uneventful night.    Ready to go to [facility].    Fevers unchanged.    Temp  Min: 98.2 °F (36.8 °C)  Max: 103.4 °F (39.7 °C)       History     Last Reviewed by Victor Hugo Cool MD on 9/3/2024 at  6:37 PM    Sections Reviewed    Medical, Surgical, Family, Tobacco, Alcohol, Drug Use, Sexual Activity,   Social Documentation    Problem list reviewed by Victor Hugo Cool MD on 9/3/2024 at  6:37 PM  Medicines reviewed by Victor Hugo Cool MD on 9/3/2024 at  6:37 PM  Allergies reviewed by Victor Hugo Cool MD on 9/3/2024 at  6:37 PM    The patient's relevant past medical, surgical and social history were reviewed and updated in Epic as appropriate.        OBJECTIVE     Vitals:  Temp: (!) 100.8 °F (38.2 °C) (24 0800) Temp  Min: 98.2 °F (36.8 °C)  Max: 103.4 °F (39.7 °C)   Temp core:      BP: 106/77 (24 1015) BP  Min: 81/63  Max: 149/63   MAP (non-invasive) Noninvasive MAP (mmHg): 89 (24 1015) Noninvasive MAP (mmHg)  Av.9  Min: 41  Max: 177   Pulse: 82 (24 1015) Pulse  Min: 75  Max: 105   Resp: 20 (24 0800) Resp  Min: 20  Max: 21   SpO2: 95 % (24 1015) SpO2  Min: 86 %  Max: 100 %   Device: ventilator (24 0800)    Flow Rate: 70 (24 1800) No data recorded         24  0924  0515   Weight: 110 kg (241 lb 7.2 oz) 110 kg (241 lb 6.5 oz)         Intake/Ouptut 24 hrs (7:00AM - 6:59 AM)  Intake & Output (last 2 days)         09/23 0701 09/24 0700 09/24 0701 09/25 0700 09/25 0701 09/26 0700    I.V. (mL/kg) 555.7 (5.1) 121.2 (1.1)     Other 476 630     NG/GT 1188 1407     IV Piggyback       Total Intake(mL/kg) 2219.7 (20.2) 2158.2 (19.8)     Urine (mL/kg/hr) 3255 (1.2) 2475 (0.9) 400 (1)    Stool 0 0     Total Output 3255 2475 400    Net -1035.3 -316.8 -400           Stool Unmeasured Occurrence 3 x 1 x             Medications (drips):    dextrose, Last Rate: 100 mL/hr (09/25/24 0903)       Invasive Mechanical Ventilator   Settings: Observed:   Mode: VC+/AC (09/25/24 1000)    Resp Rate (Set): 16 (09/25/24 1000) Resp Rate (Observed) Vent: 16 (09/25/24 1015)   Vt (Set, mL): 490 mL (09/25/24 1000) Vt, Exp (observed, mL): 469 mL (09/25/24 1000)    Minute Ventilation (L/min) (Obs): 8.08 L/min (09/25/24 1000)    I:E Ratio (Obs): 1:2 (09/25/24 1000)       FiO2 (%): 35 % (09/25/24 1000) Plateau Pressure (cm H2O): (S) 24 cm H2O (09/25/24 0736)   PEEP/CPAP (cm H2O): 5 cm H20 (09/25/24 1000) Driving Pressure (cm H2O): 18.6 cm H2O (09/25/24 0736)    Static Compliance (L/cm H2O): 19 (09/25/24 0736)      Physical Examination  Telemetry:  Rhythm: normal sinus rhythm (09/25/24 1000)  Conduction Defect: right bundle branch block (09/14/24 1800)   Constitutional:  No acute distress.   Cardiovascular: RRR.    Respiratory: Coarse breath sounds  No adventitious sounds   Abdominal:  Soft with no tenderness.   Extremities: Trace Edema   Neurological:   Awake.    Best Eye Response: 4-->(E4) spontaneous (09/25/24 1000)  Best Motor Response: 6-->(M6) obeys commands (09/25/24 1000)  Best Verbal Response: 1-->(V1) none (09/25/24 1000)  [place] Coma Scale Score: 11 (09/25/24 1000)    RASS (Chery Agitation-Sedation Scale): 0-->alert and calm (09/22/24 1800)    Confusion Assessment Method-ICU (CAM-ICU)  Feature 3: Altered Level of Consciousness: Negative (09/22/24 1800)     Results  Reviewed:  Laboratory  Microbiology  Radiology  Pathology    Hematology:  Results from last 7 days   Lab Units 09/25/24  0407 09/24/24  0311 09/23/24  0405   WBC 10*3/mm3 12.53* 12.02* 11.99*   HEMOGLOBIN g/dL 10.9* 11.0* 11.2*   MCV fL 89.2 90.4 88.7   PLATELETS 10*3/mm3 435 468* 515*     Results from last 7 days   Lab Units 09/25/24  0407 09/24/24  0311 09/23/24  0405   NEUTROS ABS 10*3/mm3 9.30* 9.18* 9.09*   LYMPHS ABS 10*3/mm3 1.50 1.17 1.08   EOS ABS 10*3/mm3 0.49* 0.29 0.26     Chemistry:  Estimated Creatinine Clearance: 156.4 mL/min (A) (by C-G formula based on SCr of 0.59 mg/dL (L)).    Results from last 7 days   Lab Units 09/25/24 0407 09/24/24 1405 09/24/24  0311   SODIUM mmol/L 154*  --  146*   POTASSIUM mmol/L 4.0 4.1 3.5   CHLORIDE mmol/L 111*  --  103   CO2 mmol/L 28.0  --  31.0*   BUN mg/dL 33*  --  36*   CREATININE mg/dL 0.59*  --  0.70*   GLUCOSE mg/dL 153*  --  147*     Results from last 7 days   Lab Units 09/25/24  0407 09/24/24 0311 09/23/24  1404 09/23/24  0405   CALCIUM mg/dL 9.3 9.2  --  9.4   MAGNESIUM mg/dL  --  2.6*  --  2.8*   PHOSPHORUS mg/dL  --  2.7 2.3* 1.3*     Hepatic Panel:  Results from last 7 days   Lab Units 09/23/24  0405   ALBUMIN g/dL 3.5   TOTAL PROTEIN g/dL 8.0   BILIRUBIN mg/dL 0.4   AST (SGOT) U/L 105*   ALT (SGPT) U/L 85*   ALK PHOS U/L 129*     Biomarkers:  Results from last 7 days   Lab Units 09/25/24  0407 09/23/24  0405 09/19/24  0359   CRP mg/dL  --  19.40*  --    PROCALCITONIN ng/mL 0.17  --  0.20       Phenytoin Level   Date Value Ref Range Status   09/25/2024 2.8 (L) 10.0 - 20.0 mcg/mL Final   09/17/2024 3.3 (L) 10.0 - 20.0 mcg/mL Final     COVID-19  Lab Results   Component Value Date    COVID19 Not Detected 09/20/2024    COVID19 Not Detected 09/01/2024    COVID19 Not Detected 04/22/2023    COVID19 Negative 03/23/2023     Lab Results   Component Value Date    PHENYTOIN 2.8 (L) 09/25/2024    PHENYTOIN 3.3 (L) 09/17/2024    PHENYTOIN 7.4 (L) 09/10/2024     PHENYTOIN 0.9 (L) 09/08/2024    PHENYTOIN 1.7 (L) 09/03/2024     Arterial Blood Gases:  Results from last 7 days   Lab Units 09/25/24  0338 09/24/24  0412 09/23/24  0356   PH, ARTERIAL pH units 7.511* 7.474* 7.520*   PCO2, ARTERIAL mm Hg 40.6 48.2* 45.7*   PO2 ART mm Hg 114.0* 79.4* 68.1*   FIO2 % 40 35 35       Images:  XR Chest 1 View    Result Date: 9/25/2024  Impression: Stable, mild nonspecific pulmonary interstitial disease pattern and borderline heart shadow enlargement. Small nodular opacity at the right apex again noted. No new chest disease. Electronically Signed: Dani Trejo MD  9/25/2024 7:23 AM EDT  Workstation ID: [id number]    XR Chest 1 View    Result Date: 9/24/2024  Impression: 1. Mostly stable, chronic appearing interstitial lung changes similar to yesterday's exam. 2. Subtle 15 mm nodular density superimposed over the right apex, which may be artifactual or focal airspace disease. Electronically Signed: Dani Trejo MD  9/24/2024 7:37 AM EDT  Workstation ID: [id number]     Echo:  Results for orders placed during the hospital encounter of 09/01/24    Adult Transthoracic Echo Complete W/ Cont if Necessary Per Protocol    Interpretation Summary    Left ventricular systolic function is moderately decreased. Estimated left ventricular EF = 28%.  There is global hypokinesis of left ventricle.    The left ventricular cavity is mildly dilated.    The left atrial cavity is mild to moderately dilated.    Mild aortic valve stenosis is present (mean gradient 15 mmHg)    Mild to moderate mitral regurgitation is present.    Estimated right ventricular systolic pressure from tricuspid regurgitation is normal (<35 mmHg).      Results: Reviewed.  I reviewed the patient's new laboratory and imaging results.  I independently reviewed the patient's new images.    Medications: Reviewed.    Assessment   A/P     Hospital:  LOS: 22 days   ICU: 21d 16h     Active Hospital Problems    Diagnosis  POA    **Respiratory Failure Type  2 [J96.92]  Yes    Right lower lobe pneumonia [J18.9]  Yes    NSTEMI (non-ST elevated myocardial infarction) [I21.4]  Yes     Elevated troponin in the setting of pneumonia and CHF, 9/1/2024      HFrEF (heart failure with reduced ejection fraction) [I50.20]  Yes     Echo (9/29/2023): LVEF 36%.  Moderate MR.  RVSP 42 mmHg       Fernie is a 64 y.o. male admitted on 9/1/2024 with worsening Dyspnea [R06.00]  Acute on chronic respiratory failure with hypoxia [J96.21]    Admitted with chest pain, heart failure, dyspnea and chills, which was attributed to COPD with exacerbation due to a RLL pneumonia. Despite effective diuresis, he continued to decline.    On 09/03/24 he developed some respiratory distress and respiratory arrest.  He was emergently intubated. During this admission he had elevated troponin and ST changes consistent with a non-ST elevation MI.  He also has obstructive sleep apnea but is noncompliant with CPAP.    09/18/24 His clinical course has been complicated with prolonged Invasive Mechanical Ventilation requiring Tracheostomy and a PEG.    On 09/23/24 A Flutter with RVR, requiring ECV.      Assessment/Management/Treatment Plan:    Respiratory Failure, type 2, acute on chronic  Intubated 09/03/24   Invasive Mechanical Ventilation   Trach 09/18/24  Possible RLL pneumonia  Former smoker. Quit 2022  OAD per history.   Cardiovascular  A Flutter with RVRT, requiring ECV on 09/23/24 ? NSR  HFrEF. EF 28%  CAD. NSTEMI. Chronically occluded RCA.  Dyslipidemia   HTN  Antibiotics  Elevated PCT on admission, 4.18 (09/01/24), in the range of sepsis. Improving.    Lab Results   Component Value Date    PROCALCITO 0.17 09/25/2024    PROCALCITO 0.20 09/19/2024    PROCALCITO 0.13 09/18/2024    PROCALCITO 0.29 (H) 09/09/2024    PROCALCITO 0.41 (H) 09/07/2024    PROCALCITO 0.61 (H) 09/06/2024     MRSA PCR screen Negative  Antibiotics: Piperacillin-Tazobactam   Nutrition Support  Tube Feeding: Formula: Peptamen Intense VHP  (Peptide Based, Very High Protein); Feeding Type: Continuous; Start at: Other; Other: 75; Then Advance By: Do Not Advance; Total Daily Feeding Volume (mL/day): 1500; Water Flush: 30 mL; Every: 1 hour  Peptamen Intense (1.0 kcal/mL, 92 g protein/L, 4 g fiber/L)  He is receiving Phenytoin, and Enteral Nutrition is on hold 1 h before and after each dose. (BID)    Lab Results   Component Value Date    PREALBUMIN 12.1 (L) 09/23/2024    PREALBUMIN 14.3 (L) 09/09/2024    CRP 19.40 (H) 09/23/2024    CRP 11.36 (H) 09/09/2024     Endocrine   Body mass index is 35.75 kg/m². Obese Class II: 35-39.9kg/m2  Type 2 diabetes.    Lab Results   Lab Value Date/Time    HGBA1C 6.00 (H) 09/01/2024 1050    HGBA1C 7.30 (H) 04/25/2023 0440     Results from last 7 days   Lab Units 09/25/24  0545 09/24/24  2325 09/24/24  1745 09/24/24  1221 09/24/24  0506 09/23/24  2331 09/23/24  1752 09/23/24  1203   GLUCOSE mg/dL 133* 131* 165* 164* 150* 155* 178* 153*       Diet: NPO Diet NPO Type: Strict NPO, Sips with Meds   Advance Directives: Code Status and Medical Interventions: CPR (Attempt to Resuscitate); Full Support   Ordered at: 09/01/24 1318     Level Of Support Discussed With:    Patient     Code Status (Patient has no pulse and is not breathing):    CPR (Attempt to Resuscitate)     Medical Interventions (Patient has pulse or is breathing):    Full Support        VTE Prophylaxis:  Mechanical VTE prophylaxis orders are signed & held. Pharmacologic VTE prophylaxis orders are present.         In brief:  A Flutter per Dr. Gaitan  Amiodarone PO  Metoprolol  Phenytoin level Low. No seizures this admission.  Goal: Glucose < 180 mg/dL.   Insulin SQ - correction scale for now.  Disposition:  Transfer to [facility] today.    Plan of care and goals reviewed during interdisciplinary rounds.  I discussed the patient's findings and my recommendations with patient and nursing staff    Time < 30 min.    [x] Primary Attending Intensive Care Medicine - Nutrition  Support   [] Consultant    Copied text in this note has been reviewed and is accurate as of 09/25/24

## 2024-09-25 NOTE — CASE MANAGEMENT/SOCIAL WORK
Case Management Discharge Note      Final Note: Per MDR,patient discharging today to [facility] for vent weaning. RN to call report to [phone] and send d/c packet. D/C summary will be pulled out of Epic when completed by Debra @ [facility]. I have arranged [facility] stretcher transport for today @ 11am. PCS in dropbox. No other d/c needs. I left VM on cell phone for sibling Oumou updating on above. I will call on home phone as well. Attempted to call home phone no answer, rang several times, not able to leave VM.         Selected Continued Care - Admitted Since 9/1/2024       Destination Coordination complete.      Service Provider Selected Services Address Phone Fax Patient Preferred    [facility] Sk[facility] [address] [place], [place] [id number] [phone] --              Durable Medical Equipment    No services have been selected for the patient.                Dialysis/Infusion    No services have been selected for the patient.                Home Medical Care    No services have been selected for the patient.                Therapy    No services have been selected for the patient.                Community Resources    No services have been selected for the patient.                Community & DME    No services have been selected for the patient.                         Final Discharge Disposition Code: 03 - skilled nursing facility (SNF)

## 2024-09-25 NOTE — CASE MANAGEMENT/SOCIAL WORK
Continued Stay Note  [facility]     Patient Name: Fernie Dalal  MRN: [id number]  Today's Date: 9/25/2024    Admit Date: 9/1/2024    Plan: [facility]   Discharge Plan       Row Name 09/25/24 0831       Plan    Plan [facility]    Patient/Family in Agreement with Plan yes    Plan Comments I spoke w/Debra this am @ [facility], plan to d/c there today. She reviewed labs, updated that he has 11 am EMS [facility] ALS transport scheduled.    Final Discharge Disposition Code 03 - skilled nursing facility (SNF)                   Discharge Codes    No documentation.                 Expected Discharge Date and Time       Expected Discharge Date Expected Discharge Time    Sep 27, 2024               Aniket Marquez RN

## 2024-09-25 NOTE — PROGRESS NOTES
"[facility]  IP Progress Note      Chief Complaint: Tachycardia/atrial flutter    Subjective   Awake, responsive, denies chest pain.  Remains on ventilator, intermittently coughing.  Maintaining sinus rhythm.  Objective     Blood pressure 120/76, pulse 98, temperature (!) 101.4 °F (38.6 °C), temperature source Oral, resp. rate 20, height 175 cm (68.9\"), weight 110 kg (241 lb 6.5 oz), SpO2 93%.     Intake/Output Summary (Last 24 hours) at 9/25/2024 0751  Last data filed at 9/25/2024 0600  Gross per 24 hour   Intake 2158.2 ml   Output 2475 ml   Net -316.8 ml       Physical Exam:  General: No apparent distress.  On ventilator via tracheostomy.  Neck: no JVD.  Chest:No respiratory distress, breath sounds are diminished at bases, scattered rhonchi but  Cardiovascular: Normal S1 and S2, distant  Extremities: No significant edema.      Results Review:     I reviewed the patient's new clinical results.    Results from last 7 days   Lab Units 09/25/24  0407   WBC 10*3/mm3 12.53*   HEMOGLOBIN g/dL 10.9*   HEMATOCRIT % 36.5*   PLATELETS 10*3/mm3 435     Results from last 7 days   Lab Units 09/25/24  0407 09/23/24  1404 09/23/24  0405   SODIUM mmol/L 154*   < > 149*   POTASSIUM mmol/L 4.0   < > 3.3*   CHLORIDE mmol/L 111*   < > 103   CO2 mmol/L 28.0   < > 33.0*   BUN mg/dL 33*   < > 35*   CREATININE mg/dL 0.59*   < > 0.73*   CALCIUM mg/dL 9.3   < > 9.4   BILIRUBIN mg/dL  --   --  0.4   ALK PHOS U/L  --   --  129*   ALT (SGPT) U/L  --   --  85*   AST (SGOT) U/L  --   --  105*   GLUCOSE mg/dL 153*   < > 166*    < > = values in this interval not displayed.     Results from last 7 days   Lab Units 09/25/24  0407   SODIUM mmol/L 154*   POTASSIUM mmol/L 4.0   CHLORIDE mmol/L 111*   CO2 mmol/L 28.0   BUN mg/dL 33*   CREATININE mg/dL 0.59*   GLUCOSE mg/dL 153*   CALCIUM mg/dL 9.3         Lab Results   Component Value Date    TROPONINT 338 (C) 09/03/2024     Pharmacy Consult, , Does not apply, " Q12H  Albuterol Sulfate NEB Orderable, 2.5 mg, Nebulization, Q6H - RT  amiodarone, 200 mg, Nasogastric, Q8H   Followed by  [START ON 10/1/2024] amiodarone, 200 mg, Nasogastric, Q12H   Followed by  [START ON 10/15/2024] amiodarone, 200 mg, Nasogastric, Daily  arformoterol, 15 mcg, Nebulization, BID - RT  aspirin, 81 mg, Nasogastric, Daily  atorvastatin, 80 mg, Nasogastric, Daily  [START ON 9/26/2024] fentaNYL, 1 patch, Transdermal, Q72H   And  Check Fentanyl Patch Placement, 1 each, Does not apply, Q12H  chlorhexidine, 15 mL, Mouth/Throat, Q12H  citalopram, 40 mg, Nasogastric, Daily  diazePAM, 10 mg, Nasogastric, BID  empagliflozin, 10 mg, Nasogastric, Daily  enoxaparin, 40 mg, Subcutaneous, Daily  furosemide, 40 mg, Intravenous, Q12H  insulin regular, 2-7 Units, Subcutaneous, Q6H  metoprolol tartrate, 25 mg, Nasogastric, BID  pantoprazole, 40 mg, Intravenous, Q AM  pharmacy consult - MTM, , Does not apply, Daily  phenytoin, 200 mg, Nasogastric, BID  ProSource No Carb, 30 mL, Nasogastric, Daily  revefenacin, 175 mcg, Nebulization, Daily - RT  senna, 2 tablet, Oral, BID  sodium chloride, 10 mL, Intravenous, Q12H         Tele: Sinus Rythym with PACs.      Assessment:  Atrial flutter with RVR, now maintaining sinus rhythm post ECV and on amiodarone.  NSTEMI in the setting of demand ischemia with known chronically occluded RCA, he did not have significant left coronary system disease by cath last year.  Acute on chronic systolic heart failure, EF 28% this admission. Last EF 36% by echo 10/2023  Cardiomyopathy.  Pneumonia  Acute on chronic respiratory failure with hypoxia  Coronary artery disease  Dyslipidemia  Hypertension    Plan:  Increase metoprolol to 50 mg twice daily.  Continue amiodarone.    Continue Jardiance and Lasix, subsequently consider adding low-dose ARB if blood pressure allows.  Continue aspirin and statin therapy.  Management of multiple medical conditions per ICU team.    Marsha Gaitan MD, FACC,  FSCAI

## 2024-09-25 NOTE — PLAN OF CARE
Goal Outcome Evaluation:  -no acute events overnight  -pt remains on mechanical ventilation PEEP of 5 FiO2 @35%  -pt follows commands but is still unable to use communication board   -awake and unrestrained  -pt had a fever earlier in shift, oral temp has now resolved after ice packs and tylenol x2 given.   -UOP adequate this shift  -plan to d/c to [facility] tomorrow morning

## 2024-09-25 NOTE — PLAN OF CARE
Goal Outcome Evaluation:            Pt still requiring mechanical ventilation

## 2024-09-27 LAB
BACTERIA SPEC AEROBE CULT: NORMAL
QT INTERVAL: 328 MS
QT INTERVAL: 398 MS
QT INTERVAL: 422 MS
QTC INTERVAL: 519 MS
QTC INTERVAL: 523 MS
QTC INTERVAL: 553 MS

## 2024-10-01 LAB
MYCOBACTERIUM SPEC CULT: NORMAL
NIGHT BLUE STAIN TISS: NORMAL

## 2024-10-02 LAB — FUNGUS WND CULT: NORMAL

## 2024-10-08 LAB
MYCOBACTERIUM SPEC CULT: NORMAL
NIGHT BLUE STAIN TISS: NORMAL

## 2024-10-09 LAB — FUNGUS WND CULT: NORMAL

## 2024-10-15 LAB
MYCOBACTERIUM SPEC CULT: NORMAL
NIGHT BLUE STAIN TISS: NORMAL

## 2024-10-16 LAB — FUNGUS WND CULT: NORMAL

## (undated) DEVICE — PATIENT RETURN ELECTRODE, SINGLE-USE, CONTACT QUALITY MONITORING, ADULT, WITH 9FT CORD, FOR PATIENTS WEIGING OVER 33LBS. (15KG): Brand: MEGADYNE

## (undated) DEVICE — INTRO SHEATH PRELUDE IDEAL SPRNG COIL 021 6F 23X80CM

## (undated) DEVICE — ST INF PRI SMRTSTE 20DRP 2VLV 24ML 117

## (undated) DEVICE — PK CATH CARD 10

## (undated) DEVICE — SUT SILK 3/0 SH CR8 18IN C013D

## (undated) DEVICE — Device

## (undated) DEVICE — CATH DIAG EXPO M/ PK 5F FL4/FR4 PIG

## (undated) DEVICE — DRESSING,OPTIFOAM,NON-ADHESIVE,4X4: Brand: MEDLINE

## (undated) DEVICE — SUT PROLN 2/0 PC3 8833H

## (undated) DEVICE — GW INQWIRE FC PTFE STD J/1.5 .035 260

## (undated) DEVICE — SYS DRAIN ENTRL FARRELL ENFIT 250ML

## (undated) DEVICE — DRSNG WND OPTIFOAM FEN LF 3X3IN STRL

## (undated) DEVICE — DRAINBAG,ANTI-REFLUX TOWER,L/F,2000ML,LL: Brand: MEDLINE

## (undated) DEVICE — AIR AND WATER TUBING/CAP SET FOR OLYMPUS® SCOPES: Brand: ERBE

## (undated) DEVICE — ST EXT IV SMRTSTE 2VLV FIX M LL 6ML 41

## (undated) DEVICE — PENCL SMOKE/EVAC MEGADYNE TELESCP 10FT

## (undated) DEVICE — PK MINOR SPLT 10

## (undated) DEVICE — GLV SURG SENSICARE PI MIC PF SZ7.5 LF STRL

## (undated) DEVICE — GLV SURG SENSICARE PI MIC PF SZ7 LF STRL

## (undated) DEVICE — THE BITE BLOCK MAXI, LATEX FREE STRAP IS USED TO PROTECT THE ENDOSCOPE INSERTION TUBE FROM BEING BITTEN BY THE PATIENT.

## (undated) DEVICE — APPL CHLORAPREP TINTED 26ML TEAL

## (undated) DEVICE — ADULT, W/LG. BACK PAD, RADIOTRANSPARENT ELEMENT AND LEAD WIRE: Brand: DEFIBRILLATION ELECTRODES

## (undated) DEVICE — DEV COMPR RADL PRELUDESYNCEZ 30ML 32CM

## (undated) DEVICE — SUT ETHLN 2/0 PS 18IN 585H

## (undated) DEVICE — HDRST POSTIN FM CRDL TRACH SLOT NONCOMRESS 9X8X4IN

## (undated) DEVICE — BG DRN ENTERNAL W/ENFIT/CONN PVC 1000ML 150CM/TBG

## (undated) DEVICE — TRAP FLD MINIVAC MEGADYNE 100ML

## (undated) DEVICE — KT PEG ENDOVIVE ENFIT STD PULL 20F 1P/U

## (undated) DEVICE — MODEL BT2000 P/N 700287-012KIT CONTENTS: MANIFOLD WITH SALINE AND CONTRAST PORTS, SALINE TUBING WITH SPIKE AND HAND SYRINGE, TRANSDUCER: Brand: BT2000 AUTOMATED MANIFOLD KIT

## (undated) DEVICE — SYR LL TP 10ML STRL

## (undated) DEVICE — CATH DIAG IMPULSE FL3 6F 100CM

## (undated) DEVICE — GOWN SURG ORBIS LVL3 2XL STRL